# Patient Record
Sex: FEMALE | Race: BLACK OR AFRICAN AMERICAN | NOT HISPANIC OR LATINO | Employment: OTHER | URBAN - METROPOLITAN AREA
[De-identification: names, ages, dates, MRNs, and addresses within clinical notes are randomized per-mention and may not be internally consistent; named-entity substitution may affect disease eponyms.]

---

## 2017-01-04 ENCOUNTER — OFFICE VISIT (OUTPATIENT)
Dept: OBSTETRICS AND GYNECOLOGY | Facility: CLINIC | Age: 62
End: 2017-01-04
Attending: OBSTETRICS & GYNECOLOGY
Payer: COMMERCIAL

## 2017-01-04 ENCOUNTER — HOSPITAL ENCOUNTER (OUTPATIENT)
Dept: RADIOLOGY | Facility: OTHER | Age: 62
Discharge: HOME | End: 2017-01-04
Attending: OBSTETRICS & GYNECOLOGY
Payer: COMMERCIAL

## 2017-01-04 VITALS
SYSTOLIC BLOOD PRESSURE: 150 MMHG | DIASTOLIC BLOOD PRESSURE: 78 MMHG | BODY MASS INDEX: 40.01 KG/M2 | HEIGHT: 64 IN | WEIGHT: 234.38 LBS

## 2017-01-04 DIAGNOSIS — Z12.31 OTHER SCREENING MAMMOGRAM: ICD-10-CM

## 2017-01-04 DIAGNOSIS — Z01.419 ENCOUNTER FOR GYNECOLOGICAL EXAMINATION WITHOUT ABNORMAL FINDING: Primary | ICD-10-CM

## 2017-01-04 DIAGNOSIS — Z12.4 SCREENING FOR CERVICAL CANCER: ICD-10-CM

## 2017-01-04 DIAGNOSIS — N76.0 ACUTE VAGINITIS: ICD-10-CM

## 2017-01-04 PROCEDURE — 77067 SCR MAMMO BI INCL CAD: CPT | Mod: 26,,, | Performed by: RADIOLOGY

## 2017-01-04 PROCEDURE — 77067 SCR MAMMO BI INCL CAD: CPT | Mod: TC

## 2017-01-04 PROCEDURE — 3078F DIAST BP <80 MM HG: CPT | Mod: S$GLB,,, | Performed by: OBSTETRICS & GYNECOLOGY

## 2017-01-04 PROCEDURE — 99396 PREV VISIT EST AGE 40-64: CPT | Mod: S$GLB,,, | Performed by: OBSTETRICS & GYNECOLOGY

## 2017-01-04 PROCEDURE — 3077F SYST BP >= 140 MM HG: CPT | Mod: S$GLB,,, | Performed by: OBSTETRICS & GYNECOLOGY

## 2017-01-04 PROCEDURE — 99999 PR PBB SHADOW E&M-EST. PATIENT-LVL III: CPT | Mod: PBBFAC,,, | Performed by: OBSTETRICS & GYNECOLOGY

## 2017-01-04 PROCEDURE — 88175 CYTOPATH C/V AUTO FLUID REDO: CPT

## 2017-01-04 RX ORDER — TERCONAZOLE 4 MG/G
1 CREAM VAGINAL DAILY
Qty: 45 G | Refills: 1 | Status: SHIPPED | OUTPATIENT
Start: 2017-01-04 | End: 2018-10-23

## 2017-01-04 RX ORDER — DICLOFENAC SODIUM 50 MG/1
TABLET, DELAYED RELEASE ORAL
Refills: 2 | COMMUNITY
Start: 2016-12-30 | End: 2018-03-15 | Stop reason: SDUPTHER

## 2017-01-04 RX ORDER — OXYBUTYNIN CHLORIDE 10 MG/1
TABLET, EXTENDED RELEASE ORAL
Refills: 11 | COMMUNITY
Start: 2016-12-12 | End: 2017-09-20 | Stop reason: ALTCHOICE

## 2017-01-04 NOTE — MR AVS SNAPSHOT
"    [facility], [place], operated by [facility] [address]  [address]  [facility] [address]  Phone: [phone]  Fax: [phone]                  Adriana Paula   2017 10:00 AM   Office Visit    Description:  Female : 1955   Provider:  Tomasa Boston MD   Department:  [facility], [place], operated by [facility] [address]           Reason for Visit     Gynecologic Exam                To Do List           Goals (5 Years of Data)     None      [facility]     [facility] Care [facility] -  Assistance  Registered nurses in the [facility] Call Center provide clinical advisement, health education, appointment booking, and other advisory services.  Call for this free service at [phone].             Medications           Message regarding Medications     Verify the changes and/or additions to your medication regime listed below are the same as discussed with your clinician today.  If any of these changes or additions are incorrect, please notify your healthcare provider.             Verify that the below list of medications is an accurate representation of the medications you are currently taking.  If none reported, the list may be blank. If incorrect, please contact your healthcare provider. Carry this list with you in case of emergency.           Current Medications     amlodipine (NORVASC) 10 MG tablet Take 1 tablet (10 mg total) by mouth once daily.    blood sugar diagnostic (ONETOUCH VERIO) Strp Pt use 4x/day    blood-glucose meter (ONETOUCH VERIO IQ METER) kit Use as instructed    insulin needles, disposable, (BD ULTRA-FINE RUDDY PEN NEEDLES) 32 x 5/32 " Ndle Use with insulin pens    insulin NPH-insulin regular, 70/30, (HUMULIN 70/30) 100 unit/mL (70-30) injection INJECT 35 UNITS UNDER THEN SKIN IN THE MORNING AND 26 UNITS UNDER THE SKIN WITH DINNER    insulin syringe-needle U-100 1 mL 31 gauge x 5/16 Syrg USE TWICE DAILY AS DIRECTED    lancets (ONE TOUCH DELICA LANCETS) 33 gauge Misc 1 lancet by " "Misc.(Non-Drug; Combo Route) route 4 (four) times daily. Pt use 4x/day    losartan (COZAAR) 100 MG tablet Take 1 tablet (100 mg total) by mouth once daily.    metformin (GLUCOPHAGE-XR) 500 MG 24 hr tablet TAKE 2 TABLETS BY MOUTH TWICE DAILY WITH MEALS    tramadol (ULTRAM) 50 mg tablet Take 1-2 tablets ( mg total) by mouth every 6 (six) hours as needed.    aspirin (ECOTRIN) 81 MG EC tablet Take 81 mg by mouth once daily.    diclofenac (VOLTAREN) 50 MG EC tablet     oxybutynin (DITROPAN-XL) 10 MG 24 hr tablet            Clinical Reference Information           Vital Signs - Last Recorded  Most recent update: 1/4/2017 10:18 AM by Kiersten Andrade MA    BP Ht Wt BMI       (!) 150/78 5' 4" (1.626 m) 106.3 kg (234 lb 5.6 oz) 40.23 kg/m2       Blood Pressure          Most Recent Value    BP  (!)  150/78      Allergies as of 1/4/2017     Keflex [Cephalexin]    Penicillins    Sulfa (Sulfonamide Antibiotics)      Immunizations Administered on Date of Encounter - 1/4/2017     None      "

## 2017-01-04 NOTE — PROGRESS NOTES
"SUBJECTIVE:   61 y.o. female   for annual routine Pap and checkup. No LMP recorded. Patient is postmenopausal..  She complains of external vaginal itching. .        Past Medical History   Diagnosis Date    Allergy     Anemia     Anxiety     Arthritis     Cataract     DR (diabetic retinopathy)     Dyslipidemia     GERD (gastroesophageal reflux disease)     Glaucoma     Hypertension     PN (peripheral neuropathy)     Sleep apnea     Type II or unspecified type diabetes mellitus with other specified manifestations, uncontrolled      Past Surgical History   Procedure Laterality Date     section      Myomectomy      Carpal tunnel release      Knee arthroscopy  14     right    Cataract extraction       Social History     Social History    Marital status: Single     Spouse name: N/A    Number of children: N/A    Years of education: N/A     Occupational History    Not on file.     Social History Main Topics    Smoking status: Former Smoker     Start date: 2002    Smokeless tobacco: Never Used    Alcohol use Yes      Comment: socially    Drug use: No    Sexual activity: No     Other Topics Concern    Not on file     Social History Narrative    . 1 daughter. Works as  at [employer] [place] [employer].      Family History   Problem Relation Age of Onset    Arthritis Mother     Cancer Mother     Diabetes Mother     Heart disease Mother     Miscarriages / Stillbirths Mother     Vision loss Mother     No Known Problems Father     Diabetes       "Half of my family"    Breast cancer Maternal Grandmother     Breast cancer Sister     Breast cancer Cousin     No Known Problems Brother     No Known Problems Maternal Aunt     No Known Problems Maternal Uncle     No Known Problems Paternal Aunt     No Known Problems Paternal Uncle     No Known Problems Maternal Grandfather     No Known Problems Paternal Grandmother     No Known Problems Paternal Grandfather " "    Thyroid disease Neg Hx     Ovarian cancer Neg Hx     Colon cancer Neg Hx     Amblyopia Neg Hx     Blindness Neg Hx     Cataracts Neg Hx     Glaucoma Neg Hx     Hypertension Neg Hx     Macular degeneration Neg Hx     Retinal detachment Neg Hx     Strabismus Neg Hx     Stroke Neg Hx      OB History    Para Term  AB SAB TAB Ectopic Multiple Living   2    1 1          # Outcome Date GA Lbr Leroy/2nd Weight Sex Delivery Anes PTL Lv   2       CS-LTranv      1 SAB                     Current Outpatient Prescriptions   Medication Sig Dispense Refill    amlodipine (NORVASC) 10 MG tablet Take 1 tablet (10 mg total) by mouth once daily. 90 tablet 3    blood sugar diagnostic (ONETOUCH VERIO) Strp Pt use 4x/day 200 strip 12    blood-glucose meter (ONETOUCH VERIO IQ METER) kit Use as instructed 200 each 11    insulin needles, disposable, (BD ULTRA-FINE RUDDY PEN NEEDLES) 32 x 5/32 " Ndle Use with insulin pens 200 each 3    insulin NPH-insulin regular, 70/30, (HUMULIN 70/30) 100 unit/mL (70-30) injection INJECT 35 UNITS UNDER THEN SKIN IN THE MORNING AND 26 UNITS UNDER THE SKIN WITH DINNER 40 mL 3    insulin syringe-needle U-100 1 mL 31 gauge x 5/16 Syrg USE TWICE DAILY AS DIRECTED 100 each 11    lancets (ONE TOUCH DELICA LANCETS) 33 gauge Misc 1 lancet by Misc.(Non-Drug; Combo Route) route 4 (four) times daily. Pt use 4x/day 200 each 12    losartan (COZAAR) 100 MG tablet Take 1 tablet (100 mg total) by mouth once daily. 90 tablet 3    metformin (GLUCOPHAGE-XR) 500 MG 24 hr tablet TAKE 2 TABLETS BY MOUTH TWICE DAILY WITH MEALS 360 tablet 2    tramadol (ULTRAM) 50 mg tablet Take 1-2 tablets ( mg total) by mouth every 6 (six) hours as needed. 120 tablet 1    aspirin (ECOTRIN) 81 MG EC tablet Take 81 mg by mouth once daily.      diclofenac (VOLTAREN) 50 MG EC tablet   2    oxybutynin (DITROPAN-XL) 10 MG 24 hr tablet   11    terconazole (TERAZOL 7) 0.4 % Crea Place 1 applicator " vaginally once daily. 45 g 1     No current facility-administered medications for this visit.      Allergies: Keflex [cephalexin]; Penicillins; and Sulfa (sulfonamide antibiotics)     ROS:  Constitutional: no weight loss, weight gain, fever, fatigue  Eyes:  No vision changes, glasses/contacts  ENT/Mouth: No ulcers, sinus problems, ears ringing, headache  Cardiovascular: No inability to lie flat, chest pain, exercise intolerance, swelling, heart palpitations  Respiratory: No wheezing, coughing blood, shortness of breath, or cough  Gastrointestinal: No diarrhea, bloody stool, nausea/vomiting, constipation, gas, hemorrhoids  Genitourinary: No blood in urine, painful urination, urgency of urination, frequency of urination, incomplete emptying, incontinence, abnormal bleeding, painful periods, heavy periods, vaginal discharge, vaginal odor, painful intercourse, sexual problems, bleeding after intercourse, +vaginal itching.  Musculoskeletal: No muscle weakness  Skin/Breast: No painful breasts, nipple discharge, masses, rash, ulcers  Neurological: No passing out, seizures, numbness, headache  Endocrine: +diabetes, hypothyroid, hyperthyroid, hot flashes, hair loss, abnormal hair growth, acne  Psychiatric: No depression, crying  Hematologic: No bruises, bleeding, swollen lymph nodes, anemia.      Physical Exam:   Constitutional: She is oriented to person, place, and time. She appears well-developed and well-nourished.      Neck: Normal range of motion. No tracheal deviation present. No thyromegaly present.    Cardiovascular: Exam reveals no edema.     Pulmonary/Chest: Effort normal. She exhibits no mass, no tenderness, no deformity and no retraction. Right breast exhibits no inverted nipple, no mass, no nipple discharge, no skin change, no tenderness, presence, no bleeding and no swelling. Left breast exhibits no inverted nipple, no mass, no nipple discharge, no skin change, no tenderness, presence, no bleeding and no  swelling. Breasts are symmetrical.        Abdominal: Soft. She exhibits no distension and no mass. There is no tenderness. There is no rebound and no guarding. No hernia. Hernia confirmed negative in the left inguinal area.     Genitourinary: Vagina normal and uterus normal. Rectal exam shows no external hemorrhoid. There is no rash, tenderness or lesion on the right labia. There is no rash, tenderness or lesion on the left labia. Uterus is not deviated. Cervix is normal. No no adexnal prolapse. Right adnexum displays no mass, no tenderness and no fullness. Left adnexum displays no mass, no tenderness and no fullness. No tenderness, bleeding, rectocele, cystocele or unspecified prolapse of vaginal walls in the vagina. No vaginal discharge found. Cervix exhibits no motion tenderness, no discharge and no friability.           Musculoskeletal: Normal range of motion and moves all extremeties. She exhibits no edema.      Lymphadenopathy:        Right: No inguinal adenopathy present.        Left: No inguinal adenopathy present.    Neurological: She is alert and oriented to person, place, and time.    Skin: No rash noted. No erythema. No pallor.    Psychiatric: She has a normal mood and affect. Her behavior is normal. Judgment and thought content normal.         ASSESSMENT:   well woman  Vaginitis  PLAN:   mammogram  pap smear  return annually or prn  Multiple women in her family with breast cancer- discussed genetic testing. She will return for testing

## 2017-01-06 ENCOUNTER — TELEPHONE (OUTPATIENT)
Dept: OBSTETRICS AND GYNECOLOGY | Facility: CLINIC | Age: 62
End: 2017-01-06

## 2017-01-06 NOTE — TELEPHONE ENCOUNTER
----- Message from Yaneli Vyas sent at 1/6/2017  8:15 AM CST -----  Contact: pt   X_  1st Request  _  2nd Request  _  3rd Request    Who:NALDO BARKSDALE [[id number]]    Why: Patient states she would like a call back with the procedure code for her genetic testing     What Number to Call Back: [phone]    When to Expect a call back: (Before the end of the day)   -- if call after 3:00 call back will be tomorrow.

## 2017-01-06 NOTE — TELEPHONE ENCOUNTER
Pt states she is not feeling well and unable to make her appointment today. Pt will call back to reschedule.Pt also wanted the CPT code for the genetic testing. Gave pt billing # to call and get code.

## 2017-01-14 ENCOUNTER — OFFICE VISIT (OUTPATIENT)
Dept: INTERNAL MEDICINE | Facility: CLINIC | Age: 62
End: 2017-01-14
Payer: COMMERCIAL

## 2017-01-14 VITALS
HEIGHT: 65 IN | WEIGHT: 233.69 LBS | TEMPERATURE: 99 F | HEART RATE: 78 BPM | SYSTOLIC BLOOD PRESSURE: 140 MMHG | BODY MASS INDEX: 38.93 KG/M2 | DIASTOLIC BLOOD PRESSURE: 90 MMHG

## 2017-01-14 DIAGNOSIS — J32.9 SINUSITIS, UNSPECIFIED CHRONICITY, UNSPECIFIED LOCATION: Primary | ICD-10-CM

## 2017-01-14 PROCEDURE — 1159F MED LIST DOCD IN RCRD: CPT | Mod: S$GLB,,, | Performed by: INTERNAL MEDICINE

## 2017-01-14 PROCEDURE — 99213 OFFICE O/P EST LOW 20 MIN: CPT | Mod: S$GLB,,, | Performed by: INTERNAL MEDICINE

## 2017-01-14 PROCEDURE — 3080F DIAST BP >= 90 MM HG: CPT | Mod: S$GLB,,, | Performed by: INTERNAL MEDICINE

## 2017-01-14 PROCEDURE — 3077F SYST BP >= 140 MM HG: CPT | Mod: S$GLB,,, | Performed by: INTERNAL MEDICINE

## 2017-01-14 PROCEDURE — 99999 PR PBB SHADOW E&M-EST. PATIENT-LVL III: CPT | Mod: PBBFAC,,, | Performed by: INTERNAL MEDICINE

## 2017-01-14 RX ORDER — DOXYCYCLINE 100 MG/1
100 CAPSULE ORAL 2 TIMES DAILY
Qty: 14 CAPSULE | Refills: 0 | Status: SHIPPED | OUTPATIENT
Start: 2017-01-14 | End: 2017-01-21

## 2017-01-14 NOTE — PROGRESS NOTES
"Subjective:       Patient ID: Adriana Paula is a 61 y.o. female.    Chief Complaint: Sinusitis    HPI    Patient of Amrit Abbott MD, here today for Urgent Care visit. PMH of HTN, back pain, DM, HLD, HA, MARINA. Last A1c 8.3%.    Feels like sinus infexn, had in past. Started as cold with chest pain and coughing and sneezing. Felt better and then recurs. Now with sinus pressure, coughing and sore throat.  At least 2 weeks. Sinuses for past week. No fever or chills, some subjective fever. No nausea or vomiting. Some chest pain (soreness) with coughing. Taking tramadol for osteoarthritis, using for some head pain relief. Also using Tussin-DM. Not using any nasal spray.    Review of Systems    As per HPI    Objective:      Physical Exam   Constitutional: She is oriented to person, place, and time. No distress (does have some mild malaise).   -American woman whose Body mass index is 38.89 kg/(m^2).    HENT:   Right Ear: Tympanic membrane normal.   Left Ear: Tympanic membrane normal.   Nose: Right sinus exhibits frontal sinus tenderness. Right sinus exhibits no maxillary sinus tenderness. Left sinus exhibits frontal sinus tenderness. Left sinus exhibits no maxillary sinus tenderness.   Mouth/Throat: Oropharynx is clear and moist. No oropharyngeal exudate.   Neck: No thyromegaly present.   Cardiovascular: Normal rate, regular rhythm and normal heart sounds.    Pulmonary/Chest: Effort normal and breath sounds normal. No stridor. She has no wheezes. She has no rales.   Lymphadenopathy:     She has no cervical adenopathy.   Neurological: She is alert and oriented to person, place, and time.   Skin: She is not diaphoretic.   Nursing note and vitals reviewed.      Vitals:    01/14/17 0834   BP: (!) 140/90   Pulse: 78   Temp: 98.7 °F (37.1 °C)   Weight: 106 kg (233 lb 11 oz)   Height: 5' 5" (1.651 m)     Body mass index is 38.89 kg/(m^2).    RESULTS: Reviewed labs from last 6 months    Assessment:       1. Sinusitis, " "unspecified chronicity, unspecified location        Plan:   Adriana was seen today for sinusitis.    Diagnoses and all orders for this visit:    Sinusitis, unspecified chronicity, unspecified location:  Sx for over 1 week with pressure, concern for bacterial etiology, will start doxy, counseled about side effects. Also recommendations given for over-the-counter relief, continue Tussin-DM:  -     doxycycline (VIBRAMYCIN) 100 MG Cap; Take 1 capsule (100 mg total) by mouth 2 (two) times daily.  "For symptomatic relief of sinus congestion, take over-the-counter Flonase, 2 sprays each nose once a day; use every day for 1 week. You can also use over-the-counter antihistamines like Claritin, Zyrtec, or Allegra once daily during the day (generic versions are just as good). If problems persist at night, please use over-the-counter Benadryl; this usually causes people to feel drowsy."  Keep regular follow up appointments with Amrit Abbott MD.     Tevin Ruth MD  Internal Medicine    Portions of this note were completed using Dragon medical dictation software. Please excuse typographical or syntax errors.   "

## 2017-01-14 NOTE — PATIENT INSTRUCTIONS
Take doxycycline with a full glass of water and do not lay down for at least 1 hour afterward to avoid esophagus irritation. This medication can also cause patients to get sunburned more easily, so wear sunscreen and skin covering when out in the sun.     For symptomatic relief of sinus congestion, take over-the-counter Flonase, 2 sprays each nose once a day; use every day for 1 week. You can also use over-the-counter antihistamines like Claritin, Zyrtec, or Allegra once daily during the day (generic versions are just as good). If problems persist at night, please use over-the-counter Benadryl; this usually causes people to feel drowsy.

## 2017-01-14 NOTE — MR AVS SNAPSHOT
[facility]  [address]  [facility] [address]  Phone: [phone]  Fax: [phone]                  Adriana Paula   2017 8:00 AM   Office Visit    Description:  Female : 1955   Provider:  Tevin Ruth MD   Department:  [facility]           Reason for Visit     Sinusitis           Diagnoses this Visit        Comments    Sinusitis, unspecified chronicity, unspecified location    -  Primary            To Do List           Goals (5 Years of Data)     None       These Medications        Disp Refills Start End    doxycycline (VIBRAMYCIN) 100 MG Cap 14 capsule 0 2017    Take 1 capsule (100 mg total) by mouth 2 (two) times daily. - Oral    Pharmacy: [facility] [address] [place] [address] [address] [place] [address] AT [facility] Ph #: [phone]         [facility] On Call     [facility] On Call Nurse Care Line -  Assistance  Registered nurses in the [facility] Center provide clinical advisement, health education, appointment booking, and other advisory services.  Call for this free service at [phone].             Medications           Message regarding Medications     Verify the changes and/or additions to your medication regime listed below are the same as discussed with your clinician today.  If any of these changes or additions are incorrect, please notify your healthcare provider.        START taking these NEW medications        Refills    doxycycline (VIBRAMYCIN) 100 MG Cap 0    Sig: Take 1 capsule (100 mg total) by mouth 2 (two) times daily.    Class: Normal    Route: Oral           Verify that the below list of medications is an accurate representation of the medications you are currently taking.  If none reported, the list may be blank. If incorrect, please contact your healthcare provider. Carry this list with you in case of emergency.           Current Medications     amlodipine (NORVASC) 10 MG  "tablet Take 1 tablet (10 mg total) by mouth once daily.    aspirin (ECOTRIN) 81 MG EC tablet Take 81 mg by mouth once daily.    blood sugar diagnostic (ONETOUCH VERIO) Strp Pt use 4x/day    blood-glucose meter (ONETOUCH VERIO IQ METER) kit Use as instructed    insulin needles, disposable, (BD ULTRA-FINE RUDDY PEN NEEDLES) 32 x 5/32 " Ndle Use with insulin pens    insulin NPH-insulin regular, 70/30, (HUMULIN 70/30) 100 unit/mL (70-30) injection INJECT 35 UNITS UNDER THEN SKIN IN THE MORNING AND 26 UNITS UNDER THE SKIN WITH DINNER    insulin syringe-needle U-100 1 mL 31 gauge x 5/16 Syrg USE TWICE DAILY AS DIRECTED    lancets (ONE TOUCH DELICA LANCETS) 33 gauge Misc 1 lancet by Misc.(Non-Drug; Combo Route) route 4 (four) times daily. Pt use 4x/day    losartan (COZAAR) 100 MG tablet Take 1 tablet (100 mg total) by mouth once daily.    metformin (GLUCOPHAGE-XR) 500 MG 24 hr tablet TAKE 2 TABLETS BY MOUTH TWICE DAILY WITH MEALS    oxybutynin (DITROPAN-XL) 10 MG 24 hr tablet     terconazole (TERAZOL 7) 0.4 % Crea Place 1 applicator vaginally once daily.    tramadol (ULTRAM) 50 mg tablet Take 1-2 tablets ( mg total) by mouth every 6 (six) hours as needed.    diclofenac (VOLTAREN) 50 MG EC tablet     doxycycline (VIBRAMYCIN) 100 MG Cap Take 1 capsule (100 mg total) by mouth 2 (two) times daily.           Clinical Reference Information           Vital Signs - Last Recorded  Most recent update: 1/14/2017  8:36 AM by Virgen Jarvis MA    BP Pulse Temp Ht Wt BMI    (!) 140/90 78 98.7 °F (37.1 °C) 5' 5" (1.651 m) 106 kg (233 lb 11 oz) 38.89 kg/m2      Blood Pressure          Most Recent Value    BP  (!)  140/90      Allergies as of 1/14/2017     Keflex [Cephalexin]    Penicillins    Sulfa (Sulfonamide Antibiotics)      Immunizations Administered on Date of Encounter - 1/14/2017     None      Instructions    Take doxycycline with a full glass of water and do not lay down for at least 1 hour afterward to avoid esophagus " irritation. This medication can also cause patients to get sunburned more easily, so wear sunscreen and skin covering when out in the sun.     For symptomatic relief of sinus congestion, take over-the-counter Flonase, 2 sprays each nose once a day; use every day for 1 week. You can also use over-the-counter antihistamines like Claritin, Zyrtec, or Allegra once daily during the day (generic versions are just as good). If problems persist at night, please use over-the-counter Benadryl; this usually causes people to feel drowsy.

## 2017-01-27 ENCOUNTER — TELEPHONE (OUTPATIENT)
Dept: INTERNAL MEDICINE | Facility: CLINIC | Age: 62
End: 2017-01-27

## 2017-01-31 ENCOUNTER — OFFICE VISIT (OUTPATIENT)
Dept: INTERNAL MEDICINE | Facility: CLINIC | Age: 62
End: 2017-01-31
Payer: COMMERCIAL

## 2017-01-31 ENCOUNTER — LAB VISIT (OUTPATIENT)
Dept: LAB | Facility: HOSPITAL | Age: 62
End: 2017-01-31
Attending: FAMILY MEDICINE
Payer: COMMERCIAL

## 2017-01-31 VITALS — DIASTOLIC BLOOD PRESSURE: 89 MMHG | HEART RATE: 70 BPM | SYSTOLIC BLOOD PRESSURE: 138 MMHG

## 2017-01-31 DIAGNOSIS — E11.39 TYPE 2 DIABETES MELLITUS WITH OTHER OPHTHALMIC COMPLICATION, WITHOUT LONG-TERM CURRENT USE OF INSULIN: ICD-10-CM

## 2017-01-31 DIAGNOSIS — E78.5 DYSLIPIDEMIA: ICD-10-CM

## 2017-01-31 DIAGNOSIS — G47.33 OBSTRUCTIVE SLEEP APNEA (ADULT) (PEDIATRIC): ICD-10-CM

## 2017-01-31 DIAGNOSIS — E13.3299: ICD-10-CM

## 2017-01-31 DIAGNOSIS — E11.39 TYPE 2 DIABETES MELLITUS WITH OTHER OPHTHALMIC COMPLICATION, WITHOUT LONG-TERM CURRENT USE OF INSULIN: Primary | ICD-10-CM

## 2017-01-31 DIAGNOSIS — E66.09 OBESITY DUE TO EXCESS CALORIES, UNSPECIFIED OBESITY SEVERITY: ICD-10-CM

## 2017-01-31 PROCEDURE — 3079F DIAST BP 80-89 MM HG: CPT | Mod: S$GLB,,, | Performed by: FAMILY MEDICINE

## 2017-01-31 PROCEDURE — 1159F MED LIST DOCD IN RCRD: CPT | Mod: S$GLB,,, | Performed by: FAMILY MEDICINE

## 2017-01-31 PROCEDURE — 99214 OFFICE O/P EST MOD 30 MIN: CPT | Mod: S$GLB,,, | Performed by: FAMILY MEDICINE

## 2017-01-31 PROCEDURE — 4010F ACE/ARB THERAPY RXD/TAKEN: CPT | Mod: S$GLB,,, | Performed by: FAMILY MEDICINE

## 2017-01-31 PROCEDURE — 3045F PR MOST RECENT HEMOGLOBIN A1C LEVEL 7.0-9.0%: CPT | Mod: S$GLB,,, | Performed by: FAMILY MEDICINE

## 2017-01-31 PROCEDURE — 3075F SYST BP GE 130 - 139MM HG: CPT | Mod: S$GLB,,, | Performed by: FAMILY MEDICINE

## 2017-01-31 PROCEDURE — 36415 COLL VENOUS BLD VENIPUNCTURE: CPT

## 2017-01-31 PROCEDURE — 99999 PR PBB SHADOW E&M-EST. PATIENT-LVL II: CPT | Mod: PBBFAC,,, | Performed by: FAMILY MEDICINE

## 2017-01-31 PROCEDURE — 83036 HEMOGLOBIN GLYCOSYLATED A1C: CPT

## 2017-01-31 RX ORDER — ATORVASTATIN CALCIUM 40 MG/1
40 TABLET, FILM COATED ORAL DAILY
Qty: 90 TABLET | Refills: 3 | Status: SHIPPED | OUTPATIENT
Start: 2017-01-31 | End: 2018-03-15 | Stop reason: SDUPTHER

## 2017-01-31 NOTE — PROGRESS NOTES
Subjective:       Patient ID: Adriana Paula is a 61 y.o. female.    Chief Complaint: No chief complaint on file.  Adriana Paula 61 y.o. female is here for office visit to review care and physical exam, uses 32 U AM and 20 PM 70/30 reports CBGs very variable.  Misses PM dose a lot 2nd to life still hectic.        HPI  Review of Systems   Constitutional: Negative for activity change, fatigue, fever and unexpected weight change.   HENT: Negative for congestion, hearing loss, postnasal drip and rhinorrhea.    Eyes: Negative for redness and visual disturbance.   Respiratory: Negative for chest tightness, shortness of breath and wheezing.    Cardiovascular: Negative for chest pain, palpitations and leg swelling.   Gastrointestinal: Negative for abdominal distention.   Genitourinary: Negative for decreased urine volume, dysuria, flank pain, hematuria, pelvic pain and urgency.   Musculoskeletal: Negative for back pain, gait problem, joint swelling and neck stiffness.   Skin: Negative for color change, rash and wound.   Neurological: Negative for dizziness, syncope, weakness and headaches.   Psychiatric/Behavioral: Negative for behavioral problems, confusion and sleep disturbance. The patient is not nervous/anxious.        Objective:      Physical Exam   Constitutional: She is oriented to person, place, and time. She appears well-developed and well-nourished. No distress.   HENT:   Head: Normocephalic.   Mouth/Throat: No oropharyngeal exudate.   Eyes: EOM are normal. Pupils are equal, round, and reactive to light. No scleral icterus.   Neck: Neck supple. No JVD present. No thyromegaly present.   Cardiovascular: Normal rate, regular rhythm and normal heart sounds.  Exam reveals no gallop and no friction rub.    No murmur heard.  Pulmonary/Chest: Effort normal and breath sounds normal. She has no wheezes. She has no rales.   Abdominal: Soft. Bowel sounds are normal. She exhibits no distension and no mass. There is no  tenderness. There is no guarding.   Musculoskeletal: Normal range of motion. She exhibits no edema.   Lymphadenopathy:     She has no cervical adenopathy.   Neurological: She is alert and oriented to person, place, and time. She has normal reflexes. She displays normal reflexes. No cranial nerve deficit. She exhibits normal muscle tone.   Skin: Skin is warm. No rash noted. No erythema.   Psychiatric: She has a normal mood and affect. Thought content normal.       Assessment:       No diagnosis found.    Plan:       Diagnoses and all orders for this visit:    Type 2 diabetes mellitus with other ophthalmic complication, without long-term current use of insulin  -     Hemoglobin A1c; Future    Mild nonproliferative diabetic retinopathy without macular edema associated with diabetes mellitus of other type  - eye exam  Dyslipidemia  - refill meds, labs three mo  Obstructive sleep apnea (adult) (pediatric)  - needs to Use  Obesity due to excess calories, unspecified obesity severity  - diet duscussed, has some weight loss  Other orders  -     atorvastatin (LIPITOR) 40 MG tablet; Take 1 tablet (40 mg total) by mouth once daily.

## 2017-01-31 NOTE — MR AVS SNAPSHOT
"    [facility]  [address]  [place] [address]  Phone: [phone]  Fax: [phone]                  Adriana Paula   2017 2:00 PM   Office Visit    Description:  Female : 1955   Provider:  Amrit Abbott MD   Department:  [facility] - Internal Medicine           Diagnoses this Visit        Comments    Type 2 diabetes mellitus with other ophthalmic complication, without long-term current use of insulin    -  Primary     Mild nonproliferative diabetic retinopathy without macular edema associated with diabetes mellitus of other type         Dyslipidemia         Obstructive sleep apnea (adult) (pediatric)         Obesity due to excess calories, unspecified obesity severity                To Do List           Goals (5 Years of Data)     None      Follow-Up and Disposition     Return in about 3 months (around 2017), or if symptoms worsen or fail to improve.    Follow-up and Disposition History       These Medications        Disp Refills Start End    atorvastatin (LIPITOR) 40 MG tablet 90 tablet 3 2017    Take 1 tablet (40 mg total) by mouth once daily. - Oral    Pharmacy: [facility] [address] Ph #: [phone]       insulin detemir (LEVEMIR FLEXTOUCH) 100 unit/mL (3 mL) SubQ InPn pen 1 Box 0 2017    Inject 30 Units into the skin every evening. - Subcutaneous    Pharmacy: [facility] [address] AT [address] Ph #: [phone]       needle, disp, 31 gauge 31 gauge x 5/16" Ndle 100 each 11 2017     1 application by Misc.(Non-Drug; Combo Route) route Daily. - Misc.(Non-Drug; Combo Route)    Pharmacy: [facility] [address] AT [address] Ph #: [phone]         [facility]     [facility] -  Assistance  Registered nurses in " "the [facility] provide clinical advisement, health education, appointment booking, and other advisory services.  Call for this free service at [phone].             Medications           Message regarding Medications     Verify the changes and/or additions to your medication regime listed below are the same as discussed with your clinician today.  If any of these changes or additions are incorrect, please notify your healthcare provider.        START taking these NEW medications        Refills    atorvastatin (LIPITOR) 40 MG tablet 3    Sig: Take 1 tablet (40 mg total) by mouth once daily.    Class: Normal    Route: Oral    insulin detemir (LEVEMIR FLEXTOUCH) 100 unit/mL (3 mL) SubQ InPn pen 0    Sig: Inject 30 Units into the skin every evening.    Class: Normal    Route: Subcutaneous    needle, disp, 31 gauge 31 gauge x 5/16" Ndle 11    Si application by Misc.(Non-Drug; Combo Route) route Daily.    Class: Normal    Route: Misc.(Non-Drug; Combo Route)           Verify that the below list of medications is an accurate representation of the medications you are currently taking.  If none reported, the list may be blank. If incorrect, please contact your healthcare provider. Carry this list with you in case of emergency.           Current Medications     amlodipine (NORVASC) 10 MG tablet Take 1 tablet (10 mg total) by mouth once daily.    aspirin (ECOTRIN) 81 MG EC tablet Take 81 mg by mouth once daily.    atorvastatin (LIPITOR) 40 MG tablet Take 1 tablet (40 mg total) by mouth once daily.    blood sugar diagnostic (ONETOUCH VERIO) Strp Pt use 4x/day    blood-glucose meter (ONETOUCH VERIO IQ METER) kit Use as instructed    diclofenac (VOLTAREN) 50 MG EC tablet     insulin detemir (LEVEMIR FLEXTOUCH) 100 unit/mL (3 mL) SubQ InPn pen Inject 30 Units into the skin every evening.    insulin needles, disposable, (BD ULTRA-FINE RUDDY PEN NEEDLES) 32 x 5/32 " Ndle Use with insulin pens    insulin " "NPH-insulin regular, 70/30, (HUMULIN 70/30) 100 unit/mL (70-30) injection INJECT 35 UNITS UNDER THEN SKIN IN THE MORNING AND 26 UNITS UNDER THE SKIN WITH DINNER    insulin syringe-needle U-100 1 mL 31 gauge x 5/16 Syrg USE TWICE DAILY AS DIRECTED    lancets (ONE TOUCH DELICA LANCETS) 33 gauge Misc 1 lancet by Misc.(Non-Drug; Combo Route) route 4 (four) times daily. Pt use 4x/day    losartan (COZAAR) 100 MG tablet Take 1 tablet (100 mg total) by mouth once daily.    metformin (GLUCOPHAGE-XR) 500 MG 24 hr tablet TAKE 2 TABLETS BY MOUTH TWICE DAILY WITH MEALS    needle, disp, 31 gauge 31 gauge x 5/16" Ndle 1 application by Misc.(Non-Drug; Combo Route) route Daily.    oxybutynin (DITROPAN-XL) 10 MG 24 hr tablet     terconazole (TERAZOL 7) 0.4 % Crea Place 1 applicator vaginally once daily.    tramadol (ULTRAM) 50 mg tablet Take 1-2 tablets ( mg total) by mouth every 6 (six) hours as needed.           Clinical Reference Information           Vital Signs - Last Recorded  Most recent update: 1/31/2017  2:12 PM by Amrit Abbott MD    BP Pulse                138/89 70          Blood Pressure          Most Recent Value    BP  138/89      Allergies as of 1/31/2017     Keflex [Cephalexin]    Penicillins    Sulfa (Sulfonamide Antibiotics)      Immunizations Administered on Date of Encounter - 1/31/2017     None      Orders Placed During Today's Visit     Future Labs/Procedures Expected by Expires    Hemoglobin A1c  1/31/2017 1/31/2018    Diabetic Eye Screening Photo  As directed 1/31/2018      "

## 2017-02-01 ENCOUNTER — TELEPHONE (OUTPATIENT)
Dept: INTERNAL MEDICINE | Facility: CLINIC | Age: 62
End: 2017-02-01

## 2017-02-01 LAB
ESTIMATED AVG GLUCOSE: 235 MG/DL
HBA1C MFR BLD HPLC: 9.8 %

## 2017-02-02 ENCOUNTER — TELEPHONE (OUTPATIENT)
Dept: INTERNAL MEDICINE | Facility: CLINIC | Age: 62
End: 2017-02-02

## 2017-02-02 NOTE — TELEPHONE ENCOUNTER
"----- Message from Venkata Valle MA sent at 2/2/2017  2:51 PM CST -----  Contact: Candie sims/Oriana - [phone]   Type: Rx    Name of medication(s): insulin needles, disposable, (BD ULTRA-FINE RUDDY PEN NEEDLES) 32 x 5/32 " Ndle    Is this a refill? New rx? Refill     Who prescribed medication?    Pharmacy Name, Phone, & Location:    Comments: Please call. Thanks!   "

## 2017-02-03 ENCOUNTER — PATIENT MESSAGE (OUTPATIENT)
Dept: INTERNAL MEDICINE | Facility: CLINIC | Age: 62
End: 2017-02-03

## 2017-02-03 ENCOUNTER — LAB VISIT (OUTPATIENT)
Dept: LAB | Facility: HOSPITAL | Age: 62
End: 2017-02-03
Payer: COMMERCIAL

## 2017-02-03 ENCOUNTER — CLINICAL SUPPORT (OUTPATIENT)
Dept: DIABETES | Facility: CLINIC | Age: 62
End: 2017-02-03
Payer: COMMERCIAL

## 2017-02-03 VITALS
BODY MASS INDEX: 38.2 KG/M2 | HEART RATE: 78 BPM | WEIGHT: 229.25 LBS | SYSTOLIC BLOOD PRESSURE: 140 MMHG | DIASTOLIC BLOOD PRESSURE: 82 MMHG | HEIGHT: 65 IN

## 2017-02-03 DIAGNOSIS — Z79.4 TYPE 2 DIABETES MELLITUS WITH HYPERGLYCEMIA, WITH LONG-TERM CURRENT USE OF INSULIN: Primary | Chronic | ICD-10-CM

## 2017-02-03 DIAGNOSIS — I10 ESSENTIAL HYPERTENSION: Chronic | ICD-10-CM

## 2017-02-03 DIAGNOSIS — E11.42 TYPE 2 DIABETES MELLITUS WITH DIABETIC POLYNEUROPATHY, WITH LONG-TERM CURRENT USE OF INSULIN: Chronic | ICD-10-CM

## 2017-02-03 DIAGNOSIS — Z79.4 TYPE 2 DIABETES MELLITUS WITH DIABETIC POLYNEUROPATHY, WITH LONG-TERM CURRENT USE OF INSULIN: Chronic | ICD-10-CM

## 2017-02-03 DIAGNOSIS — E11.9 TYPE 2 DIABETES MELLITUS WITHOUT COMPLICATION, WITH LONG-TERM CURRENT USE OF INSULIN: ICD-10-CM

## 2017-02-03 DIAGNOSIS — E11.3293 TYPE 2 DIABETES MELLITUS WITH BOTH EYES AFFECTED BY MILD NONPROLIFERATIVE RETINOPATHY WITHOUT MACULAR EDEMA, WITH LONG-TERM CURRENT USE OF INSULIN: Chronic | ICD-10-CM

## 2017-02-03 DIAGNOSIS — E11.65 TYPE 2 DIABETES MELLITUS WITH HYPERGLYCEMIA, WITH LONG-TERM CURRENT USE OF INSULIN: Primary | Chronic | ICD-10-CM

## 2017-02-03 DIAGNOSIS — E78.5 DYSLIPIDEMIA: ICD-10-CM

## 2017-02-03 DIAGNOSIS — E11.65 TYPE 2 DIABETES MELLITUS WITH HYPERGLYCEMIA, WITH LONG-TERM CURRENT USE OF INSULIN: Chronic | ICD-10-CM

## 2017-02-03 DIAGNOSIS — E11.9 TYPE 2 DIABETES MELLITUS WITHOUT COMPLICATION, WITH LONG-TERM CURRENT USE OF INSULIN: Primary | ICD-10-CM

## 2017-02-03 DIAGNOSIS — Z79.4 TYPE 2 DIABETES MELLITUS WITHOUT COMPLICATION, WITH LONG-TERM CURRENT USE OF INSULIN: ICD-10-CM

## 2017-02-03 DIAGNOSIS — Z79.4 TYPE 2 DIABETES MELLITUS WITH BOTH EYES AFFECTED BY MILD NONPROLIFERATIVE RETINOPATHY WITHOUT MACULAR EDEMA, WITH LONG-TERM CURRENT USE OF INSULIN: Chronic | ICD-10-CM

## 2017-02-03 DIAGNOSIS — K21.9 GASTROESOPHAGEAL REFLUX DISEASE WITHOUT ESOPHAGITIS: Chronic | ICD-10-CM

## 2017-02-03 DIAGNOSIS — Z79.4 TYPE 2 DIABETES MELLITUS WITH HYPERGLYCEMIA, WITH LONG-TERM CURRENT USE OF INSULIN: Chronic | ICD-10-CM

## 2017-02-03 DIAGNOSIS — E66.9 OBESITY (BMI 30-39.9): Chronic | ICD-10-CM

## 2017-02-03 DIAGNOSIS — Z79.4 TYPE 2 DIABETES MELLITUS WITHOUT COMPLICATION, WITH LONG-TERM CURRENT USE OF INSULIN: Primary | ICD-10-CM

## 2017-02-03 LAB
ANION GAP SERPL CALC-SCNC: 8 MMOL/L
BUN SERPL-MCNC: 10 MG/DL
CALCIUM SERPL-MCNC: 9.3 MG/DL
CHLORIDE SERPL-SCNC: 100 MMOL/L
CO2 SERPL-SCNC: 26 MMOL/L
CREAT SERPL-MCNC: 0.8 MG/DL
EST. GFR  (AFRICAN AMERICAN): >60 ML/MIN/1.73 M^2
EST. GFR  (NON AFRICAN AMERICAN): >60 ML/MIN/1.73 M^2
GLUCOSE SERPL-MCNC: 287 MG/DL
POTASSIUM SERPL-SCNC: 4 MMOL/L
SODIUM SERPL-SCNC: 134 MMOL/L

## 2017-02-03 PROCEDURE — G0108 DIAB MANAGE TRN  PER INDIV: HCPCS | Mod: S$GLB,,, | Performed by: DIETITIAN, REGISTERED

## 2017-02-03 PROCEDURE — 80048 BASIC METABOLIC PNL TOTAL CA: CPT

## 2017-02-03 PROCEDURE — 36415 COLL VENOUS BLD VENIPUNCTURE: CPT

## 2017-02-03 PROCEDURE — 99214 OFFICE O/P EST MOD 30 MIN: CPT | Mod: S$GLB,,, | Performed by: NURSE PRACTITIONER

## 2017-02-03 PROCEDURE — 99999 PR PBB SHADOW E&M-EST. PATIENT-LVL III: CPT | Mod: PBBFAC,,,

## 2017-02-03 PROCEDURE — 82570 ASSAY OF URINE CREATININE: CPT

## 2017-02-03 RX ORDER — PEN NEEDLE, DIABETIC 30 GX3/16"
NEEDLE, DISPOSABLE MISCELLANEOUS
Qty: 100 EACH | Refills: 4 | Status: SHIPPED | OUTPATIENT
Start: 2017-02-03 | End: 2017-02-03 | Stop reason: SDUPTHER

## 2017-02-03 RX ORDER — METFORMIN HYDROCHLORIDE 500 MG/1
2000 TABLET, EXTENDED RELEASE ORAL DAILY
Qty: 360 TABLET | Refills: 2 | Status: SHIPPED | OUTPATIENT
Start: 2017-02-03 | End: 2017-12-27 | Stop reason: SDUPTHER

## 2017-02-03 RX ORDER — PEN NEEDLE, DIABETIC 30 GX3/16"
NEEDLE, DISPOSABLE MISCELLANEOUS
Qty: 100 EACH | Refills: 4 | Status: SHIPPED | OUTPATIENT
Start: 2017-02-03 | End: 2017-03-01 | Stop reason: SDUPTHER

## 2017-02-03 RX ORDER — PEN NEEDLE, DIABETIC 29 G X1/2"
1 NEEDLE, DISPOSABLE MISCELLANEOUS DAILY
Qty: 100 EACH | Refills: 11 | Status: SHIPPED | OUTPATIENT
Start: 2017-02-03 | End: 2017-02-03

## 2017-02-03 NOTE — PROGRESS NOTES
CC:  Diabetes.     HPI: Adriana Paula is a 61 y.o. female presents for visit in [facility]. The patient has had diabetes along with associated comorbidities indicated in the Visit Diagnosis section of this encounter. The patient was diagnosed with Type 2 diabetes in ~1993. Diagnosed during 1st pregnancy, then diagnosed as Type 2 DM.     VISIT #: 1    1st visit - Today, presents with no BG logs, appt scheduled after PCP visit today.     DIABETES COMPLICATIONS: retinopathy and peripheral neuropathy     HOSPITALIZATIONS FOR DIABETES OR RELATED COMPLICATIONS:  No    CURRENT A1C:    Hemoglobin A1C   Date Value Ref Range Status   01/31/2017 9.8 (H) 4.5 - 6.2 % Final     Comment:     According to ADA guidelines, hemoglobin A1C <7.0% represents  optimal control in non-pregnant diabetic patients.  Different  metrics may apply to specific populations.   Standards of Medical Care in Diabetes - 2016.  For the purpose of screening for the presence of diabetes:  <5.7%     Consistent with the absence of diabetes  5.7-6.4%  Consistent with increasing risk for diabetes   (prediabetes)  >or=6.5%  Consistent with diabetes  Currently no consensus exists for use of hemoglobin A1C  for diagnosis of diabetes for children.     10/28/2016 8.3 (H) 4.5 - 6.2 % Final     Comment:     According to ADA guidelines, hemoglobin A1C <7.0% represents  optimal control in non-pregnant diabetic patients.  Different  metrics may apply to specific populations.   Standards of Medical Care in Diabetes - 2016.  For the purpose of screening for the presence of diabetes:  <5.7%     Consistent with the absence of diabetes  5.7-6.4%  Consistent with increasing risk for diabetes   (prediabetes)  >or=6.5%  Consistent with diabetes  Currently no consensus exists for use of hemoglobin A1C  for diagnosis of diabetes for children.     05/19/2016 10.4 (H) 4.5 - 6.2 % Final       DM MEDICATIONS USED IN THE PAST: Novolog, Levemir, Metformin,  70/30    MEDICATIONS UPON START OF PROGRAM: Humulin 34 qAM before breakfast and 22 qPM, Metformin XR 1000 mg BID  Approx twice per week, misses PM doses of insulin and Metformin   Takes doses approx 11 hours apart   AM dose ~10am, PM dose ~7am    CURRENT DIABETES MEDICATIONS: Humulin 34 qAM before breakfast and 22 qPM, Metformin XR 1000 mg BID  Approx twice per week, misses PM doses of insulin and Metformin   Takes doses approx 11 hours apart   AM dose ~10am, PM dose ~7am    ANY DIFFICULTY AFFORDING YOUR CURRENT MEDICATION REGIMEN?  No    CGMS interpretation:  To be performed     DO YOU USE THE [facility] EMAIL SYSTEM? Yes      STANDARDS of CARE:  Statin:  Yes - lipitor 40 mg   ACEI or ARB:  Yes -  ARB  ASA:  Yes - 81 mg   Last eye exam 1/2016 - + mild retinopathy   Microalbumin/Creatinine ratio:  Lab Results   Component Value Date    MICALBCREAT 7.5 07/11/2013       BLOOD GLUCOSE MONITORING:  Checking BG 1-2 times daily 80's-300's - huge variability with no consistency     HYPOGLYCEMIA:  No    CURRENT DAILY ROUTINE (meals/meds):   Eating 4     10am - B - oatmeal, fruit & muffin   1-2pm/ L - largest meal.  5pm - snacks on nuts, fruit, string cheese  8pm - D - hot meal home cooked, or take out     CURRENT EXERCISE:  No    OCCUPATION: works in an office, credentialing department at [employer]     MEDICATIONS, ALLERGIES, LABS, VS's, MEDICAL, SURGICAL, SOCIAL, AND FAMILY HISTORY reviewed and updated in the appropriate sections during this encounter    ROS:     Constitutional: Negative for weight change  Endocrine:  + polyuria, polydipsia, nocturia.  HENT: Denies neck swelling, lumps, horseness or trouble swallowing. Denies any personal or family history of thyroid cancer.    Eyes: + blurry vision in right eye, needs cataract surgery in right   Respiratory: Negative for cough or shortness or breath.  Cardiovascular: Negative for chest pain or claudication.   Gastrointestinal: Negative for nausea, diarrhea, vomiting, bloating.  " No history of pancreatitis or gastroparesis.  Genitourinary: Negative for urgency, frequency, frequent urinary tract infections.  Skin: Denies prolonged wound healing.  Neurological: Negative for syncope, + numbness/burning of extremities.  Psychiatric/Behavioral: Negative for depression or anxiety      All other systems reviewed and are negative.    PE:  Constitutional:   Visit Vitals    BP (!) 140/82    Pulse 78    Ht 5' 5" (1.651 m)    Wt 104 kg (229 lb 4.5 oz)    BMI 38.15 kg/m2      Well developed, well nourished, NAD.    HENT:   External ears, nose: no masses. No significant findings.   Hearing: normal     Neck:  No trachial deviation present, No neck masses noticed   Thyroid:  No thyromegaly present.  No thyroid tenderness.      Cardiovascular:    Auscultation:  No murmurs or abnormal sounds   Lower extremities:  No edema or cyanosis.     Respiratory:    Effort:  Normal, no use of accessory muscles.   Auscultation: breath sounds normal, no adventitious sounds.  Abdomen:     Exam:  Soft, non-tender, no masses.      No hernia noted.  Skin:    Inspection: no rashes, lesion or ulcers, + acanthosis nigracans.   Palpation: no induration or nodules.   Insulin injection sites: no lipoatrophy or lipohypertrophy.  Psychiatric:  Judgement and insight good with normal mood and affect.  Musculoskeletal:  Gait steady. No gross abnormalities.        FOOT EXAMINATION:   Protective Sensation (w/ 10 gram monofilament):  Right: Intact  Left: Intact    Visual Inspection:  Callus -  bilateral heels    Pedal Pulses:   Right: Present  Left: Present    Posterior tibialis:   Right:Present  Left: Present      Decreased vibratory response to 128 Hz tuning fork bilaterally.     ______________________________________________________________________     ASSESSMENT and PLAN:    1- Type 2 diabetes with neuropathy, retinopathy and hyperglycemia - A1c elevated, BG markedly variable without adequate BG control on logs. Reviewed A1c and " BG goals.  Discussed diagnosis of DM, progression of disease, long term complications and tx options, including changing to analog insulin, GLP-1, SGLT-2 medications. For compliance reasons, change Metformin XR to once daily, start at 1000 mg daily and titrate 500 mg weekly to goal of 2000 mg daily (if diarrhea, can decrease to 1000 mg daily). D/c 70/30. Start Victoza at 0.6 mg/day x2 week, then increase to 1.2 mg/day x2 week. IF BG remains >140, then can increase to 1.8 mg/day thereafte (insurance only covers 1.2 mg dose for 1st month).  If experience nausea or vomiting, decrease dose to previously lower dose x1 week, then continue to increase dose weekly to goal dose of 1.8 mg/day. Notify Endocrine NP if nausea or vomiting does not improve with dose adjustment.  Start Levemir 40 units daily.      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 Discussed with patient the importance of wearing appropriate footwear at all times, not to walk barefoot ever, and to check shoes before putting them on feet. Instructed patient to keep feet dry by regularly changing shoes and socks and drying feet after baths and exercises. Also, instructed patient to report any new lesions, discolorations, or swelling to a healthcare professional.    4- Hypertension - goal < 140/90 mmHg -  At goal, on ARB, continue current medications   BP Readings from Last 3 Encounters:   02/03/17 (!) 140/82   01/31/17  138/89   01/14/17 (!) 140/90       5- Hyperlipidemia -  LFT's WNL. LDL at goal, on Lipitor 40 mg daily, continue current medications     Lab Results   Component Value Date    LDLCALC 62.6 (L) 05/19/2016       6- Body mass index is 38.15 kg/(m^2). = Obesity. Modest weight loss (5-10%) may greatly improve BG control. May benefit from weight loss properties of Victoza    7 - GERD - no s/s currently, discussed that use of Victoza may increase nausea, reflux symptoms. Instructed pt to notify me if occurs

## 2017-02-03 NOTE — LETTER
February 3, 2017      [facility]  [address]  [facility] [address]  Phone: [phone]       Patient: NALDO Paula   YOB: 1955  Date of Visit: 02/03/2017    To Whom It May Concern:    NALDO was at [facility] on 02/03/2017. She may return to work/school on 2/6/2017 with no restrictions. If you have any questions or concerns, or if I can be of further assistance, please do not hesitate to contact me.    Sincerely,            Lianne Wilcox, NP

## 2017-02-03 NOTE — Clinical Note
Hi Dr. Abbott,  I saw Adriana Felicianory today for her first Diabetes Empowerment visit. I started her on Victoza and Levemir, d/c 70/30.   I will see her  back in 6 weeks for further medication changes, then 3 months for a f/u with labs. Please let me know if I can be of any assistance. Thank you for allowing me to participate in her care.   Thanks ALMA Roy Endocrinology Nurse Practitioner

## 2017-02-03 NOTE — MR AVS SNAPSHOT
"    [facility]  [address] [facility] [address]  Phone: [phone]                  Adriana Paula   2/3/2017 2:30 PM   Clinical Support    Description:  Female : 1955   Provider:  DIAB[facility]MP[facility]MENT [facility]   Department:  [facility]           Reason for Visit     Blood Sugar Problem           Diagnoses this Visit        Comments    Type 2 diabetes mellitus with hyperglycemia, with long-term current use of insulin    -  Primary     Type 2 diabetes mellitus with both eyes affected by mild nonproliferative retinopathy without macular edema, with long-term current use of insulin                To Do List           Future Appointments        Provider Department Dept Phone    2/3/2017 3:30 PM LAB, APPOINTMENT [facility] [phone]    2017 11:00 AM Minda Williamson OD [facility] [phone]    3/17/2017 3:30 PM DIABETES EMPOWERMENT PROGRAM [facility] [phone]    2017 2:20 PM Amrit Abbott MD [facility] [phone]    2017 10:00 AM LAB, APPOINTMENT [facility] [phone]      Goals (5 Years of Data)     None      Follow-Up and Disposition     Return in about 3 months (around 5/3/2017).       These Medications        Disp Refills Start End    metformin (GLUCOPHAGE-XR) 500 MG 24 hr tablet 360 tablet 2 2/3/2017     Take 4 tablets (2,000 mg total) by mouth once daily. - Oral    Pharmacy: [id number] - [place], [place] - [address] Ph #: [phone]       pen needle, diabetic (BD ULTRA-FINE RUDDY PEN NEEDLES) 32 gauge x 5/32" Ndle 100 each 4 2/3/2017     Uses 2 times daily, on multiple daily insulin injections    Pharmacy: [facility] - [place], [place] - [address] [facility] Ph #: [phone]       liraglutide 0.6 mg/0.1 mL, 18 mg/3 mL, subq PNIJ " "(VICTOZA 2-DUANE) 0.6 mg/0.1 mL (18 mg/3 mL) PnIj 6 mL 0 2/3/2017     Inject 1.2 mg into the skin once daily. - Subcutaneous    Pharmacy: [facility] - [place], [place] [address] Ph #: [phone]         [facility]     [facility] Nurse Care [facility] - 24/7 Assistance  Registered nurses in the [facility] provide clinical advisement, health education, appointment booking, and other advisory services.  Call for this free service at [phone].             Medications           Message regarding Medications     Verify the changes and/or additions to your medication regime listed below are the same as discussed with your clinician today.  If any of these changes or additions are incorrect, please notify your healthcare provider.        START taking these NEW medications        Refills    pen needle, diabetic (BD ULTRA-FINE RUDDY PEN NEEDLES) 32 gauge x 5/32" Ndle 4    Sig: Uses 2 times daily, on multiple daily insulin injections    Class: Normal    liraglutide 0.6 mg/0.1 mL, 18 mg/3 mL, subq PNIJ (VICTOZA 2-DUANE) 0.6 mg/0.1 mL (18 mg/3 mL) PnIj 0    Sig: Inject 1.2 mg into the skin once daily.    Class: Normal    Route: Subcutaneous      CHANGE how you are taking these medications     Start Taking Instead of    metformin (GLUCOPHAGE-XR) 500 MG 24 hr tablet metformin (GLUCOPHAGE-XR) 500 MG 24 hr tablet    Dosage:  Take 4 tablets (2,000 mg total) by mouth once daily. Dosage:  TAKE 2 TABLETS BY MOUTH TWICE DAILY WITH MEALS    Reason for Change:  Reorder       STOP taking these medications     metformin (FORTAMET) 500 mg 24 hr tablet Take 1 tablet (500 mg total) by mouth daily with breakfast.    pen needle, diabetic 31 gauge x 1/4" Ndle 1 application by Misc.(Non-Drug; Combo Route) route Daily.    insulin syringe-needle U-100 1 mL 31 gauge x 5/16 Syrg USE TWICE DAILY AS DIRECTED    needle, disp, 31 gauge 31 gauge x 5/16" Ndle 1 application by Misc.(Non-Drug; Combo Route) route " "Daily.    insulin NPH-insulin regular, 70/30, (HUMULIN 70/30) 100 unit/mL (70-30) injection INJECT 35 UNITS UNDER THEN SKIN IN THE MORNING AND 26 UNITS UNDER THE SKIN WITH DINNER           Verify that the below list of medications is an accurate representation of the medications you are currently taking.  If none reported, the list may be blank. If incorrect, please contact your healthcare provider. Carry this list with you in case of emergency.           Current Medications     amlodipine (NORVASC) 10 MG tablet Take 1 tablet (10 mg total) by mouth once daily.    aspirin (ECOTRIN) 81 MG EC tablet Take 81 mg by mouth once daily.    atorvastatin (LIPITOR) 40 MG tablet Take 1 tablet (40 mg total) by mouth once daily.    blood sugar diagnostic (ONETOUCH VERIO) Strp Pt use 4x/day    blood-glucose meter (ONETOUCH VERIO IQ METER) kit Use as instructed    diclofenac (VOLTAREN) 50 MG EC tablet     insulin detemir (LEVEMIR FLEXTOUCH) 100 unit/mL (3 mL) SubQ InPn pen Inject 30 Units into the skin every evening.    lancets (ONE TOUCH DELICA LANCETS) 33 gauge Misc 1 lancet by Misc.(Non-Drug; Combo Route) route 4 (four) times daily. Pt use 4x/day    liraglutide 0.6 mg/0.1 mL, 18 mg/3 mL, subq PNIJ (VICTOZA 2-DUANE) 0.6 mg/0.1 mL (18 mg/3 mL) PnIj Inject 1.2 mg into the skin once daily.    losartan (COZAAR) 100 MG tablet Take 1 tablet (100 mg total) by mouth once daily.    metformin (GLUCOPHAGE-XR) 500 MG 24 hr tablet Take 4 tablets (2,000 mg total) by mouth once daily.    oxybutynin (DITROPAN-XL) 10 MG 24 hr tablet     pen needle, diabetic (BD ULTRA-FINE RUDDY PEN NEEDLES) 32 gauge x 5/32" Ndle Uses 2 times daily, on multiple daily insulin injections    terconazole (TERAZOL 7) 0.4 % Crea Place 1 applicator vaginally once daily.    tramadol (ULTRAM) 50 mg tablet Take 1-2 tablets ( mg total) by mouth every 6 (six) hours as needed.           Clinical Reference Information           Your Vitals Were     BP Pulse Height Weight BMI " "   140/82 78 5' 5" (1.651 m) 104 kg (229 lb 4.5 oz) 38.15 kg/m2      Blood Pressure          Most Recent Value    BP  (!)  140/82      Allergies as of 2/3/2017     Keflex [Cephalexin]    Penicillins    Sulfa (Sulfonamide Antibiotics)      Immunizations Administered on Date of Encounter - 2/3/2017     None      Orders Placed During Today's Visit      Normal Orders This Visit    Ambulatory consult to Ophthalmology     Microalbumin/creatinine urine ratio     Future Labs/Procedures Expected by Expires    Basic metabolic panel  2/3/2017 2/3/2018    Comprehensive metabolic panel  2/3/2017 2/3/2018    Hemoglobin A1c  2/3/2017 2/3/2018    Lipid panel  2/3/2017 2/3/2018      Instructions    Metformin dose change instructions:    1st week:  Take 2 tablets (1000 mg) with breakfast     2nd week: Take 3 tablets (1500 mg) with breakfast    3rd week: Tatke 4 tablets (2000 mg ) with breakfast and stay on this dose    If you have consistent diarrhea, decrease to 1000 mg once daily     STOP 70/30 insulin    Start Victoza at 0.6 mg/day x2 week, then increase to 1.2 mg/day x2 week, then increase to 1.8 mg/day thereafter. If experience nausea or vomiting, decrease dose to previously lower dose x1 week, then continue to increase dose weekly to goal dose of 1.8 mg/day. Notify Endocrine NP if nausea or vomiting does not improve with dose adjustment.    Start Levemir 40 units once daily     I will send your Victoza and Levemir next month to your [facility] pharmacy            Language Assistance Services     ATTENTION: Language assistance services are available, free of charge. Please call [phone].      ATENCIÓN: Si habla español, tiene a centeno disposición servicios gratuitos de asistencia lingüística. Llame al [phone].     PERLA Ý: N?u b?n nói Ti?ng Vi?t, có các d?ch v? h? tr? ngôn ng? mi?n phí dành cho b?n. G?i s? [phone].         [facility] complies with applicable Federal civil rights laws and does " not discriminate on the basis of race, color, national origin, age, disability, or sex.

## 2017-02-03 NOTE — PATIENT INSTRUCTIONS
Metformin dose change instructions:    1st week:  Take 2 tablets (1000 mg) with breakfast     2nd week: Take 3 tablets (1500 mg) with breakfast    3rd week: Tatke 4 tablets (2000 mg ) with breakfast and stay on this dose    If you have consistent diarrhea, decrease to 1000 mg once daily     STOP 70/30 insulin    Start Victoza at 0.6 mg/day x2 week, then increase to 1.2 mg/day x2 week, then increase to 1.8 mg/day thereafter. If experience nausea or vomiting, decrease dose to previously lower dose x1 week, then continue to increase dose weekly to goal dose of 1.8 mg/day. Notify Endocrine NP if nausea or vomiting does not improve with dose adjustment.    Start Levemir 40 units once daily     I will send your Victoza and Levemir next month to your [facility] pharmacy

## 2017-02-04 LAB
CREAT UR-MCNC: 66 MG/DL
MICROALBUMIN UR DL<=1MG/L-MCNC: 16 UG/ML
MICROALBUMIN/CREATININE RATIO: 24.2 UG/MG

## 2017-02-06 ENCOUNTER — PATIENT MESSAGE (OUTPATIENT)
Dept: INTERNAL MEDICINE | Facility: CLINIC | Age: 62
End: 2017-02-06

## 2017-02-06 DIAGNOSIS — E11.65 TYPE 2 DIABETES MELLITUS WITH HYPERGLYCEMIA, WITH LONG-TERM CURRENT USE OF INSULIN: Chronic | ICD-10-CM

## 2017-02-06 DIAGNOSIS — Z79.4 TYPE 2 DIABETES MELLITUS WITH HYPERGLYCEMIA, WITH LONG-TERM CURRENT USE OF INSULIN: Chronic | ICD-10-CM

## 2017-02-09 ENCOUNTER — PATIENT MESSAGE (OUTPATIENT)
Dept: DIABETES | Facility: CLINIC | Age: 62
End: 2017-02-09

## 2017-02-09 NOTE — PROGRESS NOTES
"   02/03/17 0000   Diabetes Type   Diabetes Type  Type II   Diabetes History   Diabetes Diagnosis >10 years   Nutrition   Meal Plan 24 Hour Recall - Breakfast oatmeal and fruit OR 1/2 muffin and fruit   Meal Plan 24 Hour Recall - Lunch largest meal of the day - 6 in sub sometimes with chips OR a hot meal - order in at work every day. rarely brings lunch, but when does will bring can of soup or dinner leftovers   Meal Plan 24 Hour Recall - Dinner has more of a snack like popcorn and a diet drink or juice at 5 PM and then will eat "dinner" at around 7 PM, but states she is not really hungry and will just have another snack   Monitoring    Blood Glucose Logs No  (Pt reports checking BG 1-2 times daily 80's-300's )   Exercise    Exercise Type walking  (Reports unable to do much with arthritis pain)   Intensity Low   Frequency (2 times per wk)   Duration 15 min   Current Diabetes Treatment    Current Treatment Insulin;Oral Medicaiton  (Currenly taking Metformin and 70/30, but was prescribed Levemir to take with the 70/30 - will have NP assess meds for possible changes)   Social History   Preferred Learning Method Face to Face;Demonstration;Hands On;Reading Materials   Primary Support Self;Daughter   Educational Level High School   Occupation creditiling corinator at [employer]   Smoking Status Ex Smoker  (quit in 2002)   Alcohol Use Monthly  (1-2 glasses of wine about every other weekend)   Barriers to Change   Barriers to Change None   Learning Challenges  None   Readiness to Learn    Readiness to Learn  Acceptance   Diabetes Education Visit   Diabetes Education Record Assessment/Progress Initial/Comprehensive   Diabetes Education Assessment/Progress   Acute Complications (preventing, detecting, and treating acute complications) DC;IS;W;5;1  (instructed on s/s, causes and proper tx of hypo with "rule of 15")   Chronic Complications (preventing, detecting, and treating chronic complications) DC;IS;1  (reviewed standards of " care)   Diabetes Disease Process (diabetes disease process and treatment options) DC   Nutrition (Incorporating nutritional management into one's lifestyle) DC;IS;W;5;1  (Instructed on what foods have carbs, timing and spacing of meals and snacks, and limiting portions of carb foods. also discussed meal balance and consistency and label reading. Rec 30-45 grams CHO for meals and 0-5 grams for snacks)   Medications (states correct name, dose, onset, peak, duration, side effects & timing of meds) DC;D;R;IS;W;5;1  (Per NP recommendation, pt is to d/c 70/30, continue use of Levemir and Metformin and start victoza.  DIABETES EDUCATOR NOTE    VICTOZA PEN INSTRUCTIONS FOR INDIVIDUAL APPOINTMENT        PT was given background information on Victoza and how it works.  Covered in detail how to use the Victoza pen (timing - when to take it, meal planning, storage, and pen replacement). Discussed what to expect: side effects, effects on weight, and blood sugar control.      Instructed that the starting dose is 0.6mg daily for one week and then increased to 1.2 mg daily. If glycemic control doesnt improve on the 1.2 mg dose, the provider may increase the dose to 1.8 mg a day.     Reviewed causes of hypoglycemia including signs, symptoms and treatment options.  When a trend of hypoglycemia occurs, and the pt .is on other oral diabetes medications (OHA, combination medications) stressed the importance of calling the clinic or referring provider immediately as a reduction in dose of this type of medication may be required.    The PT was allowed time to practice placing a pen needle on the test medium demonstration pen, performing a First Time Use flow check shot to purge air bubbles and dialing up a 0.6 mg starting dose.  PT performed adequate return demonstration of subcutaneous mock injection of the test medium into an injection pillow without difficulty.   Questions from the patient were addressed.  The PT was given a Victoza  Patient Starter Kit and was advised to keep a blood sugar log, to perform SMBG BID  at alternating times and to bring the log to all clinic appointments. Patient verbalized understanding.    BG log book containing goals for SMBG was given.     Hypoglycemia information sheet (TOBESOFT) was given and reviewed.)   Monitoring (monitoring blood glucose/other parameters &using results) DC;IS;W;5;1  (provided logs and instructed to SMBG BID and to bring logs to f/u visit in 6 weeks)   Goal Setting and Problem Solving (verbalizes behavior change strategies & sets realistic goals) DC   Behavior Change (developing personal strategies to health & behavior change) DC   Goals   Healthy Eating Set  (Pt will limit carb portions at meals to rec amts and eat more consistent meals)   Start Date 02/03/17   Target Date 05/05/17   Medications Set  (Pt will take all meds as prescribed)   Start Date 02/03/17   Target Date 03/03/17   Diabetes Self-Management Support Plan   Review Status Patient reviewed and agreed to support plan.   Diabetes Care Plan/Intervention   Education Plan/Intervention Diabetes Empowerment Program   Diabetes Meal Plan   Carbohydrate Per Meal 30-45g   Carbohydrate Per Snack  (0-5 grams)   Education Units of Time    Time Spent 60 min

## 2017-02-09 NOTE — TELEPHONE ENCOUNTER
Spoke with the pt. She stated that her symptoms of nausea and vomiting are slowly getting better after taking her Victoza and Levemir medications. The pt was given the pt's assistance program phone number to help pay for the cost of her medications.

## 2017-02-09 NOTE — TELEPHONE ENCOUNTER
Called patient to discuss. Reports nausea has subsided, occurred on Sunday after eating past her fullness point. Now improved, very slight nausea intermittently, last yesterday. Due to increase Victoza to 1.2 mg daily on Saturday. Instructed to increase only if feeling well with no nausea.     Reports  this AM. Decrease Levemir to 35 units starting with next dose. Instructed to send logs next Wednesday or sooner for BG <90 for further insulin titration. Pt verbalized understanding of all instructions.

## 2017-02-13 ENCOUNTER — PATIENT MESSAGE (OUTPATIENT)
Dept: ORTHOPEDICS | Facility: CLINIC | Age: 62
End: 2017-02-13

## 2017-02-16 ENCOUNTER — OFFICE VISIT (OUTPATIENT)
Dept: OPTOMETRY | Facility: CLINIC | Age: 62
End: 2017-02-16
Payer: COMMERCIAL

## 2017-02-16 ENCOUNTER — PATIENT MESSAGE (OUTPATIENT)
Dept: DIABETES | Facility: CLINIC | Age: 62
End: 2017-02-16

## 2017-02-16 DIAGNOSIS — Z96.1 PSEUDOPHAKIA OF LEFT EYE: ICD-10-CM

## 2017-02-16 DIAGNOSIS — H02.834 DERMATOCHALASIS OF BOTH UPPER EYELIDS: ICD-10-CM

## 2017-02-16 DIAGNOSIS — H40.053 OCULAR HYPERTENSION, BILATERAL: ICD-10-CM

## 2017-02-16 DIAGNOSIS — H25.11 NUCLEAR SCLEROTIC CATARACT OF RIGHT EYE: ICD-10-CM

## 2017-02-16 DIAGNOSIS — Z79.4 TYPE 2 DIABETES MELLITUS WITH BOTH EYES AFFECTED BY MILD NONPROLIFERATIVE RETINOPATHY WITHOUT MACULAR EDEMA, WITH LONG-TERM CURRENT USE OF INSULIN: Primary | ICD-10-CM

## 2017-02-16 DIAGNOSIS — H52.4 BILATERAL PRESBYOPIA: ICD-10-CM

## 2017-02-16 DIAGNOSIS — E11.3293 TYPE 2 DIABETES MELLITUS WITH BOTH EYES AFFECTED BY MILD NONPROLIFERATIVE RETINOPATHY WITHOUT MACULAR EDEMA, WITH LONG-TERM CURRENT USE OF INSULIN: Primary | ICD-10-CM

## 2017-02-16 DIAGNOSIS — H02.889 MEIBOMIAN GLAND DYSFUNCTION: ICD-10-CM

## 2017-02-16 DIAGNOSIS — H02.831 DERMATOCHALASIS OF BOTH UPPER EYELIDS: ICD-10-CM

## 2017-02-16 DIAGNOSIS — H04.123 BILATERAL DRY EYES: ICD-10-CM

## 2017-02-16 PROCEDURE — 92014 COMPRE OPH EXAM EST PT 1/>: CPT | Mod: S$GLB,,, | Performed by: OPTOMETRIST

## 2017-02-16 PROCEDURE — 99999 PR PBB SHADOW E&M-EST. PATIENT-LVL II: CPT | Mod: PBBFAC,,, | Performed by: OPTOMETRIST

## 2017-02-16 NOTE — Clinical Note
Echo Wagner,  Ms. Paula was referred to me today for a diabetic eye examination. She is now ready for cataract surgery in the right eye. You performed the cataract surgery in the left eye last year. Did you want to see her for an evaluation first, or can she just schedule the surgery directly? She currently has an evaluation scheduled with you in March; please contact her if she can schedule the surgery directly.  Also, she was initially referred to you for cataract surgery in 2015 by Dr. Batista. I am happy to see her for her post-op and future optometry visits if [facility], but she was initially a patient of Dr. Batista's.  Please let me know if you have any questions.  Sincerely, Minda Williamson OD

## 2017-02-16 NOTE — MR AVS SNAPSHOT
[facility]  [address]  [facility] [address]  Phone: [phone]                  Adriana Paula   2017 1:40 PM   Office Visit    Description:  Female : 1955   Provider:  Minda Williamson OD   Department:  [facility]           Reason for Visit     Diabetic Eye Exam                To Do List           Future Appointments        Provider Department Dept Phone    3/17/2017 3:30 PM DIABETES EMPOWERMENT PROGRAM [facility] [phone]    2017 2:20 PM Amrit Abbott MD [facility] [phone]    2017 10:00 AM LAB, APPOINTMENT [facility] [phone]    2017 3:30 PM DIABETES EMPOWERMENT PROGRAM [facility] [phone]      Goals (5 Years of Data)     None      [facility]     [facility] On [facility] Nurse Care Line -  Assistance  Registered nurses in the [facility] provide clinical advisement, health education, appointment booking, and other advisory services.  Call for this free service at [phone].             Medications           Message regarding Medications     Verify the changes and/or additions to your medication regime listed below are the same as discussed with your clinician today.  If any of these changes or additions are incorrect, please notify your healthcare provider.             Verify that the below list of medications is an accurate representation of the medications you are currently taking.  If none reported, the list may be blank. If incorrect, please contact your healthcare provider. Carry this list with you in case of emergency.           Current Medications     amlodipine (NORVASC) 10 MG tablet Take 1 tablet (10 mg total) by mouth once daily.    aspirin (ECOTRIN) 81 MG EC tablet Take 81 mg by mouth once daily.    atorvastatin (LIPITOR) 40 MG tablet Take 1 tablet (40 mg total) by mouth once daily.    blood sugar  "diagnostic (ONETOUCH VERIO) Strp Pt use 4x/day    blood-glucose meter (ONETOUCH VERIO IQ METER) kit Use as instructed    diclofenac (VOLTAREN) 50 MG EC tablet     insulin detemir (LEVEMIR FLEXTOUCH) 100 unit/mL (3 mL) SubQ InPn pen Inject 35 Units into the skin once daily.    lancets (ONE TOUCH DELICA LANCETS) 33 gauge Misc 1 lancet by Misc.(Non-Drug; Combo Route) route 4 (four) times daily. Pt use 4x/day    liraglutide 0.6 mg/0.1 mL, 18 mg/3 mL, subq PNIJ (VICTOZA 2-DUANE) 0.6 mg/0.1 mL (18 mg/3 mL) PnIj Inject 1.2 mg into the skin once daily.    losartan (COZAAR) 100 MG tablet Take 1 tablet (100 mg total) by mouth once daily.    metformin (GLUCOPHAGE-XR) 500 MG 24 hr tablet Take 4 tablets (2,000 mg total) by mouth once daily.    oxybutynin (DITROPAN-XL) 10 MG 24 hr tablet     pen needle, diabetic (BD ULTRA-FINE RUDDY PEN NEEDLES) 32 gauge x 5/32" Ndle Uses 2 times daily, on multiple daily insulin injections    terconazole (TERAZOL 7) 0.4 % Crea Place 1 applicator vaginally once daily.    tramadol (ULTRAM) 50 mg tablet Take 1-2 tablets ( mg total) by mouth every 6 (six) hours as needed.           Clinical Reference Information           Allergies as of 2/16/2017     Keflex [Cephalexin]    Penicillins    Sulfa (Sulfonamide Antibiotics)      Immunizations Administered on Date of Encounter - 2/16/2017     None      Instructions    Thank you very much for coming in for your eye examination. It was a pleasure working with you today. Below is some information you might find helpful.     You have dryness of your eyes. Please use over-the-counter artificial tears or lubricants (such as Systane, Refresh, Blink, Genteal), 1 drop in each eye 3-4 times per day. Please avoid drops that advertise redness-relief as these can be unhealthy for your eyes and can cause permanent redness if used long-term.    ==============================================    MEIBOMITIS    Your eyes look dry today. Your eyes are dry not because " you're missing the watery portion of your tear film but because you're missing an oily component. The oil component keeps the tears from evaporating and provides stability to the tears.    This portion of the tears is produced by the Meibomian glands which are located just behind the base of the eyelashes. 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 Meibomitis is caused by chronic inflammation inside the glands thought to be a reaction to the normal bacteria that live on your skin.     As this is a chronic condition the goal of treatment is to reduce your symptoms and the inflammation under control. The initial treatment is as follows:     - Warm Compresses: Heat a wash cloth by running it under hot water. Then hold this wash cloth over your closed eyelids and allow your eyelids to absorb the heat. After 20-30 seconds once the wash cloth cools down you'll need to heat it up again.  (An alternative heat source is a gel pack or microwavable eye mask, warmed in the microwave or in a dish of water,  wrapped with a warm wet washcloth). You want to get 10 minutes of warmth to your eyelids, so if the compress feels cool before the end of 10 minutes you should reheat it. You should consistently do this 2 times a day.     - Artificial tears: Some good Artificial tear drops to try are Blink, Refresh Optive, Systane Balance, Thera Tears or Genteal. You should use these consistently 2-3 times a day more if you need them. It's important to use these when in breezy environments or when doing prolonged near work such as reading or computer. The goal is to prevent your eyes from drying rather than instilling them once your eye is already dry.     - Omega 3 Supplement: 1000 mg of good quality fish oil (labeled DHA and/or EPA).   Fish oil, flax seed oil or omega 3 supplements have found to be beneficial in Meibomitis and dry eye syndrome. There are a lot of choices out there and not one single product  has been shown to be more beneficial than others.    It usually takes 1-2 months of treatment before you'll notice a difference. However some will continue to have problems even with this therapy. If you continue to have problems please let me know.     Minda Williamson OD        Language Assistance Services     ATTENTION: Language assistance services are available, free of charge. Please call [phone].      ATENCIÓN: Si habla español, tiene a centeno disposición servicios gratuitos de asistencia lingüística. Llame al [phone].     CHÚ Ý: N?u b?n nói Ti?ng Vi?t, có các d?ch v? h? tr? ngôn ng? mi?n phí dành cho b?n. G?i s? [phone].         [facility] complies with applicable Federal civil rights laws and does not discriminate on the basis of race, color, national origin, age, disability, or sex.

## 2017-02-16 NOTE — LETTER
February 16, 2017      Lianne Wilcox, NP  [address]  [facility] [address]           [facility]  [address]  [facility] [address]  Phone: [phone]          Patient: Adriana Puala   MR Number: [id number]   YOB: 1955   Date of Visit: 2/16/2017       Dear Lianne Wilcox:    Thank you for referring Adriana Paula to me for evaluation. Attached you will find relevant portions of my assessment and plan of care.    If you have questions, please do not hesitate to call me. I look forward to following Adriana Paula along with you.    Sincerely,    Minda Williamson, OD    Enclosure  CC:  No Recipients    If you would like to receive this communication electronically, please contact [email or web address] or [phone] to request more information on [facility] Link access.    For providers and/or their staff who would like to refer a patient to [facility], please contact us through our one-stop-shop provider referral line, [facility] , at [phone].    If you feel you have received this communication in error or would no longer like to receive these types of communications, please e-mail [email or web address]

## 2017-02-16 NOTE — Clinical Note
Molly Abdalla and Dr. Abbott,  Thank you for referring Ms. Paula for a diabetic eye examination. She has new mild non-proliferative diabetic retinopathy in both eyes. She is interested in cataract surgery in the right eye so I have referred her to ophthalmology. Please let me know if you have questions.  Sincerely, Minda Williamson OD

## 2017-02-16 NOTE — PATIENT INSTRUCTIONS
Thank you very much for coming in for your eye examination. It was a pleasure working with you today. Below is some information you might find helpful.     You have dryness of your eyes. Please use over-the-counter artificial tears or lubricants (such as Systane, Refresh, Blink, Genteal), 1 drop in each eye 3-4 times per day. Please avoid drops that advertise redness-relief as these can be unhealthy for your eyes and can cause permanent redness if used long-term.    ==============================================    MEIBOMITIS    Your eyes look dry today. Your eyes are dry not because you're missing the watery portion of your tear film but because you're missing an oily component. The oil component keeps the tears from evaporating and provides stability to the tears.    This portion of the tears is produced by the Meibomian glands which are located just behind the base of the eyelashes. 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 Meibomitis is caused by chronic inflammation inside the glands thought to be a reaction to the normal bacteria that live on your skin.     As this is a chronic condition the goal of treatment is to reduce your symptoms and the inflammation under control. The initial treatment is as follows:     - Warm Compresses: Heat a wash cloth by running it under hot water. Then hold this wash cloth over your closed eyelids and allow your eyelids to absorb the heat. After 20-30 seconds once the wash cloth cools down you'll need to heat it up again.  (An alternative heat source is a gel pack or microwavable eye mask, warmed in the microwave or in a dish of water,  wrapped with a warm wet washcloth). You want to get 10 minutes of warmth to your eyelids, so if the compress feels cool before the end of 10 minutes you should reheat it. You should consistently do this 2 times a day.     - Artificial tears: Some good Artificial tear drops to try are Blink, Refresh Optive, Systane  Balance, Thera Tears or Genteal. You should use these consistently 2-3 times a day more if you need them. It's important to use these when in breezy environments or when doing prolonged near work such as reading or computer. The goal is to prevent your eyes from drying rather than instilling them once your eye is already dry.     - Omega 3 Supplement: 1000 mg of good quality fish oil (labeled DHA and/or EPA).   Fish oil, flax seed oil or omega 3 supplements have found to be beneficial in Meibomitis and dry eye syndrome. There are a lot of choices out there and not one single product has been shown to be more beneficial than others.    It usually takes 1-2 months of treatment before you'll notice a difference. However some will continue to have problems even with this therapy. If you continue to have problems please let me know.     Minda Williamson, OD

## 2017-02-27 RX ORDER — LOSARTAN POTASSIUM 100 MG/1
TABLET ORAL
Qty: 90 TABLET | Refills: 2 | Status: SHIPPED | OUTPATIENT
Start: 2017-02-27 | End: 2017-04-11 | Stop reason: CLARIF

## 2017-03-01 ENCOUNTER — PATIENT MESSAGE (OUTPATIENT)
Dept: DIABETES | Facility: CLINIC | Age: 62
End: 2017-03-01

## 2017-03-01 DIAGNOSIS — Z79.4 TYPE 2 DIABETES MELLITUS WITH HYPERGLYCEMIA, WITH LONG-TERM CURRENT USE OF INSULIN: Chronic | ICD-10-CM

## 2017-03-01 DIAGNOSIS — E11.65 TYPE 2 DIABETES MELLITUS WITH HYPERGLYCEMIA, WITH LONG-TERM CURRENT USE OF INSULIN: Chronic | ICD-10-CM

## 2017-03-01 RX ORDER — PEN NEEDLE, DIABETIC 30 GX3/16"
NEEDLE, DISPOSABLE MISCELLANEOUS
Qty: 100 EACH | Refills: 4 | Status: SHIPPED | OUTPATIENT
Start: 2017-03-01 | End: 2018-02-15

## 2017-03-02 ENCOUNTER — PATIENT MESSAGE (OUTPATIENT)
Dept: DIABETES | Facility: CLINIC | Age: 62
End: 2017-03-02

## 2017-03-10 ENCOUNTER — INITIAL CONSULT (OUTPATIENT)
Dept: OPHTHALMOLOGY | Facility: CLINIC | Age: 62
End: 2017-03-10
Payer: COMMERCIAL

## 2017-03-10 DIAGNOSIS — H25.11 NUCLEAR SCLEROTIC CATARACT OF RIGHT EYE: Primary | ICD-10-CM

## 2017-03-10 DIAGNOSIS — Z98.42 STATUS POST CATARACT EXTRACTION AND INSERTION OF INTRAOCULAR LENS, LEFT: ICD-10-CM

## 2017-03-10 DIAGNOSIS — Z96.1 STATUS POST CATARACT EXTRACTION AND INSERTION OF INTRAOCULAR LENS, LEFT: ICD-10-CM

## 2017-03-10 DIAGNOSIS — H40.003 GLAUCOMA SUSPECT OF BOTH EYES: ICD-10-CM

## 2017-03-10 PROCEDURE — 92136 OPHTHALMIC BIOMETRY: CPT | Mod: RT,S$GLB,, | Performed by: OPHTHALMOLOGY

## 2017-03-10 PROCEDURE — 92014 COMPRE OPH EXAM EST PT 1/>: CPT | Mod: S$GLB,,, | Performed by: OPHTHALMOLOGY

## 2017-03-10 PROCEDURE — 99999 PR PBB SHADOW E&M-EST. PATIENT-LVL II: CPT | Mod: PBBFAC,,, | Performed by: OPHTHALMOLOGY

## 2017-03-10 RX ORDER — PREDNISOLONE ACETATE 10 MG/ML
1 SUSPENSION/ DROPS OPHTHALMIC 3 TIMES DAILY
Qty: 5 ML | Refills: 1 | Status: SHIPPED | OUTPATIENT
Start: 2017-03-10 | End: 2017-04-09

## 2017-03-10 RX ORDER — INSULIN HUMAN 100 [IU]/ML
INJECTION, SUSPENSION SUBCUTANEOUS
COMMUNITY
Start: 2017-01-30 | End: 2017-03-10 | Stop reason: ALTCHOICE

## 2017-03-10 RX ORDER — OFLOXACIN 3 MG/ML
1 SOLUTION/ DROPS OPHTHALMIC 3 TIMES DAILY
Qty: 5 ML | Refills: 0 | Status: SHIPPED | OUTPATIENT
Start: 2017-03-10 | End: 2017-03-20

## 2017-03-10 NOTE — PROGRESS NOTES
HPI     Erica ROQUEPrem Batista, OD   OHTN / + fam h/o glc - mom/brother   S/p phaco w/IOL OS 3/17/16     Patient returns for cataract eval OD. Pt states she is ready to have OD   done. Denies any pain. No flashes, do have floaters OU. Comp[laints of   dryness OU.     Gtts: None           Last edited by Clark Salguero MA on 3/10/2017 10:55 AM.         Assessment /Plan     For exam results, see Encounter Report.    Nuclear sclerotic cataract of right eye  -     IOL Master - OD - Right Eye    Status post cataract extraction and insertion of intraocular lens, left    Glaucoma suspect of both eyes    Other orders  -     prednisoLONE acetate (PRED FORTE) 1 % DrpS; Place 1 drop into the right eye 3 (three) times daily.  Dispense: 5 mL; Refill: 1  -     ofloxacin (OCUFLOX) 0.3 % ophthalmic solution; Place 1 drop into the right eye 3 (three) times daily.  Dispense: 5 mL; Refill: 0      Visually significant nuclear sclerotic cataract   - Interfering with activities of daily living.  Pt desires cataract surgery for [facility] rehabilitation.   - R/B/A discussed and pt agrees to proceed with surgery.   - IOL options discussed according to patient's goals and concomitant ocular pathology; and pt content with monofocal lens.    - Target: plano.    (pcboo 24.0 od)    S/p phaco w/IOL OS 3/17/16       OHTN / + fam h/o glc - mom/brother

## 2017-03-10 NOTE — LETTER
March 10, 2017      Minda Williamson, OD  [address]  [facility] [address]           [facility]  [address]  [facility] [address]  Phone: [phone]  Fax: [phone]          Patient: Adriana Paula   MR Number: [id number]   YOB: 1955   Date of Visit: 3/10/2017       Dear Dr. Minda Williamson:    Thank you for referring Adriana Paula to me for evaluation. Attached you will find relevant portions of my assessment and plan of care.    If you have questions, please do not hesitate to call me. I look forward to following Adriana Paula along with you.    Sincerely,    Ingrid Wagner MD    Enclosure  CC:  No Recipients    If you would like to receive this communication electronically, please contact [email or web address] or [phone] to request more information on [facility] Link access.    For providers and/or their staff who would like to refer a patient to [facility], please contact us through our one-stop-shop provider referral line, [facility], at [phone].    If you feel you have received this communication in error or would no longer like to receive these types of communications, please e-mail [email or web address]

## 2017-03-13 ENCOUNTER — TELEPHONE (OUTPATIENT)
Dept: OPHTHALMOLOGY | Facility: CLINIC | Age: 62
End: 2017-03-13

## 2017-03-13 DIAGNOSIS — H25.11 NUCLEAR SCLEROTIC CATARACT OF RIGHT EYE: Primary | ICD-10-CM

## 2017-03-17 ENCOUNTER — CLINICAL SUPPORT (OUTPATIENT)
Dept: DIABETES | Facility: CLINIC | Age: 62
End: 2017-03-17
Payer: COMMERCIAL

## 2017-03-17 VITALS
HEART RATE: 76 BPM | BODY MASS INDEX: 37.1 KG/M2 | SYSTOLIC BLOOD PRESSURE: 136 MMHG | WEIGHT: 222.69 LBS | DIASTOLIC BLOOD PRESSURE: 84 MMHG | HEIGHT: 65 IN

## 2017-03-17 DIAGNOSIS — E11.42 TYPE 2 DIABETES MELLITUS WITH DIABETIC POLYNEUROPATHY, WITH LONG-TERM CURRENT USE OF INSULIN: Primary | Chronic | ICD-10-CM

## 2017-03-17 DIAGNOSIS — E66.9 OBESITY (BMI 30-39.9): Chronic | ICD-10-CM

## 2017-03-17 DIAGNOSIS — Z79.4 TYPE 2 DIABETES MELLITUS WITH BOTH EYES AFFECTED BY MILD NONPROLIFERATIVE RETINOPATHY WITHOUT MACULAR EDEMA, WITH LONG-TERM CURRENT USE OF INSULIN: Chronic | ICD-10-CM

## 2017-03-17 DIAGNOSIS — Z79.4 TYPE 2 DIABETES MELLITUS WITH HYPERGLYCEMIA, WITH LONG-TERM CURRENT USE OF INSULIN: Chronic | ICD-10-CM

## 2017-03-17 DIAGNOSIS — E11.3293 TYPE 2 DIABETES MELLITUS WITH BOTH EYES AFFECTED BY MILD NONPROLIFERATIVE RETINOPATHY WITHOUT MACULAR EDEMA, WITH LONG-TERM CURRENT USE OF INSULIN: Chronic | ICD-10-CM

## 2017-03-17 DIAGNOSIS — E78.5 DYSLIPIDEMIA: ICD-10-CM

## 2017-03-17 DIAGNOSIS — K21.9 GASTROESOPHAGEAL REFLUX DISEASE WITHOUT ESOPHAGITIS: Chronic | ICD-10-CM

## 2017-03-17 DIAGNOSIS — Z79.4 TYPE 2 DIABETES MELLITUS WITH DIABETIC POLYNEUROPATHY, WITH LONG-TERM CURRENT USE OF INSULIN: Primary | Chronic | ICD-10-CM

## 2017-03-17 DIAGNOSIS — I10 ESSENTIAL HYPERTENSION: Chronic | ICD-10-CM

## 2017-03-17 DIAGNOSIS — E11.65 TYPE 2 DIABETES MELLITUS WITH HYPERGLYCEMIA, WITH LONG-TERM CURRENT USE OF INSULIN: Chronic | ICD-10-CM

## 2017-03-17 PROCEDURE — 99214 OFFICE O/P EST MOD 30 MIN: CPT | Mod: S$GLB,,, | Performed by: NURSE PRACTITIONER

## 2017-03-17 PROCEDURE — 99999 PR PBB SHADOW E&M-EST. PATIENT-LVL IV: CPT | Mod: PBBFAC,,,

## 2017-03-17 NOTE — PROGRESS NOTES
CC:  Diabetes.     HPI: Adriana Paula is a 61 y.o. female presents for visit in [facility]. The patient has had diabetes along with associated comorbidities indicated in the Visit Diagnosis section of this encounter. The patient was diagnosed with Type 2 diabetes in ~1993. Diagnosed during 1st pregnancy, then diagnosed as Type 2 DM.     VISIT #: 2    1st visit - Presented with no BG logs. At this visit, was started on Victoza and Levemir, metformin changed to XR for compliance reasons.     2nd visit - Today, she presents alone for 6 week f/u. BG improved but not at goal. Was having challenges with nausea after starting Victoza and had to decrease dose a few times, now on 1.2 mg daily and reports improved but nausea remains intermittent (couple times per week). Did not change to Metformin 2000 mg once daily, still missing many doses of PM metformin - didn't change to once daily because did not want to take 4 pills at once. Upcoming cataract surgery     DIABETES COMPLICATIONS: retinopathy and peripheral neuropathy     HOSPITALIZATIONS FOR DIABETES OR RELATED COMPLICATIONS:  No    CURRENT A1C:    Hemoglobin A1C   Date Value Ref Range Status   01/31/2017 9.8 (H) 4.5 - 6.2 % Final     Comment:     According to ADA guidelines, hemoglobin A1C <7.0% represents  optimal control in non-pregnant diabetic patients.  Different  metrics may apply to specific populations.   Standards of Medical Care in Diabetes - 2016.  For the purpose of screening for the presence of diabetes:  <5.7%     Consistent with the absence of diabetes  5.7-6.4%  Consistent with increasing risk for diabetes   (prediabetes)  >or=6.5%  Consistent with diabetes  Currently no consensus exists for use of hemoglobin A1C  for diagnosis of diabetes for children.     10/28/2016 8.3 (H) 4.5 - 6.2 % Final     Comment:     According to ADA guidelines, hemoglobin A1C <7.0% represents  optimal control in non-pregnant diabetic patients.   Different  metrics may apply to specific populations.   Standards of Medical Care in Diabetes - 2016.  For the purpose of screening for the presence of diabetes:  <5.7%     Consistent with the absence of diabetes  5.7-6.4%  Consistent with increasing risk for diabetes   (prediabetes)  >or=6.5%  Consistent with diabetes  Currently no consensus exists for use of hemoglobin A1C  for diagnosis of diabetes for children.     05/19/2016 10.4 (H) 4.5 - 6.2 % Final       DM MEDICATIONS USED IN THE PAST: Novolog, Levemir, Metformin, 70/30    MEDICATIONS UPON START OF PROGRAM: Humulin 34 qAM before breakfast and 22 qPM, Metformin XR 1000 mg BID  Approx twice per week, misses PM doses of insulin and Metformin   Takes doses approx 11 hours apart   AM dose ~10am, PM dose ~7am    CURRENT DIABETES MEDICATIONS: Metformin XR 1000 mg BID, Victoza 1.2 mg daily, Levemir 40 units daily   Multiple times per week missing PM dose of Metformin     ANY DIFFICULTY AFFORDING YOUR CURRENT MEDICATION REGIMEN?  No    CGMS interpretation:  To be performed     DO YOU USE THE [facility] EMAIL SYSTEM? Yes      STANDARDS of CARE:  Statin:  Yes - lipitor 40 mg   ACEI or ARB:  Yes -  ARB  ASA:  Yes - 81 mg   Last eye exam 2/2017 - + mild retinopathy   Microalbumin/Creatinine ratio:  Lab Results   Component Value Date    MICALBCREAT 24.2 02/03/2017       BLOOD GLUCOSE MONITORING:  Checking BG 1-2 times daily now 150's-180's      HYPOGLYCEMIA:  No    CURRENT DAILY ROUTINE (meals/meds):   Eating 2-3 small meals daily  Not snacking often     CURRENT EXERCISE:  No    OCCUPATION: works in an office, credentialing department at [employer]     MEDICATIONS, ALLERGIES, LABS, VS's, MEDICAL, SURGICAL, SOCIAL, AND FAMILY HISTORY reviewed and updated in the appropriate sections during this encounter    ROS:     Constitutional: Negative for weight change  Endocrine:  Denies polyuria, polydipsia, nocturia.  HENT: Denies neck swelling, lumps, horseness or trouble swallowing.  "Denies any personal or family history of thyroid cancer.    Eyes: + blurry vision in right eye, needs cataract surgery in right   Respiratory: Negative for cough or shortness or breath.  Cardiovascular: Negative for chest pain or claudication.   Gastrointestinal: Negative for nausea, diarrhea, vomiting, bloating.  No history of pancreatitis or gastroparesis.  Genitourinary: Negative for urgency, frequency, frequent urinary tract infections.  Skin: Denies prolonged wound healing.  Neurological: Negative for syncope, + numbness/burning of extremities.  Psychiatric/Behavioral: Negative for depression or anxiety      All other systems reviewed and are negative.    PE:  Constitutional:   /84  Pulse 76  Ht 5' 5" (1.651 m)  Wt 101 kg (222 lb 10.6 oz)  BMI 37.05 kg/m2   Well developed, well nourished, NAD.    HENT:   External ears, nose: no masses. No significant findings.   Hearing: normal     Neck:  No trachial deviation present, No neck masses noticed   Thyroid:  No thyromegaly present.  No thyroid tenderness.      Cardiovascular:    Auscultation:  No murmurs or abnormal sounds   Lower extremities:  No edema or cyanosis.     Respiratory:    Effort:  Normal, no use of accessory muscles.   Auscultation: breath sounds normal, no adventitious sounds.  Abdomen:     Exam:  Soft, non-tender, no masses.      No hernia noted.  Skin:    Inspection: no rashes, lesion or ulcers, + acanthosis nigracans.   Palpation: no induration or nodules.   Insulin injection sites: no lipoatrophy or lipohypertrophy.  Psychiatric:  Judgement and insight good with normal mood and affect.  Musculoskeletal:  Gait steady. No gross abnormalities.        FOOT EXAMINATION:   Protective Sensation (w/ 10 gram monofilament):  Right: Intact  Left: Intact    Visual Inspection:  Callus -  bilateral heels    Pedal Pulses:   Right: Present  Left: Present    Posterior tibialis:   Right:Present  Left: Present      Decreased vibratory response to 128 Hz " tuning fork bilaterally.     ______________________________________________________________________     ASSESSMENT and PLAN:    1- Type 2 diabetes with neuropathy, retinopathy and hyperglycemia - BG readings improved but not at goal.  Discussed tx options, including changing to Trulicity to see if nausea resolves, adding SGLT-2, d/c GLP-1 and basal and change to Vgo.     Again, for compliance reasons, highly suggest changing to Metformin XR to once daily 2000 mg daily - patient will try.   Long discussion with pt - recommend to add SGLT-2 to regimen if she wishes to continue on GLP-1 but would recommend more to change to Vgo to eliminate intermittent nausea and eliminate all injections.  Pt wishes to try to continue Victoza, discussed that she needs to notify me if nausea resumes or increases with increase in dose. BG currently not controlled with current doses of medications. Continue Levemir 40 units daily, increase Victoza to 1.8 mg daily.     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 Discussed with patient the importance of wearing appropriate footwear at all times, not to walk barefoot ever, and to check shoes before putting them on feet. Instructed patient to keep feet dry by regularly changing shoes and socks and drying feet after baths and exercises. Also, instructed patient to report any new lesions, discolorations, or swelling to a healthcare professional.    4- Hypertension - goal < 140/90 mmHg -  At goal, on ARB, continue current medications   BP Readings from Last 3  Encounters:   03/17/17 136/84   02/03/17 (!) 140/82   01/31/17 138/89       5- Hyperlipidemia -  LFT's WNL. LDL at goal, on Lipitor 40 mg daily, continue current medications     Lab Results   Component Value Date    LDLCALC 62.6 (L) 05/19/2016       6- Body mass index is 37.05 kg/(m^2). = Obesity. Modest weight loss (5-10%) may greatly improve BG control. May benefit from weight loss properties of Victoza    7 - GERD - nausea improving, only intermittent now. Discussed that use of Victoza may increase nausea, reflux symptoms. Instructed pt to notify me if occurs

## 2017-03-17 NOTE — MR AVS SNAPSHOT
[facility]  [address]  [facility] [address]  Phone: [phone]                  Adriana Paula   3/17/2017 3:30 PM   Clinical Support    Description:  Female : 1955   Provider:  [facility]   Department:  [facility]           Reason for Visit     Blood Sugar Problem           Diagnoses this Visit        Comments    Type 2 diabetes mellitus with diabetic polyneuropathy, with long-term current use of insulin    -  Primary     Type 2 diabetes mellitus with both eyes affected by mild nonproliferative retinopathy without macular edema, with long-term current use of insulin         Type 2 diabetes mellitus with hyperglycemia, with long-term current use of insulin         Obesity (BMI 30-39.9)         Gastroesophageal reflux disease without esophagitis         Essential hypertension         Dyslipidemia                To Do List           Future Appointments        Provider Department Dept Phone    2017 1:00 PM [facility], ENDOCRINE [facility] [phone]    2017 2:20 PM Amrit Abbott MD [facility] Internal Medicine [phone]    2017 8:00 AM [facility], ENDOCRINE [facility] [phone]    2017 10:00 AM LAB, APPOINTMENT [facility] [phone]    2017 3:30 PM DI[facility]MENT [facility] [phone]      Your Future Surgeries/Procedures     2017   Surgery with Ingrid Wagner MD   [facility]    [address] [place], [place] [address] [id number] [phone]              Goals (5 Years of Data)     None      [facility]     [facility] Care Line -  Assistance  Registered nurses in the [facility] provide clinical advisement, health education, appointment booking, and other advisory services.  Call for this free service at [phone].            "  Medications           Message regarding Medications     Verify the changes and/or additions to your medication regime listed below are the same as discussed with your clinician today.  If any of these changes or additions are incorrect, please notify your healthcare provider.             Verify that the below list of medications is an accurate representation of the medications you are currently taking.  If none reported, the list may be blank. If incorrect, please contact your healthcare provider. Carry this list with you in case of emergency.           Current Medications     amlodipine (NORVASC) 10 MG tablet Take 1 tablet (10 mg total) by mouth once daily.    atorvastatin (LIPITOR) 40 MG tablet Take 1 tablet (40 mg total) by mouth once daily.    blood sugar diagnostic (ONETOUCH VERIO) Strp Pt use 4x/day    blood-glucose meter (ONETOUCH VERIO IQ METER) kit Use as instructed    diclofenac (VOLTAREN) 50 MG EC tablet     insulin detemir (LEVEMIR FLEXTOUCH) 100 unit/mL (3 mL) SubQ InPn pen Inject 40 Units into the skin once daily.    lancets (ONE TOUCH DELICA LANCETS) 33 gauge Misc 1 lancet by Misc.(Non-Drug; Combo Route) route 4 (four) times daily. Pt use 4x/day    liraglutide 0.6 mg/0.1 mL, 18 mg/3 mL, subq PNIJ (VICTOZA 3-DUANE) 0.6 mg/0.1 mL (18 mg/3 mL) PnIj Inject 1.8 mg into the skin once daily.    losartan (COZAAR) 100 MG tablet Take 1 tablet (100 mg total) by mouth once daily.    losartan (COZAAR) 100 MG tablet TAKE 1 TABLET BY MOUTH EVERY DAY    metformin (GLUCOPHAGE-XR) 500 MG 24 hr tablet Take 4 tablets (2,000 mg total) by mouth once daily.    ofloxacin (OCUFLOX) 0.3 % ophthalmic solution Place 1 drop into the right eye 3 (three) times daily.    oxybutynin (DITROPAN-XL) 10 MG 24 hr tablet     pen needle, diabetic (BD ULTRA-FINE RUDDY PEN NEEDLES) 32 gauge x 5/32" Ndle Uses 2 times daily, on multiple daily insulin injections    prednisoLONE acetate (PRED FORTE) 1 % DrpS Place 1 drop into the right eye 3 " "(three) times daily.    tramadol (ULTRAM) 50 mg tablet Take 1-2 tablets ( mg total) by mouth every 6 (six) hours as needed.    aspirin (ECOTRIN) 81 MG EC tablet Take 81 mg by mouth once daily.    terconazole (TERAZOL 7) 0.4 % Crea Place 1 applicator vaginally once daily.           Clinical Reference Information           Your Vitals Were     BP Pulse Height Weight BMI    136/84 76 5' 5" (1.651 m) 101 kg (222 lb 10.6 oz) 37.05 kg/m2      Blood Pressure          Most Recent Value    BP  136/84      Allergies as of 3/17/2017     Keflex [Cephalexin]    Penicillins    Sulfa (Sulfonamide Antibiotics)      Immunizations Administered on Date of Encounter - 3/17/2017     None      Orders Placed During Today's Visit     Future Labs/Procedures Expected by Expires    TSH  3/17/2017 3/17/2018    GLUCOSE MONITORING CONTINUOUS MIN 72 HOURS  As directed 3/17/2018    GLUCOSE MONITORING CONTINUOUS MIN 72 HOURS  As directed 3/17/2018      Instructions    Increase your Metformin to either 1000 mg twice daily EVERY DAY or 2000 mg daily in the morning with food    If no nausea, increase your Victoza to 1.8 mg daily    Increase your fluid intake to 6 bottles daily and email me if you can consistently do this and I will start Invokana     For now, continue your Levemir 40 units daily        Language Assistance Services     ATTENTION: Language assistance services are available, free of charge. Please call [phone].      ATENCIÓN: Si habla español, tiene a centeno disposición servicios gratuitos de asistencia lingüística. Llame al [phone].     PERLA Ý: N?u b?n nói Ti?ng Vi?t, có các d?ch v? h? tr? ngôn ng? mi?n phí dành cho b?n. G?i s? [phone].         [facility] complies with applicable Federal civil rights laws and does not discriminate on the basis of race, color, national origin, age, disability, or sex.        "

## 2017-03-20 ENCOUNTER — PATIENT MESSAGE (OUTPATIENT)
Dept: DIABETES | Facility: CLINIC | Age: 62
End: 2017-03-20

## 2017-04-04 ENCOUNTER — PATIENT MESSAGE (OUTPATIENT)
Dept: INTERNAL MEDICINE | Facility: CLINIC | Age: 62
End: 2017-04-04

## 2017-04-04 DIAGNOSIS — H26.9 CATARACT, UNSPECIFIED CATARACT TYPE, UNSPECIFIED LATERALITY: Primary | ICD-10-CM

## 2017-04-06 ENCOUNTER — PATIENT MESSAGE (OUTPATIENT)
Dept: OPHTHALMOLOGY | Facility: CLINIC | Age: 62
End: 2017-04-06

## 2017-04-07 ENCOUNTER — PATIENT MESSAGE (OUTPATIENT)
Dept: DIABETES | Facility: CLINIC | Age: 62
End: 2017-04-07

## 2017-04-11 ENCOUNTER — PATIENT MESSAGE (OUTPATIENT)
Dept: DIABETES | Facility: CLINIC | Age: 62
End: 2017-04-11

## 2017-04-11 DIAGNOSIS — Z79.4 TYPE 2 DIABETES MELLITUS WITH DIABETIC POLYNEUROPATHY, WITH LONG-TERM CURRENT USE OF INSULIN: Primary | Chronic | ICD-10-CM

## 2017-04-11 DIAGNOSIS — E11.42 TYPE 2 DIABETES MELLITUS WITH DIABETIC POLYNEUROPATHY, WITH LONG-TERM CURRENT USE OF INSULIN: Primary | Chronic | ICD-10-CM

## 2017-04-11 NOTE — H&P
History    Chief complaint:  Painless progressive vision loss    Present Ilness/Diagnosis: Nuclear sclerotic Cataract    Past Medical History:  has a past medical history of Allergy; Anemia; Anxiety; Arthritis; Cataract; DR (diabetic retinopathy); Dyslipidemia; Essential hypertension (8/1/2012); Gastroesophageal reflux disease without esophagitis (2/3/2017); GERD (gastroesophageal reflux disease); Glaucoma; Hypertension; Obesity (BMI 30-39.9) (10/24/2013); PN (peripheral neuropathy); Sleep apnea; Type 2 diabetes mellitus with both eyes affected by mild nonproliferative retinopathy without macular edema, with long-term current use of insulin; Type 2 diabetes mellitus with diabetic polyneuropathy, with long-term current use of insulin; and Type II or unspecified type diabetes mellitus with other specified manifestations, uncontrolled.    Family History/Social History: refer to chart    Allergies:   Review of patient's allergies indicates:   Allergen Reactions    Keflex [cephalexin] Rash      blisters, fever    Penicillins Itching    Sulfa (sulfonamide antibiotics) Rash      blisters,fever           Current Medications: No current facility-administered medications for this encounter.     Current Outpatient Prescriptions:     amlodipine (NORVASC) 10 MG tablet, Take 1 tablet (10 mg total) by mouth once daily., Disp: 90 tablet, Rfl: 3    aspirin (ECOTRIN) 81 MG EC tablet, Take 81 mg by mouth once daily., Disp: , Rfl:     atorvastatin (LIPITOR) 40 MG tablet, Take 1 tablet (40 mg total) by mouth once daily., Disp: 90 tablet, Rfl: 3    blood sugar diagnostic (ONETOUCH VERIO) Strp, Pt use 4x/day, Disp: 200 strip, Rfl: 12    blood-glucose meter (ONETOUCH VERIO IQ METER) kit, Use as instructed, Disp: 200 each, Rfl: 11    diclofenac (VOLTAREN) 50 MG EC tablet, , Disp: , Rfl: 2    insulin detemir (LEVEMIR FLEXTOUCH) 100 unit/mL (3 mL) SubQ InPn pen, Inject 40 Units into the skin once daily., Disp: 1 Box, Rfl: 4     "lancets (ONE TOUCH DELICA LANCETS) 33 gauge Misc, 1 lancet by Misc.(Non-Drug; Combo Route) route 4 (four) times daily. Pt use 4x/day, Disp: 200 each, Rfl: 12    liraglutide 0.6 mg/0.1 mL, 18 mg/3 mL, subq PNIJ (VICTOZA 3-DUANE) 0.6 mg/0.1 mL (18 mg/3 mL) PnIj, Inject 1.8 mg into the skin once daily., Disp: 9 mL, Rfl: 4    losartan (COZAAR) 100 MG tablet, Take 1 tablet (100 mg total) by mouth once daily., Disp: 90 tablet, Rfl: 3    losartan (COZAAR) 100 MG tablet, TAKE 1 TABLET BY MOUTH EVERY DAY, Disp: 90 tablet, Rfl: 2    metformin (GLUCOPHAGE-XR) 500 MG 24 hr tablet, Take 4 tablets (2,000 mg total) by mouth once daily., Disp: 360 tablet, Rfl: 2    oxybutynin (DITROPAN-XL) 10 MG 24 hr tablet, , Disp: , Rfl: 11    pen needle, diabetic (BD ULTRA-FINE RUDDY PEN NEEDLES) 32 gauge x 5/32" Ndle, Uses 2 times daily, on multiple daily insulin injections, Disp: 100 each, Rfl: 4    terconazole (TERAZOL 7) 0.4 % Crea, Place 1 applicator vaginally once daily., Disp: 45 g, Rfl: 1    tramadol (ULTRAM) 50 mg tablet, Take 1-2 tablets ( mg total) by mouth every 6 (six) hours as needed., Disp: 120 tablet, Rfl: 1    Physical Exam    BP: Vital signs stable  General: No apparent distress  HEENT: nuclear sclerotic cataract  Lungs: adequate respirations  Heart: + pulses  Abdomen: soft  Rectal/pelvic: deferred    Impression: Visually significant Cataract    Plan: Phacoemulsification with implantation of Intraocular lens    "

## 2017-04-12 ENCOUNTER — TELEPHONE (OUTPATIENT)
Dept: OPHTHALMOLOGY | Facility: CLINIC | Age: 62
End: 2017-04-12

## 2017-04-13 ENCOUNTER — OFFICE VISIT (OUTPATIENT)
Dept: OPHTHALMOLOGY | Facility: CLINIC | Age: 62
End: 2017-04-13
Attending: OPHTHALMOLOGY
Payer: COMMERCIAL

## 2017-04-13 ENCOUNTER — SURGERY (OUTPATIENT)
Age: 62
End: 2017-04-13

## 2017-04-13 ENCOUNTER — HOSPITAL ENCOUNTER (OUTPATIENT)
Facility: OTHER | Age: 62
Discharge: HOME | End: 2017-04-13
Attending: OPHTHALMOLOGY | Admitting: OPHTHALMOLOGY
Payer: COMMERCIAL

## 2017-04-13 ENCOUNTER — ANESTHESIA (OUTPATIENT)
Dept: SURGERY | Facility: OTHER | Age: 62
End: 2017-04-13
Payer: COMMERCIAL

## 2017-04-13 ENCOUNTER — ANESTHESIA EVENT (OUTPATIENT)
Dept: SURGERY | Facility: OTHER | Age: 62
End: 2017-04-13
Payer: COMMERCIAL

## 2017-04-13 VITALS
TEMPERATURE: 99 F | SYSTOLIC BLOOD PRESSURE: 122 MMHG | OXYGEN SATURATION: 98 % | HEART RATE: 80 BPM | WEIGHT: 222 LBS | RESPIRATION RATE: 18 BRPM | HEIGHT: 65 IN | DIASTOLIC BLOOD PRESSURE: 60 MMHG | BODY MASS INDEX: 36.99 KG/M2

## 2017-04-13 DIAGNOSIS — H25.10 SENILE NUCLEAR SCLEROSIS: ICD-10-CM

## 2017-04-13 DIAGNOSIS — Z96.1 STATUS POST CATARACT EXTRACTION AND INSERTION OF INTRAOCULAR LENS, RIGHT: Primary | ICD-10-CM

## 2017-04-13 DIAGNOSIS — H25.11 SENILE NUCLEAR SCLEROSIS, RIGHT: Primary | ICD-10-CM

## 2017-04-13 DIAGNOSIS — Z96.1 STATUS POST CATARACT EXTRACTION AND INSERTION OF INTRAOCULAR LENS, LEFT: ICD-10-CM

## 2017-04-13 DIAGNOSIS — Z98.42 STATUS POST CATARACT EXTRACTION AND INSERTION OF INTRAOCULAR LENS, LEFT: ICD-10-CM

## 2017-04-13 DIAGNOSIS — Z98.41 STATUS POST CATARACT EXTRACTION AND INSERTION OF INTRAOCULAR LENS, RIGHT: Primary | ICD-10-CM

## 2017-04-13 LAB — POCT GLUCOSE: 213 MG/DL (ref 70–110)

## 2017-04-13 PROCEDURE — 37000008 HC ANESTHESIA 1ST 15 MINUTES: Performed by: OPHTHALMOLOGY

## 2017-04-13 PROCEDURE — 37000009 HC ANESTHESIA EA ADD 15 MINS: Performed by: OPHTHALMOLOGY

## 2017-04-13 PROCEDURE — 66984 XCAPSL CTRC RMVL W/O ECP: CPT | Mod: RT,,, | Performed by: OPHTHALMOLOGY

## 2017-04-13 PROCEDURE — 99999 PR PBB SHADOW E&M-EST. PATIENT-LVL I: CPT | Mod: PBBFAC,,, | Performed by: OPHTHALMOLOGY

## 2017-04-13 PROCEDURE — 36000706: Performed by: OPHTHALMOLOGY

## 2017-04-13 PROCEDURE — V2632 POST CHMBR INTRAOCULAR LENS: HCPCS | Performed by: OPHTHALMOLOGY

## 2017-04-13 PROCEDURE — 71000015 HC POSTOP RECOV 1ST HR: Performed by: OPHTHALMOLOGY

## 2017-04-13 PROCEDURE — 25000003 PHARM REV CODE 250: Performed by: NURSE ANESTHETIST, CERTIFIED REGISTERED

## 2017-04-13 PROCEDURE — 99024 POSTOP FOLLOW-UP VISIT: CPT | Mod: S$GLB,,, | Performed by: OPHTHALMOLOGY

## 2017-04-13 PROCEDURE — 36000707: Performed by: OPHTHALMOLOGY

## 2017-04-13 PROCEDURE — 63600175 PHARM REV CODE 636 W HCPCS: Performed by: NURSE ANESTHETIST, CERTIFIED REGISTERED

## 2017-04-13 PROCEDURE — 25000003 PHARM REV CODE 250: Performed by: OPHTHALMOLOGY

## 2017-04-13 PROCEDURE — 82962 GLUCOSE BLOOD TEST: CPT | Performed by: OPHTHALMOLOGY

## 2017-04-13 DEVICE — IMPLANTABLE DEVICE: Type: IMPLANTABLE DEVICE | Site: EYE | Status: FUNCTIONAL

## 2017-04-13 RX ORDER — MIDAZOLAM HYDROCHLORIDE 1 MG/ML
INJECTION INTRAMUSCULAR; INTRAVENOUS
Status: DISCONTINUED | OUTPATIENT
Start: 2017-04-13 | End: 2017-04-13

## 2017-04-13 RX ORDER — PROPARACAINE HYDROCHLORIDE 5 MG/ML
1 SOLUTION/ DROPS OPHTHALMIC
Status: DISCONTINUED | OUTPATIENT
Start: 2017-04-13 | End: 2017-04-13 | Stop reason: HOSPADM

## 2017-04-13 RX ORDER — PHENYLEPHRINE HYDROCHLORIDE 25 MG/ML
1 SOLUTION/ DROPS OPHTHALMIC
Status: DISCONTINUED | OUTPATIENT
Start: 2017-04-13 | End: 2017-04-13 | Stop reason: HOSPADM

## 2017-04-13 RX ORDER — TROPICAMIDE 10 MG/ML
1 SOLUTION/ DROPS OPHTHALMIC
Status: DISCONTINUED | OUTPATIENT
Start: 2017-04-13 | End: 2017-04-13 | Stop reason: HOSPADM

## 2017-04-13 RX ORDER — PREDNISOLONE ACETATE 10 MG/ML
1 SUSPENSION/ DROPS OPHTHALMIC 3 TIMES DAILY
Qty: 5 ML | Refills: 1 | Status: SHIPPED | OUTPATIENT
Start: 2017-04-13 | End: 2017-05-13

## 2017-04-13 RX ORDER — KETOROLAC TROMETHAMINE 5 MG/ML
1 SOLUTION OPHTHALMIC ONCE
Status: COMPLETED | OUTPATIENT
Start: 2017-04-13 | End: 2017-04-13

## 2017-04-13 RX ORDER — SODIUM CHLORIDE, SODIUM LACTATE, POTASSIUM CHLORIDE, CALCIUM CHLORIDE 600; 310; 30; 20 MG/100ML; MG/100ML; MG/100ML; MG/100ML
INJECTION, SOLUTION INTRAVENOUS CONTINUOUS PRN
Status: DISCONTINUED | OUTPATIENT
Start: 2017-04-13 | End: 2017-04-13

## 2017-04-13 RX ORDER — PHENYLEPHRINE HYDROCHLORIDE 100 MG/ML
SOLUTION/ DROPS OPHTHALMIC
Status: DISCONTINUED | OUTPATIENT
Start: 2017-04-13 | End: 2017-04-13 | Stop reason: HOSPADM

## 2017-04-13 RX ORDER — ACETAMINOPHEN 325 MG/1
650 TABLET ORAL EVERY 4 HOURS PRN
Status: DISCONTINUED | OUTPATIENT
Start: 2017-04-13 | End: 2017-04-13 | Stop reason: HOSPADM

## 2017-04-13 RX ORDER — LIDOCAINE HYDROCHLORIDE 40 MG/ML
INJECTION, SOLUTION RETROBULBAR
Status: DISCONTINUED | OUTPATIENT
Start: 2017-04-13 | End: 2017-04-13 | Stop reason: HOSPADM

## 2017-04-13 RX ORDER — MOXIFLOXACIN 5 MG/ML
1 SOLUTION/ DROPS OPHTHALMIC
Status: COMPLETED | OUTPATIENT
Start: 2017-04-13 | End: 2017-04-13

## 2017-04-13 RX ORDER — LIDOCAINE HYDROCHLORIDE 10 MG/ML
INJECTION, SOLUTION EPIDURAL; INFILTRATION; INTRACAUDAL; PERINEURAL
Status: DISCONTINUED | OUTPATIENT
Start: 2017-04-13 | End: 2017-04-13 | Stop reason: HOSPADM

## 2017-04-13 RX ORDER — TETRACAINE HYDROCHLORIDE 5 MG/ML
SOLUTION OPHTHALMIC
Status: DISCONTINUED | OUTPATIENT
Start: 2017-04-13 | End: 2017-04-13 | Stop reason: HOSPADM

## 2017-04-13 RX ORDER — MOXIFLOXACIN 5 MG/ML
SOLUTION/ DROPS OPHTHALMIC
Status: DISCONTINUED | OUTPATIENT
Start: 2017-04-13 | End: 2017-04-13 | Stop reason: HOSPADM

## 2017-04-13 RX ADMIN — MIDAZOLAM HYDROCHLORIDE 2 MG: 1 INJECTION, SOLUTION INTRAMUSCULAR; INTRAVENOUS at 10:04

## 2017-04-13 RX ADMIN — TROPICAMIDE 1 DROP: 10 SOLUTION/ DROPS OPHTHALMIC at 08:04

## 2017-04-13 RX ADMIN — LIDOCAINE HYDROCHLORIDE 2 DROP: 40 INJECTION, SOLUTION RETROBULBAR; TOPICAL at 10:04

## 2017-04-13 RX ADMIN — SODIUM CHONDROITIN SULFATE / SODIUM HYALURONATE 2 ML: 0.55-0.5 INJECTION INTRAOCULAR at 10:04

## 2017-04-13 RX ADMIN — PHENYLEPHRINE HYDROCHLORIDE 1 DROP: 25 SOLUTION/ DROPS OPHTHALMIC at 08:04

## 2017-04-13 RX ADMIN — LIDOCAINE HYDROCHLORIDE 1 ML: 10 INJECTION, SOLUTION EPIDURAL; INFILTRATION; INTRACAUDAL; PERINEURAL at 10:04

## 2017-04-13 RX ADMIN — BALANCED SALT SOLUTION ENRICHED WITH BICARBONATE, DEXTROSE, AND GLUTATHIONE 1 ML: KIT at 09:04

## 2017-04-13 RX ADMIN — PHENYLEPHRINE HYDROCHLORIDE 1 DROP: 100 SOLUTION/ DROPS OPHTHALMIC at 09:04

## 2017-04-13 RX ADMIN — PROPARACAINE HYDROCHLORIDE 1 DROP: 5 SOLUTION/ DROPS OPHTHALMIC at 08:04

## 2017-04-13 RX ADMIN — KETOROLAC TROMETHAMINE 1 DROP: 5 SOLUTION/ DROPS OPHTHALMIC at 08:04

## 2017-04-13 RX ADMIN — MOXIFLOXACIN HYDROCHLORIDE 1 DROP: 5 SOLUTION/ DROPS OPHTHALMIC at 08:04

## 2017-04-13 RX ADMIN — PROPARACAINE HYDROCHLORIDE 1 DROP: 5 SOLUTION/ DROPS OPHTHALMIC at 09:04

## 2017-04-13 RX ADMIN — BALANCED SALT SOLUTION ENRICHED WITH BICARBONATE, DEXTROSE, AND GLUTATHIONE 500 ML: KIT at 10:04

## 2017-04-13 RX ADMIN — TETRACAINE HYDROCHLORIDE 1 DROP: 5 SOLUTION OPHTHALMIC at 10:04

## 2017-04-13 RX ADMIN — MOXIFLOXACIN HYDROCHLORIDE 1 DROP: 5 SOLUTION/ DROPS OPHTHALMIC at 09:04

## 2017-04-13 RX ADMIN — SODIUM CHLORIDE, SODIUM LACTATE, POTASSIUM CHLORIDE, AND CALCIUM CHLORIDE: 600; 310; 30; 20 INJECTION, SOLUTION INTRAVENOUS at 10:04

## 2017-04-13 NOTE — DISCHARGE SUMMARY
BRIEF DISCHARGE NOTE:    Reason for hospitalization -  Cataract surgery     Final Diagnosis - Visually significant Cataract    Procedures and treatment provided - Status post phacoemulsification with placement of intraocular lens     Diet - Advance to regular as tolerated    Activity - as tolerated    Disposition at the end of the case - Good.    Discharge: to home    The patient tolerated the procedure well and knows to follow up with me tomorrow morning in the eye clinic, sooner if needed.    Patient and family instructions (as appropriate) - Given to patient on discharge    Ingrid Wagner MD

## 2017-04-13 NOTE — PROGRESS NOTES
HPI     Post-op Evaluation    Additional comments: Right Eye.            Comments   Erica Batista, OD   OHTN / + fam h/o glc - mom/brother   S/p phaco w/IOL OS 3/17/16   S/p CE with IOL OD (Catalys) - 4/13/17    Pt states having some FBS and mild blurry vision OD.  Otherwise doing   well.           Last edited by Ingrid Wagner MD on 4/13/2017  2:28 PM. (History)            Assessment /Plan     For exam results, see Encounter Report.    Status post cataract extraction and insertion of intraocular lens, right    Status post cataract extraction and insertion of intraocular lens, left    Other orders  -     prednisoLONE acetate (PRED FORTE) 1 % DrpS; Place 1 drop into the right eye 3 (three) times daily.  Dispense: 5 mL; Refill: 1      Slit Lamp Exam  L/L - normal  C/s - quiet  Cornea - clear  A/C - 1+ cell  Lens - PCIOL    Slit Lamp Exam  L/L - normal  C/s - quiet  Cornea - clear  A/C - 1+ cell  Lens - PCIOL    POD #1 s/p phaco/IOL  - doing well  - continue the following drops:    vigamox or ocuflox TID x 1 wk then stop  Pred forte or durezol or dexamethasone TID x  4 wks  Ketorolac TID until runs out    Versus:    Steroid/abx/NSAID combo drop TID until it runs out, ~ 30 days duration    Appropriate precautions and post op medications reviewed.  Patient instructed to call or come in if symptoms of redness, decreased vision, or pain are experienced.    -f/up 1-2wks, sooner PRN.     Then 1 mo dr. Batista for dfe / mrx

## 2017-04-13 NOTE — OP NOTE
DATE OF PROCEDURE: 04/13/2017    SURGEON: JOHN BURRIS MD    PREOPERATIVE DIAGNOSIS:  Senile nuclear sclerotic cataract right eye.     POSTOPERATIVE DIAGNOSIS: Senile nuclear sclerotic cataract right eye.     PROCEDURE PERFORMED:  Phacoemulsification with placement of intraocular lens, right eye.    IMPLANT:  PCBOO 24.0    ANESTHESIA:  Topical and MAC    COMPLICATIONS: none    ESTIMATED BLOOD LOSS: <1cc    SPECIMENS: none    INDICATIONS FOR PROCEDURE:  This patient presented to the clinic with decreased vision in the right eye and was found to have a cataract.  The risks, benefits, and alternatives were discussed and the patient agreed to proceed with phacoemulsification and implantation of a lens in the right eye.     PROCEDURE IN DETAIL:     The Catalys femtosecond laser was used prior to the cataract surgery portion in the laser suite to perform the capsulorhexis and lens fragmentation.    The patient was met in the preop holding area.  Consent was confirmed to be signed.  The operative site was marked.  The patient was brought into the operating room by the anesthesia team and placed under monitored anesthesia care.  The right eye was prepped and draped in a sterile ophthalmic fashion.  A Ham speculum was placed into the right eye.   A paracentesis site was made and 1% preservative-free lidocaine was injected into the anterior chamber.  Viscoelastic  material was injected into the anterior chamber.  A keratome blade was used to make a clear corneal incision.  A cystotome was used to initiate the continuous curvilinear capsulorrhexis which was completed with Utrata forceps.  BSS on a morfin cannula was used to perform hydrodissection.  The phacoemulsification tip was introduced into the eye and the nucleus was removed in a standard divide-and-conquer fashion.  Remaining cortical material was removed from the eye using irrigation-aspiration.  The capsular bag was filled with viscoelastic material and the  intraocular lens was injected and positioned into place. Remaining viscoelastic material was removed from the eye using irrigation and aspiration.  The corneal wounds were hydrated.  The eye was filled to physiologic pressure. The wounds were found to be watertight. Drops of Vigamox and prednisilone were placed into the eye.  The eye was washed, dried, and shielded.  The patient tolerated the procedure well and knows to follow up with me tomorrow morning, sooner if needed.

## 2017-04-13 NOTE — LETTER
April 13, 2017         [address]  [place] [address]  Phone: [phone]  Fax: [phone]       Patient: Adriana Paula   YOB: 1955  Date of Visit: 04/13/2017    To Whom It May Concern:    Livan was at [facility] on 04/13/2017. She was with her Mother at the hospital. If you have any questions or concerns, or if I can be of further assistance, please do not hesitate to contact me.    Sincerely,    Dagmar Corley RN

## 2017-04-13 NOTE — IP AVS SNAPSHOT
[facility], [place], operated by [facility]  [address] [place], [place] [id number]  Phone: [phone]           Patient Discharge Instructions   Our goal is to set you up for success. This packet includes information on your condition, medications, and your home care.  It will help you care for yourself to prevent having to return to the hospital.     Please ask your nurse if you have any questions.      There are many details to remember when preparing to leave the hospital. Here is what you will need to do:    1. Take your medicine. If you are prescribed medications, review your Medication List on the following pages. You may have new medications to  at the pharmacy and others that you'll need to stop taking. Review the instructions for how and when to take your medications. Talk with your doctor or nurses if you are unsure of what to do.     2. Go to your follow-up appointments. Specific follow-up information is listed in the following pages. Your may be contacted by a nurse or clinical provider about future appointments. Be sure we have all of the phone numbers to reach you. Please contact your provider's office if you are unable to make an appointment.     3. Watch for warning signs. Your doctor or nurse will give you detailed warning signs to watch for and when to call for assistance. These instructions may also include educational information about your condition. If you experience any of warning signs to your health, call your doctor.               ** Verify the list of medication(s) below is accurate and up to date. Carry this with you in case of emergency. If your medications have changed, please notify your healthcare provider.             Medication List      CONTINUE taking these medications        Additional Info                      amlodipine 10 MG tablet   Commonly known as:  NORVASC   Quantity:  90 tablet   Refills:  3   Dose:  10 mg    Instructions:  Take 1 tablet (10 mg total) by mouth  once daily.     Begin Date    AM    Noon    PM    Bedtime       aspirin 81 MG EC tablet   Commonly known as:  ECOTRIN   Refills:  0   Dose:  81 mg    Instructions:  Take 81 mg by mouth once daily.     Begin Date    AM    Noon    PM    Bedtime       atorvastatin 40 MG tablet   Commonly known as:  LIPITOR   Quantity:  90 tablet   Refills:  3   Dose:  40 mg    Instructions:  Take 1 tablet (40 mg total) by mouth once daily.     Begin Date    AM    Noon    PM    Bedtime       blood sugar diagnostic Strp   Commonly known as:  ONETOUCH VERIO   Quantity:  200 strip   Refills:  12    Instructions:  Pt use 4x/day     Begin Date    AM    Noon    PM    Bedtime       blood-glucose meter kit   Commonly known as:  ONETOUCH VERIO IQ METER   Quantity:  200 each   Refills:  11    Instructions:  Use as instructed     Begin Date    AM    Noon    PM    Bedtime       diclofenac 50 MG EC tablet   Commonly known as:  VOLTAREN   Refills:  2      Begin Date    AM    Noon    PM    Bedtime       insulin detemir 100 unit/mL (3 mL) Inpn pen   Commonly known as:  LEVEMIR FLEXTOUCH   Quantity:  1 Box   Refills:  4   Dose:  40 Units    Instructions:  Inject 40 Units into the skin once daily.     Begin Date    AM    Noon    PM    Bedtime       lancets 33 gauge Misc   Commonly known as:  ONETOUCH DELICA LANCETS   Quantity:  200 each   Refills:  12   Dose:  1 lancet    Instructions:  1 lancet by Misc.(Non-Drug; Combo Route) route 4 (four) times daily. Pt use 4x/day     Begin Date    AM    Noon    PM    Bedtime       losartan 100 MG tablet   Commonly known as:  COZAAR   Quantity:  90 tablet   Refills:  3   Dose:  100 mg    Instructions:  Take 1 tablet (100 mg total) by mouth once daily.     Begin Date    AM    Noon    PM    Bedtime       metformin 500 MG 24 hr tablet   Commonly known as:  GLUCOPHAGE-XR   Quantity:  360 tablet   Refills:  2   Dose:  2000 mg    Instructions:  Take 4 tablets (2,000 mg total) by mouth once daily.     Begin Date    AM     "Noon    PM    Bedtime       oxybutynin 10 MG 24 hr tablet   Commonly known as:  DITROPAN-XL   Refills:  11      Begin Date    AM    Noon    PM    Bedtime       pen needle, diabetic 32 gauge x 5/32" Ndle   Commonly known as:  BD ULTRA-FINE RUDDY PEN NEEDLES   Quantity:  100 each   Refills:  4    Instructions:  Uses 2 times daily, on multiple daily insulin injections     Begin Date    AM    Noon    PM    Bedtime       SITagliptan 100 MG Tab   Commonly known as:  JANUVIA   Quantity:  30 tablet   Refills:  4   Dose:  100 mg    Instructions:  Take 1 tablet (100 mg total) by mouth once daily.     Begin Date    AM    Noon    PM    Bedtime       terconazole 0.4 % Crea   Commonly known as:  TERAZOL 7   Quantity:  45 g   Refills:  1   Dose:  1 applicator    Instructions:  Place 1 applicator vaginally once daily.     Begin Date    AM    Noon    PM    Bedtime       tramadol 50 mg tablet   Commonly known as:  ULTRAM   Quantity:  120 tablet   Refills:  1   Dose:   mg    Instructions:  Take 1-2 tablets ( mg total) by mouth every 6 (six) hours as needed.     Begin Date    AM    Noon    PM    Bedtime                  Please bring to all follow up appointments:    1. A copy of your discharge instructions.  2. All medicines you are currently taking in their original bottles.  3. Identification and insurance card.    Please arrive 15 minutes ahead of scheduled appointment time.    Please call 24 hours in advance if you must reschedule your appointment and/or time.        Your Scheduled Appointments     Apr 13, 2017  1:35 PM CDT   Post OP with Ingrid Wagner MD   Christian - Opthalmology ([facility])    [address]  [facility] [id number] [phone]            Apr 26, 2017  1:00 PM CDT   Cont. Glucose Monitoring with CGMS, ENDOCRINE   Rafi Arrington - Endocrinology ([facility] )    [address]  [facility] [address] [phone]            May 01, 2017  2:20 PM CDT   Established Patient " "Visit with MD Rafi Hansen Brandi - Internal Medicine ([facility])    [address]  [facility] [address] [phone]            May 02, 2017  8:00 AM CDT   Cont. Glucose Monitoring with CGMS, ENDOCRINE   Rafi Arrington - Endocrinology ([facility] )    [address]  [facility] [address] [phone]            May 05, 2017 10:00 AM CDT   Non-Fasting Lab with LAB, APPOINTMENT [facility]    [address]  [place] [address] [phone]              Follow-up Information     Follow up In 1 day.        Discharge Instructions     Future Orders    Call MD for:  severe uncontrolled pain     Diet general     Questions:    Total calories:      Fat restriction, if any:      Protein restriction, if any:      Na restriction, if any:      Fluid restriction:      Additional restrictions:      Lifting restrictions         Discharge Instructions         Anesthesia: Monitored Anesthesia Care (MAC)    Anesthesia Safety  · Have an adult family member or friend drive you home after the procedure.  · For the first 24 hours after your surgery:  ¨ Do not drive or use heavy equipment.  ¨ Do not make important decisions or sign documents.  ¨ Avoid alcohol.  ¨ Have someone stay with you, if possible. They can watch for problems and help keep you safe.          Primary Diagnosis     Your primary diagnosis was:  Cataract      Admission Information     Date & Time Provider Department CSN    4/13/2017  7:54 AM Ingrid Wagner MD [facility] [id number]      Care Providers     Provider Role Specialty Primary office phone    Ingrid Wagner MD Attending Provider Ophthalmology [phone]    Ingrid Wagenr MD Surgeon  Ophthalmology [phone]      Your Vitals Were     BP Pulse Temp Resp Height Weight    122/60 80 98.6 °F (37 °C) (Oral) 18 5' 5" (1.651 m) " 100.7 kg (222 lb)    SpO2 BMI             98% 36.94 kg/m2         Recent Lab Values        7/11/2013 10/24/2013 2/17/2014 12/12/2014 10/27/2015 5/19/2016 10/28/2016 1/31/2017      9:28 AM 10:35 AM  8:20 AM  9:10 AM  9:10 AM  9:24 AM  3:20 PM  2:45 PM    A1C 12.0 (H) 10.0 (H) 10.1 (H) 10.9 (H) 11.4 (H) 10.4 (H) 8.3 (H) 9.8 (H)    Comment for A1C at  3:20 PM on 10/28/2016:  According to ADA guidelines, hemoglobin A1C <7.0% represents  optimal control in non-pregnant diabetic patients.  Different  metrics may apply to specific populations.   Standards of Medical Care in Diabetes - 2016.  For the purpose of screening for the presence of diabetes:  <5.7%     Consistent with the absence of diabetes  5.7-6.4%  Consistent with increasing risk for diabetes   (prediabetes)  >or=6.5%  Consistent with diabetes  Currently no consensus exists for use of hemoglobin A1C  for diagnosis of diabetes for children.      Comment for A1C at  2:45 PM on 1/31/2017:  According to ADA guidelines, hemoglobin A1C <7.0% represents  optimal control in non-pregnant diabetic patients.  Different  metrics may apply to specific populations.   Standards of Medical Care in Diabetes - 2016.  For the purpose of screening for the presence of diabetes:  <5.7%     Consistent with the absence of diabetes  5.7-6.4%  Consistent with increasing risk for diabetes   (prediabetes)  >or=6.5%  Consistent with diabetes  Currently no consensus exists for use of hemoglobin A1C  for diagnosis of diabetes for children.        Allergies as of 4/13/2017        Reactions    Keflex [Cephalexin] Rash     blisters, fever    Penicillins Itching    Sulfa (Sulfonamide Antibiotics) Rash     blisters,fever      [facility]     [facility] On Call Nurse Care Line - 24/7 Assistance  Unless otherwise directed by your provider, please contact [facility], our nurse care line that is available for 24/7 assistance.     Registered nurses in the [facility] provide clinical  advisement, health education, appointment booking, and other advisory services.  Call for this free service at [phone].        Advance Directives     An advance directive is a document which, in the event you are no longer able to make decisions for yourself, tells your healthcare team what kind of treatment you do or do not want to receive, or who you would like to make those decisions for you.  If you do not currently have an advance directive, [facility] encourages you to create one.  For more information call:  [phone]), [phone] ([phone]),  or log on to [email or web address].        Smoking Cessation     If you would like to quit smoking:   You may be eligible for free services if you are a [place] resident and started smoking cigarettes before September 1, 1988.  Call the Smoking Cessation Trust (SCT) toll free at (647) 761-0987 or (308) 905-0349.   Call 9-399-QUIT-NOW if you do not meet the above criteria.   Contact us via email: [email or web address]   View our website for more information: [email or web address]        Language Assistance Services     ATTENTION: Language assistance services are available, free of charge. Please call [phone].      ATENCIÓN: Si habla español, tiene a centeno disposición servicios gratuitos de asistencia lingüística. Llame al [phone].     CHÚ Ý: N?u b?n nói Ti?ng Vi?t, có các d?ch v? h? tr? ngôn ng? mi?n phí dành cho b?n. G?i s? [phone].        Diabetes Discharge Instructions                                    [facility] complies with applicable Federal civil rights laws and does not discriminate on the basis of race, color, national origin, age, disability, or sex.

## 2017-04-13 NOTE — PLAN OF CARE
Patient prefers to have Livan Galdamez, daughter, present for discharge teaching. Please contact them @ [phone].

## 2017-04-13 NOTE — ANESTHESIA PREPROCEDURE EVALUATION
04/13/2017  Adriana Paula is a 61 y.o., female.    [facility] Anesthesia Evaluation    I have reviewed the Patient Summary Reports.    I have reviewed the Nursing Notes.   I have reviewed the Medications.     Review of Systems  Anesthesia Hx:  No problems with previous Anesthesia  History of prior surgery of interest to airway management or planning:   Social:  Former Smoker    Hematology/Oncology:  Hematology Normal   Oncology Normal     EENT/Dental:EENT/Dental Normal   Cardiovascular:   Exercise tolerance: good Hypertension hyperlipidemia    Pulmonary:   Sleep Apnea    Renal/:  Renal/ Normal     Hepatic/GI:   GERD    Musculoskeletal:   Arthritis     Neurological:   Headaches    Endocrine:   Diabetes, type 1    Psych:  Psychiatric Normal           Physical Exam  General:  Obesity                 Anesthesia Plan  Type of Anesthesia, risks & benefits discussed:  Anesthesia Type:  MAC  Patient's Preference:   Intra-op Monitoring Plan:   Intra-op Monitoring Plan Comments:   Post Op Pain Control Plan:   Post Op Pain Control Plan Comments:   Induction:    Beta Blocker:         Informed Consent: Patient understands risks and agrees with Anesthesia plan.  Questions answered. Anesthesia consent signed with patient.  ASA Score: 3     Day of Surgery Review of History & Physical:    H&P update referred to the surgeon.         Ready For Surgery From Anesthesia Perspective.

## 2017-04-13 NOTE — TELEPHONE ENCOUNTER
Please schedule patient for diabetes education for MNT (carb counting) in next 2-3 weeks (ideally before CGMS is placed)    Thanks

## 2017-04-13 NOTE — ANESTHESIA POSTPROCEDURE EVALUATION
"Anesthesia Post Evaluation    Patient: Adriana Paula    Procedure(s) Performed: Procedure(s) (LRB):  PHACOEMULSIFICATION-ASPIRATION-CATARACT (Right)  INSERTION-INTRAOCULAR LENS (IOL) (Right)    Final Anesthesia Type: MAC  Patient location during evaluation: [facility]  Patient participation: Yes- Able to Participate  Level of consciousness: awake and alert  Post-procedure vital signs: reviewed and stable  Pain management: adequate  Airway patency: patent  PONV status at discharge: No PONV  Anesthetic complications: no      Cardiovascular status: blood pressure returned to baseline  Respiratory status: unassisted and spontaneous ventilation  Hydration status: euvolemic  Follow-up not needed.        Visit Vitals    /63    Pulse 92    Temp 36.9 °C (98.4 °F) (Oral)    Resp 18    Ht 5' 5" (1.651 m)    Wt 100.7 kg (222 lb)    SpO2 98%    Breastfeeding No    BMI 36.94 kg/m2       Pain/Eric Score: No Data Recorded      "

## 2017-04-13 NOTE — PROGRESS NOTES
Dr. Melendez called and stated patient's blood glucose of 213 was fine and no further action needed.

## 2017-04-18 ENCOUNTER — TELEPHONE (OUTPATIENT)
Dept: DIABETES | Facility: CLINIC | Age: 62
End: 2017-04-18

## 2017-04-18 ENCOUNTER — OFFICE VISIT (OUTPATIENT)
Dept: OPHTHALMOLOGY | Facility: CLINIC | Age: 62
End: 2017-04-18
Payer: COMMERCIAL

## 2017-04-18 ENCOUNTER — PATIENT MESSAGE (OUTPATIENT)
Dept: ENDOCRINOLOGY | Facility: HOSPITAL | Age: 62
End: 2017-04-18

## 2017-04-18 DIAGNOSIS — Z96.1 STATUS POST CATARACT EXTRACTION AND INSERTION OF INTRAOCULAR LENS, LEFT: ICD-10-CM

## 2017-04-18 DIAGNOSIS — H40.003 GLAUCOMA SUSPECT OF BOTH EYES: ICD-10-CM

## 2017-04-18 DIAGNOSIS — Z79.4 TYPE 2 DIABETES MELLITUS WITH DIABETIC POLYNEUROPATHY, WITH LONG-TERM CURRENT USE OF INSULIN: Primary | Chronic | ICD-10-CM

## 2017-04-18 DIAGNOSIS — Z98.41 STATUS POST CATARACT EXTRACTION AND INSERTION OF INTRAOCULAR LENS, RIGHT: Primary | ICD-10-CM

## 2017-04-18 DIAGNOSIS — Z98.42 STATUS POST CATARACT EXTRACTION AND INSERTION OF INTRAOCULAR LENS, LEFT: ICD-10-CM

## 2017-04-18 DIAGNOSIS — E11.42 TYPE 2 DIABETES MELLITUS WITH DIABETIC POLYNEUROPATHY, WITH LONG-TERM CURRENT USE OF INSULIN: Primary | Chronic | ICD-10-CM

## 2017-04-18 DIAGNOSIS — Z96.1 STATUS POST CATARACT EXTRACTION AND INSERTION OF INTRAOCULAR LENS, RIGHT: Primary | ICD-10-CM

## 2017-04-18 PROCEDURE — 99999 PR PBB SHADOW E&M-EST. PATIENT-LVL III: CPT | Mod: PBBFAC,,, | Performed by: OPHTHALMOLOGY

## 2017-04-18 PROCEDURE — 99024 POSTOP FOLLOW-UP VISIT: CPT | Mod: S$GLB,,, | Performed by: OPHTHALMOLOGY

## 2017-04-18 NOTE — PROGRESS NOTES
HPI     Post-op Evaluation    Additional comments: 1 wek po od           Comments   Erica Batista, OD   OHTN / + fam h/o glc - mom/brother   S/p phaco w/IOL OS 3/17/16   S/p CE with IOL OD ([facility]) - 4/13/17     Pt states VA OD ha improved. Pt denies pain and discomfort. Pt states she   was not given instructions on teto gtts regimen. Pt eyes are teary. BSL was   X 212 this morning.     Eyemeds  PF TID OD  Ketorolac TID OD  Viagmaox TID OD          Last edited by Ingrid Wagner MD on 4/18/2017  9:14 AM. (History)            Assessment /Plan     For exam results, see Encounter Report.    Status post cataract extraction and insertion of intraocular lens, right    Status post cataract extraction and insertion of intraocular lens, left    Glaucoma suspect of both eyes      PO week #1 s/p phaco/IOL -    - doing well, no issues  - okay to d/c abx gtt  - continue PF/ketoroloc TID for total of 1 month VS. COMBINATION drop TID until gone.    - f/up 3-4 wks for MRx, DFE with dr. Batista    GS testing per eliel as well.

## 2017-04-19 ENCOUNTER — PATIENT MESSAGE (OUTPATIENT)
Dept: INTERNAL MEDICINE | Facility: CLINIC | Age: 62
End: 2017-04-19

## 2017-04-19 DIAGNOSIS — L03.90 CELLULITIS, UNSPECIFIED CELLULITIS SITE: Primary | ICD-10-CM

## 2017-04-19 DIAGNOSIS — L03.90 CELLULITIS, UNSPECIFIED CELLULITIS SITE: ICD-10-CM

## 2017-04-19 RX ORDER — DOXYCYCLINE HYCLATE 100 MG
100 TABLET ORAL 2 TIMES DAILY
Qty: 20 TABLET | Refills: 0 | Status: SHIPPED | OUTPATIENT
Start: 2017-04-19 | End: 2017-04-29

## 2017-04-19 RX ORDER — DOXYCYCLINE HYCLATE 100 MG
100 TABLET ORAL 2 TIMES DAILY
Qty: 20 TABLET | Refills: 0 | Status: SHIPPED | OUTPATIENT
Start: 2017-04-19 | End: 2017-04-19 | Stop reason: SDUPTHER

## 2017-04-26 ENCOUNTER — CLINICAL SUPPORT (OUTPATIENT)
Dept: ENDOCRINOLOGY | Facility: CLINIC | Age: 62
End: 2017-04-26
Payer: COMMERCIAL

## 2017-04-26 ENCOUNTER — CLINICAL SUPPORT (OUTPATIENT)
Dept: DIABETES | Facility: CLINIC | Age: 62
End: 2017-04-26
Payer: COMMERCIAL

## 2017-04-26 DIAGNOSIS — Z79.4 TYPE 2 DIABETES MELLITUS WITH DIABETIC POLYNEUROPATHY, WITH LONG-TERM CURRENT USE OF INSULIN: Chronic | ICD-10-CM

## 2017-04-26 DIAGNOSIS — E11.42 TYPE 2 DIABETES MELLITUS WITH DIABETIC POLYNEUROPATHY, WITH LONG-TERM CURRENT USE OF INSULIN: Chronic | ICD-10-CM

## 2017-04-26 DIAGNOSIS — Z79.4 TYPE 2 DIABETES MELLITUS WITH BOTH EYES AFFECTED BY MILD NONPROLIFERATIVE RETINOPATHY WITHOUT MACULAR EDEMA, WITH LONG-TERM CURRENT USE OF INSULIN: Chronic | ICD-10-CM

## 2017-04-26 DIAGNOSIS — E11.3293 TYPE 2 DIABETES MELLITUS WITH BOTH EYES AFFECTED BY MILD NONPROLIFERATIVE RETINOPATHY WITHOUT MACULAR EDEMA, WITH LONG-TERM CURRENT USE OF INSULIN: Chronic | ICD-10-CM

## 2017-04-26 PROCEDURE — G0108 DIAB MANAGE TRN  PER INDIV: HCPCS | Mod: S$GLB,,, | Performed by: DIETITIAN, REGISTERED

## 2017-04-26 PROCEDURE — 95250 CONT GLUC MNTR PHYS/QHP EQP: CPT | Mod: S$GLB,,, | Performed by: DIETITIAN, REGISTERED

## 2017-04-26 NOTE — PROGRESS NOTES
"Diabetes Education  Author: Tracy Stafford RD  Date: 4/26/2017    Diabetes Education Visit  Diabetes Education Record Assessment/Progress: Comprehensive/Group    Diabetes Type  Diabetes Type : Type II    Nutrition  Meal Planning:  (Pt has not made any dietary changes since last education visit, but has poor concept of foods that contian CHO)  Meal Plan 24 Hour Recall - Breakfast: oatmeal and fruit OR 1/2 muffin and fruit  Meal Plan 24 Hour Recall - Lunch: largest meal of the day - 6 in sub sometimes with chips OR a hot meal - order in at work every day. rarely brings lunch, but when does will bring can of soup or dinner leftovers  Meal Plan 24 Hour Recall - Dinner: has more of a snack like popcorn and a diet drink or juice at 5 PM and then will eat "dinner" at around 7 PM, but states she is not really hungry and will just have another snack    Monitoring   Blood Glucose Logs: No (Pt reports checking BG 1-2 times daily low 100's-200's. Pt seen to have CGMS device placed today)    Exercise   Frequency: Never    Current Diabetes Treatment   Current Treatment: Insulin, Oral Medication (Tradjenta 5 mg daily, Levemir 45 units daily (this was increased from 40 units last week by PCP), and is prescribed 2000 mg Metformin daily, but frequently forgets to take PM dose and continues to refuse to take all XR tabs in the AM)    Social History  Preferred Learning Method: Face to Face, Demonstration, Hands On, Reading Materials  Primary Support: Self  Educational Level: High School  Occupation: credentialing corrdinator  Smoking Status: Never a Smoker  Alcohol Use: Monthly    Barriers to Change  Barriers to Change: None  Learning Challenges : None    Diabetes Education Assessment/Progress  Acute Complications (preventing, detecting, and treating acute complications): Discussion, Individual Session, Written Materials Provided, Instructed, Competent (verbalizes/demonstrates) (instructed on s/s, causes and proper tx of hypo " "with "rule of 15")    Chronic Complications (preventing, detecting, and treating chronic complications):  (reviewed standards of care)    Diabetes Disease Process (diabetes disease process and treatment options): Discussion    Nutrition (Incorporating nutritional management into one's lifestyle): Discussion, Individual Session, Written Materials Provided, Instructed (Instructed on what foods have carbs, timing and spacing of meals and snacks, and limiting portions of carb foods. also discussed meal balance and consistency and label reading. Rec 30-45 grams CHO for meals and 0-5 grams for snacks)    Physical Activity (incorporating physical activity into one's lifestyle): Discussion, Individual Session (discussed goals and benefits)    Medications (states correct name, dose, onset, peak, duration, side effects & timing of meds): Discussion, Individual Session, Instructed, Competent (verbalizes/demonstrates) (Reviewed timing and MOA of all meds and stressed importance of taking prescribed dose of metformin)    Monitoring (monitoring blood glucose/other parameters & using results): Discussion, Individual Session, Written Materials Provided, Instructed, Competent (verbalizes/demonstrates) (provided logs and instructed to SMBG BID and to bring logs to all empowerment visits)    Goal Setting and Problem Solving (verbalizes behavior change strategies & sets realistic goals): Discussion    Behavior Change (developing personal strategies to health & behavior change): Discussion    Goals  Healthy Eating: In Progress (Pt will limit carb portions at meals to rec amts and eat more consistent meals)  Medications: In Progress (Pt will take all meds as prescribed)    Diabetes Care Plan/Intervention  Education Plan/Intervention: Diabetes Empowerment Program    Diabetes Meal Plan  Carbohydrate Per Meal: 30-45g  Carbohydrate Per Snack :  (0-5 grams)    Education Units of Time   Time Spent: 45 min      Health Maintenance Topics with " due status: Not Due       Topic Last Completion Date    Lipid Panel 05/19/2016    Mammogram 01/04/2017    Pap Smear 01/04/2017    Hemoglobin A1c 01/31/2017    Foot Exam 02/03/2017    Eye Exam 03/10/2017     Health Maintenance Due   Topic Date Due    TETANUS VACCINE  05/22/1972    Pneumococcal PPSV23 (Medium Risk) (1) 05/22/1972    Colonoscopy  03/23/2016    Influenza Vaccine  08/01/2016

## 2017-04-26 NOTE — PROGRESS NOTES
"DIABETES EDUCATOR NOTE   PLACEMENT OF FREESTYLE WINSOME PRO SENSOR  CONTINOUS GLUCOSE MONITORING SYSTEM (CGMS)    Patient is here in clinic today for placement of continuous glucose monitoring sensor.      Each patient verified that they were here for CGMS procedure ordered by their provider and that they have a working glucose meter and supplies at home.   Patient will be provided with a Freestyle Winsome Sensor and a copy of the Continuous Glucose Monitoring Patient Log to fill out during the study.   A detailed  explanation of Continuous Glucose Monitoring was  provided. Patient informed that this is a blind procedure and that they will not actually see the blood sugar tracing in real time.  Reviewed with patient the eblizz patient education handout called "Your Freestyle Winsome Pro sensor: What you need to know" to review self-care during the study to avoid sensor loosening or removal ie... Bathing, Swimming, dressing, and exercising.   Instructed patient to check blood sugar using home glucometer and to record the following on provided patient log sheets:Blood sugar taken at home, Meals and snacks, Activity, and Diabetes medications taken and dosage    Patient was brought to a private location.  Arm for insertion was selected and prepared and allowed to dry. Glucose Sensor Serial Number [id number]  was inserted to back of patient's right upper arm.    The following forms  were given and reviewed in detail with patient and all questions answered.   · Continuous Glucose Monitoring Patient Log #[id number]  · Freestyle Voter [employer]ity Patient Handout "Your Freestyle Winsome Pro Sensor: What you need to know"     Instructions held in a group: Time: 15 min   Insertion of sensor done individually in private:  Time: 5 minutes       "

## 2017-05-01 ENCOUNTER — PATIENT MESSAGE (OUTPATIENT)
Dept: ENDOCRINOLOGY | Facility: HOSPITAL | Age: 62
End: 2017-05-01

## 2017-05-02 ENCOUNTER — CLINICAL SUPPORT (OUTPATIENT)
Dept: ENDOCRINOLOGY | Facility: CLINIC | Age: 62
End: 2017-05-02
Payer: COMMERCIAL

## 2017-05-02 DIAGNOSIS — Z79.4 TYPE 2 DIABETES MELLITUS WITH DIABETIC POLYNEUROPATHY, WITH LONG-TERM CURRENT USE OF INSULIN: Chronic | ICD-10-CM

## 2017-05-02 DIAGNOSIS — E11.42 TYPE 2 DIABETES MELLITUS WITH DIABETIC POLYNEUROPATHY, WITH LONG-TERM CURRENT USE OF INSULIN: Chronic | ICD-10-CM

## 2017-05-02 PROCEDURE — 95251 CONT GLUC MNTR ANALYSIS I&R: CPT | Mod: S$GLB,,, | Performed by: NURSE PRACTITIONER

## 2017-05-03 ENCOUNTER — PATIENT MESSAGE (OUTPATIENT)
Dept: DIABETES | Facility: CLINIC | Age: 62
End: 2017-05-03

## 2017-05-03 NOTE — PROGRESS NOTES
DIABETES EDUCATOR NOTE   Return of the Freestyle Rolando Pro Sensor and Patient Log.    Patient returned to clinic today to return Glucose Sensor and signed patient log form used in CMGS procedure.    The CGMS Sensor will be scanned and downloaded. All reports will be imported into the patient's electronic medical record.    Endocrine Nurse Practitioner will complete data interpretation and make recommendations; will forward recommendations to the ordering provider for follow up with patient.

## 2017-05-11 NOTE — TELEPHONE ENCOUNTER
See email, Please help pt to schedule, pt will need to contact billing about copay but please help her schedule/reschedule appt if current appt time does not work

## 2017-05-19 ENCOUNTER — PATIENT MESSAGE (OUTPATIENT)
Dept: ENDOCRINOLOGY | Facility: CLINIC | Age: 62
End: 2017-05-19

## 2017-05-23 ENCOUNTER — OFFICE VISIT (OUTPATIENT)
Dept: OPTOMETRY | Facility: CLINIC | Age: 62
End: 2017-05-23
Payer: COMMERCIAL

## 2017-05-23 DIAGNOSIS — H04.123 DRY EYE SYNDROME, BILATERAL: ICD-10-CM

## 2017-05-23 DIAGNOSIS — H02.88A MEIBOMIAN GLAND DYSFUNCTION (MGD), BILATERAL, BOTH UPPER AND LOWER LIDS: ICD-10-CM

## 2017-05-23 DIAGNOSIS — H02.88B MEIBOMIAN GLAND DYSFUNCTION (MGD), BILATERAL, BOTH UPPER AND LOWER LIDS: ICD-10-CM

## 2017-05-23 DIAGNOSIS — I10 ESSENTIAL HYPERTENSION: Chronic | ICD-10-CM

## 2017-05-23 DIAGNOSIS — Z96.1 PSEUDOPHAKIA, BOTH EYES: Primary | ICD-10-CM

## 2017-05-23 DIAGNOSIS — E11.65 TYPE 2 DIABETES MELLITUS WITH HYPERGLYCEMIA, WITH LONG-TERM CURRENT USE OF INSULIN: Chronic | ICD-10-CM

## 2017-05-23 DIAGNOSIS — Z79.4 TYPE 2 DIABETES MELLITUS WITH HYPERGLYCEMIA, WITH LONG-TERM CURRENT USE OF INSULIN: Chronic | ICD-10-CM

## 2017-05-23 PROCEDURE — 99024 POSTOP FOLLOW-UP VISIT: CPT | Mod: S$GLB,,, | Performed by: OPTOMETRIST

## 2017-05-23 PROCEDURE — 99999 PR PBB SHADOW E&M-EST. PATIENT-LVL I: CPT | Mod: PBBFAC,,, | Performed by: OPTOMETRIST

## 2017-05-23 RX ORDER — OFLOXACIN 3 MG/ML
SOLUTION/ DROPS OPHTHALMIC
Refills: 0 | COMMUNITY
Start: 2017-04-04 | End: 2018-03-15

## 2017-05-23 NOTE — PROGRESS NOTES
HPI     Adriana Paula is a/an 62 y.o. 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 She needs to read on   paper and at the computer. The readers Dr Wagner recommended +2.50 OTC   readers only work for reading she would like to get a solution for both if   possible. She also noticed that her eyes are looking puffy and her eyes   feel heavy but that was present before the catarct Sx.  She still uses the prednisolone eyedrops 2x a day OD  She states that she took another medication for her type 2 diabetes   recently and lost a lot of weight but had to discontinue that medicine and   thats when her bloodsugar got a little bit out of control. She expect it   to get back to normal since she switched meds again  LBSL 157 yesterday  Hemoglobin A1C       Date                     Value               Ref Range             Status                05/02/2017               9.5 (H)             4.5 - 6.2 %           Final                ----------    (--)blurred vision  (--)Headaches  (--)diplopia  (--)flashes  (--)floaters  (--)pain  (--)Itching  (--)tearing  (--)burning  (+)Dryness her eyes feel dry   (--) OTC Drops  (--)Photophobia    Last edited by Karolina Washington on 5/23/2017  2:35 PM.   (History)            Assessment /Plan     For exam results, see Encounter Report.    Pseudophakia, both eyes   Stable, monitor   Decrease PF to QD until finished    Type 2 diabetes mellitus with hyperglycemia, with long-term current use of insulin  Essential hypertension   No retinopathy, monitor yearly    Meibomian gland dysfunction (MGD), bilateral, both upper and lower lids   WC, lid scrubs daily    Dry eye syndrome, bilateral   Use refresh liquigel  BID/PRN    RTC 1 year DFE

## 2017-05-24 ENCOUNTER — TELEPHONE (OUTPATIENT)
Dept: INTERNAL MEDICINE | Facility: CLINIC | Age: 62
End: 2017-05-24

## 2017-06-30 ENCOUNTER — TELEPHONE (OUTPATIENT)
Dept: INTERNAL MEDICINE | Facility: CLINIC | Age: 62
End: 2017-06-30

## 2017-07-24 ENCOUNTER — PATIENT MESSAGE (OUTPATIENT)
Dept: INTERNAL MEDICINE | Facility: CLINIC | Age: 62
End: 2017-07-24

## 2017-07-24 DIAGNOSIS — Z79.4 TYPE 2 DIABETES MELLITUS WITH HYPERGLYCEMIA, WITH LONG-TERM CURRENT USE OF INSULIN: Chronic | ICD-10-CM

## 2017-07-24 DIAGNOSIS — E11.65 TYPE 2 DIABETES MELLITUS WITH HYPERGLYCEMIA, WITH LONG-TERM CURRENT USE OF INSULIN: Chronic | ICD-10-CM

## 2017-07-24 RX ORDER — PEN NEEDLE, DIABETIC 29 G X1/2"
1 NEEDLE, DISPOSABLE MISCELLANEOUS
Qty: 200 EACH | Refills: 11 | Status: SHIPPED | OUTPATIENT
Start: 2017-07-24 | End: 2018-02-15

## 2017-07-24 RX ORDER — NYSTATIN AND TRIAMCINOLONE ACETONIDE 100000; 1 [USP'U]/G; MG/G
CREAM TOPICAL 4 TIMES DAILY
Qty: 1 TUBE | Refills: 0 | Status: SHIPPED | OUTPATIENT
Start: 2017-07-24 | End: 2017-11-10 | Stop reason: SDUPTHER

## 2017-07-24 RX ORDER — NYSTATIN AND TRIAMCINOLONE ACETONIDE 100000; 1 [USP'U]/G; MG/G
CREAM TOPICAL 4 TIMES DAILY
Qty: 1 TUBE | Refills: 0 | Status: SHIPPED | OUTPATIENT
Start: 2017-07-24 | End: 2017-07-24 | Stop reason: SDUPTHER

## 2017-07-31 ENCOUNTER — DOCUMENTATION ONLY (OUTPATIENT)
Dept: INTERNAL MEDICINE | Facility: CLINIC | Age: 62
End: 2017-07-31

## 2017-07-31 NOTE — PROGRESS NOTES
"Pt came in today to review CBGs and diet.  Has a very poor diet.  Has biscuits and egg mc muffins, etc... States "doesn't like to cook."  Has a lot of unhealthy foods at work that's available.  Using 40 U Levemir in AM.  Has good AM CBGs but PMs high, 150-180s likely 2nd to mid-day poor eating.  Will con't to see back with diet adjustment.  "

## 2017-08-09 ENCOUNTER — PATIENT MESSAGE (OUTPATIENT)
Dept: INTERNAL MEDICINE | Facility: CLINIC | Age: 62
End: 2017-08-09

## 2017-08-09 RX ORDER — LANCETS 33 GAUGE
1 EACH MISCELLANEOUS
Qty: 200 EACH | Refills: 11 | Status: SHIPPED | OUTPATIENT
Start: 2017-08-09 | End: 2020-06-25 | Stop reason: SDUPTHER

## 2017-08-14 DIAGNOSIS — E11.9 TYPE 2 DIABETES MELLITUS WITHOUT COMPLICATION: ICD-10-CM

## 2017-08-14 RX ORDER — BLOOD-GLUCOSE METER
EACH MISCELLANEOUS
Qty: 200 STRIP | Refills: 0 | Status: SHIPPED | OUTPATIENT
Start: 2017-08-14 | End: 2019-04-11

## 2017-08-21 ENCOUNTER — TELEPHONE (OUTPATIENT)
Dept: INTERNAL MEDICINE | Facility: CLINIC | Age: 62
End: 2017-08-21

## 2017-08-23 ENCOUNTER — PATIENT MESSAGE (OUTPATIENT)
Dept: PODIATRY | Facility: CLINIC | Age: 62
End: 2017-08-23

## 2017-08-28 ENCOUNTER — OFFICE VISIT (OUTPATIENT)
Dept: PODIATRY | Facility: CLINIC | Age: 62
End: 2017-08-28
Payer: COMMERCIAL

## 2017-08-28 VITALS
WEIGHT: 203.13 LBS | HEART RATE: 85 BPM | DIASTOLIC BLOOD PRESSURE: 77 MMHG | HEIGHT: 65 IN | SYSTOLIC BLOOD PRESSURE: 145 MMHG | BODY MASS INDEX: 33.84 KG/M2

## 2017-08-28 DIAGNOSIS — E11.42 DIABETIC POLYNEUROPATHY ASSOCIATED WITH TYPE 2 DIABETES MELLITUS: Primary | ICD-10-CM

## 2017-08-28 DIAGNOSIS — B35.1 ONYCHOMYCOSIS DUE TO DERMATOPHYTE: ICD-10-CM

## 2017-08-28 PROCEDURE — 4010F ACE/ARB THERAPY RXD/TAKEN: CPT | Mod: S$GLB,,, | Performed by: PODIATRIST

## 2017-08-28 PROCEDURE — 99999 PR PBB SHADOW E&M-EST. PATIENT-LVL IV: CPT | Mod: PBBFAC,,, | Performed by: PODIATRIST

## 2017-08-28 PROCEDURE — 3008F BODY MASS INDEX DOCD: CPT | Mod: S$GLB,,, | Performed by: PODIATRIST

## 2017-08-28 PROCEDURE — 3077F SYST BP >= 140 MM HG: CPT | Mod: S$GLB,,, | Performed by: PODIATRIST

## 2017-08-28 PROCEDURE — 3078F DIAST BP <80 MM HG: CPT | Mod: S$GLB,,, | Performed by: PODIATRIST

## 2017-08-28 PROCEDURE — 99213 OFFICE O/P EST LOW 20 MIN: CPT | Mod: S$GLB,,, | Performed by: PODIATRIST

## 2017-08-28 PROCEDURE — 3046F HEMOGLOBIN A1C LEVEL >9.0%: CPT | Mod: S$GLB,,, | Performed by: PODIATRIST

## 2017-08-28 NOTE — PATIENT INSTRUCTIONS
Soak toe in warm water with Epson salts daily for 1 week    Vicks vapor rub to nail daily    Nail removal if pain continues         Understanding Thickened Nails  There are several causes of very thick or crumbling nails. They can be caused by injuries or pressure from shoes. Fungal infections are a common cause. Diabetes, psoriasis, or vascular disease are other possible causes.    Symptoms  Along with thickening, the nail may appear ridged, brittle, or yellowish. It may also feel painful when pressure is put on it. After a while, a thickened nail may loosen and fall off.  Evaluation  Because thickened nails may be a symptom of a medical condition, your doctor will look at your medical history. A test may be done to check for fungal infection. The thickness and color of the nail are examined carefully. This helps determine the cause.  Treatment  · For infection: Oral or topical antifungal medications may be used to treat infection. These can help prevent sores under the nail. They also keep the fungus from spreading to other nails.  · For thick nails not caused by infection: Thinning the nail may be an option. This can be done by trimming, filing, or grinding.  · For pain: If the nail causes pain, part or all of the nail can be removed with surgery. Never try to remove a nail by yourself.  Prevention  Many nail problems can be prevented by wearing the right shoes and trimming your nails properly. Keep your feet clean and dry to help avoid infection. If you have diabetes, talk with your podiatrist or primary care doctor before doing any foot self-care.  · The right shoes: Get your feet measured. Your size may change as you age. This is due to ligaments that loosen with age and allow the bones in your foot to change position or spread. Wear shoes that are supportive and roomy enough for your toes to wiggle. Look for shoes made of natural materials, such as leather, which allow your feet to breathe.  · Proper  trimming: To avoid problems, trim your toenails straight across. Then, file the edges with a nail file. If you cant trim your own nails, ask your health care provider to do so for you.      Understanding Tinea Unguium  Tinea unguium is a type of fungal infection. The fungus infects the fingernails and, more commonly, the toenails. Its more common in men, older adults, and people who have diabetes, peripheral vascular disease, or another health problem that weakens the immune system.  How to say it  [id number]   What causes tinea unguium?  Tinea unguium is caused by a fungus. Several different types of fungus can grow on the nails.  The condition is much more likely to occur on the toenails. It can spread from one nail to another. You are more likely to get tinea unguium if you:  · Have another fungal infection, such as athletes foot  · Have sweaty feet  · Share nail clippers with a person who has a fungal infection  · Swim often  · Walk barefoot in damp areas, such as locker rooms  · Use communal or shared showers  What are the symptoms of tinea unguium?  If you have an infected nail, it may become:  · Brittle  · Thick  · Hard  · Discolored, yellow to brown  · Irregular in shape  The nail may also have crumbling white or colored material under it.  If left untreated, the fungus may spread to the nail bed, which is the skin under the nail. The nail may  also fall off.  How is tinea unguium treated?  With proper treatment, tinea unguium may be cured. But it often takes several months as the nail grows.  Treatments include:  · Good hygiene. Keep feet and nails clean and dry. If the infection is on the toenails, check that your shoes fit properly.  · Medicine. Over-the-counter antifungal products, such as creams, may kill the fungus and ease symptoms. If you have a severe infection, or the infection wont go away, you may need to take another medicine by mouth.  Some of these oral medicines can have side  effects and need to be used for 3 months.  · Surgery.  The nail may be removed. But there is a high chance the fungus will return.  · Laser. A special type of laser directed at the nail itself can kill the fungus.        Nail Fungal Infection  A nail fungal infection changes the way fingernails and toenails look. They may thicken, discolor, change shape, or split. This condition is hard to treat because nails grow slowly and have limited blood supply. The infection often comes back after treatment.  There are 2 types of medicines used to treat this condition:  · Topical anti-fungal medicines. These are applied to the surface of the skin and nail area. These medicines are not very effective because they cant get deep into the nail.  · Oral antifungal medicines. These medicines work better because they go into the nail from the inside out. But the infection may still come back. It may take 9 to 12 months for your nail to look normal again. This means you are cured. You can repeat treatment if needed. Most people take these medicines without any problems. It is rare to stop therapy because of side effects. But your healthcare provider may give you some monitoring tests. Talk about possible side effects with your provider before starting treatment.  If medicines fail, the nail can be removed surgically or chemically. These methods physically remove the fungus from the body. This helps medical treatment be more effective.  Home care  · Use medicines exactly as directed for as long as directed. Treating a fungal infection can take longer than other kinds of infections.  · Smoking is a risk factor for fungal infection. This is one more reason to quit.  · Wear absorbent socks, and shoes that let your feet breathe. Sweaty feet increase your risk of fungal infection. They also make an existing infection harder to treat.  · Use footwear when in damp public places like swimming pools, gyms, and shower rooms. This will help you  avoid the fungus that grows there.  · Don't share nail clippers or scissors with others.  Follow-up care  Follow up with your healthcare provider, or as advised.  When to seek medical advice  Call your healthcare provider right away if any of these occur:  · Skin by the nail becomes red, swollen, painful, or drains pus (a creamy yellow or white liquid)  · Side effects from oral anti-fungal medicines  Date Last Reviewed: 8/1/2016 © 2000-2016 [employer]. [address] [place] [address]. All rights reserved. This information is not intended as a substitute for professional medical care. Always follow your healthcare professional's instructions.

## 2017-08-28 NOTE — PROGRESS NOTES
Subjective:      Patient ID: Adriana Paula is a 62 y.o. female.    Chief Complaint: Diabetes Mellitus (PCP Dr. Abbott 17); Diabetic Foot Exam; Routine Foot Care; and Nail Problem (right big toe nail splitt)    Adriana is a 62 y.o. female who presents to the clinic complaining of right foot pain that started 3 days ago after wearing high heels for a . She is limping with pain during every step. Denies fall or injury. Pain at bottom of big toe joint with WB. Ice helps.     10/27/15: f/u for right toe pain with splitting and fungus. Gets nails done at spa. Denies injury, drainage, swelling. Pain only with pressure at nail.    Review of Systems   Constitution: Negative for chills, fever and malaise/fatigue.   Cardiovascular: Negative for chest pain, leg swelling, orthopnea and palpitations.   Respiratory: Negative for cough, shortness of breath and wheezing.    Skin: Positive for dry skin. Negative for itching, poor wound healing and rash.   Musculoskeletal: Negative for arthritis, gout, joint pain, joint swelling, muscle weakness and myalgias.   Neurological: Positive for numbness and paresthesias. Negative for disturbances in coordination, dizziness, focal weakness and tremors.           Objective:      Physical Exam   Cardiovascular:   Pulses:       Dorsalis pedis pulses are 1+ on the right side, and 1+ on the left side.        Posterior tibial pulses are 2+ on the right side, and 2+ on the left side.   Dorsalis pedis and posterior tibial pulses are diminished Bilaterally. Toes are cool to touch. Feet are warm proximally.There is decreased digital hair. Skin is atrophic, slightly hyperpigmented, and mildly edematous       Musculoskeletal:        Right foot: There is normal range of motion, no tenderness and no bony tenderness.   No pain at sesamoid or MTPJ     Neurological:   Boston-Hari 5.07 monofilamant testing is diminished both feet. Sharp/dull sensation diminished Bilaterally. Light touch  absent Bilaterally.       Skin: Skin is dry and intact. No abrasion, no ecchymosis, no laceration and no rash noted. No cyanosis.   Mycotic changes at right hallux nail with thickening and yellow discoloration. Splitting at medial border of nail plate with some tenderness at this area. No ingrown nail border.                    Assessment:       Encounter Diagnoses   Name Primary?    Diabetic polyneuropathy associated with type 2 diabetes mellitus Yes    Onychomycosis due to dermatophyte          Plan:       Adriana was seen today for diabetes mellitus, diabetic foot exam, routine foot care and nail problem.    Diagnoses and all orders for this visit:    Diabetic polyneuropathy associated with type 2 diabetes mellitus    Onychomycosis due to dermatophyte      I counseled the patient on her conditions, their implications and medical management.    Discussed all treatment options such as topical, oral, laser treatments vs surgical removal of nail permanent vs non-permanent in detail pertaining to nail fungus    She will use vicks vapor rub daily.    Split area of nail plate trimmed and removed for comfort. No signs of infection.     Agrees to nail plate removal if pain does not improve in 3-4 weeks.

## 2017-09-12 ENCOUNTER — TELEPHONE (OUTPATIENT)
Dept: INTERNAL MEDICINE | Facility: CLINIC | Age: 62
End: 2017-09-12

## 2017-09-19 ENCOUNTER — PATIENT MESSAGE (OUTPATIENT)
Dept: UROLOGY | Facility: CLINIC | Age: 62
End: 2017-09-19

## 2017-09-20 ENCOUNTER — OFFICE VISIT (OUTPATIENT)
Dept: UROLOGY | Facility: CLINIC | Age: 62
End: 2017-09-20
Payer: COMMERCIAL

## 2017-09-20 VITALS
HEIGHT: 66 IN | DIASTOLIC BLOOD PRESSURE: 85 MMHG | BODY MASS INDEX: 32.47 KG/M2 | HEART RATE: 75 BPM | WEIGHT: 202 LBS | SYSTOLIC BLOOD PRESSURE: 145 MMHG

## 2017-09-20 DIAGNOSIS — N89.8 VAGINAL ITCHING: ICD-10-CM

## 2017-09-20 DIAGNOSIS — E11.65 TYPE 2 DIABETES MELLITUS WITH HYPERGLYCEMIA, WITH LONG-TERM CURRENT USE OF INSULIN: Chronic | ICD-10-CM

## 2017-09-20 DIAGNOSIS — Z79.4 TYPE 2 DIABETES MELLITUS WITH HYPERGLYCEMIA, WITH LONG-TERM CURRENT USE OF INSULIN: Chronic | ICD-10-CM

## 2017-09-20 DIAGNOSIS — N39.41 URGE INCONTINENCE: Primary | ICD-10-CM

## 2017-09-20 LAB
BILIRUB SERPL-MCNC: NORMAL MG/DL
BLOOD URINE, POC: NORMAL
COLOR, POC UA: YELLOW
GLUCOSE UR QL STRIP: NORMAL
KETONES UR QL STRIP: NORMAL
LEUKOCYTE ESTERASE URINE, POC: NORMAL
NITRITE, POC UA: NORMAL
PH, POC UA: 7
PROTEIN, POC: NORMAL
SPECIFIC GRAVITY, POC UA: 1
UROBILINOGEN, POC UA: NORMAL

## 2017-09-20 PROCEDURE — 99214 OFFICE O/P EST MOD 30 MIN: CPT | Mod: 25,S$GLB,, | Performed by: NURSE PRACTITIONER

## 2017-09-20 PROCEDURE — 81002 URINALYSIS NONAUTO W/O SCOPE: CPT | Mod: S$GLB,,, | Performed by: NURSE PRACTITIONER

## 2017-09-20 PROCEDURE — 3008F BODY MASS INDEX DOCD: CPT | Mod: S$GLB,,, | Performed by: NURSE PRACTITIONER

## 2017-09-20 PROCEDURE — 3079F DIAST BP 80-89 MM HG: CPT | Mod: S$GLB,,, | Performed by: NURSE PRACTITIONER

## 2017-09-20 PROCEDURE — 87480 CANDIDA DNA DIR PROBE: CPT

## 2017-09-20 PROCEDURE — 99999 PR PBB SHADOW E&M-EST. PATIENT-LVL V: CPT | Mod: PBBFAC,,, | Performed by: NURSE PRACTITIONER

## 2017-09-20 PROCEDURE — 3077F SYST BP >= 140 MM HG: CPT | Mod: S$GLB,,, | Performed by: NURSE PRACTITIONER

## 2017-09-20 PROCEDURE — 87660 TRICHOMONAS VAGIN DIR PROBE: CPT

## 2017-09-20 PROCEDURE — 4010F ACE/ARB THERAPY RXD/TAKEN: CPT | Mod: S$GLB,,, | Performed by: NURSE PRACTITIONER

## 2017-09-20 PROCEDURE — 3046F HEMOGLOBIN A1C LEVEL >9.0%: CPT | Mod: S$GLB,,, | Performed by: NURSE PRACTITIONER

## 2017-09-20 RX ORDER — SOLIFENACIN SUCCINATE 10 MG/1
10 TABLET, FILM COATED ORAL DAILY
Qty: 30 TABLET | Refills: 11 | Status: SHIPPED | OUTPATIENT
Start: 2017-09-20 | End: 2017-09-20 | Stop reason: ALTCHOICE

## 2017-09-20 RX ORDER — FESOTERODINE FUMARATE 8 MG/1
8 TABLET, EXTENDED RELEASE ORAL DAILY
Qty: 30 TABLET | Refills: 3 | Status: SHIPPED | OUTPATIENT
Start: 2017-09-20 | End: 2017-11-28 | Stop reason: ALTCHOICE

## 2017-09-20 NOTE — PROGRESS NOTES
CHIEF COMPLAINT:    Mrs. Paula is a 62 y.o. female presenting for urge incontinence.    PRESENTING ILLNESS:    Adriana Paula is a 62 y.o. female who presents for urge incontinence.    Last seen in the clinic with Dr. Antoine in 2016.     She reports that while on oxybutynin 10 mg XL denied frequency, urgency, and urge incontinence. She did not need to wear any pads. She reported good FOS and complete bladder emptying. Reports side effects of dry mouth and dry eyes. She was pleased with her urination but not the s/e's.  She ran out of it for the last week and has started to have frequency, urgency, and urge incontinence. She reports that she is having to wear 1-2 pads daily. She also reports vaginal itching and possible discharge. She admits that she drinks copious fluids with at least 4 20 oz bottles of water plus 2-3 8 oz cups of coffee a day.    , C section, had a myomectomy before she was able to get pregnant with her child, bowels are normal.      REVIEW OF SYSTEMS:    Patient denies bleeding diathesis, chills, decreased size/force of stream, dysuria, fever, flank pain, hematuria, hesitancy, intermittency or feeling of incomplete emptying, stones, TB or genitourinary trauma and urethral discharge.    PATIENT HISTORY:    Past Medical History:   Diagnosis Date    Allergy     Anemia     Anxiety     Arthritis     Cataract     DR (diabetic retinopathy)     Dyslipidemia     Essential hypertension 2012    Gastroesophageal reflux disease without esophagitis 2/3/2017    GERD (gastroesophageal reflux disease)     Glaucoma     Hypertension     Obesity (BMI 30-39.9) 10/24/2013    PN (peripheral neuropathy)     Sleep apnea     Type 2 diabetes mellitus with both eyes affected by mild nonproliferative retinopathy without macular edema, with long-term current use of insulin     Type 2 diabetes mellitus with diabetic polyneuropathy, with long-term current use of insulin     Type II or unspecified  "type diabetes mellitus with other specified manifestations, uncontrolled        Past Surgical History:   Procedure Laterality Date    CARPAL TUNNEL RELEASE      CATARACT EXTRACTION      CATARACT EXTRACTION W/  INTRAOCULAR LENS IMPLANT Right 2017    Dr Brewster     SECTION      KNEE ARTHROSCOPY  14    right    MYOMECTOMY         Family History   Problem Relation Age of Onset    Arthritis Mother     Cancer Mother     Diabetes Mother     Heart disease Mother     Miscarriages / Stillbirths Mother     Vision loss Mother     No Known Problems Father     Diabetes       "Half of my family"    Breast cancer Maternal Grandmother     Breast cancer Sister     Breast cancer Cousin      Social History    Marital status: Single     Social History Main Topics    Smoking status: Former Smoker     Start date: 2002    Smokeless tobacco: Never Used    Alcohol use Yes      Comment: socially    Drug use: No    Sexual activity: No     Social History Narrative    . 1 daughter. Works as  at [employer].        Allergies:  Keflex [cephalexin]; Penicillins; and Sulfa (sulfonamide antibiotics)    Medications:  Outpatient Encounter Prescriptions as of 2016   Medication Sig Dispense Refill    amlodipine (NORVASC) 10 MG tablet Take 1 tablet (10 mg total) by mouth once daily. 30 tablet 11    blood sugar diagnostic (ONETOUCH VERIO) Strp Pt use 4x/day 200 strip 12    blood-glucose meter (ONETOUCH VERIO IQ METER) kit Use as instructed 200 each 11    HUMULIN 70/30 100 unit/mL (70-30) injection INJECT 35 UNITS UNDER THEN SKIN IN THE MORNING AND 26 UNITS UNDER THE SKIN WITH DINNER (Patient taking differently: INJECT 32 UNITS UNDER THEN SKIN IN THE MORNING AND 22 UNITS UNDER THE SKIN WITH DINNER) 40 mL 11    insulin syringe-needle U-100 1 mL 31 x 5/16" Syrg USE TWICE DAILY 100 each 6    lancets (ONE TOUCH DELICA LANCETS) 33 gauge Misc 1 lancet by Misc.(Non-Drug; Combo " Route) route 4 (four) times daily. Pt use 4x/day 200 each 12    losartan (COZAAR) 100 MG tablet TAKE 1 TABLET BY MOUTH DAILY 90 tablet 3    metformin (GLUCOPHAGE-XR) 500 MG 24 hr tablet TAKE 2 TABLETS BY MOUTH TWICE DAILY WITH MEALS 360 tablet 3    tramadol (ULTRAM) 50 mg tablet Take 1-2 tablets ( mg total) by mouth every 6 (six) hours as needed. 120 tablet 1     PHYSICAL EXAMINATION:    The patient generally appears in good health, is appropriately interactive, and is in no apparent distress.    Skin: No lesions.    Mental: Cooperative with normal affect.    Neuro: Grossly intact.    HEENT: Normal. No evidence of lymphadenopathy.    Chest: Clear to ascultation bilaterally, normal inspiratory effort.    Heart: Regular rhythm.  No murmurs    Abdomen: BS active. Soft, non-tender. No masses or organomegaly. Bladder is not palpable. No evidence of flank discomfort. No evidence of inguinal hernia.    Extremities: No clubbing, cyanosis, or edema    The skin of the external genitalia is thickened, consistent with chronic irritation.   Urethral meatus is normal    LABS:    UA 1.000, pH 7, otherwise, negative    IMPRESSION:    Encounter Diagnoses   Name Primary?    Urge incontinence Yes    Vaginal itching     Type 2 diabetes mellitus with hyperglycemia, with long-term current use of insulin        PLAN:  -Reassure pt and discussed plan of care,  -Vaginosis screening for vaginal itching and discharge. Will notify with results.   -Discussed multimodal therapy.  Discussed anticholinergics and myrbetriq. Explained both possible s/es. She has high BP and does not want to try myrbetriq. She wants to try a new anticholinergic. Discussed side effects, indications, and MOA for toviaz. Prescription sent to the pharmacy. Pt verbalized understanding.  -Discussed DM control  -Follow up in 4-6 weeks to reassess toviaz or prn worsening symptoms.

## 2017-09-20 NOTE — PATIENT INSTRUCTIONS
Treating Incontinence in Women with Medicine    Urinary incontinence is the leaking of urine from the bladder. In some cases, medicine can reduce or stop the leaking. It is mainly given for urge incontinence. Your healthcare provider will talk with you about your options. Make sure to ask what side effects to expect.  Below are some types of medicines that may help with urge incontinence.  Types of medicine  · Anticholinergics. These may increase how much urine the bladder can hold. They may also help relax bladder muscles.  · Estrogen. This may help improve muscle tone in the urethra and bladder.  · Antibiotics. These are used to treat urinary tract infections.  · Botulinum toxin. Injection of botulinum toxin into the bladder muscle is an option when other medicines are not effective.  Tips for taking medicine  · Take your medicine on time and as your healthcare provider tell you to.  · Tell your healthcare provider if you have any side effects, your dosage may be adjusted if necessary.  · Be patient. It may take time to find the right dose for you.  · Keep a list of the medicines you take. Show it to your healthcare provider and pharmacist before you buy over-the-counter medicines.   Date Last Reviewed: 1/1/2017  © 4469-9252 [employer]. [address] [place] [address], [place] [address]. All rights reserved. This information is not intended as a substitute for professional medical care. Always follow your healthcare professional's instructions.

## 2017-09-21 LAB
CANDIDA RRNA VAG QL PROBE: NEGATIVE
G VAGINALIS RRNA GENITAL QL PROBE: NEGATIVE
T VAGINALIS RRNA GENITAL QL PROBE: NEGATIVE

## 2017-09-26 ENCOUNTER — PATIENT MESSAGE (OUTPATIENT)
Dept: ORTHOPEDICS | Facility: CLINIC | Age: 62
End: 2017-09-26

## 2017-09-26 ENCOUNTER — TELEPHONE (OUTPATIENT)
Dept: ORTHOPEDICS | Facility: CLINIC | Age: 62
End: 2017-09-26

## 2017-09-26 NOTE — TELEPHONE ENCOUNTER
Spoke to pt and she was informed several times via telephone and patient portal per [facility], that neither her or Dr perera will have reasons to fill her an jury duty excuse note stating that she won't be able to attend. She has not had any recent surgeries. She was also informed that she will need to contact Dr Almeida's office to to get tramadol.  An appt was made for 10/6 at 215p with [facility] for her continued knee pain. Pt verbalized understanding.

## 2017-09-28 ENCOUNTER — PATIENT MESSAGE (OUTPATIENT)
Dept: INTERNAL MEDICINE | Facility: CLINIC | Age: 62
End: 2017-09-28

## 2017-10-06 ENCOUNTER — OFFICE VISIT (OUTPATIENT)
Dept: ORTHOPEDICS | Facility: CLINIC | Age: 62
End: 2017-10-06
Payer: COMMERCIAL

## 2017-10-06 ENCOUNTER — TELEPHONE (OUTPATIENT)
Dept: INTERNAL MEDICINE | Facility: CLINIC | Age: 62
End: 2017-10-06

## 2017-10-06 ENCOUNTER — HOSPITAL ENCOUNTER (OUTPATIENT)
Dept: RADIOLOGY | Facility: HOSPITAL | Age: 62
Discharge: HOME | End: 2017-10-06
Attending: NURSE PRACTITIONER
Payer: COMMERCIAL

## 2017-10-06 ENCOUNTER — PATIENT MESSAGE (OUTPATIENT)
Dept: INTERNAL MEDICINE | Facility: CLINIC | Age: 62
End: 2017-10-06

## 2017-10-06 VITALS — HEIGHT: 66 IN | WEIGHT: 203.94 LBS | BODY MASS INDEX: 32.78 KG/M2

## 2017-10-06 DIAGNOSIS — M25.562 LEFT KNEE PAIN, UNSPECIFIED CHRONICITY: Primary | ICD-10-CM

## 2017-10-06 DIAGNOSIS — M25.562 PAIN IN BOTH KNEES, UNSPECIFIED CHRONICITY: ICD-10-CM

## 2017-10-06 DIAGNOSIS — M17.0 PRIMARY OSTEOARTHRITIS OF BOTH KNEES: Primary | ICD-10-CM

## 2017-10-06 DIAGNOSIS — M25.561 PAIN IN BOTH KNEES, UNSPECIFIED CHRONICITY: ICD-10-CM

## 2017-10-06 DIAGNOSIS — E11.65 TYPE 2 DIABETES MELLITUS WITH HYPERGLYCEMIA, WITH LONG-TERM CURRENT USE OF INSULIN: Chronic | ICD-10-CM

## 2017-10-06 DIAGNOSIS — Z79.4 TYPE 2 DIABETES MELLITUS WITH HYPERGLYCEMIA, WITH LONG-TERM CURRENT USE OF INSULIN: Chronic | ICD-10-CM

## 2017-10-06 PROCEDURE — 73562 X-RAY EXAM OF KNEE 3: CPT | Mod: TC,50

## 2017-10-06 PROCEDURE — 99213 OFFICE O/P EST LOW 20 MIN: CPT | Mod: 25,S$GLB,, | Performed by: NURSE PRACTITIONER

## 2017-10-06 PROCEDURE — 73562 X-RAY EXAM OF KNEE 3: CPT | Mod: 26,LT,, | Performed by: RADIOLOGY

## 2017-10-06 PROCEDURE — 99999 PR PBB SHADOW E&M-EST. PATIENT-LVL III: CPT | Mod: PBBFAC,,, | Performed by: NURSE PRACTITIONER

## 2017-10-06 PROCEDURE — 73562 X-RAY EXAM OF KNEE 3: CPT | Mod: 26,59,RT, | Performed by: RADIOLOGY

## 2017-10-06 PROCEDURE — 20610 DRAIN/INJ JOINT/BURSA W/O US: CPT | Mod: 50,S$GLB,, | Performed by: NURSE PRACTITIONER

## 2017-10-06 RX ORDER — TRAMADOL HYDROCHLORIDE 50 MG/1
50 TABLET ORAL EVERY 6 HOURS PRN
Qty: 60 TABLET | Refills: 1 | Status: SHIPPED | OUTPATIENT
Start: 2017-10-06 | End: 2018-05-15 | Stop reason: SDUPTHER

## 2017-10-06 RX ADMIN — TRIAMCINOLONE ACETONIDE 80 MG: 40 INJECTION, SUSPENSION INTRA-ARTICULAR; INTRAMUSCULAR at 11:10

## 2017-10-06 NOTE — TELEPHONE ENCOUNTER
----- Message from Tahir Ortega sent at 10/6/2017 10:49 AM CDT -----  Contact: Pt at [phone]  Pt requesting a call back to f/u on a letter regarding jury duty.

## 2017-10-06 NOTE — PROGRESS NOTES
CC: Pain of the Left Knee and Pain of the Right Knee      HPI: Pt with bilateral knee pain for the past week. She has been seen in the past and has had xray and MRI. Her pain is consistent with inflammation and she is seen by PM&R for pain medication management. She has had cortisone injections in the past with complete relief of her pain and would like to repeat that today. The pain is aching and global and worse with activity. She denies catching and locking. She is ambulating without assistive device. There is not a limp.    ROS  General: denies fever and chills  Resp: no c/o sob  CVS: no c/o cp  MSK: c/o bilateral knee pain which is aching and global     PE  General: AAOx3, pleasant and cooperative  Resp: respirations even and unlabored  MSK: bilateral knee exam  0 degrees extension  120 degrees flexion  No warmth or erythema   - effusion    Xray:  Reviewed by me: Right: No fracture dislocation bone destruction seen.    Left: No fracture dislocation bone destruction seen    Assessment:  Bilateral knee pain    Plan:  Cortisone injections to bilateral knees today  RICE   nsaids and tramadol prn  F/u as needed    Knee Injection Procedure Note    Pre-operative Diagnosis: bilateral knee degenerative arthritis    Post-operative Diagnosis: same    Indications: bilateral knee pain    Anesthesia: none    Procedure Details     Verbal consent was obtained for the procedure. The injection site was identified and the skin was prepared with alcohol. The bilateral knee was injected from an anterolateral approach with 1 ml of Kenalog and 5 ml Lidocaine under sterile technique using a 22 gauge needle. The needle was removed and the area cleansed and dressed.    Complications:  None; patient tolerated the procedure well.    she was advised to rest the knee today, using ice and elevation as needed for comfort and swelling. she did receive immediate relief of the knee pain. she was told this would be short lived and is secondary to  the lidocaine. she may have an increase in discomfort tonight followed by steady improvement over the next several days. It may take 1-3 weeks following the injection to get the full benefit of the medication.

## 2017-10-08 RX ORDER — TRIAMCINOLONE ACETONIDE 40 MG/ML
80 INJECTION, SUSPENSION INTRA-ARTICULAR; INTRAMUSCULAR
Status: COMPLETED | OUTPATIENT
Start: 2017-10-06 | End: 2017-10-06

## 2017-11-09 DIAGNOSIS — I10 ESSENTIAL HYPERTENSION: ICD-10-CM

## 2017-11-09 RX ORDER — AMLODIPINE BESYLATE 10 MG/1
TABLET ORAL
Qty: 90 TABLET | Refills: 0 | Status: SHIPPED | OUTPATIENT
Start: 2017-11-09 | End: 2018-03-15 | Stop reason: SDUPTHER

## 2017-11-10 ENCOUNTER — PATIENT MESSAGE (OUTPATIENT)
Dept: INTERNAL MEDICINE | Facility: CLINIC | Age: 62
End: 2017-11-10

## 2017-11-10 DIAGNOSIS — E11.9 TYPE 2 DIABETES MELLITUS WITHOUT COMPLICATION: ICD-10-CM

## 2017-11-10 RX ORDER — NYSTATIN AND TRIAMCINOLONE ACETONIDE 100000; 1 [USP'U]/G; MG/G
CREAM TOPICAL 4 TIMES DAILY
Qty: 1 TUBE | Refills: 0 | Status: SHIPPED | OUTPATIENT
Start: 2017-11-10 | End: 2018-03-21 | Stop reason: SDUPTHER

## 2017-11-13 DIAGNOSIS — E11.65 TYPE 2 DIABETES MELLITUS WITH HYPERGLYCEMIA, WITH LONG-TERM CURRENT USE OF INSULIN: Chronic | ICD-10-CM

## 2017-11-13 DIAGNOSIS — Z79.4 TYPE 2 DIABETES MELLITUS WITH HYPERGLYCEMIA, WITH LONG-TERM CURRENT USE OF INSULIN: Chronic | ICD-10-CM

## 2017-11-14 RX ORDER — INSULIN DETEMIR 100 [IU]/ML
INJECTION, SOLUTION SUBCUTANEOUS
Qty: 15 ML | Refills: 0 | Status: SHIPPED | OUTPATIENT
Start: 2017-11-14 | End: 2017-12-21 | Stop reason: SDUPTHER

## 2017-11-21 DIAGNOSIS — E11.3293 TYPE 2 DIABETES MELLITUS WITH BOTH EYES AFFECTED BY MILD NONPROLIFERATIVE RETINOPATHY WITHOUT MACULAR EDEMA, WITH LONG-TERM CURRENT USE OF INSULIN: Primary | Chronic | ICD-10-CM

## 2017-11-21 DIAGNOSIS — Z79.4 TYPE 2 DIABETES MELLITUS WITH BOTH EYES AFFECTED BY MILD NONPROLIFERATIVE RETINOPATHY WITHOUT MACULAR EDEMA, WITH LONG-TERM CURRENT USE OF INSULIN: Primary | Chronic | ICD-10-CM

## 2017-11-21 DIAGNOSIS — E11.42 TYPE 2 DIABETES MELLITUS WITH DIABETIC POLYNEUROPATHY, WITH LONG-TERM CURRENT USE OF INSULIN: Chronic | ICD-10-CM

## 2017-11-21 DIAGNOSIS — Z79.4 TYPE 2 DIABETES MELLITUS WITH DIABETIC POLYNEUROPATHY, WITH LONG-TERM CURRENT USE OF INSULIN: Chronic | ICD-10-CM

## 2017-11-21 RX ORDER — LINAGLIPTIN 5 MG/1
TABLET, FILM COATED ORAL
Qty: 30 TABLET | Refills: 0 | Status: SHIPPED | OUTPATIENT
Start: 2017-11-21 | End: 2017-12-27 | Stop reason: SDUPTHER

## 2017-11-21 NOTE — TELEPHONE ENCOUNTER
Please contact pt, Pt needs empowerment f/u with BMP, A1c and urine MAC before    Tradjenta refilled for 30 days until pt can obtain appt    Thanks

## 2017-11-22 ENCOUNTER — PATIENT MESSAGE (OUTPATIENT)
Dept: UROLOGY | Facility: CLINIC | Age: 62
End: 2017-11-22

## 2017-11-28 DIAGNOSIS — R32 URINARY INCONTINENCE, UNSPECIFIED TYPE: ICD-10-CM

## 2017-11-28 DIAGNOSIS — R35.0 FREQUENCY OF URINATION: Primary | ICD-10-CM

## 2017-11-28 RX ORDER — SOLIFENACIN SUCCINATE 10 MG/1
10 TABLET, FILM COATED ORAL DAILY
Qty: 30 TABLET | Refills: 4 | Status: SHIPPED | OUTPATIENT
Start: 2017-11-28 | End: 2018-02-16 | Stop reason: ALTCHOICE

## 2017-11-28 RX ORDER — SOLIFENACIN SUCCINATE 10 MG/1
10 TABLET, FILM COATED ORAL DAILY
Qty: 30 TABLET | Refills: 4 | Status: SHIPPED | OUTPATIENT
Start: 2017-11-28 | End: 2017-11-28 | Stop reason: SDUPTHER

## 2017-11-29 ENCOUNTER — TELEPHONE (OUTPATIENT)
Dept: UROLOGY | Facility: CLINIC | Age: 62
End: 2017-11-29

## 2017-11-29 NOTE — TELEPHONE ENCOUNTER
Called pharmacy and explained that she tried toviaz and ditropan with no benefits. She stated she would complete the prior auth and let me know about the vesicare. I verbalized appreciation.       ----- Message from Sosa Linares RN sent at 11/29/2017  9:35 AM CST -----  Regarding: FW: Vesicare not covered  Contact: [phone]      ----- Message -----  From: Kathleen Arias PharmD  Sent: 11/29/2017   9:21 AM  To: [facility]  Subject: Vesicare not covered                             Vesicare is not covered under her pharmacy benefit. They prefer oxybutynin, oxybutynin ER, Toviaz or Oxytrol OTC. Please let us know if you'd like to switch to one of those, [facility] at [address] [place] [address].     ThanksKhoa

## 2017-11-30 ENCOUNTER — TELEPHONE (OUTPATIENT)
Dept: INTERNAL MEDICINE | Facility: CLINIC | Age: 62
End: 2017-11-30

## 2017-11-30 NOTE — TELEPHONE ENCOUNTER
----- Message from Jonh Card sent at 11/30/2017  2:11 PM CST -----  Contact: [facility]  Appointment Request From: Adriana Paula    With Provider: Amrit Abbott MD [-Primary Care Physician-]    Would Accept With:Only the person I've selected    Preferred Date Range: From 12/1/2017 To 12/5/2017    Preferred Times: Monday Morning, Friday Afternoon    Reason for visit: Request an Appt    Comments:  Dr. Abbott:  I need an appointment as soon as possible, and maybe a flu shoot.  Blood sugars, head and neck aches, and I've been dizzy and weak for about a week.    Thanks, Adriana

## 2017-12-01 ENCOUNTER — OFFICE VISIT (OUTPATIENT)
Dept: INTERNAL MEDICINE | Facility: CLINIC | Age: 62
End: 2017-12-01
Payer: COMMERCIAL

## 2017-12-01 VITALS
DIASTOLIC BLOOD PRESSURE: 68 MMHG | TEMPERATURE: 99 F | WEIGHT: 202.81 LBS | BODY MASS INDEX: 33.79 KG/M2 | HEART RATE: 80 BPM | SYSTOLIC BLOOD PRESSURE: 124 MMHG | HEIGHT: 65 IN | OXYGEN SATURATION: 98 %

## 2017-12-01 DIAGNOSIS — J32.9 SINUSITIS, UNSPECIFIED CHRONICITY, UNSPECIFIED LOCATION: Primary | ICD-10-CM

## 2017-12-01 DIAGNOSIS — E11.3299 TYPE 2 DIABETES MELLITUS WITH MILD NONPROLIFERATIVE RETINOPATHY WITHOUT MACULAR EDEMA, WITH LONG-TERM CURRENT USE OF INSULIN, UNSPECIFIED LATERALITY: ICD-10-CM

## 2017-12-01 DIAGNOSIS — Z79.4 TYPE 2 DIABETES MELLITUS WITH MILD NONPROLIFERATIVE RETINOPATHY WITHOUT MACULAR EDEMA, WITH LONG-TERM CURRENT USE OF INSULIN, UNSPECIFIED LATERALITY: ICD-10-CM

## 2017-12-01 PROCEDURE — 99213 OFFICE O/P EST LOW 20 MIN: CPT | Mod: S$GLB,,, | Performed by: INTERNAL MEDICINE

## 2017-12-01 PROCEDURE — 99999 PR PBB SHADOW E&M-EST. PATIENT-LVL III: CPT | Mod: PBBFAC,,, | Performed by: INTERNAL MEDICINE

## 2017-12-01 RX ORDER — PROMETHAZINE HYDROCHLORIDE AND CODEINE PHOSPHATE 6.25; 1 MG/5ML; MG/5ML
5 SOLUTION ORAL EVERY 6 HOURS PRN
Qty: 180 ML | Refills: 0 | Status: SHIPPED | OUTPATIENT
Start: 2017-12-01 | End: 2017-12-11

## 2017-12-01 RX ORDER — CIPROFLOXACIN 500 MG/1
500 TABLET ORAL 2 TIMES DAILY
Qty: 20 TABLET | Refills: 0 | Status: SHIPPED | OUTPATIENT
Start: 2017-12-01 | End: 2017-12-11

## 2017-12-02 NOTE — PROGRESS NOTES
Subjective:       Patient ID: Adriana Paula is a 62 y.o. female.    Chief Complaint: URI (sore throat, dizziness, ear popping x1 week, afebrile)    URI    This is a new problem. The current episode started in the past 7 days. The problem has been gradually worsening. There has been no fever. Associated symptoms include congestion, coughing, sinus pain and a sore throat. Pertinent negatives include no abdominal pain, chest pain, diarrhea, dysuria, ear pain, headaches, nausea, rash, vomiting or wheezing. She has tried nothing for the symptoms. The treatment provided no relief.     Review of Systems   Constitutional: Negative for activity change, chills, fatigue and fever.   HENT: Positive for congestion, sinus pain and sore throat. Negative for ear pain, nosebleeds, postnasal drip and sinus pressure.    Eyes: Negative.  Negative for visual disturbance.   Respiratory: Positive for cough. Negative for chest tightness, shortness of breath and wheezing.    Cardiovascular: Negative for chest pain.   Gastrointestinal: Negative for abdominal pain, diarrhea, nausea and vomiting.   Genitourinary: Negative for difficulty urinating, dysuria, frequency and urgency.   Musculoskeletal: Negative for arthralgias and neck stiffness.   Skin: Negative for rash.   Neurological: Negative for dizziness, weakness and headaches.   Psychiatric/Behavioral: Negative for sleep disturbance. The patient is not nervous/anxious.        Objective:      Physical Exam   Constitutional: She is oriented to person, place, and time. She appears well-developed and well-nourished.  Non-toxic appearance. No distress.   HENT:   Head: Normocephalic and atraumatic.   Right Ear: Tympanic membrane, external ear and ear canal normal.   Left Ear: Tympanic membrane, external ear and ear canal normal.   Nose: Right sinus exhibits frontal sinus tenderness. Right sinus exhibits no maxillary sinus tenderness. Left sinus exhibits frontal sinus tenderness. Left sinus  exhibits no maxillary sinus tenderness.   Mouth/Throat:       Eyes: EOM are normal. Pupils are equal, round, and reactive to light. No scleral icterus.   Neck: Normal range of motion. Neck supple. No thyromegaly present.   Cardiovascular: Normal rate, regular rhythm and normal heart sounds.    Pulmonary/Chest: Effort normal and breath sounds normal.   Abdominal: Soft. Bowel sounds are normal. She exhibits no mass. There is no tenderness. There is no rebound.   Musculoskeletal: Normal range of motion.   Lymphadenopathy:     She has no cervical adenopathy.   Neurological: She is alert and oriented to person, place, and time. She has normal reflexes. She displays normal reflexes. No cranial nerve deficit. She exhibits normal muscle tone. Coordination normal.   Skin: Skin is warm and dry.   Psychiatric: She has a normal mood and affect. Her behavior is normal.       Assessment:       1. Sinusitis, unspecified chronicity, unspecified location    2. Type 2 diabetes mellitus with mild nonproliferative retinopathy without macular edema, with long-term current use of insulin, unspecified laterality        Plan:   Adriana was seen today for uri.    Diagnoses and all orders for this visit:    Sinusitis, unspecified chronicity, unspecified location    Type 2 diabetes mellitus with mild nonproliferative retinopathy without macular edema, with long-term current use of insulin, unspecified laterality    Other orders  -     ciprofloxacin HCl (CIPRO) 500 MG tablet; Take 1 tablet (500 mg total) by mouth 2 (two) times daily.  -     promethazine-codeine 6.25-10 mg/5 ml (PHENERGAN WITH CODEINE) 6.25-10 mg/5 mL syrup; Take 5 mLs by mouth every 6 (six) hours as needed.

## 2017-12-18 ENCOUNTER — PATIENT MESSAGE (OUTPATIENT)
Dept: INTERNAL MEDICINE | Facility: CLINIC | Age: 62
End: 2017-12-18

## 2017-12-18 DIAGNOSIS — E11.65 TYPE 2 DIABETES MELLITUS WITH HYPERGLYCEMIA, WITH LONG-TERM CURRENT USE OF INSULIN: Chronic | ICD-10-CM

## 2017-12-18 DIAGNOSIS — Z79.4 TYPE 2 DIABETES MELLITUS WITH HYPERGLYCEMIA, WITH LONG-TERM CURRENT USE OF INSULIN: Chronic | ICD-10-CM

## 2017-12-18 RX ORDER — INSULIN DETEMIR 100 [IU]/ML
INJECTION, SOLUTION SUBCUTANEOUS
Qty: 15 ML | Refills: 0 | OUTPATIENT
Start: 2017-12-18

## 2017-12-20 RX ORDER — LOSARTAN POTASSIUM 100 MG/1
100 TABLET ORAL DAILY
Qty: 90 TABLET | Refills: 3 | Status: SHIPPED | OUTPATIENT
Start: 2017-12-20 | End: 2018-08-01 | Stop reason: SDUPTHER

## 2017-12-20 RX ORDER — LOSARTAN POTASSIUM 100 MG/1
100 TABLET ORAL DAILY
Qty: 90 TABLET | Refills: 3 | Status: SHIPPED | OUTPATIENT
Start: 2017-12-20 | End: 2017-12-20 | Stop reason: SDUPTHER

## 2017-12-21 DIAGNOSIS — E11.65 TYPE 2 DIABETES MELLITUS WITH HYPERGLYCEMIA, WITH LONG-TERM CURRENT USE OF INSULIN: Chronic | ICD-10-CM

## 2017-12-21 DIAGNOSIS — Z79.4 TYPE 2 DIABETES MELLITUS WITH HYPERGLYCEMIA, WITH LONG-TERM CURRENT USE OF INSULIN: Chronic | ICD-10-CM

## 2017-12-21 RX ORDER — INSULIN DETEMIR 100 [IU]/ML
INJECTION, SOLUTION SUBCUTANEOUS
Qty: 15 ML | Refills: 0 | Status: SHIPPED | OUTPATIENT
Start: 2017-12-21 | End: 2018-01-25 | Stop reason: SDUPTHER

## 2017-12-27 ENCOUNTER — PATIENT MESSAGE (OUTPATIENT)
Dept: INTERNAL MEDICINE | Facility: CLINIC | Age: 62
End: 2017-12-27

## 2017-12-27 DIAGNOSIS — E11.65 TYPE 2 DIABETES MELLITUS WITH HYPERGLYCEMIA, WITH LONG-TERM CURRENT USE OF INSULIN: Chronic | ICD-10-CM

## 2017-12-27 DIAGNOSIS — Z79.4 TYPE 2 DIABETES MELLITUS WITH HYPERGLYCEMIA, WITH LONG-TERM CURRENT USE OF INSULIN: Chronic | ICD-10-CM

## 2017-12-27 DIAGNOSIS — Z79.4 TYPE 2 DIABETES MELLITUS WITH DIABETIC POLYNEUROPATHY, WITH LONG-TERM CURRENT USE OF INSULIN: Chronic | ICD-10-CM

## 2017-12-27 DIAGNOSIS — E11.42 TYPE 2 DIABETES MELLITUS WITH DIABETIC POLYNEUROPATHY, WITH LONG-TERM CURRENT USE OF INSULIN: Chronic | ICD-10-CM

## 2017-12-27 RX ORDER — METFORMIN HYDROCHLORIDE 500 MG/1
1000 TABLET, EXTENDED RELEASE ORAL 2 TIMES DAILY WITH MEALS
Qty: 60 TABLET | Refills: 3 | Status: SHIPPED | OUTPATIENT
Start: 2017-12-27 | End: 2018-05-17

## 2017-12-29 ENCOUNTER — LAB VISIT (OUTPATIENT)
Dept: LAB | Facility: HOSPITAL | Age: 62
End: 2017-12-29
Attending: FAMILY MEDICINE
Payer: COMMERCIAL

## 2017-12-29 DIAGNOSIS — E11.9 TYPE 2 DIABETES MELLITUS WITHOUT COMPLICATION, WITH LONG-TERM CURRENT USE OF INSULIN: ICD-10-CM

## 2017-12-29 DIAGNOSIS — Z79.4 TYPE 2 DIABETES MELLITUS WITHOUT COMPLICATION, WITH LONG-TERM CURRENT USE OF INSULIN: Primary | ICD-10-CM

## 2017-12-29 DIAGNOSIS — E11.9 TYPE 2 DIABETES MELLITUS WITHOUT COMPLICATION, WITH LONG-TERM CURRENT USE OF INSULIN: Primary | ICD-10-CM

## 2017-12-29 DIAGNOSIS — Z79.4 TYPE 2 DIABETES MELLITUS WITHOUT COMPLICATION, WITH LONG-TERM CURRENT USE OF INSULIN: ICD-10-CM

## 2017-12-29 LAB
ESTIMATED AVG GLUCOSE: 192 MG/DL
HBA1C MFR BLD HPLC: 8.3 %

## 2017-12-29 PROCEDURE — 83036 HEMOGLOBIN GLYCOSYLATED A1C: CPT

## 2017-12-29 PROCEDURE — 36415 COLL VENOUS BLD VENIPUNCTURE: CPT

## 2018-01-02 ENCOUNTER — TELEPHONE (OUTPATIENT)
Dept: INTERNAL MEDICINE | Facility: CLINIC | Age: 63
End: 2018-01-02

## 2018-01-25 ENCOUNTER — TELEPHONE (OUTPATIENT)
Dept: OBSTETRICS AND GYNECOLOGY | Facility: CLINIC | Age: 63
End: 2018-01-25

## 2018-01-25 DIAGNOSIS — Z79.4 TYPE 2 DIABETES MELLITUS WITH HYPERGLYCEMIA, WITH LONG-TERM CURRENT USE OF INSULIN: Chronic | ICD-10-CM

## 2018-01-25 DIAGNOSIS — E11.65 TYPE 2 DIABETES MELLITUS WITH HYPERGLYCEMIA, WITH LONG-TERM CURRENT USE OF INSULIN: Chronic | ICD-10-CM

## 2018-01-25 DIAGNOSIS — Z12.31 ENCOUNTER FOR SCREENING MAMMOGRAM FOR MALIGNANT NEOPLASM OF BREAST: Primary | ICD-10-CM

## 2018-01-25 RX ORDER — INSULIN DETEMIR 100 [IU]/ML
INJECTION, SOLUTION SUBCUTANEOUS
Qty: 15 ML | Refills: 0 | Status: SHIPPED | OUTPATIENT
Start: 2018-01-25 | End: 2018-03-11 | Stop reason: SDUPTHER

## 2018-01-25 NOTE — TELEPHONE ENCOUNTER
----- Message from Marlon Donohue sent at 1/25/2018  8:57 AM CST -----  Contact: pt  x 1st Request  _ 2nd Request  _ 3rd Request    Who: pt    Why: [facility] order request    What Number to Call Back: [phone]    When to Expect a call back: (Before the end of the day)  -- if call after 3:00 call back will be tomorrow.

## 2018-01-26 ENCOUNTER — HOSPITAL ENCOUNTER (OUTPATIENT)
Dept: RADIOLOGY | Facility: OTHER | Age: 63
Discharge: HOME | End: 2018-01-26
Attending: OBSTETRICS & GYNECOLOGY
Payer: COMMERCIAL

## 2018-01-26 DIAGNOSIS — Z12.31 ENCOUNTER FOR SCREENING MAMMOGRAM FOR MALIGNANT NEOPLASM OF BREAST: ICD-10-CM

## 2018-01-26 PROCEDURE — 77063 BREAST TOMOSYNTHESIS BI: CPT | Mod: 26,,, | Performed by: RADIOLOGY

## 2018-01-26 PROCEDURE — 77067 SCR MAMMO BI INCL CAD: CPT | Mod: TC

## 2018-01-26 PROCEDURE — 77067 SCR MAMMO BI INCL CAD: CPT | Mod: 26,,, | Performed by: RADIOLOGY

## 2018-02-02 ENCOUNTER — TELEPHONE (OUTPATIENT)
Dept: OBSTETRICS AND GYNECOLOGY | Facility: CLINIC | Age: 63
End: 2018-02-02

## 2018-02-02 NOTE — TELEPHONE ENCOUNTER
Left message for Adriana Paula  Letting her know her mammogram was normal, however her Eliseoer-Raven lifetime risk assessment for breast cancer was on the higher side and we would like to schedule a CONSULT at the [facility] to discuss this and come up with a plan for monitoring her closer.

## 2018-02-02 NOTE — TELEPHONE ENCOUNTER
----- Message from Tomasa Boston MD sent at 2/1/2018  6:36 PM CST -----  Please schedule with Butch or Lauren for T-C score >20%

## 2018-02-14 ENCOUNTER — TELEPHONE (OUTPATIENT)
Dept: INTERNAL MEDICINE | Facility: CLINIC | Age: 63
End: 2018-02-14

## 2018-02-14 NOTE — TELEPHONE ENCOUNTER
"----- Message from Ayde Garcia PharmD sent at 2/14/2018 12:17 PM CST -----  Regarding: BD pen Needle Request  Patient is requesting a new rx for Short BD Pen Needles (5/16"x0.25mm). The pen needles the patient is using at the moment are too long for her comfort. If appropriated, please send a new rx to [facility]. Patient stated she has a visit in March to establish care with Dr. Márquez. Thank you!  "

## 2018-02-15 ENCOUNTER — PATIENT MESSAGE (OUTPATIENT)
Dept: INTERNAL MEDICINE | Facility: CLINIC | Age: 63
End: 2018-02-15

## 2018-02-15 DIAGNOSIS — N39.41 URGE INCONTINENCE: ICD-10-CM

## 2018-02-15 RX ORDER — PEN NEEDLE, DIABETIC 30 GX3/16"
1 NEEDLE, DISPOSABLE MISCELLANEOUS 2 TIMES DAILY
Qty: 100 EACH | Refills: 11 | Status: SHIPPED | OUTPATIENT
Start: 2018-02-15 | End: 2019-01-08 | Stop reason: SDUPTHER

## 2018-02-16 ENCOUNTER — PATIENT MESSAGE (OUTPATIENT)
Dept: UROLOGY | Facility: CLINIC | Age: 63
End: 2018-02-16

## 2018-02-16 DIAGNOSIS — N39.41 URGE INCONTINENCE: Primary | ICD-10-CM

## 2018-02-16 RX ORDER — FESOTERODINE FUMARATE 8 MG/1
TABLET, FILM COATED, EXTENDED RELEASE ORAL
Qty: 30 TABLET | Refills: 0 | OUTPATIENT
Start: 2018-02-16

## 2018-02-16 RX ORDER — FESOTERODINE FUMARATE 8 MG/1
1 TABLET, EXTENDED RELEASE ORAL DAILY
Qty: 90 TABLET | Refills: 2 | Status: SHIPPED | OUTPATIENT
Start: 2018-02-16 | End: 2018-02-23 | Stop reason: ALTCHOICE

## 2018-02-19 ENCOUNTER — TELEPHONE (OUTPATIENT)
Dept: OBSTETRICS AND GYNECOLOGY | Facility: CLINIC | Age: 63
End: 2018-02-19

## 2018-02-19 NOTE — TELEPHONE ENCOUNTER
Spoke with Adriana Paula  to schedule a CONSULT with Jil Mccormack / Dr. Rae at the [facility] due to having a Tyrer-Cuzi lifetime breast cancer risk of 32.59%.    I reassured her that her mammogram was normal, but that this is a new tool used to help identify those at a higher risk for breast cancer in the future.  At the consult, a plan can be created for more frequent monitoring; and in turn early detection of any breast cancer that may develop in the future.    Instructions to the [facility] given and reminder letter printed and sent in the mail.    Pt verbalized understanding and appointment was scheduled for March 27th.

## 2018-02-23 DIAGNOSIS — N39.41 URGE INCONTINENCE: Primary | ICD-10-CM

## 2018-02-23 RX ORDER — OXYBUTYNIN CHLORIDE 10 MG/1
10 TABLET, EXTENDED RELEASE ORAL DAILY
Qty: 30 TABLET | Refills: 11 | Status: SHIPPED | OUTPATIENT
Start: 2018-02-23 | End: 2018-04-04 | Stop reason: SDUPTHER

## 2018-02-27 DIAGNOSIS — I10 ESSENTIAL HYPERTENSION: ICD-10-CM

## 2018-02-27 RX ORDER — AMLODIPINE BESYLATE 10 MG/1
TABLET ORAL
Qty: 90 TABLET | Refills: 0 | OUTPATIENT
Start: 2018-02-27

## 2018-02-27 RX ORDER — ATORVASTATIN CALCIUM 40 MG/1
TABLET, FILM COATED ORAL
Qty: 90 TABLET | Refills: 0 | OUTPATIENT
Start: 2018-02-27

## 2018-03-11 ENCOUNTER — OFFICE VISIT (OUTPATIENT)
Dept: URGENT CARE | Facility: CLINIC | Age: 63
End: 2018-03-11
Payer: COMMERCIAL

## 2018-03-11 VITALS
DIASTOLIC BLOOD PRESSURE: 76 MMHG | SYSTOLIC BLOOD PRESSURE: 173 MMHG | TEMPERATURE: 98 F | BODY MASS INDEX: 32.99 KG/M2 | RESPIRATION RATE: 16 BRPM | HEIGHT: 65 IN | OXYGEN SATURATION: 98 % | HEART RATE: 63 BPM | WEIGHT: 198 LBS

## 2018-03-11 DIAGNOSIS — Z79.4 TYPE 2 DIABETES MELLITUS WITH HYPERGLYCEMIA, WITH LONG-TERM CURRENT USE OF INSULIN: Chronic | ICD-10-CM

## 2018-03-11 DIAGNOSIS — J01.20 ACUTE ETHMOIDAL SINUSITIS, RECURRENCE NOT SPECIFIED: Primary | ICD-10-CM

## 2018-03-11 DIAGNOSIS — Z79.4 TYPE 2 DIABETES MELLITUS WITHOUT COMPLICATION, WITH LONG-TERM CURRENT USE OF INSULIN: ICD-10-CM

## 2018-03-11 DIAGNOSIS — E11.65 TYPE 2 DIABETES MELLITUS WITH HYPERGLYCEMIA, WITH LONG-TERM CURRENT USE OF INSULIN: Chronic | ICD-10-CM

## 2018-03-11 DIAGNOSIS — E11.9 TYPE 2 DIABETES MELLITUS WITHOUT COMPLICATION, WITH LONG-TERM CURRENT USE OF INSULIN: ICD-10-CM

## 2018-03-11 LAB — GLUCOSE SERPL-MCNC: 187 MG/DL (ref 70–110)

## 2018-03-11 PROCEDURE — 99214 OFFICE O/P EST MOD 30 MIN: CPT | Mod: S$GLB,,, | Performed by: EMERGENCY MEDICINE

## 2018-03-11 PROCEDURE — 3077F SYST BP >= 140 MM HG: CPT | Mod: CPTII,S$GLB,, | Performed by: EMERGENCY MEDICINE

## 2018-03-11 PROCEDURE — 3078F DIAST BP <80 MM HG: CPT | Mod: CPTII,S$GLB,, | Performed by: EMERGENCY MEDICINE

## 2018-03-11 PROCEDURE — 82948 REAGENT STRIP/BLOOD GLUCOSE: CPT | Mod: S$GLB,,, | Performed by: EMERGENCY MEDICINE

## 2018-03-11 RX ORDER — DOXYCYCLINE 100 MG/1
100 CAPSULE ORAL 2 TIMES DAILY
Qty: 20 CAPSULE | Refills: 0 | Status: SHIPPED | OUTPATIENT
Start: 2018-03-11 | End: 2018-03-21

## 2018-03-11 NOTE — PROGRESS NOTES
Subjective:       Patient ID: Adriana Paula is a 62 y.o. female.    Vitals:    03/11/18 1403   BP: (!) 173/76   Pulse: 63   Resp: 16   Temp: 98 °F (36.7 °C)       Chief Complaint: Cough    Pt states non productive cough and chest congestion with sinus pressure and headache x 3-4 days.       Cough   This is a new problem. The current episode started in the past 7 days. The problem has been gradually worsening. The problem occurs hourly. The cough is non-productive. Associated symptoms include headaches, myalgias and a sore throat. Pertinent negatives include no chest pain, chills, ear pain, eye redness, fever, shortness of breath or wheezing. She has tried nothing for the symptoms. The treatment provided no relief.     Review of Systems   Constitution: Negative for chills, fever and malaise/fatigue.   HENT: Positive for congestion and sore throat. Negative for ear pain and hoarse voice.    Eyes: Negative for discharge and redness.   Cardiovascular: Negative for chest pain, dyspnea on exertion and leg swelling.   Respiratory: Positive for cough. Negative for shortness of breath, sputum production and wheezing.    Musculoskeletal: Positive for myalgias.   Gastrointestinal: Positive for nausea. Negative for abdominal pain.   Neurological: Positive for headaches.       Objective:      Physical Exam   Constitutional: She is oriented to person, place, and time. She appears well-developed and well-nourished. She is cooperative.  Non-toxic appearance. She does not appear ill. No distress.   HENT:   Head: Normocephalic and atraumatic.   Right Ear: Hearing, tympanic membrane, external ear and ear canal normal.   Left Ear: Hearing, tympanic membrane, external ear and ear canal normal.   Nose: Mucosal edema and rhinorrhea present. No nasal deformity. No epistaxis. Right sinus exhibits frontal sinus tenderness. Right sinus exhibits no maxillary sinus tenderness. Left sinus exhibits frontal sinus tenderness. Left sinus  exhibits no maxillary sinus tenderness.   Mouth/Throat: Uvula is midline, oropharynx is clear and moist and mucous membranes are normal. No trismus in the jaw. Normal dentition. No uvula swelling. No posterior oropharyngeal erythema.   Eyes: Conjunctivae and lids are normal. No scleral icterus.   Sclera clear bilat   Neck: Trachea normal, full passive range of motion without pain and phonation normal. Neck supple.   Cardiovascular: Normal rate, regular rhythm, normal heart sounds, intact distal pulses and normal pulses.    Pulmonary/Chest: Effort normal and breath sounds normal. No respiratory distress.   Abdominal: Soft. Normal appearance and bowel sounds are normal. She exhibits no distension. There is no tenderness.   Musculoskeletal: Normal range of motion. She exhibits no edema or deformity.   Neurological: She is alert and oriented to person, place, and time. She exhibits normal muscle tone. Coordination normal.   Skin: Skin is warm, dry and intact. She is not diaphoretic. No pallor.   Psychiatric: She has a normal mood and affect. Her speech is normal and behavior is normal. Judgment and thought content normal. Cognition and memory are normal.   Nursing note and vitals reviewed.      Assessment:       1. Acute ethmoidal sinusitis, recurrence not specified    2. Type 2 diabetes mellitus without complication, with long-term current use of insulin    3. Type 2 diabetes mellitus with hyperglycemia, with long-term current use of insulin        Plan:       Adriana was seen today for cough.    Diagnoses and all orders for this visit:    Acute ethmoidal sinusitis, recurrence not specified    Type 2 diabetes mellitus without complication, with long-term current use of insulin  -     POCT glucose    Type 2 diabetes mellitus with hyperglycemia, with long-term current use of insulin  -     insulin detemir U-100 (LEVEMIR FLEXTOUCH U-100 INSULN) 100 unit/mL (3 mL) SubQ InPn pen; Inject 45 Units into the skin once  daily.    Other orders  -     insulin regular 100 unit/mL Inj injection; Use Insulin as follows based on sugar readin-50: eat/call MD                    : none  150-200: 3units SQ                200-250: 5 units SQ  250-300: 7 units SQ               300-350:  9units SQ  >350: 11units/call Md/go to ER  -     doxycycline (VIBRAMYCIN) 100 MG Cap; Take 1 capsule (100 mg total) by mouth 2 (two) times daily.      Patient Instructions     Please return here or go to the Emergency Department for any concerns or worsening of condition.        Zyrtec, Claritin, or Allegra OTC as directed for the next 7 days  Flonase OTC as directed for the next 7 days  Salt Water Nasal Spray OTC 3x/day for the next 7 days      Follow up with Primary Care or ENT if not improved in 7-10 Days:  [phone]          Acute Bacterial Rhinosinusitis (ABRS)  Acute bacterial rhinosinusitis (ABRS) is an infection of your nasal cavity and sinuses. Its caused by bacteria. Acute means that youve had symptoms for less than 12 weeks.  Understanding your sinuses  The nasal cavity is the large air-filled space behind your nose. The sinuses are a group of spaces formed by the bones of your face. They connect with your nasal cavity. ABRS causes the tissue lining these spaces to become inflamed. Mucus may not drain normally. This leads to facial pain and other symptoms.  What causes ABRS?  ABRS most often follows an upper respiratory infection caused by a virus. Bacteria then infect the lining of your nasal cavity and sinuses. But you can also get ABRS if you have:  · Nasal allergies  · Long-term nasal swelling and congestion not caused by allergies  · Blockage in the nose  Symptoms of ABRS  The symptoms of ABRS may be different for each person, and can include:  · Nasal congestion  · Runny nose  · Fluid draining from the nose down the throat (postnasal drip)  · Headache  · Cough  · Pain in the sinuses  · Thick, colored fluid from the nose  (mucus)  · Fever  Diagnosing ABRS  ABRS may be diagnosed if youve had an upper respiratory infection like a cold and cough for longer than 10 to 14 days. Your health care provider will ask about your symptoms and your medical history. The provider will check your vital signs, including your temperature. Youll have a physical exam. The health care provider will check your ears, nose, and throat. You likely wont need any tests. If ABRS comes back, you may have a culture or other tests.  Treatment for ABRS  Treatment may include:  · Antibiotic medicine. This is for symptoms that last for at least 10 to 14 days.  · Nasal corticosteroid medicine. Drops or spray used in the nose can lessen swelling and congestion.  · Over-the-counter pain medicine. This is to lessen sinus pain and pressure.  · Nasal decongestant medicine. Spray or drops may help to lessen congestion. Do not use them for more than a few days.  · Salt wash (saline irrigation). This can help to loosen mucus.  Possible complications of ABRS  ABRS may come back or become long-term (chronic).  In rare cases, ABRS may cause complications such as:   · Inflamed tissue around the brain and spinal cord (meningitis)  · Inflamed tissue around the eyes (orbital cellulitis)  · Inflamed bones around the sinuses (osteitis)  These problems may need to be treated in a hospital with intravenous (IV) antibiotic medicine or surgery.  When to call the health care provider  Call your health care provider if you have any of the following:  · Symptoms that dont get better, or get worse  · Symptoms that dont get better after 3 to 5 days on antibiotics  · Trouble seeing  · Swelling around your eyes  · Confusion or trouble staying awake   Date Last Reviewed: 3/3/2015  © 4804-1716 [employer]. [address] [place] [address], [place] [address]. All rights reserved. This information is not intended as a substitute for professional medical care. Always follow your healthcare  "professional's instructions.      Understanding Nasal Anatomy: Inside View  A lot happens under the surface of the nose. The bone and cartilage under the skin give the nose most of its size and shape. Other structures inside and behind the nose help you breathe. Learning the anatomy of the nose can help you better understand how the nose works.       Bone supports the upper third (bridge) of the nose. The upper cartilage supports the side of the nose. The lower cartilage adds support, width, and height. It helps shape the nostrils and the tip of the nose. Skin also helps shape the nose.          The nasal cavity is a hollow space behind the nose that air flows through. The septum is a thin "wall" made of cartilage and bone. It divides the inside of the nose into two chambers. The mucous membrane is thin tissue that lines the nose, sinuses, and throat. It warms and moistens the air you breathe in. It also makes the sticky mucus that helps clean the air of dust and other small particles. The turbinates on each side of the nose are curved, bony ridges lined with mucous membrane. They warm and moisten the air you breathe in. The sinuses are hollow, air-filled chambers in the bone around your nose. Mucus from the sinuses drains into the nasal cavity.  Date Last Reviewed: 11/1/2016 © 2000-2016 [employer]. [address] [place] [address], [place] [address]. All rights reserved. This information is not intended as a substitute for professional medical care. Always follow your healthcare professional's instructions.            How to Check Your Blood Sugar    Keeping track of how much sugar (glucose) is in your blood is an important part of self-care when you have diabetes. This is also called self-monitoring of blood glucose (SMBG). To make sure your glucose and insulin are in balance, check your blood sugar as instructed by your healthcare provider. You may need to check your blood glucose levels at certain times " every day. Or you may need to check them only a few times a week.  What you need  To check your blood sugar, make sure you have the following:   · A small pricking needle (lancing device)  · Test strips  · A glucose meter  · A notebook, chart, or log book and pen or pencil   Using a blood glucose meter  You can check your blood sugar at home, at work, and anywhere else. Your diabetes team will help you choose a blood glucose meter. A meter measures the amount of glucose in a tiny drop of blood. Youll use a device called a lancet to draw a drop of blood. Put the strip in the meter first. Then touch the test strip to the drop of blood. The meter then gives you a number (reading) that tells you the level of your blood sugar.  Aim for your target range  Your blood sugar should be in your target range--not too high and not too low. A target range is where your blood sugar level is healthiest. Staying in this range as much as possible will help lower your risk for health problems (complications). Your diabetes team will help you figure out the best target range for you. That range depends on many things. They include your age, other health problems, how well your diabetes is controlled, and how long you have had diabetes. In general, target ranges are:  · Control of blood glucose (hemoglobin A1c or A1c): generally, 7.0% or less. You will usually have this test at the lab.  · Before a meal (preprandial glucose): Between 80 and 130 mg/dL.  · One to 2 hours after a meal (postprandial glucose): Less than 180 mg/dL.  Track your readings  Use a notebook, chart, or log book to keep track of your readings. Write down the date, time, and your blood sugar level numbers. This helps you see patterns, such as high blood sugar after eating certain foods. Take your log along when you see your healthcare provider. Your blood glucose levels will help your provider decide if he or she needs to make any changes to your management plan. To  check your blood sugar, follow the steps below.  Step 1. Get ready  · Wash your hands with soap and warm (not hot) water.  · Follow all of the instructions that came with your glucometer. Be sure that your test strips were designed to be used with your meter and are not .   Step 2. Draw a drop of blood  · Prick the side of your finger with the lancet. Squeeze gently until you get a drop of blood. Squeezing too hard can cause an inaccurate reading.  · Put the lancet in a special sharps container. Ask your healthcare team where you can buy one or what you can use to throw away any sharps.  · If you are unable to get enough blood, hold your hand at your side and gently shake it.  Step 3. Place the drop on a strip  · Wait for the meter to show a message or symbol that it is time to test.  · Touch the test strip to the drop of blood.  · Be sure to follow the instructions included with meter.  Step 4. Read and record your results  · Wait for your meter to show the result.  · If you see an error message, recheck using a fresh strip and a fresh drop of blood. Also recheck if the glucose numbers aren't what you expect--too low without symptoms, or too high for no reason.  · Write the results in your log book.  Date Last Reviewed: 2016  © 4323-1492 [employer]. [address] [place] [address], [place] [address]. All rights reserved. This information is not intended as a substitute for professional medical care. Always follow your healthcare professional's instructions.

## 2018-03-11 NOTE — LETTER
March 11, 2018      [facility] [address] [place]  [facility] [address]  Phone: [phone]  Fax: [phone]       Patient: Adriana Paula   YOB: 1955  Date of Visit: 03/11/2018    To Whom It May Concern:    Hetal Paula  was at [facility] on 03/11/2018. She may return to work/school on 3/13/18 with no restrictions. If you have any questions or concerns, or if I can be of further assistance, please do not hesitate to contact me.    Sincerely,    Loc Vazquez III, MD

## 2018-03-15 ENCOUNTER — OFFICE VISIT (OUTPATIENT)
Dept: INTERNAL MEDICINE | Facility: CLINIC | Age: 63
End: 2018-03-15
Payer: COMMERCIAL

## 2018-03-15 VITALS
BODY MASS INDEX: 33.43 KG/M2 | SYSTOLIC BLOOD PRESSURE: 164 MMHG | HEIGHT: 65 IN | WEIGHT: 200.63 LBS | RESPIRATION RATE: 16 BRPM | HEART RATE: 75 BPM | TEMPERATURE: 98 F | DIASTOLIC BLOOD PRESSURE: 91 MMHG

## 2018-03-15 DIAGNOSIS — E66.9 OBESITY (BMI 30-39.9): Chronic | ICD-10-CM

## 2018-03-15 DIAGNOSIS — E78.5 DYSLIPIDEMIA: ICD-10-CM

## 2018-03-15 DIAGNOSIS — Z79.4 TYPE 2 DIABETES MELLITUS WITH DIABETIC POLYNEUROPATHY, WITH LONG-TERM CURRENT USE OF INSULIN: Chronic | ICD-10-CM

## 2018-03-15 DIAGNOSIS — G47.33 OSA ON CPAP: ICD-10-CM

## 2018-03-15 DIAGNOSIS — M15.9 PRIMARY OSTEOARTHRITIS INVOLVING MULTIPLE JOINTS: ICD-10-CM

## 2018-03-15 DIAGNOSIS — E11.42 TYPE 2 DIABETES MELLITUS WITH DIABETIC POLYNEUROPATHY, WITH LONG-TERM CURRENT USE OF INSULIN: Chronic | ICD-10-CM

## 2018-03-15 DIAGNOSIS — R53.83 FATIGUE, UNSPECIFIED TYPE: ICD-10-CM

## 2018-03-15 DIAGNOSIS — Z00.00 ANNUAL PHYSICAL EXAM: Primary | ICD-10-CM

## 2018-03-15 DIAGNOSIS — I10 ESSENTIAL HYPERTENSION: ICD-10-CM

## 2018-03-15 PROCEDURE — 99396 PREV VISIT EST AGE 40-64: CPT | Mod: S$GLB,,, | Performed by: INTERNAL MEDICINE

## 2018-03-15 PROCEDURE — 99999 PR PBB SHADOW E&M-EST. PATIENT-LVL III: CPT | Mod: PBBFAC,,, | Performed by: INTERNAL MEDICINE

## 2018-03-15 RX ORDER — AMLODIPINE BESYLATE 10 MG/1
10 TABLET ORAL DAILY
Qty: 90 TABLET | Refills: 3 | Status: SHIPPED | OUTPATIENT
Start: 2018-03-15 | End: 2018-08-01 | Stop reason: SDUPTHER

## 2018-03-15 RX ORDER — ATORVASTATIN CALCIUM 40 MG/1
40 TABLET, FILM COATED ORAL DAILY
Qty: 90 TABLET | Refills: 3 | Status: SHIPPED | OUTPATIENT
Start: 2018-03-15 | End: 2018-08-01 | Stop reason: SDUPTHER

## 2018-03-15 RX ORDER — DICLOFENAC SODIUM 50 MG/1
50 TABLET, DELAYED RELEASE ORAL 2 TIMES DAILY PRN
Qty: 60 TABLET | Refills: 1 | Status: SHIPPED | OUTPATIENT
Start: 2018-03-15 | End: 2019-01-22

## 2018-03-15 NOTE — PROGRESS NOTES
Subjective:      Adriana Palua is a 62 y.o. female who presents for annual exam.      Family History:  family history includes Arthritis in her mother; Breast cancer (age of onset: 40) in her cousin and cousin; Breast cancer (age of onset: 50) in her maternal grandmother and sister; Breast cancer (age of onset: 75) in her mother; Cancer in her mother; Diabetes in her mother; Heart disease in her mother; Miscarriages / Stillbirths in her mother; No Known Problems in her brother, father, maternal aunt, maternal grandfather, maternal uncle, paternal aunt, paternal grandfather, paternal grandmother, and paternal uncle; Vision loss in her mother.    Health Maintenance:  Health Maintenance       Date Due Completion Date    TETANUS VACCINE 1972 ---    Pneumococcal PPSV23 (Medium Risk) (1) 1972 ---    Low Dose Statin 1975 ---    Colonoscopy 2016 3/23/2009    Influenza Vaccine 2017    Foot Exam 2018 2/3/2017    Eye Exam 03/10/2018 3/10/2017    Lipid Panel 2018    Hemoglobin A1c 2018    Mammogram 2019    Pap Smear with HPV Cotest 2020        Eye exam: 2017  OB/GYN: 2017  Colonoscopy: 3/2009, normal    MM2018  Last Pap: 2017    Tetanus: patient thinks it was done  Shingles: 2016  Prevnar 13 2016    Exercise: not much, limited by knee pains  Diet: unhealthy at times  Body mass index is 33.38 kg/m².    Meds:   Current Outpatient Prescriptions:     amLODIPine (NORVASC) 10 MG tablet, Take 1 tablet (10 mg total) by mouth once daily., Disp: 90 tablet, Rfl: 3    aspirin (ECOTRIN) 81 MG EC tablet, Take 81 mg by mouth once daily., Disp: , Rfl:     atorvastatin (LIPITOR) 40 MG tablet, Take 1 tablet (40 mg total) by mouth once daily., Disp: 90 tablet, Rfl: 3    blood-glucose meter (ONETOUCH VERIO IQ METER) kit, Use as instructed, Disp: 200 each, Rfl: 11    diclofenac (VOLTAREN) 50 MG EC tablet, Take 1  "tablet (50 mg total) by mouth 2 (two) times daily as needed., Disp: 60 tablet, Rfl: 1    doxycycline (VIBRAMYCIN) 100 MG Cap, Take 1 capsule (100 mg total) by mouth 2 (two) times daily., Disp: 20 capsule, Rfl: 0    insulin detemir U-100 (LEVEMIR FLEXTOUCH U-100 INSULN) 100 unit/mL (3 mL) SubQ InPn pen, Inject 45 Units into the skin once daily., Disp: 13.5 mL, Rfl: 0    lancets 33 gauge Misc, 1 lancet by Misc.(Non-Drug; Combo Route) route 2 (two) times daily before meals., Disp: 200 each, Rfl: 11    linagliptin (TRADJENTA) 5 mg Tab tablet, Take 1 tablet (5 mg total) by mouth once daily., Disp: 30 tablet, Rfl: 3    losartan (COZAAR) 100 MG tablet, Take 1 tablet (100 mg total) by mouth once daily., Disp: 90 tablet, Rfl: 3    metFORMIN (GLUCOPHAGE-XR) 500 MG 24 hr tablet, Take 2 tablets (1,000 mg total) by mouth 2 (two) times daily with meals., Disp: 60 tablet, Rfl: 3    ONETOUCH VERIO Strp, USE FOUR TIMES DAILY AS DIRECTED, Disp: 200 strip, Rfl: 0    oxybutynin (DITROPAN-XL) 10 MG 24 hr tablet, Take 1 tablet (10 mg total) by mouth once daily., Disp: 30 tablet, Rfl: 11    pen needle, diabetic (BD INSULIN PEN NEEDLE UF SHORT) 31 gauge x 5/16" Ndle, 1 each by Misc.(Non-Drug; Combo Route) route 2 (two) times daily., Disp: 100 each, Rfl: 11    terconazole (TERAZOL 7) 0.4 % Crea, Place 1 applicator vaginally once daily., Disp: 45 g, Rfl: 1    nystatin-triamcinolone (MYCOLOG II) cream, Apply topically 4 (four) times daily., Disp: 1 Tube, Rfl: 0    PMHx:   Past Medical History:   Diagnosis Date    Allergy     Anemia     Anxiety     Arthritis     Breast cyst     Cataract     DR (diabetic retinopathy)     Dyslipidemia     Essential hypertension 8/1/2012    Gastroesophageal reflux disease without esophagitis 2/3/2017    GERD (gastroesophageal reflux disease)     Glaucoma     Hypertension     Obesity (BMI 30-39.9) 10/24/2013    PN (peripheral neuropathy)     Sleep apnea     Type 2 diabetes mellitus with " both eyes affected by mild nonproliferative retinopathy without macular edema, with long-term current use of insulin     Type 2 diabetes mellitus with diabetic polyneuropathy, with long-term current use of insulin     Type II or unspecified type diabetes mellitus with other specified manifestations, uncontrolled        PSHx:     Past Surgical History:   Procedure Laterality Date    BREAST CYST EXCISION Right     1980s    CARPAL TUNNEL RELEASE      CATARACT EXTRACTION      CATARACT EXTRACTION W/  INTRAOCULAR LENS IMPLANT Right 2017    Dr Brewster     SECTION      KNEE ARTHROSCOPY  14    right    MYOMECTOMY         SocHx:   Social History     Social History    Marital status: Single     Spouse name: N/A    Number of children: N/A    Years of education: N/A     Social History Main Topics    Smoking status: Former Smoker     Start date: 2002    Smokeless tobacco: Never Used    Alcohol use Yes      Comment: socially    Drug use: No    Sexual activity: No     Other Topics Concern    None     Social History Narrative    . 1 daughter. Works as  at [employer].        Review of Systems   Constitutional: Negative for chills, fatigue, fever and unexpected weight change (able to lose 40 lbs in the last year).   HENT: Negative for congestion, ear discharge, ear pain, postnasal drip, rhinorrhea, sinus pressure and sore throat.    Eyes: Negative for redness and visual disturbance.   Respiratory: Negative for cough, chest tightness and shortness of breath.    Cardiovascular: Negative for chest pain, palpitations and leg swelling.   Gastrointestinal: Negative for abdominal pain, blood in stool, constipation, diarrhea, nausea and vomiting.   Endocrine: Negative for polydipsia and polyuria.   Genitourinary: Negative for dysuria, hematuria and urgency.   Musculoskeletal: Positive for arthralgias (knee pains, she would like to delay any surgery, sees Ortho, mild hip  pains). Negative for myalgias.   Skin: Negative for rash.   Neurological: Positive for headaches (neck and headache, tramadol ). Negative for dizziness, weakness, light-headedness and numbness.        History of migraine   Hematological: Negative for adenopathy.   Psychiatric/Behavioral: Negative for dysphoric mood and sleep disturbance.        Reports lots of stress related to caring for mother       Objective:      Physical Exam   Constitutional: She is oriented to person, place, and time. Vital signs are normal. She appears well-developed and well-nourished. No distress.   HENT:   Head: Normocephalic and atraumatic.   Right Ear: Hearing, tympanic membrane, external ear and ear canal normal. Tympanic membrane is not erythematous and not bulging.   Left Ear: Hearing, tympanic membrane, external ear and ear canal normal. Tympanic membrane is not erythematous and not bulging.   Nose: Nose normal.   Mouth/Throat: Uvula is midline, oropharynx is clear and moist and mucous membranes are normal. No oropharyngeal exudate or posterior oropharyngeal erythema.   Eyes: Conjunctivae, EOM and lids are normal. Pupils are equal, round, and reactive to light. No scleral icterus.   Neck: Normal range of motion. Neck supple. No thyroid mass and no thyromegaly present.   Cardiovascular: Normal rate, regular rhythm, normal heart sounds and intact distal pulses.    No murmur heard.  Pulmonary/Chest: Effort normal and breath sounds normal. She has no wheezes.   Abdominal: Soft. Bowel sounds are normal. She exhibits no distension. There is no tenderness. There is no rigidity, no rebound and no guarding.   Musculoskeletal: Normal range of motion. She exhibits no edema.   Lymphadenopathy:     She has no cervical adenopathy.        Right: No supraclavicular adenopathy present.        Left: No supraclavicular adenopathy present.   Neurological: She is alert and oriented to person, place, and time. She has normal reflexes. Coordination and  gait normal.   Skin: Skin is warm, dry and intact. No rash noted. She is not diaphoretic.   Psychiatric: She has a normal mood and affect.   Vitals reviewed.      Assessment:       1. Annual physical exam    2. Type 2 diabetes mellitus with diabetic polyneuropathy, with long-term current use of insulin    3. Dyslipidemia    4. Essential hypertension    5. Fatigue, unspecified type    6. Obesity (BMI 30-39.9)    7. MARINA on CPAP    8. Primary osteoarthritis involving multiple joints        Plan:       1. Annual physical exam  - CBC auto differential; Future  - Comprehensive metabolic panel; Future  - Lipid panel; Future  - TSH; Future  - Urinalysis; Future    2. Type 2 diabetes mellitus with diabetic polyneuropathy, with long-term current use of insulin  - Hemoglobin A1c; Future  - Microalbumin/creatinine urine ratio; Future  - AM BG controlled, sees endocrinology, continue Levemir, Tradjenta and metformin    3. Dyslipidemia  - stable, check lipid panel  - atorvastatin (LIPITOR) 40 MG tablet; Take 1 tablet (40 mg total) by mouth once daily.  Dispense: 90 tablet; Refill: 3    4. Essential hypertension  - reports normal SBP on medications is 140-150's  - amLODIPine (NORVASC) 10 MG tablet; Take 1 tablet (10 mg total) by mouth once daily.  Dispense: 90 tablet; Refill: 3    5. Fatigue, unspecified type  - Vitamin D; Future  - Ferritin; Future  - Iron and TIBC; Future    6. Obesity (BMI 30-39.9)  - increase physical activity    7. MARINA on CPAP  - has not been compliant    8. Primary osteoarthritis involving multiple joints  - diclofenac (VOLTAREN) 50 MG EC tablet; Take 1 tablet (50 mg total) by mouth 2 (two) times daily as needed.  Dispense: 60 tablet; Refill: 1      RTC in 1 month or sooner if needed    Nicole Márquez MD

## 2018-03-15 NOTE — Clinical Note
Patient requested a refill of the nystatin and steroid cream in a jar that was mixed in the pharmacy at [facility]. Could you ask if she can provide a prescription number on the label and date it was filled? thx

## 2018-03-19 ENCOUNTER — LAB VISIT (OUTPATIENT)
Dept: LAB | Facility: HOSPITAL | Age: 63
End: 2018-03-19
Attending: INTERNAL MEDICINE
Payer: COMMERCIAL

## 2018-03-19 ENCOUNTER — TELEPHONE (OUTPATIENT)
Dept: ENDOCRINOLOGY | Facility: CLINIC | Age: 63
End: 2018-03-19

## 2018-03-19 ENCOUNTER — TELEPHONE (OUTPATIENT)
Dept: INTERNAL MEDICINE | Facility: CLINIC | Age: 63
End: 2018-03-19

## 2018-03-19 DIAGNOSIS — Z79.4 TYPE 2 DIABETES MELLITUS WITH DIABETIC POLYNEUROPATHY, WITH LONG-TERM CURRENT USE OF INSULIN: Chronic | ICD-10-CM

## 2018-03-19 DIAGNOSIS — Z00.00 ANNUAL PHYSICAL EXAM: ICD-10-CM

## 2018-03-19 DIAGNOSIS — R53.83 FATIGUE, UNSPECIFIED TYPE: ICD-10-CM

## 2018-03-19 DIAGNOSIS — E11.42 TYPE 2 DIABETES MELLITUS WITH DIABETIC POLYNEUROPATHY, WITH LONG-TERM CURRENT USE OF INSULIN: Chronic | ICD-10-CM

## 2018-03-19 LAB
25(OH)D3+25(OH)D2 SERPL-MCNC: 6 NG/ML
ALBUMIN SERPL BCP-MCNC: 3.5 G/DL
ALP SERPL-CCNC: 69 U/L
ALT SERPL W/O P-5'-P-CCNC: 18 U/L
ANION GAP SERPL CALC-SCNC: 6 MMOL/L
ANION GAP SERPL CALC-SCNC: 6 MMOL/L
AST SERPL-CCNC: 21 U/L
BASOPHILS # BLD AUTO: 0.03 K/UL
BASOPHILS NFR BLD: 0.5 %
BILIRUB SERPL-MCNC: 0.8 MG/DL
BUN SERPL-MCNC: 8 MG/DL
BUN SERPL-MCNC: 8 MG/DL
CALCIUM SERPL-MCNC: 9.2 MG/DL
CALCIUM SERPL-MCNC: 9.2 MG/DL
CHLORIDE SERPL-SCNC: 105 MMOL/L
CHLORIDE SERPL-SCNC: 105 MMOL/L
CHOLEST SERPL-MCNC: 171 MG/DL
CHOLEST/HDLC SERPL: 2.2 {RATIO}
CO2 SERPL-SCNC: 28 MMOL/L
CO2 SERPL-SCNC: 28 MMOL/L
CREAT SERPL-MCNC: 0.8 MG/DL
CREAT SERPL-MCNC: 0.8 MG/DL
DIFFERENTIAL METHOD: ABNORMAL
EOSINOPHIL # BLD AUTO: 0.1 K/UL
EOSINOPHIL NFR BLD: 1.9 %
ERYTHROCYTE [DISTWIDTH] IN BLOOD BY AUTOMATED COUNT: 14.6 %
EST. GFR  (AFRICAN AMERICAN): >60 ML/MIN/1.73 M^2
EST. GFR  (AFRICAN AMERICAN): >60 ML/MIN/1.73 M^2
EST. GFR  (NON AFRICAN AMERICAN): >60 ML/MIN/1.73 M^2
EST. GFR  (NON AFRICAN AMERICAN): >60 ML/MIN/1.73 M^2
ESTIMATED AVG GLUCOSE: 174 MG/DL
FERRITIN SERPL-MCNC: 70 NG/ML
GLUCOSE SERPL-MCNC: 193 MG/DL
GLUCOSE SERPL-MCNC: 193 MG/DL
HBA1C MFR BLD HPLC: 7.7 %
HCT VFR BLD AUTO: 38.9 %
HDLC SERPL-MCNC: 79 MG/DL
HDLC SERPL: 46.2 %
HGB BLD-MCNC: 12.3 G/DL
IMM GRANULOCYTES # BLD AUTO: 0.02 K/UL
IMM GRANULOCYTES NFR BLD AUTO: 0.3 %
IRON SERPL-MCNC: 45 UG/DL
LDLC SERPL CALC-MCNC: 82.8 MG/DL
LYMPHOCYTES # BLD AUTO: 1.9 K/UL
LYMPHOCYTES NFR BLD: 33.1 %
MCH RBC QN AUTO: 25.9 PG
MCHC RBC AUTO-ENTMCNC: 31.6 G/DL
MCV RBC AUTO: 82 FL
MONOCYTES # BLD AUTO: 0.5 K/UL
MONOCYTES NFR BLD: 9 %
NEUTROPHILS # BLD AUTO: 3.2 K/UL
NEUTROPHILS NFR BLD: 55.2 %
NONHDLC SERPL-MCNC: 92 MG/DL
NRBC BLD-RTO: 0 /100 WBC
PLATELET # BLD AUTO: 226 K/UL
PMV BLD AUTO: 11.2 FL
POTASSIUM SERPL-SCNC: 4.1 MMOL/L
POTASSIUM SERPL-SCNC: 4.1 MMOL/L
PROT SERPL-MCNC: 6.9 G/DL
RBC # BLD AUTO: 4.74 M/UL
SATURATED IRON: 14 %
SODIUM SERPL-SCNC: 139 MMOL/L
SODIUM SERPL-SCNC: 139 MMOL/L
TOTAL IRON BINDING CAPACITY: 327 UG/DL
TRANSFERRIN SERPL-MCNC: 221 MG/DL
TRIGL SERPL-MCNC: 46 MG/DL
TSH SERPL DL<=0.005 MIU/L-ACNC: 0.4 UIU/ML
WBC # BLD AUTO: 5.8 K/UL

## 2018-03-19 PROCEDURE — 82728 ASSAY OF FERRITIN: CPT

## 2018-03-19 PROCEDURE — 82306 VITAMIN D 25 HYDROXY: CPT

## 2018-03-19 PROCEDURE — 80053 COMPREHEN METABOLIC PANEL: CPT

## 2018-03-19 PROCEDURE — 85025 COMPLETE CBC W/AUTO DIFF WBC: CPT

## 2018-03-19 PROCEDURE — 83036 HEMOGLOBIN GLYCOSYLATED A1C: CPT

## 2018-03-19 PROCEDURE — 80061 LIPID PANEL: CPT

## 2018-03-19 PROCEDURE — 83540 ASSAY OF IRON: CPT

## 2018-03-19 PROCEDURE — 84443 ASSAY THYROID STIM HORMONE: CPT

## 2018-03-19 PROCEDURE — 36415 COLL VENOUS BLD VENIPUNCTURE: CPT | Mod: PO

## 2018-03-19 NOTE — TELEPHONE ENCOUNTER
Please call pt to schedule chronic DM follow up. [facility] 3[facility]..    Thanks

## 2018-03-20 ENCOUNTER — PATIENT MESSAGE (OUTPATIENT)
Dept: INTERNAL MEDICINE | Facility: CLINIC | Age: 63
End: 2018-03-20

## 2018-03-20 PROBLEM — E55.9 VITAMIN D DEFICIENCY: Status: ACTIVE | Noted: 2018-03-20

## 2018-03-20 RX ORDER — ERGOCALCIFEROL 1.25 MG/1
50000 CAPSULE ORAL
Qty: 4 CAPSULE | Refills: 2 | Status: SHIPPED | OUTPATIENT
Start: 2018-03-20 | End: 2019-04-11

## 2018-03-20 NOTE — TELEPHONE ENCOUNTER
Called a pt and left a voice mail to give a call back for schedule for follow up appointment with Endo dept. Contact no was provided .

## 2018-03-21 RX ORDER — NYSTATIN AND TRIAMCINOLONE ACETONIDE 100000; 1 [USP'U]/G; MG/G
CREAM TOPICAL 3 TIMES DAILY
Qty: 1 TUBE | Refills: 2 | Status: SHIPPED | OUTPATIENT
Start: 2018-03-21 | End: 2019-04-02

## 2018-03-21 NOTE — TELEPHONE ENCOUNTER
See result note    Ordered weekly vitamin D3 50,000 IU    Please call [facility] pharmacy to find out components of the compounded cream (possibly nystatin and a steroid cream). Please provide verbal order for same cream with 1 refill.

## 2018-03-21 NOTE — TELEPHONE ENCOUNTER
Spoke with pharmacy, patient was given both nystatin and triamcinolone cream separately and mixed as a courtesy for patient. Dr Márquez notified . Med is on profile as mycolog II

## 2018-04-04 ENCOUNTER — PATIENT MESSAGE (OUTPATIENT)
Dept: UROLOGY | Facility: CLINIC | Age: 63
End: 2018-04-04

## 2018-04-04 DIAGNOSIS — N39.41 URGE INCONTINENCE: ICD-10-CM

## 2018-04-04 RX ORDER — OXYBUTYNIN CHLORIDE 10 MG/1
10 TABLET, EXTENDED RELEASE ORAL DAILY
Qty: 30 TABLET | Refills: 11 | Status: SHIPPED | OUTPATIENT
Start: 2018-04-04 | End: 2018-04-10

## 2018-04-10 DIAGNOSIS — R35.0 FREQUENCY OF URINATION: Primary | ICD-10-CM

## 2018-04-10 RX ORDER — OXYBUTYNIN CHLORIDE 5 MG/1
5 TABLET ORAL 3 TIMES DAILY
Qty: 90 TABLET | Refills: 11 | Status: SHIPPED | OUTPATIENT
Start: 2018-04-10 | End: 2019-05-13

## 2018-04-13 ENCOUNTER — PATIENT MESSAGE (OUTPATIENT)
Dept: OPTOMETRY | Facility: CLINIC | Age: 63
End: 2018-04-13

## 2018-04-13 ENCOUNTER — OFFICE VISIT (OUTPATIENT)
Dept: OPTOMETRY | Facility: CLINIC | Age: 63
End: 2018-04-13
Payer: COMMERCIAL

## 2018-04-13 DIAGNOSIS — H43.812 PVD (POSTERIOR VITREOUS DETACHMENT), LEFT EYE: Primary | ICD-10-CM

## 2018-04-13 DIAGNOSIS — I10 ESSENTIAL HYPERTENSION: Chronic | ICD-10-CM

## 2018-04-13 DIAGNOSIS — Z79.4 TYPE 2 DIABETES MELLITUS WITH HYPERGLYCEMIA, WITH LONG-TERM CURRENT USE OF INSULIN: Chronic | ICD-10-CM

## 2018-04-13 DIAGNOSIS — E11.65 TYPE 2 DIABETES MELLITUS WITH HYPERGLYCEMIA, WITH LONG-TERM CURRENT USE OF INSULIN: Chronic | ICD-10-CM

## 2018-04-13 PROCEDURE — 99999 PR PBB SHADOW E&M-EST. PATIENT-LVL III: CPT | Mod: PBBFAC,,, | Performed by: OPTOMETRIST

## 2018-04-13 PROCEDURE — 92014 COMPRE OPH EXAM EST PT 1/>: CPT | Mod: S$GLB,,, | Performed by: OPTOMETRIST

## 2018-04-17 NOTE — PROGRESS NOTES
"HPI     Spots and/or Floaters    Additional comments: Floaters/Jagged Light OS           Comments   OHTN / + fam h/o glc - mom/brother   S/p phaco w/IOL OS 3/17/16   S/p CE with IOL OD ([facility]) - 4/13/17     Patient states she has been seeing Floaters in OU(not sure which eye) for   about 1 week now. She states she saw a "jagged" flash of light in OS va   for about 20 minutes, last night. She also stated she had a slight   headache that was accompanied with the "jagged" light episode. Her va in   OU, overall seems to be stable.    No gtts       Last edited by Raymundo Argueta on 4/13/2018  3:00 PM. (History)            Assessment /Plan     For exam results, see Encounter Report.    PVD (posterior vitreous detachment), left eye   Symptomatic x 1 week   No retinal holes, tears, or detachments   Discussed RD precautions   RTC 1 month for DFE/ PCD follow up    Type 2 diabetes mellitus with hyperglycemia, with long-term current use of insulin  Essential hypertension   No retinopathy, monitor yearly               "

## 2018-04-20 ENCOUNTER — PATIENT MESSAGE (OUTPATIENT)
Dept: INTERNAL MEDICINE | Facility: CLINIC | Age: 63
End: 2018-04-20

## 2018-04-20 DIAGNOSIS — E11.65 TYPE 2 DIABETES MELLITUS WITH HYPERGLYCEMIA, WITH LONG-TERM CURRENT USE OF INSULIN: Chronic | ICD-10-CM

## 2018-04-20 DIAGNOSIS — Z79.4 TYPE 2 DIABETES MELLITUS WITH HYPERGLYCEMIA, WITH LONG-TERM CURRENT USE OF INSULIN: Chronic | ICD-10-CM

## 2018-04-23 ENCOUNTER — PATIENT MESSAGE (OUTPATIENT)
Dept: INTERNAL MEDICINE | Facility: CLINIC | Age: 63
End: 2018-04-23

## 2018-05-07 ENCOUNTER — OFFICE VISIT (OUTPATIENT)
Dept: OPTOMETRY | Facility: CLINIC | Age: 63
End: 2018-05-07
Payer: COMMERCIAL

## 2018-05-07 DIAGNOSIS — H52.4 PRESBYOPIA: ICD-10-CM

## 2018-05-07 DIAGNOSIS — E11.9 TYPE 2 DIABETES MELLITUS WITHOUT OPHTHALMIC MANIFESTATIONS: ICD-10-CM

## 2018-05-07 DIAGNOSIS — E11.65 TYPE 2 DIABETES MELLITUS WITH HYPERGLYCEMIA, WITH LONG-TERM CURRENT USE OF INSULIN: Primary | ICD-10-CM

## 2018-05-07 DIAGNOSIS — H43.812 PVD (POSTERIOR VITREOUS DETACHMENT), LEFT EYE: ICD-10-CM

## 2018-05-07 DIAGNOSIS — Z79.4 TYPE 2 DIABETES MELLITUS WITH HYPERGLYCEMIA, WITH LONG-TERM CURRENT USE OF INSULIN: Primary | ICD-10-CM

## 2018-05-07 PROCEDURE — 92015 DETERMINE REFRACTIVE STATE: CPT | Mod: S$GLB,,, | Performed by: OPTOMETRIST

## 2018-05-07 PROCEDURE — 92014 COMPRE OPH EXAM EST PT 1/>: CPT | Mod: S$GLB,,, | Performed by: OPTOMETRIST

## 2018-05-07 PROCEDURE — 99999 PR PBB SHADOW E&M-EST. PATIENT-LVL III: CPT | Mod: PBBFAC,,, | Performed by: OPTOMETRIST

## 2018-05-15 DIAGNOSIS — M17.0 PRIMARY OSTEOARTHRITIS OF BOTH KNEES: ICD-10-CM

## 2018-05-15 RX ORDER — TRAMADOL HYDROCHLORIDE 50 MG/1
TABLET ORAL
Qty: 60 TABLET | Refills: 0 | Status: SHIPPED | OUTPATIENT
Start: 2018-05-15 | End: 2019-01-22 | Stop reason: SDUPTHER

## 2018-05-17 DIAGNOSIS — Z79.4 TYPE 2 DIABETES MELLITUS WITH HYPERGLYCEMIA, WITH LONG-TERM CURRENT USE OF INSULIN: Chronic | ICD-10-CM

## 2018-05-17 DIAGNOSIS — E11.65 TYPE 2 DIABETES MELLITUS WITH HYPERGLYCEMIA, WITH LONG-TERM CURRENT USE OF INSULIN: Chronic | ICD-10-CM

## 2018-05-17 RX ORDER — METFORMIN HYDROCHLORIDE 500 MG/1
1000 TABLET, EXTENDED RELEASE ORAL 2 TIMES DAILY WITH MEALS
Qty: 60 TABLET | Refills: 2 | Status: SHIPPED | OUTPATIENT
Start: 2018-05-17 | End: 2018-08-29

## 2018-07-09 DIAGNOSIS — Z79.4 TYPE 2 DIABETES MELLITUS WITH HYPERGLYCEMIA, WITH LONG-TERM CURRENT USE OF INSULIN: Chronic | ICD-10-CM

## 2018-07-09 DIAGNOSIS — E11.65 TYPE 2 DIABETES MELLITUS WITH HYPERGLYCEMIA, WITH LONG-TERM CURRENT USE OF INSULIN: Chronic | ICD-10-CM

## 2018-07-13 DIAGNOSIS — E11.42 TYPE 2 DIABETES MELLITUS WITH DIABETIC POLYNEUROPATHY, WITH LONG-TERM CURRENT USE OF INSULIN: Chronic | ICD-10-CM

## 2018-07-13 DIAGNOSIS — Z79.4 TYPE 2 DIABETES MELLITUS WITH DIABETIC POLYNEUROPATHY, WITH LONG-TERM CURRENT USE OF INSULIN: Chronic | ICD-10-CM

## 2018-08-01 ENCOUNTER — OFFICE VISIT (OUTPATIENT)
Dept: INTERNAL MEDICINE | Facility: CLINIC | Age: 63
End: 2018-08-01
Payer: COMMERCIAL

## 2018-08-01 ENCOUNTER — HOSPITAL ENCOUNTER (OUTPATIENT)
Dept: RADIOLOGY | Facility: HOSPITAL | Age: 63
Discharge: HOME | End: 2018-08-01
Attending: INTERNAL MEDICINE
Payer: COMMERCIAL

## 2018-08-01 VITALS
HEIGHT: 65 IN | SYSTOLIC BLOOD PRESSURE: 137 MMHG | BODY MASS INDEX: 33.35 KG/M2 | WEIGHT: 200.19 LBS | HEART RATE: 70 BPM | TEMPERATURE: 98 F | RESPIRATION RATE: 16 BRPM | DIASTOLIC BLOOD PRESSURE: 69 MMHG

## 2018-08-01 DIAGNOSIS — Z98.890 HISTORY OF CARPAL TUNNEL SURGERY OF RIGHT WRIST: ICD-10-CM

## 2018-08-01 DIAGNOSIS — M79.644 PAIN OF RIGHT THUMB: ICD-10-CM

## 2018-08-01 DIAGNOSIS — M25.531 RIGHT WRIST PAIN: ICD-10-CM

## 2018-08-01 DIAGNOSIS — R30.0 DYSURIA: ICD-10-CM

## 2018-08-01 DIAGNOSIS — E11.42 TYPE 2 DIABETES MELLITUS WITH DIABETIC POLYNEUROPATHY, WITH LONG-TERM CURRENT USE OF INSULIN: Primary | Chronic | ICD-10-CM

## 2018-08-01 DIAGNOSIS — E55.9 VITAMIN D INSUFFICIENCY: ICD-10-CM

## 2018-08-01 DIAGNOSIS — E78.5 DYSLIPIDEMIA: ICD-10-CM

## 2018-08-01 DIAGNOSIS — Z79.4 TYPE 2 DIABETES MELLITUS WITH DIABETIC POLYNEUROPATHY, WITH LONG-TERM CURRENT USE OF INSULIN: Primary | Chronic | ICD-10-CM

## 2018-08-01 DIAGNOSIS — I10 ESSENTIAL HYPERTENSION: Chronic | ICD-10-CM

## 2018-08-01 PROBLEM — M18.11 PRIMARY OSTEOARTHRITIS OF FIRST CARPOMETACARPAL JOINT OF RIGHT HAND: Status: ACTIVE | Noted: 2018-08-01

## 2018-08-01 PROCEDURE — 90471 IMMUNIZATION ADMIN: CPT | Mod: S$GLB,,, | Performed by: INTERNAL MEDICINE

## 2018-08-01 PROCEDURE — 99999 PR PBB SHADOW E&M-EST. PATIENT-LVL IV: CPT | Mod: PBBFAC,,, | Performed by: INTERNAL MEDICINE

## 2018-08-01 PROCEDURE — 3075F SYST BP GE 130 - 139MM HG: CPT | Mod: CPTII,S$GLB,, | Performed by: INTERNAL MEDICINE

## 2018-08-01 PROCEDURE — 99214 OFFICE O/P EST MOD 30 MIN: CPT | Mod: 25,S$GLB,, | Performed by: INTERNAL MEDICINE

## 2018-08-01 PROCEDURE — 73130 X-RAY EXAM OF HAND: CPT | Mod: 26,RT,, | Performed by: RADIOLOGY

## 2018-08-01 PROCEDURE — 90732 PPSV23 VACC 2 YRS+ SUBQ/IM: CPT | Mod: S$GLB,,, | Performed by: INTERNAL MEDICINE

## 2018-08-01 PROCEDURE — 3045F PR MOST RECENT HEMOGLOBIN A1C LEVEL 7.0-9.0%: CPT | Mod: CPTII,S$GLB,, | Performed by: INTERNAL MEDICINE

## 2018-08-01 PROCEDURE — 73110 X-RAY EXAM OF WRIST: CPT | Mod: 26,RT,, | Performed by: RADIOLOGY

## 2018-08-01 PROCEDURE — 73130 X-RAY EXAM OF HAND: CPT | Mod: TC,PO,RT

## 2018-08-01 PROCEDURE — 3078F DIAST BP <80 MM HG: CPT | Mod: CPTII,S$GLB,, | Performed by: INTERNAL MEDICINE

## 2018-08-01 PROCEDURE — 3008F BODY MASS INDEX DOCD: CPT | Mod: CPTII,S$GLB,, | Performed by: INTERNAL MEDICINE

## 2018-08-01 PROCEDURE — 73110 X-RAY EXAM OF WRIST: CPT | Mod: TC,PO,RT

## 2018-08-01 RX ORDER — AMLODIPINE BESYLATE 10 MG/1
10 TABLET ORAL DAILY
Qty: 90 TABLET | Refills: 3 | Status: SHIPPED | OUTPATIENT
Start: 2018-08-01 | End: 2018-08-01 | Stop reason: SDUPTHER

## 2018-08-01 RX ORDER — ATORVASTATIN CALCIUM 40 MG/1
40 TABLET, FILM COATED ORAL DAILY
Qty: 90 TABLET | Refills: 3 | Status: SHIPPED | OUTPATIENT
Start: 2018-08-01 | End: 2018-10-19 | Stop reason: SDUPTHER

## 2018-08-01 RX ORDER — AMLODIPINE BESYLATE 10 MG/1
10 TABLET ORAL DAILY
Qty: 90 TABLET | Refills: 3 | Status: SHIPPED | OUTPATIENT
Start: 2018-08-01 | End: 2018-10-19 | Stop reason: SDUPTHER

## 2018-08-01 RX ORDER — LOSARTAN POTASSIUM 100 MG/1
100 TABLET ORAL DAILY
Qty: 90 TABLET | Refills: 3 | Status: SHIPPED | OUTPATIENT
Start: 2018-08-01 | End: 2018-10-19 | Stop reason: SDUPTHER

## 2018-08-01 NOTE — PROGRESS NOTES
Subjective:       Patient ID: Adriana Paula is a 63 y.o. female who presents for Follow-up; Diabetes; Hypertension; and Wrist Pain      Diabetes   She has type 2 diabetes mellitus. No MedicAlert identification noted. The initial diagnosis of diabetes was made 25 years ago. Hypoglycemia symptoms include dizziness, nervousness/anxiousness (reports she is under lots of stress) and sleepiness. Pertinent negatives for hypoglycemia include no confusion, headaches, hunger, mood changes, seizures, speech difficulty, sweats or tremors. Associated symptoms include blurred vision, fatigue, polydipsia, polyuria, visual change, weakness and weight loss. Pertinent negatives for diabetes include no chest pain, no foot paresthesias, no foot ulcerations and no polyphagia. Pertinent negatives for hypoglycemia complications include no blackouts, no hospitalization, no required assistance and no required glucagon injection. Symptoms are worsening. Diabetic complications include peripheral neuropathy and retinopathy. Pertinent negatives for diabetic complications include no autonomic neuropathy, CVA, heart disease or nephropathy. Risk factors for coronary artery disease include family history, hypertension, obesity, stress and diabetes mellitus. Current diabetic treatment includes diet, insulin injections and oral agent (monotherapy). She is compliant with treatment most of the time. She is currently taking insulin pre-breakfast. Insulin injections are given by patient. Rotation sites for injection include the thighs. Her weight is decreasing steadily. She is following a generally healthy diet. Meal planning includes avoidance of concentrated sweets. She has not had a previous visit with a dietitian. She participates in exercise intermittently. She monitors blood glucose at home 1-2 x per day. She monitors urine at home <1 x per month. Blood glucose monitoring compliance is adequate. Her home blood glucose trend is fluctuating  minimally. Her breakfast blood glucose range is generally 140-180 mg/dl. An ACE inhibitor/angiotensin II receptor blocker is being taken. She sees a podiatrist.Eye exam is current.   Hypertension   This is a chronic problem. The current episode started more than 1 year ago. The problem has been gradually improving since onset. The problem is controlled. Associated symptoms include blurred vision and malaise/fatigue. Pertinent negatives include no chest pain, headaches, palpitations, peripheral edema, shortness of breath or sweats. There are no associated agents to hypertension. Risk factors for coronary artery disease include diabetes mellitus, dyslipidemia, family history and sedentary lifestyle. Past treatments include calcium channel blockers and angiotensin blockers. Compliance problems include diet.  Hypertensive end-organ damage includes retinopathy. There is no history of kidney disease or CVA. There is no history of chronic renal disease or a thyroid problem.   Wrist Pain    The pain is present in the right wrist. This is a recurrent problem. The current episode started more than 1 month ago. There has been no history of extremity trauma. The problem occurs intermittently. The problem has been waxing and waning. The quality of the pain is described as aching. The pain is moderate. Associated symptoms include joint swelling and stiffness. Pertinent negatives include no fever, numbness or tingling. The treatment provided no relief. Her past medical history is significant for diabetes. There is no history of rheumatoid arthritis.      's but was 171 this AM. Has been in 80's at times and she reports weakness and shakiness but no recent episodes. Does not follow strict diet and ate donuts recently but avoids sodas.      Answers for HPI/ROS submitted by the patient on 7/30/2018   Diabetes problem  Diabetes type: type 2  MedicAlert ID: No  Disease duration: 25 years  blurred vision: Yes  chest pain:  Yes  fatigue: Yes  foot paresthesias: No  foot ulcerations: No  polydipsia: Yes  polyphagia: No  polyuria: Yes  visual change: Yes  weakness: Yes  weight loss: Yes  Symptom course: worsening  confusion: No  dizziness: Yes  headaches: Yes  hunger: No  mood changes: No  nervous/anxious: Yes  pallor: No  seizures: No  sleepiness: Yes  speech difficulty: No  sweats: No  tremors: No  blackouts: No  hospitalization: No  required assistance: No  required glucagon: No  CVA: No  heart disease: No  nephropathy: No  peripheral neuropathy: Yes  retinopathy: Yes  autonomic neuropathy: No  CAD risks: family history, hypertension, obesity, stress, diabetes mellitus  Current treatments: diet, insulin injections, oral agent (monotherapy)  Treatment compliance: most of the time  Dose schedule: pre-breakfast  Given by: patient  Injection sites: thighs  Home blood tests: 1-2 x per day  Home urines: <1 x per month  Monitoring compliance: adequate  Blood glucose trend: fluctuating minimally  Weight trend: decreasing steadily  Current diet: generally healthy  Meal planning: avoidance of concentrated sweets  Exercise: intermittently  Dietitian visit: No  Eye exam current: Yes  Sees podiatrist: Yes      Review of Systems   Constitutional: Positive for fatigue, malaise/fatigue and weight loss. Negative for chills and fever.   HENT: Negative for congestion and rhinorrhea.    Eyes: Positive for blurred vision. Negative for photophobia and redness.   Respiratory: Negative for cough, chest tightness and shortness of breath.    Cardiovascular: Negative for chest pain, palpitations and leg swelling.   Gastrointestinal: Negative for abdominal pain, constipation, diarrhea and vomiting.   Endocrine: Positive for polydipsia and polyuria. Negative for polyphagia.   Genitourinary: Negative for dysuria, hematuria and urgency.        Urine is dark at times   Musculoskeletal: Positive for arthralgias and stiffness. Negative for myalgias.   Skin: Negative  for rash.   Neurological: Positive for dizziness and weakness. Negative for tingling, tremors, seizures, speech difficulty, numbness and headaches.   Psychiatric/Behavioral: Negative for confusion. The patient is nervous/anxious (reports she is under lots of stress).        Objective:      Physical Exam   Constitutional: She is oriented to person, place, and time. Vital signs are normal. She appears well-developed and well-nourished. No distress.   HENT:   Head: Normocephalic and atraumatic.   Right Ear: Hearing and external ear normal.   Left Ear: Hearing and external ear normal.   Nose: Nose normal.   Mouth/Throat: Uvula is midline and mucous membranes are normal.   Eyes: Lids are normal.   Neck: Full passive range of motion without pain.   Cardiovascular: Normal rate, regular rhythm, normal heart sounds and intact distal pulses.    No murmur heard.  Pulmonary/Chest: Effort normal and breath sounds normal. She has no wheezes.   Abdominal: Soft. Bowel sounds are normal. She exhibits no distension. There is no tenderness.   Musculoskeletal: Normal range of motion. She exhibits no edema.        Right wrist: She exhibits swelling. She exhibits normal range of motion and no bony tenderness.   Neurological: She is alert and oriented to person, place, and time.   Skin: Skin is warm and dry. No rash noted. She is not diaphoretic.   Nodular lesions felt under skin on thighs at injection sites   Psychiatric: She has a normal mood and affect.   Vitals reviewed.      Assessment/Plan:         1. Type 2 diabetes mellitus with diabetic polyneuropathy, with long-term current use of insulin  - increase Levemir 45 units daily, will increase based on BG readings  - misses 2nd metformin dose at times  - Basic metabolic panel; Future  - Hemoglobin A1c; Future    2. Essential hypertension  - stable, controlled  - losartan (COZAAR) 100 MG tablet; Take 1 tablet (100 mg total) by mouth once daily.  Dispense: 90 tablet; Refill: 3  -  amLODIPine (NORVASC) 10 MG tablet; Take 1 tablet (10 mg total) by mouth once daily.  Dispense: 90 tablet; Refill: 3    3. Dyslipidemia  - stable, continue :ipitor  - atorvastatin (LIPITOR) 40 MG tablet; Take 1 tablet (40 mg total) by mouth once daily.  Dispense: 90 tablet; Refill: 3    4. Dysuria  - Urinalysis; Future  - Urine culture; Future    5. Pain of right thumb  - may use tylenol as needed for pain    6. Right wrist pain  - X-Ray Hand Complete Right; Future  - Ambulatory Referral to Orthopedics    7. History of carpal tunnel surgery of right wrist  - Ambulatory Referral to Orthopedics    8. Vitamin D insufficiency  - Vitamin D; Future    RTC in 3 months or sooner if needed    Nicole Márquez MD

## 2018-08-08 ENCOUNTER — PATIENT MESSAGE (OUTPATIENT)
Dept: OPTOMETRY | Facility: CLINIC | Age: 63
End: 2018-08-08

## 2018-08-13 ENCOUNTER — OFFICE VISIT (OUTPATIENT)
Dept: ORTHOPEDICS | Facility: CLINIC | Age: 63
End: 2018-08-13
Payer: COMMERCIAL

## 2018-08-13 VITALS
HEART RATE: 73 BPM | SYSTOLIC BLOOD PRESSURE: 129 MMHG | DIASTOLIC BLOOD PRESSURE: 70 MMHG | WEIGHT: 200 LBS | HEIGHT: 65 IN | BODY MASS INDEX: 33.32 KG/M2

## 2018-08-13 DIAGNOSIS — M79.641 RIGHT HAND PAIN: Primary | ICD-10-CM

## 2018-08-13 DIAGNOSIS — M18.11 ARTHRITIS OF CARPOMETACARPAL (CMC) JOINT OF RIGHT THUMB: ICD-10-CM

## 2018-08-13 PROCEDURE — 20526 THER INJECTION CARP TUNNEL: CPT | Mod: 59,RT,S$GLB, | Performed by: PHYSICIAN ASSISTANT

## 2018-08-13 PROCEDURE — 99999 PR PBB SHADOW E&M-EST. PATIENT-LVL IV: CPT | Mod: PBBFAC,,, | Performed by: PHYSICIAN ASSISTANT

## 2018-08-13 PROCEDURE — 20600 DRAIN/INJ JOINT/BURSA W/O US: CPT | Mod: RT,S$GLB,, | Performed by: PHYSICIAN ASSISTANT

## 2018-08-13 PROCEDURE — 3078F DIAST BP <80 MM HG: CPT | Mod: CPTII,S$GLB,, | Performed by: PHYSICIAN ASSISTANT

## 2018-08-13 PROCEDURE — 3074F SYST BP LT 130 MM HG: CPT | Mod: CPTII,S$GLB,, | Performed by: PHYSICIAN ASSISTANT

## 2018-08-13 PROCEDURE — 3008F BODY MASS INDEX DOCD: CPT | Mod: CPTII,S$GLB,, | Performed by: PHYSICIAN ASSISTANT

## 2018-08-13 PROCEDURE — 99204 OFFICE O/P NEW MOD 45 MIN: CPT | Mod: 25,S$GLB,, | Performed by: PHYSICIAN ASSISTANT

## 2018-08-13 RX ORDER — DEXAMETHASONE SODIUM PHOSPHATE 4 MG/ML
8 INJECTION, SOLUTION INTRA-ARTICULAR; INTRALESIONAL; INTRAMUSCULAR; INTRAVENOUS; SOFT TISSUE
Status: COMPLETED | OUTPATIENT
Start: 2018-08-13 | End: 2018-08-13

## 2018-08-13 RX ORDER — LIDOCAINE HYDROCHLORIDE 10 MG/ML
1.5 INJECTION, SOLUTION EPIDURAL; INFILTRATION; INTRACAUDAL; PERINEURAL
Status: COMPLETED | OUTPATIENT
Start: 2018-08-13 | End: 2018-08-13

## 2018-08-13 RX ADMIN — DEXAMETHASONE SODIUM PHOSPHATE 8 MG: 4 INJECTION, SOLUTION INTRA-ARTICULAR; INTRALESIONAL; INTRAMUSCULAR; INTRAVENOUS; SOFT TISSUE at 04:08

## 2018-08-13 RX ADMIN — LIDOCAINE HYDROCHLORIDE 15 MG: 10 INJECTION, SOLUTION EPIDURAL; INFILTRATION; INTRACAUDAL; PERINEURAL at 04:08

## 2018-08-13 NOTE — LETTER
August 13, 2018      Nicole Márquez MD  [address] [facility] [address]           [facility]  [address], [address]  [facility] [address]  Phone: [phone]          Patient: Adriana Paula   MR Number: [id number]   YOB: 1955   Date of Visit: 8/13/2018       Dear Dr. Nicole Márquez:    Thank you for referring Adriana Paula to me for evaluation. Attached you will find relevant portions of my assessment and plan of care.    If you have questions, please do not hesitate to call me. I look forward to following Adriana Paula along with you.    Sincerely,    Olga Washington PA-C    Enclosure  CC:  No Recipients    If you would like to receive this communication electronically, please contact [email or web address] or [phone] to request more information on [employer] access.    For providers and/or their staff who would like to refer a patient to [facility], please contact us through our one-stop-shop provider referral line, [facility], at [phone].    If you feel you have received this communication in error or would no longer like to receive these types of communications, please e-mail [email or web address]

## 2018-08-13 NOTE — PROGRESS NOTES
Subjective:      Patient ID: Adriana Paula is a 63 y.o. female.    Chief Complaint: Pain of the Right Wrist      HPI  Adriana Paula is a right hand dominant 63 y.o. female presenting today for right hand and thumb pain. She notes hx of right carpal tunnel release in 1995. This resolved prior numbness/tingling however states she feels she has had some on-going pain since surgery. Pain has increased in the past 2-3 mos. She notes pain greatest around the area of the thumb CMC. Pain is increased with use of the hand, such as opening jars. Pt also has occasional shooting pain, this does sometimes awaken her at night. She has tried aspercream over the thumb with minimal relief. She does note rare numbness in the ulnar distribution.        Review of patient's allergies indicates:   Allergen Reactions    Keflex [cephalexin] Rash      blisters, fever    Penicillins Itching    Sulfa (sulfonamide antibiotics) Rash      blisters,fever             Current Outpatient Medications   Medication Sig Dispense Refill    amLODIPine (NORVASC) 10 MG tablet Take 1 tablet (10 mg total) by mouth once daily. 90 tablet 3    atorvastatin (LIPITOR) 40 MG tablet Take 1 tablet (40 mg total) by mouth once daily. 90 tablet 3    blood-glucose meter (ONETOUCH VERIO IQ METER) kit Use as instructed 200 each 11    diclofenac (VOLTAREN) 50 MG EC tablet Take 1 tablet (50 mg total) by mouth 2 (two) times daily as needed. 60 tablet 1    ergocalciferol (ERGOCALCIFEROL) 50,000 unit Cap Take 1 capsule (50,000 Units total) by mouth every 7 days. 4 capsule 2    insulin detemir U-100 (LEVEMIR FLEXTOUCH U-100 INSULN) 100 unit/mL (3 mL) SubQ InPn pen Inject 45 Units into the skin once daily. 15 mL 1    lancets 33 gauge Misc 1 lancet by Misc.(Non-Drug; Combo Route) route 2 (two) times daily before meals. 200 each 11    linagliptin (TRADJENTA) 5 mg Tab tablet Take 1 tablet (5 mg total) by mouth once daily. 30 tablet 2    losartan (COZAAR) 100 MG  "tablet Take 1 tablet (100 mg total) by mouth once daily. 90 tablet 3    metFORMIN (GLUCOPHAGE-XR) 500 MG 24 hr tablet Take 2 tablets (1,000 mg total) by mouth 2 (two) times daily with meals. 60 tablet 2    ONETOUCH VERIO Strp USE FOUR TIMES DAILY AS DIRECTED 200 strip 0    oxybutynin (DITROPAN) 5 MG Tab Take 1 tablet (5 mg total) by mouth 3 (three) times daily. 90 tablet 11    pen needle, diabetic (BD ULTRA-FINE SHORT PEN NEEDLE) 31 gauge x 5/16" Ndle Use as directed (two) times daily. 100 each 11    terconazole (TERAZOL 7) 0.4 % Crea Place 1 applicator vaginally once daily. 45 g 1    traMADol (ULTRAM) 50 mg tablet TAKE 1 TABLET(50 MG) BY MOUTH EVERY 6 HOURS AS NEEDED 60 tablet 0    aspirin (ECOTRIN) 81 MG EC tablet Take 81 mg by mouth once daily.      nystatin-triamcinolone (MYCOLOG II) cream Apply topically 3 (three) times daily. 1 Tube 2     Current Facility-Administered Medications   Medication Dose Route Frequency Provider Last Rate Last Dose    [COMPLETED] dexamethasone injection 8 mg  8 mg Other 1 time in Clinic/HOD Olga Washington PA-C   8 mg at 08/13/18 1600    [COMPLETED] lidocaine (PF) 10 mg/ml (1%) injection 15 mg  1.5 mL Other 1 time in Clinic/HOD Olga Washington PA-C   15 mg at 08/13/18 1600       Past Medical History:   Diagnosis Date    Allergy     Anemia     Anxiety     Arthritis     Breast cyst     Cataract     DR (diabetic retinopathy)     Dyslipidemia     Essential hypertension 8/1/2012    Gastroesophageal reflux disease without esophagitis 2/3/2017    GERD (gastroesophageal reflux disease)     Glaucoma     Hypertension     Obesity (BMI 30-39.9) 10/24/2013    PN (peripheral neuropathy)     Sleep apnea     Type 2 diabetes mellitus with both eyes affected by mild nonproliferative retinopathy without macular edema, with long-term current use of insulin     Type 2 diabetes mellitus with diabetic polyneuropathy, with long-term current use of insulin     Type II or unspecified " "type diabetes mellitus with other specified manifestations, uncontrolled        Past Surgical History:   Procedure Laterality Date    BREAST CYST EXCISION Right     1980s    CARPAL TUNNEL RELEASE Right     CATARACT EXTRACTION      CATARACT EXTRACTION W/  INTRAOCULAR LENS IMPLANT Right 2017    Dr Brewster     SECTION      KNEE ARTHROSCOPY  14    right    MYOMECTOMY         Review of Systems:  Constitutional: Negative for chills and fever.   Respiratory: Negative for cough and shortness of breath.    Gastrointestinal: Negative for nausea and vomiting.   Skin: Negative for rash.   Neurological: Negative for dizziness and headaches.   Psychiatric/Behavioral: Negative for depression.   MSK as in HPI       OBJECTIVE:     PHYSICAL EXAM:  /70   Pulse 73   Ht 5' 5" (1.651 m)   Wt 90.7 kg (200 lb)   BMI 33.28 kg/m²     GEN:  NAD, well-developed, well-groomed.  NEURO: Awake, alert, and oriented. Normal attention and concentration.    PSYCH: Normal mood and affect. Behavior is normal.  HEENT: No cervical lymphadenopathy noted.  CARDIOVASCULAR: Radial pulses 2+ bilaterally. No LE edema noted.  PULMONARY: Breath sounds normal. No respiratory distress.  SKIN: Intact, no rashes.      MSK:   RUE:  Good active ROM of the wrist and fingers. ttp over the thumb CMC. Positive tinels and durkans at the wrist. AIN/PIN/Radial/Median/Ulnar Nerves assessed in isolation without deficit. Radial & Ulnar arteries palpated 2+. Capillary Refill <3s.        RADIOGRAPHS:  Xray right wrist 18  FINDINGS:  Mild degenerative changes are seen at the 1st carpal metacarpal joint space.  Bony structures of the wrist are otherwise intact.    Comments: I have personally reviewed the imaging and I agree with the above radiologist's report.    ASSESSMENT/PLAN:       ICD-10-CM ICD-9-CM   1. Right hand pain M79.641 729.5   2. Arthritis of carpometacarpal (CMC) joint of right thumb M18.11 716.94       Orders Placed This " Encounter    EMG W/ ULTRASOUND AND NERVE CONDUCTION TEST 2 Extremities    lidocaine (PF) 10 mg/ml (1%) injection 15 mg    dexamethasone injection 8 mg      Plan:   -thumb CMC- injection today, compound cream ordered, recommend bracing and paraffin   -carpal tunnel- injection today, EMG ordered, brace given for nightly use  -RTC for results    PROCEDURE:  I have explained the risks, benefits, and alternatives of the procedure in detail.  The patient voices understanding and all questions have been answered.  The patient agrees to proceed as planned. Fluoroscopy used for needle placement. So after a sterile prep of the skin in the normal fashion the right thumb CMC is injected using a 25 gauge needle with a combination of 0.5cc 1% plain lidocaine and 4 mg of dexamethasone.  The patient is cautioned and immediate relief of pain is secondary to the local anesthetic and will be temporary.  After the anesthetic wears off there may be a increase in pain that may last for a few hours or a few days and they should use ice to help alleviate this flair up of pain. Patient tolerated the procedure well.       PROCEDURE:  I have explained the risks, benefits, and alternatives of the procedure in detail.  The patient voices understanding and all questions have been answered.  The patient agrees to proceed as planned. So after a sterile prep of the skin in the normal fashion the right carpal tunnel is injected from the volar approach using a 25 gauge needle with a combination of 1cc 1% plain lidocaine and 4 mg of dexamethasone.  The patient is cautioned and immediate relief of pain is secondary to the local anesthetic and will be temporary.  After the anesthetic wears off there may be a increase in pain that may last for a few hours or a few days and they should use ice to help alleviate this flair up of pain. Patient tolerated the procedure well.       The patient indicates understanding of these issues and agrees to the  plan.    Olga Washington PA-C  [facility]   [facility] [place], [place]

## 2018-08-29 ENCOUNTER — TELEPHONE (OUTPATIENT)
Dept: INTERNAL MEDICINE | Facility: CLINIC | Age: 63
End: 2018-08-29

## 2018-08-29 DIAGNOSIS — Z79.4 TYPE 2 DIABETES MELLITUS WITH HYPERGLYCEMIA, WITH LONG-TERM CURRENT USE OF INSULIN: Chronic | ICD-10-CM

## 2018-08-29 DIAGNOSIS — E11.65 TYPE 2 DIABETES MELLITUS WITH HYPERGLYCEMIA, WITH LONG-TERM CURRENT USE OF INSULIN: Chronic | ICD-10-CM

## 2018-08-29 RX ORDER — METFORMIN HYDROCHLORIDE 500 MG/1
1000 TABLET, EXTENDED RELEASE ORAL 2 TIMES DAILY WITH MEALS
Qty: 60 TABLET | Refills: 2 | Status: SHIPPED | OUTPATIENT
Start: 2018-08-29 | End: 2018-11-21 | Stop reason: SDUPTHER

## 2018-08-29 NOTE — TELEPHONE ENCOUNTER
Spoke to patient.     Still with some hypoglycemic episodes. Will change Levemir to 22 units bid with breakfast and with dinner.    Check BG readings and call or email in a few days.

## 2018-09-19 ENCOUNTER — PATIENT MESSAGE (OUTPATIENT)
Dept: UROLOGY | Facility: CLINIC | Age: 63
End: 2018-09-19

## 2018-10-04 ENCOUNTER — PATIENT MESSAGE (OUTPATIENT)
Dept: INTERNAL MEDICINE | Facility: CLINIC | Age: 63
End: 2018-10-04

## 2018-10-05 DIAGNOSIS — Z79.4 TYPE 2 DIABETES MELLITUS WITH HYPERGLYCEMIA, WITH LONG-TERM CURRENT USE OF INSULIN: Chronic | ICD-10-CM

## 2018-10-05 DIAGNOSIS — E11.65 TYPE 2 DIABETES MELLITUS WITH HYPERGLYCEMIA, WITH LONG-TERM CURRENT USE OF INSULIN: Chronic | ICD-10-CM

## 2018-10-06 DIAGNOSIS — Z79.4 TYPE 2 DIABETES MELLITUS WITH HYPERGLYCEMIA, WITH LONG-TERM CURRENT USE OF INSULIN: Chronic | ICD-10-CM

## 2018-10-06 DIAGNOSIS — E11.65 TYPE 2 DIABETES MELLITUS WITH HYPERGLYCEMIA, WITH LONG-TERM CURRENT USE OF INSULIN: Chronic | ICD-10-CM

## 2018-10-08 DIAGNOSIS — Z79.4 TYPE 2 DIABETES MELLITUS WITH HYPERGLYCEMIA, WITH LONG-TERM CURRENT USE OF INSULIN: Chronic | ICD-10-CM

## 2018-10-08 DIAGNOSIS — E11.65 TYPE 2 DIABETES MELLITUS WITH HYPERGLYCEMIA, WITH LONG-TERM CURRENT USE OF INSULIN: Chronic | ICD-10-CM

## 2018-10-08 RX ORDER — INSULIN DETEMIR 100 [IU]/ML
INJECTION, SOLUTION SUBCUTANEOUS
Qty: 15 ML | Refills: 0 | Status: SHIPPED | OUTPATIENT
Start: 2018-10-08 | End: 2018-10-19

## 2018-10-08 RX ORDER — INSULIN DEGLUDEC 100 U/ML
30 INJECTION, SOLUTION SUBCUTANEOUS DAILY
Qty: 9 ML | Refills: 0 | Status: SHIPPED | OUTPATIENT
Start: 2018-10-08 | End: 2018-10-19

## 2018-10-08 NOTE — TELEPHONE ENCOUNTER
Sent Tresiba 30 units daily to pharmacy to determine cost.    Emailed patient.    She will notify of cost for Tresiba. If affordable, will change Levemir to Tresiba.

## 2018-10-17 ENCOUNTER — PATIENT MESSAGE (OUTPATIENT)
Dept: INTERNAL MEDICINE | Facility: CLINIC | Age: 63
End: 2018-10-17

## 2018-10-18 ENCOUNTER — PATIENT MESSAGE (OUTPATIENT)
Dept: INTERNAL MEDICINE | Facility: CLINIC | Age: 63
End: 2018-10-18

## 2018-10-18 ENCOUNTER — PROCEDURE VISIT (OUTPATIENT)
Dept: NEUROLOGY | Facility: CLINIC | Age: 63
End: 2018-10-18
Payer: COMMERCIAL

## 2018-10-18 DIAGNOSIS — M79.641 RIGHT HAND PAIN: ICD-10-CM

## 2018-10-18 PROCEDURE — 95886 MUSC TEST DONE W/N TEST COMP: CPT | Mod: S$GLB,,, | Performed by: PSYCHIATRY & NEUROLOGY

## 2018-10-18 PROCEDURE — 95913 NRV CNDJ TEST 13/> STUDIES: CPT | Mod: S$GLB,,, | Performed by: PSYCHIATRY & NEUROLOGY

## 2018-10-19 ENCOUNTER — HOSPITAL ENCOUNTER (OUTPATIENT)
Dept: RADIOLOGY | Facility: HOSPITAL | Age: 63
Discharge: HOME | End: 2018-10-19
Attending: INTERNAL MEDICINE
Payer: COMMERCIAL

## 2018-10-19 ENCOUNTER — OFFICE VISIT (OUTPATIENT)
Dept: INTERNAL MEDICINE | Facility: CLINIC | Age: 63
End: 2018-10-19
Payer: COMMERCIAL

## 2018-10-19 DIAGNOSIS — E11.65 TYPE 2 DIABETES MELLITUS WITH HYPERGLYCEMIA, WITH LONG-TERM CURRENT USE OF INSULIN: Chronic | ICD-10-CM

## 2018-10-19 DIAGNOSIS — M25.511 ACUTE PAIN OF RIGHT SHOULDER: Primary | ICD-10-CM

## 2018-10-19 DIAGNOSIS — E78.5 DYSLIPIDEMIA: ICD-10-CM

## 2018-10-19 DIAGNOSIS — I10 ESSENTIAL HYPERTENSION: Chronic | ICD-10-CM

## 2018-10-19 DIAGNOSIS — Z79.4 TYPE 2 DIABETES MELLITUS WITH HYPERGLYCEMIA, WITH LONG-TERM CURRENT USE OF INSULIN: Chronic | ICD-10-CM

## 2018-10-19 DIAGNOSIS — M94.0 COSTOCHONDRITIS: ICD-10-CM

## 2018-10-19 DIAGNOSIS — M25.511 ACUTE PAIN OF RIGHT SHOULDER: ICD-10-CM

## 2018-10-19 DIAGNOSIS — M62.838 MUSCLE SPASM OF RIGHT SHOULDER: ICD-10-CM

## 2018-10-19 PROCEDURE — 3045F PR MOST RECENT HEMOGLOBIN A1C LEVEL 7.0-9.0%: CPT | Mod: CPTII,S$GLB,, | Performed by: INTERNAL MEDICINE

## 2018-10-19 PROCEDURE — 99214 OFFICE O/P EST MOD 30 MIN: CPT | Mod: 25,S$GLB,, | Performed by: INTERNAL MEDICINE

## 2018-10-19 PROCEDURE — 90686 IIV4 VACC NO PRSV 0.5 ML IM: CPT | Mod: S$GLB,,, | Performed by: INTERNAL MEDICINE

## 2018-10-19 PROCEDURE — 73030 X-RAY EXAM OF SHOULDER: CPT | Mod: TC,PO,RT

## 2018-10-19 PROCEDURE — 73030 X-RAY EXAM OF SHOULDER: CPT | Mod: 26,RT,, | Performed by: RADIOLOGY

## 2018-10-19 PROCEDURE — 90471 IMMUNIZATION ADMIN: CPT | Mod: S$GLB,,, | Performed by: INTERNAL MEDICINE

## 2018-10-19 PROCEDURE — 3078F DIAST BP <80 MM HG: CPT | Mod: CPTII,S$GLB,, | Performed by: INTERNAL MEDICINE

## 2018-10-19 PROCEDURE — 3008F BODY MASS INDEX DOCD: CPT | Mod: CPTII,S$GLB,, | Performed by: INTERNAL MEDICINE

## 2018-10-19 PROCEDURE — 99999 PR PBB SHADOW E&M-EST. PATIENT-LVL IV: CPT | Mod: PBBFAC,,, | Performed by: INTERNAL MEDICINE

## 2018-10-19 PROCEDURE — 3075F SYST BP GE 130 - 139MM HG: CPT | Mod: CPTII,S$GLB,, | Performed by: INTERNAL MEDICINE

## 2018-10-19 RX ORDER — ATORVASTATIN CALCIUM 40 MG/1
40 TABLET, FILM COATED ORAL DAILY
Qty: 90 TABLET | Refills: 3 | Status: SHIPPED | OUTPATIENT
Start: 2018-10-19 | End: 2019-10-09 | Stop reason: SDUPTHER

## 2018-10-19 RX ORDER — AMLODIPINE BESYLATE 10 MG/1
10 TABLET ORAL DAILY
Qty: 90 TABLET | Refills: 3 | Status: SHIPPED | OUTPATIENT
Start: 2018-10-19 | End: 2019-10-09 | Stop reason: SDUPTHER

## 2018-10-19 RX ORDER — LOSARTAN POTASSIUM 100 MG/1
100 TABLET ORAL DAILY
Qty: 90 TABLET | Refills: 3 | Status: SHIPPED | OUTPATIENT
Start: 2018-10-19 | End: 2019-10-09 | Stop reason: SDUPTHER

## 2018-10-19 RX ORDER — METHOCARBAMOL 500 MG/1
500 TABLET, FILM COATED ORAL NIGHTLY PRN
Qty: 30 TABLET | Refills: 0 | Status: SHIPPED | OUTPATIENT
Start: 2018-10-19 | End: 2019-04-02

## 2018-10-19 NOTE — PROGRESS NOTES
Subjective:       Patient ID: Adriana Paula is a 63 y.o. female who presents for Chest Pain; Shoulder Pain; and Hypertension      Chest Pain    This is a new problem. The current episode started more than 1 month ago. The problem occurs intermittently. The problem has been waxing and waning. The pain is present in the substernal region. The pain is moderate. The quality of the pain is described as pressure. The pain radiates to the right arm and right shoulder. Associated symptoms include headaches. Pertinent negatives include no abdominal pain, cough, dizziness, exertional chest pressure, fever, irregular heartbeat, lower extremity edema, nausea, numbness, shortness of breath, sputum production, vomiting or weakness. The pain is aggravated by nothing. She has tried rest for the symptoms. The treatment provided mild relief. Risk factors include lack of exercise, post-menopausal and sedentary lifestyle.   Her past medical history is significant for diabetes, hyperlipidemia, hypertension and recent injury (has been lifting her mother with right arm repeatedly).   Pertinent negatives for past medical history include no anxiety/panic attacks.   Her family medical history is significant for heart disease. Prior diagnostic workup includes stress echo.   Neck Pain    This is a new problem. The current episode started 1 to 4 weeks ago. The problem occurs intermittently. The problem has been waxing and waning. The pain is associated with lifting a heavy object. The pain is present in the right side. The quality of the pain is described as aching. The pain is severe. Nothing aggravates the symptoms. Associated symptoms include chest pain, headaches and tingling. Pertinent negatives include no fever, numbness, photophobia or weakness. She has tried nothing for the symptoms.   Hypertension   This is a chronic problem. The current episode started more than 1 year ago. The problem is unchanged. The problem is controlled.  Associated symptoms include chest pain, headaches and neck pain. Pertinent negatives include no peripheral edema or shortness of breath. There are no associated agents to hypertension. Risk factors for coronary artery disease include diabetes mellitus, dyslipidemia, family history and sedentary lifestyle. Past treatments include calcium channel blockers and angiotensin blockers. The current treatment provides moderate improvement. There are no compliance problems.  There is no history of kidney disease. There is no history of chronic renal disease.   Shoulder Pain    The pain is present in the right shoulder. This is a new problem. The current episode started 1 to 4 weeks ago. There has been no history of extremity trauma. The problem occurs intermittently. The problem has been waxing and waning. The quality of the pain is described as aching. The pain is moderate. Associated symptoms include headaches, a limited range of motion, stiffness and tingling. Pertinent negatives include no fever or numbness. The symptoms are aggravated by activity. She has tried acetaminophen for the symptoms. The treatment provided mild relief. Her past medical history is significant for diabetes.      BP at home 130-140's/70-80's. AM BG usually 130-150's but sometimes as high as 200's. She has been evaluated in the past for chest pain and was told she has costochondritis.      Review of Systems   Constitutional: Negative for chills and fever.   HENT: Negative for congestion, postnasal drip, rhinorrhea and sinus pressure.    Eyes: Negative for photophobia and visual disturbance.   Respiratory: Negative for cough, sputum production and shortness of breath.    Cardiovascular: Positive for chest pain. Negative for leg swelling.   Gastrointestinal: Negative for abdominal pain, constipation, diarrhea, nausea and vomiting.   Endocrine: Negative for cold intolerance and heat intolerance.   Musculoskeletal: Positive for neck pain and stiffness.  Negative for arthralgias (right shoulder pain) and myalgias.   Skin: Negative for rash.   Neurological: Positive for tingling and headaches. Negative for dizziness, weakness and numbness.       Objective:      Physical Exam   Constitutional: She is oriented to person, place, and time. Vital signs are normal. She appears well-developed and well-nourished. No distress.   HENT:   Head: Normocephalic and atraumatic.   Right Ear: Hearing and external ear normal.   Left Ear: Hearing and external ear normal.   Nose: Nose normal.   Mouth/Throat: Uvula is midline.   Eyes: Lids are normal.   Neck: Normal range of motion. Spinous process tenderness and muscular tenderness present. No neck rigidity.   Cardiovascular: Normal rate, regular rhythm, normal heart sounds and intact distal pulses.   No murmur heard.  Pulmonary/Chest: Effort normal and breath sounds normal. She has no wheezes.   Abdominal: Soft. Bowel sounds are normal. She exhibits no distension. There is no tenderness.   Musculoskeletal: She exhibits no edema.        Right shoulder: She exhibits decreased range of motion, tenderness and pain. She exhibits no crepitus.        Right wrist: She exhibits swelling. She exhibits normal range of motion and no bony tenderness.        Right upper arm: She exhibits tenderness. She exhibits no swelling.   Neurological: She is alert and oriented to person, place, and time.   Skin: Skin is warm, dry and intact. No rash noted. She is not diaphoretic.   Nodular lesions felt under skin on thighs at injection sites   Psychiatric: She has a normal mood and affect.   Vitals reviewed.      Assessment/Plan:         1. Acute pain of right shoulder  - X-ray Shoulder 2 or More Views Right; Future  - may use voltaren as needed for pain    2. Muscle spasm of right shoulder  - methocarbamol (ROBAXIN) 500 MG Tab; Take 1 tablet (500 mg total) by mouth nightly as needed.  Dispense: 30 tablet; Refill: 0    3. Costochondritis  - voltaren as needed  for pain, call if no improvement    4. Essential hypertension  - BP controlled  - amLODIPine (NORVASC) 10 MG tablet; Take 1 tablet (10 mg total) by mouth once daily.  Dispense: 90 tablet; Refill: 3  - losartan (COZAAR) 100 MG tablet; Take 1 tablet (100 mg total) by mouth once daily.  Dispense: 90 tablet; Refill: 3    5. Type 2 diabetes mellitus with hyperglycemia, with long-term current use of insulin  - increase levemir to 26 unts bid  - insulin detemir U-100 (LEVEMIR FLEXTOUCH U-100 INSULN) 100 unit/mL (3 mL) SubQ InPn pen; Inject 22 Units into the skin 2 (two) times daily. Inject 26 units twice daily  Dispense: 15 mL; Refill: 0    6. Dyslipidemia  - atorvastatin (LIPITOR) 40 MG tablet; Take 1 tablet (40 mg total) by mouth once daily.  Dispense: 90 tablet; Refill: 3    RTC in 3 months or sooner if needed    Nicole Márquez MD

## 2018-10-20 VITALS
RESPIRATION RATE: 16 BRPM | HEIGHT: 65 IN | HEART RATE: 79 BPM | WEIGHT: 202.63 LBS | DIASTOLIC BLOOD PRESSURE: 62 MMHG | SYSTOLIC BLOOD PRESSURE: 134 MMHG | TEMPERATURE: 99 F | BODY MASS INDEX: 33.76 KG/M2

## 2018-10-22 ENCOUNTER — TELEPHONE (OUTPATIENT)
Dept: INTERNAL MEDICINE | Facility: CLINIC | Age: 63
End: 2018-10-22

## 2018-10-23 ENCOUNTER — OFFICE VISIT (OUTPATIENT)
Dept: ORTHOPEDICS | Facility: CLINIC | Age: 63
End: 2018-10-23
Payer: COMMERCIAL

## 2018-10-23 ENCOUNTER — CLINICAL SUPPORT (OUTPATIENT)
Dept: OPHTHALMOLOGY | Facility: CLINIC | Age: 63
End: 2018-10-23
Payer: COMMERCIAL

## 2018-10-23 VITALS
BODY MASS INDEX: 33.76 KG/M2 | HEIGHT: 65 IN | DIASTOLIC BLOOD PRESSURE: 69 MMHG | SYSTOLIC BLOOD PRESSURE: 120 MMHG | WEIGHT: 202.63 LBS | HEART RATE: 81 BPM

## 2018-10-23 DIAGNOSIS — M19.049 CMC ARTHRITIS: Primary | ICD-10-CM

## 2018-10-23 PROCEDURE — 99999 PR PBB SHADOW E&M-EST. PATIENT-LVL IV: CPT | Mod: PBBFAC,,, | Performed by: ORTHOPAEDIC SURGERY

## 2018-10-23 PROCEDURE — 99214 OFFICE O/P EST MOD 30 MIN: CPT | Mod: S$GLB,,, | Performed by: ORTHOPAEDIC SURGERY

## 2018-10-23 PROCEDURE — 3008F BODY MASS INDEX DOCD: CPT | Mod: CPTII,S$GLB,, | Performed by: ORTHOPAEDIC SURGERY

## 2018-10-23 PROCEDURE — 3078F DIAST BP <80 MM HG: CPT | Mod: CPTII,S$GLB,, | Performed by: ORTHOPAEDIC SURGERY

## 2018-10-23 PROCEDURE — 90715 TDAP VACCINE 7 YRS/> IM: CPT | Mod: ,,, | Performed by: INTERNAL MEDICINE

## 2018-10-23 PROCEDURE — 90471 IMMUNIZATION ADMIN: CPT | Mod: ,,, | Performed by: INTERNAL MEDICINE

## 2018-10-23 PROCEDURE — 3074F SYST BP LT 130 MM HG: CPT | Mod: CPTII,S$GLB,, | Performed by: ORTHOPAEDIC SURGERY

## 2018-10-23 NOTE — PROGRESS NOTES
Tdap given; RIGHT Deltoid; VIS given; No adverse reaction noted; Patient asked to remain in clinic for 15 minutes post injection.

## 2018-10-23 NOTE — PROGRESS NOTES
This visit: Injection did help for a couple of weeks. Pain started again in base of thumb 2 weeks ago. Pt has trouble picking things up or turning top of jar.     Subjective:      Patient ID: Adriana Paula is a 63 y.o. female.    Chief Complaint: Pain of the Left Hand and Pain of the Right Hand      HPI At last visit:   Adriana Paula is a right hand dominant 63 y.o. female presenting today for right hand and thumb pain. She notes hx of right carpal tunnel release in 1995. This resolved prior numbness/tingling however states she feels she has had some on-going pain since surgery. Pain has increased in the past 2-3 mos. She notes pain greatest around the area of the thumb CMC. Pain is increased with use of the hand, such as opening jars. Pt also has occasional shooting pain, this does sometimes awaken her at night. She has tried aspercream over the thumb with minimal relief. She does note rare numbness in the ulnar distribution.        Review of patient's allergies indicates:   Allergen Reactions    Keflex [cephalexin] Rash      blisters, fever    Penicillins Itching    Sulfa (sulfonamide antibiotics) Rash      blisters,fever             Current Outpatient Medications   Medication Sig Dispense Refill    amLODIPine (NORVASC) 10 MG tablet Take 1 tablet (10 mg total) by mouth once daily. 90 tablet 3    aspirin (ECOTRIN) 81 MG EC tablet Take 81 mg by mouth once daily.      atorvastatin (LIPITOR) 40 MG tablet Take 1 tablet (40 mg total) by mouth once daily. 90 tablet 3    blood-glucose meter (ONETOUCH VERIO IQ METER) kit Use as instructed 200 each 11    diclofenac (VOLTAREN) 50 MG EC tablet Take 1 tablet (50 mg total) by mouth 2 (two) times daily as needed. 60 tablet 1    ergocalciferol (ERGOCALCIFEROL) 50,000 unit Cap Take 1 capsule (50,000 Units total) by mouth every 7 days. 4 capsule 2    insulin detemir U-100 (LEVEMIR FLEXTOUCH U-100 INSULN) 100 unit/mL (3 mL) SubQ InPn pen Inject 26 Units into the  "skin 2 (two) times daily. Inject 26 units twice daily 15 mL 0    lancets 33 gauge Misc 1 lancet by Misc.(Non-Drug; Combo Route) route 2 (two) times daily before meals. 200 each 11    linagliptin (TRADJENTA) 5 mg Tab tablet Take 1 tablet (5 mg total) by mouth once daily. 30 tablet 2    losartan (COZAAR) 100 MG tablet Take 1 tablet (100 mg total) by mouth once daily. 90 tablet 3    metFORMIN (GLUCOPHAGE-XR) 500 MG 24 hr tablet Take 2 tablets (1,000 mg total) by mouth 2 (two) times daily with meals. 60 tablet 2    methocarbamol (ROBAXIN) 500 MG Tab Take 1 tablet (500 mg total) by mouth nightly as needed. 30 tablet 0    nystatin-triamcinolone (MYCOLOG II) cream Apply topically 3 (three) times daily. 1 Tube 2    ONETOUCH VERIO Strp USE FOUR TIMES DAILY AS DIRECTED 200 strip 0    oxybutynin (DITROPAN) 5 MG Tab Take 1 tablet (5 mg total) by mouth 3 (three) times daily. 90 tablet 11    pen needle, diabetic (BD ULTRA-FINE SHORT PEN NEEDLE) 31 gauge x 5/16" Ndle Use as directed (two) times daily. 100 each 11    traMADol (ULTRAM) 50 mg tablet TAKE 1 TABLET(50 MG) BY MOUTH EVERY 6 HOURS AS NEEDED 60 tablet 0     No current facility-administered medications for this visit.        Past Medical History:   Diagnosis Date    Allergy     Anemia     Anxiety     Arthritis     Breast cyst     Cataract     DR (diabetic retinopathy)     Dyslipidemia     Essential hypertension 8/1/2012    Gastroesophageal reflux disease without esophagitis 2/3/2017    GERD (gastroesophageal reflux disease)     Glaucoma     Hypertension     Obesity (BMI 30-39.9) 10/24/2013    PN (peripheral neuropathy)     Sleep apnea     Type 2 diabetes mellitus with both eyes affected by mild nonproliferative retinopathy without macular edema, with long-term current use of insulin     Type 2 diabetes mellitus with diabetic polyneuropathy, with long-term current use of insulin     Type II or unspecified type diabetes mellitus with other " "specified manifestations, uncontrolled        Past Surgical History:   Procedure Laterality Date    ARTHROSCOPY, KNEE Right 2014    Performed [facility] at [facility] 2ND FLR    BREAST CYST EXCISION Right     1980s    CARPAL TUNNEL RELEASE Right     CATARACT EXTRACTION      CATARACT EXTRACTION W/  INTRAOCULAR LENS IMPLANT Right 2017    Dr Brewster     SECTION      INSERTION-INTRAOCULAR LENS (IOL) Right 2017    [facility]    INSERTION-INTRAOCULAR LENS (IOL) Left 3/17/2016    Performed by Ingrid [facility] at [facility] OR 1ST FLR    KNEE ARTHROSCOPY  14    right    MYOMECTOMY      PHACOEMULSIFICATION-ASPIRATION-CATARACT Right 2017    [facility]    PHACOEMULSIFICATION-ASPIRATION-CATARACT Left 3/17/2016    Performed by Ingrid Wagner [facility] at [facility] OR 1ST FLR       Review of Systems:  Constitutional: Negative for chills and fever.   Respiratory: Negative for cough and shortness of breath.    Gastrointestinal: Negative for nausea and vomiting.   Skin: Negative for rash.   Neurological: Negative for dizziness and headaches.   Psychiatric/Behavioral: Negative for depression.   MSK as in HPI       OBJECTIVE:     PHYSICAL EXAM:  /69   Pulse 81   Ht 5' 5" (1.651 m)   Wt 91.9 kg (202 lb 9.6 oz)   BMI 33.71 kg/m²     GEN:  NAD, well-developed, well-groomed.  NEURO: Awake, alert, and oriented. Normal attention and concentration.    PSYCH: Normal mood and affect. Behavior is normal.  HEENT: No cervical lymphadenopathy noted.  CARDIOVASCULAR: Radial pulses 2+ bilaterally. No LE edema noted.  PULMONARY: Breath sounds normal. No respiratory distress.  SKIN: Intact, no rashes.      MSK:   RUE:  Good active ROM of the wrist and fingers. ttp over the thumb CMC. Positive tinels and durkans at the wrist. AIN/PIN/Radial/Median/Ulnar Nerves assessed in isolation without deficit. Radial & Ulnar arteries palpated " 2+. Capillary Refill <3s.        RADIOGRAPHS:  Xray right wrist 8/1/18  FINDINGS:  Mild degenerative changes are seen at the 1st carpal metacarpal joint space.  Bony structures of the wrist are otherwise intact.    Comments: I have personally reviewed the imaging and I agree with the above radiologist's report.    ASSESSMENT/PLAN:       ICD-10-CM ICD-9-CM   1. CMC arthritis M19.049 716.94       Orders Placed This Encounter    Ambulatory Referral to Physical/Occupational Therapy      Plan for OT, compound cream, bracing, paraffin

## 2018-11-02 ENCOUNTER — CLINICAL SUPPORT (OUTPATIENT)
Dept: REHABILITATION | Facility: HOSPITAL | Age: 63
End: 2018-11-02
Attending: ORTHOPAEDIC SURGERY
Payer: COMMERCIAL

## 2018-11-02 DIAGNOSIS — M79.644 THUMB PAIN, RIGHT: ICD-10-CM

## 2018-11-02 DIAGNOSIS — R29.898 DECREASED GRIP STRENGTH OF RIGHT HAND: ICD-10-CM

## 2018-11-02 PROCEDURE — 97018 PARAFFIN BATH THERAPY: CPT

## 2018-11-02 PROCEDURE — 97110 THERAPEUTIC EXERCISES: CPT

## 2018-11-02 PROCEDURE — 97165 OT EVAL LOW COMPLEX 30 MIN: CPT

## 2018-11-02 NOTE — PATIENT INSTRUCTIONS
"[facility]  OCCUPATIONAL THERAPY  HOME EXERCISE PROGRAM         Complete the following exercises with 10 repetitions each, 3x/day.                                 [facility]  OCCUPATIONAL THERAPY  HOME EXERCISE PROGRAM    Apply pressure to the webspace between your thumb & index finger. You can use your other hand or a chip clip. Hold for 30 seconds. Do 3 repetitions, 2x/day.     Reach around the back of your thumb using your opposite hand & wrap your fingers around your thumb. Pull your palm open using your opposite hand. To deepen the stretch, bring your palm to your chest.   Hold for 30 seconds. Do 3 repetitions, 2x/day.     Make the shape of the letter "c" using your thumb & index finger.   Hold for 5 seconds. Do 10 repetitions.      Make the shape of the letter "c" using your thumb & index finger. Slowly start to pinch the tips of your thumb and index finger together like you are closing the "c". Stop before you lose the [place] shape. Return to the "c" position and repeat.   Do 10 repetitions, 2x/day.   Rest your hand on your small finger. Lift your index finger away from your middle finger. Use your other hand to resist your index finger. Hold 5 seconds. Do 10 repetitions, 2x/day.      MONSTER Reynoso  Occupational Therapist      "

## 2018-11-02 NOTE — PLAN OF CARE
[facility] Occupational Therapy  Initial Evaluation     Name: Adriana Paula  Clinic Number: [id number]    Medical Diagnosis: R thumb CMC arthritis  Date of Onset: worsening sx in past 2-3 mo  Date of Surgery: had CTR on R hand in 1995  Surgical Procedure: n/a  Therapy Diagnosis:   Encounter Diagnoses   Name Primary?    Thumb pain, right     Decreased  strength of right hand      Precautions: Standard    Physician: Yuridia Gonzales, [place]  Physician Orders: eval and treat  Date of Return to MD: no future appt made yet    Evaluation Date: 11/2/2018  Plan of Care Certification Period: 12/16/18    Visit #: 1/ Visits authorized: 20  Insurance Authorization period Expiration: 12/31/18    Time In:2:05  Time Out: 3:05  Total Billable Time: 60 minutes  Charges for this Visit: IE x 1, P x 1, TE x 1      Subjective     Involved Side:  right  Dominant Side: Right  Imaging: x-ray on 8/1/18 indicates: Mild degenerative changes are seen at the 1st carpal metacarpal joint space.  Bony structures of the wrist are otherwise intact.  Mechanism of Injury: arthritis, repetitive strain  History of Current Condition: worsening sx over past few months affecting ADLs and IADLs  Previous Therapy: has had therapy in the past on her R hand following CTR    Pain:  Functional Pain Scale Rating 0-10:   7/10 at current  4/10 at best  9/10 at worst  Location: thumb thenar and CMC area  Description: Aching, Burning and Tight  Aggravating Factors: Extension, Flexing and Lifting  Easing Factors: pain medication, heating pad and rest; pt reports she takes tramadol for her knees      Past Medical History/Physical Systems Review:   Adriana Paula  has a past medical history of Allergy, Anemia, Anxiety, Arthritis, Breast cyst, Cataract, DR (diabetic retinopathy), Dyslipidemia, Essential hypertension, Gastroesophageal reflux disease without esophagitis, GERD (gastroesophageal reflux disease), Glaucoma, Hypertension, Obesity  (BMI 30-39.9), PN (peripheral neuropathy), Sleep apnea, Type 2 diabetes mellitus with both eyes affected by mild nonproliferative retinopathy without macular edema, with long-term current use of insulin, Type 2 diabetes mellitus with diabetic polyneuropathy, with long-term current use of insulin, and Type II or unspecified type diabetes mellitus with other specified manifestations, uncontrolled.    Adriana Paula  has a past surgical history that includes  section; Myomectomy; Carpal tunnel release (Right); Knee arthroscopy (14); Cataract extraction; Cataract extraction w/  intraocular lens implant (Right, 2017); Breast cyst excision (Right); PHACOEMULSIFICATION-ASPIRATION-CATARACT (Right, 2017); INSERTION-INTRAOCULAR LENS (IOL) (Right, 2017); PHACOEMULSIFICATION-ASPIRATION-CATARACT (Left, 3/17/2016); INSERTION-INTRAOCULAR LENS (IOL) (Left, 3/17/2016); and ARTHROSCOPY, KNEE (Right, 2014).    Adriana has a current medication list which includes the following prescription(s): amlodipine, aspirin, atorvastatin, blood-glucose meter, diclofenac, ergocalciferol, insulin detemir u-100, lancets, linagliptin, losartan, metformin, methocarbamol, nystatin-triamcinolone, onetouch verio, oxybutynin, pen needle, diabetic, and tramadol.    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    Functional Limitations/Social History:   Pt lives with her elderly mother. Occasionally needs to assist her mother. Reports her mother falls often. Pt occasionally cooks and cleans. Tries to get her daughter to assist with vacuuming.  Pt drives herself. Pt enjoys dancing. Pt having trouble moving  laundry, opening jars and bottles, making the bed.      Objective     Observation/Appearance: pt wearing thumb brace, minimal swelling noted at thumb CMC    Edema. Measured in centimeters.   11/2/2018 11/2/2018    Left Right   Wrist Crease 16.2 16.5     Elbow and Wrist ROM. Measured in degrees.   11/2/2018 11/2/2018    Left Right   Wrist Ext/Flex 70/80 55/80   Wrist RD/UD 14/30 14/30     Hand ROM. Measured in degrees.   11/2/2018 11/2/2018    Left Right        Thumb: MP 55 53                IP 50 47       Rad ADD/ABD 0/46 0/48       Pal ADD/ABD 0/50 0/50          Opposition WFL 3 cm away from DPC, able to touch tip of SF      Strength (Dynamometer) and Pinch Strength (Pinch Gauge)  Measured in pounds.   11/2/2018 11/2/2018    Left Right   Rung II 25 17   Key Pinch 8 5   3pt Pinch 5 4   2pt Pinch 3.5 3     Sensation: reports occasional numbness in tips of fingers, especially SF & RF.      Manual Muscle Test: NT due to pain    Special Tests  + Tinels test on R        Treatment     Treatment Time In: 2:40 pm  Treatment Time Out: 3:05 pm  Total Treatment time separate from Evaluation time:25 min    Adriana received the following supervised modalities after being cleared for contradictions for 10 minutes:   -Patient received paraffin bath to R hand(s) for 10 minutes to increase blood flow, circulation, pain management and for tissue elasticity prior to therex.       Adriana received therapeutic exercises for 15 minutes including:  -Pt instructed on and performed HEP, including thumb circumduction, pinky slides, and thumb CMC arthritis ex and stretches (see pt instructions)        Home Exercise Program/Education:  Issued HEP (see patient instructions in EMR) and educated on modality use for pain management . Exercises were reviewed and Adriana was able to demonstrate them prior to the end of the session.   Pt received a written copy of exercises to perform at home. Adriana demonstrated good  understanding of the  education provided.  Pt was advised to perform these exercises free of pain, and to stop performing them if pain occurs.    Patient/Family Education: role of OT, goals for OT, scheduling/cancellations - pt verbalized understanding. Discussed insurance limitations with patient.    Additional Education provided: joint protection principles      Assessment     Adriana Paula is a 63 y.o. female referred to outpatient occupational/hand therapy and presents with a medical diagnosis of R thumb CMC arthritis, resulting in Decreased ROM, Decreased  strength, Decreased pinch strength, Decreased functional hand use, Increased pain, Joint Stiffness and Diminished/Impaired Sensation and demonstrates limitations as described in the chart below. Following expanded medical record review it is determined that pt will benefit from occupational therapy services in order to maximize pain free and/or functional use of right hand. The following goals were discussed with the patient and patient is in agreement with them as to be addressed in the treatment plan. The patient's rehab potential is Good.     Anticipated barriers to occupational therapy: none  Pt has no cultural, educational or language barriers to learning provided.    Profile and History Assessment of Occupational Performance Level of Clinical Decision Making Complexity Score   Occupational Profile:   Adriana Paula is a 63 y.o. female who lives with their family and is currently employed . Job duties include working on a computer all day.    Adriana Paula has difficulty with  grooming and dressing  driving/transportation management, shopping, phone/computer use and housework/household chores  affecting his/her daily functional abilities. His/her main goal for therapy is to have less pain.     Previous functional status: Independent with all ADLs.      Comorbidities:   See PMH and Physical Systems Review Section above.    Medical and Therapy History Review:    Expanded               Performance Deficits    Physical:  Joint Mobility  Muscle Power/Strength  Muscle Endurance   Strength  Pinch Strength  Fine Motor Coordination  Pain    Cognitive:  No Deficits    Psychosocial:    No Deficits     Clinical Decision Making:  moderate    Assessment Process:  Detailed Assessments    Modification/Need for Assistance:  Not Necessary    Intervention Selection:  Several Treatment Options       low  Based on PMHX, co morbidities , data from assessments and functional level of assistance required with task and clinical presentation directly impacting function.         Goals   The following goals were discussed with the patient and patient is in agreement with them as to be addressed in the treatment plan.   Long Term Goals (LTGs); to be met by discharge.  LTG #1: Pt will report a pain level of 1 out of 10 at the worst with daily activities   LTG #2: Pt will demonstrate improved  strength by at least 3-5# in order to hold and grasp items better  LTG #3: Pt will return to prior level of function for ADLs and household management.     Short Term Goals (STGs); to be met within 4 weeks (12/2/18).  STG #1a: Pt will report 3 out of 10 pain level with daily activities and job duties.  STG #2: Pt will demonstrate independence with issued HEP.   STG #3b: Pt will demo improved opposition WFL in order to grasp items and better use hand    Plan     Outpatient occupational therapy 2 times weekly for 6 weeks during the certification period from 11/2/2018 to 12/16/18.    Treatment to include: Paraffin, Fluidotherapy, Manual therapy/joint mobilizations, Modalities for pain management, US 3 mhz, Therapeutic exercises/activities., Strengthening, Orthotic Fabrication/Fit/Training, Joint Protection and Energy Conservation, as well as any other treatments deemed necessary based on the patient's needs or progress.     Therapist: MONSTER Reynoso certify the need for these services  furnished under this plan of treatment and while under my care    ____________________________________                         __________________  Physician/Referring Practitioner                                               Date of Signature

## 2018-11-13 ENCOUNTER — CLINICAL SUPPORT (OUTPATIENT)
Dept: REHABILITATION | Facility: HOSPITAL | Age: 63
End: 2018-11-13
Payer: COMMERCIAL

## 2018-11-13 DIAGNOSIS — M79.644 THUMB PAIN, RIGHT: ICD-10-CM

## 2018-11-13 DIAGNOSIS — R29.898 DECREASED GRIP STRENGTH OF RIGHT HAND: ICD-10-CM

## 2018-11-13 PROCEDURE — 97110 THERAPEUTIC EXERCISES: CPT

## 2018-11-13 PROCEDURE — 97035 APP MDLTY 1+ULTRASOUND EA 15: CPT

## 2018-11-13 PROCEDURE — 97018 PARAFFIN BATH THERAPY: CPT

## 2018-11-13 NOTE — PROGRESS NOTES
"  Occupational Therapy Daily Treatment Note      Date: 11/13/2018  Name: Adriana Paula  Clinic Number: [id number]    Medical Diagnosis: R thumb CMC arthritis  Date of Onset: worsening sx in past 2-3 mo  Date of Surgery: had CTR on R hand in 1995  Surgical Procedure: n/a  Therapy Diagnosis:        Encounter Diagnoses   Name Primary?    Thumb pain, right      Decreased  strength of right hand        Precautions: Standard     Physician: Yuridia Gonzales, *  Physician Orders: eval and treat  Date of Return to MD: no future appt made yet     Evaluation Date: 11/2/2018  Plan of Care Certification Period: 12/16/18     Visit #: 2/ Visits authorized: 20  Insurance Authorization period Expiration: 12/31/18    Time In: 2:15 pm  Time Out: 3:05  Total Billable Time: 50 minutes  Charges for this Visit: P x 1, US x 1, TE x 1    Subjective     Pt reports: "my hand is sore when I don't wear my brace." Pt reports she had to help lift and move her mother recently when she fell.  she was compliant with home exercise program given last session.   Response to previous treatment:cont with thumb pain  Functional change: cont with pain, especially with using computer for work    Pain: 6/10  Location: right thumb CMC    Objective     Pt seen by OT this session. Treatment consisted of the following:    Adriana received the following supervised modalities after being cleared for contradictions for 10 minutes:   Patient received paraffin bath to R hand(s) for 10 minutes to increase blood flow, circulation, pain management and for tissue elasticity prior to therex.       Adriana received the following direct contact modalities after being cleared for contraindications for 8 minutes:  Patient received ultrasound to  R thumb CMC area to increase blood flow, circulation, tissue elasticity, pain management and for wound/scar management for 8 minutes @ 3.3 Mhz, Intensity 0.6 w/cm2 at 20% duty cycle.       Adriana received the following " "manual therapy techniques for 5 minutes:   Pt received STM to R thenar and CMC to increase tissue circulation and elasticity    Adriana received therapeutic exercises for 22 minutes including:  Pt performed thumb circumduction, thumb abd/add, ext/flex, pinky slides, thumb and index "C" isometrics x 10 reps each. Pt performed wrist AROM flex/ext, RD/UD x 10 reps each. Pt performed FM task with pom pom  with thumb and index.       Home Exercises and Education Provided     Education provided: reviewed brace wear, stretches, HEP and modalities  - Progress towards goals     Written Home Exercises Provided: Patient instructed to cont prior HEP.  Exercises were reviewed and Adriana was able to demonstrate them prior to the end of the session.  Adriana demonstrated good  understanding of the education provided.   .   See EMR under Patient Instructions for exercises provided prior visit.     Assessment     Adriana Paula is a 63 y.o. female referred to outpatient occupational/hand therapy and presents with a medical diagnosis of R thumb CMC arthritis. Pt with cont c/o pain in thumb CMC. Good tolerance of tx this date.     Adriana is progressing well towards her goals and there are no updates to goals at this time. Pt prognosis continues as Good. Pt will continue to benefit from skilled outpatient occupational therapy to address the deficits listed in the problem list on initial evaluation, provide pt/family education and to maximize pt's level of independence in the home and community environment.     Anticipated barriers to continued occupational therapy: none    Pt's spiritual, cultural and educational needs considered and pt agreeable to plan of care and goals.    Goals     The following goals were discussed with the patient and patient is in agreement with them as to be addressed in the treatment plan.   Long Term Goals (LTGs); to be met by discharge.  LTG #1: Pt will report a pain level of 1 out of 10 at the " worst with daily activities       LTG #2: Pt will demonstrate improved  strength by at least 3-5# in order to hold and grasp items better  LTG #3: Pt will return to prior level of function for ADLs and household management.      Short Term Goals (STGs); to be met within 4 weeks (12/2/18).  STG #1a: Pt will report 3 out of 10 pain level with daily activities and job duties.  STG #2: Pt will demonstrate independence with issued HEP.   STG #3b: Pt will demo improved opposition WFL in order to grasp items and better use hand    Plan     Continue skilled occupational therapy with individualized plan of care 2x/week for 6 weeks from 11/2/18 to 12/16/18.    Discussed Plan of Care with patient: Yes  Updates/Grading for next session: progress as tolerated. Pt would like to hold therapy for about a month.    MONSTER Reynoso

## 2018-11-21 ENCOUNTER — PATIENT MESSAGE (OUTPATIENT)
Dept: INTERNAL MEDICINE | Facility: CLINIC | Age: 63
End: 2018-11-21

## 2018-11-21 DIAGNOSIS — E11.3293 TYPE 2 DIABETES MELLITUS WITH BOTH EYES AFFECTED BY MILD NONPROLIFERATIVE RETINOPATHY WITHOUT MACULAR EDEMA, WITH LONG-TERM CURRENT USE OF INSULIN: Primary | Chronic | ICD-10-CM

## 2018-11-21 DIAGNOSIS — Z79.4 TYPE 2 DIABETES MELLITUS WITH HYPERGLYCEMIA, WITH LONG-TERM CURRENT USE OF INSULIN: Chronic | ICD-10-CM

## 2018-11-21 DIAGNOSIS — Z79.4 TYPE 2 DIABETES MELLITUS WITH DIABETIC POLYNEUROPATHY, WITH LONG-TERM CURRENT USE OF INSULIN: Chronic | ICD-10-CM

## 2018-11-21 DIAGNOSIS — E11.42 TYPE 2 DIABETES MELLITUS WITH DIABETIC POLYNEUROPATHY, WITH LONG-TERM CURRENT USE OF INSULIN: Chronic | ICD-10-CM

## 2018-11-21 DIAGNOSIS — E11.65 TYPE 2 DIABETES MELLITUS WITH HYPERGLYCEMIA, WITH LONG-TERM CURRENT USE OF INSULIN: Chronic | ICD-10-CM

## 2018-11-21 DIAGNOSIS — Z79.4 TYPE 2 DIABETES MELLITUS WITH BOTH EYES AFFECTED BY MILD NONPROLIFERATIVE RETINOPATHY WITHOUT MACULAR EDEMA, WITH LONG-TERM CURRENT USE OF INSULIN: Primary | Chronic | ICD-10-CM

## 2018-11-21 RX ORDER — METFORMIN HYDROCHLORIDE 500 MG/1
1000 TABLET, EXTENDED RELEASE ORAL 2 TIMES DAILY WITH MEALS
Qty: 60 TABLET | Refills: 2 | Status: SHIPPED | OUTPATIENT
Start: 2018-11-21 | End: 2018-11-29 | Stop reason: SDUPTHER

## 2018-11-29 ENCOUNTER — PATIENT MESSAGE (OUTPATIENT)
Dept: INTERNAL MEDICINE | Facility: CLINIC | Age: 63
End: 2018-11-29

## 2018-11-29 DIAGNOSIS — E11.42 TYPE 2 DIABETES MELLITUS WITH DIABETIC POLYNEUROPATHY, WITH LONG-TERM CURRENT USE OF INSULIN: Chronic | ICD-10-CM

## 2018-11-29 DIAGNOSIS — Z79.4 TYPE 2 DIABETES MELLITUS WITH HYPERGLYCEMIA, WITH LONG-TERM CURRENT USE OF INSULIN: Chronic | ICD-10-CM

## 2018-11-29 DIAGNOSIS — E11.65 TYPE 2 DIABETES MELLITUS WITH HYPERGLYCEMIA, WITH LONG-TERM CURRENT USE OF INSULIN: Chronic | ICD-10-CM

## 2018-11-29 DIAGNOSIS — Z79.4 TYPE 2 DIABETES MELLITUS WITH DIABETIC POLYNEUROPATHY, WITH LONG-TERM CURRENT USE OF INSULIN: Chronic | ICD-10-CM

## 2018-11-29 RX ORDER — METFORMIN HYDROCHLORIDE 500 MG/1
1000 TABLET, EXTENDED RELEASE ORAL 2 TIMES DAILY WITH MEALS
Qty: 120 TABLET | Refills: 2 | Status: SHIPPED | OUTPATIENT
Start: 2018-11-29 | End: 2019-01-22 | Stop reason: SDUPTHER

## 2018-11-29 NOTE — TELEPHONE ENCOUNTER
Spoke to patient.    Tradjenta and metformin were sent to [facility] on [address] on 11/21/18 but patient still waiting to receive medication.    Will send tradjenta, metformin and Levemir 26 units bid to [facility] on [address] now. Patient will call them to verify that medications were received.    Spoke to pharmacy and medication orders have been received.

## 2018-12-06 ENCOUNTER — PATIENT MESSAGE (OUTPATIENT)
Dept: INTERNAL MEDICINE | Facility: CLINIC | Age: 63
End: 2018-12-06

## 2018-12-06 ENCOUNTER — LAB VISIT (OUTPATIENT)
Dept: LAB | Facility: HOSPITAL | Age: 63
End: 2018-12-06
Attending: INTERNAL MEDICINE
Payer: COMMERCIAL

## 2018-12-06 ENCOUNTER — OFFICE VISIT (OUTPATIENT)
Dept: INTERNAL MEDICINE | Facility: CLINIC | Age: 63
End: 2018-12-06
Payer: COMMERCIAL

## 2018-12-06 VITALS
WEIGHT: 198.19 LBS | SYSTOLIC BLOOD PRESSURE: 123 MMHG | DIASTOLIC BLOOD PRESSURE: 62 MMHG | HEIGHT: 65 IN | BODY MASS INDEX: 33.02 KG/M2 | OXYGEN SATURATION: 97 % | TEMPERATURE: 99 F | HEART RATE: 73 BPM

## 2018-12-06 DIAGNOSIS — E11.65 TYPE 2 DIABETES MELLITUS WITH HYPERGLYCEMIA, WITH LONG-TERM CURRENT USE OF INSULIN: Chronic | ICD-10-CM

## 2018-12-06 DIAGNOSIS — E11.3293 TYPE 2 DIABETES MELLITUS WITH BOTH EYES AFFECTED BY MILD NONPROLIFERATIVE RETINOPATHY WITHOUT MACULAR EDEMA, WITH LONG-TERM CURRENT USE OF INSULIN: Chronic | ICD-10-CM

## 2018-12-06 DIAGNOSIS — Z79.4 TYPE 2 DIABETES MELLITUS WITH HYPERGLYCEMIA, WITH LONG-TERM CURRENT USE OF INSULIN: Chronic | ICD-10-CM

## 2018-12-06 DIAGNOSIS — J02.9 SORE THROAT: ICD-10-CM

## 2018-12-06 DIAGNOSIS — R05.9 COUGH: ICD-10-CM

## 2018-12-06 DIAGNOSIS — J01.00 ACUTE NON-RECURRENT MAXILLARY SINUSITIS: Primary | ICD-10-CM

## 2018-12-06 DIAGNOSIS — Z79.4 TYPE 2 DIABETES MELLITUS WITH BOTH EYES AFFECTED BY MILD NONPROLIFERATIVE RETINOPATHY WITHOUT MACULAR EDEMA, WITH LONG-TERM CURRENT USE OF INSULIN: Chronic | ICD-10-CM

## 2018-12-06 LAB
ANION GAP SERPL CALC-SCNC: 8 MMOL/L
BUN SERPL-MCNC: 6 MG/DL
CALCIUM SERPL-MCNC: 9.4 MG/DL
CHLORIDE SERPL-SCNC: 103 MMOL/L
CO2 SERPL-SCNC: 25 MMOL/L
CREAT SERPL-MCNC: 0.8 MG/DL
DEPRECATED S PYO AG THROAT QL EIA: NEGATIVE
EST. GFR  (AFRICAN AMERICAN): >60 ML/MIN/1.73 M^2
EST. GFR  (NON AFRICAN AMERICAN): >60 ML/MIN/1.73 M^2
ESTIMATED AVG GLUCOSE: 252 MG/DL
GLUCOSE SERPL-MCNC: 275 MG/DL
HBA1C MFR BLD HPLC: 10.4 %
POTASSIUM SERPL-SCNC: 3.8 MMOL/L
SODIUM SERPL-SCNC: 136 MMOL/L

## 2018-12-06 PROCEDURE — 3008F BODY MASS INDEX DOCD: CPT | Mod: CPTII,S$GLB,, | Performed by: INTERNAL MEDICINE

## 2018-12-06 PROCEDURE — 83036 HEMOGLOBIN GLYCOSYLATED A1C: CPT

## 2018-12-06 PROCEDURE — 99214 OFFICE O/P EST MOD 30 MIN: CPT | Mod: S$GLB,,, | Performed by: INTERNAL MEDICINE

## 2018-12-06 PROCEDURE — 3074F SYST BP LT 130 MM HG: CPT | Mod: CPTII,S$GLB,, | Performed by: INTERNAL MEDICINE

## 2018-12-06 PROCEDURE — 36415 COLL VENOUS BLD VENIPUNCTURE: CPT | Mod: PO

## 2018-12-06 PROCEDURE — 87081 CULTURE SCREEN ONLY: CPT

## 2018-12-06 PROCEDURE — 87880 STREP A ASSAY W/OPTIC: CPT | Mod: PO

## 2018-12-06 PROCEDURE — 80048 BASIC METABOLIC PNL TOTAL CA: CPT

## 2018-12-06 PROCEDURE — 99999 PR PBB SHADOW E&M-EST. PATIENT-LVL V: CPT | Mod: PBBFAC,,, | Performed by: INTERNAL MEDICINE

## 2018-12-06 PROCEDURE — 3078F DIAST BP <80 MM HG: CPT | Mod: CPTII,S$GLB,, | Performed by: INTERNAL MEDICINE

## 2018-12-06 RX ORDER — FLUTICASONE PROPIONATE 50 MCG
1 SPRAY, SUSPENSION (ML) NASAL DAILY
Qty: 16 G | Refills: 0 | Status: SHIPPED | OUTPATIENT
Start: 2018-12-06 | End: 2019-01-02 | Stop reason: SDUPTHER

## 2018-12-06 RX ORDER — LEVOCETIRIZINE DIHYDROCHLORIDE 5 MG/1
5 TABLET, FILM COATED ORAL DAILY
Qty: 30 TABLET | Refills: 0 | Status: SHIPPED | OUTPATIENT
Start: 2018-12-06 | End: 2019-04-11

## 2018-12-06 RX ORDER — BENZONATATE 200 MG/1
200 CAPSULE ORAL 3 TIMES DAILY PRN
Qty: 30 CAPSULE | Refills: 0 | Status: SHIPPED | OUTPATIENT
Start: 2018-12-06 | End: 2018-12-16

## 2018-12-06 RX ORDER — AZITHROMYCIN 250 MG/1
TABLET, FILM COATED ORAL
Qty: 6 TABLET | Refills: 0 | Status: SHIPPED | OUTPATIENT
Start: 2018-12-06 | End: 2019-01-22

## 2018-12-06 RX ORDER — CODEINE PHOSPHATE AND GUAIFENESIN 10; 100 MG/5ML; MG/5ML
5 SOLUTION ORAL NIGHTLY PRN
Qty: 118 ML | Refills: 0 | Status: SHIPPED | OUTPATIENT
Start: 2018-12-06 | End: 2018-12-16

## 2018-12-06 RX ORDER — DOXYCYCLINE HYCLATE 100 MG
100 TABLET ORAL EVERY 12 HOURS
Qty: 14 TABLET | Refills: 0 | Status: SHIPPED | OUTPATIENT
Start: 2018-12-06 | End: 2018-12-06 | Stop reason: ALTCHOICE

## 2018-12-06 NOTE — TELEPHONE ENCOUNTER
Pt scheduled to come in this morning at 11am. Pt has been notified via portal and was asked to call us if she is not able to make it.

## 2018-12-08 LAB — BACTERIA THROAT CULT: NORMAL

## 2018-12-10 ENCOUNTER — PATIENT MESSAGE (OUTPATIENT)
Dept: INTERNAL MEDICINE | Facility: CLINIC | Age: 63
End: 2018-12-10

## 2018-12-13 PROBLEM — J01.00 ACUTE NON-RECURRENT MAXILLARY SINUSITIS: Status: ACTIVE | Noted: 2018-12-13

## 2018-12-13 PROBLEM — J01.00 ACUTE NON-RECURRENT MAXILLARY SINUSITIS: Status: RESOLVED | Noted: 2018-12-13 | Resolved: 2018-12-13

## 2018-12-14 NOTE — PROGRESS NOTES
Subjective:       Patient ID: Adriana Paula is a 63 y.o. female who presents for Cough; Nasal Congestion; Generalized Body Aches; and Sore Throat      Sore Throat    This is a recurrent problem. The current episode started in the past 7 days. The problem has been gradually worsening. The pain is worse on the left side. There has been no fever. The fever has been present for less than 1 day. The pain is at a severity of 7/10. The pain is moderate. Associated symptoms include congestion, coughing, ear pain (left), headaches, a hoarse voice, neck pain, swollen glands and trouble swallowing. Pertinent negatives include no abdominal pain, diarrhea, drooling, ear discharge, plugged ear sensation, shortness of breath, stridor or vomiting. She has had no exposure to strep or mono. She has tried oral narcotic analgesics for the symptoms. The treatment provided no relief.   Otalgia    There is pain in the left ear. This is a new problem. The current episode started in the past 7 days. The problem occurs constantly. The problem has been unchanged. There has been no fever. Associated symptoms include coughing, headaches, neck pain, rhinorrhea and a sore throat. Pertinent negatives include no abdominal pain, diarrhea, ear discharge, hearing loss, rash or vomiting. She has tried nothing for the symptoms. There is no history of a chronic ear infection or hearing loss.      Reports BG is better controlled, 90s in AM and 150-160's in evening.      Review of Systems   Constitutional: Negative for chills, diaphoresis and fever.   HENT: Positive for congestion, ear pain (left), hoarse voice, postnasal drip, rhinorrhea, sinus pressure, sinus pain, sore throat and trouble swallowing. Negative for drooling, ear discharge and hearing loss.    Eyes: Negative for discharge and itching.   Respiratory: Positive for cough. Negative for chest tightness, shortness of breath and stridor.    Cardiovascular: Negative for chest pain, palpitations  and leg swelling.   Gastrointestinal: Negative for abdominal pain, constipation, diarrhea, nausea and vomiting.   Musculoskeletal: Positive for neck pain. Negative for arthralgias and myalgias.   Skin: Negative for rash.   Neurological: Positive for headaches. Negative for dizziness and numbness.   Hematological: Negative for adenopathy.         Answers for HPI/ROS submitted by the patient on 12/6/2018   Sore throat  Chronicity: recurrent  Onset: in the past 7 days  Progression since onset: gradually worsening  Pain worse on: left  Fever: no fever  Fever duration: less than 1 day  Pain - numeric: 7/10  abdominal pain: No  congestion: Yes  cough: Yes  diarrhea: No  drooling: No  ear discharge: No  ear pain: Yes  headaches: Yes  hoarse voice: Yes  neck pain: Yes  plugged ear sensation: No  shortness of breath: No  stridor: Yes  swollen glands: Yes  trouble swallowing: Yes  vomiting: No  strep: No  mono: No  Treatments tried: oral narcotic analgesics  Improvement on treatment: no relief  Pain severity: moderate      Objective:      Physical Exam   Constitutional: She is oriented to person, place, and time. Vital signs are normal. She appears well-developed and well-nourished. No distress.   HENT:   Head: Normocephalic and atraumatic.   Right Ear: Hearing, tympanic membrane, external ear and ear canal normal. Tympanic membrane is not erythematous and not bulging.   Left Ear: Hearing, tympanic membrane, external ear and ear canal normal. Tympanic membrane is not erythematous and not bulging.   Nose: Nose normal.   Mouth/Throat: Uvula is midline and mucous membranes are normal.   Eyes: Lids are normal.   Neck: Full passive range of motion without pain.   Cardiovascular: Normal rate, regular rhythm, normal heart sounds and intact distal pulses.   No murmur heard.  Pulmonary/Chest: Effort normal and breath sounds normal. No tachypnea and no bradypnea. She has no decreased breath sounds. She has no wheezes.   Abdominal:  Soft. Bowel sounds are normal. She exhibits no distension. There is no tenderness.   Musculoskeletal: Normal range of motion. She exhibits no edema.   Lymphadenopathy:        Head (right side): No submandibular, no preauricular and no posterior auricular adenopathy present.        Head (left side): No submandibular, no preauricular and no posterior auricular adenopathy present.     She has no cervical adenopathy.        Right cervical: No superficial cervical adenopathy present.       Left cervical: No superficial cervical adenopathy present.   Neurological: She is alert and oriented to person, place, and time.   Skin: Skin is warm, dry and intact. No rash noted. She is not diaphoretic.   Psychiatric: She has a normal mood and affect.   Vitals reviewed.      Assessment/Plan:       1. Acute non-recurrent maxillary sinusitis  - start Z-pack   - fluticasone (FLONASE) 50 mcg/actuation nasal spray; 1 spray (50 mcg total) by Each Nare route once daily.  Dispense: 16 g; Refill: 0  - levocetirizine (XYZAL) 5 MG tablet; Take 1 tablet (5 mg total) by mouth once daily.  Dispense: 30 tablet; Refill: 0  - avoid decongestants    2. Cough  - benzonatate (TESSALON) 200 MG capsule; Take 1 capsule (200 mg total) by mouth 3 (three) times daily as needed.  Dispense: 30 capsule; Refill: 0  - guaifenesin-codeine 100-10 mg/5 ml (TUSSI-ORGANIDIN NR)  mg/5 mL syrup; Take 5 mLs by mouth nightly as needed for Cough.  Dispense: 118 mL; Refill: 0    3. Sore throat  - Throat Screen, Rapid  - may use chloraseptic spray as needed for sore throat    Call if no improvement in symptoms    Nicole Márquez MD

## 2019-01-02 DIAGNOSIS — J01.00 ACUTE NON-RECURRENT MAXILLARY SINUSITIS: ICD-10-CM

## 2019-01-02 RX ORDER — FLUTICASONE PROPIONATE 50 MCG
SPRAY, SUSPENSION (ML) NASAL
Qty: 16 ML | Refills: 0 | Status: SHIPPED | OUTPATIENT
Start: 2019-01-02 | End: 2019-02-14 | Stop reason: SDUPTHER

## 2019-01-07 ENCOUNTER — PATIENT MESSAGE (OUTPATIENT)
Dept: INTERNAL MEDICINE | Facility: CLINIC | Age: 64
End: 2019-01-07

## 2019-01-08 ENCOUNTER — PATIENT MESSAGE (OUTPATIENT)
Dept: ORTHOPEDICS | Facility: CLINIC | Age: 64
End: 2019-01-08

## 2019-01-08 RX ORDER — PEN NEEDLE, DIABETIC 30 GX3/16"
1 NEEDLE, DISPOSABLE MISCELLANEOUS 2 TIMES DAILY
Qty: 100 EACH | Refills: 11 | Status: SHIPPED | OUTPATIENT
Start: 2019-01-08 | End: 2020-04-01

## 2019-01-08 RX ORDER — INSULIN DEGLUDEC 100 U/ML
60 INJECTION, SOLUTION SUBCUTANEOUS DAILY
Qty: 18 ML | Refills: 2 | Status: SHIPPED | OUTPATIENT
Start: 2019-01-08 | End: 2019-05-05 | Stop reason: SDUPTHER

## 2019-01-08 NOTE — TELEPHONE ENCOUNTER
Called pt; advised to stop levemir, and to start new rx. Pt verbalized understanding; nothing further.

## 2019-01-10 ENCOUNTER — HOSPITAL ENCOUNTER (OUTPATIENT)
Dept: RADIOLOGY | Facility: OTHER | Age: 64
Discharge: HOME | End: 2019-01-10
Attending: PHYSICIAN ASSISTANT
Payer: COMMERCIAL

## 2019-01-10 ENCOUNTER — OFFICE VISIT (OUTPATIENT)
Dept: ORTHOPEDICS | Facility: CLINIC | Age: 64
End: 2019-01-10
Payer: COMMERCIAL

## 2019-01-10 VITALS
SYSTOLIC BLOOD PRESSURE: 155 MMHG | DIASTOLIC BLOOD PRESSURE: 81 MMHG | HEIGHT: 65 IN | WEIGHT: 198 LBS | BODY MASS INDEX: 32.99 KG/M2 | HEART RATE: 75 BPM

## 2019-01-10 DIAGNOSIS — M65.312 TRIGGER FINGER OF LEFT THUMB: Primary | ICD-10-CM

## 2019-01-10 DIAGNOSIS — M65.312 TRIGGER FINGER OF LEFT THUMB: ICD-10-CM

## 2019-01-10 PROCEDURE — 3077F PR MOST RECENT SYSTOLIC BLOOD PRESSURE >= 140 MM HG: ICD-10-PCS | Mod: CPTII,S$GLB,, | Performed by: PHYSICIAN ASSISTANT

## 2019-01-10 PROCEDURE — 3077F SYST BP >= 140 MM HG: CPT | Mod: CPTII,S$GLB,, | Performed by: PHYSICIAN ASSISTANT

## 2019-01-10 PROCEDURE — 73130 X-RAY EXAM OF HAND: CPT | Mod: TC,FY,LT

## 2019-01-10 PROCEDURE — 76942 ECHO GUIDE FOR BIOPSY: CPT | Mod: S$GLB,,, | Performed by: PHYSICIAN ASSISTANT

## 2019-01-10 PROCEDURE — 99213 PR OFFICE/OUTPT VISIT, EST, LEVL III, 20-29 MIN: ICD-10-PCS | Mod: 25,S$GLB,, | Performed by: PHYSICIAN ASSISTANT

## 2019-01-10 PROCEDURE — 20551 PR INJECT TENDON ORIGIN/INSERT: ICD-10-PCS | Mod: FA,S$GLB,, | Performed by: PHYSICIAN ASSISTANT

## 2019-01-10 PROCEDURE — 3079F DIAST BP 80-89 MM HG: CPT | Mod: CPTII,S$GLB,, | Performed by: PHYSICIAN ASSISTANT

## 2019-01-10 PROCEDURE — 99999 PR PBB SHADOW E&M-EST. PATIENT-LVL IV: ICD-10-PCS | Mod: PBBFAC,,, | Performed by: PHYSICIAN ASSISTANT

## 2019-01-10 PROCEDURE — 3079F PR MOST RECENT DIASTOLIC BLOOD PRESSURE 80-89 MM HG: ICD-10-PCS | Mod: CPTII,S$GLB,, | Performed by: PHYSICIAN ASSISTANT

## 2019-01-10 PROCEDURE — 76942 PR U/S GUIDANCE FOR NEEDLE GUIDANCE: ICD-10-PCS | Mod: S$GLB,,, | Performed by: PHYSICIAN ASSISTANT

## 2019-01-10 PROCEDURE — 73130 X-RAY EXAM OF HAND: CPT | Mod: 26,LT,, | Performed by: RADIOLOGY

## 2019-01-10 PROCEDURE — 20551 NJX 1 TENDON ORIGIN/INSJ: CPT | Mod: FA,S$GLB,, | Performed by: PHYSICIAN ASSISTANT

## 2019-01-10 PROCEDURE — 3008F PR BODY MASS INDEX (BMI) DOCUMENTED: ICD-10-PCS | Mod: CPTII,S$GLB,, | Performed by: PHYSICIAN ASSISTANT

## 2019-01-10 PROCEDURE — 99213 OFFICE O/P EST LOW 20 MIN: CPT | Mod: 25,S$GLB,, | Performed by: PHYSICIAN ASSISTANT

## 2019-01-10 PROCEDURE — 73130 XR HAND COMPLETE 3 VIEW LEFT: ICD-10-PCS | Mod: 26,LT,, | Performed by: RADIOLOGY

## 2019-01-10 PROCEDURE — 99999 PR PBB SHADOW E&M-EST. PATIENT-LVL IV: CPT | Mod: PBBFAC,,, | Performed by: PHYSICIAN ASSISTANT

## 2019-01-10 PROCEDURE — 3008F BODY MASS INDEX DOCD: CPT | Mod: CPTII,S$GLB,, | Performed by: PHYSICIAN ASSISTANT

## 2019-01-10 RX ORDER — DEXAMETHASONE SODIUM PHOSPHATE 4 MG/ML
4 INJECTION, SOLUTION INTRA-ARTICULAR; INTRALESIONAL; INTRAMUSCULAR; INTRAVENOUS; SOFT TISSUE
Status: COMPLETED | OUTPATIENT
Start: 2019-01-10 | End: 2019-01-10

## 2019-01-10 RX ORDER — LIDOCAINE HYDROCHLORIDE 10 MG/ML
0.5 INJECTION, SOLUTION EPIDURAL; INFILTRATION; INTRACAUDAL; PERINEURAL
Status: COMPLETED | OUTPATIENT
Start: 2019-01-10 | End: 2019-01-10

## 2019-01-10 RX ADMIN — LIDOCAINE HYDROCHLORIDE 5 MG: 10 INJECTION, SOLUTION EPIDURAL; INFILTRATION; INTRACAUDAL; PERINEURAL at 09:01

## 2019-01-10 RX ADMIN — DEXAMETHASONE SODIUM PHOSPHATE 4 MG: 4 INJECTION, SOLUTION INTRA-ARTICULAR; INTRALESIONAL; INTRAMUSCULAR; INTRAVENOUS; SOFT TISSUE at 09:01

## 2019-01-10 NOTE — PROGRESS NOTES
"Subjective:      Patient ID: Adriana Paula is a 63 y.o. female.    Chief Complaint: Pain of the Left Hand and Pain of the Right Hand      HPI  Adriana Paula is a right hand dominant 63 y.o. female presenting today for left thumb pain.  There was not a history of trauma.  Onset of symptoms began 1 month ago. She reports that the thumb has started "sticking" when she bends it.  She is able to actively release it. She reports a history of a trigger thumb on the right hand, she believes it was surgically released.  She also has a history of right carpal tunnel release.  She was initially seen for right hand and wrist pain in 08/2018.  She has been treating right CMC arthritis with bracing and compound cream, noting some improvement in her pain.  She has also been using bilateral carpal tunnel braces at night, her finger numbness and tingling has improved with use of the braces.    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      Review of patient's allergies indicates:   Allergen Reactions    Keflex [cephalexin] Rash      blisters, fever    Penicillins Itching    Sulfa (sulfonamide antibiotics) Rash      blisters,fever             Current Outpatient Medications   Medication Sig Dispense Refill    amLODIPine (NORVASC) 10 MG tablet Take 1 tablet (10 mg total) by mouth once daily. 90 tablet 3    atorvastatin (LIPITOR) 40 MG tablet Take 1 tablet (40 mg total) by mouth once daily. 90 tablet 3    azithromycin (Z-DUANE) 250 MG tablet Take 2 tablets by mouth on day 1; Take 1 tablet by mouth on days 2-5 6 tablet 0    blood-glucose meter (ONETOUCH VERIO IQ METER) kit Use as instructed 200 each 11    diclofenac (VOLTAREN) 50 MG EC tablet Take 1 tablet (50 mg total) by mouth 2 (two) times daily as needed. 60 tablet 1    ergocalciferol (ERGOCALCIFEROL) 50,000 unit Cap Take 1 capsule (50,000 Units total) " "by mouth every 7 days. 4 capsule 2    fluticasone (FLONASE) 50 mcg/actuation nasal spray SHAKE LIQUID AND USE 1 SPRAY(50 MCG) IN EACH NOSTRIL EVERY DAY 16 mL 0    insulin degludec (TRESIBA FLEXTOUCH U-100) 100 unit/mL (3 mL) InPn Inject 60 Units into the skin once daily. 18 mL 2    lancets 33 gauge Misc 1 lancet by Misc.(Non-Drug; Combo Route) route 2 (two) times daily before meals. 200 each 11    levocetirizine (XYZAL) 5 MG tablet Take 1 tablet (5 mg total) by mouth once daily. 30 tablet 0    linagliptin (TRADJENTA) 5 mg Tab tablet Take 1 tablet (5 mg total) by mouth once daily. 30 tablet 2    losartan (COZAAR) 100 MG tablet Take 1 tablet (100 mg total) by mouth once daily. 90 tablet 3    metFORMIN (GLUCOPHAGE-XR) 500 MG 24 hr tablet Take 2 tablets (1,000 mg total) by mouth 2 (two) times daily with meals. 120 tablet 2    methocarbamol (ROBAXIN) 500 MG Tab Take 1 tablet (500 mg total) by mouth nightly as needed. 30 tablet 0    ONETOUCH VERIO Strp USE FOUR TIMES DAILY AS DIRECTED 200 strip 0    oxybutynin (DITROPAN) 5 MG Tab Take 1 tablet (5 mg total) by mouth 3 (three) times daily. 90 tablet 11    pen needle, diabetic (BD ULTRA-FINE SHORT PEN NEEDLE) 31 gauge x 5/16" Ndle Use as directed (two) times daily. 100 each 11    traMADol (ULTRAM) 50 mg tablet TAKE 1 TABLET(50 MG) BY MOUTH EVERY 6 HOURS AS NEEDED 60 tablet 0    aspirin (ECOTRIN) 81 MG EC tablet Take 81 mg by mouth once daily.      nystatin-triamcinolone (MYCOLOG II) cream Apply topically 3 (three) times daily. 1 Tube 2     No current facility-administered medications for this visit.        Past Medical History:   Diagnosis Date    Allergy     Anemia     Anxiety     Arthritis     Breast cyst     Cataract     DR (diabetic retinopathy)     Dyslipidemia     Essential hypertension 8/1/2012    Gastroesophageal reflux disease without esophagitis 2/3/2017    GERD (gastroesophageal reflux disease)     Glaucoma     Hypertension     Obesity " "(BMI 30-39.9) 10/24/2013    PN (peripheral neuropathy)     Sleep apnea     Type 2 diabetes mellitus with both eyes affected by mild nonproliferative retinopathy without macular edema, with long-term current use of insulin     Type 2 diabetes mellitus with diabetic polyneuropathy, with long-term current use of insulin     Type II or unspecified type diabetes mellitus with other specified manifestations, uncontrolled        Past Surgical History:   Procedure Laterality Date    ARTHROSCOPY, KNEE Right 2014    Performed [facility] at [facility] OR 2ND [facility]LR    BREAST CYST EXCISION Right     1980s    CARPAL TUNNEL RELEASE Right     CATARACT EXTRACTION      CATARACT EXTRACTION W/  INTRAOCULAR LENS IMPLANT Right 2017    Dr Brewster     SECTION      INSERTION-INTRAOCULAR LENS (IOL) Right 2017    Performed by [facility] at [facility] OR    INSERTION-INTRAOCULAR LENS (IOL) Left 3/17/2016    Performed by Ingrid [facility] at [facility] OR 1ST FLR    KNEE ARTHROSCOPY  14    right    MYOMECTOMY      PHACOEMULSIFICATION-ASPIRATION-CATARACT Right 2017    Performed by [facility] at [facility] OR    PHACOEMULSIFICATION-ASPIRATION-CATARACT Left 3/17/2016    Performed by Ingrid Wagner [facility] at [facility] OR 1ST FLR         Review of Systems:  Review of Systems   Constitution: Negative for chills and fever.   Skin: Negative for rash and suspicious lesions.   Musculoskeletal:        See HPI   Neurological: Negative for dizziness, headaches, light-headedness, numbness and paresthesias.   Psychiatric/Behavioral: Negative for depression. The patient is not nervous/anxious.          OBJECTIVE:     PHYSICAL EXAM:  Height: 5' 5" (165.1 cm) Weight: 89.8 kg (198 lb)  Vitals:    01/10/19 0802   BP: (!) 155/81   Pulse: 75   Weight: 89.8 kg (198 lb)   Height: 5' 5" (1.651 m)   PainSc:   7     General    Vitals reviewed.  Constitutional: She is oriented to person, place, and time. She appears " well-developed and well-nourished.   HENT:   Head: Normocephalic and atraumatic.   Neck: Normal range of motion.   Cardiovascular: Normal rate.    Pulmonary/Chest: Effort normal. No respiratory distress.   Neurological: She is alert and oriented to person, place, and time.   Psychiatric: She has a normal mood and affect. Her behavior is normal. Judgment and thought content normal.            Musculoskeletal:  Well-healed surgical scars right hand.  Tender to palpation over the left thumb A1 pulley.  Otherwise nontender to palpation over the hands and wrists bilaterally. Positive grind test on the right, negative on the left.  Neurovascularly intact-good sensation, however right hand sensation decreased compared to left in all distributions. Good equal motor function, good capillary refill, 2+ radial pulses.      RADIOGRAPHS:  Left Hand X-Ray, 1/10/19  FINDINGS:  DJD changes 1st MCP, 1st metacarpal-carpal and radiocarpal joint particularly medially and dorsally.      Impression     No fracture dislocation     Comments: I have personally reviewed the imaging and I agree with the above radiologist's report.      EMG, 10/2018:  Impression   This is an abnormal study. There is electrophysiologic evidence of:   1. Mild bilateral carpal tunnel syndrome, comparatively worse on the left;   2. 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     ASSESSMENT/PLAN:   Adriana was seen today for pain and pain.    Diagnoses and all orders for this visit:    Trigger finger of left thumb  -     X-Ray Hand 3 View Left; Future    Other orders  -     lidocaine (PF) 10 mg/ml (1%) injection 5 mg  -     dexamethasone injection 4 mg           - We talked at length about the anatomy and pathophysiology of   Encounter Diagnosis   Name Primary?    Trigger finger of left thumb Yes       - discussed patient's symptoms and physical exam findings, discussed trigger finger.  Discussed treatment  options including bracing, NSAIDs, massage/therapy, steroid injection, and surgery.  - left hand x-ray today  - steroid injection today left thumb trigger finger  - Follow up in 8 weeks    PROCEDURE NOTE:  I have explained the risks, benefits, and alternatives of the procedure in detail.  The patient voices understanding and all questions have been answered, consents to injection. Pause for timeout.  After a steril prep of the skin in the normal fashion, the level of the A-1 pulley of the left thumb is injected using a 25 gauge needle from the volar approach with a  combination of 0.5 cc 1% plain Lidocaine and 4 mg of dexamethasone.  The patient is cautioned and immediate relief of pain is secondary to the local anesthetic and will be temporary.  After the anesthetic wears off there may be a increase in pain that may last for a few hours or a few days and they should use ice to help alleviate this flair up of pain.   Ultrasound was utilized for this injection for improved visualization.        Disclaimer: This note has been generated using voice-recognition software. There may be typographical errors that have been missed during proof-reading.

## 2019-01-17 ENCOUNTER — TELEPHONE (OUTPATIENT)
Dept: ORTHOPEDICS | Facility: CLINIC | Age: 64
End: 2019-01-17

## 2019-01-17 ENCOUNTER — PATIENT MESSAGE (OUTPATIENT)
Dept: ORTHOPEDICS | Facility: CLINIC | Age: 64
End: 2019-01-17

## 2019-01-22 ENCOUNTER — OFFICE VISIT (OUTPATIENT)
Dept: INTERNAL MEDICINE | Facility: CLINIC | Age: 64
End: 2019-01-22
Payer: COMMERCIAL

## 2019-01-22 VITALS
RESPIRATION RATE: 20 BRPM | SYSTOLIC BLOOD PRESSURE: 110 MMHG | BODY MASS INDEX: 33.5 KG/M2 | TEMPERATURE: 99 F | DIASTOLIC BLOOD PRESSURE: 62 MMHG | HEART RATE: 76 BPM | HEIGHT: 65 IN | WEIGHT: 201.06 LBS

## 2019-01-22 DIAGNOSIS — E11.59 HYPERTENSION ASSOCIATED WITH DIABETES: ICD-10-CM

## 2019-01-22 DIAGNOSIS — I15.2 HYPERTENSION ASSOCIATED WITH DIABETES: ICD-10-CM

## 2019-01-22 DIAGNOSIS — E11.65 TYPE 2 DIABETES MELLITUS WITH HYPERGLYCEMIA, WITH LONG-TERM CURRENT USE OF INSULIN: Primary | Chronic | ICD-10-CM

## 2019-01-22 DIAGNOSIS — Z79.4 TYPE 2 DIABETES MELLITUS WITH HYPERGLYCEMIA, WITH LONG-TERM CURRENT USE OF INSULIN: Primary | Chronic | ICD-10-CM

## 2019-01-22 DIAGNOSIS — M17.0 PRIMARY OSTEOARTHRITIS OF BOTH KNEES: ICD-10-CM

## 2019-01-22 PROCEDURE — 3078F DIAST BP <80 MM HG: CPT | Mod: CPTII,S$GLB,, | Performed by: INTERNAL MEDICINE

## 2019-01-22 PROCEDURE — 99999 PR PBB SHADOW E&M-EST. PATIENT-LVL III: ICD-10-PCS | Mod: PBBFAC,,, | Performed by: INTERNAL MEDICINE

## 2019-01-22 PROCEDURE — 99214 PR OFFICE/OUTPT VISIT, EST, LEVL IV, 30-39 MIN: ICD-10-PCS | Mod: S$GLB,,, | Performed by: INTERNAL MEDICINE

## 2019-01-22 PROCEDURE — 99214 OFFICE O/P EST MOD 30 MIN: CPT | Mod: S$GLB,,, | Performed by: INTERNAL MEDICINE

## 2019-01-22 PROCEDURE — 3046F HEMOGLOBIN A1C LEVEL >9.0%: CPT | Mod: CPTII,S$GLB,, | Performed by: INTERNAL MEDICINE

## 2019-01-22 PROCEDURE — 3008F BODY MASS INDEX DOCD: CPT | Mod: CPTII,S$GLB,, | Performed by: INTERNAL MEDICINE

## 2019-01-22 PROCEDURE — 3008F PR BODY MASS INDEX (BMI) DOCUMENTED: ICD-10-PCS | Mod: CPTII,S$GLB,, | Performed by: INTERNAL MEDICINE

## 2019-01-22 PROCEDURE — 3046F PR MOST RECENT HEMOGLOBIN A1C LEVEL > 9.0%: ICD-10-PCS | Mod: CPTII,S$GLB,, | Performed by: INTERNAL MEDICINE

## 2019-01-22 PROCEDURE — 99999 PR PBB SHADOW E&M-EST. PATIENT-LVL III: CPT | Mod: PBBFAC,,, | Performed by: INTERNAL MEDICINE

## 2019-01-22 PROCEDURE — 3074F PR MOST RECENT SYSTOLIC BLOOD PRESSURE < 130 MM HG: ICD-10-PCS | Mod: CPTII,S$GLB,, | Performed by: INTERNAL MEDICINE

## 2019-01-22 PROCEDURE — 3078F PR MOST RECENT DIASTOLIC BLOOD PRESSURE < 80 MM HG: ICD-10-PCS | Mod: CPTII,S$GLB,, | Performed by: INTERNAL MEDICINE

## 2019-01-22 PROCEDURE — 3074F SYST BP LT 130 MM HG: CPT | Mod: CPTII,S$GLB,, | Performed by: INTERNAL MEDICINE

## 2019-01-22 RX ORDER — DICLOFENAC SODIUM 10 MG/G
GEL TOPICAL
COMMUNITY
Start: 2018-11-02 | End: 2019-10-07

## 2019-01-22 RX ORDER — METFORMIN HYDROCHLORIDE 500 MG/1
1000 TABLET, EXTENDED RELEASE ORAL 2 TIMES DAILY WITH MEALS
Qty: 120 TABLET | Refills: 2 | Status: SHIPPED | OUTPATIENT
Start: 2019-01-22 | End: 2019-05-13 | Stop reason: SDUPTHER

## 2019-01-22 RX ORDER — LIDOCAINE AND PRILOCAINE 25; 25 MG/G; MG/G
CREAM TOPICAL
COMMUNITY
Start: 2018-11-02 | End: 2019-10-07

## 2019-01-22 RX ORDER — TRAMADOL HYDROCHLORIDE 50 MG/1
50 TABLET ORAL EVERY 12 HOURS PRN
Qty: 60 TABLET | Refills: 0 | Status: SHIPPED | OUTPATIENT
Start: 2019-01-22 | End: 2019-03-24 | Stop reason: SDUPTHER

## 2019-01-22 NOTE — PROGRESS NOTES
Subjective:       Patient ID: Adriana Paula is a 63 y.o. female who presents for Follow-up; Diabetes; Knee Pain; and Hypertension      Diabetes   She presents for her follow-up diabetic visit. She has type 2 diabetes mellitus. Her disease course has been stable. Hypoglycemia symptoms include tremors. Pertinent negatives for hypoglycemia include no dizziness, headaches or nervousness/anxiousness. (Occurred once) Pertinent negatives for diabetes include no chest pain, no polydipsia, no polyuria and no weakness. There are no hypoglycemic complications. Diabetic complications include peripheral neuropathy and retinopathy. Risk factors for coronary artery disease include diabetes mellitus, dyslipidemia and hypertension. She is compliant with treatment all of the time. She is following a generally healthy diet. She rarely participates in exercise. There is no change in her home blood glucose trend. Her breakfast blood glucose range is generally  mg/dl. An ACE inhibitor/angiotensin II receptor blocker is being taken.   Knee Pain    The incident occurred more than 1 week ago. There was no injury mechanism. The pain is present in the left knee and right knee. The pain is moderate. The pain has been intermittent since onset. Pertinent negatives include no inability to bear weight, muscle weakness or numbness. The symptoms are aggravated by weight bearing and palpation. She has tried NSAIDs for the symptoms. The treatment provided mild relief.   Hypertension   This is a chronic problem. The current episode started more than 1 year ago. The problem is unchanged. The problem is controlled. Pertinent negatives include no chest pain, headaches, palpitations, peripheral edema or shortness of breath. There are no associated agents to hypertension. Risk factors for coronary artery disease include diabetes mellitus, dyslipidemia and sedentary lifestyle. Past treatments include calcium channel blockers and angiotensin  blockers. The current treatment provides moderate improvement. Hypertensive end-organ damage includes retinopathy. There is no history of chronic renal disease.       Review of Systems   Constitutional: Negative for chills and fever.   HENT: Negative for congestion, rhinorrhea and sinus pressure.    Eyes: Negative for redness and visual disturbance.   Respiratory: Negative for cough and shortness of breath.    Cardiovascular: Negative for chest pain, palpitations and leg swelling.   Gastrointestinal: Negative for abdominal pain, diarrhea, nausea and vomiting.   Endocrine: Negative for polydipsia and polyuria.   Genitourinary: Negative for dysuria, flank pain and hematuria.   Musculoskeletal: Positive for arthralgias (bilateral knee pain). Negative for myalgias.   Skin: Negative for rash.   Neurological: Positive for tremors. Negative for dizziness, weakness, numbness and headaches.   Psychiatric/Behavioral: Negative for dysphoric mood. The patient is not nervous/anxious.        Objective:      Physical Exam   Constitutional: She is oriented to person, place, and time. Vital signs are normal. She appears well-developed and well-nourished. No distress.   HENT:   Head: Normocephalic and atraumatic.   Right Ear: Hearing and external ear normal.   Left Ear: Hearing and external ear normal.   Nose: Nose normal.   Mouth/Throat: Uvula is midline and mucous membranes are normal.   Eyes: Lids are normal.   Cardiovascular: Normal rate, regular rhythm, normal heart sounds and intact distal pulses.   No murmur heard.  Pulmonary/Chest: Effort normal and breath sounds normal. She has no wheezes.   Abdominal: Soft. Bowel sounds are normal. She exhibits no distension. There is no tenderness.   Musculoskeletal: Normal range of motion. She exhibits no edema.   Neurological: She is alert and oriented to person, place, and time.   Skin: Skin is warm, dry and intact. No rash noted. She is not diaphoretic.   Psychiatric: She has a normal  mood and affect.   Vitals reviewed.      Assessment/Plan:       1. Type 2 diabetes mellitus with hyperglycemia, with long-term current use of insulin  - metFORMIN (GLUCOPHAGE-XR) 500 MG 24 hr tablet; Take 2 tablets (1,000 mg total) by mouth 2 (two) times daily with meals.  Dispense: 120 tablet; Refill: 2  - flash glucose scanning reader (FREESTYLE WINSOME 10 DAY READER) Misc; 1 each by Misc.(Non-Drug; Combo Route) route once. for 1 dose  Dispense: 1 each; Refill: 0  - flash glucose sensor (FREESTYLE WINSOME 10 DAY SENSOR) Kit; 1 each by Misc.(Non-Drug; Combo Route) route continuous prn (every 10 days).  Dispense: 3 kit; Refill: 5    2. Primary osteoarthritis of both knees  - VOLTAREN 1 % Gel  - traMADol (ULTRAM) 50 mg tablet; Take 1 tablet (50 mg total) by mouth every 12 (twelve) hours as needed for Pain.  Dispense: 60 tablet; Refill: 0    3. Hypertension associated with diabetes  - controlled, continue losartan and amlodipine    RTC in March for annual exam or sooner if needed    Nicole Márquez MD

## 2019-01-29 ENCOUNTER — PATIENT MESSAGE (OUTPATIENT)
Dept: INTERNAL MEDICINE | Facility: CLINIC | Age: 64
End: 2019-01-29

## 2019-01-31 ENCOUNTER — PATIENT MESSAGE (OUTPATIENT)
Dept: INTERNAL MEDICINE | Facility: CLINIC | Age: 64
End: 2019-01-31

## 2019-02-05 ENCOUNTER — TELEPHONE (OUTPATIENT)
Dept: OBSTETRICS AND GYNECOLOGY | Facility: CLINIC | Age: 64
End: 2019-02-05

## 2019-02-05 ENCOUNTER — PATIENT MESSAGE (OUTPATIENT)
Dept: OBSTETRICS AND GYNECOLOGY | Facility: CLINIC | Age: 64
End: 2019-02-05

## 2019-02-05 DIAGNOSIS — Z12.39 BREAST SCREENING: Primary | ICD-10-CM

## 2019-02-14 ENCOUNTER — HOSPITAL ENCOUNTER (OUTPATIENT)
Dept: RADIOLOGY | Facility: OTHER | Age: 64
Discharge: HOME | End: 2019-02-14
Attending: OBSTETRICS & GYNECOLOGY
Payer: COMMERCIAL

## 2019-02-14 DIAGNOSIS — J01.00 ACUTE NON-RECURRENT MAXILLARY SINUSITIS: ICD-10-CM

## 2019-02-14 DIAGNOSIS — Z12.39 BREAST SCREENING: ICD-10-CM

## 2019-02-14 PROCEDURE — 77063 MAMMO DIGITAL SCREENING BILAT WITH TOMOSYNTHESIS_CAD: ICD-10-PCS | Mod: 26,,, | Performed by: RADIOLOGY

## 2019-02-14 PROCEDURE — 77063 BREAST TOMOSYNTHESIS BI: CPT | Mod: 26,,, | Performed by: RADIOLOGY

## 2019-02-14 PROCEDURE — 77067 SCR MAMMO BI INCL CAD: CPT | Mod: TC

## 2019-02-14 PROCEDURE — 77067 SCR MAMMO BI INCL CAD: CPT | Mod: 26,,, | Performed by: RADIOLOGY

## 2019-02-14 PROCEDURE — 77067 MAMMO DIGITAL SCREENING BILAT WITH TOMOSYNTHESIS_CAD: ICD-10-PCS | Mod: 26,,, | Performed by: RADIOLOGY

## 2019-02-14 RX ORDER — FLUTICASONE PROPIONATE 50 MCG
SPRAY, SUSPENSION (ML) NASAL
Qty: 16 ML | Refills: 0 | Status: SHIPPED | OUTPATIENT
Start: 2019-02-14 | End: 2019-12-20

## 2019-02-22 NOTE — TELEPHONE ENCOUNTER
Changed from Januvia to Tradjenta for PA reasons   Helical Rim Advancement Flap Text: The defect edges were debeveled with a #15 blade scalpel.  Given the location of the defect and the proximity to free margins (helical rim) a double helical rim advancement flap was deemed most appropriate.  Using a sterile surgical marker, the appropriate advancement flaps were drawn incorporating the defect and placing the expected incisions between the helical rim and antihelix where possible.  The area thus outlined was incised through and through with a #15 scalpel blade.  With a skin hook and iris scissors, the flaps were gently and sharply undermined and freed up.

## 2019-03-07 ENCOUNTER — OFFICE VISIT (OUTPATIENT)
Dept: ORTHOPEDICS | Facility: CLINIC | Age: 64
End: 2019-03-07
Payer: COMMERCIAL

## 2019-03-07 VITALS
SYSTOLIC BLOOD PRESSURE: 134 MMHG | HEIGHT: 65 IN | BODY MASS INDEX: 33.49 KG/M2 | HEART RATE: 80 BPM | DIASTOLIC BLOOD PRESSURE: 76 MMHG | WEIGHT: 201 LBS

## 2019-03-07 DIAGNOSIS — M65.312 TRIGGER FINGER OF LEFT THUMB: Primary | ICD-10-CM

## 2019-03-07 PROCEDURE — 3008F PR BODY MASS INDEX (BMI) DOCUMENTED: ICD-10-PCS | Mod: CPTII,S$GLB,, | Performed by: PHYSICIAN ASSISTANT

## 2019-03-07 PROCEDURE — 99999 PR PBB SHADOW E&M-EST. PATIENT-LVL V: ICD-10-PCS | Mod: PBBFAC,,, | Performed by: PHYSICIAN ASSISTANT

## 2019-03-07 PROCEDURE — 3075F PR MOST RECENT SYSTOLIC BLOOD PRESS GE 130-139MM HG: ICD-10-PCS | Mod: CPTII,S$GLB,, | Performed by: PHYSICIAN ASSISTANT

## 2019-03-07 PROCEDURE — 99214 PR OFFICE/OUTPT VISIT, EST, LEVL IV, 30-39 MIN: ICD-10-PCS | Mod: S$GLB,,, | Performed by: PHYSICIAN ASSISTANT

## 2019-03-07 PROCEDURE — 99999 PR PBB SHADOW E&M-EST. PATIENT-LVL V: CPT | Mod: PBBFAC,,, | Performed by: PHYSICIAN ASSISTANT

## 2019-03-07 PROCEDURE — 99214 OFFICE O/P EST MOD 30 MIN: CPT | Mod: S$GLB,,, | Performed by: PHYSICIAN ASSISTANT

## 2019-03-07 PROCEDURE — 3078F DIAST BP <80 MM HG: CPT | Mod: CPTII,S$GLB,, | Performed by: PHYSICIAN ASSISTANT

## 2019-03-07 PROCEDURE — 3075F SYST BP GE 130 - 139MM HG: CPT | Mod: CPTII,S$GLB,, | Performed by: PHYSICIAN ASSISTANT

## 2019-03-07 PROCEDURE — 3078F PR MOST RECENT DIASTOLIC BLOOD PRESSURE < 80 MM HG: ICD-10-PCS | Mod: CPTII,S$GLB,, | Performed by: PHYSICIAN ASSISTANT

## 2019-03-07 PROCEDURE — 3008F BODY MASS INDEX DOCD: CPT | Mod: CPTII,S$GLB,, | Performed by: PHYSICIAN ASSISTANT

## 2019-03-07 NOTE — PROGRESS NOTES
"Subjective:      Patient ID: Adriana Paula is a 63 y.o. female.    Chief Complaint: Pain of the Left Hand      HPI  Adriana Paula is a right hand dominant 63 y.o. female presenting today for follow up of left thumb pain.  She underwent steroid injection for trigger thumb at her last visit, she reports that it is not improved.  She reports that she has decreased use of the left hand due to pain and triggering when she moves the left thumb.  She reports that last trigger 1 hr ago.  She reports significant pain with triggering.  She has been using oral diclofenac and compound cream to aid in her pain, she uses these for general arthritis pain.    There was not a history of trauma.  Onset of symptoms began 12/2018. She reports that the thumb has started "sticking" when she bends it.  She is able to actively release it. She reports a history of a trigger thumb on the right hand, she believes it was surgically released.  She also has a history of right carpal tunnel release.  She was initially seen for right hand and wrist pain in 08/2018.  She has been treating right CMC arthritis with bracing and compound cream, noting some improvement in her pain.  She has also been using bilateral carpal tunnel braces at night, her finger numbness and tingling has improved with use of the braces.    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      Review of patient's allergies indicates:   Allergen Reactions    Keflex [cephalexin] Rash      blisters, fever    Penicillins Itching    Sulfa (sulfonamide antibiotics) Rash      blisters,fever             Current Outpatient Medications   Medication Sig Dispense Refill    amLODIPine (NORVASC) 10 MG tablet Take 1 tablet (10 mg total) by mouth once daily. 90 tablet 3    atorvastatin (LIPITOR) 40 MG tablet Take 1 tablet (40 mg total) by mouth once daily. 90 tablet 3 " "   blood-glucose meter (ONETOUCH VERIO IQ METER) kit Use as instructed 200 each 11    ergocalciferol (ERGOCALCIFEROL) 50,000 unit Cap Take 1 capsule (50,000 Units total) by mouth every 7 days. 4 capsule 2    flash glucose sensor (FREESTYLE WINSOME 10 DAY SENSOR) Kit 1 each by Misc.(Non-Drug; Combo Route) route continuous prn (every 10 days). 3 kit 5    fluticasone (FLONASE) 50 mcg/actuation nasal spray SHAKE LIQUID AND USE 1 SPRAY(50 MCG) IN EACH NOSTRIL EVERY DAY 16 mL 0    insulin degludec (TRESIBA FLEXTOUCH U-100) 100 unit/mL (3 mL) InPn Inject 60 Units into the skin once daily. 18 mL 2    lancets 33 gauge Misc 1 lancet by Misc.(Non-Drug; Combo Route) route 2 (two) times daily before meals. 200 each 11    levocetirizine (XYZAL) 5 MG tablet Take 1 tablet (5 mg total) by mouth once daily. 30 tablet 0    lidocaine-prilocaine (EMLA) cream       linagliptin (TRADJENTA) 5 mg Tab tablet Take 1 tablet (5 mg total) by mouth once daily. 30 tablet 2    losartan (COZAAR) 100 MG tablet Take 1 tablet (100 mg total) by mouth once daily. 90 tablet 3    metFORMIN (GLUCOPHAGE-XR) 500 MG 24 hr tablet Take 2 tablets (1,000 mg total) by mouth 2 (two) times daily with meals. 120 tablet 2    methocarbamol (ROBAXIN) 500 MG Tab Take 1 tablet (500 mg total) by mouth nightly as needed. 30 tablet 0    ONETOUCH VERIO Strp USE FOUR TIMES DAILY AS DIRECTED 200 strip 0    oxybutynin (DITROPAN) 5 MG Tab Take 1 tablet (5 mg total) by mouth 3 (three) times daily. 90 tablet 11    pen needle, diabetic (BD ULTRA-FINE SHORT PEN NEEDLE) 31 gauge x 5/16" Ndle Use as directed (two) times daily. 100 each 11    traMADol (ULTRAM) 50 mg tablet Take 1 tablet (50 mg total) by mouth every 12 (twelve) hours as needed for Pain. 60 tablet 0    VOLTAREN 1 % Gel       aspirin (ECOTRIN) 81 MG EC tablet Take 81 mg by mouth once daily.      nystatin-triamcinolone (MYCOLOG II) cream Apply topically 3 (three) times daily. 1 Tube 2     No current " facility-administered medications for this visit.        Past Medical History:   Diagnosis Date    Allergy     Anemia     Anxiety     Arthritis     Breast cyst     Cataract     DR (diabetic retinopathy)     Dyslipidemia     Essential hypertension 2012    Gastroesophageal reflux disease without esophagitis 2/3/2017    GERD (gastroesophageal reflux disease)     Glaucoma     Hypertension     Obesity (BMI 30-39.9) 10/24/2013    PN (peripheral neuropathy)     Sleep apnea     Type 2 diabetes mellitus with both eyes affected by mild nonproliferative retinopathy without macular edema, with long-term current use of insulin     Type 2 diabetes mellitus with diabetic polyneuropathy, with long-term current use of insulin     Type II or unspecified type diabetes mellitus with other specified manifestations, uncontrolled        Past Surgical History:   Procedure Laterality Date    ARTHROSCOPY, KNEE Right 2014    Performed [facility] at [facility] OR 2ND FLR    BREAST CYST EXCISION Right     1980s    CARPAL TUNNEL RELEASE Right     CATARACT EXTRACTION      CATARACT EXTRACTION W/  INTRAOCULAR LENS IMPLANT Right 2017    Dr Brewster     SECTION      INSERTION-INTRAOCULAR LENS (IOL) Right 2017    [facility]    INSERTION-INTRAOCULAR LENS (IOL) Left 3/17/2016    Performed by In[facility] at [facility] OR 1ST FLR    KNEE ARTHROSCOPY  14    right    MYOMECTOMY      PHACOEMULSIFICATION-ASPIRATION-CATARACT Right 2017    [facility]    PHACOEMULSIFICATION-ASPIRATION-CATARACT Left 3/17/2016    Performed by Ingrid Wagner [facility] at [facility] OR 1ST FLR         Review of Systems:  Review of Systems   Constitution: Negative for chills and fever.   Skin: Negative for rash and suspicious lesions.   Musculoskeletal:        See HPI   Neurological: Negative for dizziness, headaches, light-headedness, numbness and  "paresthesias.   Psychiatric/Behavioral: Negative for depression. The patient is not nervous/anxious.          OBJECTIVE:     PHYSICAL EXAM:  Height: 5' 5" (165.1 cm) Weight: 91.2 kg (201 lb)  Vitals:    03/07/19 1450   BP: 134/76   Pulse: 80   Weight: 91.2 kg (201 lb)   Height: 5' 5" (1.651 m)   PainSc:   5     General    Vitals reviewed.  Constitutional: She is oriented to person, place, and time. She appears well-developed and well-nourished.   HENT:   Head: Normocephalic and atraumatic.   Neck: Normal range of motion.   Cardiovascular: Normal rate.    Pulmonary/Chest: Effort normal. No respiratory distress.   Neurological: She is alert and oriented to person, place, and time.   Psychiatric: She has a normal mood and affect. Her behavior is normal. Judgment and thought content normal.            Musculoskeletal:  Well-healed surgical scars right hand.  Tender to palpation over the left thumb A1 pulley. Palpable thickening, triggering on exam today. Otherwise nontender to palpation over the hands and wrists bilaterally. Positive grind test on the right, negative on the left.  Neurovascularly intact-good sensation, however right hand sensation decreased compared to left in all distributions. Good equal motor function, good capillary refill, 2+ radial pulses.      RADIOGRAPHS:  Left Hand X-Ray, 1/10/19  FINDINGS:  DJD changes 1st MCP, 1st metacarpal-carpal and radiocarpal joint particularly medially and dorsally.      Impression     No fracture dislocation     Comments: I have personally reviewed the imaging and I agree with the above radiologist's report.      EMG, 10/2018:  Impression   This is an abnormal study. There is electrophysiologic evidence of:   1. Mild bilateral carpal tunnel syndrome, comparatively worse on the left;   2. 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     ASSESSMENT/PLAN:   Adriana was seen today for pain.    Diagnoses and all orders " for this visit:    Trigger finger of left thumb  -     Ambulatory Referral to Physical/Occupational Therapy           - We talked at length about the anatomy and pathophysiology of   Encounter Diagnosis   Name Primary?    Trigger finger of left thumb Yes       - discussed patient's symptoms and physical exam findings, discussed trigger finger.  Discussed treatment options including bracing, NSAIDs, massage/therapy, steroid injection, and surgery. She is not interested in repeat injection.  - Orders for OT, custom brace  - Discussed options, patient would like to try OT and schedule surgery for next month, in case it is not improving with OT.  Indications and risks of the procedure were discussed with the patient in detail. Her questions were answered. Consent forms signed today in clinic.  She is not interested in carpal tunnel release at this time.          Disclaimer: This note has been generated using voice-recognition software. There may be typographical errors that have been missed during proof-reading.

## 2019-03-08 ENCOUNTER — OFFICE VISIT (OUTPATIENT)
Dept: URGENT CARE | Facility: CLINIC | Age: 64
End: 2019-03-08
Payer: COMMERCIAL

## 2019-03-08 ENCOUNTER — TELEPHONE (OUTPATIENT)
Dept: ORTHOPEDICS | Facility: CLINIC | Age: 64
End: 2019-03-08

## 2019-03-08 VITALS
DIASTOLIC BLOOD PRESSURE: 71 MMHG | RESPIRATION RATE: 16 BRPM | WEIGHT: 201 LBS | OXYGEN SATURATION: 100 % | TEMPERATURE: 98 F | HEART RATE: 86 BPM | BODY MASS INDEX: 33.49 KG/M2 | HEIGHT: 65 IN | SYSTOLIC BLOOD PRESSURE: 133 MMHG

## 2019-03-08 DIAGNOSIS — L02.411 CUTANEOUS ABSCESS OF RIGHT AXILLA: Primary | ICD-10-CM

## 2019-03-08 PROCEDURE — 3078F PR MOST RECENT DIASTOLIC BLOOD PRESSURE < 80 MM HG: ICD-10-PCS | Mod: CPTII,S$GLB,, | Performed by: EMERGENCY MEDICINE

## 2019-03-08 PROCEDURE — 3008F BODY MASS INDEX DOCD: CPT | Mod: CPTII,S$GLB,, | Performed by: EMERGENCY MEDICINE

## 2019-03-08 PROCEDURE — 3008F PR BODY MASS INDEX (BMI) DOCUMENTED: ICD-10-PCS | Mod: CPTII,S$GLB,, | Performed by: EMERGENCY MEDICINE

## 2019-03-08 PROCEDURE — 99214 OFFICE O/P EST MOD 30 MIN: CPT | Mod: S$GLB,,, | Performed by: EMERGENCY MEDICINE

## 2019-03-08 PROCEDURE — 3078F DIAST BP <80 MM HG: CPT | Mod: CPTII,S$GLB,, | Performed by: EMERGENCY MEDICINE

## 2019-03-08 PROCEDURE — 99214 PR OFFICE/OUTPT VISIT, EST, LEVL IV, 30-39 MIN: ICD-10-PCS | Mod: S$GLB,,, | Performed by: EMERGENCY MEDICINE

## 2019-03-08 PROCEDURE — 3075F PR MOST RECENT SYSTOLIC BLOOD PRESS GE 130-139MM HG: ICD-10-PCS | Mod: CPTII,S$GLB,, | Performed by: EMERGENCY MEDICINE

## 2019-03-08 PROCEDURE — 3075F SYST BP GE 130 - 139MM HG: CPT | Mod: CPTII,S$GLB,, | Performed by: EMERGENCY MEDICINE

## 2019-03-08 RX ORDER — MUPIROCIN 20 MG/G
OINTMENT TOPICAL
Qty: 22 G | Refills: 1 | Status: SHIPPED | OUTPATIENT
Start: 2019-03-08 | End: 2019-04-11

## 2019-03-08 RX ORDER — HYDROCODONE BITARTRATE AND ACETAMINOPHEN 5; 325 MG/1; MG/1
1 TABLET ORAL EVERY 6 HOURS PRN
Qty: 9 TABLET | Refills: 0 | Status: SHIPPED | OUTPATIENT
Start: 2019-03-08 | End: 2019-03-25 | Stop reason: ALTCHOICE

## 2019-03-08 RX ORDER — CLINDAMYCIN HYDROCHLORIDE 150 MG/1
300 CAPSULE ORAL EVERY 6 HOURS
Qty: 28 CAPSULE | Refills: 0 | Status: SHIPPED | OUTPATIENT
Start: 2019-03-08 | End: 2019-03-15

## 2019-03-08 NOTE — TELEPHONE ENCOUNTER
----- Message from Shayy Reed sent at 3/8/2019  9:57 AM CST -----  Contact: Pt  Name of Who is CallingNALDO BARKSDALE [[id number]]:       What is the request in detail: Pt is requesting a call back from clinical team in regard to not doing PT. Pt just would like the surgery  Please contact to further discuss and advise.          Can the clinic reply by [facility]:       [facility] Back [facility][phone]

## 2019-03-09 NOTE — PATIENT INSTRUCTIONS
Go to the Emergency Room if symptoms or condition worsens in any way    Return to Clinic if symptoms do not improve      Abscess (Antibiotic Treatment Only)  An abscess (sometimes called a boil) happens when bacteria get trapped under the skin and start to grow. Pus forms inside the abscess as the body responds to the bacteria. An abscess can happen with an insect bite, ingrown hair, blocked oil gland, pimple, cyst, or puncture wound.  In the early stages, your wound may be red and tender. For this stage, you may get antibiotics. If the abscess does not get better with antibiotics, it will need to be drained with a small cut.  Home care  These tips will help you care for your abscess at home:  · Soak the wound in hot water or apply hot packs (small towel soaked in hot water) to the area for 20 minutes at a time. Do this 3 to 4 times a day.  · Do not cut, squeeze, or pop the boil yourself.  · Apply antibiotic cream or ointment to the skin 3 to 4 times a day, unless something else was prescribed. Some ointments include an antibiotic plus a pain reliever.  · If your doctor prescribed antibiotics, do not stop taking them until you have finished the medicine or the doctor tells you to stop.  · You may use an over-the-counter pain medicine to control pain, unless another pain medicine was prescribed. If you have chronic liver or kidney disease or ever had a stomach ulcer or gastrointestinal bleeding, talk with your doctor before using these any of these.  Follow-up care  Follow up with your healthcare provider, or as advised. Check your wound each day for the signs of worsening infection listed below.  When to seek medical advice  Get prompt medical attention if any of these occur:  · An increase in redness or swelling  · Red streaks in the skin leading away from the abscess  · An increase in local pain or swelling  · Fever of 100.4ºF (38ºC) or higher, or as directed by your healthcare provider  · Pus or fluid coming  from the abscess  · Boil returns after getting better  Date Last Reviewed: 9/1/2016  © 2241-5741 [employer]. [address] [place] [address], [place] [address]. All rights reserved. This information is not intended as a substitute for professional medical care. Always follow your healthcare professional's instructions.

## 2019-03-09 NOTE — PROGRESS NOTES
"Subjective:       Patient ID: Adriana Paula is a 63 y.o. female.    Vitals:    03/08/19 1819   BP: 133/71   Pulse: 86   Resp: 16   Temp: 98.1 °F (36.7 °C)   TempSrc: Oral   SpO2: 100%   Weight: 91.2 kg (201 lb)   Height: 5' 5" (1.651 m)       Chief Complaint: Abscess    Pt states she noticed a pea size pimple under right arm pit x3 weeks. Pt states x6 days size has increased to a quarter size and draining.       Abscess   Chronicity:  NewProgression Since Onset: gradually worsening  Location:  Shoulder/arm  Associated Symptoms: no fever, no chills  Characteristics: painful, redness and swelling    Pain Scale:  8/10  Treatments Tried:  Topical antibiotics    Review of Systems   Constitution: Negative for chills and fever.   HENT: Negative for sore throat.    Eyes: Negative for blurred vision.   Cardiovascular: Negative for chest pain.   Respiratory: Negative for shortness of breath.    Skin: Negative for rash.   Musculoskeletal: Negative for back pain and joint pain.   Gastrointestinal: Negative for abdominal pain, diarrhea, nausea and vomiting.   Neurological: Negative for headaches.   Psychiatric/Behavioral: The patient is not nervous/anxious.        Objective:      Physical Exam   Constitutional: She is oriented to person, place, and time. She appears well-developed and well-nourished.   HENT:   Head: Normocephalic and atraumatic. Head is without abrasion, without contusion and without laceration.   Right Ear: External ear normal.   Left Ear: External ear normal.   Nose: Nose normal.   Mouth/Throat: Oropharynx is clear and moist.   Eyes: Conjunctivae, EOM and lids are normal. Pupils are equal, round, and reactive to light.   Neck: Trachea normal, full passive range of motion without pain and phonation normal. Neck supple.   Cardiovascular: Normal rate, regular rhythm and normal heart sounds.   Pulmonary/Chest: Effort normal and breath sounds normal. No stridor. No respiratory distress.   Musculoskeletal: " Normal range of motion.   Neurological: She is alert and oriented to person, place, and time.   Skin: Skin is warm, dry and intact. Capillary refill takes less than 2 seconds. No abrasion, no bruising, no burn, no ecchymosis, no laceration, no lesion and no rash noted. No erythema.        Patient has a 3 cm wound under the right axilla there is active drainage there is slight surrounding edema with no induration or fluctuance there is no erythema wound shows moderate tenderness to palpation  Patient is neurovascularly intact distally to the right arm      Wound is not amenable to incision and drainage   Psychiatric: She has a normal mood and affect. Her speech is normal and behavior is normal. Judgment and thought content normal. Cognition and memory are normal.   Nursing note and vitals reviewed.      Assessment:       1. Cutaneous abscess of right axilla        Plan:       Adriana was seen today for abscess.    Diagnoses and all orders for this visit:    Cutaneous abscess of right axilla    Other orders  -     clindamycin (CLEOCIN HCL) 150 MG capsule; Take 2 capsules (300 mg total) by mouth every 6 (six) hours. for 7 days  -     HYDROcodone-acetaminophen (NORCO) 5-325 mg per tablet; Take 1 tablet by mouth every 6 (six) hours as needed for Pain.  -     mupirocin (BACTROBAN) 2 % ointment; Apply to affected area 3 times daily          Patient Instructions   Go to the Emergency Room if symptoms or condition worsens in any way    Return to Clinic if symptoms do not improve      Abscess (Antibiotic Treatment Only)  An abscess (sometimes called a boil) happens when bacteria get trapped under the skin and start to grow. Pus forms inside the abscess as the body responds to the bacteria. An abscess can happen with an insect bite, ingrown hair, blocked oil gland, pimple, cyst, or puncture wound.  In the early stages, your wound may be red and tender. For this stage, you may get antibiotics. If the abscess does not get  better with antibiotics, it will need to be drained with a small cut.  Home care  These tips will help you care for your abscess at home:  · Soak the wound in hot water or apply hot packs (small towel soaked in hot water) to the area for 20 minutes at a time. Do this 3 to 4 times a day.  · Do not cut, squeeze, or pop the boil yourself.  · Apply antibiotic cream or ointment to the skin 3 to 4 times a day, unless something else was prescribed. Some ointments include an antibiotic plus a pain reliever.  · If your doctor prescribed antibiotics, do not stop taking them until you have finished the medicine or the doctor tells you to stop.  · You may use an over-the-counter pain medicine to control pain, unless another pain medicine was prescribed. If you have chronic liver or kidney disease or ever had a stomach ulcer or gastrointestinal bleeding, talk with your doctor before using these any of these.  Follow-up care  Follow up with your healthcare provider, or as advised. Check your wound each day for the signs of worsening infection listed below.  When to seek medical advice  Get prompt medical attention if any of these occur:  · An increase in redness or swelling  · Red streaks in the skin leading away from the abscess  · An increase in local pain or swelling  · Fever of 100.4ºF (38ºC) or higher, or as directed by your healthcare provider  · Pus or fluid coming from the abscess  · Boil returns after getting better  Date Last Reviewed: 9/1/2016  © 4754-5961 [employer]. [address] [place] [address], [place] [address]. All rights reserved. This information is not intended as a substitute for professional medical care. Always follow your healthcare professional's instructions.

## 2019-03-24 DIAGNOSIS — M17.0 PRIMARY OSTEOARTHRITIS OF BOTH KNEES: ICD-10-CM

## 2019-03-25 RX ORDER — TRAMADOL HYDROCHLORIDE 50 MG/1
TABLET ORAL
Qty: 30 TABLET | Refills: 0 | Status: SHIPPED | OUTPATIENT
Start: 2019-03-25 | End: 2019-06-25 | Stop reason: SDUPTHER

## 2019-03-26 ENCOUNTER — TELEPHONE (OUTPATIENT)
Dept: INTERNAL MEDICINE | Facility: CLINIC | Age: 64
End: 2019-03-26

## 2019-03-26 ENCOUNTER — TELEPHONE (OUTPATIENT)
Dept: ORTHOPEDICS | Facility: CLINIC | Age: 64
End: 2019-03-26

## 2019-03-26 DIAGNOSIS — M17.0 PRIMARY OSTEOARTHRITIS OF BOTH KNEES: ICD-10-CM

## 2019-03-26 DIAGNOSIS — Z00.00 ROUTINE GENERAL MEDICAL EXAMINATION AT A HEALTH CARE FACILITY: Primary | ICD-10-CM

## 2019-03-26 DIAGNOSIS — Z79.4 TYPE 2 DIABETES MELLITUS WITH BOTH EYES AFFECTED BY MILD NONPROLIFERATIVE RETINOPATHY WITHOUT MACULAR EDEMA, WITH LONG-TERM CURRENT USE OF INSULIN: Chronic | ICD-10-CM

## 2019-03-26 DIAGNOSIS — E55.9 VITAMIN D INSUFFICIENCY: ICD-10-CM

## 2019-03-26 DIAGNOSIS — E11.3293 TYPE 2 DIABETES MELLITUS WITH BOTH EYES AFFECTED BY MILD NONPROLIFERATIVE RETINOPATHY WITHOUT MACULAR EDEMA, WITH LONG-TERM CURRENT USE OF INSULIN: Chronic | ICD-10-CM

## 2019-03-26 RX ORDER — TRAMADOL HYDROCHLORIDE 50 MG/1
TABLET ORAL
Qty: 60 TABLET | Refills: 0 | OUTPATIENT
Start: 2019-03-26

## 2019-03-26 NOTE — TELEPHONE ENCOUNTER
----- Message from Nicole Márquez MD sent at 3/25/2019  8:33 PM CDT -----  Needs to schedule annual. Will not be able to refill Ultram again after today until patient is seen again for annual.

## 2019-03-27 ENCOUNTER — TELEPHONE (OUTPATIENT)
Dept: ORTHOPEDICS | Facility: CLINIC | Age: 64
End: 2019-03-27

## 2019-03-27 NOTE — TELEPHONE ENCOUNTER
Spoke w/pt, surgery date being kept on 4/15/19. Pt states her PCP office told her she would need to push back her surgery for clearance. Notified pt no note of clearance needed noted on consent packet or in OV note from our office. Pt verbalized understanding and will keep sx on 4/15/19.

## 2019-03-27 NOTE — TELEPHONE ENCOUNTER
----- Message from Apryl Tapia sent at 3/27/2019  9:21 AM CDT -----  Contact: NALDO BARKSDALE [[id number]]  Name of Who is Calling: NALDO BARKSDALE [[id number]]      What is the request in detail: Patient would like a call back regarding scheduling surgery. Please call     Can the clinic reply by [facility]: no    What [facility] Back [facility]: [phone]

## 2019-04-02 ENCOUNTER — OFFICE VISIT (OUTPATIENT)
Dept: INTERNAL MEDICINE | Facility: CLINIC | Age: 64
End: 2019-04-02
Payer: COMMERCIAL

## 2019-04-02 ENCOUNTER — LAB VISIT (OUTPATIENT)
Dept: LAB | Facility: HOSPITAL | Age: 64
End: 2019-04-02
Attending: INTERNAL MEDICINE
Payer: COMMERCIAL

## 2019-04-02 VITALS
SYSTOLIC BLOOD PRESSURE: 126 MMHG | WEIGHT: 208.31 LBS | TEMPERATURE: 99 F | BODY MASS INDEX: 34.71 KG/M2 | HEART RATE: 71 BPM | DIASTOLIC BLOOD PRESSURE: 68 MMHG | HEIGHT: 65 IN

## 2019-04-02 DIAGNOSIS — Z00.00 ROUTINE GENERAL MEDICAL EXAMINATION AT A HEALTH CARE FACILITY: ICD-10-CM

## 2019-04-02 DIAGNOSIS — Z01.818 PRE-OP EXAMINATION: Primary | ICD-10-CM

## 2019-04-02 DIAGNOSIS — G47.33 OBSTRUCTIVE SLEEP APNEA SYNDROME: ICD-10-CM

## 2019-04-02 DIAGNOSIS — E11.65 TYPE 2 DIABETES MELLITUS WITH HYPERGLYCEMIA, WITH LONG-TERM CURRENT USE OF INSULIN: Chronic | ICD-10-CM

## 2019-04-02 DIAGNOSIS — M65.312 TRIGGER FINGER OF LEFT THUMB: Primary | ICD-10-CM

## 2019-04-02 DIAGNOSIS — J02.9 SORE THROAT: ICD-10-CM

## 2019-04-02 DIAGNOSIS — I15.2 HYPERTENSION ASSOCIATED WITH DIABETES: ICD-10-CM

## 2019-04-02 DIAGNOSIS — M65.312 TRIGGER FINGER OF LEFT THUMB: ICD-10-CM

## 2019-04-02 DIAGNOSIS — Z79.4 TYPE 2 DIABETES MELLITUS WITH HYPERGLYCEMIA, WITH LONG-TERM CURRENT USE OF INSULIN: Chronic | ICD-10-CM

## 2019-04-02 DIAGNOSIS — E11.59 HYPERTENSION ASSOCIATED WITH DIABETES: ICD-10-CM

## 2019-04-02 LAB
BILIRUB UR QL STRIP: NEGATIVE
CLARITY UR REFRACT.AUTO: CLEAR
COLOR UR AUTO: NORMAL
DEPRECATED S PYO AG THROAT QL EIA: NEGATIVE
GLUCOSE SERPL-MCNC: 78 MG/DL (ref 70–110)
GLUCOSE UR QL STRIP: NEGATIVE
HGB UR QL STRIP: NEGATIVE
KETONES UR QL STRIP: NEGATIVE
LEUKOCYTE ESTERASE UR QL STRIP: NEGATIVE
NITRITE UR QL STRIP: NEGATIVE
PH UR STRIP: 6 [PH] (ref 5–8)
PROT UR QL STRIP: NEGATIVE
SP GR UR STRIP: 1.01 (ref 1–1.03)
URN SPEC COLLECT METH UR: NORMAL

## 2019-04-02 PROCEDURE — 3045F PR MOST RECENT HEMOGLOBIN A1C LEVEL 7.0-9.0%: ICD-10-PCS | Mod: CPTII,S$GLB,, | Performed by: INTERNAL MEDICINE

## 2019-04-02 PROCEDURE — 3078F DIAST BP <80 MM HG: CPT | Mod: CPTII,S$GLB,, | Performed by: INTERNAL MEDICINE

## 2019-04-02 PROCEDURE — 3008F PR BODY MASS INDEX (BMI) DOCUMENTED: ICD-10-PCS | Mod: CPTII,S$GLB,, | Performed by: INTERNAL MEDICINE

## 2019-04-02 PROCEDURE — 82962 GLUCOSE BLOOD TEST: CPT | Mod: S$GLB,,, | Performed by: INTERNAL MEDICINE

## 2019-04-02 PROCEDURE — 99214 OFFICE O/P EST MOD 30 MIN: CPT | Mod: 25,S$GLB,, | Performed by: INTERNAL MEDICINE

## 2019-04-02 PROCEDURE — 3045F PR MOST RECENT HEMOGLOBIN A1C LEVEL 7.0-9.0%: CPT | Mod: CPTII,S$GLB,, | Performed by: INTERNAL MEDICINE

## 2019-04-02 PROCEDURE — 99999 PR PBB SHADOW E&M-EST. PATIENT-LVL III: CPT | Mod: PBBFAC,,, | Performed by: INTERNAL MEDICINE

## 2019-04-02 PROCEDURE — 3074F SYST BP LT 130 MM HG: CPT | Mod: CPTII,S$GLB,, | Performed by: INTERNAL MEDICINE

## 2019-04-02 PROCEDURE — 81003 URINALYSIS AUTO W/O SCOPE: CPT

## 2019-04-02 PROCEDURE — 93005 EKG 12-LEAD: ICD-10-PCS | Mod: S$GLB,,, | Performed by: INTERNAL MEDICINE

## 2019-04-02 PROCEDURE — 82962 POCT GLUCOSE, HAND-HELD DEVICE: ICD-10-PCS | Mod: S$GLB,,, | Performed by: INTERNAL MEDICINE

## 2019-04-02 PROCEDURE — 99999 PR PBB SHADOW E&M-EST. PATIENT-LVL III: ICD-10-PCS | Mod: PBBFAC,,, | Performed by: INTERNAL MEDICINE

## 2019-04-02 PROCEDURE — 87081 CULTURE SCREEN ONLY: CPT

## 2019-04-02 PROCEDURE — 99214 PR OFFICE/OUTPT VISIT, EST, LEVL IV, 30-39 MIN: ICD-10-PCS | Mod: 25,S$GLB,, | Performed by: INTERNAL MEDICINE

## 2019-04-02 PROCEDURE — 93005 ELECTROCARDIOGRAM TRACING: CPT | Mod: S$GLB,,, | Performed by: INTERNAL MEDICINE

## 2019-04-02 PROCEDURE — 87880 STREP A ASSAY W/OPTIC: CPT | Mod: PO

## 2019-04-02 PROCEDURE — 3008F BODY MASS INDEX DOCD: CPT | Mod: CPTII,S$GLB,, | Performed by: INTERNAL MEDICINE

## 2019-04-02 PROCEDURE — 3074F PR MOST RECENT SYSTOLIC BLOOD PRESSURE < 130 MM HG: ICD-10-PCS | Mod: CPTII,S$GLB,, | Performed by: INTERNAL MEDICINE

## 2019-04-02 PROCEDURE — 93010 EKG 12-LEAD: ICD-10-PCS | Mod: S$GLB,,, | Performed by: INTERNAL MEDICINE

## 2019-04-02 PROCEDURE — 93010 ELECTROCARDIOGRAM REPORT: CPT | Mod: S$GLB,,, | Performed by: INTERNAL MEDICINE

## 2019-04-02 PROCEDURE — 3078F PR MOST RECENT DIASTOLIC BLOOD PRESSURE < 80 MM HG: ICD-10-PCS | Mod: CPTII,S$GLB,, | Performed by: INTERNAL MEDICINE

## 2019-04-02 NOTE — PROGRESS NOTES
Subjective:       Patient ID: Adriana Paula is a 63 y.o. female who presents for Pre-op Exam; Sore Throat; Hypertension; and Diabetes      Sore Throat    This is a new problem. The current episode started in the past 7 days. The problem has been unchanged. Neither side of throat is experiencing more pain than the other. There has been no fever. The pain is moderate. Associated symptoms include congestion, coughing (with yellowish mucus production) and trouble swallowing. Pertinent negatives include no abdominal pain, diarrhea, ear discharge, ear pain, headaches, plugged ear sensation, shortness of breath, swollen glands or vomiting. She has had no exposure to strep. She has tried gargles for the symptoms. The treatment provided mild relief.   Hypertension   This is a chronic problem. The current episode started more than 1 year ago. The problem is unchanged. The problem is controlled. Pertinent negatives include no anxiety, chest pain, headaches, malaise/fatigue, palpitations, peripheral edema or shortness of breath. There are no associated agents to hypertension. Risk factors for coronary artery disease include diabetes mellitus and sedentary lifestyle. Past treatments include calcium channel blockers. The current treatment provides moderate improvement. Compliance problems include exercise.  There is no history of kidney disease. There is no history of chronic renal disease.   Diabetes   She presents for her follow-up diabetic visit. She has type 2 diabetes mellitus. Her disease course has been improving. There are no hypoglycemic associated symptoms. Pertinent negatives for hypoglycemia include no dizziness, headaches or nervousness/anxiousness. (Decreased insulin dose to avoid episodes of hypoglycemia) Pertinent negatives for diabetes include no chest pain, no polydipsia and no polyuria. There are no hypoglycemic complications. Symptoms are stable. There are no diabetic complications. Risk factors for  coronary artery disease include diabetes mellitus, dyslipidemia and hypertension. Current diabetic treatment includes insulin injections and oral agent (dual therapy). She is compliant with treatment all of the time. She is following a generally healthy diet. There is no change in her home blood glucose trend.      Planning left trigger finger release on 4/15/19 with Dr. Gonzales    Active cardiac issues:  Active decompensated heart failure? No   Unstable angina?  No   Significant uncontrolled arrhythmias? No   Severe valvular heart disease-Aortic or Mitral Stenosis? No   Recent MI or coronary revascularization < 30 days? No     Cardiac Risk Factors  History of CAD/ischemic heart disease? No   History of cerebrovascular disease? No   History of compensated heart failure? No   Type 2 diabetes requiring insulin? Yes   Serum Creatinine > 2? Labs today   Total cardiac risk factors 1     Functional mets >4      Review of Systems   Constitutional: Positive for appetite change (increased). Negative for chills, diaphoresis, fever and malaise/fatigue.   HENT: Positive for congestion, postnasal drip, sinus pressure, sore throat and trouble swallowing. Negative for ear discharge and ear pain.    Eyes: Negative for redness and itching.   Respiratory: Positive for cough (with yellowish mucus production). Negative for chest tightness and shortness of breath.    Cardiovascular: Negative for chest pain, palpitations and leg swelling.   Gastrointestinal: Negative for abdominal pain, constipation, diarrhea, nausea and vomiting.   Endocrine: Negative for polydipsia and polyuria.   Genitourinary: Negative for frequency and urgency.   Musculoskeletal: Negative for arthralgias and myalgias.   Skin: Negative for rash and wound (healed abscess right axilla).   Neurological: Negative for dizziness and headaches.   Hematological: Negative for adenopathy.   Psychiatric/Behavioral: Negative for dysphoric mood. The patient is not  nervous/anxious.        Objective:      Physical Exam   Constitutional: She is oriented to person, place, and time. Vital signs are normal. She appears well-developed and well-nourished. No distress.   HENT:   Head: Normocephalic and atraumatic.   Right Ear: Hearing, tympanic membrane, external ear and ear canal normal. Tympanic membrane is not erythematous and not bulging.   Left Ear: Hearing, tympanic membrane, external ear and ear canal normal. Tympanic membrane is not erythematous and not bulging.   Nose: Nose normal. Right sinus exhibits no maxillary sinus tenderness and no frontal sinus tenderness. Left sinus exhibits no maxillary sinus tenderness and no frontal sinus tenderness.   Mouth/Throat: Uvula is midline and mucous membranes are normal. Posterior oropharyngeal erythema present.   Eyes: Conjunctivae and lids are normal.   Neck: Normal range of motion. Neck supple.   Cardiovascular: Normal rate, regular rhythm, normal heart sounds and intact distal pulses.   No murmur heard.  Pulmonary/Chest: Effort normal and breath sounds normal. She has no wheezes.   Abdominal: Soft. Bowel sounds are normal. She exhibits no distension. There is no tenderness.   Musculoskeletal: Normal range of motion. She exhibits no edema.   Lymphadenopathy:        Head (right side): No submandibular, no preauricular and no posterior auricular adenopathy present.        Head (left side): No submandibular, no preauricular and no posterior auricular adenopathy present.     She has no cervical adenopathy.        Right cervical: No superficial cervical adenopathy present.       Left cervical: No superficial cervical adenopathy present.   Neurological: She is alert and oriented to person, place, and time.   Skin: Skin is warm, dry and intact. Lesion (healing lesion right axilla, no redness, no drainage) noted. No rash noted. She is not diaphoretic.   Psychiatric: She has a normal mood and affect.   Vitals reviewed.      Assessment/Plan:        1. Pre-op examination  - IN OFFICE EKG 12-LEAD (to Muse)  - Protime-INR; Future    2. Trigger finger of left thumb  - surgery planned on 4/15    3. Sore throat  - Throat Screen, Rapid  - chloraseptic spray as needed for sore throat    4. Type 2 diabetes mellitus with hyperglycemia, with long-term current use of insulin  - POCT Glucose, Hand-Held Device  - continue metformin and tradjenta with Tresiba 60 units daily     5. Hypertension associated with diabetes  - BP is controlled, continue amlodipine, losartan    6. Obstructive sleep apnea syndrome  - stable    RTC in 1 week for annual exam or sooner if needed    Nicole Márquez MD

## 2019-04-04 LAB — BACTERIA THROAT CULT: NORMAL

## 2019-04-09 ENCOUNTER — HOSPITAL ENCOUNTER (OUTPATIENT)
Dept: PREADMISSION TESTING | Facility: OTHER | Age: 64
Discharge: HOME | End: 2019-04-09
Attending: ORTHOPAEDIC SURGERY
Payer: COMMERCIAL

## 2019-04-09 ENCOUNTER — ANESTHESIA EVENT (OUTPATIENT)
Dept: SURGERY | Facility: OTHER | Age: 64
End: 2019-04-09
Payer: COMMERCIAL

## 2019-04-09 VITALS
DIASTOLIC BLOOD PRESSURE: 82 MMHG | TEMPERATURE: 99 F | WEIGHT: 205 LBS | HEIGHT: 65 IN | SYSTOLIC BLOOD PRESSURE: 147 MMHG | BODY MASS INDEX: 34.16 KG/M2 | HEART RATE: 74 BPM | OXYGEN SATURATION: 100 %

## 2019-04-09 RX ORDER — SODIUM CHLORIDE, SODIUM LACTATE, POTASSIUM CHLORIDE, CALCIUM CHLORIDE 600; 310; 30; 20 MG/100ML; MG/100ML; MG/100ML; MG/100ML
INJECTION, SOLUTION INTRAVENOUS CONTINUOUS
Status: CANCELLED | OUTPATIENT
Start: 2019-04-09

## 2019-04-09 RX ORDER — LIDOCAINE HYDROCHLORIDE 10 MG/ML
0.5 INJECTION, SOLUTION EPIDURAL; INFILTRATION; INTRACAUDAL; PERINEURAL ONCE
Status: CANCELLED | OUTPATIENT
Start: 2019-04-09 | End: 2019-04-09

## 2019-04-09 RX ORDER — FAMOTIDINE 20 MG/1
20 TABLET, FILM COATED ORAL
Status: CANCELLED | OUTPATIENT
Start: 2019-04-09 | End: 2019-04-09

## 2019-04-09 RX ORDER — ACETAMINOPHEN 500 MG
1000 TABLET ORAL
Status: CANCELLED | OUTPATIENT
Start: 2019-04-09 | End: 2019-04-09

## 2019-04-09 NOTE — ANESTHESIA PREPROCEDURE EVALUATION
04/09/2019  Adriana Paula is a 63 y.o., female.    Anesthesia Evaluation    I have reviewed the Patient Summary Reports.    I have reviewed the Nursing Notes.   I have reviewed the Medications.     Review of Systems  Anesthesia Hx:  No problems with previous Anesthesia  History of prior surgery of interest to airway management or planning:   Social:  Former Smoker, Social Alcohol Use    Hematology/Oncology:  Hematology Normal   Oncology Normal     EENT/Dental:EENT/Dental Normal   Cardiovascular:   Exercise tolerance: good Hypertension hyperlipidemia    Pulmonary:   Sleep Apnea    Renal/:  Renal/ Normal     Hepatic/GI:   GERD    Musculoskeletal:   Arthritis     Neurological:   Headaches    Endocrine:   Diabetes, type 1    Psych:  Psychiatric Normal           Physical Exam  General:  Obesity    Airway/Jaw/Neck:  Airway Findings: Mouth Opening: Normal Tongue: Normal  General Airway Assessment: Adult, Good  Mallampati: I  TM Distance: Normal, at least 6 cm  Jaw/Neck Findings:  Neck ROM: Normal ROM      Dental:  Dental Findings: Lower partial dentures        Mental Status:  Mental Status Findings:  Alert and Oriented         Anesthesia Plan  Type of Anesthesia, risks & benefits discussed:  Anesthesia Type:  general  Patient's Preference:   Intra-op Monitoring Plan: standard ASA monitors  Intra-op Monitoring Plan Comments:   Post Op Pain Control Plan: per primary service following discharge from PACU  Post Op Pain Control Plan Comments:   Induction:    Beta Blocker:         Informed Consent: Patient understands risks and agrees with Anesthesia plan.  Questions answered. Anesthesia consent signed with patient.  ASA Score: 3     Day of Surgery Review of History & Physical:    H&P update referred to the surgeon.     Anesthesia Plan Notes: Recent labs are in Epic.          Ready For Surgery From Anesthesia  Perspective.

## 2019-04-10 ENCOUNTER — TELEPHONE (OUTPATIENT)
Dept: ORTHOPEDICS | Facility: CLINIC | Age: 64
End: 2019-04-10

## 2019-04-10 RX ORDER — ACETAMINOPHEN AND CODEINE PHOSPHATE 300; 30 MG/1; MG/1
1 TABLET ORAL EVERY 6 HOURS PRN
Qty: 25 TABLET | Refills: 0 | Status: SHIPPED | OUTPATIENT
Start: 2019-04-10 | End: 2019-05-29

## 2019-04-10 NOTE — H&P
"Subjective:       Patient ID: Adriana Paula is a 63 y.o. female.     Chief Complaint: Pain of the Left Hand        HPI  Adriana Paula is a right hand dominant 63 y.o. female presenting today for follow up of left thumb pain.  She underwent steroid injection for trigger thumb at her last visit, she reports that it is not improved.  She reports that she has decreased use of the left hand due to pain and triggering when she moves the left thumb.  She reports that last trigger 1 hr ago.  She reports significant pain with triggering.  She has been using oral diclofenac and compound cream to aid in her pain, she uses these for general arthritis pain.     There was not a history of trauma.  Onset of symptoms began 12/2018. She reports that the thumb has started "sticking" when she bends it.  She is able to actively release it. She reports a history of a trigger thumb on the right hand, she believes it was surgically released.  She also has a history of right carpal tunnel release.  She was initially seen for right hand and wrist pain in 08/2018.  She has been treating right CMC arthritis with bracing and compound cream, noting some improvement in her pain.  She has also been using bilateral carpal tunnel braces at night, her finger numbness and tingling has improved with use of the braces.     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              Review of patient's allergies indicates:   Allergen Reactions    Keflex [cephalexin] Rash        blisters, fever    Penicillins Itching    Sulfa (sulfonamide antibiotics) Rash        blisters,fever                Current Medications          Current Outpatient Medications   Medication Sig Dispense Refill    amLODIPine (NORVASC) 10 MG tablet Take 1 tablet (10 mg total) by mouth once daily. 90 tablet 3    atorvastatin (LIPITOR) 40 MG tablet Take 1 " "tablet (40 mg total) by mouth once daily. 90 tablet 3    blood-glucose meter (ONETOUCH VERIO IQ METER) kit Use as instructed 200 each 11    ergocalciferol (ERGOCALCIFEROL) 50,000 unit Cap Take 1 capsule (50,000 Units total) by mouth every 7 days. 4 capsule 2    flash glucose sensor (FREESTYLE WINSOME 10 DAY SENSOR) Kit 1 each by Misc.(Non-Drug; Combo Route) route continuous prn (every 10 days). 3 kit 5    fluticasone (FLONASE) 50 mcg/actuation nasal spray SHAKE LIQUID AND USE 1 SPRAY(50 MCG) IN EACH NOSTRIL EVERY DAY 16 mL 0    insulin degludec (TRESIBA FLEXTOUCH U-100) 100 unit/mL (3 mL) InPn Inject 60 Units into the skin once daily. 18 mL 2    lancets 33 gauge Misc 1 lancet by Misc.(Non-Drug; Combo Route) route 2 (two) times daily before meals. 200 each 11    levocetirizine (XYZAL) 5 MG tablet Take 1 tablet (5 mg total) by mouth once daily. 30 tablet 0    lidocaine-prilocaine (EMLA) cream          linagliptin (TRADJENTA) 5 mg Tab tablet Take 1 tablet (5 mg total) by mouth once daily. 30 tablet 2    losartan (COZAAR) 100 MG tablet Take 1 tablet (100 mg total) by mouth once daily. 90 tablet 3    metFORMIN (GLUCOPHAGE-XR) 500 MG 24 hr tablet Take 2 tablets (1,000 mg total) by mouth 2 (two) times daily with meals. 120 tablet 2    methocarbamol (ROBAXIN) 500 MG Tab Take 1 tablet (500 mg total) by mouth nightly as needed. 30 tablet 0    ONETOUCH VERIO Strp USE FOUR TIMES DAILY AS DIRECTED 200 strip 0    oxybutynin (DITROPAN) 5 MG Tab Take 1 tablet (5 mg total) by mouth 3 (three) times daily. 90 tablet 11    pen needle, diabetic (BD ULTRA-FINE SHORT PEN NEEDLE) 31 gauge x 5/16" Ndle Use as directed (two) times daily. 100 each 11    traMADol (ULTRAM) 50 mg tablet Take 1 tablet (50 mg total) by mouth every 12 (twelve) hours as needed for Pain. 60 tablet 0    VOLTAREN 1 % Gel          aspirin (ECOTRIN) 81 MG EC tablet Take 81 mg by mouth once daily.        nystatin-triamcinolone (MYCOLOG II) cream Apply " topically 3 (three) times daily. 1 Tube 2      No current facility-administered medications for this visit.                  Past Medical History:   Diagnosis Date    Allergy      Anemia      Anxiety      Arthritis      Breast cyst      Cataract      DR (diabetic retinopathy)      Dyslipidemia      Essential hypertension 2012    Gastroesophageal reflux disease without esophagitis 2/3/2017    GERD (gastroesophageal reflux disease)      Glaucoma      Hypertension      Obesity (BMI 30-39.9) 10/24/2013    PN (peripheral neuropathy)      Sleep apnea      Type 2 diabetes mellitus with both eyes affected by mild nonproliferative retinopathy without macular edema, with long-term current use of insulin      Type 2 diabetes mellitus with diabetic polyneuropathy, with long-term current use of insulin      Type II or unspecified type diabetes mellitus with other specified manifestations, uncontrolled                 Past Surgical History:   Procedure Laterality Date    ARTHROSCOPY, KNEE Right 2014     [facility] 2ND [facility]    BREAST CYST EXCISION Right       1980s    CARPAL TUNNEL RELEASE Right      CATARACT EXTRACTION        CATARACT EXTRACTION W/  INTRAOCULAR LENS IMPLANT Right 2017     Dr Brewster     SECTION        INSERTION-INTRAOCULAR LENS (IOL) Right 2017     [facility]    INSERTION-INTRAOCULAR LENS (IOL) Left 3/17/2016     [facility]formed [facility] at [facility] 1ST FLR    KNEE ARTHROSCOPY   14     right    MYOMECTOMY        PHACOEMULSIFICATION-ASPIRATION-CATARACT Right 2017     [facility]    PHACOEMULSIFICATION-ASPIRATION-CATARACT Left 3/17/2016     Performed [facility] at [facility] 1ST FLR            Review of Systems:  Review of Systems   Constitution: Negative for chills and fever.   Skin: Negative for rash and suspicious lesions.  "  Musculoskeletal:        See HPI   Neurological: Negative for dizziness, headaches, light-headedness, numbness and paresthesias.   Psychiatric/Behavioral: Negative for depression. The patient is not nervous/anxious.             OBJECTIVE:      PHYSICAL EXAM:  Height: 5' 5" (165.1 cm) Weight: 91.2 kg (201 lb)  Vitals:     03/07/19 1450   BP: 134/76   Pulse: 80   Weight: 91.2 kg (201 lb)   Height: 5' 5" (1.651 m)   PainSc:   5      General     Vitals reviewed.  Constitutional: She is oriented to person, place, and time. She appears well-developed and well-nourished.   HENT:   Head: Normocephalic and atraumatic.   Neck: Normal range of motion.   Cardiovascular: Normal rate.    Pulmonary/Chest: Effort normal. No respiratory distress.   Neurological: She is alert and oriented to person, place, and time.   Psychiatric: She has a normal mood and affect. Her behavior is normal. Judgment and thought content normal.                Musculoskeletal:  Well-healed surgical scars right hand.  Tender to palpation over the left thumb A1 pulley. Palpable thickening, triggering on exam today. Otherwise nontender to palpation over the hands and wrists bilaterally. Positive grind test on the right, negative on the left.  Neurovascularly intact-good sensation, however right hand sensation decreased compared to left in all distributions. Good equal motor function, good capillary refill, 2+ radial pulses.        RADIOGRAPHS:  Left Hand X-Ray, 1/10/19       FINDINGS:  DJD changes 1st MCP, 1st metacarpal-carpal and radiocarpal joint particularly medially and dorsally.       Impression       No fracture dislocation      Comments: I have personally reviewed the imaging and I agree with the above radiologist's report.        EMG, 10/2018:  Impression   This is an abnormal study. There is electrophysiologic evidence of:   1. Mild bilateral carpal tunnel syndrome, comparatively worse on the left;   2. Mild bilateral and primarily axonal ulnar " sensory neuropathies with preserved conduction velocities.      ASSESSMENT/PLAN:   Adriana was seen today for pain.     Diagnoses and all orders for this visit:     Trigger finger of left thumb  -     Ambulatory Referral to Physical/Occupational Therapy              - We talked at length about the anatomy and pathophysiology of        Encounter Diagnosis   Name Primary?    Trigger finger of left thumb Yes         - discussed patient's symptoms and physical exam findings, discussed trigger finger.  Discussed treatment options including bracing, NSAIDs, massage/therapy, steroid injection, and surgery. She is not interested in repeat injection.  - Patient agreed to proceed with left trigger thumb release  Indications and risks of the procedure were discussed with the patient in detail. Her questions were answered. Consent forms signed previously in clinic.  She is not interested in carpal tunnel release at this time.

## 2019-04-10 NOTE — TELEPHONE ENCOUNTER
----- Message from Omero Glynn MD sent at 4/10/2019  9:41 AM CDT -----  Please schedule patient 2 week follow-up for left trigger thumb release on 4/15/19 with Nicki Espinoza. Thank you.

## 2019-04-11 ENCOUNTER — TELEPHONE (OUTPATIENT)
Dept: ORTHOPEDICS | Facility: CLINIC | Age: 64
End: 2019-04-11

## 2019-04-11 ENCOUNTER — OFFICE VISIT (OUTPATIENT)
Dept: INTERNAL MEDICINE | Facility: CLINIC | Age: 64
End: 2019-04-11
Payer: COMMERCIAL

## 2019-04-11 ENCOUNTER — TELEPHONE (OUTPATIENT)
Dept: INTERNAL MEDICINE | Facility: CLINIC | Age: 64
End: 2019-04-11

## 2019-04-11 VITALS
DIASTOLIC BLOOD PRESSURE: 56 MMHG | TEMPERATURE: 99 F | WEIGHT: 207 LBS | HEIGHT: 65 IN | SYSTOLIC BLOOD PRESSURE: 116 MMHG | HEART RATE: 74 BPM | BODY MASS INDEX: 34.49 KG/M2

## 2019-04-11 DIAGNOSIS — E11.49 OTHER DIABETIC NEUROLOGICAL COMPLICATION ASSOCIATED WITH TYPE 2 DIABETES MELLITUS: ICD-10-CM

## 2019-04-11 DIAGNOSIS — M65.312 TRIGGER FINGER OF LEFT THUMB: ICD-10-CM

## 2019-04-11 DIAGNOSIS — E11.59 HYPERTENSION ASSOCIATED WITH DIABETES: ICD-10-CM

## 2019-04-11 DIAGNOSIS — Z00.00 ANNUAL PHYSICAL EXAM: Primary | ICD-10-CM

## 2019-04-11 DIAGNOSIS — N89.8 VAGINAL DISCHARGE: ICD-10-CM

## 2019-04-11 DIAGNOSIS — I15.2 HYPERTENSION ASSOCIATED WITH DIABETES: ICD-10-CM

## 2019-04-11 DIAGNOSIS — R42 DIZZINESS: ICD-10-CM

## 2019-04-11 DIAGNOSIS — E78.5 DYSLIPIDEMIA: ICD-10-CM

## 2019-04-11 DIAGNOSIS — G47.33 OBSTRUCTIVE SLEEP APNEA SYNDROME: ICD-10-CM

## 2019-04-11 DIAGNOSIS — E55.9 VITAMIN D INSUFFICIENCY: ICD-10-CM

## 2019-04-11 PROCEDURE — 3074F PR MOST RECENT SYSTOLIC BLOOD PRESSURE < 130 MM HG: ICD-10-PCS | Mod: CPTII,S$GLB,, | Performed by: INTERNAL MEDICINE

## 2019-04-11 PROCEDURE — 99999 PR PBB SHADOW E&M-EST. PATIENT-LVL IV: ICD-10-PCS | Mod: PBBFAC,,, | Performed by: INTERNAL MEDICINE

## 2019-04-11 PROCEDURE — 3045F PR MOST RECENT HEMOGLOBIN A1C LEVEL 7.0-9.0%: CPT | Mod: CPTII,S$GLB,, | Performed by: INTERNAL MEDICINE

## 2019-04-11 PROCEDURE — 99999 PR PBB SHADOW E&M-EST. PATIENT-LVL IV: CPT | Mod: PBBFAC,,, | Performed by: INTERNAL MEDICINE

## 2019-04-11 PROCEDURE — 3074F SYST BP LT 130 MM HG: CPT | Mod: CPTII,S$GLB,, | Performed by: INTERNAL MEDICINE

## 2019-04-11 PROCEDURE — 3045F PR MOST RECENT HEMOGLOBIN A1C LEVEL 7.0-9.0%: ICD-10-PCS | Mod: CPTII,S$GLB,, | Performed by: INTERNAL MEDICINE

## 2019-04-11 PROCEDURE — 99396 PREV VISIT EST AGE 40-64: CPT | Mod: S$GLB,,, | Performed by: INTERNAL MEDICINE

## 2019-04-11 PROCEDURE — 3078F DIAST BP <80 MM HG: CPT | Mod: CPTII,S$GLB,, | Performed by: INTERNAL MEDICINE

## 2019-04-11 PROCEDURE — 3078F PR MOST RECENT DIASTOLIC BLOOD PRESSURE < 80 MM HG: ICD-10-PCS | Mod: CPTII,S$GLB,, | Performed by: INTERNAL MEDICINE

## 2019-04-11 PROCEDURE — 99396 PR PREVENTIVE VISIT,EST,40-64: ICD-10-PCS | Mod: S$GLB,,, | Performed by: INTERNAL MEDICINE

## 2019-04-11 RX ORDER — CHOLECALCIFEROL (VITAMIN D3) 25 MCG
2000 TABLET ORAL DAILY
COMMUNITY
End: 2020-07-01

## 2019-04-11 RX ORDER — MECLIZINE HYDROCHLORIDE 25 MG/1
25 TABLET ORAL 2 TIMES DAILY PRN
Qty: 30 TABLET | Refills: 0 | Status: SHIPPED | OUTPATIENT
Start: 2019-04-11 | End: 2019-07-16

## 2019-04-11 NOTE — TELEPHONE ENCOUNTER
----- Message from Radha Coley sent at 4/11/2019 11:34 AM CDT -----  Doctor appointment and lab have been scheduled.  Please link lab orders to the lab appointment.  Date of doctor appointment:  5/23/19  Date of lab appointment:  5/16  Physical or EP: Physical  Comments:

## 2019-04-11 NOTE — TELEPHONE ENCOUNTER
----- Message from Layla Whitaker LPN sent at 4/10/2019  5:23 PM CDT -----  Contact: damian      ----- Message -----  From: Marcelle Cat  Sent: 4/10/2019   1:49 PM  To: [facility] Staff    Name of Who is Calling: damian      What is the request in detail: Patient is requesting a call back she states she needs a letter stating she will be out of work for  two weeks  So that she can turn that in to her employer and her creditors       Can the clinic reply by [facility]: no      What Number to Call Back if not in [facility]: [phone]

## 2019-04-11 NOTE — PROGRESS NOTES
Subjective:      Adriana Paula is a 63 y.o. female who presents for annual exam.    Family History:  family history includes Arthritis in her mother; Breast cancer (age of onset: 40) in her cousin, cousin, and sister; Breast cancer (age of onset: 50) in her maternal grandmother and sister; Breast cancer (age of onset: 54) in her sister; Breast cancer (age of onset: 75) in her mother; Cancer in her mother; Diabetes in her mother and unknown relative; Heart disease in her mother; Miscarriages / Stillbirths in her mother; No Known Problems in her brother, father, maternal aunt, maternal grandfather, maternal uncle, paternal aunt, paternal grandfather, paternal grandmother, and paternal uncle; Vision loss in her mother.    Health Maintenance:  Health Maintenance       Date Due Completion Date    Sign Pain Contract 1972 ---    Complete Opioid Risk Tool 1972 ---    Foot Exam 2018 2/3/2017    Colonoscopy 2019 3/23/2009    Eye Exam 2019    Hemoglobin A1c 10/02/2019 2019    Pap Smear with HPV Cotest 2020    Mammogram 2020    Lipid Panel 2020    Low Dose Statin 2020    TETANUS VACCINE 10/23/2028 10/23/2018        Eye exam: 10/2018  OB/GYN: Dr. Boston    MM2019  Last Pap: 2017    Influenza: 10/2018  Tetanus: 10/2018    Exercise: minimal  Body mass index is 34.45 kg/m².    Meds:   Current Outpatient Medications:     acetaminophen-codeine 300-30mg (TYLENOL #3) 300-30 mg Tab, Take 1 tablet by mouth every 6 (six) hours as needed., Disp: 25 tablet, Rfl: 0    amLODIPine (NORVASC) 10 MG tablet, Take 1 tablet (10 mg total) by mouth once daily., Disp: 90 tablet, Rfl: 3    atorvastatin (LIPITOR) 40 MG tablet, Take 1 tablet (40 mg total) by mouth once daily., Disp: 90 tablet, Rfl: 3    blood-glucose meter (ONETOUCH VERIO IQ METER) kit, Use as instructed, Disp: 200 each, Rfl: 11    fluticasone (FLONASE) 50  "mcg/actuation nasal spray, SHAKE LIQUID AND USE 1 SPRAY(50 MCG) IN EACH NOSTRIL EVERY DAY, Disp: 16 mL, Rfl: 0    insulin degludec (TRESIBA FLEXTOUCH U-100) 100 unit/mL (3 mL) InPn, Inject 60 Units into the skin once daily. (Patient taking differently: Inject 42 Units into the skin once daily. ), Disp: 18 mL, Rfl: 2    lancets 33 gauge Misc, 1 lancet by Misc.(Non-Drug; Combo Route) route 2 (two) times daily before meals., Disp: 200 each, Rfl: 11    lidocaine-prilocaine (EMLA) cream, , Disp: , Rfl:     losartan (COZAAR) 100 MG tablet, Take 1 tablet (100 mg total) by mouth once daily., Disp: 90 tablet, Rfl: 3    metFORMIN (GLUCOPHAGE-XR) 500 MG 24 hr tablet, Take 2 tablets (1,000 mg total) by mouth 2 (two) times daily with meals. (Patient taking differently: Take 1,000 mg by mouth once daily. ), Disp: 120 tablet, Rfl: 2    pen needle, diabetic (BD ULTRA-FINE SHORT PEN NEEDLE) 31 gauge x 5/16" Ndle, Use as directed (two) times daily., Disp: 100 each, Rfl: 11    traMADol (ULTRAM) 50 mg tablet, TAKE 1 TABLET BY MOUTH EVERY 12 HOURS AS NEEDED FOR PAIN, Disp: 30 tablet, Rfl: 0    vitamin D (VITAMIN D3) 1000 units Tab, Take 2,000 Units by mouth once daily., Disp: , Rfl:     VOLTAREN 1 % Gel, , Disp: , Rfl:     aspirin (ECOTRIN) 81 MG EC tablet, Take 81 mg by mouth once daily. Instructed to stop 1 week prior to surgery on 4/15/19 per Dr. Conklin, Disp: , Rfl:     blood sugar diagnostic Strp, 1 strip by Misc.(Non-Drug; Combo Route) route 2 (two) times daily. One touch verio, Disp: 100 strip, Rfl: 5    BYDUREON 2 mg/0.65 mL PnIj, INJ 2 MG INTO THE SKIN Q 7 DAYS, Disp: , Rfl: 5    exenatide microspheres (BYDUREON BCISE) 2 mg/0.85 mL AtIn, Inject 2 mg into the skin every 7 days., Disp: 4 Syringe, Rfl: 5    meclizine (ANTIVERT) 25 mg tablet, Take 1 tablet (25 mg total) by mouth 2 (two) times daily as needed for Dizziness., Disp: 30 tablet, Rfl: 0    oxybutynin (DITROPAN) 5 MG Tab, Take 1 tablet (5 mg total) by " mouth 3 (three) times daily. (Patient taking differently: Take 5 mg by mouth once daily. ), Disp: 90 tablet, Rfl: 11  No current facility-administered medications for this visit.     Facility-Administered Medications Ordered in Other Visits:     0.9%  NaCl infusion, , Intravenous, Continuous, Omero Glynn MD    mupirocin 2 % ointment, , Nasal, On Call Procedure, Omero Glynn MD    PMHx:   Past Medical History:   Diagnosis Date    Allergy     Anemia     Anxiety     Arthritis     Breast cyst     Cataract     DR (diabetic retinopathy)     Dyslipidemia     Essential hypertension 2012    Gastroesophageal reflux disease without esophagitis 2/3/2017    GERD (gastroesophageal reflux disease)     Glaucoma     Hypertension     Obesity (BMI 30-39.9) 10/24/2013    PN (peripheral neuropathy)     Sleep apnea     Type 2 diabetes mellitus with both eyes affected by mild nonproliferative retinopathy without macular edema, with long-term current use of insulin     Type 2 diabetes mellitus with diabetic polyneuropathy, with long-term current use of insulin     Type II or unspecified type diabetes mellitus with other specified manifestations, uncontrolled        PSHx:     Past Surgical History:   Procedure Laterality Date    ARTHROSCOPY, KNEE Right 2014    [facility] 2ND FLR    BREAST CYST EXCISION Right     1980s    CARPAL TUNNEL RELEASE Right     CATARACT EXTRACTION      CATARACT EXTRACTION W/  INTRAOCULAR LENS IMPLANT Right 2017    Dr Brewster     SECTION      INSERTION-INTRAOCULAR LENS (IOL) Right 2017    [facility]    INSERTION-INTRAOCULAR LENS (IOL) Left 3/17/2016    [facility]formed [facility] at [facility] 1ST FLR    KNEE ARTHROSCOPY  14    right    MYOMECTOMY      PHACOEMULSIFICATION-ASPIRATION-CATARACT Right 2017    [facility]     PHACOEMULSIFICATION-ASPIRATION-CATARACT Left 3/17/2016    [facility]ST F[facility]    RELEASE, TRIGGER FINGER left thumb Left 4/15/2019    [facility]    TRIGGER FINGER RELEASE         SocHx:   Social History     Socioeconomic History    Marital status: Single     Spouse name: Not on file    Number of children: Not on file    Years of education: Not on file    Highest education level: Not on file   Occupational History    Not on file   Social Needs    Financial resource strain: Somewhat hard    Food insecurity:     Worry: Never true     Inability: Never true    Transportation needs:     Medical: No     Non-medical: No   Tobacco Use    Smoking status: Former Smoker     Start date: 12/9/2002    Smokeless tobacco: Never Used   Substance and Sexual Activity    Alcohol use: Yes     Frequency: 2-4 times a month     Drinks per session: 1 or 2     Binge frequency: Never     Comment: socially    Drug use: No    Sexual activity: Never     Birth control/protection: None   Lifestyle    Physical activity:     Days per week: 1 day     Minutes per session: 10 min    Stress: To some extent   Relationships    Social connections:     Talks on phone: More than three times a week     Gets together: Twice a week     Attends Sabianism service: Not on file     Active member of club or organization: No     Attends meetings of clubs or organizations: Never     Relationship status:    Other Topics Concern    Not on file   Social History Narrative    . 1 daughter. Works as  at [employer].        Review of Systems   Constitutional: Negative for chills, diaphoresis, fatigue and fever.   HENT: Negative for congestion, dental problem, ear discharge, ear pain, postnasal drip, rhinorrhea, sinus pressure and sore throat.    Eyes: Negative for redness, itching and visual disturbance.   Respiratory: Positive for shortness of breath.  Negative for cough, chest tightness and wheezing.    Cardiovascular: Positive for chest pain. Negative for palpitations and leg swelling.   Gastrointestinal: Negative for abdominal pain, blood in stool, constipation, diarrhea, nausea and vomiting.   Endocrine: Negative for polydipsia and polyuria.   Genitourinary: Positive for vaginal discharge. Negative for dysuria, frequency, hematuria and urgency.   Musculoskeletal: Positive for arthralgias (hand pain). Negative for back pain, myalgias and neck pain.   Skin: Negative for color change and rash.   Neurological: Positive for dizziness and headaches. Negative for weakness and numbness.   Hematological: Negative for adenopathy.   Psychiatric/Behavioral: Negative for dysphoric mood. The patient is not nervous/anxious.        Objective:      Physical Exam   Constitutional: She is oriented to person, place, and time. Vital signs are normal. She appears well-developed and well-nourished. No distress.   HENT:   Head: Normocephalic and atraumatic.   Right Ear: Hearing, tympanic membrane, external ear and ear canal normal. Tympanic membrane is not erythematous and not bulging.   Left Ear: Hearing, tympanic membrane, external ear and ear canal normal. Tympanic membrane is not erythematous and not bulging.   Nose: Nose normal.   Mouth/Throat: Uvula is midline, oropharynx is clear and moist and mucous membranes are normal. No oropharyngeal exudate or posterior oropharyngeal erythema.   Eyes: Pupils are equal, round, and reactive to light. Conjunctivae, EOM and lids are normal. No scleral icterus.   Neck: Normal range of motion. Neck supple. No thyroid mass and no thyromegaly present.   Cardiovascular: Normal rate, regular rhythm, normal heart sounds and intact distal pulses.   No murmur heard.  Pulmonary/Chest: Effort normal and breath sounds normal. She has no wheezes.   Abdominal: Soft. Bowel sounds are normal. She exhibits no distension. There is no hepatosplenomegaly. There  is no tenderness. There is no rigidity, no rebound and no guarding.   Musculoskeletal: Normal range of motion. She exhibits no edema.   Lymphadenopathy:     She has no cervical adenopathy.        Right: No supraclavicular adenopathy present.        Left: No supraclavicular adenopathy present.   Neurological: She is alert and oriented to person, place, and time. She has normal reflexes. Coordination and gait normal.   Skin: Skin is warm, dry and intact. No rash noted. She is not diaphoretic.   Psychiatric: She has a normal mood and affect.   Vitals reviewed.      Assessment:       1. Annual physical exam    2. Hypertension associated with diabetes    3. Other diabetic neurological complication associated with type 2 diabetes mellitus    4. Dyslipidemia    5. Vaginal discharge    6. Dizziness    7. Vitamin D insufficiency    8. Trigger finger of left thumb    9. Obstructive sleep apnea syndrome        Plan:       1. Annual physical exam  - reviewed recent labs with patient    2. Hypertension associated with diabetes  - controlled, continue amlodipine, losartan    3. Other diabetic neurological complication associated with type 2 diabetes mellitus  - Ambulatory Referral to Podiatry  - continue metformin and tresiba, start bydureon    4. Dyslipidemia  - stable, continue lipitor    5. Vaginal discharge  - Ambulatory referral to Obstetrics / Gynecology    6. Dizziness  - Ultrasound doppler carotid (Cupid Only); Future  - meclizine (ANTIVERT) 25 mg tablet; Take 1 tablet (25 mg total) by mouth 2 (two) times daily as needed for Dizziness.  Dispense: 30 tablet; Refill: 0    7. Vitamin D insufficiency  - vitamin D (VITAMIN D3) 1000 units Tab; Take 2,000 Units by mouth once daily.    8. Trigger finger of left thumb  - planning surgery soon    9. Obstructive sleep apnea syndrome  - use cpap regularly    RTC in 3 months or sooner if needed    Nicole Márquez MD

## 2019-04-11 NOTE — TELEPHONE ENCOUNTER
Please cancel annual and labs. She had annual with labs today.     Needs 1 year recall and 3 mo fu

## 2019-04-11 NOTE — TELEPHONE ENCOUNTER
Patient is requesting a letter stating that she cannot go to work two weeks after surgery (4/15/19).

## 2019-04-12 ENCOUNTER — TELEPHONE (OUTPATIENT)
Dept: ORTHOPEDICS | Facility: CLINIC | Age: 64
End: 2019-04-12

## 2019-04-12 ENCOUNTER — CLINICAL SUPPORT (OUTPATIENT)
Dept: CARDIOLOGY | Facility: CLINIC | Age: 64
End: 2019-04-12
Attending: INTERNAL MEDICINE
Payer: COMMERCIAL

## 2019-04-12 DIAGNOSIS — R42 DIZZINESS: ICD-10-CM

## 2019-04-12 LAB
LEFT ARM DIASTOLIC BLOOD PRESSURE: 60 MMHG
LEFT ARM SYSTOLIC BLOOD PRESSURE: 120 MMHG
LEFT CBA DIAS: 9 CM/S
LEFT CBA SYS: 47 CM/S
LEFT CCA DIST DIAS: 14 CM/S
LEFT CCA DIST SYS: 59 CM/S
LEFT CCA MID DIAS: 17 CM/S
LEFT CCA MID SYS: 75 CM/S
LEFT CCA PROX DIAS: 12 CM/S
LEFT CCA PROX SYS: 93 CM/S
LEFT ECA DIAS: 5 CM/S
LEFT ECA SYS: 45 CM/S
LEFT ICA DIST DIAS: 21 CM/S
LEFT ICA DIST SYS: 101 CM/S
LEFT ICA MID DIAS: 29 CM/S
LEFT ICA MID SYS: 112 CM/S
LEFT ICA PROX DIAS: 9 CM/S
LEFT ICA PROX SYS: 56 CM/S
LEFT VERTEBRAL DIAS: 20 CM/S
LEFT VERTEBRAL SYS: 64 CM/S
OHS CV CAROTID RIGHT ICA EDV HIGHEST: 19
OHS CV CAROTID ULTRASOUND LEFT ICA/CCA RATIO: 1.2
OHS CV CAROTID ULTRASOUND RIGHT ICA/CCA RATIO: 1.46
OHS CV PV CAROTID LEFT HIGHEST CCA: 93
OHS CV PV CAROTID LEFT HIGHEST ICA: 112
OHS CV PV CAROTID RIGHT HIGHEST CCA: 106
OHS CV PV CAROTID RIGHT HIGHEST ICA: 155
OHS CV US CAROTID LEFT HIGHEST EDV: 29
RIGHT ARM DIASTOLIC BLOOD PRESSURE: 60 MMHG
RIGHT ARM SYSTOLIC BLOOD PRESSURE: 120 MMHG
RIGHT CBA DIAS: 8 CM/S
RIGHT CBA SYS: 46 CM/S
RIGHT CCA DIST DIAS: 13 CM/S
RIGHT CCA DIST SYS: 61 CM/S
RIGHT CCA MID DIAS: 12 CM/S
RIGHT CCA MID SYS: 67 CM/S
RIGHT CCA PROX DIAS: 6 CM/S
RIGHT CCA PROX SYS: 106 CM/S
RIGHT ECA DIAS: 6 CM/S
RIGHT ECA SYS: 62 CM/S
RIGHT ICA DIST DIAS: 19 CM/S
RIGHT ICA DIST SYS: 58 CM/S
RIGHT ICA MID DIAS: 19 CM/S
RIGHT ICA MID SYS: 155 CM/S
RIGHT ICA PROX DIAS: 11 CM/S
RIGHT ICA PROX SYS: 50 CM/S
RIGHT VERTEBRAL DIAS: 15 CM/S
RIGHT VERTEBRAL SYS: 65 CM/S

## 2019-04-12 PROCEDURE — 93880 EXTRACRANIAL BILAT STUDY: CPT | Mod: S$GLB,,, | Performed by: INTERNAL MEDICINE

## 2019-04-12 PROCEDURE — 93880 CV US DOPPLER CAROTID (CUPID ONLY): ICD-10-PCS | Mod: S$GLB,,, | Performed by: INTERNAL MEDICINE

## 2019-04-15 ENCOUNTER — HOSPITAL ENCOUNTER (OUTPATIENT)
Facility: OTHER | Age: 64
Discharge: HOME | End: 2019-04-15
Attending: ORTHOPAEDIC SURGERY | Admitting: ORTHOPAEDIC SURGERY
Payer: COMMERCIAL

## 2019-04-15 ENCOUNTER — ANESTHESIA (OUTPATIENT)
Dept: SURGERY | Facility: OTHER | Age: 64
End: 2019-04-15
Payer: COMMERCIAL

## 2019-04-15 VITALS
DIASTOLIC BLOOD PRESSURE: 73 MMHG | SYSTOLIC BLOOD PRESSURE: 148 MMHG | HEART RATE: 72 BPM | HEIGHT: 65 IN | RESPIRATION RATE: 18 BRPM | WEIGHT: 205 LBS | OXYGEN SATURATION: 96 % | BODY MASS INDEX: 34.16 KG/M2 | TEMPERATURE: 98 F

## 2019-04-15 DIAGNOSIS — M65.312 TRIGGER THUMB OF LEFT HAND: ICD-10-CM

## 2019-04-15 DIAGNOSIS — M65.312 TRIGGER FINGER OF LEFT THUMB: Primary | ICD-10-CM

## 2019-04-15 LAB
POCT GLUCOSE: 75 MG/DL (ref 70–110)
POCT GLUCOSE: 80 MG/DL (ref 70–110)

## 2019-04-15 PROCEDURE — 37000008 HC ANESTHESIA 1ST 15 MINUTES: Performed by: ORTHOPAEDIC SURGERY

## 2019-04-15 PROCEDURE — 36000706: Performed by: ORTHOPAEDIC SURGERY

## 2019-04-15 PROCEDURE — 26055 PR INCISE FINGER TENDON SHEATH: ICD-10-PCS | Mod: FA,,, | Performed by: ORTHOPAEDIC SURGERY

## 2019-04-15 PROCEDURE — 36000707: Performed by: ORTHOPAEDIC SURGERY

## 2019-04-15 PROCEDURE — 63600175 PHARM REV CODE 636 W HCPCS: Performed by: NURSE ANESTHETIST, CERTIFIED REGISTERED

## 2019-04-15 PROCEDURE — 25000003 PHARM REV CODE 250: Performed by: SPECIALIST

## 2019-04-15 PROCEDURE — 25000003 PHARM REV CODE 250: Performed by: STUDENT IN AN ORGANIZED HEALTH CARE EDUCATION/TRAINING PROGRAM

## 2019-04-15 PROCEDURE — 71000015 HC POSTOP RECOV 1ST HR: Performed by: ORTHOPAEDIC SURGERY

## 2019-04-15 PROCEDURE — S0077 INJECTION, CLINDAMYCIN PHOSP: HCPCS | Performed by: STUDENT IN AN ORGANIZED HEALTH CARE EDUCATION/TRAINING PROGRAM

## 2019-04-15 PROCEDURE — 25000003 PHARM REV CODE 250: Performed by: ORTHOPAEDIC SURGERY

## 2019-04-15 PROCEDURE — 25000003 PHARM REV CODE 250: Performed by: NURSE ANESTHETIST, CERTIFIED REGISTERED

## 2019-04-15 PROCEDURE — 82962 GLUCOSE BLOOD TEST: CPT | Performed by: ORTHOPAEDIC SURGERY

## 2019-04-15 PROCEDURE — 37000009 HC ANESTHESIA EA ADD 15 MINS: Performed by: ORTHOPAEDIC SURGERY

## 2019-04-15 PROCEDURE — 26055 INCISE FINGER TENDON SHEATH: CPT | Mod: FA,,, | Performed by: ORTHOPAEDIC SURGERY

## 2019-04-15 RX ORDER — MEPERIDINE HYDROCHLORIDE 25 MG/ML
12.5 INJECTION INTRAMUSCULAR; INTRAVENOUS; SUBCUTANEOUS ONCE AS NEEDED
Status: DISCONTINUED | OUTPATIENT
Start: 2019-04-15 | End: 2019-04-15 | Stop reason: HOSPADM

## 2019-04-15 RX ORDER — ACETAMINOPHEN 500 MG
1000 TABLET ORAL
Status: COMPLETED | OUTPATIENT
Start: 2019-04-15 | End: 2019-04-15

## 2019-04-15 RX ORDER — SODIUM CHLORIDE, SODIUM LACTATE, POTASSIUM CHLORIDE, CALCIUM CHLORIDE 600; 310; 30; 20 MG/100ML; MG/100ML; MG/100ML; MG/100ML
INJECTION, SOLUTION INTRAVENOUS CONTINUOUS
Status: DISCONTINUED | OUTPATIENT
Start: 2019-04-15 | End: 2019-04-15 | Stop reason: HOSPADM

## 2019-04-15 RX ORDER — ONDANSETRON 2 MG/ML
4 INJECTION INTRAMUSCULAR; INTRAVENOUS DAILY PRN
Status: DISCONTINUED | OUTPATIENT
Start: 2019-04-15 | End: 2019-04-15 | Stop reason: HOSPADM

## 2019-04-15 RX ORDER — FENTANYL CITRATE 50 UG/ML
INJECTION, SOLUTION INTRAMUSCULAR; INTRAVENOUS
Status: DISCONTINUED | OUTPATIENT
Start: 2019-04-15 | End: 2019-04-15

## 2019-04-15 RX ORDER — BUPIVACAINE HYDROCHLORIDE 2.5 MG/ML
INJECTION, SOLUTION EPIDURAL; INFILTRATION; INTRACAUDAL
Status: DISCONTINUED | OUTPATIENT
Start: 2019-04-15 | End: 2019-04-15 | Stop reason: HOSPADM

## 2019-04-15 RX ORDER — OXYCODONE HYDROCHLORIDE 5 MG/1
5 TABLET ORAL
Status: DISCONTINUED | OUTPATIENT
Start: 2019-04-15 | End: 2019-04-15 | Stop reason: HOSPADM

## 2019-04-15 RX ORDER — CLINDAMYCIN PHOSPHATE 900 MG/50ML
900 INJECTION, SOLUTION INTRAVENOUS
Status: COMPLETED | OUTPATIENT
Start: 2019-04-15 | End: 2019-04-15

## 2019-04-15 RX ORDER — SODIUM CHLORIDE 9 MG/ML
INJECTION, SOLUTION INTRAVENOUS CONTINUOUS
Status: DISCONTINUED | OUTPATIENT
Start: 2019-04-15 | End: 2020-01-21

## 2019-04-15 RX ORDER — MUPIROCIN 20 MG/G
OINTMENT TOPICAL
Status: DISCONTINUED | OUTPATIENT
Start: 2019-04-15 | End: 2020-01-21

## 2019-04-15 RX ORDER — ACETAMINOPHEN 325 MG/1
650 TABLET ORAL EVERY 4 HOURS PRN
Status: DISCONTINUED | OUTPATIENT
Start: 2019-04-15 | End: 2019-04-15 | Stop reason: HOSPADM

## 2019-04-15 RX ORDER — LIDOCAINE HCL/PF 100 MG/5ML
SYRINGE (ML) INTRAVENOUS
Status: DISCONTINUED | OUTPATIENT
Start: 2019-04-15 | End: 2019-04-15

## 2019-04-15 RX ORDER — SODIUM CHLORIDE 0.9 % (FLUSH) 0.9 %
3 SYRINGE (ML) INJECTION
Status: DISCONTINUED | OUTPATIENT
Start: 2019-04-15 | End: 2019-04-15 | Stop reason: HOSPADM

## 2019-04-15 RX ORDER — HYDROCODONE BITARTRATE AND ACETAMINOPHEN 5; 325 MG/1; MG/1
1 TABLET ORAL EVERY 4 HOURS PRN
Status: DISCONTINUED | OUTPATIENT
Start: 2019-04-15 | End: 2019-04-15 | Stop reason: HOSPADM

## 2019-04-15 RX ORDER — LIDOCAINE HYDROCHLORIDE 10 MG/ML
0.5 INJECTION, SOLUTION EPIDURAL; INFILTRATION; INTRACAUDAL; PERINEURAL ONCE
Status: DISCONTINUED | OUTPATIENT
Start: 2019-04-15 | End: 2019-04-15 | Stop reason: HOSPADM

## 2019-04-15 RX ORDER — PROPOFOL 10 MG/ML
VIAL (ML) INTRAVENOUS
Status: DISCONTINUED | OUTPATIENT
Start: 2019-04-15 | End: 2019-04-15

## 2019-04-15 RX ORDER — FAMOTIDINE 20 MG/1
20 TABLET, FILM COATED ORAL
Status: COMPLETED | OUTPATIENT
Start: 2019-04-15 | End: 2019-04-15

## 2019-04-15 RX ORDER — PROPOFOL 10 MG/ML
VIAL (ML) INTRAVENOUS CONTINUOUS PRN
Status: DISCONTINUED | OUTPATIENT
Start: 2019-04-15 | End: 2019-04-15

## 2019-04-15 RX ORDER — HYDROCODONE BITARTRATE AND ACETAMINOPHEN 10; 325 MG/1; MG/1
1 TABLET ORAL EVERY 4 HOURS PRN
Status: DISCONTINUED | OUTPATIENT
Start: 2019-04-15 | End: 2019-04-15 | Stop reason: HOSPADM

## 2019-04-15 RX ORDER — GLYCOPYRROLATE 0.2 MG/ML
INJECTION INTRAMUSCULAR; INTRAVENOUS
Status: DISCONTINUED | OUTPATIENT
Start: 2019-04-15 | End: 2019-04-15

## 2019-04-15 RX ORDER — HYDROMORPHONE HYDROCHLORIDE 2 MG/ML
0.4 INJECTION, SOLUTION INTRAMUSCULAR; INTRAVENOUS; SUBCUTANEOUS EVERY 5 MIN PRN
Status: DISCONTINUED | OUTPATIENT
Start: 2019-04-15 | End: 2019-04-15 | Stop reason: HOSPADM

## 2019-04-15 RX ORDER — LIDOCAINE HYDROCHLORIDE 10 MG/ML
INJECTION, SOLUTION EPIDURAL; INFILTRATION; INTRACAUDAL; PERINEURAL
Status: DISCONTINUED | OUTPATIENT
Start: 2019-04-15 | End: 2019-04-15 | Stop reason: HOSPADM

## 2019-04-15 RX ORDER — PHENYLEPHRINE HYDROCHLORIDE 10 MG/ML
INJECTION INTRAVENOUS
Status: DISCONTINUED | OUTPATIENT
Start: 2019-04-15 | End: 2019-04-15

## 2019-04-15 RX ORDER — SODIUM CHLORIDE 0.9 % (FLUSH) 0.9 %
10 SYRINGE (ML) INJECTION
Status: DISCONTINUED | OUTPATIENT
Start: 2019-04-15 | End: 2019-04-15 | Stop reason: HOSPADM

## 2019-04-15 RX ADMIN — CLINDAMYCIN PHOSPHATE 900 MG: 18 INJECTION, SOLUTION INTRAVENOUS at 08:04

## 2019-04-15 RX ADMIN — FAMOTIDINE 20 MG: 20 TABLET, FILM COATED ORAL at 07:04

## 2019-04-15 RX ADMIN — ACETAMINOPHEN 1000 MG: 500 TABLET ORAL at 07:04

## 2019-04-15 RX ADMIN — PHENYLEPHRINE HYDROCHLORIDE 100 MCG: 10 INJECTION INTRAVENOUS at 08:04

## 2019-04-15 RX ADMIN — PROPOFOL 100 MCG/KG/MIN: 10 INJECTION, EMULSION INTRAVENOUS at 08:04

## 2019-04-15 RX ADMIN — FENTANYL CITRATE 50 MCG: 50 INJECTION, SOLUTION INTRAMUSCULAR; INTRAVENOUS at 07:04

## 2019-04-15 RX ADMIN — PROPOFOL 50 MG: 10 INJECTION, EMULSION INTRAVENOUS at 08:04

## 2019-04-15 RX ADMIN — LIDOCAINE HYDROCHLORIDE 30 MG: 20 INJECTION, SOLUTION INTRAVENOUS at 08:04

## 2019-04-15 RX ADMIN — GLYCOPYRROLATE 0.2 MG: 0.2 INJECTION, SOLUTION INTRAMUSCULAR; INTRAVENOUS at 08:04

## 2019-04-15 RX ADMIN — MUPIROCIN: 20 OINTMENT TOPICAL at 07:04

## 2019-04-15 RX ADMIN — SODIUM CHLORIDE, SODIUM LACTATE, POTASSIUM CHLORIDE, AND CALCIUM CHLORIDE: 600; 310; 30; 20 INJECTION, SOLUTION INTRAVENOUS at 07:04

## 2019-04-15 NOTE — DISCHARGE INSTRUCTIONS
Anesthesia: Monitored Anesthesia Care (MAC)    Youre due to have surgery. During surgery, youll be given medicine called anesthesia. This will keep you comfortable and pain-free. Your surgeon will use monitored anesthesia care (MAC). This sheet tells you more about this type of anesthesia.  What is monitored anesthesia care?  MAC keeps you very drowsy during surgery. You may be awake, but you will likely not remember much. And you wont feel pain. With MAC, medicines are given through an IV line into a vein in your arm or hand. A local anesthetic will usually be injected into the skin and muscle around the surgical site to numb it. The anesthesia provider monitors you during the procedure. He or she checks your heart rate and rhythm, blood pressure, and blood oxygen level.  Anesthesia tools and medicines that may be near you during your procedure  You will likely have:  · A pulse oximeter on the end of your finger. This measures your blood oxygen level.  · Electrocardiography leads (electrodes) on your chest. These record your heart rate and rhythm.  · Medicines given through an IV. These relax you and prevent pain. You may be awake or sleep lightly. If you have local anesthetic, it is injected directly into your skin.  · A facemask to give you oxygen, if needed.  Risks and possible complications  MAC has some risks. These include:  · Breathing problems  · Nausea and vomiting  · Allergic reaction to the anesthetic    Anesthesia safety  Tips for anesthesia safety include the following:   · Follow all instructions you are given for how long not to eat or drink before your procedure.  · Be sure your healthcare provider knows what medicines you take, especially any anti-inflammatory medicine or blood thinners. This includes aspirin and any other over-the-counter medicines, herbs, and supplements.  · Have an adult family member or friend drive you home after the procedure.  · For the first 24 hours after your  surgery:  ¨ Do not drive or use heavy equipment.  ¨ Do not make important decisions or sign documents.  ¨ Avoid alcohol.  ¨ Have someone stay with you, if possible. They can watch for problems and help keep you safe.  Date Last Reviewed: 12/1/2016  © 6089-5127 [employer] [address] [place] [address], [place] [address]. All rights reserved. This information is not intended as a substitute for professional medical care. Always follow your healthcare professional's instructions.

## 2019-04-15 NOTE — BRIEF OP NOTE
[facility] [place]  Brief Operative Note     SUMMARY     Surgery Date: 4/15/2019     Surgeon(s) and Role:     * Yuridia Gonzales MD - Primary    Assisting Surgeon: None    Pre-op Diagnosis:  Trigger finger of left thumb [M65.312]    Post-op Diagnosis:  Post-Op Diagnosis Codes:     * Trigger finger of left thumb [M65.312]    Procedure(s) (LRB):  RELEASE, TRIGGER FINGER left thumb (Left)    Anesthesia: Local MAC    Description of the findings of the procedure: see op note    Findings/Key Components: see op note     Estimated Blood Loss: * No values recorded between 4/15/2019  8:18 AM and 4/15/2019  8:31 AM *         Specimens:   Specimen (12h ago, onward)    None          Discharge Note    SUMMARY     Admit Date: 4/15/2019    Discharge Date and Time: No discharge date for patient encounter.    Hospital Course (synopsis of major diagnoses, care, treatment, and services provided during the course of the hospital stay):     On the day of surgery, Adriana Paula arrived to the day of surgery center. The patient was identified in the pre-operative area and the operative site was marked. All consents were verified. The patient was taken to the operative suite and underwent a left trigger thumb release without complication. The patient tolerated the procedure well and was then taken to the PACU and monitored post-operatively. The patient's pain was controlled with oral medications and the decision was made to discharge the patient home with close follow up.     Follow up appointment scheduled for 2 weeks with [facility]  Weight Bearing Status: As tolerated   Prescriptions:   Diet: Regular          Final Diagnosis: Post-Op Diagnosis Codes:     * Trigger finger of left thumb [M65.312]    Disposition: Home or Self Care    Follow Up/Patient Instructions:     Medications:  Reconciled Home Medications:      Medication List      CHANGE how you take these medications    insulin degludec 100 unit/mL (3 mL)  "Inpn  Commonly known as:  TRESIBA FLEXTOUCH U-100  Inject 60 Units into the skin once daily.  What changed:  how much to take     metFORMIN 500 MG 24 hr tablet  Commonly known as:  GLUCOPHAGE-XR  Take 2 tablets (1,000 mg total) by mouth 2 (two) times daily with meals.  What changed:  when to take this     oxybutynin 5 MG Tab  Commonly known as:  DITROPAN  Take 1 tablet (5 mg total) by mouth 3 (three) times daily.  What changed:  when to take this        CONTINUE taking these medications    acetaminophen-codeine 300-30mg 300-30 mg Tab  Commonly known as:  TYLENOL #3  Take 1 tablet by mouth every 6 (six) hours as needed.     amLODIPine 10 MG tablet  Commonly known as:  NORVASC  Take 1 tablet (10 mg total) by mouth once daily.     aspirin 81 MG EC tablet  Commonly known as:  ECOTRIN  Take 81 mg by mouth once daily. Instructed to stop 1 week prior to surgery on 4/15/19 per Dr. Conklin     atorvastatin 40 MG tablet  Commonly known as:  LIPITOR  Take 1 tablet (40 mg total) by mouth once daily.     blood sugar diagnostic Strp  1 strip by Misc.(Non-Drug; Combo Route) route 2 (two) times daily. One touch verio     blood-glucose meter kit  Commonly known as:  ONETOUCH VERIO IQ METER  Use as instructed     exenatide microspheres 2 mg/0.85 mL Atin  Commonly known as:  BYDUREON BCISE  Inject 2 mg into the skin every 7 days.     fluticasone 50 mcg/actuation nasal spray  Commonly known as:  FLONASE  SHAKE LIQUID AND USE 1 SPRAY(50 MCG) IN EACH NOSTRIL EVERY DAY     lancets 33 gauge Misc  1 lancet by Misc.(Non-Drug; Combo Route) route 2 (two) times daily before meals.     lidocaine-prilocaine cream  Commonly known as:  EMLA     losartan 100 MG tablet  Commonly known as:  COZAAR  Take 1 tablet (100 mg total) by mouth once daily.     meclizine 25 mg tablet  Commonly known as:  ANTIVERT  Take 1 tablet (25 mg total) by mouth 2 (two) times daily as needed for Dizziness.     pen needle, diabetic 31 gauge x 5/16" Ndle  Commonly known " as:  BD ULTRA-FINE SHORT PEN NEEDLE  Use as directed (two) times daily.     traMADol 50 mg tablet  Commonly known as:  ULTRAM  TAKE 1 TABLET BY MOUTH EVERY 12 HOURS AS NEEDED FOR PAIN     vitamin D 1000 units Tab  Commonly known as:  VITAMIN D3  Take 2,000 Units by mouth once daily.     VOLTAREN 1 % Gel  Generic drug:  diclofenac sodium          Discharge Procedure Orders   Diet general     Call MD for:  temperature >100.4     Call MD for:  persistent nausea and vomiting     Call MD for:  severe uncontrolled pain     Call MD for:  difficulty breathing, headache or visual disturbances     Call MD for:  redness, tenderness, or signs of infection (pain, swelling, redness, odor or green/yellow discharge around incision site)     Call MD for:  hives     Call MD for:  persistent dizziness or light-headedness     Call MD for:  extreme fatigue     Keep surgical extremity elevated     Leave dressing on - Keep it clean, dry, and intact until clinic visit     Follow-up Information     [facility] In 2 weeks.    Specialty:  Outpatient Rehab  Why:  For suture removal, For wound re-check  Contact information:  [address] [facility] [address]  [facility] [id number] [phone]  Additional information:  [facility] - [facility], 9th Floor [address]   Please park in Beryl Silva

## 2019-04-15 NOTE — OP NOTE
DATE OF PROCEDURE: 04/15/2019  SERVICE: Orthopedics.   ATTENDING SURGEON: Yuridia Gonzales M.D.   ASSISTANT SURGEON: Jalen   PREOPERATIVE DIAGNOSIS:left thumb trigger finger.   POSTOPERATIVE DIAGNOSIS:left thumb finger trigger finger.   PROCEDURE: left thumb A1 pulley release.   ANESTHESIA: Local placed by surgeon and MAC.   FLUID: Lactated Ringer's.   BLOOD LOSS: None, no blood was given.   TOURNIQUET TIME: 10 minutes.   PACKS AND DRAINS: None.   IMPLANTS: None.   SPECIMENS: None.   COMPLICATIONS: None.     INDICATIONS FOR PROCEDURE: Adriana Paula is a 63 y.o.year-old who has failed   conservative treatment for  left thumb trigger finger; therefore,   operative intervention was deemed necessary. Risks and benefits were explained   to the patient in clinic. Consents were performed in clinic.     PROCEDURE IN DETAIL: After the correct site was marked with the patient's   participation in the Holding Area, the patient was brought to the Operating   Room, placed in supine position, underwent MAC anesthesia. A well-padded   nonsterile tourniquet was placed on the right forearm. Time-out was called for   correct site, procedure and patient to be indicated. Under sterile conditions,   an injection of lidocaine 1% without epinephrine was injected at the A1 pulley   space. The arm was prepped and draped in normal sterile fashion. Incision was   marked out in a longitudinal fashion along the pulley. The arm was   exsanguinated using Esmarch. Tourniquet was insufflated 250 mmHg and that is   where it remained for 10 minutes. The incision was made. Careful dissection   down was maintained to the A1 pulley. The neurovascular structures were   retracted out of the way. The A1 pulley was identified. It was sharply   incised. It was completely incised proximally and distally. Portion of    pulley was also incised. The tendon was then retracted out of the tendon sheath   using a right angle. The finger was placed through  range of motion, showed it   did not trigger. The area was irrigated with copious amounts of normal saline.   Nylon closed the skin. Sterile dressing was applied. Tourniquet was deflated.   Brisk cap refill ensued. The patient was transferred the Recovery Room in   stable condition.     POSTOPERATIVE PLAN FOR THIS PATIENT: The patient is to keep the dressing clean, dry and   intact. We will take the sutures out at two weeks.

## 2019-04-15 NOTE — ANESTHESIA POSTPROCEDURE EVALUATION
Anesthesia Post Evaluation    Patient: Adriana Paula    Procedure(s) Performed: Procedure(s) (LRB):  RELEASE, TRIGGER FINGER left thumb (Left)    Final Anesthesia Type: general  Patient location during evaluation: [facility]  Patient participation: Yes- Able to Participate  Level of consciousness: awake and alert  Post-procedure vital signs: reviewed and stable  Pain management: adequate  Airway patency: patent  PONV status at discharge: No PONV  Anesthetic complications: no      Cardiovascular status: blood pressure returned to baseline and hemodynamically stable  Respiratory status: unassisted  Hydration status: euvolemic  Follow-up not needed.          Vitals Value Taken Time   /70 4/15/2019  7:05 AM   Temp 36.6 °C (97.8 °F) 4/15/2019  7:05 AM   Pulse 74 4/15/2019  7:05 AM   Resp 18 4/15/2019  7:05 AM   SpO2 98 % 4/15/2019  7:05 AM         No case tracking events are documented in the log.      Pain/Eric Score: Pain Rating Prior to Med Admin: 0 (4/15/2019  7:15 AM)

## 2019-04-15 NOTE — PLAN OF CARE
Adriana Paula has met all discharge criteria from Phase II. Vital Signs are stable, ambulating  without difficulty. Discharge instructions given, patient verbalized understanding. Discharged from facility via wheelchair in stable condition.

## 2019-04-17 DIAGNOSIS — Z12.11 ENCOUNTER FOR FIT (FECAL IMMUNOCHEMICAL TEST) SCREENING: Primary | ICD-10-CM

## 2019-04-18 ENCOUNTER — TELEPHONE (OUTPATIENT)
Dept: ORTHOPEDICS | Facility: CLINIC | Age: 64
End: 2019-04-18

## 2019-04-18 NOTE — TELEPHONE ENCOUNTER
Spoke with pt this morning in regards to her concerns that she is having. Pt stated that she's experiencing some pain in the top of her hand closer to her wrist with a little swelling. Pt wanted to know if it was okay if it was okay to mix her Tylenol with codeine and Tramadol's together when it came to taking her medication. I reassured the pt that it was okay and that she had to take them as directed. Pt verbalized understanding and that she had no further question/concerns at this time.      Thank you,   Drew SALDAÑA MA

## 2019-04-18 NOTE — TELEPHONE ENCOUNTER
----- Message from Katia Sydnieanderson sent at 4/18/2019  7:11 AM CDT -----  Contact: NALDO BARKSDALE [[id number]]  Name of Who is Calling: NALDO BARKSDALE [[id number]]    What is the request in detail: Patient called she stated that she is having pain and tingling in her hand. Please call her to advise.       Can the clinic reply by [facility]: no      What [facility]: [phone]

## 2019-04-22 ENCOUNTER — TELEPHONE (OUTPATIENT)
Dept: ORTHOPEDICS | Facility: CLINIC | Age: 64
End: 2019-04-22

## 2019-04-22 ENCOUNTER — OFFICE VISIT (OUTPATIENT)
Dept: OBSTETRICS AND GYNECOLOGY | Facility: CLINIC | Age: 64
End: 2019-04-22
Attending: OBSTETRICS & GYNECOLOGY
Payer: COMMERCIAL

## 2019-04-22 VITALS
DIASTOLIC BLOOD PRESSURE: 70 MMHG | WEIGHT: 210.31 LBS | HEIGHT: 65 IN | SYSTOLIC BLOOD PRESSURE: 135 MMHG | BODY MASS INDEX: 35.04 KG/M2

## 2019-04-22 DIAGNOSIS — N95.1 MENOPAUSAL VAGINAL DRYNESS: ICD-10-CM

## 2019-04-22 DIAGNOSIS — R30.0 DYSURIA: ICD-10-CM

## 2019-04-22 DIAGNOSIS — N76.0 VAGINITIS AND VULVOVAGINITIS: Primary | ICD-10-CM

## 2019-04-22 PROCEDURE — 99213 PR OFFICE/OUTPT VISIT, EST, LEVL III, 20-29 MIN: ICD-10-PCS | Mod: S$GLB,,, | Performed by: OBSTETRICS & GYNECOLOGY

## 2019-04-22 PROCEDURE — 87510 GARDNER VAG DNA DIR PROBE: CPT

## 2019-04-22 PROCEDURE — 87480 CANDIDA DNA DIR PROBE: CPT

## 2019-04-22 PROCEDURE — 3075F SYST BP GE 130 - 139MM HG: CPT | Mod: CPTII,S$GLB,, | Performed by: OBSTETRICS & GYNECOLOGY

## 2019-04-22 PROCEDURE — 3008F PR BODY MASS INDEX (BMI) DOCUMENTED: ICD-10-PCS | Mod: CPTII,S$GLB,, | Performed by: OBSTETRICS & GYNECOLOGY

## 2019-04-22 PROCEDURE — 99999 PR PBB SHADOW E&M-EST. PATIENT-LVL III: CPT | Mod: PBBFAC,,, | Performed by: OBSTETRICS & GYNECOLOGY

## 2019-04-22 PROCEDURE — 3078F PR MOST RECENT DIASTOLIC BLOOD PRESSURE < 80 MM HG: ICD-10-PCS | Mod: CPTII,S$GLB,, | Performed by: OBSTETRICS & GYNECOLOGY

## 2019-04-22 PROCEDURE — 99999 PR PBB SHADOW E&M-EST. PATIENT-LVL III: ICD-10-PCS | Mod: PBBFAC,,, | Performed by: OBSTETRICS & GYNECOLOGY

## 2019-04-22 PROCEDURE — 3008F BODY MASS INDEX DOCD: CPT | Mod: CPTII,S$GLB,, | Performed by: OBSTETRICS & GYNECOLOGY

## 2019-04-22 PROCEDURE — 87086 URINE CULTURE/COLONY COUNT: CPT

## 2019-04-22 PROCEDURE — 3075F PR MOST RECENT SYSTOLIC BLOOD PRESS GE 130-139MM HG: ICD-10-PCS | Mod: CPTII,S$GLB,, | Performed by: OBSTETRICS & GYNECOLOGY

## 2019-04-22 PROCEDURE — 99213 OFFICE O/P EST LOW 20 MIN: CPT | Mod: S$GLB,,, | Performed by: OBSTETRICS & GYNECOLOGY

## 2019-04-22 PROCEDURE — 3078F DIAST BP <80 MM HG: CPT | Mod: CPTII,S$GLB,, | Performed by: OBSTETRICS & GYNECOLOGY

## 2019-04-22 NOTE — TELEPHONE ENCOUNTER
----- Message from Irischolo Jv sent at 4/22/2019  9:19 AM CDT -----  Contact: NALDO BARKSDALE [[id number]]  Name of Who is Calling:NALDO BARKSDALE [[id number]]        What is the request in detail: Pt called stating her bandage is wet. Requesting to speak with staff regarding issue.  Please advise.      Can the clinic reply by [facility]:N     What Number to Call Back if not in [facility]: [phone]

## 2019-04-22 NOTE — PROGRESS NOTES
HISTORY OF PRESENT ILLNESS:    Adriana Paula is a 63 y.o. female, , No LMP recorded. Patient is postmenopausal.,  presents for a problem visit, complaining of dysuria and frequency for about a month.   She normally takes ditropan for urinary urgency but hasn't taken it in a few weeks.  Also complains of brown vaginal discharge and odor for a few weeks.  Reports vaginal dryness, but not sexually active in about 12 years.      Past Medical History:   Diagnosis Date    Allergy     Anemia     Anxiety     Arthritis     Breast cyst     Cataract     DR (diabetic retinopathy)     Dyslipidemia     Essential hypertension 2012    Gastroesophageal reflux disease without esophagitis 2/3/2017    GERD (gastroesophageal reflux disease)     Glaucoma     Hypertension     Obesity (BMI 30-39.9) 10/24/2013    PN (peripheral neuropathy)     Sleep apnea     Type 2 diabetes mellitus with both eyes affected by mild nonproliferative retinopathy without macular edema, with long-term current use of insulin     Type 2 diabetes mellitus with diabetic polyneuropathy, with long-term current use of insulin     Type II or unspecified type diabetes mellitus with other specified manifestations, uncontrolled        Past Surgical History:   Procedure Laterality Date    ARTHROSCOPY, KNEE Right 2014    [facility]formed [facility] 2ND FLR    BREAST CYST EXCISION Right     1980s    CARPAL TUNNEL RELEASE Right     CATARACT EXTRACTION      CATARACT EXTRACTION W/  INTRAOCULAR LENS IMPLANT Right 2017    Dr Brewster     SECTION      INSERTION-INTRAOCULAR LENS (IOL) Right 2017    [facility] at [facility] OR    INSERTION-INTRAOCULAR LENS (IOL) Left 3/17/2016    [facility]formed by [facility] at [facility] OR 1ST FLR    KNEE ARTHROSCOPY  14    right    MYOMECTOMY      PHACOEMULSIFICATION-ASPIRATION-CATARACT Right 2017    [facility] at  [facility] OR    PHACOEMULSIFICATION-ASPIRATION-CATARACT Left 3/17/2016    Performed by [facility]grid [facility] at [facility] OR 1ST FLR    RELEASE, TRIGGER FINGER left thumb Left 4/15/2019    [facility] by [facility] at [facility] OR    TRIGGER FINGER RELEASE         MEDICATIONS AND ALLERGIES:      Current Outpatient Medications:     acetaminophen-codeine 300-30mg (TYLENOL #3) 300-30 mg Tab, Take 1 tablet by mouth every 6 (six) hours as needed., Disp: 25 tablet, Rfl: 0    amLODIPine (NORVASC) 10 MG tablet, Take 1 tablet (10 mg total) by mouth once daily., Disp: 90 tablet, Rfl: 3    atorvastatin (LIPITOR) 40 MG tablet, Take 1 tablet (40 mg total) by mouth once daily., Disp: 90 tablet, Rfl: 3    blood sugar diagnostic Strp, 1 strip by Misc.(Non-Drug; Combo Route) route 2 (two) times daily. One touch verio, Disp: 100 strip, Rfl: 5    blood-glucose meter (ONETOUCH VERIO IQ METER) kit, Use as instructed, Disp: 200 each, Rfl: 11    BYDUREON 2 mg/0.65 mL PnIj, INJ 2 MG INTO THE SKIN Q 7 DAYS, Disp: , Rfl: 5    exenatide microspheres (BYDUREON BCISE) 2 mg/0.85 mL AtIn, Inject 2 mg into the skin every 7 days., Disp: 4 Syringe, Rfl: 5    fluticasone (FLONASE) 50 mcg/actuation nasal spray, SHAKE LIQUID AND USE 1 SPRAY(50 MCG) IN EACH NOSTRIL EVERY DAY, Disp: 16 mL, Rfl: 0    insulin degludec (TRESIBA FLEXTOUCH U-100) 100 unit/mL (3 mL) InPn, Inject 60 Units into the skin once daily. (Patient taking differently: Inject 42 Units into the skin once daily. ), Disp: 18 mL, Rfl: 2    lancets 33 gauge Misc, 1 lancet by Misc.(Non-Drug; Combo Route) route 2 (two) times daily before meals., Disp: 200 each, Rfl: 11    lidocaine-prilocaine (EMLA) cream, , Disp: , Rfl:     losartan (COZAAR) 100 MG tablet, Take 1 tablet (100 mg total) by mouth once daily., Disp: 90 tablet, Rfl: 3    meclizine (ANTIVERT) 25 mg tablet, Take 1 tablet (25 mg total) by mouth 2 (two) times daily as needed for Dizziness., Disp: 30 tablet, Rfl: 0     "metFORMIN (GLUCOPHAGE-XR) 500 MG 24 hr tablet, Take 2 tablets (1,000 mg total) by mouth 2 (two) times daily with meals. (Patient taking differently: Take 1,000 mg by mouth once daily. ), Disp: 120 tablet, Rfl: 2    pen needle, diabetic (BD ULTRA-FINE SHORT PEN NEEDLE) 31 gauge x 5/16" Ndle, Use as directed (two) times daily., Disp: 100 each, Rfl: 11    traMADol (ULTRAM) 50 mg tablet, TAKE 1 TABLET BY MOUTH EVERY 12 HOURS AS NEEDED FOR PAIN, Disp: 30 tablet, Rfl: 0    vitamin D (VITAMIN D3) 1000 units Tab, Take 2,000 Units by mouth once daily., Disp: , Rfl:     VOLTAREN 1 % Gel, , Disp: , Rfl:     aspirin (ECOTRIN) 81 MG EC tablet, Take 81 mg by mouth once daily. Instructed to stop 1 week prior to surgery on 4/15/19 per Dr. Conklin, Disp: , Rfl:     oxybutynin (DITROPAN) 5 MG Tab, Take 1 tablet (5 mg total) by mouth 3 (three) times daily. (Patient taking differently: Take 5 mg by mouth once daily. ), Disp: 90 tablet, Rfl: 11  No current facility-administered medications for this visit.     Facility-Administered Medications Ordered in Other Visits:     0.9%  NaCl infusion, , Intravenous, Continuous, Omero Glynn MD    mupirocin 2 % ointment, , Nasal, On Call Procedure, Omero Glynn MD    Review of patient's allergies indicates:   Allergen Reactions    Keflex [cephalexin] Rash      blisters, fever    Penicillins Itching    Sulfamethoxazole-trimethoprim      Other reaction(s): blisters    Vicodin [hydrocodone-acetaminophen] Swelling    Sulfa (sulfonamide antibiotics) Rash      blisters,fever           Family History   Problem Relation Age of Onset    Arthritis Mother     Diabetes Mother     Heart disease Mother     Miscarriages / Stillbirths Mother     Vision loss Mother     Breast cancer Mother 75        70s    Cancer Mother     No Known Problems Father     Diabetes Unknown         "Half of my family"    Breast cancer Maternal Grandmother 50        50s    Breast cancer " Sister 50        50s    Breast cancer Cousin 40        40s    No Known Problems Brother     No Known Problems Maternal Aunt     No Known Problems Maternal Uncle     No Known Problems Paternal Aunt     No Known Problems Paternal Uncle     No Known Problems Maternal Grandfather     No Known Problems Paternal Grandmother     No Known Problems Paternal Grandfather     Breast cancer Cousin 40        40s    Breast cancer Sister 40    Breast cancer Sister 54    Thyroid disease Neg Hx     Ovarian cancer Neg Hx     Amblyopia Neg Hx     Blindness Neg Hx     Cataracts Neg Hx     Glaucoma Neg Hx     Hypertension Neg Hx     Macular degeneration Neg Hx     Retinal detachment Neg Hx     Strabismus Neg Hx     Stroke Neg Hx        Social History     Socioeconomic History    Marital status: Single     Spouse name: Not on file    Number of children: Not on file    Years of education: Not on file    Highest education level: Not on file   Occupational History    Not on file   Social Needs    Financial resource strain: Somewhat hard    Food insecurity:     Worry: Never true     Inability: Never true    Transportation needs:     Medical: No     Non-medical: No   Tobacco Use    Smoking status: Former Smoker     Start date: 12/9/2002    Smokeless tobacco: Never Used   Substance and Sexual Activity    Alcohol use: Yes     Frequency: 2-4 times a month     Drinks per session: 1 or 2     Binge frequency: Never     Comment: socially    Drug use: No    Sexual activity: Never     Birth control/protection: None   Lifestyle    Physical activity:     Days per week: 1 day     Minutes per session: 10 min    Stress: To some extent   Relationships    Social connections:     Talks on phone: More than three times a week     Gets together: Twice a week     Attends Baptism service: Not on file     Active member of club or organization: No     Attends meetings of clubs or organizations: Never     Relationship status:  "   Other Topics Concern    Not on file   Social History Narrative    . 1 daughter. Works as  at [employer].        ROS:  GENERAL: No weight changes. No swelling. No fatigue. No fever.  CARDIOVASCULAR: No chest pain. No shortness of breath. No leg cramps.   NEUROLOGICAL: No headaches. No vision changes.  BREASTS: No pain. No lumps. No discharge.  ABDOMEN: No pain. No nausea. No vomiting. No diarrhea. No constipation.  REPRODUCTIVE: No abnormal bleeding.   VULVA: see above  VAGINA: see above  URINARY: see above    /70   Ht 5' 5" (1.651 m)   Wt 95.4 kg (210 lb 5.1 oz)   BMI 35.00 kg/m²     PE:  APPEARANCE: Well nourished, well developed, in no acute distress.  ABDOMEN: Soft. No tenderness or masses. No hepatosplenomegaly. No hernias.  BREASTS, FUNDOSCOPIC, RECTAL DEFERRED  PELVIC: External female genitalia without lesions.  Female hair distribution. Adequate perineal body, Normal urethral meatus. Vagina atrophic without lesions or discharge. Cervix pink without lesions, discharge or tenderness. Uterus is normal size, regular, mobile and nontender. Adnexa without masses or tenderness.  EXTREMITIES: No edema      DIAGNOSIS & PLAN  1. Vaginitis and vulvovaginitis  Vaginosis Screen by DNA Probe   2. Dysuria  Urine culture   3. Menopausal vaginal dryness         COUNSELING:  Discussed resuming Ditropan.  Also discussed vaginal ERT.      "

## 2019-04-22 NOTE — TELEPHONE ENCOUNTER
Spoke w/pt daughter, she states they are currently at another appt at [facility] and will come for dressing change today after they are done with that appt.

## 2019-04-24 LAB — BACTERIA UR CULT: NORMAL

## 2019-04-25 ENCOUNTER — TELEPHONE (OUTPATIENT)
Dept: ORTHOPEDICS | Facility: CLINIC | Age: 64
End: 2019-04-25

## 2019-04-25 LAB
BACTERIAL VAGINOSIS DNA: NEGATIVE
CANDIDA GLABRATA DNA: NEGATIVE
CANDIDA KRUSEI DNA: NEGATIVE
CANDIDA RRNA VAG QL PROBE: NEGATIVE
T VAGINALIS RRNA GENITAL QL PROBE: NEGATIVE

## 2019-04-25 NOTE — TELEPHONE ENCOUNTER
----- Message from Fiona Roger sent at 4/25/2019 11:34 AM CDT -----  Contact: Pt    Name of Who is Calling:NALDO BARKSDALE [[id number]]    What is the request in detail: Patient would like to know if her FMLA paperwork was filled out , patient states the paperwork was faxed on April 15,2019 and what is the static on paperwork Please contact to further discuss and advise     Can the clinic reply by [facility]: no    What Number to Call Back if not in [facility]: [phone]

## 2019-04-25 NOTE — TELEPHONE ENCOUNTER
Spoke with the pt this morning in regards to her concerns of her [place] paperwork. I was able to confirm with the pt if we received her faxed paperwork or not. I did advise the pt to bring the hard copy to her post-op appt on 4/29/19 with our provider that way she knows that we have it. I also let the pt know that once it's sent off to the FMLA department we will not know the status on how long it will take. Pt verbalized understanding and that she had no further question/concerns at this time.     Thank you,  Drew SALDAÑA MA

## 2019-04-29 ENCOUNTER — TELEPHONE (OUTPATIENT)
Dept: INTERNAL MEDICINE | Facility: CLINIC | Age: 64
End: 2019-04-29

## 2019-04-29 ENCOUNTER — OFFICE VISIT (OUTPATIENT)
Dept: ORTHOPEDICS | Facility: CLINIC | Age: 64
End: 2019-04-29
Payer: COMMERCIAL

## 2019-04-29 VITALS
BODY MASS INDEX: 35.04 KG/M2 | DIASTOLIC BLOOD PRESSURE: 78 MMHG | HEIGHT: 65 IN | HEART RATE: 71 BPM | SYSTOLIC BLOOD PRESSURE: 136 MMHG | WEIGHT: 210.31 LBS

## 2019-04-29 DIAGNOSIS — Z98.890 S/P TRIGGER FINGER RELEASE: Primary | ICD-10-CM

## 2019-04-29 PROCEDURE — 99999 PR PBB SHADOW E&M-EST. PATIENT-LVL IV: ICD-10-PCS | Mod: PBBFAC,,, | Performed by: PHYSICIAN ASSISTANT

## 2019-04-29 PROCEDURE — 99024 PR POST-OP FOLLOW-UP VISIT: ICD-10-PCS | Mod: S$GLB,,, | Performed by: PHYSICIAN ASSISTANT

## 2019-04-29 PROCEDURE — 99024 POSTOP FOLLOW-UP VISIT: CPT | Mod: S$GLB,,, | Performed by: PHYSICIAN ASSISTANT

## 2019-04-29 PROCEDURE — 99999 PR PBB SHADOW E&M-EST. PATIENT-LVL IV: CPT | Mod: PBBFAC,,, | Performed by: PHYSICIAN ASSISTANT

## 2019-04-29 NOTE — PROGRESS NOTES
"Ms. Paula is here today for a post-operative visit.  She is 14 days status post left thumb A1 pulley release by Dr. Gonzales on 4/15/19. She reports that she is doing okay. She does have stiffness of the fingers.  Pain is minimal.  She is taking pain medication.  She denies fever, chills, and sweats since the time of the surgery.     Physical exam:    Vitals:    04/29/19 0923   BP: 136/78   Pulse: 71   Weight: 95.4 kg (210 lb 5.1 oz)   Height: 5' 5" (1.651 m)   PainSc: 0-No pain     Vital signs are stable, patient is afebrile.  Patient is well dressed and well groomed, no acute distress.  Alert and oriented to person, place, and time.  Post op dressing taken down.  Incision is clean, dry and intact.  There is no erythema or exudate.  There is no sign of any infection. She is NVI. Sutures removed without difficulty.  She has some stiffness at the MCP joints.    Assessment:  status post left thumb A1 pulley release by Dr. Gonzales on 4/15/19    Plan:  Adriana was seen today for post-op evaluation.    Diagnoses and all orders for this visit:    S/P trigger finger release    - PO instruction reviewed and provided to patient  -pt wishes to work on home exercises, handout given   -RTC 4 wks if needed      Olga Washington PA-C  [facility]   [facility] [place], [place]        "

## 2019-05-01 ENCOUNTER — TELEPHONE (OUTPATIENT)
Dept: SURGERY | Facility: CLINIC | Age: 64
End: 2019-05-01

## 2019-05-01 NOTE — TELEPHONE ENCOUNTER
Contacted the patient regarding the message below.  The patient stated that she does not wish to schedule an appointment at this time due to medical procedures she has recently had and would like to wait before scheduling the appointment.  Stated to the patient to contact the breast center when she is ready to schedule the appointment.  The patient voiced understanding of this information.

## 2019-05-01 NOTE — TELEPHONE ENCOUNTER
----- Message from Kiersten Andrade MA sent at 5/1/2019  2:57 PM CDT -----  Hey this pt has a Tyrer-Cuzick: 36.69 % and needs a appointment.  Thanks Kiersten

## 2019-05-06 RX ORDER — INSULIN DEGLUDEC 100 U/ML
42 INJECTION, SOLUTION SUBCUTANEOUS DAILY
Qty: 13 SYRINGE | Refills: 0 | Status: SHIPPED | OUTPATIENT
Start: 2019-05-06 | End: 2019-08-20 | Stop reason: SDUPTHER

## 2019-05-06 RX ORDER — INSULIN DEGLUDEC 100 U/ML
INJECTION, SOLUTION SUBCUTANEOUS
Qty: 12 SYRINGE | Refills: 0 | Status: SHIPPED | OUTPATIENT
Start: 2019-05-06 | End: 2019-05-06 | Stop reason: SDUPTHER

## 2019-05-06 NOTE — TELEPHONE ENCOUNTER
Called pt; states she is down to 42 units because anything above 45 she has higher blood sugars. States she had an injection (other insulin) about 2 weeks ago. Pt concerned that she is taking too much DM medication. Please advise. Thank you

## 2019-05-06 NOTE — TELEPHONE ENCOUNTER
Is she taking the weekly Bydureon?    Has she been checking her blood sugars regularly? What is her lowest reading?

## 2019-05-10 ENCOUNTER — PATIENT MESSAGE (OUTPATIENT)
Dept: INTERNAL MEDICINE | Facility: CLINIC | Age: 64
End: 2019-05-10

## 2019-05-10 DIAGNOSIS — E11.65 TYPE 2 DIABETES MELLITUS WITH HYPERGLYCEMIA, WITH LONG-TERM CURRENT USE OF INSULIN: Chronic | ICD-10-CM

## 2019-05-10 DIAGNOSIS — Z79.4 TYPE 2 DIABETES MELLITUS WITH HYPERGLYCEMIA, WITH LONG-TERM CURRENT USE OF INSULIN: Chronic | ICD-10-CM

## 2019-05-13 DIAGNOSIS — R35.0 FREQUENCY OF URINATION: ICD-10-CM

## 2019-05-13 RX ORDER — OXYBUTYNIN CHLORIDE 5 MG/1
5 TABLET ORAL 3 TIMES DAILY
Qty: 90 TABLET | Refills: 2 | Status: SHIPPED | OUTPATIENT
Start: 2019-05-13 | End: 2019-10-14 | Stop reason: SDUPTHER

## 2019-05-13 RX ORDER — METFORMIN HYDROCHLORIDE 500 MG/1
1000 TABLET, EXTENDED RELEASE ORAL DAILY
Start: 2019-05-13 | End: 2019-10-07 | Stop reason: SDUPTHER

## 2019-05-13 NOTE — TELEPHONE ENCOUNTER
OK to change metformin XR to 1000mg daily. Contrinue Tresiba 42 units daily.    If the Bydureon is too bulky, then she can hold it for now.    Emailed patient.

## 2019-05-14 ENCOUNTER — OFFICE VISIT (OUTPATIENT)
Dept: OPTOMETRY | Facility: CLINIC | Age: 64
End: 2019-05-14
Payer: COMMERCIAL

## 2019-05-14 DIAGNOSIS — E11.65 TYPE 2 DIABETES MELLITUS WITH HYPERGLYCEMIA, WITH LONG-TERM CURRENT USE OF INSULIN: Chronic | ICD-10-CM

## 2019-05-14 DIAGNOSIS — H52.4 PRESBYOPIA: Primary | ICD-10-CM

## 2019-05-14 DIAGNOSIS — H04.123 DRY EYE SYNDROME, BILATERAL: ICD-10-CM

## 2019-05-14 DIAGNOSIS — I15.2 HYPERTENSION ASSOCIATED WITH DIABETES: ICD-10-CM

## 2019-05-14 DIAGNOSIS — E11.9 TYPE 2 DIABETES MELLITUS WITHOUT OPHTHALMIC MANIFESTATIONS: ICD-10-CM

## 2019-05-14 DIAGNOSIS — Z96.1 PSEUDOPHAKIA, BOTH EYES: ICD-10-CM

## 2019-05-14 DIAGNOSIS — H43.812 PVD (POSTERIOR VITREOUS DETACHMENT), LEFT EYE: ICD-10-CM

## 2019-05-14 DIAGNOSIS — E11.59 HYPERTENSION ASSOCIATED WITH DIABETES: ICD-10-CM

## 2019-05-14 DIAGNOSIS — Z79.4 TYPE 2 DIABETES MELLITUS WITH HYPERGLYCEMIA, WITH LONG-TERM CURRENT USE OF INSULIN: Chronic | ICD-10-CM

## 2019-05-14 PROCEDURE — 92014 PR EYE EXAM, EST PATIENT,COMPREHESV: ICD-10-PCS | Mod: S$GLB,,, | Performed by: OPTOMETRIST

## 2019-05-14 PROCEDURE — 92015 PR REFRACTION: ICD-10-PCS | Mod: S$GLB,,, | Performed by: OPTOMETRIST

## 2019-05-14 PROCEDURE — 92014 COMPRE OPH EXAM EST PT 1/>: CPT | Mod: S$GLB,,, | Performed by: OPTOMETRIST

## 2019-05-14 PROCEDURE — 99999 PR PBB SHADOW E&M-EST. PATIENT-LVL II: CPT | Mod: PBBFAC,,, | Performed by: OPTOMETRIST

## 2019-05-14 PROCEDURE — 92015 DETERMINE REFRACTIVE STATE: CPT | Mod: S$GLB,,, | Performed by: OPTOMETRIST

## 2019-05-14 PROCEDURE — 99999 PR PBB SHADOW E&M-EST. PATIENT-LVL II: ICD-10-PCS | Mod: PBBFAC,,, | Performed by: OPTOMETRIST

## 2019-05-14 NOTE — PROGRESS NOTES
HPI     Last eye exam was 5/7/18 with Dr Batista.    Says OU have been itching last few days OD>OS.  Sees floaters off and on   Saw a flash of light when driving at night a few times before she saw dr. Batista last year   Wears glasses for reading and computer distance ; feels like she never   fully got used to the rx.   Has headaches 2-3 times a week when waking up    Patient denies diplopia,  pain, and burning/tearing.      Using any eye gtts? no        Last edited by Flaquita Saavedra on 5/14/2019 10:50 AM. (History)            Assessment /Plan     For exam results, see Encounter Report.    Presbyopia  Rx specs       Type 2 diabetes mellitus without ophthalmic manifestations  Type 2 diabetes mellitus with hyperglycemia, with long-term current use of insulin  Essential hypertension              No retinopathy, monitor yearly       Pseudophakia, both eyes  Dry eye syndrome, bilateral   use refresh or systane PRN daily    PVD (posterior vitreous detachment), left eye                  No retinal holes, tears, or detachments              Discussed RD precautions               RTC 1 year, sooner PRN

## 2019-05-21 ENCOUNTER — OFFICE VISIT (OUTPATIENT)
Dept: PODIATRY | Facility: CLINIC | Age: 64
End: 2019-05-21
Payer: COMMERCIAL

## 2019-05-21 VITALS
SYSTOLIC BLOOD PRESSURE: 129 MMHG | HEART RATE: 76 BPM | DIASTOLIC BLOOD PRESSURE: 77 MMHG | BODY MASS INDEX: 34.99 KG/M2 | WEIGHT: 210 LBS | HEIGHT: 65 IN

## 2019-05-21 DIAGNOSIS — E11.42 DIABETIC POLYNEUROPATHY ASSOCIATED WITH TYPE 2 DIABETES MELLITUS: Primary | ICD-10-CM

## 2019-05-21 PROCEDURE — 99213 PR OFFICE/OUTPT VISIT, EST, LEVL III, 20-29 MIN: ICD-10-PCS | Mod: S$GLB,,, | Performed by: PODIATRIST

## 2019-05-21 PROCEDURE — 99999 PR PBB SHADOW E&M-EST. PATIENT-LVL III: ICD-10-PCS | Mod: PBBFAC,,, | Performed by: PODIATRIST

## 2019-05-21 PROCEDURE — 99213 OFFICE O/P EST LOW 20 MIN: CPT | Mod: S$GLB,,, | Performed by: PODIATRIST

## 2019-05-21 PROCEDURE — 99213 OFFICE O/P EST LOW 20 MIN: CPT | Mod: PBBFAC | Performed by: PODIATRIST

## 2019-05-21 PROCEDURE — 99999 PR PBB SHADOW E&M-EST. PATIENT-LVL III: CPT | Mod: PBBFAC,,, | Performed by: PODIATRIST

## 2019-05-21 RX ORDER — ECONAZOLE NITRATE 10 MG/G
CREAM TOPICAL 2 TIMES DAILY
Qty: 30 G | Refills: 2 | Status: SHIPPED | OUTPATIENT
Start: 2019-05-21 | End: 2019-05-23

## 2019-05-23 RX ORDER — KETOCONAZOLE 20 MG/G
CREAM TOPICAL DAILY
Qty: 15 G | Refills: 2 | Status: SHIPPED | OUTPATIENT
Start: 2019-05-23 | End: 2020-01-21

## 2019-05-23 RX ORDER — ECONAZOLE NITRATE 10 MG/G
CREAM TOPICAL DAILY
Qty: 30 G | Refills: 2 | Status: SHIPPED | OUTPATIENT
Start: 2019-05-23 | End: 2019-10-07

## 2019-05-23 NOTE — PROGRESS NOTES
Subjective:      Patient ID: Adriana Paula is a 64 y.o. female.    Chief Complaint: Diabetic Foot Exam (4/11/19 dr nicole puga)    Adriana is a 64 y.o. female who presents to the clinic upon referral from Dr. Vides  for evaluation and treatment of diabetic feet. Adriana has a past medical history of Allergy, Anemia, Anxiety, Arthritis, Breast cyst, Cataract, DR (diabetic retinopathy), Dyslipidemia, Essential hypertension (8/1/2012), Gastroesophageal reflux disease without esophagitis (2/3/2017), GERD (gastroesophageal reflux disease), Glaucoma, Hypertension, Obesity (BMI 30-39.9) (10/24/2013), PN (peripheral neuropathy), Sleep apnea, Type 2 diabetes mellitus with both eyes affected by mild nonproliferative retinopathy without macular edema, with long-term current use of insulin, Type 2 diabetes mellitus with diabetic polyneuropathy, with long-term current use of insulin, and Type II or unspecified type diabetes mellitus with other specified manifestations, uncontrolled. Patient relates no major problem with feet. Only complaints today consist of routine diabetic foot evaluation.    PCP: Nicole Puga MD    Date Last Seen by PCP:   Chief Complaint   Patient presents with    Diabetic Foot Exam     4/11/19 dr nicole puga         Current shoe gear: Casual shoes    Hemoglobin A1C   Date Value Ref Range Status   04/02/2019 7.2 (H) 4.0 - 5.6 % Final     Comment:     ADA Screening Guidelines:  5.7-6.4%  Consistent with prediabetes  >or=6.5%  Consistent with diabetes  High levels of fetal hemoglobin interfere with the HbA1C  assay. Heterozygous hemoglobin variants (HbS, HgC, etc)do  not significantly interfere with this assay.   However, presence of multiple variants may affect accuracy.     12/06/2018 10.4 (H) 4.0 - 5.6 % Final     Comment:     ADA Screening Guidelines:  5.7-6.4%  Consistent with prediabetes  >or=6.5%  Consistent with diabetes  High levels of fetal hemoglobin interfere with the HbA1C  assay.  Heterozygous hemoglobin variants (HbS, HgC, etc)do  not significantly interfere with this assay.   However, presence of multiple variants may affect accuracy.     08/01/2018 8.3 (H) 4.0 - 5.6 % Final     Comment:     ADA Screening Guidelines:  5.7-6.4%  Consistent with prediabetes  >or=6.5%  Consistent with diabetes  High levels of fetal hemoglobin interfere with the HbA1C  assay. Heterozygous hemoglobin variants (HbS, HgC, etc)do  not significantly interfere with this assay.   However, presence of multiple variants may affect accuracy.             Review of Systems   Constitution: Negative for chills and fever.   HENT: Negative for congestion and tinnitus.    Eyes: Negative for double vision and visual disturbance.   Cardiovascular: Negative for chest pain and claudication.   Respiratory: Negative for hemoptysis and shortness of breath.    Endocrine: Negative for cold intolerance and heat intolerance.   Hematologic/Lymphatic: Negative for adenopathy and bleeding problem.   Skin: Positive for color change and dry skin. Negative for nail changes.   Musculoskeletal: Negative for myalgias and stiffness.   Gastrointestinal: Negative for nausea and vomiting.   Genitourinary: Negative for dysuria and hematuria.   Neurological: Negative for numbness and paresthesias.   Psychiatric/Behavioral: Negative for altered mental status and suicidal ideas.   Allergic/Immunologic: Negative for environmental allergies and persistent infections.           Objective:      Physical Exam   Constitutional: She is oriented to person, place, and time. She appears well-developed and well-nourished.   Cardiovascular:   Pulses:       Dorsalis pedis pulses are 2+ on the right side, and 2+ on the left side.        Posterior tibial pulses are 2+ on the right side, and 2+ on the left side.   Pulmonary/Chest: Effort normal.   Musculoskeletal: Normal range of motion.   Inspection and palpation of the muscles joints and bones of both lower extremities  reveal that muscle strength for the anterior lateral and posterior muscle groups and intrinsic muscle groups of the foot are all 5 over 5 symmetrical.  Ankle subtalar midtarsal and digital joint range of motion are within normal limits, nonpainful, without crepitus or effusion.  Patient exhibits a normal angle and base of gait.  Palpation of the tendons reveal no defects.   Neurological: She is alert and oriented to person, place, and time.   Sharp dull light touch vibratory proprioceptive sensation are intact bilaterally.  Deep tendon reflexes to patellar and Achilles tendon are symmetrical 2 over 4 bilaterally.  No ankle clonus or Babinski reflexes noted bilaterally.  Coordination is normal to both feet and lower extremities.   Skin: Skin is warm and dry. Capillary refill takes 2 to 3 seconds.   Skin turgor is normal bilaterally.  Skin texture is dry to both lower extremities.  No lesions or rashes or wounds appreciated bilaterally.  Nail plates 1 through 5 bilaterally are within normal limits for length and thickness with minimal discoloration.  No nail clubbing or incurvation noted.   Psychiatric: She has a normal mood and affect.   Vitals reviewed.            Assessment:       Encounter Diagnosis   Name Primary?    Diabetic polyneuropathy associated with type 2 diabetes mellitus Yes         Plan:       Adriana was seen today for diabetic foot exam.    Diagnoses and all orders for this visit:    Diabetic polyneuropathy associated with type 2 diabetes mellitus    Other orders  -     econazole nitrate 1 % cream; Apply topically 2 (two) times daily.      I counseled the patient on her conditions, their implications and medical management.      Shoe inspection. Diabetic Foot Education. Patient reminded of the importance of good nutrition and blood sugar control to help prevent podiatric complications of diabetes. Patient instructed on proper foot hygeine. We discussed wearing proper shoe gear, daily foot  inspections and Diabetic foot education in detail.    Return to clinic in 3-6 months or sooner if problems arise   .

## 2019-05-29 ENCOUNTER — OFFICE VISIT (OUTPATIENT)
Dept: ORTHOPEDICS | Facility: CLINIC | Age: 64
End: 2019-05-29
Payer: COMMERCIAL

## 2019-05-29 VITALS
HEART RATE: 67 BPM | BODY MASS INDEX: 34.99 KG/M2 | HEIGHT: 65 IN | WEIGHT: 210 LBS | DIASTOLIC BLOOD PRESSURE: 72 MMHG | SYSTOLIC BLOOD PRESSURE: 124 MMHG

## 2019-05-29 DIAGNOSIS — Z98.890 S/P TRIGGER FINGER RELEASE: Primary | ICD-10-CM

## 2019-05-29 PROCEDURE — 99999 PR PBB SHADOW E&M-EST. PATIENT-LVL IV: CPT | Mod: PBBFAC,,, | Performed by: PHYSICIAN ASSISTANT

## 2019-05-29 PROCEDURE — 99999 PR PBB SHADOW E&M-EST. PATIENT-LVL IV: ICD-10-PCS | Mod: PBBFAC,,, | Performed by: PHYSICIAN ASSISTANT

## 2019-05-29 PROCEDURE — 99024 POSTOP FOLLOW-UP VISIT: CPT | Mod: S$GLB,,, | Performed by: PHYSICIAN ASSISTANT

## 2019-05-29 PROCEDURE — 99024 PR POST-OP FOLLOW-UP VISIT: ICD-10-PCS | Mod: S$GLB,,, | Performed by: PHYSICIAN ASSISTANT

## 2019-05-29 NOTE — PROGRESS NOTES
"Ms. Paula is here today for a post-operative visit.  She is 6 weeks status post left thumb A1 pulley release by Dr. Gonzales on 4/15/19. She reports that she is doing well. She has good motion. She continues to have small amount of eschar over the incision. She is not taking pain medication.  She denies fever, chills, and sweats since the time of the surgery.     Physical exam:    Vitals:    05/29/19 1446   BP: 124/72   Pulse: 67   Weight: 95.3 kg (210 lb)   Height: 5' 5" (1.651 m)   PainSc: 0-No pain     Vital signs are stable, patient is afebrile.  Patient is well dressed and well groomed, no acute distress.  Alert and oriented to person, place, and time.  Incision is clean, dry and intact, well healed, small amount of eschar present.  There is no erythema or exudate.  There is no sign of any infection. She is NVI.     Assessment:  status post left thumb A1 pulley release by Dr. Gonzales on 4/15/19    Plan:  Adriana was seen today for pain.    Diagnoses and all orders for this visit:    S/P trigger finger release     - PO instruction reviewed and provided to patient  -add vitamin E oil   -RTC as needed       Olga Washington PA-C  [facility]   [facility] [place]        "

## 2019-06-21 ENCOUNTER — OFFICE VISIT (OUTPATIENT)
Dept: URGENT CARE | Facility: CLINIC | Age: 64
End: 2019-06-21
Payer: COMMERCIAL

## 2019-06-21 VITALS
OXYGEN SATURATION: 100 % | SYSTOLIC BLOOD PRESSURE: 145 MMHG | RESPIRATION RATE: 16 BRPM | TEMPERATURE: 98 F | HEART RATE: 65 BPM | DIASTOLIC BLOOD PRESSURE: 72 MMHG | BODY MASS INDEX: 34.99 KG/M2 | HEIGHT: 65 IN | WEIGHT: 210 LBS

## 2019-06-21 DIAGNOSIS — E11.9 TYPE 2 DIABETES MELLITUS WITHOUT COMPLICATION, WITH LONG-TERM CURRENT USE OF INSULIN: ICD-10-CM

## 2019-06-21 DIAGNOSIS — M54.31 SCIATICA, RIGHT SIDE: Primary | ICD-10-CM

## 2019-06-21 DIAGNOSIS — Z79.4 TYPE 2 DIABETES MELLITUS WITHOUT COMPLICATION, WITH LONG-TERM CURRENT USE OF INSULIN: ICD-10-CM

## 2019-06-21 PROCEDURE — 3077F PR MOST RECENT SYSTOLIC BLOOD PRESSURE >= 140 MM HG: ICD-10-PCS | Mod: CPTII,S$GLB,, | Performed by: NURSE PRACTITIONER

## 2019-06-21 PROCEDURE — 99214 PR OFFICE/OUTPT VISIT, EST, LEVL IV, 30-39 MIN: ICD-10-PCS | Mod: 25,S$GLB,, | Performed by: NURSE PRACTITIONER

## 2019-06-21 PROCEDURE — 3078F PR MOST RECENT DIASTOLIC BLOOD PRESSURE < 80 MM HG: ICD-10-PCS | Mod: CPTII,S$GLB,, | Performed by: NURSE PRACTITIONER

## 2019-06-21 PROCEDURE — 3078F DIAST BP <80 MM HG: CPT | Mod: CPTII,S$GLB,, | Performed by: NURSE PRACTITIONER

## 2019-06-21 PROCEDURE — 1101F PR PT FALLS ASSESS DOC 0-1 FALLS W/OUT INJ PAST YR: ICD-10-PCS | Mod: CPTII,S$GLB,, | Performed by: NURSE PRACTITIONER

## 2019-06-21 PROCEDURE — 1101F PT FALLS ASSESS-DOCD LE1/YR: CPT | Mod: CPTII,S$GLB,, | Performed by: NURSE PRACTITIONER

## 2019-06-21 PROCEDURE — 96372 THER/PROPH/DIAG INJ SC/IM: CPT | Mod: S$GLB,,, | Performed by: NURSE PRACTITIONER

## 2019-06-21 PROCEDURE — 3008F BODY MASS INDEX DOCD: CPT | Mod: CPTII,S$GLB,, | Performed by: NURSE PRACTITIONER

## 2019-06-21 PROCEDURE — 3008F PR BODY MASS INDEX (BMI) DOCUMENTED: ICD-10-PCS | Mod: CPTII,S$GLB,, | Performed by: NURSE PRACTITIONER

## 2019-06-21 PROCEDURE — 96372 PR INJECTION,THERAP/PROPH/DIAG2ST, IM OR SUBCUT: ICD-10-PCS | Mod: S$GLB,,, | Performed by: NURSE PRACTITIONER

## 2019-06-21 PROCEDURE — 99214 OFFICE O/P EST MOD 30 MIN: CPT | Mod: 25,S$GLB,, | Performed by: NURSE PRACTITIONER

## 2019-06-21 PROCEDURE — 3045F PR MOST RECENT HEMOGLOBIN A1C LEVEL 7.0-9.0%: CPT | Mod: CPTII,S$GLB,, | Performed by: NURSE PRACTITIONER

## 2019-06-21 PROCEDURE — 3045F PR MOST RECENT HEMOGLOBIN A1C LEVEL 7.0-9.0%: ICD-10-PCS | Mod: CPTII,S$GLB,, | Performed by: NURSE PRACTITIONER

## 2019-06-21 PROCEDURE — 3077F SYST BP >= 140 MM HG: CPT | Mod: CPTII,S$GLB,, | Performed by: NURSE PRACTITIONER

## 2019-06-21 RX ORDER — PREDNISONE 20 MG/1
40 TABLET ORAL DAILY
Qty: 6 TABLET | Refills: 0 | Status: SHIPPED | OUTPATIENT
Start: 2019-06-21 | End: 2019-06-24

## 2019-06-21 RX ORDER — KETOROLAC TROMETHAMINE 30 MG/ML
30 INJECTION, SOLUTION INTRAMUSCULAR; INTRAVENOUS
Status: COMPLETED | OUTPATIENT
Start: 2019-06-21 | End: 2019-06-21

## 2019-06-21 RX ADMIN — KETOROLAC TROMETHAMINE 30 MG: 30 INJECTION, SOLUTION INTRAMUSCULAR; INTRAVENOUS at 10:06

## 2019-06-21 NOTE — PATIENT INSTRUCTIONS
Return to Urgent Care or go to ER if symptoms worsen or fail to improve.  Follow up with PCP as recommended for further management.     Monitor your blood sugars-- notify your PCP if blood sugars > 250.    Do not take diclofenac today-- may resume tomorrow afternoon.      You received a prescription for steroids today. Possible risks include, but are not limited to increased blood pressure and blood sugar, difficulty sleeping, possible mental and behavior changes.  These symptoms are usually mild and resolve within a few days.  If the symptoms are severe or prolonged, or you develop a fever or other unusual symptoms,  please return to Urgent Care or go to your PCP for further management.         Sciatica    Sciatica is a condition that causes pain in the lower back that spreads down into the buttock, hip, and leg. Sometimes the leg pain can happen without any back pain. Sciatica happens when a spinal nerve is irritated or has pressure put on it as comes out of the spinal canal in the lower back. This most often happens when a bulge or rupture of a nearby spinal disk presses on the nerve. Sciatica can also be caused by a narrowing of the spinal canal (spinal stenosis) or spasm of the muscle in the buttocks that the sciatic nerve passes through (pyriform muscle). Sciatica is also called lumbar radiculopathy.  Sciatica may begin after a sudden twisting or bending force, such as in a car accident. Or it can happen after a simple awkward movement. In either case, muscle spasm often also happens. Muscle spasm makes the pain worse.  A healthcare provider makes a diagnosis of sciatica from your symptoms and a physical exam. Unless you had an injury from a car accident or fall, you usually wont have X-rays taken at this time. This is because the nerves and disks in your back cant be seen on an X-ray. If the provider sees signs of a compressed nerve, you will need to schedule an MRI scan as an outpatient. Signs of a  compressed nerve include loss of strength in a leg.  Most sciatica gets better with medicine, exercise, and physical therapy. If your symptoms continue after at least 3 months of medical treatment, you may need surgery or injections to your lower back.  Home care  Follow these tips when caring for yourself at home:  · You may need to stay in bed the first few days. But as soon as possible, begin sitting up or walking. This will help you avoid problems that come from staying in bed for long periods.  · When in bed, try to find a position that is comfortable. A firm mattress is best. Try lying flat on your back with pillows under your knees. You can also try lying on your side with your knees bent up toward your chest and a pillow between your knees.  · Avoid sitting for long periods. This puts more stress on your lower back than standing or walking.  · Use heat from a hot shower, hot bath, or heating pad to help ease pain. Massage can also help. You can also try using an ice pack. You can make your own ice pack by putting ice cubes in a plastic bag. Wrap the bag in a thin towel. Try both heat and cold to see which works best. Use the method that feels best for 20 minutes several times a day.  · You may use acetaminophen or ibuprofen to ease pain, unless another pain medicine was prescribed. Note: If you have chronic liver or kidney disease, talk with your healthcare provider before taking these medicines. Also talk with your provider if youve had a stomach ulcer or gastrointestinal bleeding.  · Use safe lifting methods. Dont lift anything heavier than 15 pounds until all of the pain is gone.  Follow-up care  Follow up with your healthcare provider, or as advised. You may need physical therapy or additional tests.  If X-rays were taken, a radiologist will look at them. You will be told of any new findings that may affect your care.  When to seek medical advice  Call your healthcare provider right away if any of  these occur:  · Pain gets worse even after taking prescribed medicine  · Weakness or numbness in 1 or both legs or hips  · Numbness in your groin or genital area  · You cant control your bowel or bladder  · Fever  · Redness or swelling over your back or spine   Date Last Reviewed: 8/1/2016  © 7661-7704 [employer]. [address] [place] [address], [place] [address]. All rights reserved. This information is not intended as a substitute for professional medical care. Always follow your healthcare professional's instructions.

## 2019-06-21 NOTE — PROGRESS NOTES
"Subjective:       Patient ID: Adriana Paula is a 64 y.o. female.    Vitals:    06/21/19 1012   BP: (!) 145/72   Pulse: 65   Resp: 16   Temp: 98 °F (36.7 °C)   TempSrc: Oral   SpO2: 100%   Weight: 95.3 kg (210 lb)   Height: 5' 5" (1.651 m)       Chief Complaint: Hip Pain (Right Hip)    Patient reports right hip pain for 1 month. Patient reports HX of sciatica 15 years ago in same hip. Right foot tightness/numbness in ball of foot. Patient reports she didn't take any of her medicines today. Patient has HX of diabetes. HgA1c 2 months ago: 7.2.  Pt. Reports that her current blood sugars are 120s.     Hip Pain    Incident onset: 1 month. There was no injury mechanism. The pain is present in the right hip. The quality of the pain is described as aching and shooting. The pain is at a severity of 10/10. The pain has been fluctuating since onset. Associated symptoms include an inability to bear weight, numbness (right foot. ) and tingling. She reports no foreign bodies present. The symptoms are aggravated by weight bearing and movement. She has tried ice and heat (voltaren gel tramadol;diclofenac lido-prilo) for the symptoms. The treatment provided mild relief.     Review of Systems   Constitution: Negative for chills and fever.   HENT: Negative for sore throat.    Eyes: Negative for blurred vision.   Cardiovascular: Negative for chest pain.   Respiratory: Negative for shortness of breath.    Skin: Negative for rash.   Musculoskeletal: Negative for back pain, falls and joint pain.        Right hip pain that radiates to posterior thigh-- with stocking foot numbness/tightness to right foot. Has history of diabetic neuropathy on the left foot.    Gastrointestinal: Negative for abdominal pain, diarrhea, nausea and vomiting.   Neurological: Positive for numbness (right foot. ), paresthesias (bilateral feet) and tingling. Negative for brief paralysis, focal weakness and headaches.   Psychiatric/Behavioral: The patient is not " nervous/anxious.        Objective:      Physical Exam   Constitutional: She is oriented to person, place, and time. Vital signs are normal. She appears well-developed and well-nourished. She is active and cooperative.  Non-toxic appearance. No distress.   HENT:   Head: Normocephalic and atraumatic.   Nose: Nose normal.   Mouth/Throat: Oropharynx is clear and moist and mucous membranes are normal.   Eyes: Conjunctivae and lids are normal.   Neck: Trachea normal, normal range of motion, full passive range of motion without pain and phonation normal. Neck supple.   Cardiovascular: Normal heart sounds, intact distal pulses and normal pulses.   Pulses:       Dorsalis pedis pulses are 2+ on the right side, and 2+ on the left side.   Pulmonary/Chest: Effort normal.   Abdominal: Soft. Normal appearance and bowel sounds are normal. She exhibits no abdominal bruit, no pulsatile midline mass and no mass.   Musculoskeletal: She exhibits no edema or deformity.        Lumbar back: She exhibits normal range of motion, no tenderness, no bony tenderness, no swelling and no edema.        Back:         Legs:  Neurological: She is alert and oriented to person, place, and time. She has normal strength. She displays no atrophy. She exhibits normal muscle tone. Gait (favoring the right leg) abnormal. Coordination normal.   Reflex Scores:       Patellar reflexes are 1+ on the right side and 1+ on the left side.  Right thigh strength: 5/5  Left thigh strength: 5/5  Right lower leg strength: 5/5  Left lower leg strength: 5/5  Bilateral ankle flexion and extension noted  Bilateral ankle inversion and eversion noted  Pt able to bend forward at the hip without difficulty  + sensation to right toes/ + movement of right toes   Skin: Skin is warm, dry and intact. She is not diaphoretic.   Psychiatric: She has a normal mood and affect. Her speech is normal and behavior is normal. Judgment and thought content normal. Cognition and memory are normal.    Nursing note and vitals reviewed.      Assessment:       1. Sciatica, right side    2. Type 2 diabetes mellitus without complication, with long-term current use of insulin        Plan:       Adriana was seen today for hip pain.    Diagnoses and all orders for this visit:    Sciatica, right side  -     ketorolac injection 30 mg  -     predniSONE (DELTASONE) 20 MG tablet; Take 2 tablets (40 mg total) by mouth once daily. for 3 days    Type 2 diabetes mellitus without complication, with long-term current use of insulin

## 2019-06-25 ENCOUNTER — PATIENT MESSAGE (OUTPATIENT)
Dept: INTERNAL MEDICINE | Facility: CLINIC | Age: 64
End: 2019-06-25

## 2019-06-25 DIAGNOSIS — M54.41 LOW BACK PAIN DUE TO BILATERAL SCIATICA: ICD-10-CM

## 2019-06-25 DIAGNOSIS — M54.42 LOW BACK PAIN DUE TO BILATERAL SCIATICA: ICD-10-CM

## 2019-06-25 DIAGNOSIS — R20.0 NUMBNESS OF RIGHT FOOT: Primary | ICD-10-CM

## 2019-06-25 DIAGNOSIS — M17.0 PRIMARY OSTEOARTHRITIS OF BOTH KNEES: ICD-10-CM

## 2019-06-25 RX ORDER — TRAMADOL HYDROCHLORIDE 50 MG/1
50 TABLET ORAL EVERY 8 HOURS PRN
Qty: 30 TABLET | Refills: 0 | Status: SHIPPED | OUTPATIENT
Start: 2019-06-25 | End: 2019-08-16 | Stop reason: SDUPTHER

## 2019-06-25 NOTE — TELEPHONE ENCOUNTER
Called in as follows-     vishnu- [phone]    Tramadol 50mg tabs  1 po q8h prn pain  #30  0 refills      Pt informed

## 2019-06-25 NOTE — TELEPHONE ENCOUNTER
Will order x-ray lumbar spine. Ortho referral.     She was prescribed tramadol a few months ago. Does she have more of this? OK to take tramadol 50mg + tylenol 500mg three times daily as needed for back pain.

## 2019-06-25 NOTE — TELEPHONE ENCOUNTER
Spoke with pt; agrees to have xray and ortho appts; also requests tramadol to be called in to pharmacy.     Please schedule; thank you

## 2019-06-27 ENCOUNTER — TELEPHONE (OUTPATIENT)
Dept: ORTHOPEDICS | Facility: CLINIC | Age: 64
End: 2019-06-27

## 2019-06-27 ENCOUNTER — PATIENT OUTREACH (OUTPATIENT)
Dept: ADMINISTRATIVE | Facility: OTHER | Age: 64
End: 2019-06-27

## 2019-06-27 ENCOUNTER — HOSPITAL ENCOUNTER (OUTPATIENT)
Dept: RADIOLOGY | Facility: HOSPITAL | Age: 64
Discharge: HOME | End: 2019-06-27
Attending: INTERNAL MEDICINE
Payer: COMMERCIAL

## 2019-06-27 DIAGNOSIS — M54.42 LOW BACK PAIN DUE TO BILATERAL SCIATICA: ICD-10-CM

## 2019-06-27 DIAGNOSIS — R20.0 NUMBNESS OF RIGHT FOOT: ICD-10-CM

## 2019-06-27 DIAGNOSIS — M54.41 LOW BACK PAIN DUE TO BILATERAL SCIATICA: ICD-10-CM

## 2019-06-27 PROBLEM — M47.816 ARTHRITIS OF LUMBAR SPINE: Status: ACTIVE | Noted: 2019-06-27

## 2019-06-27 PROCEDURE — 72110 XR LUMBAR SPINE COMPLETE 5 VIEW: ICD-10-PCS | Mod: 26,,, | Performed by: RADIOLOGY

## 2019-06-27 PROCEDURE — 72110 X-RAY EXAM L-2 SPINE 4/>VWS: CPT | Mod: TC

## 2019-06-27 PROCEDURE — 72110 X-RAY EXAM L-2 SPINE 4/>VWS: CPT | Mod: 26,,, | Performed by: RADIOLOGY

## 2019-06-27 NOTE — TELEPHONE ENCOUNTER
Lt message for pt to inform her that her 11am with Sebas Ferro is  with the wrong provider she need to see back and spine.

## 2019-06-28 ENCOUNTER — OFFICE VISIT (OUTPATIENT)
Dept: SPINE | Facility: CLINIC | Age: 64
End: 2019-06-28
Payer: COMMERCIAL

## 2019-06-28 VITALS
DIASTOLIC BLOOD PRESSURE: 68 MMHG | HEIGHT: 65 IN | BODY MASS INDEX: 34.99 KG/M2 | WEIGHT: 210 LBS | SYSTOLIC BLOOD PRESSURE: 137 MMHG | HEART RATE: 70 BPM

## 2019-06-28 DIAGNOSIS — M51.36 DDD (DEGENERATIVE DISC DISEASE), LUMBAR: ICD-10-CM

## 2019-06-28 DIAGNOSIS — M47.26 OTHER SPONDYLOSIS WITH RADICULOPATHY, LUMBAR REGION: ICD-10-CM

## 2019-06-28 DIAGNOSIS — G89.29 CHRONIC BILATERAL LOW BACK PAIN WITH RIGHT-SIDED SCIATICA: Primary | ICD-10-CM

## 2019-06-28 DIAGNOSIS — M54.41 CHRONIC BILATERAL LOW BACK PAIN WITH RIGHT-SIDED SCIATICA: Primary | ICD-10-CM

## 2019-06-28 DIAGNOSIS — M54.16 LUMBAR RADICULOPATHY: ICD-10-CM

## 2019-06-28 PROCEDURE — 99204 PR OFFICE/OUTPT VISIT, NEW, LEVL IV, 45-59 MIN: ICD-10-PCS | Mod: S$GLB,,, | Performed by: PHYSICIAN ASSISTANT

## 2019-06-28 PROCEDURE — 1101F PR PT FALLS ASSESS DOC 0-1 FALLS W/OUT INJ PAST YR: ICD-10-PCS | Mod: CPTII,S$GLB,, | Performed by: PHYSICIAN ASSISTANT

## 2019-06-28 PROCEDURE — 3008F BODY MASS INDEX DOCD: CPT | Mod: CPTII,S$GLB,, | Performed by: PHYSICIAN ASSISTANT

## 2019-06-28 PROCEDURE — 3078F PR MOST RECENT DIASTOLIC BLOOD PRESSURE < 80 MM HG: ICD-10-PCS | Mod: CPTII,S$GLB,, | Performed by: PHYSICIAN ASSISTANT

## 2019-06-28 PROCEDURE — 3008F PR BODY MASS INDEX (BMI) DOCUMENTED: ICD-10-PCS | Mod: CPTII,S$GLB,, | Performed by: PHYSICIAN ASSISTANT

## 2019-06-28 PROCEDURE — 3075F SYST BP GE 130 - 139MM HG: CPT | Mod: CPTII,S$GLB,, | Performed by: PHYSICIAN ASSISTANT

## 2019-06-28 PROCEDURE — 3075F PR MOST RECENT SYSTOLIC BLOOD PRESS GE 130-139MM HG: ICD-10-PCS | Mod: CPTII,S$GLB,, | Performed by: PHYSICIAN ASSISTANT

## 2019-06-28 PROCEDURE — 99999 PR PBB SHADOW E&M-EST. PATIENT-LVL V: CPT | Mod: PBBFAC,,, | Performed by: PHYSICIAN ASSISTANT

## 2019-06-28 PROCEDURE — 99204 OFFICE O/P NEW MOD 45 MIN: CPT | Mod: S$GLB,,, | Performed by: PHYSICIAN ASSISTANT

## 2019-06-28 PROCEDURE — 3078F DIAST BP <80 MM HG: CPT | Mod: CPTII,S$GLB,, | Performed by: PHYSICIAN ASSISTANT

## 2019-06-28 PROCEDURE — 1101F PT FALLS ASSESS-DOCD LE1/YR: CPT | Mod: CPTII,S$GLB,, | Performed by: PHYSICIAN ASSISTANT

## 2019-06-28 PROCEDURE — 99999 PR PBB SHADOW E&M-EST. PATIENT-LVL V: ICD-10-PCS | Mod: PBBFAC,,, | Performed by: PHYSICIAN ASSISTANT

## 2019-06-28 RX ORDER — DICLOFENAC SODIUM 50 MG/1
50 TABLET, DELAYED RELEASE ORAL 2 TIMES DAILY
COMMUNITY
End: 2019-10-07

## 2019-06-28 RX ORDER — PREGABALIN 75 MG/1
75 CAPSULE ORAL 2 TIMES DAILY
Qty: 60 CAPSULE | Refills: 2 | Status: SHIPPED | OUTPATIENT
Start: 2019-06-28 | End: 2019-08-22 | Stop reason: SDUPTHER

## 2019-06-28 NOTE — PROGRESS NOTES
Subjective:     Patient ID:  Adriana Paula is a 64 y.o. female.    [facility]    Chief Complaint: Back and right leg pain    HPI    Adriana Paula is a 64 y.o. female who presents with the above CC.  Patient had similar symptoms about 12 years ago that went away after a few weeks with PT.  Pain has been present for the past 1.5 months.  Pain is across the low back rated 8/10 and radiates down the right posterior leg to the bottom of the foot rated 10/10.  Pain worse in the morning and with sitting and better with walking.  No left leg pain.      Numbness in the whole foot but more on the bottom of the foot.    Patient has not had recent PT or ESIs. She had cervical ESIs in the past and saw me before for her neck.  No spine surgery.  Patient is currently taking diclofenac and tramadol prn.    Patient denies any recent accidents or trauma, no saddle anesthesias, and no bowel or bladder incontinence.      Review of Systems:    Review of Systems   Constitutional: Negative for chills, diaphoresis, fever, malaise/fatigue and weight loss.   HENT: Positive for congestion and sinus pain. Negative for ear discharge, ear pain, hearing loss, nosebleeds, sore throat and tinnitus.    Eyes: Positive for photophobia. Negative for blurred vision, double vision, pain, discharge and redness.   Respiratory: Negative for cough, hemoptysis, sputum production, shortness of breath, wheezing and stridor.    Cardiovascular: Negative for chest pain, palpitations, orthopnea, leg swelling and PND.   Gastrointestinal: Positive for nausea. Negative for abdominal pain, blood in stool, constipation, diarrhea, heartburn, melena and vomiting.   Genitourinary: Negative for dysuria, flank pain, frequency, hematuria and urgency.   Musculoskeletal: Positive for back pain, joint pain, myalgias and neck pain. Negative for falls.   Skin: Negative for itching and rash.   Neurological: Positive for weakness and headaches. Negative for dizziness,  tingling, tremors, sensory change, speech change, seizures and loss of consciousness.   Endo/Heme/Allergies: Negative for environmental allergies and polydipsia. Does not bruise/bleed easily.   Psychiatric/Behavioral: Negative for depression, hallucinations, memory loss and substance abuse. The patient has insomnia. The patient is not nervous/anxious.          Past Medical History:   Diagnosis Date    Allergy     Anemia     Anxiety     Arthritis     Breast cyst     Cataract     DR (diabetic retinopathy)     Dyslipidemia     Essential hypertension 2012    Gastroesophageal reflux disease without esophagitis 2/3/2017    GERD (gastroesophageal reflux disease)     Glaucoma     Hypertension     Obesity (BMI 30-39.9) 10/24/2013    PN (peripheral neuropathy)     Sleep apnea     Type 2 diabetes mellitus with both eyes affected by mild nonproliferative retinopathy without macular edema, with long-term current use of insulin     Type 2 diabetes mellitus with diabetic polyneuropathy, with long-term current use of insulin     Type II or unspecified type diabetes mellitus with other specified manifestations, uncontrolled      Past Surgical History:   Procedure Laterality Date    ARTHROSCOPY, KNEE Right 2014    Performed by [facility] at [facility] OR 2ND FLR    BREAST CYST EXCISION Right     1980s    CARPAL TUNNEL RELEASE Right     CATARACT EXTRACTION      CATARACT EXTRACTION W/  INTRAOCULAR LENS IMPLANT Right 2017    Dr Brewster     SECTION      INSERTION-INTRAOCULAR LENS (IOL) Right 2017    [facility]    INSERTION-INTRAOCULAR LENS (IOL) Left 3/17/2016    Performed by Ingrid [facility] at [facility] OR 1ST FLR    KNEE ARTHROSCOPY  14    right    MYOMECTOMY      PHACOEMULSIFICATION-ASPIRATION-CATARACT Right 2017    [facility]    PHACOEMULSIFICATION-ASPIRATION-CATARACT Left 3/17/2016     Performed [facility] at [facility] OR 1ST FLR    RELEASE, TRIGGER FINGER left thumb Left 4/15/2019    Per[facility]ur[facility] Gonzales [facility] at [facility] OR    TRIGGER FINGER RELEASE       Current Outpatient Medications on File Prior to Visit   Medication Sig Dispense Refill    amLODIPine (NORVASC) 10 MG tablet Take 1 tablet (10 mg total) by mouth once daily. 90 tablet 3    atorvastatin (LIPITOR) 40 MG tablet Take 1 tablet (40 mg total) by mouth once daily. 90 tablet 3    blood sugar diagnostic Strp 1 strip by Misc.(Non-Drug; Combo Route) route 2 (two) times daily. One touch verio 100 strip 5    blood-glucose meter (ONETOUCH VERIO IQ METER) kit Use as instructed 200 each 11    diclofenac (VOLTAREN) 50 MG EC tablet Take 50 mg by mouth 2 (two) times daily.      econazole nitrate 1 % cream Apply topically once daily. 30 g 2    exenatide microspheres (BYDUREON BCISE) 2 mg/0.85 mL AtIn Inject 2 mg into the skin every 7 days. 4 Syringe 5    fluticasone (FLONASE) 50 mcg/actuation nasal spray SHAKE LIQUID AND USE 1 SPRAY(50 MCG) IN EACH NOSTRIL EVERY DAY 16 mL 0    insulin degludec (TRESIBA FLEXTOUCH U-100) 100 unit/mL (3 mL) InPn Inject 42 Units into the skin once daily. 13 Syringe 0    ketoconazole (NIZORAL) 2 % cream Apply topically once daily. 15 g 2    lancets 33 gauge Misc 1 lancet by Misc.(Non-Drug; Combo Route) route 2 (two) times daily before meals. 200 each 11    lidocaine-prilocaine (EMLA) cream       losartan (COZAAR) 100 MG tablet Take 1 tablet (100 mg total) by mouth once daily. 90 tablet 3    meclizine (ANTIVERT) 25 mg tablet Take 1 tablet (25 mg total) by mouth 2 (two) times daily as needed for Dizziness. 30 tablet 0    metFORMIN (GLUCOPHAGE-XR) 500 MG 24 hr tablet Take 2 tablets (1,000 mg total) by mouth once daily.      oxybutynin (DITROPAN) 5 MG Tab Take 1 tablet (5 mg total) by mouth 3 (three) times daily. 90 tablet 2    pen needle, diabetic (BD ULTRA-FINE SHORT PEN NEEDLE) 31 gauge x  "5/16" Ndle Use as directed (two) times daily. 100 each 11    traMADol (ULTRAM) 50 mg tablet Take 1 tablet (50 mg total) by mouth every 8 (eight) hours as needed for Pain. May take this with tylenol 500mg 30 tablet 0    vitamin D (VITAMIN D3) 1000 units Tab Take 2,000 Units by mouth once daily.      VOLTAREN 1 % Gel       aspirin (ECOTRIN) 81 MG EC tablet Take 81 mg by mouth once daily. Instructed to stop 1 week prior to surgery on 4/15/19 per Dr. Conklin       Current Facility-Administered Medications on File Prior to Visit   Medication Dose Route Frequency Provider Last Rate Last Dose    0.9%  NaCl infusion   Intravenous Continuous Omero Glynn MD        mupirocin 2 % ointment   Nasal On Call Procedure Omero Glynn MD         Review of patient's allergies indicates:   Allergen Reactions    Keflex [cephalexin] Rash      blisters, fever    Penicillins Itching    Sulfamethoxazole-trimethoprim      Other reaction(s): blisters    Vicodin [hydrocodone-acetaminophen] Swelling    Sulfa (sulfonamide antibiotics) Rash      blisters,fever         Social History     Socioeconomic History    Marital status: Single     Spouse name: Not on file    Number of children: Not on file    Years of education: Not on file    Highest education level: Not on file   Occupational History    Not on file   Social Needs    Financial resource strain: Somewhat hard    Food insecurity:     Worry: Never true     Inability: Never true    Transportation needs:     Medical: No     Non-medical: No   Tobacco Use    Smoking status: Former Smoker     Start date: 12/9/2002    Smokeless tobacco: Never Used   Substance and Sexual Activity    Alcohol use: Yes     Frequency: 2-4 times a month     Drinks per session: 1 or 2     Binge frequency: Never     Comment: socially    Drug use: No    Sexual activity: Never     Birth control/protection: None   Lifestyle    Physical activity:     Days per week: 1 day     " "Minutes per session: 10 min    Stress: To some extent   Relationships    Social connections:     Talks on phone: More than three times a week     Gets together: Twice a week     Attends Buddhism service: Not on file     Active member of club or organization: No     Attends meetings of clubs or organizations: Never     Relationship status:    Other Topics Concern    Not on file   Social History Narrative    . 1 daughter. Works as  at [employer].      Family History   Problem Relation Age of Onset    Arthritis Mother     Diabetes Mother     Heart disease Mother     Miscarriages / Stillbirths Mother     Vision loss Mother     Breast cancer Mother 75        70s    Cancer Mother     No Known Problems Father     Diabetes Unknown         "Half of my family"    Breast cancer Maternal Grandmother 50        50s    Breast cancer Sister 50        50s    Breast cancer Cousin 40        40s    No Known Problems Brother     No Known Problems Maternal Aunt     No Known Problems Maternal Uncle     No Known Problems Paternal Aunt     No Known Problems Paternal Uncle     No Known Problems Maternal Grandfather     No Known Problems Paternal Grandmother     No Known Problems Paternal Grandfather     Breast cancer Cousin 40        40s    Breast cancer Sister 40    Breast cancer Sister 54    Thyroid disease Neg Hx     Ovarian cancer Neg Hx     Amblyopia Neg Hx     Blindness Neg Hx     Cataracts Neg Hx     Glaucoma Neg Hx     Hypertension Neg Hx     Macular degeneration Neg Hx     Retinal detachment Neg Hx     Strabismus Neg Hx     Stroke Neg Hx        Objective:      Vitals:    06/28/19 0730   BP: 137/68   Pulse: 70   Weight: 95.3 kg (210 lb)   Height: 5' 5" (1.651 m)   PainSc: 10-Worst pain ever   PainLoc: Back         Physical Exam:    General:  Adriana Paula is well-developed, well-nourished, appears stated age, in no acute distress, alert and oriented to " person, place, and time.    Pulmonary/Chest:  Respiratory effort normal  Abdominal: Exhibits no distension  Psychiatric:  Normal mood and affect.  Behavior is normal.  Judgement and thought content normal    Musculoskeletal:    Patient arises from a sitting to standing position without difficulty.  Patient walks to the door without evidence of limp, pain, or abnormality of gait. Patient is able to walk on heels and toes without difficulty.    Lumbar ROM:   No pain in lumbar flexion.  Pain in extension, right lateral bending, and left lateral bending.    Lumbar Spine Inspection:  Normal with no surgical scars and no visible rashes.    Lumbar Spine Palpation:  No tenderness to low back palpation.    SI Joint Palpation:  No tenderness to SI Joint palpation.    Straight Leg Raise:  Positive right and negative left SLR.    Neurological: Alert and oriented to person, place, and time    Muscle strength against resistance:     Right Left   Hip flexion  5 / 5 5 / 5   Hip extension 5 / 5 5 / 5   Hip abduction 5 / 5 5 / 5   Hip adduction  5 / 5 5 / 5   Knee extension  5 / 5 5 / 5   Knee flexion 5 / 5 5 / 5   Dorsiflexion  5 / 5 5 / 5   EHL  5 / 5 5 / 5   Plantar flexion  5 / 5 5 / 5   Inversion of the feet 5 / 5 5 / 5   Eversion of the feet  5 / 5 5 / 5     Reflexes:     Right Left   Patellar 2+ 2+   Achilles 2+ 2+     Clonus:  Negative bilaterally    On gross examination of the bilateral upper extremities, patient has full painfree ROM with no signs of clubbing, cyanosis, edema, or weakness.     XRAY Interpretation:     Lumbar spine ap/lateral xrays were personally reviewed today.  No fractures.  Normal alignment.  Mild DDD at L4-5 and L5-S1.      Assessment:          1. Chronic bilateral low back pain with right-sided sciatica    2. DDD (degenerative disc disease), lumbar    3. Other spondylosis with radiculopathy, lumbar region    4. Lumbar radiculopathy            Plan:          Orders Placed This Encounter    MRI Lumbar  Spine Without Contrast    Ambulatory Referral to Physical/Occupational Therapy    pregabalin (LYRICA) 75 MG capsule    gabapentin (NEURONTIN) 300 MG capsule       Patient with L4-5 and L5-S1 and right S1 radiculopathy    -PT through [facility]  -Continue Diclofenac and Tramadol from another provider  -She has tried gabapentin in the past with side effects  -Will try Lyriica  -MRI lumbar spine and fu after for further recommendations    Follow-Up:  Follow up in about 2 weeks (around 7/12/2019). If there are any questions prior to this, the patient was instructed to contact the office.       SABRINA Patel PA-C  Neurosurgery  [facility]  [facility]    Addendum:  07/01/19    Gabapentin prescribed.    SABRINA Patel PA-C  Neurosurgery  [facility]  [facility]

## 2019-06-28 NOTE — LETTER
June 28, 2019      Sebas Ferro, SARAH  [address]  [facility] [address]           [facility] [place] [address] [facility] [address]  [address], [address]  [facility] [address]  Phone: [phone]  Fax: [phone]          Patient: Adriana Paula   MR Number: [id number]   YOB: 1955   Date of Visit: 6/28/2019       Dear Sebas Ferro:    Thank you for referring Adriana Paula to me for evaluation. Attached you will find relevant portions of my assessment and plan of care.    If you have questions, please do not hesitate to call me. I look forward to following Adriana Paula along with you.    Sincerely,    Cece Dowell PA-C    Enclosure  CC:  No Recipients    If you would like to receive this communication electronically, please contact [email or web address] or [phone] to request more information on [employer] access.    For providers and/or their staff who would like to refer a patient to [facility], please contact us through our one-stop-shop provider referral line, [facility], at [phone].    If you feel you have received this communication in error or would no longer like to receive these types of communications, please e-mail [email or web address]

## 2019-07-01 ENCOUNTER — PATIENT MESSAGE (OUTPATIENT)
Dept: SPINE | Facility: CLINIC | Age: 64
End: 2019-07-01

## 2019-07-01 RX ORDER — GABAPENTIN 300 MG/1
CAPSULE ORAL
Qty: 270 CAPSULE | Refills: 0 | Status: SHIPPED | OUTPATIENT
Start: 2019-07-01 | End: 2019-08-22 | Stop reason: ALTCHOICE

## 2019-07-05 ENCOUNTER — HOSPITAL ENCOUNTER (OUTPATIENT)
Dept: RADIOLOGY | Facility: OTHER | Age: 64
Discharge: HOME | End: 2019-07-05
Attending: PHYSICIAN ASSISTANT
Payer: COMMERCIAL

## 2019-07-05 DIAGNOSIS — M51.36 DDD (DEGENERATIVE DISC DISEASE), LUMBAR: ICD-10-CM

## 2019-07-05 DIAGNOSIS — M54.16 LUMBAR RADICULOPATHY: ICD-10-CM

## 2019-07-05 DIAGNOSIS — G89.29 CHRONIC BILATERAL LOW BACK PAIN WITH RIGHT-SIDED SCIATICA: ICD-10-CM

## 2019-07-05 DIAGNOSIS — M47.26 OTHER SPONDYLOSIS WITH RADICULOPATHY, LUMBAR REGION: ICD-10-CM

## 2019-07-05 DIAGNOSIS — M54.41 CHRONIC BILATERAL LOW BACK PAIN WITH RIGHT-SIDED SCIATICA: ICD-10-CM

## 2019-07-05 PROCEDURE — 72148 MRI LUMBAR SPINE WITHOUT CONTRAST: ICD-10-PCS | Mod: 26,,, | Performed by: RADIOLOGY

## 2019-07-05 PROCEDURE — 72148 MRI LUMBAR SPINE W/O DYE: CPT | Mod: 26,,, | Performed by: RADIOLOGY

## 2019-07-05 PROCEDURE — 72148 MRI LUMBAR SPINE W/O DYE: CPT | Mod: TC

## 2019-07-15 ENCOUNTER — PATIENT OUTREACH (OUTPATIENT)
Dept: ADMINISTRATIVE | Facility: OTHER | Age: 64
End: 2019-07-15

## 2019-07-16 ENCOUNTER — OFFICE VISIT (OUTPATIENT)
Dept: SPINE | Facility: CLINIC | Age: 64
End: 2019-07-16
Payer: MEDICARE

## 2019-07-16 VITALS
BODY MASS INDEX: 34.99 KG/M2 | SYSTOLIC BLOOD PRESSURE: 152 MMHG | TEMPERATURE: 99 F | HEIGHT: 65 IN | WEIGHT: 210 LBS | DIASTOLIC BLOOD PRESSURE: 63 MMHG | HEART RATE: 77 BPM

## 2019-07-16 DIAGNOSIS — M54.41 CHRONIC BILATERAL LOW BACK PAIN WITH RIGHT-SIDED SCIATICA: Primary | ICD-10-CM

## 2019-07-16 DIAGNOSIS — M47.26 OTHER SPONDYLOSIS WITH RADICULOPATHY, LUMBAR REGION: ICD-10-CM

## 2019-07-16 DIAGNOSIS — M51.36 DDD (DEGENERATIVE DISC DISEASE), LUMBAR: ICD-10-CM

## 2019-07-16 DIAGNOSIS — M54.16 LUMBAR RADICULOPATHY: ICD-10-CM

## 2019-07-16 DIAGNOSIS — G89.29 CHRONIC BILATERAL LOW BACK PAIN WITH RIGHT-SIDED SCIATICA: Primary | ICD-10-CM

## 2019-07-16 PROCEDURE — 99214 PR OFFICE/OUTPT VISIT, EST, LEVL IV, 30-39 MIN: ICD-10-PCS | Mod: S$PBB,,, | Performed by: PHYSICIAN ASSISTANT

## 2019-07-16 PROCEDURE — 99214 OFFICE O/P EST MOD 30 MIN: CPT | Mod: S$PBB,,, | Performed by: PHYSICIAN ASSISTANT

## 2019-07-16 PROCEDURE — 99215 OFFICE O/P EST HI 40 MIN: CPT | Mod: PBBFAC | Performed by: PHYSICIAN ASSISTANT

## 2019-07-16 PROCEDURE — 99999 PR PBB SHADOW E&M-EST. PATIENT-LVL V: CPT | Mod: PBBFAC,,, | Performed by: PHYSICIAN ASSISTANT

## 2019-07-16 PROCEDURE — 99999 PR PBB SHADOW E&M-EST. PATIENT-LVL V: ICD-10-PCS | Mod: PBBFAC,,, | Performed by: PHYSICIAN ASSISTANT

## 2019-07-16 NOTE — PROGRESS NOTES
"Subjective:     Patient ID:  Adriana Paula is a 64 y.o. female.    [facility]    Chief Complaint: Back and right leg pain     HPI     Adriana Paula is a 64 y.o. female who presents with the above CC.   Here for MRI lumbar spine fu.  Patient had similar symptoms about 12 years ago that went away after a few weeks with PT.  Pain has been present for the past 1.5 months.  Pain is across the low back rated 8/10 and radiates down the right posterior leg to the bottom of the foot rated 10/10.  Pain worse in the morning and with sitting and better with walking.  No left leg pain.       Numbness in the whole foot but more on the bottom of the foot.     Patient has not had recent PT or ESIs. She had cervical ESIs in the past and saw me before for her neck.  No spine surgery.  Patient is currently taking gabapentin which has been helping some.  She has not started PT yet.     Patient denies any recent accidents or trauma, no saddle anesthesias, and no bowel or bladder incontinence.       Review of Systems:  Constitution: Negative for chills, fever, night sweats and weight loss.   Musculoskeletal: Negative for falls.   Gastrointestinal: Negative for bowel incontinence, nausea and vomiting.   Genitourinary: Negative for bladder incontinence.   Neurological: Negative for disturbances in coordination and loss of balance.      Objective:      Vitals:    07/16/19 1552   BP: (!) 152/63   Pulse: 77   Temp: 99.1 °F (37.3 °C)   Weight: 95.3 kg (210 lb)   Height: 5' 5" (1.651 m)   PainSc:   9   PainLoc: Back         Physical Exam:    General:  Adriana Paula is well-developed, well-nourished, appears stated age, in no acute distress, alert and oriented to person, place, and time.    Patient sits comfortably in the exam room and answers questions appropriately. Grossly patient is able to move bilateral lower extremities without difficulty.     XRAY Interpretation:      Lumbar spine ap/lateral xrays were personally reviewed " today.  No fractures.  Normal alignment.  Mild DDD at L4-5 and L5-S1.    MRI Interpretation:     Lumbar spine MRI was personally reviewed today.  L5-S1 DDD.  BBDD at L4-5 and L5-S1 with bilateral NFS.  Probable lumbarlized sacrum.    Assessment:          1. Chronic bilateral low back pain with right-sided sciatica    2. DDD (degenerative disc disease), lumbar    3. Other spondylosis with radiculopathy, lumbar region    4. Lumbar radiculopathy            Plan:          Orders Placed This Encounter    [facility]    Procedure [facility] to [facility]       L4-5, L5-S1 DDD/spondylosis with BBDD    -L5-S1 IL KARLA  -Start PT  -Continue gabapentin  -FU in three months    Follow-Up:  Follow up in about 3 months (around 10/16/2019). If there are any questions prior to this, the patient was instructed to contact the office.       SABRINA Patel PA-C  Neurosurgery  [facility]  [facility]    Addendum:  09/10/19    L5-S1 IL KARLA ordered through the pain clinic    SABRINA Patel PA-C  Neurosurgery  [facility]  [facility]

## 2019-07-17 ENCOUNTER — TELEPHONE (OUTPATIENT)
Dept: PAIN MEDICINE | Facility: CLINIC | Age: 64
End: 2019-07-17

## 2019-07-17 DIAGNOSIS — M51.36 DDD (DEGENERATIVE DISC DISEASE), LUMBAR: Primary | ICD-10-CM

## 2019-07-19 ENCOUNTER — TELEPHONE (OUTPATIENT)
Dept: SPINE | Facility: CLINIC | Age: 64
End: 2019-07-19

## 2019-07-19 NOTE — TELEPHONE ENCOUNTER
Called and spoke with patient.  Informed her that would get a message to Cece for a referral to Physical Therapy.  Order already in system awaiting insurance approval.

## 2019-07-19 NOTE — TELEPHONE ENCOUNTER
----- Message from Cristina Cole sent at 7/19/2019 11:47 AM CDT -----  Contact: Self  Name of Who is Calling: NALDO BARKSDALE [[id number]]      What is the request in detail: pt would like to have orders places for physical therapy. Please contact to further discuss and advise.      Can the clinic reply by [facility]: N       What [facility]: [phone]

## 2019-07-29 ENCOUNTER — TELEPHONE (OUTPATIENT)
Dept: INTERNAL MEDICINE | Facility: CLINIC | Age: 64
End: 2019-07-29

## 2019-07-29 ENCOUNTER — TELEPHONE (OUTPATIENT)
Dept: REHABILITATION | Facility: HOSPITAL | Age: 64
End: 2019-07-29

## 2019-07-29 DIAGNOSIS — E11.65 TYPE 2 DIABETES MELLITUS WITH HYPERGLYCEMIA, WITH LONG-TERM CURRENT USE OF INSULIN: Primary | Chronic | ICD-10-CM

## 2019-07-29 DIAGNOSIS — Z79.4 TYPE 2 DIABETES MELLITUS WITH HYPERGLYCEMIA, WITH LONG-TERM CURRENT USE OF INSULIN: Primary | Chronic | ICD-10-CM

## 2019-07-29 NOTE — TELEPHONE ENCOUNTER
Spoke with pt- dose for trulicity is 2mg q7d; pt states she is not using this.     Metformin- (2) 500mg tabs after breakfast.

## 2019-07-29 NOTE — TELEPHONE ENCOUNTER
Received fax from vishnu- pt is requesting Tradjenta 5mg- not in current med rec- please advise; thank you     vishnu- [phone]  Fax- [phone]

## 2019-07-29 NOTE — TELEPHONE ENCOUNTER
Refilled Tradjenta.     How much Trulicity is she using and how much metformin?    Please schedule HbA1c and BMP now.     Please remind her to bring in the Fitkit. Make sure she has the supplies.

## 2019-07-29 NOTE — TELEPHONE ENCOUNTER
Her last email on 05/10/2019, patient was changed from Tradjenta to Bydureon.  She was using Bydureon but blood sugars were low.  Adjusted the dose of metformin.    She was advised to report blood sugars to us so adjustments could be made.  What medications has she been taking?      Could she send us a list of blood sugar readings?    She needs to reschedule her 3-month follow-up as soon as possible.

## 2019-07-29 NOTE — TELEPHONE ENCOUNTER
"Spoke with pt; would like to go back to [facility]- she took the Byureon 1 time but discontinued because "it was awkward"; she is currently only taking the Tresiba insulin and Metformin.     Pt is scheduled to have an epidural on Wednesday; would like medication to be filled before then.       "

## 2019-07-30 ENCOUNTER — TELEPHONE (OUTPATIENT)
Dept: INTERNAL MEDICINE | Facility: CLINIC | Age: 64
End: 2019-07-30

## 2019-07-30 NOTE — TELEPHONE ENCOUNTER
appt scheduled for pt to come in; is there a comparable alternative to Tradjenta? Please advise thank you

## 2019-07-30 NOTE — TELEPHONE ENCOUNTER
----- Message from Faviola Washington sent at 7/30/2019 10:08 AM CDT -----  Contact: self   Patient called in concerning the medication Tranjenta patient states she will no longer take anymore due to the cost to expensive.

## 2019-07-31 ENCOUNTER — HOSPITAL ENCOUNTER (OUTPATIENT)
Facility: OTHER | Age: 64
Discharge: HOME | End: 2019-07-31
Attending: ANESTHESIOLOGY | Admitting: ANESTHESIOLOGY
Payer: MEDICARE

## 2019-07-31 VITALS
WEIGHT: 210 LBS | BODY MASS INDEX: 34.99 KG/M2 | HEART RATE: 71 BPM | DIASTOLIC BLOOD PRESSURE: 63 MMHG | TEMPERATURE: 98 F | SYSTOLIC BLOOD PRESSURE: 131 MMHG | RESPIRATION RATE: 16 BRPM | OXYGEN SATURATION: 93 % | HEIGHT: 65 IN

## 2019-07-31 DIAGNOSIS — M51.36 DDD (DEGENERATIVE DISC DISEASE), LUMBAR: Primary | ICD-10-CM

## 2019-07-31 LAB — POCT GLUCOSE: 121 MG/DL (ref 70–110)

## 2019-07-31 PROCEDURE — 62323 PR INJ LUMBAR/SACRAL, W/IMAGING GUIDANCE: ICD-10-PCS | Mod: ,,, | Performed by: ANESTHESIOLOGY

## 2019-07-31 PROCEDURE — 62323 NJX INTERLAMINAR LMBR/SAC: CPT | Mod: ,,, | Performed by: ANESTHESIOLOGY

## 2019-07-31 PROCEDURE — 25000003 PHARM REV CODE 250: Performed by: ANESTHESIOLOGY

## 2019-07-31 PROCEDURE — 62323 NJX INTERLAMINAR LMBR/SAC: CPT | Performed by: ANESTHESIOLOGY

## 2019-07-31 PROCEDURE — 63600175 PHARM REV CODE 636 W HCPCS: Performed by: GENERAL PRACTICE

## 2019-07-31 PROCEDURE — 63600175 PHARM REV CODE 636 W HCPCS: Performed by: ANESTHESIOLOGY

## 2019-07-31 PROCEDURE — 25500020 PHARM REV CODE 255: Performed by: ANESTHESIOLOGY

## 2019-07-31 RX ORDER — LIDOCAINE HYDROCHLORIDE 5 MG/ML
INJECTION, SOLUTION INFILTRATION; INTRAVENOUS
Status: DISCONTINUED | OUTPATIENT
Start: 2019-07-31 | End: 2019-07-31 | Stop reason: HOSPADM

## 2019-07-31 RX ORDER — DEXAMETHASONE SODIUM PHOSPHATE 4 MG/ML
INJECTION, SOLUTION INTRA-ARTICULAR; INTRALESIONAL; INTRAMUSCULAR; INTRAVENOUS; SOFT TISSUE
Status: DISCONTINUED | OUTPATIENT
Start: 2019-07-31 | End: 2019-07-31 | Stop reason: HOSPADM

## 2019-07-31 RX ORDER — LIDOCAINE HYDROCHLORIDE 10 MG/ML
INJECTION INFILTRATION; PERINEURAL
Status: DISCONTINUED | OUTPATIENT
Start: 2019-07-31 | End: 2019-07-31 | Stop reason: HOSPADM

## 2019-07-31 RX ORDER — SODIUM CHLORIDE 9 MG/ML
500 INJECTION, SOLUTION INTRAVENOUS CONTINUOUS
Status: DISCONTINUED | OUTPATIENT
Start: 2019-07-31 | End: 2019-07-31 | Stop reason: HOSPADM

## 2019-07-31 RX ORDER — MIDAZOLAM HYDROCHLORIDE 1 MG/ML
INJECTION INTRAMUSCULAR; INTRAVENOUS
Status: DISCONTINUED | OUTPATIENT
Start: 2019-07-31 | End: 2019-07-31 | Stop reason: HOSPADM

## 2019-07-31 RX ADMIN — SODIUM CHLORIDE 500 ML: 0.9 INJECTION, SOLUTION INTRAVENOUS at 11:07

## 2019-07-31 NOTE — DISCHARGE INSTRUCTIONS
Adult Procedural Sedation Instructions    Recovery After Procedural Sedation (Adult)  You have been given medicine by vein to make you sleep during your surgery. This may have included both a pain medicine and sleeping medicine. Most of the effects have worn off. But you may still have some drowsiness for the next 6 to 8 hours.  Home care  Follow these guidelines when you get home:  · For the next 8 hours, you should be watched by a responsible adult. This person should make sure your condition is not getting worse.  · Don't drink any alcohol for the next 24 hours.  · Don't drive, operate dangerous machinery, or make important business or personal decisions during the next 24 hours.  Note: Your healthcare provider may tell you not to take any medicine by mouth for pain or sleep in the next 4 hours. These medicines may react with the medicines you were given in the hospital. This could cause a much stronger response than usual.  Follow-up care  Follow up with your healthcare provider if you are not alert and back to your usual level of activity within 12 hours.  When to seek medical advice  Call your healthcare provider right away if any of these occur:  · Drowsiness gets worse  · Weakness or dizziness gets worse  · Repeated vomiting  · You can't be awakened   Date Last Reviewed: 10/18/2016  © 9847-8243 [employer]. [address] [place] [address], [place] [address]. All rights reserved. This information is not intended as a substitute for professional medical care. Always follow your healthcare professional's instructions.       Thank you for allowing us to care for you today. You may receive a survey about the care we provided. Your feedback is valuable and helps us provide excellent care throughout the community.     Home Care Instructions for Pain Management:    1. DIET:   You may resume your normal diet today.   2. BATHING:   You may shower with luke warm water. No tub baths or anything that will soak  injection sites under water for the next 24 hours.  3. DRESSING:   You may remove your bandage today.   4. ACTIVITY LEVEL:   You may resume your normal activities 24 hrs after your procedure. Nothing strenuous today.  5. MEDICATIONS:   You may resume your normal medications today. To restart blood thinners, ask your doctor.  6. DRIVING    If you have received any sedatives by mouth today, you may not drive for 12 hours.    If you have received any sedation through your IV, you may not drive for 24 hrs.   7. SPECIAL INSTRUCTIONS:   No heat to the injection site for 24 hrs including, hot bath or shower, heating pad, moist heat, or hot tubs.    Use ice pack to injection site for any pain or discomfort.  Apply ice packs for 20 minute intervals as needed.    IF you have diabetes, be sure to monitor your blood sugar more closely. IF your injection contained steroids your blood sugar levels may become higher than normal.    If you are still having pain upon discharge:  Your pain may improve over the next 48 hours. The anesthetic (numbing medication) works immediately to 48 hours. IF your injection contained a steroid (anti-inflammatory medication), it takes approximately 3 days to start feeling relief and 7-10 days to see your greatest results from the medication. It is possible you may need subsequent injections. This would be discussed at your follow up appointment with pain management or your referring doctor.      PLEASE CALL YOUR DOCTOR IF:  1. Redness or swelling around the injection site.  2. Fever of 101 degrees or more  3. Drainage (pus) from the injection site.  4. For any continuous bleeding (some dried blood over the incision is normal.)    FOR EMERGENCIES:   If any unusual problems or difficulties occur during clinic hours, call [phone] or 660.

## 2019-07-31 NOTE — OP NOTE
"Patient Name: Adriana Paula  MRN: [id number]    INFORMED CONSENT: The procedure, risks, benefits and options were discussed with patient. There are no contraindications to the procedure. The patient expressed understanding and agreed to proceed. The personnel performing the procedure was discussed. I verify that I personally obtained Adriana's consent prior to the start of the procedure and the signed consent can be found on the patient's chart.    Procedure Date: 07/31/2019    Anesthesia: Topical    Pre Procedure diagnosis: DDD (degenerative disc disease), lumbar [M51.36]  1. DDD (degenerative disc disease), lumbar      Post-Procedure diagnosis: SAME      Moderate Sedation: None    PROCEDURE: L5-S1 INTERLAMINAR EPIDURAL STEROID INJECTION - LUMBAR      DESCRIPTION OF PROCEDURE: The patient was brought to the procedure room.  After performing time out IV access was obtained prior to the procedure. The patient was positioned prone on the fluoroscopy table. Continuous hemodynamic monitoring was initiated including blood pressure, EKG, and pulse oximetry.   The area of the spine was prepped with chlorhexidine three times and draped in a sterile fashion.  Skin anesthesia was achieved using 3 mL of lidocaine 1% over the respective injection site. The L5-S1 interspace was visualized under fluoroscopic imaging. An 18 gauge 3 1/2" Tuohy needle was slowly inserted and advanced using loss of resistance to saline with AP and lateral fluoroscopic imaging for needle guidance. Negative aspiration for blood or CSF was confirmed. Epidural contrast spread was confirmed using 2mL of Omnipaque 300 contrast spreading in the lumbar epidural space.6 mL of Lidocaine 0.5% and 10 mg decadron was injected. The needle was removed and bleeding was nil.  A sterile dressing was applied. Adriana was taken back to the recovery room for further observation.     Blood Loss: Nill  Specimen: None      Jef García MD    "

## 2019-07-31 NOTE — H&P
HPI  Patient presenting for Procedure(s) (LRB):  Injection, Steroid, Epidural LUMBAR/CAUDAL L5-S1 IL KARLA (N/A)     Patient on Anti-coagulation No     Today patient denies any fevers, chills, nausea, vomiting, changes in health, procedures such as colonoscopy or dental procedure in the last 2 weeks, or infections.    No health changes since previous encounter    Past Medical History:   Diagnosis Date    Allergy     Anemia     Anxiety     Arthritis     Breast cyst     Cataract     DR (diabetic retinopathy)     Dyslipidemia     Essential hypertension 2012    Gastroesophageal reflux disease without esophagitis 2/3/2017    GERD (gastroesophageal reflux disease)     Glaucoma     Hypertension     Obesity (BMI 30-39.9) 10/24/2013    PN (peripheral neuropathy)     Sleep apnea     Type 2 diabetes mellitus with both eyes affected by mild nonproliferative retinopathy without macular edema, with long-term current use of insulin     Type 2 diabetes mellitus with diabetic polyneuropathy, with long-term current use of insulin     Type II or unspecified type diabetes mellitus with other specified manifestations, uncontrolled      Past Surgical History:   Procedure Laterality Date    ARTHROSCOPY, KNEE Right 2014    [facility] OR 2ND FLR    BREAST CYST EXCISION Right     1980s    CARPAL TUNNEL RELEASE Right     CATARACT EXTRACTION      CATARACT EXTRACTION W/  INTRAOCULAR LENS IMPLANT Right 2017    Dr Brewster     SECTION      INSERTION-INTRAOCULAR LENS (IOL) Right 2017    [facility] at [facility] OR    INSERTION-INTRAOCULAR LENS (IOL) Left 3/17/2016    Performed by Ingrid [facility] at [facility] OR 1ST FLR    KNEE ARTHROSCOPY  14    right    MYOMECTOMY      PHACOEMULSIFICATION-ASPIRATION-CATARACT Right 2017    Performed by [facility] at [facility] OR    PHACOEMULSIFICATION-ASPIRATION-CATARACT Left 3/17/2016     "[facility]formed [facility] at [facility] 1ST FLR    RELEASE, TRIGGER FINGER left thumb Left 4/15/2019    [facility] OR    TRIGGER FINGER RELEASE       Review of patient's allergies indicates:   Allergen Reactions    Keflex [cephalexin] Rash      blisters, fever    Penicillins Itching    Sulfamethoxazole-trimethoprim      Other reaction(s): blisters    Vicodin [hydrocodone-acetaminophen] Swelling    Sulfa (sulfonamide antibiotics) Rash      blisters,fever          Current Facility-Administered Medications   Medication    0.9%  NaCl infusion     Facility-Administered Medications Ordered in Other Encounters   Medication    0.9%  NaCl infusion    mupirocin 2 % ointment       PMHx, PSHx, Allergies, Medications reviewed in epic    ROS negative except pain complaints in HPI    OBJECTIVE:    BP (!) 156/68 (BP Location: Right arm, Patient Position: Sitting)   Pulse 82   Temp 98.4 °F (36.9 °C) (Oral)   Ht 5' 5" (1.651 m)   Wt 95.3 kg (210 lb)   SpO2 97%   BMI 34.95 kg/m²     PHYSICAL EXAMINATION:    GENERAL: Well appearing, in no acute distress, alert and oriented x3.  PSYCH:  Mood and affect appropriate.  SKIN: Skin color, texture, turgor normal, no rashes or lesions which will impact the procedure.  CV: RRR with palpation of the radial artery.  PULM: No evidence of respiratory difficulty, symmetric chest rise. Clear to auscultation.  NEURO: Cranial nerves grossly intact.    Plan:    Proceed with procedure as planned Procedure(s) (LRB):  Injection, Steroid, Epidural LUMBAR/CAUDAL L5-S1 IL KARLA (N/A)    SAL Christy  07/31/2019            "

## 2019-07-31 NOTE — DISCHARGE SUMMARY
Discharge Note  Short Stay      SUMMARY     Admit Date: 7/31/2019    Attending Physician: Jef García      Discharge Physician: Jef García      Discharge Date: 7/31/2019 1:12 PM    Procedure(s) (LRB):  Injection, Steroid, Epidural LUMBAR/CAUDAL L5-S1 IL KARLA (N/A)    Final Diagnosis: DDD (degenerative disc disease), lumbar [M51.36]    Disposition: Home or self care    Patient Instructions:   Discharge Medication List as of 7/31/2019 12:40 PM      CONTINUE these medications which have NOT CHANGED    Details   amLODIPine (NORVASC) 10 MG tablet Take 1 tablet (10 mg total) by mouth once daily., Starting Fri 10/19/2018, Normal      aspirin (ECOTRIN) 81 MG EC tablet Take 81 mg by mouth once daily. Instructed to stop 1 week prior to surgery on 4/15/19 per Dr. Conklin, Starting Sat 7/12/2014, Until Tue 7/16/2019, Historical Med      atorvastatin (LIPITOR) 40 MG tablet Take 1 tablet (40 mg total) by mouth once daily., Starting Fri 10/19/2018, Normal      blood sugar diagnostic Strp 1 strip by Misc.(Non-Drug; Combo Route) route 2 (two) times daily. One touch verio, Starting Thu 4/11/2019, Normal      blood-glucose meter (ONETOUCH VERIO IQ METER) kit Use as instructed, Normal      diclofenac (VOLTAREN) 50 MG EC tablet Take 50 mg by mouth 2 (two) times daily., Historical Med      econazole nitrate 1 % cream Apply topically once daily., Starting Thu 5/23/2019, Normal      fluticasone (FLONASE) 50 mcg/actuation nasal spray SHAKE LIQUID AND USE 1 SPRAY(50 MCG) IN EACH NOSTRIL EVERY DAY, Normal      gabapentin (NEURONTIN) 300 MG capsule Take one cap PO QHS for 7 days, then one cap PO BID for the next 7 days, and then take one cap PO TID and continue, Normal      insulin degludec (TRESIBA FLEXTOUCH U-100) 100 unit/mL (3 mL) InPn Inject 42 Units into the skin once daily., Starting Mon 5/6/2019, Normal      ketoconazole (NIZORAL) 2 % cream Apply topically once daily., Starting Thu 5/23/2019, Normal      lancets 33 gauge  "Misc 1 lancet by Misc.(Non-Drug; Combo Route) route 2 (two) times daily before meals., Starting Wed 8/9/2017, Normal      lidocaine-prilocaine (EMLA) cream Starting Fri 11/2/2018, Historical Med      linaGLIPtin (TRADJENTA) 5 mg Tab tablet Take 1 tablet (5 mg total) by mouth once daily., Starting Mon 7/29/2019, Normal      losartan (COZAAR) 100 MG tablet Take 1 tablet (100 mg total) by mouth once daily., Starting Fri 10/19/2018, Normal      MAGNESIUM ORAL Take 500 mg by mouth., Historical Med      metFORMIN (GLUCOPHAGE-XR) 500 MG 24 hr tablet Take 2 tablets (1,000 mg total) by mouth once daily., Starting Mon 5/13/2019, No Print      oxybutynin (DITROPAN) 5 MG Tab Take 1 tablet (5 mg total) by mouth 3 (three) times daily., Starting Mon 5/13/2019, Normal      pen needle, diabetic (BD ULTRA-FINE SHORT PEN NEEDLE) 31 gauge x 5/16" Ndle Use as directed (two) times daily., Starting Tue 1/8/2019, Normal      pregabalin (LYRICA) 75 MG capsule Take 1 capsule (75 mg total) by mouth 2 (two) times daily., Starting Fri 6/28/2019, Until Fri 12/27/2019, Print      traMADol (ULTRAM) 50 mg tablet Take 1 tablet (50 mg total) by mouth every 8 (eight) hours as needed for Pain. May take this with tylenol 500mg, Starting Tue 6/25/2019, Phone In      vitamin D (VITAMIN D3) 1000 units Tab Take 2,000 Units by mouth once daily., Historical Med      VOLTAREN 1 % Gel Starting Fri 11/2/2018, Historical Med                 Discharge Diagnosis: DDD (degenerative disc disease), lumbar [M51.36]  Condition on Discharge: Stable with no complications to procedure   Diet on Discharge: Same as before.  Activity: as per instruction sheet.  Discharge to: Home with a responsible adult.  Follow up: 2-4 weeks    Please call my office or pager at [phone] if experienced any weakness or loss of sensation, fever > 101.5, pain uncontrolled with oral medications, persistent nausea/vomiting/or diarrhea, redness or drainage from the incisions, or any other " worrisome concerns. If physician on call was not reached or could not communicate with our office for any reason please go to the nearest emergency department

## 2019-08-01 ENCOUNTER — TELEPHONE (OUTPATIENT)
Dept: INTERNAL MEDICINE | Facility: CLINIC | Age: 64
End: 2019-08-01

## 2019-08-01 NOTE — TELEPHONE ENCOUNTER
[facility] called- sp with scott       Gave ICD 10 codes for diagnosis.     Gave names of all tried and failed medications.     Will fax decision of p/a for tradjenta within 24 hours

## 2019-08-01 NOTE — TELEPHONE ENCOUNTER
How long ago did she stop Tradjenta? OK to stop but I need to check her HbA1c.     She needs to come in for labs. Please schedule BMP, HbA1c and urine microalb-cr ratio.     Will adjust the Tresiba insulin dose. But she needs to contact us with readings. See if she needs a blood sugar log.

## 2019-08-05 ENCOUNTER — PATIENT MESSAGE (OUTPATIENT)
Dept: INTERNAL MEDICINE | Facility: CLINIC | Age: 64
End: 2019-08-05

## 2019-08-05 NOTE — TELEPHONE ENCOUNTER
Spoke to pt.  Schedule nurse visit this fri 8/9 for pen injection instruction.    Pt also scheduled labs, same day.

## 2019-08-08 ENCOUNTER — PATIENT MESSAGE (OUTPATIENT)
Dept: SPINE | Facility: CLINIC | Age: 64
End: 2019-08-08

## 2019-08-09 ENCOUNTER — LAB VISIT (OUTPATIENT)
Dept: LAB | Facility: HOSPITAL | Age: 64
End: 2019-08-09
Attending: INTERNAL MEDICINE
Payer: MEDICARE

## 2019-08-09 DIAGNOSIS — Z79.4 TYPE 2 DIABETES MELLITUS WITH HYPERGLYCEMIA, WITH LONG-TERM CURRENT USE OF INSULIN: Chronic | ICD-10-CM

## 2019-08-09 DIAGNOSIS — E11.65 TYPE 2 DIABETES MELLITUS WITH HYPERGLYCEMIA, WITH LONG-TERM CURRENT USE OF INSULIN: Chronic | ICD-10-CM

## 2019-08-09 LAB
ANION GAP SERPL CALC-SCNC: 9 MMOL/L (ref 8–16)
BUN SERPL-MCNC: 9 MG/DL (ref 8–23)
CALCIUM SERPL-MCNC: 9.9 MG/DL (ref 8.7–10.5)
CHLORIDE SERPL-SCNC: 105 MMOL/L (ref 95–110)
CO2 SERPL-SCNC: 26 MMOL/L (ref 23–29)
CREAT SERPL-MCNC: 0.7 MG/DL (ref 0.5–1.4)
EST. GFR  (AFRICAN AMERICAN): >60 ML/MIN/1.73 M^2
EST. GFR  (NON AFRICAN AMERICAN): >60 ML/MIN/1.73 M^2
ESTIMATED AVG GLUCOSE: 177 MG/DL (ref 68–131)
GLUCOSE SERPL-MCNC: 84 MG/DL (ref 70–110)
HBA1C MFR BLD HPLC: 7.8 % (ref 4–5.6)
POTASSIUM SERPL-SCNC: 4 MMOL/L (ref 3.5–5.1)
SODIUM SERPL-SCNC: 140 MMOL/L (ref 136–145)

## 2019-08-09 PROCEDURE — 80048 BASIC METABOLIC PNL TOTAL CA: CPT

## 2019-08-09 PROCEDURE — 83036 HEMOGLOBIN GLYCOSYLATED A1C: CPT

## 2019-08-09 PROCEDURE — 36415 COLL VENOUS BLD VENIPUNCTURE: CPT | Mod: PO

## 2019-08-13 ENCOUNTER — TELEPHONE (OUTPATIENT)
Dept: REHABILITATION | Facility: HOSPITAL | Age: 64
End: 2019-08-13

## 2019-08-14 ENCOUNTER — CLINICAL SUPPORT (OUTPATIENT)
Dept: REHABILITATION | Facility: HOSPITAL | Age: 64
End: 2019-08-14
Payer: MEDICARE

## 2019-08-14 DIAGNOSIS — R26.2 DIFFICULTY WALKING: ICD-10-CM

## 2019-08-14 DIAGNOSIS — M53.86 DECREASED ROM OF LUMBAR SPINE: ICD-10-CM

## 2019-08-14 PROBLEM — M54.40 ACUTE RIGHT-SIDED LOW BACK PAIN WITH SCIATICA: Status: ACTIVE | Noted: 2019-08-14

## 2019-08-14 PROCEDURE — 97110 THERAPEUTIC EXERCISES: CPT | Mod: PO

## 2019-08-14 PROCEDURE — 97161 PT EVAL LOW COMPLEX 20 MIN: CPT | Mod: PO

## 2019-08-14 NOTE — PLAN OF CARE
[facility]  Physical Therapy Initial Evaluation    Name: Adriana Paula  Clinic Number: [id number]    Therapy Diagnosis:   Encounter Diagnoses   Name Primary?    Decreased ROM of lumbar spine     Difficulty walking      Physician: Cece Dowell, *    Physician Orders: PT Eval and Treat   Medical Diagnosis from Referral:   M54.41,G89.29 (ICD-10-CM) - Chronic bilateral low back pain with right-sided sciatica   M51.36 (ICD-10-CM) - DDD (degenerative disc disease), lumbar   M47.26 (ICD-10-CM) - Other spondylosis with radiculopathy, lumbar region   M54.16 (ICD-10-CM) - Lumbar radiculopathy       Evaluation Date: 8/14/2019  Authorization Period Expiration: 12/31/2019  Plan of Care Expiration: 10/9/2019  Visit # / Visits authorized: 1/ 20    Time In: 9:30  Time Out: 10:00  Total Billable Time: 30 minutes    Precautions: Standard    Subjective   Date of onset: 3 months ago  History of current condition - Adriana reports: insiduous onset of LBP, no specific LISA.  States she had been out of work for a while due to L thumb surgery, when she returned to work (primarily sitting), began with LBP. Pain started on the R side of her low back, pain then began in her R buttock and down the back of her leg.    C/o sharp pain in R low back with intermittent pain down the back of her R leg, all the way down to her foot. C/o numbness in R foot.       Medical History:   Past Medical History:   Diagnosis Date    Allergy     Anemia     Anxiety     Arthritis     Breast cyst     Cataract     DR (diabetic retinopathy)     Dyslipidemia     Essential hypertension 8/1/2012    Gastroesophageal reflux disease without esophagitis 2/3/2017    GERD (gastroesophageal reflux disease)     Glaucoma     Hypertension     Obesity (BMI 30-39.9) 10/24/2013    PN (peripheral neuropathy)     Sleep apnea     Type 2 diabetes mellitus with both eyes affected by mild nonproliferative retinopathy without macular  edema, with long-term current use of insulin     Type 2 diabetes mellitus with diabetic polyneuropathy, with long-term current use of insulin     Type II or unspecified type diabetes mellitus with other specified manifestations, uncontrolled        Surgical History:   Adriana Paula  has a past surgical history that includes  section; Myomectomy; Carpal tunnel release (Right); Knee arthroscopy (14); Cataract extraction; Cataract extraction w/  intraocular lens implant (Right, 2017); Breast cyst excision (Right); Trigger finger release; Trigger finger release (Left, 4/15/2019); and Epidural steroid injection (N/A, 2019).    Medications:   Adriana has a current medication list which includes the following prescription(s): amlodipine, aspirin, atorvastatin, blood sugar diagnostic, blood-glucose meter, diclofenac, econazole nitrate, fluticasone propionate, gabapentin, insulin degludec, ketoconazole, lancets, lidocaine-prilocaine, losartan, magnesium, metformin, oxybutynin, pen needle, diabetic, pregabalin, tramadol, vitamin d, and voltaren, and the following Facility-Administered Medications: sodium chloride 0.9% and mupirocin.    Allergies:   Review of patient's allergies indicates:   Allergen Reactions    Keflex [cephalexin] Rash      blisters, fever    Penicillins Itching    Sulfamethoxazole-trimethoprim      Other reaction(s): blisters    Vicodin [hydrocodone-acetaminophen] Swelling    Sulfa (sulfonamide antibiotics) Rash      blisters,fever            Imaging, MRI studies: Spondylo degenerative change lumbar spine most pronounced at L5/S1 with posterior disc osteophyte, ligamentum flavum hypertrophy and facet joint arthropathy with mild central canal stenosis and moderate to severe bilateral foraminal stenosis as detailed above.  Significant medical hx: B knee OA, DM  Prior Therapy: none; recent tx involves KARLA with temporary relief   Social History: lives at home  Occupation:  "recently retired  Prior Level of Function: no limitations with all work/daily activity, ambulating without cane  Current Level of Function:     Sitting: can jackelyn no more than 20 minutes before LE pain starts  Standing/walking: can tolerate no more than 15 minutes, has started using cane in the last month to take pressure off her low back  Driving: increased pain with car transfers, increased R thigh pain with pressing on gas pedal  Housework: increased pain after 10 minutes in kitchen, needs assist for activity involving bending/lifitng; unable to vaccuum  Sleep: wakes up after approx 5-6 hours due to LBP/R LE pain    Pain:  Current 8/10, worst 10/10, best 5/10   Easing Factors: ice pack, lying down    Pts goals: decreased pain, be able to walk for at least 20 minutes, no longer need cane "get back to normal"    Objective       Observation/Posture: Enters clinic ambulating with SPC on R, demo weight shift to L LE with static stand    Lumbar AROM:    Percent Tolerance   Flexion 100      Extension 50 Pain R post thigh   Left Side Bending 75 Pain middle lsp   Right Side Bending 50 Pain R LE   Left rotation 50    Right Rotation 50 Pain R LE     LOWER EXTREMITY STRENGTH:   RIGHT LEFT   Quadriceps 4/5 4+/5   Hamstrings 4+/5 4+/5     Hip Flexion 4-/5 4+/5   Hip Abduction 3+/5 4-/5   Hip IR 4-/5 4/5   Hip ER 4/5 4/5   Hip Ext 3+/5 3+/5       Flexibility: R hip passive IR severely limited (<10 deg), c/o pain in lumbar spine at end range  Palpation: moderate tightness/tenderness noted R gluteals    Special Tests:   Left Right   Slump - + for R LBP   SLR - + at 60 deg for LE pain         CMS Impairment/Limitation/Restriction for FOTO lumbar Survey    Therapist reviewed FOTO scores for Adriana Paula on 8/14/2019.   FOTO documents entered into [employer] - see Media section.    Limitation Score: 53%  Category: Mobility    Current : CL = least 60% but < 80% impaired, limited or restricted  Goal: CJ = at least 20% but < 40% " "impaired, limited or restricted           TREATMENT   Treatment Time In: 10:00  Treatment Time Out: 10:30  Total Treatment time separate from Evaluation: 30 minutes    Adriana received therapeutic exercises to develop strength, endurance, ROM, flexibility and core stabilization for 25 minutes including:    LTR x 15  Pelvic tilt 10 x 5"  Piriformis stretch 2 x 30"  Prone prop x 1 minute, 1 minute at end of session  Supine add squeeze 5" hold x 20-next session    Progression: Supine clams, supine march with tilt,     Home Exercises and Patient Education Provided    Education provided:   - avoiding sitting with slumped posture  - use of ice with prone prop    Written Home Exercises Provided: yes.  Exercises were reviewed and Adriana was able to demonstrate them prior to the end of the session.  Adriana demonstrated good  understanding of the education provided.     See EMR under Patient Instructions for exercises provided 8/14/2019.    Assessment   Adriana is a 64 y.o. female referred to outpatient Physical Therapy with a medical diagnosis of lumbar DDD with radiculopathy. Patient presents with moderate functional limitations secondary to persistent R LE pain with signs and symptoms consistent with lumbar disc dysfunction.  Patient demonstrates limitations in segmental and gross mobility, weakness, and referred pain into R LE. Patient responds well this date to Lupe extension ex's, will benefit from progressive core stab program along with low level stretching. Pt demo good understanding of HEP and PT education on positioning.      Pt prognosis is Good.   Pt will benefit from skilled outpatient Physical Therapy to address the deficits stated above and in the chart below, provide pt/family education, and to maximize pt's level of independence.     Plan of care discussed with patient: Yes  Pt's spiritual, cultural and educational needs considered and patient is agreeable to the plan of care and goals as stated below: "     Anticipated Barriers for therapy: none    Medical Necessity is demonstrated by the following  History  Co-morbidities and personal factors that may impact the plan of care Co-morbidities:   See PMH above    Personal Factors:   no deficits     low   Examination  Body Structures and Functions, activity limitations and participation restrictions that may impact the plan of care Body Regions:   back  lower extremities  trunk    Body Systems:    gross symmetry  ROM  strength  gross coordinated movement  balance  gait  transfers    Participation Restrictions:   none    Activity limitations:   Learning and applying knowledge  no deficits    General Tasks and Commands  no deficits    Communication  no deficits    Mobility  lifting and carrying objects  walking  driving (bike, car, motorcycle)    Self care  caring for body parts (brushing teeth, shaving, grooming)  dressing    Domestic Life  no deficits    Interactions/Relationships  no deficits    Life Areas  no deficits    Community and Social Life  no deficits         low   Clinical Presentation stable and uncomplicated low   Decision Making/ Complexity Score: low     Goals:  Short Term Goals:   1. Patient to consistently report pain at rest less than 3/10 within 3 weeks   2. Patient able to tolerate at least 15 minutes sustained standing/walking with low back pain less than 5/10 within 3 weeks to improve jackelyn to functional activity  3. Patient to report at least 50% decrease in LE symptoms within 3 weeks      Long Term Goals:  1. Patient to deny LE symptoms with all functional activity within 8 weeks in order to return patient to prior level of function  2. Patient able to tolerate at least 60 minutes sustained standing/walking with LBP less than 2/10 within 8 weeks for improved functional mobility  3. Increase lumbar AROM to WFL all planes, pain-free, within 6 weeks for improved functional mobility  4. Patient able to perform all housework, including  sweeping/vaccuuming, with low back pain less than 3/10 within 8 weeks    Plan   Plan of care Certification: 8/14/2019 to 10/9/2019.    Outpatient Physical Therapy 2 times weekly for 0 weeks to include the following interventions: Manual Therapy, Moist Heat/ Ice and Therapeutic Exercise.     Lizette Bond, PT

## 2019-08-16 ENCOUNTER — CLINICAL SUPPORT (OUTPATIENT)
Dept: REHABILITATION | Facility: HOSPITAL | Age: 64
End: 2019-08-16
Payer: MEDICARE

## 2019-08-16 DIAGNOSIS — M54.41 ACUTE RIGHT-SIDED LOW BACK PAIN WITH RIGHT-SIDED SCIATICA: ICD-10-CM

## 2019-08-16 DIAGNOSIS — M17.0 PRIMARY OSTEOARTHRITIS OF BOTH KNEES: ICD-10-CM

## 2019-08-16 DIAGNOSIS — R26.2 DIFFICULTY WALKING: ICD-10-CM

## 2019-08-16 DIAGNOSIS — M53.86 DECREASED ROM OF LUMBAR SPINE: ICD-10-CM

## 2019-08-16 PROCEDURE — 97110 THERAPEUTIC EXERCISES: CPT | Mod: PO

## 2019-08-16 PROCEDURE — 97140 MANUAL THERAPY 1/> REGIONS: CPT | Mod: PO

## 2019-08-16 NOTE — PROGRESS NOTES
"  Physical Therapy Daily Treatment Note     Name: Adriana Paula  Clinic Number: [id number]    Therapy Diagnosis:   Encounter Diagnoses   Name Primary?    Acute right-sided low back pain with right-sided sciatica     Decreased ROM of lumbar spine     Difficulty walking      Physician: Cece Dowell, *    Visit Date: 8/16/2019       Physician Orders: PT Eval and Treat   Medical Diagnosis from Referral:   M54.41,G89.29 (ICD-10-CM) - Chronic bilateral low back pain with right-sided sciatica   M51.36 (ICD-10-CM) - DDD (degenerative disc disease), lumbar   M47.26 (ICD-10-CM) - Other spondylosis with radiculopathy, lumbar region   M54.16 (ICD-10-CM) - Lumbar radiculopathy         Evaluation Date: 8/14/2019  Authorization Period Expiration: 12/31/2019  Plan of Care Expiration: 10/9/2019    Visit #/Visits authorized: 2/ 20     Time In: 11:09  Time Out: 11:50  Total Billable Time: 39 minutes    Precautions: Standard    Subjective     Pt reports: her pain has been better the last two days, has been doing her ex's.  Current pain in R LE 4/10, not going past her knee.  Also reporting decreased parasthesias in R foot.  She was compliant with home exercise program.  Response to previous treatment: good  Functional change: decreased pain with sit to stand    Pain: 4/10  Location: right leg, not below her knee    Objective     Adriana received therapeutic exercises to develop strength, endurance, ROM, flexibility and core stabilization for 25 minutes including:    LTR x 2 minutes  Pelvic tilt 2 minutes  5"  Piriformis stretch 3 x 30"  Prone prop x 1 minute, 1 minute at end of session  Supine add squeeze 5" hold x 20-next session  Supine clams GTB x 2 minutes  Supine march with tilt 3 x 10  REIL x 10         Adriana received the following manual therapy techniques: Joint mobilizations were applied to the: lsp for 8 minutes, including:  Prone P/A mobs tsp/lsp grade II/III      Adriana received cold pack for 5 minutes to " lsp in sustained lumbar extension.      Home Exercises Provided and Patient Education Provided     Education provided:   - reviewed HEP, corrected technique as needed    Written Home Exercises Provided: Patient instructed to cont prior HEP.  Exercises were reviewed and Adriana was able to demonstrate them prior to the end of the session.  Adriana demonstrated good  understanding of the education provided.     See EMR under Patient Instructions for exercises provided 8/16/2019.    Assessment     Patient reporting centralization of symptoms upon arrival.  Fair tolerance to hip strengthening as she reports increased R posterior hip pain, symptoms reduced at end of session with sustained prone extension.     Adriana is progressing well towards her goals.   Pt prognosis is Good.     Pt will continue to benefit from skilled outpatient physical therapy to address the deficits listed in the problem list box on initial evaluation, provide pt/family education and to maximize pt's level of independence in the home and community environment.     Pt's spiritual, cultural and educational needs considered and pt agreeable to plan of care and goals.     Anticipated barriers to physical therapy: none    Goals:  Short Term Goals:   1. Patient to consistently report pain at rest less than 3/10 within 3 weeks   2. Patient able to tolerate at least 15 minutes sustained standing/walking with low back pain less than 5/10 within 3 weeks to improve jackelyn to functional activity  3. Patient to report at least 50% decrease in LE symptoms within 3 weeks        Long Term Goals:  1. Patient to deny LE symptoms with all functional activity within 8 weeks in order to return patient to prior level of function  2. Patient able to tolerate at least 60 minutes sustained standing/walking with LBP less than 2/10 within 8 weeks for improved functional mobility  3. Increase lumbar AROM to WFL all planes, pain-free, within 6 weeks for improved functional  mobility  4. Patient able to perform all housework, including sweeping/vaccuuming, with low back pain less than 3/10 within 8 weeks    Plan     Plan of care Certification: 8/14/2019 to 10/9/2019.     Outpatient Physical Therapy 2 times weekly for 0 weeks to include the following interventions: Manual Therapy, Moist Heat/ Ice and Therapeutic Exercise.      Lizette Bond, PT    Lizette Bond, PT

## 2019-08-18 RX ORDER — TRAMADOL HYDROCHLORIDE 50 MG/1
TABLET ORAL
Qty: 30 TABLET | Refills: 1 | Status: SHIPPED | OUTPATIENT
Start: 2019-08-18 | End: 2019-08-21 | Stop reason: SDUPTHER

## 2019-08-19 ENCOUNTER — PATIENT MESSAGE (OUTPATIENT)
Dept: INTERNAL MEDICINE | Facility: CLINIC | Age: 64
End: 2019-08-19

## 2019-08-20 RX ORDER — INSULIN DEGLUDEC 100 U/ML
38 INJECTION, SOLUTION SUBCUTANEOUS DAILY
Qty: 13 SYRINGE | Refills: 0
Start: 2019-08-20 | End: 2019-10-07 | Stop reason: SDUPTHER

## 2019-08-20 NOTE — TELEPHONE ENCOUNTER
Spoke with patient.    Some low BG readings with weakness, has been taking Bydureon regularly. Will decrease Tresiba to 38 units daily and monitor. Continue Bydureon and metformin.    Continued back pain after KARLA. Could not fill Lyrica due to cost so now on gabapentin 300mg tid. Reports some drowsiness and mild improvement in pain.    Advised to sign up for the Lyrica copay savings card.

## 2019-08-21 ENCOUNTER — CLINICAL SUPPORT (OUTPATIENT)
Dept: REHABILITATION | Facility: HOSPITAL | Age: 64
End: 2019-08-21
Payer: MEDICARE

## 2019-08-21 ENCOUNTER — PATIENT MESSAGE (OUTPATIENT)
Dept: SPINE | Facility: CLINIC | Age: 64
End: 2019-08-21

## 2019-08-21 ENCOUNTER — PATIENT MESSAGE (OUTPATIENT)
Dept: INTERNAL MEDICINE | Facility: CLINIC | Age: 64
End: 2019-08-21

## 2019-08-21 DIAGNOSIS — M17.0 PRIMARY OSTEOARTHRITIS OF BOTH KNEES: ICD-10-CM

## 2019-08-21 DIAGNOSIS — M54.41 ACUTE RIGHT-SIDED LOW BACK PAIN WITH RIGHT-SIDED SCIATICA: ICD-10-CM

## 2019-08-21 DIAGNOSIS — R26.2 DIFFICULTY WALKING: ICD-10-CM

## 2019-08-21 DIAGNOSIS — M53.86 DECREASED ROM OF LUMBAR SPINE: ICD-10-CM

## 2019-08-21 PROCEDURE — 97110 THERAPEUTIC EXERCISES: CPT | Mod: PO

## 2019-08-21 NOTE — TELEPHONE ENCOUNTER
Pt requesting refill of BYDUREON and TRAMADOL   Blood sugar average last week 95 fasting.  Couple of lows, 62 & 65.    Not taking Trajenta at all.    Annual scheduled:  10/7/19    I don't see Bydureon on the med list.

## 2019-08-21 NOTE — PROGRESS NOTES
"  Physical Therapy Daily Treatment Note     Name: Adriana Paula  Clinic Number: [id number]    Therapy Diagnosis:   Encounter Diagnoses   Name Primary?    Acute right-sided low back pain with right-sided sciatica     Decreased ROM of lumbar spine     Difficulty walking      Physician: Cece Dowell, *    Visit Date: 8/21/2019       Physician Orders: PT Eval and Treat   Medical Diagnosis from Referral:   M54.41,G89.29 (ICD-10-CM) - Chronic bilateral low back pain with right-sided sciatica   M51.36 (ICD-10-CM) - DDD (degenerative disc disease), lumbar   M47.26 (ICD-10-CM) - Other spondylosis with radiculopathy, lumbar region   M54.16 (ICD-10-CM) - Lumbar radiculopathy         Evaluation Date: 8/14/2019  Authorization Period Expiration: 12/31/2019  Plan of Care Expiration: 10/9/2019    Visit #/Visits authorized: 3/ 20     Time In: 10:35  Time Out: 11:23  Total Billable Time:  43 minutes    Precautions: Standard    Subjective     Pt reports: "I might have overdone it yesterday", states she sat for approx 45 minutes then stood/walked while shopping for 45 minutes.  Increased R LE pain today. Has been trying not to use her cane.  Pain is usually worse first getting out of bed in the morning.  Patient states she's not getting the sharp pain as much in her low back and states the intensity of the parasthesias in her R foot are easing. Has been able to sleep through the night without waking up due to pain.    Objective     Enters clinic ambulating without AD    Adriana received therapeutic exercises to develop strength, endurance, ROM, flexibility and core stabilization for 43 minutes including:    LTR x 3 minutes  Pelvic tilt 2 minutes  5"  Piriformis stretch 3 x 30"  Prone prop x 1 minute, 1 minute at end of session  Supine add squeeze 5" hold x 2 minutes  Supine clams GTB x 2 minutes  Supine march with tilt 3 x 10  REIL x 10  SL clams 3 x 10  Figure 4 stretch 2 x 30" in supine with OP to knee     Progression: " supine ab iso, seated UE/LE flexion with neutral spine, bridging      Adriana received the following manual therapy techniques: Joint mobilizations were applied to the: lsp for 8 minutes, including:    Prone P/A mobs tsp/lsp grade II/III-not performed  Prone R hip IR/ER passive stretch-next session    Adriana received cold pack for 5 minutes to lsp in sustained lumbar extension.      Home Exercises Provided and Patient Education Provided     Education provided:   - reviewed HEP, corrected technique as needed    Written Home Exercises Provided: Patient instructed to cont prior HEP.  Exercises were reviewed and Adriana was able to demonstrate them prior to the end of the session.  Adriana demonstrated good  understanding of the education provided.     See EMR under Patient Instructions for exercises provided 8/16/2019.    Assessment     Continues with mild R antalgic gait pattern.  Good tolerance to low level strengthening this date, reports relief of L LE pain as session progressed this date.     Adriana is progressing well towards her goals.   Pt prognosis is Good.     Pt will continue to benefit from skilled outpatient physical therapy to address the deficits listed in the problem list box on initial evaluation, provide pt/family education and to maximize pt's level of independence in the home and community environment.     Pt's spiritual, cultural and educational needs considered and pt agreeable to plan of care and goals.     Anticipated barriers to physical therapy: none    Goals:  Short Term Goals:   1. Patient to consistently report pain at rest less than 3/10 within 3 weeks   2. Patient able to tolerate at least 15 minutes sustained standing/walking with low back pain less than 5/10 within 3 weeks to improve jackelyn to functional activity  3. Patient to report at least 50% decrease in LE symptoms within 3 weeks        Long Term Goals:  1. Patient to deny LE symptoms with all functional activity within 8 weeks in  order to return patient to prior level of function  2. Patient able to tolerate at least 60 minutes sustained standing/walking with LBP less than 2/10 within 8 weeks for improved functional mobility  3. Increase lumbar AROM to WFL all planes, pain-free, within 6 weeks for improved functional mobility  4. Patient able to perform all housework, including sweeping/vaccuuming, with low back pain less than 3/10 within 8 weeks    Plan     Plan of care Certification: 8/14/2019 to 10/9/2019.     Outpatient Physical Therapy 2 times weekly for 0 weeks to include the following interventions: Manual Therapy, Moist Heat/ Ice and Therapeutic Exercise.        Lizette Bond, PT

## 2019-08-22 PROBLEM — M17.0 PRIMARY OSTEOARTHRITIS OF BOTH KNEES: Status: ACTIVE | Noted: 2019-08-22

## 2019-08-22 RX ORDER — PREGABALIN 75 MG/1
75 CAPSULE ORAL 2 TIMES DAILY
Qty: 60 CAPSULE | Refills: 2 | Status: SHIPPED | OUTPATIENT
Start: 2019-08-22 | End: 2020-01-08 | Stop reason: SDUPTHER

## 2019-08-22 RX ORDER — TRAMADOL HYDROCHLORIDE 50 MG/1
50 TABLET ORAL EVERY 8 HOURS PRN
Qty: 60 TABLET | Refills: 0 | Status: SHIPPED | OUTPATIENT
Start: 2019-08-22 | End: 2019-10-07 | Stop reason: SDUPTHER

## 2019-08-22 NOTE — TELEPHONE ENCOUNTER
Phone in [facility] and let the patient know.    Cece Dowell, SABRINA, PA-C  Neurosurgery  [facility]  [facility]

## 2019-08-23 ENCOUNTER — CLINICAL SUPPORT (OUTPATIENT)
Dept: REHABILITATION | Facility: HOSPITAL | Age: 64
End: 2019-08-23
Payer: MEDICARE

## 2019-08-23 ENCOUNTER — PATIENT MESSAGE (OUTPATIENT)
Dept: ADMINISTRATIVE | Facility: OTHER | Age: 64
End: 2019-08-23

## 2019-08-23 DIAGNOSIS — R26.2 DIFFICULTY WALKING: ICD-10-CM

## 2019-08-23 DIAGNOSIS — M53.86 DECREASED ROM OF LUMBAR SPINE: ICD-10-CM

## 2019-08-23 DIAGNOSIS — M54.41 ACUTE RIGHT-SIDED LOW BACK PAIN WITH RIGHT-SIDED SCIATICA: ICD-10-CM

## 2019-08-23 PROCEDURE — 97140 MANUAL THERAPY 1/> REGIONS: CPT | Mod: PO

## 2019-08-23 PROCEDURE — 97110 THERAPEUTIC EXERCISES: CPT | Mod: PO

## 2019-08-23 NOTE — PROGRESS NOTES
"  Physical Therapy Daily Treatment Note     Name: Adriana Paula  Clinic Number: [id number]    Therapy Diagnosis:   Encounter Diagnoses   Name Primary?    Acute right-sided low back pain with right-sided sciatica     Decreased ROM of lumbar spine     Difficulty walking      Physician: Cece Dowell, *    Visit Date: 8/23/2019       Physician Orders: PT Eval and Treat   Medical Diagnosis from Referral:   M54.41,G89.29 (ICD-10-CM) - Chronic bilateral low back pain with right-sided sciatica   M51.36 (ICD-10-CM) - DDD (degenerative disc disease), lumbar   M47.26 (ICD-10-CM) - Other spondylosis with radiculopathy, lumbar region   M54.16 (ICD-10-CM) - Lumbar radiculopathy         Evaluation Date: 8/14/2019  Authorization Period Expiration: 12/31/2019  Plan of Care Expiration: 10/9/2019    Visit #/Visits authorized: 4/ 20     Time In: 8:02  Time Out: 8:59  Total Billable Time:  29 minutes    Precautions: Standard    Subjective     Pt reports: continued improvement, reports 4/10 R hip/LBP upon arrival.    Objective     Enters clinic ambulating without AD    Adriana received therapeutic exercises to develop strength, endurance, ROM, flexibility and core stabilization for 43 minutes including:    LTR x 3 minutes  Pelvic tilt 2 minutes  5"  Piriformis stretch 3 x 30"  Prone prop x 1 minute, 1 minute at end of session  Supine add squeeze 5" hold x 2 minutes  Supine march with tilt 3 x 10  REIL x 10  SL clams 3 x 10 YTB  Figure 4 stretch 2 x 30" in supine with OP to knee   Supine ab iso 15 x 3"  Seated UE/LE flexion with neutral spine 2 x 10  Bridging 3 x 10      Adriana received the following manual therapy techniques: Joint mobilizations were applied to the: lsp for 11 minutes, including:    STM and MFR R lumbar paraspinals and proximal gluteals  Prone R hip IR/ER passive stretch    Home Exercises Provided and Patient Education Provided     Education provided:   - reviewed HEP, corrected technique as " needed    Written Home Exercises Provided: Patient instructed to cont prior HEP.  Exercises were reviewed and Adriana was able to demonstrate them prior to the end of the session.  Adriana demonstrated good  understanding of the education provided.     See EMR under Patient Instructions for exercises provided 8/16/2019.    Assessment     Denies R hip pain at end of session, symptom consistently centralizing and pt reporting decreased reliance on AD.    Adriana is progressing well towards her goals.   Pt prognosis is Good.     Pt will continue to benefit from skilled outpatient physical therapy to address the deficits listed in the problem list box on initial evaluation, provide pt/family education and to maximize pt's level of independence in the home and community environment.     Pt's spiritual, cultural and educational needs considered and pt agreeable to plan of care and goals.     Anticipated barriers to physical therapy: none    Goals:  Short Term Goals:   1. Patient to consistently report pain at rest less than 3/10 within 3 weeks   2. Patient able to tolerate at least 15 minutes sustained standing/walking with low back pain less than 5/10 within 3 weeks to improve jackelyn to functional activity  3. Patient to report at least 50% decrease in LE symptoms within 3 weeks        Long Term Goals:  1. Patient to deny LE symptoms with all functional activity within 8 weeks in order to return patient to prior level of function  2. Patient able to tolerate at least 60 minutes sustained standing/walking with LBP less than 2/10 within 8 weeks for improved functional mobility  3. Increase lumbar AROM to WFL all planes, pain-free, within 6 weeks for improved functional mobility  4. Patient able to perform all housework, including sweeping/vaccuuming, with low back pain less than 3/10 within 8 weeks    Plan     Plan of care Certification: 8/14/2019 to 10/9/2019.     Outpatient Physical Therapy 2 times weekly for 0 weeks to  include the following interventions: Manual Therapy, Moist Heat/ Ice and Therapeutic Exercise.        Lizette Bond, PT

## 2019-08-26 ENCOUNTER — CLINICAL SUPPORT (OUTPATIENT)
Dept: REHABILITATION | Facility: HOSPITAL | Age: 64
End: 2019-08-26
Payer: MEDICARE

## 2019-08-26 DIAGNOSIS — M54.41 ACUTE RIGHT-SIDED LOW BACK PAIN WITH RIGHT-SIDED SCIATICA: ICD-10-CM

## 2019-08-26 DIAGNOSIS — R26.2 DIFFICULTY WALKING: ICD-10-CM

## 2019-08-26 DIAGNOSIS — M53.86 DECREASED ROM OF LUMBAR SPINE: ICD-10-CM

## 2019-08-26 PROCEDURE — 97140 MANUAL THERAPY 1/> REGIONS: CPT | Mod: PO

## 2019-08-26 PROCEDURE — 97110 THERAPEUTIC EXERCISES: CPT | Mod: PO

## 2019-08-26 NOTE — PROGRESS NOTES
"  Physical Therapy Daily Treatment Note     Name: Adriana Paula  Clinic Number: [id number]    Therapy Diagnosis:   Encounter Diagnoses   Name Primary?    Acute right-sided low back pain with right-sided sciatica     Decreased ROM of lumbar spine     Difficulty walking      Physician: Cece Dowell, *    Visit Date: 8/26/2019       Physician Orders: PT Eval and Treat   Medical Diagnosis from Referral:   M54.41,G89.29 (ICD-10-CM) - Chronic bilateral low back pain with right-sided sciatica   M51.36 (ICD-10-CM) - DDD (degenerative disc disease), lumbar   M47.26 (ICD-10-CM) - Other spondylosis with radiculopathy, lumbar region   M54.16 (ICD-10-CM) - Lumbar radiculopathy         Evaluation Date: 8/14/2019  Authorization Period Expiration: 12/31/2019  Plan of Care Expiration: 10/9/2019    Visit #/Visits authorized: 5/ 20     Time In: 10:30  Time Out: 11:30  Total Billable Time:  30 minutes    Precautions: Standard    Subjective     Pt reports: she was on her feet a lot over the weekend "I think I overdid it".  Patient states she has stopped taking her Gabapentin approx 4-5 days ago, has rec'd rx for Lyrica but is waiting to get it filled and isn't sure if she wants to take it since she's starting to feel a little better. Has had more N&T in her R leg in the last few days but pain has not increased.     Objective     Enters clinic ambulating without AD    Adriana received therapeutic exercises to develop strength, endurance, ROM, flexibility and core stabilization for 43 minutes including:    LTR x 3 minutes  Pelvic tilt 2 minutes  5"  Piriformis stretch 3 x 30"  Prone prop x 1 minute, 1 minute at end of session  Supine add squeeze 5" hold x 2 minutes  Supine march with tilt 3 x 10  REIL x 10  SL clams 3 x 10 YTB  Figure 4 stretch 2 x 30" in supine with OP to knee   Supine ab iso 2 min x 3"  Seated UE/LE flexion with neutral spine 2 x 10  Bridging 3 x 10      Adriana received the following manual therapy " techniques: Joint mobilizations were applied to the: lsp for 11 minutes, including:    STM and MFR R lumbar paraspinals and proximal gluteals  Prone R hip IR/ER passive stretch    Home Exercises Provided and Patient Education Provided     Education provided:   - reviewed HEP, corrected technique as needed    Written Home Exercises Provided: Patient instructed to cont prior HEP.  Exercises were reviewed and Adriana was able to demonstrate them prior to the end of the session.  Adriana demonstrated good  understanding of the education provided.     See EMR under Patient Instructions for exercises provided 8/16/2019.    Assessment     Glut strength improving.  Patient reporting more consistent parasthesias since stopping Gabapentin, yet no changes reported in frequency or intensity of R LE pain.  Pt continues to rely on cane for long distance ambulation/sustained standing and will benefit from continued skilled PT to improve jackelyn to sustained activity and eliminate R LE pain.    Adriana is progressing well towards her goals.   Pt prognosis is Good.     Pt will continue to benefit from skilled outpatient physical therapy to address the deficits listed in the problem list box on initial evaluation, provide pt/family education and to maximize pt's level of independence in the home and community environment.     Pt's spiritual, cultural and educational needs considered and pt agreeable to plan of care and goals.     Anticipated barriers to physical therapy: none    Goals:  Short Term Goals:   1. Patient to consistently report pain at rest less than 3/10 within 3 weeks   2. Patient able to tolerate at least 15 minutes sustained standing/walking with low back pain less than 5/10 within 3 weeks to improve jackelyn to functional activity  3. Patient to report at least 50% decrease in LE symptoms within 3 weeks        Long Term Goals:  1. Patient to deny LE symptoms with all functional activity within 8 weeks in order to return  patient to prior level of function  2. Patient able to tolerate at least 60 minutes sustained standing/walking with LBP less than 2/10 within 8 weeks for improved functional mobility  3. Increase lumbar AROM to WFL all planes, pain-free, within 6 weeks for improved functional mobility  4. Patient able to perform all housework, including sweeping/vaccuuming, with low back pain less than 3/10 within 8 weeks    Plan     Plan of care Certification: 8/14/2019 to 10/9/2019.     Outpatient Physical Therapy 2 times weekly for 0 weeks to include the following interventions: Manual Therapy, Moist Heat/ Ice and Therapeutic Exercise.        Lizette Bond, PT

## 2019-08-28 ENCOUNTER — CLINICAL SUPPORT (OUTPATIENT)
Dept: REHABILITATION | Facility: HOSPITAL | Age: 64
End: 2019-08-28
Payer: MEDICARE

## 2019-08-28 DIAGNOSIS — M54.41 ACUTE RIGHT-SIDED LOW BACK PAIN WITH RIGHT-SIDED SCIATICA: ICD-10-CM

## 2019-08-28 DIAGNOSIS — R26.2 DIFFICULTY WALKING: ICD-10-CM

## 2019-08-28 DIAGNOSIS — M53.86 DECREASED ROM OF LUMBAR SPINE: ICD-10-CM

## 2019-08-28 PROCEDURE — 97140 MANUAL THERAPY 1/> REGIONS: CPT | Mod: PO

## 2019-08-28 PROCEDURE — 97110 THERAPEUTIC EXERCISES: CPT | Mod: PO

## 2019-08-28 NOTE — PROGRESS NOTES
"  Physical Therapy Daily Treatment Note     Name: Adriana Patterson Chai  Clinic Number: [id number]    Therapy Diagnosis:   Encounter Diagnoses   Name Primary?    Acute right-sided low back pain with right-sided sciatica     Decreased ROM of lumbar spine     Difficulty walking      Physician: Cece Dowell, *    Visit Date: 8/28/2019       Physician Orders: PT Eval and Treat   Medical Diagnosis from Referral:   M54.41,G89.29 (ICD-10-CM) - Chronic bilateral low back pain with right-sided sciatica   M51.36 (ICD-10-CM) - DDD (degenerative disc disease), lumbar   M47.26 (ICD-10-CM) - Other spondylosis with radiculopathy, lumbar region   M54.16 (ICD-10-CM) - Lumbar radiculopathy         Evaluation Date: 8/14/2019  Authorization Period Expiration: 12/31/2019  Plan of Care Expiration: 10/9/2019    Visit #/Visits authorized: 5/ 20     Time In: 10:30  Time Out: 11:30  Total Billable Time:  30 minutes    Precautions: Standard    Subjective     Pt reports: she was able to vaccuum yesterday without increased pain, but states afterward she's had a little more R LBP.  Decreased N&T in R LE the past two days and reports less frequent R thigh pain yesterday.    Objective     Enters clinic ambulating without AD    Adriana received therapeutic exercises to develop strength, endurance, ROM, flexibility and core stabilization for 43 minutes including:    LTR x 3 minutes  Pelvic tilt 2 minutes  5"  Piriformis stretch 3 x 30"  Prone prop x 1 minute, 1 minute at end of session  Supine add squeeze with abdominal bracing  Supine dead bug 2 x 10  REIL x 10  SL clams 3 x 10 YTB  Figure 4 stretch 2 x 30" in supine with OP to knee   Supine ab iso 2 min x 3"  Seated UE/LE flexion with neutral spine 2 x 10  Bridging 3 x 10-hold secondary to LBP  Slow march on ball-next session      Adriana received the following manual therapy techniques: Joint mobilizations were applied to the: lsp for 11 minutes, including:    STM and MFR R lumbar " paraspinals and proximal gluteals  Prone R hip IR/ER passive stretch  After obtaining verbal and written consent, PT performed dry needling to R glut medius/minimus/piriformis    Home Exercises Provided and Patient Education Provided     Education provided:   - reviewed HEP, corrected technique as needed    Written Home Exercises Provided: Patient instructed to cont prior HEP.  Exercises were reviewed and Adriana was able to demonstrate them prior to the end of the session.  Adriana demonstrated good  understanding of the education provided.     See EMR under Patient Instructions for exercises provided 8/16/2019.    Assessment     Patient responds well to manual tx this date with decreased pain at end of session. Gait pattern much improved since initial session with equal stance time and pt maintaining neutral spine without AD.      Adriana is progressing well towards her goals.   Pt prognosis is Good.     Pt will continue to benefit from skilled outpatient physical therapy to address the deficits listed in the problem list box on initial evaluation, provide pt/family education and to maximize pt's level of independence in the home and community environment.     Pt's spiritual, cultural and educational needs considered and pt agreeable to plan of care and goals.     Anticipated barriers to physical therapy: none    Goals:  Short Term Goals:   1. Patient to consistently report pain at rest less than 3/10 within 3 weeks   2. Patient able to tolerate at least 15 minutes sustained standing/walking with low back pain less than 5/10 within 3 weeks to improve jackelyn to functional activity  3. Patient to report at least 50% decrease in LE symptoms within 3 weeks        Long Term Goals:  1. Patient to deny LE symptoms with all functional activity within 8 weeks in order to return patient to prior level of function  2. Patient able to tolerate at least 60 minutes sustained standing/walking with LBP less than 2/10 within 8 weeks  for improved functional mobility  3. Increase lumbar AROM to WFL all planes, pain-free, within 6 weeks for improved functional mobility  4. Patient able to perform all housework, including sweeping/vaccuuming, with low back pain less than 3/10 within 8 weeks    Plan     Plan of care Certification: 8/14/2019 to 10/9/2019.     Outpatient Physical Therapy 2 times weekly for 0 weeks to include the following interventions: Manual Therapy, Moist Heat/ Ice and Therapeutic Exercise.        Lizette Bond, PT

## 2019-08-30 ENCOUNTER — PATIENT MESSAGE (OUTPATIENT)
Dept: INTERNAL MEDICINE | Facility: CLINIC | Age: 64
End: 2019-08-30

## 2019-08-30 ENCOUNTER — CLINICAL SUPPORT (OUTPATIENT)
Dept: REHABILITATION | Facility: HOSPITAL | Age: 64
End: 2019-08-30
Payer: MEDICARE

## 2019-08-30 DIAGNOSIS — R26.2 DIFFICULTY WALKING: ICD-10-CM

## 2019-08-30 DIAGNOSIS — M54.41 ACUTE RIGHT-SIDED LOW BACK PAIN WITH RIGHT-SIDED SCIATICA: ICD-10-CM

## 2019-08-30 DIAGNOSIS — M53.86 DECREASED ROM OF LUMBAR SPINE: ICD-10-CM

## 2019-08-30 PROCEDURE — 97110 THERAPEUTIC EXERCISES: CPT | Mod: PO

## 2019-08-30 NOTE — PROGRESS NOTES
"  Physical Therapy Daily Treatment Note     Name: Adriana Patterson Chai  Clinic Number: [id number]    Therapy Diagnosis:   Encounter Diagnoses   Name Primary?    Acute right-sided low back pain with right-sided sciatica     Decreased ROM of lumbar spine     Difficulty walking      Physician: Cece Dowell, *    Visit Date: 8/30/2019       Physician Orders: PT Eval and Treat   Medical Diagnosis from Referral:   M54.41,G89.29 (ICD-10-CM) - Chronic bilateral low back pain with right-sided sciatica   M51.36 (ICD-10-CM) - DDD (degenerative disc disease), lumbar   M47.26 (ICD-10-CM) - Other spondylosis with radiculopathy, lumbar region   M54.16 (ICD-10-CM) - Lumbar radiculopathy         Evaluation Date: 8/14/2019  Authorization Period Expiration: 12/31/2019  Plan of Care Expiration: 10/9/2019    Visit #/Visits authorized: 6/ 20     Time In: 11:00  Time Out: 12:00  Total Billable Time:  30 minutes    Precautions: Standard    Subjective     Pt reports: she continues with "soreness" in R low back and hip along with intermittent "tightness" in R lower leg.  N&T continues to improve.   States she always limps first thing in the morning due to feeling of weakness in R LE.    Objective     Enters clinic ambulating without AD    Adriana received therapeutic exercises to develop strength, endurance, ROM, flexibility and core stabilization for 43 minutes including:    LTR x 3 minutes  Pelvic tilt 2 minutes  5"  Piriformis stretch 3 x 30"  Prone prop x 1 minute, 1 minute at end of session  Supine add squeeze with abdominal bracing  Supine dead bug 3 x 8  REIL x 10  SL clams 3 x 10 YTB  Figure 4 stretch 2 x 30" in supine with OP to knee   Supine ab iso 2 min x 3"  Seated UE/LE flexion with neutral spine 2 x 10  Lat pulldowns seated on ball BTB 3 x 10      Adriana received the following manual therapy techniques: Joint mobilizations were applied to the: lsp for 11 minutes, including:    STM and MFR R lumbar paraspinals and " proximal gluteals  Prone R hip IR/ER passive stretch  After obtaining verbal and written consent, PT performed dry needling to R glut medius/minimus/piriformis- not performed     Home Exercises Provided and Patient Education Provided     Education provided:   - reviewed HEP, corrected technique as needed    Written Home Exercises Provided: Patient instructed to cont prior HEP.  Exercises were reviewed and Adriana was able to demonstrate them prior to the end of the session.  Adriana demonstrated good  understanding of the education provided.     See EMR under Patient Instructions for exercises provided 8/16/2019.    Assessment     Patient continues to respond well to treatment. Gait pattern much improved since initial session with equal stance time and pt maintaining neutral spine without AD.      Adriana is progressing well towards her goals.   Pt prognosis is Good.     Pt will continue to benefit from skilled outpatient physical therapy to address the deficits listed in the problem list box on initial evaluation, provide pt/family education and to maximize pt's level of independence in the home and community environment.     Pt's spiritual, cultural and educational needs considered and pt agreeable to plan of care and goals.     Anticipated barriers to physical therapy: none    Goals:  Short Term Goals:   1. Patient to consistently report pain at rest less than 3/10 within 3 weeks   2. Patient able to tolerate at least 15 minutes sustained standing/walking with low back pain less than 5/10 within 3 weeks to improve jackelyn to functional activity  3. Patient to report at least 50% decrease in LE symptoms within 3 weeks        Long Term Goals:  1. Patient to deny LE symptoms with all functional activity within 8 weeks in order to return patient to prior level of function  2. Patient able to tolerate at least 60 minutes sustained standing/walking with LBP less than 2/10 within 8 weeks for improved functional mobility  3.  Increase lumbar AROM to WFL all planes, pain-free, within 6 weeks for improved functional mobility  4. Patient able to perform all housework, including sweeping/vaccuuming, with low back pain less than 3/10 within 8 weeks    Plan     Plan of care Certification: 8/14/2019 to 10/9/2019.     Outpatient Physical Therapy 2 times weekly for 0 weeks to include the following interventions: Manual Therapy, Moist Heat/ Ice and Therapeutic Exercise.        Lizette Bond, PT

## 2019-09-06 ENCOUNTER — PATIENT MESSAGE (OUTPATIENT)
Dept: SPINE | Facility: CLINIC | Age: 64
End: 2019-09-06

## 2019-09-06 NOTE — TELEPHONE ENCOUNTER
I don't understand this message.    What is a Good RX and why can't it be used with a phoned in Lyrica prescription?    Please find out more details.    I can order another KARLA if she would like to go forward with that.    Let me know.    Thanks,  Cece Dowell, [facility], PA-C  Neurosurgery  [facility]  [facility]

## 2019-09-09 ENCOUNTER — PATIENT MESSAGE (OUTPATIENT)
Dept: SPINE | Facility: CLINIC | Age: 64
End: 2019-09-09

## 2019-09-09 ENCOUNTER — CLINICAL SUPPORT (OUTPATIENT)
Dept: REHABILITATION | Facility: HOSPITAL | Age: 64
End: 2019-09-09
Payer: MEDICARE

## 2019-09-09 DIAGNOSIS — M54.41 ACUTE RIGHT-SIDED LOW BACK PAIN WITH RIGHT-SIDED SCIATICA: ICD-10-CM

## 2019-09-09 DIAGNOSIS — M53.86 DECREASED ROM OF LUMBAR SPINE: ICD-10-CM

## 2019-09-09 DIAGNOSIS — R26.2 DIFFICULTY WALKING: ICD-10-CM

## 2019-09-09 PROCEDURE — 97140 MANUAL THERAPY 1/> REGIONS: CPT | Mod: PO

## 2019-09-09 PROCEDURE — 97110 THERAPEUTIC EXERCISES: CPT | Mod: PO

## 2019-09-09 NOTE — PROGRESS NOTES
"  Physical Therapy Daily Treatment Note     Name: Adriana Patterson Chai  Clinic Number: [id number]    Therapy Diagnosis:   Encounter Diagnoses   Name Primary?    Acute right-sided low back pain with right-sided sciatica     Decreased ROM of lumbar spine     Difficulty walking      Physician: Cece Dowell, *    Visit Date: 9/9/2019       Physician Orders: PT Eval and Treat   Medical Diagnosis from Referral:   M54.41,G89.29 (ICD-10-CM) - Chronic bilateral low back pain with right-sided sciatica   M51.36 (ICD-10-CM) - DDD (degenerative disc disease), lumbar   M47.26 (ICD-10-CM) - Other spondylosis with radiculopathy, lumbar region   M54.16 (ICD-10-CM) - Lumbar radiculopathy         Evaluation Date: 8/14/2019  Authorization Period Expiration: 12/31/2019  Plan of Care Expiration: 10/9/2019    Visit #/Visits authorized: 7/ 20     Time In: 2:05 PM  Time Out: 2:55 PM  Total Billable Time:  25 minutes    Precautions: Standard    Subjective     Pt reports: she just requested for her 2nd epidural.  She is walking better but sh is still having pain. 8/10 pain today.    Objective     Enters clinic ambulating without AD    Adriana received therapeutic exercises to develop strength, endurance, ROM, flexibility and core stabilization for 40 minutes including:    LTR x 3 minutes  Pelvic tilt 2 minutes  5"  Piriformis stretch 3 x 30"  Prone prop x 1 minute, 1 minute at end of session  Supine add squeeze with abdominal bracing  Supine dead bug 3 x 8  REIL x 10  SL clams 3 x 10 YTB  Figure 4 stretch 2 x 30" in supine with OP to knee   Supine ab iso 2 min x 3"  Seated UE/LE flexion with neutral spine 2 x 10  Lat pulldowns seated on ball BTB 3 x 10      Adriana received the following manual therapy techniques: Joint mobilizations were applied to the: lsp for 10 minutes, including:    STM and MFR R lumbar paraspinals and proximal gluteals  Prone R hip IR/ER passive stretch    Home Exercises Provided and Patient Education Provided "     Education provided:   - reviewed HEP, corrected technique as needed    Written Home Exercises Provided: Patient instructed to cont prior HEP.  Exercises were reviewed and Adriana was able to demonstrate them prior to the end of the session.  Adriana demonstrated good  understanding of the education provided.     See EMR under Patient Instructions for exercises provided 8/16/2019.    Assessment     Patient continues to respond well to treatment. Gait pattern much improved since initial session with equal stance time and pt maintaining neutral spine without AD.  Decreased pain to 6/10 at end of session. Continue to progress based on pt's tolerance.      Adriana is progressing well towards her goals.   Pt prognosis is Good.     Pt will continue to benefit from skilled outpatient physical therapy to address the deficits listed in the problem list box on initial evaluation, provide pt/family education and to maximize pt's level of independence in the home and community environment.     Pt's spiritual, cultural and educational needs considered and pt agreeable to plan of care and goals.     Anticipated barriers to physical therapy: none    Goals:  Short Term Goals:   1. Patient to consistently report pain at rest less than 3/10 within 3 weeks   2. Patient able to tolerate at least 15 minutes sustained standing/walking with low back pain less than 5/10 within 3 weeks to improve jackelyn to functional activity  3. Patient to report at least 50% decrease in LE symptoms within 3 weeks        Long Term Goals:  1. Patient to deny LE symptoms with all functional activity within 8 weeks in order to return patient to prior level of function  2. Patient able to tolerate at least 60 minutes sustained standing/walking with LBP less than 2/10 within 8 weeks for improved functional mobility  3. Increase lumbar AROM to WFL all planes, pain-free, within 6 weeks for improved functional mobility  4. Patient able to perform all housework,  including sweeping/vaccuuming, with low back pain less than 3/10 within 8 weeks    Plan     Plan of care Certification: 8/14/2019 to 10/9/2019.     Outpatient Physical Therapy 2 times weekly for 0 weeks to include the following interventions: Manual Therapy, Moist Heat/ Ice and Therapeutic Exercise.        Pramod Rizzo, PT,DPT  9/9/2019

## 2019-09-10 ENCOUNTER — PATIENT MESSAGE (OUTPATIENT)
Dept: ADMINISTRATIVE | Facility: OTHER | Age: 64
End: 2019-09-10

## 2019-09-10 NOTE — TELEPHONE ENCOUNTER
Injection ordered through the pain clinic.    SABRINA Patel, PA-C  Neurosurgery  [facility]  [facility]

## 2019-09-10 NOTE — PROGRESS NOTES
"  Physical Therapy Daily Treatment Note     Name: Adriana Paula  Clinic Number: [id number]    Therapy Diagnosis:   Encounter Diagnoses   Name Primary?    Acute right-sided low back pain with right-sided sciatica     Decreased ROM of lumbar spine     Difficulty walking      Physician: Cece Dowell, *    Visit Date: 9/11/2019       Physician Orders: PT Eval and Treat   Medical Diagnosis from Referral:   M54.41,G89.29 (ICD-10-CM) - Chronic bilateral low back pain with right-sided sciatica   M51.36 (ICD-10-CM) - DDD (degenerative disc disease), lumbar   M47.26 (ICD-10-CM) - Other spondylosis with radiculopathy, lumbar region   M54.16 (ICD-10-CM) - Lumbar radiculopathy         Evaluation Date: 8/14/2019  Authorization Period Expiration: 12/31/2019  Plan of Care Expiration: 10/9/2019    Visit #/Visits authorized: 8/ 20     Time In: 10:58  Time Out: 11:55  Total Billable Time:  30 minutes    Precautions: Standard    Subjective     Pt reports: that she felt a little bit better after last session, but she is still sore.  She has her 2nd epidural on Monday so she is cancelling next therapy appt.  6/10 pain today.    Objective     Enters clinic ambulating without AD    Adriana received therapeutic exercises to develop strength, endurance, ROM, flexibility and core stabilization for 37 minutes including:    LTR x 3 minutes  Pelvic tilt 2 minutes  5"  Piriformis stretch 3 x 30"  Prone prop x 1 minute, 1 minute at end of session  Supine add squeeze with abdominal bracing  Supine dead bug 3 x 8  REIL x 10  SL clams 3 x 10 YTB  Figure 4 stretch 2 x 30" in supine with OP to knee   Supine ab iso 2 min x 3"  Seated UE/LE flexion with neutral spine 2 x 10  Lat pulldowns seated on ball BTB 3 x 10      Adriana received the following manual therapy techniques: Joint mobilizations were applied to the: lsp for 10 minutes, including:    STM and MFR R lumbar paraspinals and proximal gluteals  Prone R hip IR/ER passive " stretch    Coldpack to low back for 10 minutes in prone position    Home Exercises Provided and Patient Education Provided     Education provided:   - reviewed HEP, corrected technique as needed    Written Home Exercises Provided: Patient instructed to cont prior HEP.  Exercises were reviewed and Adriana was able to demonstrate them prior to the end of the session.  Adriana demonstrated good  understanding of the education provided.     See EMR under Patient Instructions for exercises provided 8/16/2019.    Assessment     Patient continues to respond well to treatment. Verbal cues required for proper technique during supine exercises. Gait pattern much improved since initial session with equal stance time and pt maintaining neutral spine without AD.  Decreased pain at end of session. Continue to progress based on pt's tolerance.      Adriana is progressing well towards her goals.   Pt prognosis is Good.     Pt will continue to benefit from skilled outpatient physical therapy to address the deficits listed in the problem list box on initial evaluation, provide pt/family education and to maximize pt's level of independence in the home and community environment.     Pt's spiritual, cultural and educational needs considered and pt agreeable to plan of care and goals.     Anticipated barriers to physical therapy: none    Goals:  Short Term Goals:   1. Patient to consistently report pain at rest less than 3/10 within 3 weeks   2. Patient able to tolerate at least 15 minutes sustained standing/walking with low back pain less than 5/10 within 3 weeks to improve jackelyn to functional activity  3. Patient to report at least 50% decrease in LE symptoms within 3 weeks        Long Term Goals:  1. Patient to deny LE symptoms with all functional activity within 8 weeks in order to return patient to prior level of function  2. Patient able to tolerate at least 60 minutes sustained standing/walking with LBP less than 2/10 within 8 weeks  for improved functional mobility  3. Increase lumbar AROM to WFL all planes, pain-free, within 6 weeks for improved functional mobility  4. Patient able to perform all housework, including sweeping/vaccuuming, with low back pain less than 3/10 within 8 weeks    Plan     Plan of care Certification: 8/14/2019 to 10/9/2019.     Outpatient Physical Therapy 2 times weekly for 10 weeks to include the following interventions: Manual Therapy, Moist Heat/ Ice and Therapeutic Exercise.        Pramod Rizzo, PT,DPT  9/11/2019

## 2019-09-11 ENCOUNTER — TELEPHONE (OUTPATIENT)
Dept: PAIN MEDICINE | Facility: CLINIC | Age: 64
End: 2019-09-11

## 2019-09-11 ENCOUNTER — CLINICAL SUPPORT (OUTPATIENT)
Dept: REHABILITATION | Facility: HOSPITAL | Age: 64
End: 2019-09-11
Payer: MEDICARE

## 2019-09-11 DIAGNOSIS — M54.41 ACUTE RIGHT-SIDED LOW BACK PAIN WITH RIGHT-SIDED SCIATICA: ICD-10-CM

## 2019-09-11 DIAGNOSIS — R26.2 DIFFICULTY WALKING: ICD-10-CM

## 2019-09-11 DIAGNOSIS — M51.36 DDD (DEGENERATIVE DISC DISEASE), LUMBAR: Primary | ICD-10-CM

## 2019-09-11 DIAGNOSIS — M53.86 DECREASED ROM OF LUMBAR SPINE: ICD-10-CM

## 2019-09-11 PROCEDURE — 97140 MANUAL THERAPY 1/> REGIONS: CPT | Mod: PO

## 2019-09-11 PROCEDURE — 97110 THERAPEUTIC EXERCISES: CPT | Mod: PO

## 2019-09-11 NOTE — TELEPHONE ENCOUNTER
Contacted patient to schedule procedure. Date, time, and instructions given. Patient verbalized understanding.

## 2019-09-16 ENCOUNTER — HOSPITAL ENCOUNTER (OUTPATIENT)
Facility: OTHER | Age: 64
Discharge: HOME | End: 2019-09-16
Attending: ANESTHESIOLOGY | Admitting: ANESTHESIOLOGY
Payer: MEDICARE

## 2019-09-16 VITALS
OXYGEN SATURATION: 100 % | RESPIRATION RATE: 14 BRPM | HEART RATE: 84 BPM | SYSTOLIC BLOOD PRESSURE: 177 MMHG | TEMPERATURE: 99 F | DIASTOLIC BLOOD PRESSURE: 81 MMHG

## 2019-09-16 DIAGNOSIS — G89.29 CHRONIC PAIN: ICD-10-CM

## 2019-09-16 DIAGNOSIS — M51.36 DDD (DEGENERATIVE DISC DISEASE), LUMBAR: ICD-10-CM

## 2019-09-16 DIAGNOSIS — G89.4 CHRONIC PAIN SYNDROME: Primary | ICD-10-CM

## 2019-09-16 LAB — POCT GLUCOSE: 85 MG/DL (ref 70–110)

## 2019-09-16 PROCEDURE — 62323 NJX INTERLAMINAR LMBR/SAC: CPT | Performed by: ANESTHESIOLOGY

## 2019-09-16 PROCEDURE — 25000003 PHARM REV CODE 250: Performed by: ANESTHESIOLOGY

## 2019-09-16 PROCEDURE — 62323 PR INJ LUMBAR/SACRAL, W/IMAGING GUIDANCE: ICD-10-PCS | Mod: ,,, | Performed by: ANESTHESIOLOGY

## 2019-09-16 PROCEDURE — 25500020 PHARM REV CODE 255: Performed by: ANESTHESIOLOGY

## 2019-09-16 PROCEDURE — 62323 NJX INTERLAMINAR LMBR/SAC: CPT | Mod: ,,, | Performed by: ANESTHESIOLOGY

## 2019-09-16 PROCEDURE — 63600175 PHARM REV CODE 636 W HCPCS: Performed by: ANESTHESIOLOGY

## 2019-09-16 RX ORDER — SODIUM CHLORIDE 9 MG/ML
500 INJECTION, SOLUTION INTRAVENOUS CONTINUOUS
Status: DISCONTINUED | OUTPATIENT
Start: 2019-09-16 | End: 2019-09-16 | Stop reason: HOSPADM

## 2019-09-16 RX ORDER — LIDOCAINE HYDROCHLORIDE 10 MG/ML
INJECTION INFILTRATION; PERINEURAL
Status: DISCONTINUED | OUTPATIENT
Start: 2019-09-16 | End: 2019-09-16 | Stop reason: HOSPADM

## 2019-09-16 RX ORDER — LIDOCAINE HYDROCHLORIDE 5 MG/ML
INJECTION, SOLUTION INFILTRATION; INTRAVENOUS
Status: DISCONTINUED | OUTPATIENT
Start: 2019-09-16 | End: 2019-09-16 | Stop reason: HOSPADM

## 2019-09-16 RX ORDER — DEXAMETHASONE SODIUM PHOSPHATE 4 MG/ML
INJECTION, SOLUTION INTRA-ARTICULAR; INTRALESIONAL; INTRAMUSCULAR; INTRAVENOUS; SOFT TISSUE
Status: DISCONTINUED | OUTPATIENT
Start: 2019-09-16 | End: 2019-09-16 | Stop reason: HOSPADM

## 2019-09-16 RX ORDER — MIDAZOLAM HYDROCHLORIDE 1 MG/ML
INJECTION INTRAMUSCULAR; INTRAVENOUS
Status: DISCONTINUED | OUTPATIENT
Start: 2019-09-16 | End: 2019-09-16 | Stop reason: HOSPADM

## 2019-09-16 NOTE — DISCHARGE SUMMARY
Discharge Note  Short Stay      SUMMARY     Admit Date: 9/16/2019    Attending Physician: Jef García      Discharge Physician: Jef García      Discharge Date: 9/16/2019 12:08 PM    Procedure(s) (LRB):  Injection, Steroid, Epidural LUMBAR/CAUDAL L5-S1 IL KARLA (N/A)    Final Diagnosis: DDD (degenerative disc disease), lumbar [M51.36]    Disposition: Home or self care    Patient Instructions:   Current Discharge Medication List      CONTINUE these medications which have NOT CHANGED    Details   amLODIPine (NORVASC) 10 MG tablet Take 1 tablet (10 mg total) by mouth once daily.  Qty: 90 tablet, Refills: 3    Associated Diagnoses: Essential hypertension      losartan (COZAAR) 100 MG tablet Take 1 tablet (100 mg total) by mouth once daily.  Qty: 90 tablet, Refills: 3    Associated Diagnoses: Essential hypertension      aspirin (ECOTRIN) 81 MG EC tablet Take 81 mg by mouth once daily. Instructed to stop 1 week prior to surgery on 4/15/19 per Dr. Conklin      atorvastatin (LIPITOR) 40 MG tablet Take 1 tablet (40 mg total) by mouth once daily.  Qty: 90 tablet, Refills: 3    Associated Diagnoses: Dyslipidemia      blood sugar diagnostic Strp 1 strip by Misc.(Non-Drug; Combo Route) route 2 (two) times daily. One touch verio  Qty: 100 strip, Refills: 5      blood-glucose meter (ONETOUCH VERIO IQ METER) kit Use as instructed  Qty: 200 each, Refills: 11    Associated Diagnoses: Type 2 diabetes mellitus without complication      diclofenac (VOLTAREN) 50 MG EC tablet Take 50 mg by mouth 2 (two) times daily.      econazole nitrate 1 % cream Apply topically once daily.  Qty: 30 g, Refills: 2      exenatide microspheres (BYDUREON) 2 mg/0.65 mL PnIj Inject 2 mg into the skin once a week.  Qty: 4 each, Refills: 2      fluticasone (FLONASE) 50 mcg/actuation nasal spray SHAKE LIQUID AND USE 1 SPRAY(50 MCG) IN EACH NOSTRIL EVERY DAY  Qty: 16 mL, Refills: 0    Associated Diagnoses: Acute non-recurrent maxillary sinusitis     "  insulin degludec (TRESIBA FLEXTOUCH U-100) 100 unit/mL (3 mL) InPn Inject 38 Units into the skin once daily.  Qty: 13 Syringe, Refills: 0    Comments: Change to 38 units daily      ketoconazole (NIZORAL) 2 % cream Apply topically once daily.  Qty: 15 g, Refills: 2      lancets 33 gauge Misc 1 lancet by Misc.(Non-Drug; Combo Route) route 2 (two) times daily before meals.  Qty: 200 each, Refills: 11      lidocaine-prilocaine (EMLA) cream       MAGNESIUM ORAL Take 500 mg by mouth.      metFORMIN (GLUCOPHAGE-XR) 500 MG 24 hr tablet Take 2 tablets (1,000 mg total) by mouth once daily.    Associated Diagnoses: Type 2 diabetes mellitus with hyperglycemia, with long-term current use of insulin      oxybutynin (DITROPAN) 5 MG Tab Take 1 tablet (5 mg total) by mouth 3 (three) times daily.  Qty: 90 tablet, Refills: 2    Associated Diagnoses: Frequency of urination      pen needle, diabetic (BD ULTRA-FINE SHORT PEN NEEDLE) 31 gauge x 5/16" Ndle Use as directed (two) times daily.  Qty: 100 each, Refills: 11      pregabalin (LYRICA) 75 MG capsule Take 1 capsule (75 mg total) by mouth 2 (two) times daily.  Qty: 60 capsule, Refills: 2      traMADol (ULTRAM) 50 mg tablet Take 1 tablet (50 mg total) by mouth every 8 (eight) hours as needed.  Qty: 60 tablet, Refills: 0    Comments: Greater than 7 day supply is medically necessary  Associated Diagnoses: Primary osteoarthritis of both knees      vitamin D (VITAMIN D3) 1000 units Tab Take 2,000 Units by mouth once daily.    Associated Diagnoses: Vitamin D insufficiency      VOLTAREN 1 % Gel     Associated Diagnoses: Primary osteoarthritis of both knees                 Discharge Diagnosis: DDD (degenerative disc disease), lumbar [M51.36]  Condition on Discharge: Stable with no complications to procedure   Diet on Discharge: Same as before.  Activity: as per instruction sheet.  Discharge to: Home with a responsible adult.  Follow up: 2-4 weeks    Please call my office or pager at " [phone] if experienced any weakness or loss of sensation, fever > 101.5, pain uncontrolled with oral medications, persistent nausea/vomiting/or diarrhea, redness or drainage from the incisions, or any other worrisome concerns. If physician on call was not reached or could not communicate with our office for any reason please go to the nearest emergency department

## 2019-09-16 NOTE — DISCHARGE INSTRUCTIONS

## 2019-09-16 NOTE — H&P
HPI:  Ms. Paula is a 63 y/o lady with chronic low back pain who presents for the below procedure.     Patient presenting for Procedure(s) (LRB):  Injection, Steroid, Epidural LUMBAR/CAUDAL L5-S1 IL KARLA (N/A)     Patient on Anti-coagulation No    No health changes since previous encounter    Past Medical History:   Diagnosis Date    Allergy     Anemia     Anxiety     Arthritis     Breast cyst     Cataract     DR (diabetic retinopathy)     Dyslipidemia     Essential hypertension 2012    Gastroesophageal reflux disease without esophagitis 2/3/2017    GERD (gastroesophageal reflux disease)     Glaucoma     Hypertension     Obesity (BMI 30-39.9) 10/24/2013    PN (peripheral neuropathy)     Sleep apnea     Type 2 diabetes mellitus with both eyes affected by mild nonproliferative retinopathy without macular edema, with long-term current use of insulin     Type 2 diabetes mellitus with diabetic polyneuropathy, with long-term current use of insulin     Type II or unspecified type diabetes mellitus with other specified manifestations, uncontrolled      Past Surgical History:   Procedure Laterality Date    ARTHROSCOPY, KNEE Right 2014    Performed [facility] L[facility] Chapin [facility] OR 2ND FLR    BREAST CYST EXCISION Right     1980s    CARPAL TUNNEL RELEASE Right     CATARACT EXTRACTION      CATARACT EXTRACTION W/  INTRAOCULAR LENS IMPLANT Right 2017    Dr Brewster     SECTION      Injection, Steroid, Epidural LUMBAR/CAUDAL L5-S1 IL KARLA N/A 2019    Performed by Jef [facility] at [facility] PAIN MGT    INSERTION-INTRAOCULAR LENS (IOL) Right 2017    Per[facility] by Ingrid [facility] at [facility] OR    INSERTION-INTRAOCULAR LENS (IOL) Left 3/17/2016    Performed by Ingrid [facility] at [facility] OR 1ST FLR    KNEE ARTHROSCOPY  14    right    MYOMECTOMY      PHACOEMULSIFICATION-ASPIRATION-CATARACT Right 2017    Performed by Ingrid Wagner MD at [facility] OR     PHACOEMULSIFICATION-ASPIRATION-CATARACT Left 3/17/2016    Performed by [facility] at [facility] OR 1ST FLR    RELEASE, TRIGGER FINGER left thumb Left 4/15/2019    [facility] OR    TRIGGER FINGER RELEASE       Review of patient's allergies indicates:   Allergen Reactions    Keflex [cephalexin] Rash      blisters, fever    Penicillins Itching    Sulfamethoxazole-trimethoprim      Other reaction(s): blisters    Vicodin [hydrocodone-acetaminophen] Swelling    Sulfa (sulfonamide antibiotics) Rash      blisters,fever          Current Facility-Administered Medications   Medication    0.9%  NaCl infusion    dexamethasone injection    iohexol (OMNIPAQUE 300) injection    lidocaine (PF) 5 mg/ml (0.5%) injection    lidocaine HCL 10 mg/ml (1%) injection     Facility-Administered Medications Ordered in Other Encounters   Medication    0.9%  NaCl infusion    mupirocin 2 % ointment       PMHx, PSHx, Allergies, Medications reviewed in epic    ROS negative except pain complaints in HPI    OBJECTIVE:    BP (!) 144/67 (BP Location: Right arm, Patient Position: Lying)   Pulse 77   Temp 98.5 °F (36.9 °C) (Oral)   Resp 16   SpO2 96%   Breastfeeding? No     PHYSICAL EXAMINATION:    GENERAL: Well appearing, in no acute distress, alert and oriented x3.  PSYCH:  Mood and affect appropriate.  SKIN: Skin color, texture, turgor normal, no rashes or lesions which will impact the procedure.  CV: RRR with palpation of the radial artery.  PULM: No evidence of respiratory difficulty, symmetric chest rise. Clear to auscultation.  NEURO: Cranial nerves grossly intact.    Plan:    Proceed with procedure as planned Procedure(s) (LRB):  Injection, Steroid, Epidural LUMBAR/CAUDAL L5-S1 IL KARLA (N/A)    Sheldon Ortiz MD  09/16/2019

## 2019-09-16 NOTE — OP NOTE
"Patient Name: Adriana Paula  MRN: [id number]    INFORMED CONSENT: The procedure, risks, benefits and options were discussed with patient. There are no contraindications to the procedure. The patient expressed understanding and agreed to proceed. The personnel performing the procedure was discussed. I verify that I personally obtained Adriana's consent prior to the start of the procedure and the signed consent can be found on the patient's chart.    Procedure Date: 09/16/2019    Anesthesia: Topical    Pre Procedure diagnosis: DDD (degenerative disc disease), lumbar [M51.36]  1. Chronic pain syndrome    2. DDD (degenerative disc disease), lumbar    3. Chronic pain      Post-Procedure diagnosis: SAME      Moderate Sedation: None    PROCEDURE: L5-S1 INTERLAMINAR EPIDURAL STEROID INJECTION - LUMBAR      DESCRIPTION OF PROCEDURE: The patient was brought to the procedure room.  After performing time out IV access was obtained prior to the procedure. The patient was positioned prone on the fluoroscopy table. Continuous hemodynamic monitoring was initiated including blood pressure, EKG, and pulse oximetry.   The area of the spine was prepped with chlorhexidine three times and draped in a sterile fashion.  Skin anesthesia was achieved using 3 mL of lidocaine 1% over the respective injection site. The L5-S1 interspace was visualized under fluoroscopic imaging. An 18 gauge 3 1/2" Tuohy needle was slowly inserted and advanced using loss of resistance to saline with AP and lateral fluoroscopic imaging for needle guidance. Negative aspiration for blood or CSF was confirmed. Epidural contrast spread was confirmed using 2mL of Omnipaque 300 contrast spreading in the lumbar epidural space.6 mL of Lidocaine 0.5% and 10 mg decadron was injected. The needle was removed and bleeding was nil.  A sterile dressing was applied. Adriana was taken back to the recovery room for further observation.     Blood Loss: Nill  Specimen: " None      Jef García MD

## 2019-09-17 NOTE — PROGRESS NOTES
"  Physical Therapy Daily Treatment Note     Name: Adraina Patterson Chai  Clinic Number: [id number]    Therapy Diagnosis:   Encounter Diagnoses   Name Primary?    Acute right-sided low back pain with right-sided sciatica     Decreased ROM of lumbar spine     Difficulty walking      Physician: Cece Dowell, *    Visit Date: 9/18/2019       Physician Orders: PT Eval and Treat   Medical Diagnosis from Referral:   M54.41,G89.29 (ICD-10-CM) - Chronic bilateral low back pain with right-sided sciatica   M51.36 (ICD-10-CM) - DDD (degenerative disc disease), lumbar   M47.26 (ICD-10-CM) - Other spondylosis with radiculopathy, lumbar region   M54.16 (ICD-10-CM) - Lumbar radiculopathy         Evaluation Date: 8/14/2019  Authorization Period Expiration: 12/31/2019  Plan of Care Expiration: 10/9/2019    Visit #/Visits authorized: 9/ 20     Time In: 9:55  Time Out: 10:53  Total Billable Time:  25 minutes    Precautions: Standard    Subjective     Pt reports: that she had her injections on Monday and she is feeling a little sore.  However, she is able to walk better. Pain is 3/10.    Objective     Enters clinic ambulating without AD    Adriana received therapeutic exercises to develop strength, endurance, ROM, flexibility and core stabilization for 48 minutes including:    LTR x 3 minutes  Pelvic tilt 2 minutes  5"  Piriformis stretch 3 x 30"  Prone prop x 1 minute, 1 minute at end of session  Supine add squeeze with abdominal bracing 2 minutes c/ 5" holds  Supine dead bug 3 x 8  REIL x 10  SL clams 3 x 10 GTB  Reverse clamshells 2 x 10  Figure 4 stretch 2 x 30" in supine with OP to knee   Supine ab iso 2 min x 3"  Seated UE/LE flexion with neutral spine 2 x 10  Lat pulldowns seated on ball BTB 3 x 10      Adriana received the following manual therapy techniques: Joint mobilizations were applied to the: lsp for 0 minutes, including: not performed today    STM and MFR R lumbar paraspinals and proximal gluteals  Prone R hip IR/ER " passive stretch    Coldpack to low back for 10 minutes in prone position    Home Exercises Provided and Patient Education Provided     Education provided:   - reviewed HEP, corrected technique as needed    Written Home Exercises Provided: Patient instructed to cont prior HEP.  Exercises were reviewed and Adriana was able to demonstrate them prior to the end of the session.  Adriana demonstrated good  understanding of the education provided.     See EMR under Patient Instructions for exercises provided 8/16/2019.    Assessment     Patient continues to respond well to treatment. Pt required less verbal cues during therex.  Gait pattern much improved since initial session with equal stance time and pt maintaining neutral spine without AD.  Decreased pain at end of session. Continue to progress based on pt's tolerance.      Adriana is progressing well towards her goals.   Pt prognosis is Good.     Pt will continue to benefit from skilled outpatient physical therapy to address the deficits listed in the problem list box on initial evaluation, provide pt/family education and to maximize pt's level of independence in the home and community environment.     Pt's spiritual, cultural and educational needs considered and pt agreeable to plan of care and goals.     Anticipated barriers to physical therapy: none    Goals:  Short Term Goals:   1. Patient to consistently report pain at rest less than 3/10 within 3 weeks   2. Patient able to tolerate at least 15 minutes sustained standing/walking with low back pain less than 5/10 within 3 weeks to improve jackelyn to functional activity  3. Patient to report at least 50% decrease in LE symptoms within 3 weeks        Long Term Goals:  1. Patient to deny LE symptoms with all functional activity within 8 weeks in order to return patient to prior level of function  2. Patient able to tolerate at least 60 minutes sustained standing/walking with LBP less than 2/10 within 8 weeks for improved  functional mobility  3. Increase lumbar AROM to WFL all planes, pain-free, within 6 weeks for improved functional mobility  4. Patient able to perform all housework, including sweeping/vaccuuming, with low back pain less than 3/10 within 8 weeks    Plan     Plan of care Certification: 8/14/2019 to 10/9/2019.     Outpatient Physical Therapy 2 times weekly for 10 weeks to include the following interventions: Manual Therapy, Moist Heat/ Ice and Therapeutic Exercise.        Pramod Rizzo, PT,DPT  9/18/2019

## 2019-09-18 ENCOUNTER — CLINICAL SUPPORT (OUTPATIENT)
Dept: REHABILITATION | Facility: HOSPITAL | Age: 64
End: 2019-09-18
Payer: MEDICARE

## 2019-09-18 DIAGNOSIS — R26.2 DIFFICULTY WALKING: ICD-10-CM

## 2019-09-18 DIAGNOSIS — M53.86 DECREASED ROM OF LUMBAR SPINE: ICD-10-CM

## 2019-09-18 DIAGNOSIS — M54.41 ACUTE RIGHT-SIDED LOW BACK PAIN WITH RIGHT-SIDED SCIATICA: ICD-10-CM

## 2019-09-18 PROCEDURE — 97110 THERAPEUTIC EXERCISES: CPT | Mod: PO

## 2019-09-19 ENCOUNTER — PATIENT MESSAGE (OUTPATIENT)
Dept: INTERNAL MEDICINE | Facility: CLINIC | Age: 64
End: 2019-09-19

## 2019-09-23 ENCOUNTER — CLINICAL SUPPORT (OUTPATIENT)
Dept: REHABILITATION | Facility: HOSPITAL | Age: 64
End: 2019-09-23
Payer: MEDICARE

## 2019-09-23 DIAGNOSIS — R26.2 DIFFICULTY WALKING: ICD-10-CM

## 2019-09-23 DIAGNOSIS — M54.41 ACUTE RIGHT-SIDED LOW BACK PAIN WITH RIGHT-SIDED SCIATICA: ICD-10-CM

## 2019-09-23 DIAGNOSIS — M53.86 DECREASED ROM OF LUMBAR SPINE: ICD-10-CM

## 2019-09-23 PROCEDURE — G8978 MOBILITY CURRENT STATUS: HCPCS | Mod: CK,PO

## 2019-09-23 PROCEDURE — 97110 THERAPEUTIC EXERCISES: CPT | Mod: PO

## 2019-09-23 PROCEDURE — G8979 MOBILITY GOAL STATUS: HCPCS | Mod: CK,PO

## 2019-09-23 NOTE — PROGRESS NOTES
"  Physical Therapy Daily Treatment Note     Name: Adriana Paula  Clinic Number: [id number]    Therapy Diagnosis:   Encounter Diagnoses   Name Primary?    Acute right-sided low back pain with right-sided sciatica     Decreased ROM of lumbar spine     Difficulty walking      Physician: Cece Dowell, *    Visit Date: 9/23/2019       Physician Orders: PT Eval and Treat   Medical Diagnosis from Referral:   M54.41,G89.29 (ICD-10-CM) - Chronic bilateral low back pain with right-sided sciatica   M51.36 (ICD-10-CM) - DDD (degenerative disc disease), lumbar   M47.26 (ICD-10-CM) - Other spondylosis with radiculopathy, lumbar region   M54.16 (ICD-10-CM) - Lumbar radiculopathy         Evaluation Date: 8/14/2019  Authorization Period Expiration: 12/31/2019  Plan of Care Expiration: 10/9/2019    Visit #/Visits authorized: 10/ 20     Time In: 10:00  Time Out: 11:00  Total Billable Time:  25 minutes    Precautions: Standard    Subjective     Pt reports: that she is feeling much better and will be beginning water aerobics next week.    Objective     Enters clinic ambulating without AD    Adriana received therapeutic exercises to develop strength, endurance, ROM, flexibility and core stabilization for 50 minutes including:    LTR x 3 minutes-  Pelvic tilt 2 minutes  5"-  Piriformis stretch 3 x 30"-  Prone prop x 1 minute, 1 minute at end of session  Supine add squeeze with abdominal bracing 2 minutes c/ 5" holds-  Supine dead bug 3 x 8-  REIL x 10  SL clams 3 x 10 GTB  Reverse clamshells 2 x 10  Figure 4 stretch 2 x 30" in supine with OP to knee   Supine ab iso 2 min x 3"  Seated UE/LE flexion with neutral spine 2 x 10  Lat pulldowns seated on ball BTB 3 x 10  Seated marches on ball x 20  Standing ab iso contraction c/ swiss ball 20x3"    Coldpack to low back for 10 minutes in prone position    Home Exercises Provided and Patient Education Provided     Education provided:   - reviewed HEP, corrected technique as " needed    Written Home Exercises Provided: Patient instructed to cont prior HEP.  Exercises were reviewed and Adriana was able to demonstrate them prior to the end of the session.  Adriana demonstrated good  understanding of the education provided.     See EMR under Patient Instructions for exercises provided 8/16/2019.    Assessment     Patient continues to respond well to treatment. Pt required less verbal cues during therex.  Gait pattern much improved since initial session with equal stance time and pt maintaining neutral spine without AD.  Decreased pain at end of session. Continue to progress based on pt's tolerance.  PT and pt discussed possible discharge at next session.    Adriana is progressing well towards her goals.   Pt prognosis is Good.     Pt will continue to benefit from skilled outpatient physical therapy to address the deficits listed in the problem list box on initial evaluation, provide pt/family education and to maximize pt's level of independence in the home and community environment.     Pt's spiritual, cultural and educational needs considered and pt agreeable to plan of care and goals.     Anticipated barriers to physical therapy: none    Goals:  Short Term Goals:   1. Patient to consistently report pain at rest less than 3/10 within 3 weeks   2. Patient able to tolerate at least 15 minutes sustained standing/walking with low back pain less than 5/10 within 3 weeks to improve jackelyn to functional activity  3. Patient to report at least 50% decrease in LE symptoms within 3 weeks        Long Term Goals:  1. Patient to deny LE symptoms with all functional activity within 8 weeks in order to return patient to prior level of function  2. Patient able to tolerate at least 60 minutes sustained standing/walking with LBP less than 2/10 within 8 weeks for improved functional mobility  3. Increase lumbar AROM to WFL all planes, pain-free, within 6 weeks for improved functional mobility  4. Patient able  to perform all housework, including sweeping/vaccuuming, with low back pain less than 3/10 within 8 weeks    FOTO score on 9/23/19: 48% limitation    Plan     Possible D/C at next session    Plan of care Certification: 8/14/2019 to 10/9/2019.     Outpatient Physical Therapy 2 times weekly for 10 weeks to include the following interventions: Manual Therapy, Moist Heat/ Ice and Therapeutic Exercise.        Pramod Rizzo, PT,DPT  9/23/2019

## 2019-09-24 NOTE — PROGRESS NOTES
"  Physical Therapy Daily Treatment Note/ Discharge     Name: Adriana Paula  Clinic Number: [id number]    Therapy Diagnosis:   Encounter Diagnoses   Name Primary?    Acute right-sided low back pain with right-sided sciatica     Decreased ROM of lumbar spine     Difficulty walking      Physician: Cece Dowell, *    Visit Date: 9/25/2019       Physician Orders: PT Eval and Treat   Medical Diagnosis from Referral:   M54.41,G89.29 (ICD-10-CM) - Chronic bilateral low back pain with right-sided sciatica   M51.36 (ICD-10-CM) - DDD (degenerative disc disease), lumbar   M47.26 (ICD-10-CM) - Other spondylosis with radiculopathy, lumbar region   M54.16 (ICD-10-CM) - Lumbar radiculopathy         Evaluation Date: 8/14/2019  Authorization Period Expiration: 12/31/2019  Plan of Care Expiration: 10/9/2019    Visit #/Visits authorized: 11/ 20     Time In: 10:05 (pt arrived late)  Time Out: 10:35  Total Billable Time:  25 minutes    Precautions: Standard    Subjective     Pt reports: that she is feeling much better and will be beginning water aerobics next week. Pt reports pain is 4/10.  She feels she can continue c/ exercises at home.    Objective     Observation/Posture: Enters clinic ambulating with SPC on R, demo weight shift to L LE with static stand     Lumbar AROM:     Percent   Flexion 100      Extension 75   Left Side Bending 75   Right Side Bending 75   Left rotation 75   Right Rotation 75      LOWER EXTREMITY STRENGTH:    RIGHT LEFT   Quadriceps 5/5 5/5   Hamstrings 5/5 5/5      Hip Flexion 4+/5 5/5   Hip Abduction 4+/5 4+/5   Hip IR 4+/5 4+/5   Hip ER 4+/5 4+/5   Hip Ext 3+/5 3+/5         Adriana received therapeutic exercises and assessment to develop strength, endurance, ROM, flexibility and core stabilization for 30 minutes including:    LTR x 3 minutes  Pelvic tilt 2 minutes  5"  Piriformis stretch 3 x 30"  Prone prop x 1 minute, 1 minute at end of session  Supine add squeeze with abdominal bracing 2 " "minutes c/ 5" holds-    Coldpack to low back for 10 minutes in prone position    Home Exercises Provided and Patient Education Provided     Education provided:   - reviewed HEP, corrected technique as needed    Written Home Exercises Provided: Patient instructed to cont prior HEP.  Exercises were reviewed and Adriana was able to demonstrate them prior to the end of the session.  Adriana demonstrated good  understanding of the education provided.     See EMR under Patient Instructions for exercises provided 8/16/2019.    Assessment     Patient has met all short term goals and is making progress c/ all long term goals.  Pt reports that her symptoms have improved by at least 50% since beginning therapy.  Pt would like to be discharged today and will continue c/ HEP and aquatic exercises.  PT encouraged pt to contact PT c/ any questions, concerns, or an increase in familiar symptoms.    Goals:  Short Term Goals:   1. Patient to consistently report pain at rest less than 3/10 within 3 weeks  (Met 9/25/19)  2. Patient able to tolerate at least 15 minutes sustained standing/walking with low back pain less than 5/10 within 3 weeks to improve jackelyn to functional activity (Met 9/25/19)  3. Patient to report at least 50% decrease in LE symptoms within 3 weeks (Met 9/25/19)        Long Term Goals:  1. Patient to deny LE symptoms with all functional activity within 8 weeks in order to return patient to prior level of function (In progress)  2. Patient able to tolerate at least 60 minutes sustained standing/walking with LBP less than 2/10 within 8 weeks for improved functional mobility (In progress)  3. Increase lumbar AROM to WFL all planes, pain-free, within 6 weeks for improved functional mobility (In progress)  4. Patient able to perform all housework, including sweeping/vaccuuming, with low back pain less than 3/10 within 8 weeks (In progress)    FOTO score on 9/23/19: 48% limitation    Plan     Discharge from outpatient PT c/ " HEP    Pramod Rizzo, PT,DPT  9/25/2019

## 2019-09-25 ENCOUNTER — CLINICAL SUPPORT (OUTPATIENT)
Dept: REHABILITATION | Facility: HOSPITAL | Age: 64
End: 2019-09-25
Payer: MEDICARE

## 2019-09-25 ENCOUNTER — TELEPHONE (OUTPATIENT)
Dept: INTERNAL MEDICINE | Facility: CLINIC | Age: 64
End: 2019-09-25

## 2019-09-25 DIAGNOSIS — M53.86 DECREASED ROM OF LUMBAR SPINE: ICD-10-CM

## 2019-09-25 DIAGNOSIS — M54.41 ACUTE RIGHT-SIDED LOW BACK PAIN WITH RIGHT-SIDED SCIATICA: ICD-10-CM

## 2019-09-25 DIAGNOSIS — R26.2 DIFFICULTY WALKING: ICD-10-CM

## 2019-09-25 PROCEDURE — 97110 THERAPEUTIC EXERCISES: CPT | Mod: PO

## 2019-09-25 NOTE — TELEPHONE ENCOUNTER
Portal message not yet read by pt.  Called her for update.    Pt states her BS has been higher since she's started the steroid injections.  Average 115/120 in the morning.  This morning was 196    Started Lyrica a week ago.    Has been very constipated.  Had her first bowel movement in a week, today.    She's not feeling well.  On her way to PT now.  Said she'll send an email later.      Ms. Paula,     Have the blood sugars been under control?     I noticed that your blood sugar was checked before the epidural steroid injection and it was 85. That is wonderful!     Do you feel ok with the medications?     MD Judi

## 2019-09-26 ENCOUNTER — PATIENT MESSAGE (OUTPATIENT)
Dept: INTERNAL MEDICINE | Facility: CLINIC | Age: 64
End: 2019-09-26

## 2019-09-27 NOTE — PROGRESS NOTES
"Physical Therapy Daily Treatment Note     Name: Adriana Paula  Clinic Number: [id number]    Therapy Diagnosis:   Encounter Diagnoses   Name Primary?    Acute right-sided low back pain with right-sided sciatica     Decreased ROM of lumbar spine     Difficulty walking      Physician: Cece Dowell, *    Visit Date: 9/30/2019  Physician Orders: PT Eval and Treat   Medical Diagnosis from Referral:   M54.41,G89.29 (ICD-10-CM) - Chronic bilateral low back pain with right-sided sciatica   M51.36 (ICD-10-CM) - DDD (degenerative disc disease), lumbar   M47.26 (ICD-10-CM) - Other spondylosis with radiculopathy, lumbar region   M54.16 (ICD-10-CM) - Lumbar radiculopathy         Evaluation Date: 8/14/2019; reassess 9/30/2019  Authorization Period Expiration: 12/31/2019  Plan of Care Expiration: 12/23/2019  Visit # / Visits authorized: 1/ 20  Total visits: 12 land; 1 aquatic    Time In: 1300  Time Out: 1400  Total Billable Time: 30 minutes    Precautions: HTN, DM, peripheral neuropathy    PROCEDURES/IMAGING:  Subjective     Pt reports: "This right side is always hurting"  She was compliant with home exercise program.  Response to previous treatment: 1st pool treatment  Functional change: none, walks into clinic    Pain: 5/10  Location: right back  & leg    Objective     Adriana received aquatic therapeutic exercises to develop strength, endurance, ROM, flexibility, posture and core stabilization for 60 minutes including:    WARMUP x 2 laps each  Walk fwd/back/side    STRETCHES 2 x 20sec  Hamstring  Piriformis  Gastroc    LE EX x 20  Mini squat  Heel raise with GS  Hip abd/add  Hip flex/ext      UE EX/CORE  x 20  Shoulder flex/ext TA activation APO  Shoulder horizontal abd/add TA activation APO  Shoulder abd/add TA activation APO  Lower truncal rotation // bars     ENDURANCE  Bicycle // bars x 3 min    COOL DOWN x 1 lap each  Walk fwd/back/side    Reassessment 9/30/2019  TUG:15 sec B hands on chair armrest  FTSTS: 23 " sec B hands on chair armrest    Strength:  Hip Left Right- pain##   Flexion 4-/5 3+/5   Abduction 4-/5 3+/5   Adduction 4-/5 3+/5   Extension 4-/5 3+/5   External Rot 4-/5 4-/5   Internal Rot 4-/5 4-/5     Knee Left Right   Extension 4-/5 4-/5   Flexion 4-/5 4-/5     Ankle Left Right   Dorsiflexion 4/5 4/5   Plantarflexion 4/5 4/5       Home Exercises Provided and Patient Education Provided     Education provided:   Role of aquatic therapy  Hydration post therapy      Written Home Exercises Provided: Patient instructed to cont prior HEP.  Exercises were reviewed and Adriana was able to demonstrate them prior to the end of the session.  Adriana demonstrated good  understanding of the education provided.     See EMR under Patient Instructions for exercises provided prior visit.    Assessment     Patient required frequent verbal cues  for proper technique & core stabilization. Patient did well post instruction. Patient will benefit from aquatic environment to unload  spine for strengthening, core stabilization & postural awareness. New goals set.      Adriana is progressing well towards her goals.   Pt prognosis is Fair.     Pt will continue to benefit from skilled aquatic outpatient physical therapy to address the deficits listed in the problem list box on initial evaluation, provide pt/family education and to maximize pt's level of independence in the home and community environment.     Pt's spiritual, cultural and educational needs considered and pt agreeable to plan of care and goals.    Anticipated barriers to physical therapy: none    Goals:   Short Term Goals: 6 weeks  1. Patient will be independent with HEP & progressions (progressing- not met)  2. Patient will achieve TUG score of 13 to demonstrate improved mobility (progressing- not met)     Long Term Goals: 12 weeks  1. Patient will achieve FOTO limitations score of 46%  To demonstrate improved function & decreased pain (progressing- not met)  2. Patient  will achieve FTSTS score of 18 or less to demonstrate improved transfers & endurance (progressing- not met)  3. Patient will increase R hip MMT to 4-/5 to demonstrate improved strength (progressing- not met)      Plan     Increase reps & exercises slowly to prevent pain    Outpatient PT 1-2x/wk for 12 weeks to include manual therapy, patient education, therapeutic exercise, aquatic therapy. Patient may be seen by PTA as part of the rehabilitation team.    Delaney Mace, PT

## 2019-09-30 ENCOUNTER — CLINICAL SUPPORT (OUTPATIENT)
Dept: REHABILITATION | Facility: HOSPITAL | Age: 64
End: 2019-09-30
Payer: MEDICARE

## 2019-09-30 DIAGNOSIS — R26.2 DIFFICULTY WALKING: ICD-10-CM

## 2019-09-30 DIAGNOSIS — M54.41 ACUTE RIGHT-SIDED LOW BACK PAIN WITH RIGHT-SIDED SCIATICA: ICD-10-CM

## 2019-09-30 DIAGNOSIS — M53.86 DECREASED ROM OF LUMBAR SPINE: ICD-10-CM

## 2019-09-30 PROCEDURE — 97113 AQUATIC THERAPY/EXERCISES: CPT

## 2019-09-30 NOTE — PLAN OF CARE
"  Outpatient Therapy Updated Plan of Care     Visit Date: 9/30/2019  Name: Adriana Paula  Clinic Number: [id number]    Therapy Diagnosis:   Encounter Diagnoses   Name Primary?    Acute right-sided low back pain with right-sided sciatica     Decreased ROM of lumbar spine     Difficulty walking      Physician: Cece Dowell, *    Physician Orders: eval & treat  Medical Diagnosis: LBP with R sciatica  Evaluation Date: 8/14/2019    Total Visits Received: 13  Cancelled Visits: none  No Show Visits: none    Current Certification Period:  8/4/2019 to 10/2/2019  Precautions:  none  Visits from Evaluation Date:  13  Functional Level Prior to Evaluation:  I with mobility but distance limited 2/2 pain & fear of falling    Subjective     Update: "I'm ready for this"    Objective     Update: TUG:15 sec B hands on chair armrest  FTSTS: 23 sec B hands on chair armrest  R hip strength 3+/5 2/2 pain with resistance    Assessment     Update: Patient has transferred to aquatic therapy for continued strengthening    Previous Short Term Goals Status:   Progressing- not met  New Short Term Goals Status:   none  Long Term Goal Status:  Progressing - not met  Reasons for Recertification of Therapy:   strengthening of LEs & core in the aquatic environment    Plan     Updated Certification Period: 9/30/2019 to 12/23/2019  Recommended Treatment Plan: 1-2 times per week for 12 weeks: Aquatic Therapy, Manual Therapy, Patient Education and Therapeutic Exercise  Other Recommendations: none    Delaney Mace, PT  9/30/2019      "

## 2019-10-03 NOTE — PROGRESS NOTES
Physical Therapy Daily Treatment Note     Name: Adriana Paula  Clinic Number: [id number]    Therapy Diagnosis:   Encounter Diagnoses   Name Primary?    Acute right-sided low back pain with right-sided sciatica     Decreased ROM of lumbar spine     Difficulty walking      Physician: Cece Dowell, *    Visit Date: 10/4/2019  Physician Orders: PT Eval and Treat   Medical Diagnosis from Referral:   M54.41,G89.29 (ICD-10-CM) - Chronic bilateral low back pain with right-sided sciatica   M51.36 (ICD-10-CM) - DDD (degenerative disc disease), lumbar   M47.26 (ICD-10-CM) - Other spondylosis with radiculopathy, lumbar region   M54.16 (ICD-10-CM) - Lumbar radiculopathy         Evaluation Date: 8/14/2019; reassess 9/30/2019  Authorization Period Expiration: 12/31/2019  Plan of Care Expiration: 12/23/2019  Visit # / Visits authorized: 2/ 20  Total visits: 12 land; 2 aquatic    Time In: 1300  Time Out: 1400  Total Billable Time: 60 minutes    Precautions: HTN, DM, peripheral neuropathy    PROCEDURES/IMAGING:  Subjective     Pt reports: It is not as bad today  She was compliant with home exercise program.  Response to previous treatment: Muscle soreness x 1 day  Functional change: none, walks into clinic    Pain: 4/10  Location: right back  & leg    Objective     Adriana received aquatic therapeutic exercises to develop strength, endurance, ROM, flexibility, posture and core stabilization for 60 minutes including:    WARMUP x 2 laps each  Walk fwd/back/side    STRETCHES 2 x 20sec  Hamstring  Piriformis  Gastroc    LE EX x 20  Mini squat  Heel raise with GS  Hip abd/add  Hip flex/ext  Lunge forward    UE EX/CORE  x 20  Shoulder flex/ext TA activation APO modified tandem  Shoulder horizontal abd/add TA activation APO modified tandem  Shoulder abd/add TA activation APO modified tandem  Lower truncal rotation // bars     ENDURANCE  Bicycle // bars x 4 min    COOL DOWN x 1 lap each  Walk fwd/back/side      Home Exercises  Provided and Patient Education Provided     Education provided:   Role of aquatic therapy  Hydration post therapy      Written Home Exercises Provided: Patient instructed to cont prior HEP.  Exercises were reviewed and Adriana was able to demonstrate them prior to the end of the session.  Adriana demonstrated good  understanding of the education provided.     See EMR under Patient Instructions for exercises provided prior visit.    Assessment     Patient required frequent verbal cues  for proper technique & core stabilization especially with paddle exercises. Patient did well post instruction. Patient was able to tolerate core challenge of modified tandem stance with paddle exercises with only minimal c/o increased back pain. Patient will benefit from aquatic environment to unload  spine for strengthening, core stabilization & postural awareness. New goals set.      Adriana is progressing well towards her goals.   Pt prognosis is Fair.     Pt will continue to benefit from skilled aquatic outpatient physical therapy to address the deficits listed in the problem list box on initial evaluation, provide pt/family education and to maximize pt's level of independence in the home and community environment.     Pt's spiritual, cultural and educational needs considered and pt agreeable to plan of care and goals.    Anticipated barriers to physical therapy: none    Goals:   Short Term Goals: 6 weeks  1. Patient will be independent with HEP & progressions (progressing- not met)  2. Patient will achieve TUG score of 13 to demonstrate improved mobility (progressing- not met)     Long Term Goals: 12 weeks  1. Patient will achieve FOTO limitations score of 46%  To demonstrate improved function & decreased pain (progressing- not met)  2. Patient will achieve FTSTS score of 18 or less to demonstrate improved transfers & endurance (progressing- not met)  3. Patient will increase R hip MMT to 4-/5 to demonstrate improved strength  (progressing- not met)      Plan     Increase reps & exercises slowly to prevent pain    Outpatient PT 1-2x/wk for 12 weeks to include manual therapy, patient education, therapeutic exercise, aquatic therapy. Patient may be seen by PTA as part of the rehabilitation team.    Delaney Mace, PT

## 2019-10-04 ENCOUNTER — CLINICAL SUPPORT (OUTPATIENT)
Dept: REHABILITATION | Facility: HOSPITAL | Age: 64
End: 2019-10-04
Payer: MEDICARE

## 2019-10-04 DIAGNOSIS — R26.2 DIFFICULTY WALKING: ICD-10-CM

## 2019-10-04 DIAGNOSIS — M54.41 ACUTE RIGHT-SIDED LOW BACK PAIN WITH RIGHT-SIDED SCIATICA: ICD-10-CM

## 2019-10-04 DIAGNOSIS — M53.86 DECREASED ROM OF LUMBAR SPINE: ICD-10-CM

## 2019-10-04 PROCEDURE — 97113 AQUATIC THERAPY/EXERCISES: CPT

## 2019-10-07 ENCOUNTER — OFFICE VISIT (OUTPATIENT)
Dept: INTERNAL MEDICINE | Facility: CLINIC | Age: 64
End: 2019-10-07
Payer: MEDICARE

## 2019-10-07 VITALS
OXYGEN SATURATION: 96 % | SYSTOLIC BLOOD PRESSURE: 114 MMHG | BODY MASS INDEX: 36.03 KG/M2 | HEART RATE: 90 BPM | RESPIRATION RATE: 15 BRPM | WEIGHT: 216.5 LBS | DIASTOLIC BLOOD PRESSURE: 58 MMHG | TEMPERATURE: 99 F

## 2019-10-07 DIAGNOSIS — Z79.4 TYPE 2 DIABETES MELLITUS WITH DIABETIC POLYNEUROPATHY, WITH LONG-TERM CURRENT USE OF INSULIN: Chronic | ICD-10-CM

## 2019-10-07 DIAGNOSIS — M17.0 PRIMARY OSTEOARTHRITIS OF BOTH KNEES: ICD-10-CM

## 2019-10-07 DIAGNOSIS — R09.89 BRUIT: ICD-10-CM

## 2019-10-07 DIAGNOSIS — E11.59 HYPERTENSION ASSOCIATED WITH DIABETES: Primary | ICD-10-CM

## 2019-10-07 DIAGNOSIS — R93.89 ABNORMAL CAROTID ULTRASOUND: ICD-10-CM

## 2019-10-07 DIAGNOSIS — I15.2 HYPERTENSION ASSOCIATED WITH DIABETES: Primary | ICD-10-CM

## 2019-10-07 DIAGNOSIS — M54.41 ACUTE RIGHT-SIDED LOW BACK PAIN WITH RIGHT-SIDED SCIATICA: ICD-10-CM

## 2019-10-07 DIAGNOSIS — E11.42 TYPE 2 DIABETES MELLITUS WITH DIABETIC POLYNEUROPATHY, WITH LONG-TERM CURRENT USE OF INSULIN: Chronic | ICD-10-CM

## 2019-10-07 DIAGNOSIS — R93.0 ABNORMAL FINDINGS ON DIAGNOSTIC IMAGING OF SKULL AND HEAD, NOT ELSEWHERE CLASSIFIED: ICD-10-CM

## 2019-10-07 PROCEDURE — 99214 OFFICE O/P EST MOD 30 MIN: CPT | Mod: PBBFAC,PO,25 | Performed by: INTERNAL MEDICINE

## 2019-10-07 PROCEDURE — 99999 PR PBB SHADOW E&M-EST. PATIENT-LVL IV: CPT | Mod: PBBFAC,,, | Performed by: INTERNAL MEDICINE

## 2019-10-07 PROCEDURE — 99214 OFFICE O/P EST MOD 30 MIN: CPT | Mod: S$PBB,,, | Performed by: INTERNAL MEDICINE

## 2019-10-07 PROCEDURE — 99214 PR OFFICE/OUTPT VISIT, EST, LEVL IV, 30-39 MIN: ICD-10-PCS | Mod: S$PBB,,, | Performed by: INTERNAL MEDICINE

## 2019-10-07 PROCEDURE — 90686 IIV4 VACC NO PRSV 0.5 ML IM: CPT | Mod: PBBFAC,PO

## 2019-10-07 PROCEDURE — 99999 PR PBB SHADOW E&M-EST. PATIENT-LVL IV: ICD-10-PCS | Mod: PBBFAC,,, | Performed by: INTERNAL MEDICINE

## 2019-10-07 RX ORDER — INSULIN DEGLUDEC 100 U/ML
14 INJECTION, SOLUTION SUBCUTANEOUS DAILY
Qty: 13 SYRINGE | Refills: 0
Start: 2019-10-07 | End: 2019-10-16 | Stop reason: SDUPTHER

## 2019-10-07 RX ORDER — METFORMIN HYDROCHLORIDE 500 MG/1
TABLET, EXTENDED RELEASE ORAL
Start: 2019-10-07 | End: 2019-11-08

## 2019-10-07 RX ORDER — AMOXICILLIN 250 MG
1 CAPSULE ORAL DAILY
Qty: 90 TABLET | Refills: 0 | Status: SHIPPED | OUTPATIENT
Start: 2019-10-07 | End: 2020-07-01

## 2019-10-07 RX ORDER — DICLOFENAC SODIUM 10 MG/G
GEL TOPICAL 3 TIMES DAILY
Qty: 100 G | Refills: 2 | Status: SHIPPED | OUTPATIENT
Start: 2019-10-07 | End: 2020-01-21

## 2019-10-07 RX ORDER — TRAMADOL HYDROCHLORIDE 50 MG/1
50 TABLET ORAL EVERY 12 HOURS PRN
Qty: 60 TABLET | Refills: 0 | Status: SHIPPED | OUTPATIENT
Start: 2019-10-07 | End: 2020-01-21 | Stop reason: SDUPTHER

## 2019-10-07 NOTE — PROGRESS NOTES
Subjective:       Patient ID: Adriana Paula is a 64 y.o. female who presents for Diabetes; Hypertension; and Back Pain      Diabetes   She presents for her follow-up diabetic visit. She has type 2 diabetes mellitus. Her disease course has been improving. Hypoglycemia symptoms include dizziness (mild) and headaches (with light sensitivity). Pertinent negatives for hypoglycemia include no confusion, nervousness/anxiousness, sweats or tremors. Pertinent negatives for diabetes include no chest pain, no polydipsia, no polyuria and no weakness. There are no hypoglycemic complications. Symptoms are stable. Diabetic complications include peripheral neuropathy. Pertinent negatives for diabetic complications include no CVA. Risk factors for coronary artery disease include hypertension, diabetes mellitus and sedentary lifestyle. Current diabetic treatment includes insulin injections and oral agent (dual therapy). She is compliant with treatment most of the time. Her weight is stable. She is following a generally healthy diet. She participates in exercise intermittently. There is no change in her home blood glucose trend. Her breakfast blood glucose range is generally 130-140 mg/dl.   Hypertension   This is a chronic problem. The current episode started more than 1 year ago. The problem is unchanged. The problem is controlled. Associated symptoms include headaches (with light sensitivity) and neck pain (right-sided). Pertinent negatives include no chest pain, palpitations, peripheral edema, shortness of breath or sweats. There are no associated agents to hypertension. Risk factors for coronary artery disease include diabetes mellitus, dyslipidemia, family history and sedentary lifestyle. Past treatments include angiotensin blockers and calcium channel blockers. The current treatment provides moderate improvement. There are no compliance problems.  There is no history of CVA. There is no history of a hypertension causing  med.   Low-back Pain   This is a chronic problem. The current episode started more than 1 month ago. The problem occurs constantly. The problem has been gradually improving. Associated symptoms include arthralgias, headaches (with light sensitivity) and neck pain (right-sided). Pertinent negatives include no abdominal pain, chest pain, chills, congestion, coughing, diaphoresis, fever, joint swelling, rash, urinary symptoms, vomiting or weakness. The symptoms are aggravated by standing and walking. She has tried NSAIDs (s/p 2 KARLA's) for the symptoms.      BG has been 110-130 in AM fasting,  stopped bydureon 2 weeks ago. Reports ~50% improvement in back pain since completed the injections and PT.      Review of Systems   Constitutional: Positive for activity change and unexpected weight change. Negative for chills, diaphoresis and fever.   HENT: Negative for congestion, hearing loss, rhinorrhea and trouble swallowing.         Reports dry mouth   Eyes: Negative for discharge and visual disturbance.   Respiratory: Negative for cough, chest tightness, shortness of breath and wheezing.    Cardiovascular: Negative for chest pain and palpitations.   Gastrointestinal: Positive for constipation (due to Lyrica, minimal improvement after starting Colace). Negative for abdominal pain, blood in stool, diarrhea and vomiting.   Endocrine: Negative for polydipsia and polyuria.   Genitourinary: Negative for difficulty urinating, dysuria, frequency, hematuria and menstrual problem.   Musculoskeletal: Positive for arthralgias, back pain (reports improvement with Lyricaq but stopped due to the constipations) and neck pain (right-sided). Negative for joint swelling.   Skin: Negative for rash.   Neurological: Positive for dizziness (mild) and headaches (with light sensitivity). Negative for tremors and weakness.   Psychiatric/Behavioral: Positive for dysphoric mood. Negative for confusion. The patient is not nervous/anxious.          Reports feeling mildly depressed due to continued problems with low back pain         Answers for HPI/ROS submitted by the patient on 10/4/2019   activity change: Yes  unexpected weight change: Yes  neck pain: Yes  hearing loss: No  rhinorrhea: No  trouble swallowing: No  eye discharge: No  visual disturbance: No  chest tightness: No  wheezing: No  chest pain: No  palpitations: No  blood in stool: No  constipation: Yes  vomiting: No  diarrhea: No  polydipsia: No  polyuria: No  difficulty urinating: No  hematuria: No  menstrual problem: No  dysuria: No  joint swelling: No  arthralgias: Yes  headaches: Yes  weakness: No  confusion: No  dysphoric mood: Yes        Objective:      Physical Exam   Constitutional: She is oriented to person, place, and time. Vital signs are normal. She appears well-developed and well-nourished. No distress.   HENT:   Head: Normocephalic and atraumatic.   Right Ear: Hearing and external ear normal.   Left Ear: Hearing and external ear normal.   Nose: Nose normal.   Mouth/Throat: Uvula is midline and mucous membranes are normal.   Eyes: Lids are normal.   Neck: Full passive range of motion without pain. Carotid bruit is present (faint, bilateral).   Cardiovascular: Normal rate, regular rhythm, normal heart sounds and intact distal pulses.   No murmur heard.  Pulmonary/Chest: Effort normal and breath sounds normal. She has no wheezes.   Abdominal: Soft. Bowel sounds are normal. She exhibits no distension. There is no tenderness.   Musculoskeletal: Normal range of motion. She exhibits no edema.   Neurological: She is alert and oriented to person, place, and time.   Skin: Skin is warm, dry and intact. No rash noted. She is not diaphoretic.   Psychiatric: She has a normal mood and affect.   Vitals reviewed.      Assessment/Plan:       1. Hypertension associated with diabetes  - BP is controlled, continue amlodipine, losartan  - Comprehensive metabolic panel; Future    2. Type 2 diabetes mellitus  with diabetic polyneuropathy, with long-term current use of insulin  - patient stopped bydureon, will increase metformin  - monitor BP closely  - insulin degludec (TRESIBA FLEXTOUCH U-100) 100 unit/mL (3 mL) InPn; Inject 14 Units into the skin once daily.  Dispense: 13 Syringe; Refill: 0  - metFORMIN (GLUCOPHAGE-XR) 500 MG 24 hr tablet; Take 1000mg at lunch and 500mg at dinner  - Hemoglobin A1c; Future    3. Primary osteoarthritis of both knees  - diclofenac sodium (VOLTAREN) 1 % Gel; Apply topically 3 (three) times daily.  Dispense: 100 g; Refill: 2  - traMADol (ULTRAM) 50 mg tablet; Take 1 tablet (50 mg total) by mouth every 12 (twelve) hours as needed. M54.40 Greated than 7 day supply is medically necessary  Dispense: 60 tablet; Refill: 0    4. Acute right-sided low back pain with right-sided sciatica  - improving, continue tramadol, signed pain contract, continue PT    5. Bruit/ Abnormal carotid ultrasound  - MRA Neck without contrast; Future    RTC in 3 months or sooner if needed    Nicole Márquez MD

## 2019-10-08 ENCOUNTER — CLINICAL SUPPORT (OUTPATIENT)
Dept: REHABILITATION | Facility: HOSPITAL | Age: 64
End: 2019-10-08
Payer: MEDICARE

## 2019-10-08 DIAGNOSIS — M53.86 DECREASED ROM OF LUMBAR SPINE: ICD-10-CM

## 2019-10-08 DIAGNOSIS — M54.41 ACUTE RIGHT-SIDED LOW BACK PAIN WITH RIGHT-SIDED SCIATICA: ICD-10-CM

## 2019-10-08 DIAGNOSIS — R26.2 DIFFICULTY WALKING: ICD-10-CM

## 2019-10-08 PROCEDURE — 97113 AQUATIC THERAPY/EXERCISES: CPT

## 2019-10-08 NOTE — PROGRESS NOTES
Physical Therapy Daily Treatment Note     Name: Adriana Paula  Clinic Number: [id number]    Therapy Diagnosis:   Encounter Diagnoses   Name Primary?    Acute right-sided low back pain with right-sided sciatica     Decreased ROM of lumbar spine     Difficulty walking      Physician: Cece Dowell, *    Visit Date: 10/8/2019  Physician Orders: PT Eval and Treat   Medical Diagnosis from Referral:   M54.41,G89.29 (ICD-10-CM) - Chronic bilateral low back pain with right-sided sciatica   M51.36 (ICD-10-CM) - DDD (degenerative disc disease), lumbar   M47.26 (ICD-10-CM) - Other spondylosis with radiculopathy, lumbar region   M54.16 (ICD-10-CM) - Lumbar radiculopathy         Evaluation Date: 8/14/2019; reassess 9/30/2019  Authorization Period Expiration: 12/31/2019  Plan of Care Expiration: 12/23/2019  Visit # / Visits authorized: 3/ 20  Total visits: 12 land; 3 aquatic    Time In: 1110  Time Out: 1200  Total Billable Time: 45 minutes    Precautions: HTN, DM, peripheral neuropathy    PROCEDURES/IMAGING:  Subjective     Pt reports: I have a lot of stress - my mom is in hospice  She was compliant with home exercise program.  Response to previous treatment: Muscle soreness x 1 day  Functional change: none, walks into clinic    Pain: 5/10  Location: right back  & leg    Objective     Adriana received aquatic therapeutic exercises to develop strength, endurance, ROM, flexibility, posture and core stabilization for 50  minutes including:    WARMUP x 2 laps each  Walk fwd/back/side    STRETCHES 2 x 20sec  Hamstring  Piriformis  Gastroc    LE EX x 20  Mini squat  Heel raise with GS  Hip abd/add  Hip flex/ext  Lunge forward  Lunge lateral x 10 each LE    UE EX/CORE  x 20  Shoulder flex/ext TA activation APO modified tandem-np  Shoulder horizontal abd/add TA activation APO modified tandem-np  Shoulder abd/add TA activation APO modified tandem-np  Lower truncal rotation // bars     ENDURANCE  Bicycle // bars x 4 min    COOL  DOWN x 1 lap each  Walk fwd/back/side      Home Exercises Provided and Patient Education Provided     Education provided:   Role of aquatic therapy  Hydration post therapy      Written Home Exercises Provided: Patient instructed to cont prior HEP.  Exercises were reviewed and Adriana was able to demonstrate them prior to the end of the session.  Adriana demonstrated good  understanding of the education provided.     See EMR under Patient Instructions for exercises provided prior visit.    Assessment     Patient required frequent verbal cues  for proper technique & core stabilization especially with paddle exercises. Patient did well post instruction. Patient was able to tolerate addition of lateral lunges with minimal  c/o increased back pain.Patient was unable to complete entire program 2/2 late arrival Patient will benefit from aquatic environment to unload  spine for strengthening, core stabilization & postural awareness. New goals set.      Adriana is progressing well towards her goals.   Pt prognosis is Fair.     Pt will continue to benefit from skilled aquatic outpatient physical therapy to address the deficits listed in the problem list box on initial evaluation, provide pt/family education and to maximize pt's level of independence in the home and community environment.     Pt's spiritual, cultural and educational needs considered and pt agreeable to plan of care and goals.    Anticipated barriers to physical therapy: none    Goals:   Short Term Goals: 6 weeks  1. Patient will be independent with HEP & progressions (progressing- not met)  2. Patient will achieve TUG score of 13 to demonstrate improved mobility (progressing- not met)     Long Term Goals: 12 weeks  1. Patient will achieve FOTO limitations score of 46%  To demonstrate improved function & decreased pain (progressing- not met)  2. Patient will achieve FTSTS score of 18 or less to demonstrate improved transfers & endurance (progressing- not  met)  3. Patient will increase R hip MMT to 4-/5 to demonstrate improved strength (progressing- not met)      Plan     Increase reps & exercises slowly to prevent pain    Outpatient PT 1-2x/wk for 12 weeks to include manual therapy, patient education, therapeutic exercise, aquatic therapy. Patient may be seen by PTA as part of the rehabilitation team.    Delaney Mace, PT

## 2019-10-09 DIAGNOSIS — E78.5 DYSLIPIDEMIA: ICD-10-CM

## 2019-10-09 DIAGNOSIS — I10 ESSENTIAL HYPERTENSION: Chronic | ICD-10-CM

## 2019-10-09 RX ORDER — ATORVASTATIN CALCIUM 40 MG/1
TABLET, FILM COATED ORAL
Qty: 90 TABLET | Refills: 1 | Status: SHIPPED | OUTPATIENT
Start: 2019-10-09 | End: 2020-06-25 | Stop reason: SDUPTHER

## 2019-10-09 RX ORDER — LOSARTAN POTASSIUM 100 MG/1
TABLET ORAL
Qty: 90 TABLET | Refills: 1 | Status: SHIPPED | OUTPATIENT
Start: 2019-10-09 | End: 2020-06-10

## 2019-10-09 RX ORDER — AMLODIPINE BESYLATE 10 MG/1
TABLET ORAL
Qty: 90 TABLET | Refills: 1 | Status: SHIPPED | OUTPATIENT
Start: 2019-10-09 | End: 2020-06-10

## 2019-10-09 NOTE — PROGRESS NOTES
Physical Therapy Daily Treatment Note     Name: Adriana Paula  Clinic Number: [id number]    Therapy Diagnosis:   Encounter Diagnoses   Name Primary?    Acute right-sided low back pain with right-sided sciatica     Decreased ROM of lumbar spine     Difficulty walking      Physician: Cece Dowell, *    Visit Date: 10/11/2019  Physician Orders: PT Eval and Treat   Medical Diagnosis from Referral:   M54.41,G89.29 (ICD-10-CM) - Chronic bilateral low back pain with right-sided sciatica   M51.36 (ICD-10-CM) - DDD (degenerative disc disease), lumbar   M47.26 (ICD-10-CM) - Other spondylosis with radiculopathy, lumbar region   M54.16 (ICD-10-CM) - Lumbar radiculopathy         Evaluation Date: 8/14/2019; reassess 9/30/2019  Authorization Period Expiration: 12/31/2019  Plan of Care Expiration: 12/23/2019  Visit # / Visits authorized: 4/ 20  Total visits: 12 land; 4 aquatic    Time In: 1300  Time Out: 1400  Total Billable Time: 15 minutes    Precautions: HTN, DM, peripheral neuropathy    PROCEDURES/IMAGING:  Subjective     Pt reports: I am going to restart my lyrica tomorrow  She was compliant with home exercise program.  Response to previous treatment: Muscle soreness x 1 day  Functional change: none, walks into clinic    Pain: 4/10  Location: right back  & leg    Objective     Adriana received aquatic therapeutic exercises to develop strength, endurance, ROM, flexibility, posture and core stabilization for 50  minutes including:    WARMUP x 2 laps each  Walk fwd/back/side    STRETCHES 2 x 20sec  Hamstring  Piriformis  Gastroc    LE EX x 20  Mini squat  Heel raise with GS  Hip abd/add  Hip flex/ext  Lunge forward x 12 each LE  Lunge lateral x 12 each LE  Step up forward x 10 each LE    UE EX/CORE  x 20  Shoulder flex/ext TA activation APO modified tandem-np  Shoulder horizontal abd/add TA activation APO modified tandem-np  Shoulder abd/add TA activation APO modified tandem-np  Lower truncal rotation // bars      ENDURANCE  Bicycle // bars x 5 min    COOL DOWN x 1 lap each  Walk fwd/back/side      Home Exercises Provided and Patient Education Provided     Education provided:   Role of aquatic therapy  Hydration post therapy      Written Home Exercises Provided: Patient instructed to cont prior HEP.  Exercises were reviewed and Adriana was able to demonstrate them prior to the end of the session.  Adriana demonstrated good  understanding of the education provided.     See EMR under Patient Instructions for exercises provided prior visit.    Assessment     Patient required intermittent verbal cues  for proper technique, movement in pain free range & core stabilization . Patient did well post instruction. Patient was able to tolerate increased reps of  lunges with minimal  c/o increased back pain. Patient will benefit from aquatic environment to unload  spine for strengthening, core stabilization & postural awareness. New goals set.      Adriana is progressing well towards her goals.   Pt prognosis is Fair.     Pt will continue to benefit from skilled aquatic outpatient physical therapy to address the deficits listed in the problem list box on initial evaluation, provide pt/family education and to maximize pt's level of independence in the home and community environment.     Pt's spiritual, cultural and educational needs considered and pt agreeable to plan of care and goals.    Anticipated barriers to physical therapy: none    Goals:   Short Term Goals: 6 weeks  1. Patient will be independent with HEP & progressions (progressing- not met)  2. Patient will achieve TUG score of 13 to demonstrate improved mobility (progressing- not met)     Long Term Goals: 12 weeks  1. Patient will achieve FOTO limitations score of 46%  To demonstrate improved function & decreased pain (progressing- not met)  2. Patient will achieve FTSTS score of 18 or less to demonstrate improved transfers & endurance (progressing- not met)  3. Patient will  increase R hip MMT to 4-/5 to demonstrate improved strength (progressing- not met)      Plan     Increase reps & exercises slowly to prevent pain    Outpatient PT 1-2x/wk for 12 weeks to include manual therapy, patient education, therapeutic exercise, aquatic therapy. Patient may be seen by PTA as part of the rehabilitation team.    Delaney Mace, PT

## 2019-10-11 ENCOUNTER — CLINICAL SUPPORT (OUTPATIENT)
Dept: REHABILITATION | Facility: HOSPITAL | Age: 64
End: 2019-10-11
Payer: MEDICARE

## 2019-10-11 DIAGNOSIS — R26.2 DIFFICULTY WALKING: ICD-10-CM

## 2019-10-11 DIAGNOSIS — M53.86 DECREASED ROM OF LUMBAR SPINE: ICD-10-CM

## 2019-10-11 DIAGNOSIS — M54.41 ACUTE RIGHT-SIDED LOW BACK PAIN WITH RIGHT-SIDED SCIATICA: ICD-10-CM

## 2019-10-11 PROCEDURE — 97113 AQUATIC THERAPY/EXERCISES: CPT

## 2019-10-14 ENCOUNTER — PATIENT OUTREACH (OUTPATIENT)
Dept: ADMINISTRATIVE | Facility: OTHER | Age: 64
End: 2019-10-14

## 2019-10-14 DIAGNOSIS — R35.0 FREQUENCY OF URINATION: ICD-10-CM

## 2019-10-14 RX ORDER — OXYBUTYNIN CHLORIDE 5 MG/1
TABLET ORAL
Qty: 270 TABLET | Refills: 1 | Status: SHIPPED | OUTPATIENT
Start: 2019-10-14 | End: 2020-01-21 | Stop reason: SDUPTHER

## 2019-10-16 ENCOUNTER — PATIENT MESSAGE (OUTPATIENT)
Dept: INTERNAL MEDICINE | Facility: CLINIC | Age: 64
End: 2019-10-16

## 2019-10-16 ENCOUNTER — TELEPHONE (OUTPATIENT)
Dept: PAIN MEDICINE | Facility: CLINIC | Age: 64
End: 2019-10-16

## 2019-10-16 ENCOUNTER — OFFICE VISIT (OUTPATIENT)
Dept: SPINE | Facility: CLINIC | Age: 64
End: 2019-10-16
Payer: MEDICARE

## 2019-10-16 VITALS
HEART RATE: 79 BPM | TEMPERATURE: 98 F | SYSTOLIC BLOOD PRESSURE: 151 MMHG | HEIGHT: 65 IN | DIASTOLIC BLOOD PRESSURE: 68 MMHG | WEIGHT: 216.5 LBS | BODY MASS INDEX: 36.07 KG/M2

## 2019-10-16 DIAGNOSIS — M54.16 LUMBAR RADICULOPATHY: Primary | ICD-10-CM

## 2019-10-16 DIAGNOSIS — G89.29 CHRONIC BILATERAL LOW BACK PAIN WITH RIGHT-SIDED SCIATICA: Primary | ICD-10-CM

## 2019-10-16 DIAGNOSIS — Z79.4 TYPE 2 DIABETES MELLITUS WITH DIABETIC POLYNEUROPATHY, WITH LONG-TERM CURRENT USE OF INSULIN: Chronic | ICD-10-CM

## 2019-10-16 DIAGNOSIS — M47.26 OTHER SPONDYLOSIS WITH RADICULOPATHY, LUMBAR REGION: ICD-10-CM

## 2019-10-16 DIAGNOSIS — M51.36 DDD (DEGENERATIVE DISC DISEASE), LUMBAR: ICD-10-CM

## 2019-10-16 DIAGNOSIS — M54.41 CHRONIC BILATERAL LOW BACK PAIN WITH RIGHT-SIDED SCIATICA: Primary | ICD-10-CM

## 2019-10-16 DIAGNOSIS — E11.42 TYPE 2 DIABETES MELLITUS WITH DIABETIC POLYNEUROPATHY, WITH LONG-TERM CURRENT USE OF INSULIN: Chronic | ICD-10-CM

## 2019-10-16 DIAGNOSIS — M54.16 LUMBAR RADICULOPATHY: ICD-10-CM

## 2019-10-16 PROCEDURE — 99999 PR PBB SHADOW E&M-EST. PATIENT-LVL V: ICD-10-PCS | Mod: PBBFAC,,, | Performed by: PHYSICIAN ASSISTANT

## 2019-10-16 PROCEDURE — 99999 PR PBB SHADOW E&M-EST. PATIENT-LVL V: CPT | Mod: PBBFAC,,, | Performed by: PHYSICIAN ASSISTANT

## 2019-10-16 PROCEDURE — 99215 OFFICE O/P EST HI 40 MIN: CPT | Mod: PBBFAC | Performed by: PHYSICIAN ASSISTANT

## 2019-10-16 PROCEDURE — 99214 PR OFFICE/OUTPT VISIT, EST, LEVL IV, 30-39 MIN: ICD-10-PCS | Mod: S$PBB,,, | Performed by: PHYSICIAN ASSISTANT

## 2019-10-16 PROCEDURE — 99214 OFFICE O/P EST MOD 30 MIN: CPT | Mod: S$PBB,,, | Performed by: PHYSICIAN ASSISTANT

## 2019-10-16 RX ORDER — INSULIN DEGLUDEC 100 U/ML
36 INJECTION, SOLUTION SUBCUTANEOUS DAILY
Qty: 4 SYRINGE | Refills: 5 | Status: SHIPPED | OUTPATIENT
Start: 2019-10-16 | End: 2020-03-03

## 2019-10-16 RX ORDER — INSULIN DEGLUDEC 100 U/ML
36 INJECTION, SOLUTION SUBCUTANEOUS DAILY
Qty: 11 SYRINGE | Refills: 1 | Status: SHIPPED | OUTPATIENT
Start: 2019-10-16 | End: 2019-10-16 | Stop reason: SDUPTHER

## 2019-10-16 NOTE — PROGRESS NOTES
"Subjective:     Patient ID:  Adriana Paula is a 64 y.o. female.    [facility]    Chief Complaint: Low back and right leg pain    HPI    Adriana Paula is a 64 y.o. female who presents for follow up.  The PT has been helping and she is doing aqua exercises also.  She stopped the gabapentin and is taking Lyrica BID which is helping some.  The L5-S1 IL KARLA helped the pain for a few days and then the pain came back.  She has pain in the low back with radiation to the right lateral leg to the lateral right calf.  No left leg pain.    The right buttock and leg bother her the most at this time.    Patient denies any recent accidents or trauma, no saddle anesthesias, and no bowel or bladder incontinence.      Review of Systems:  Constitution: Negative for chills, fever, night sweats and weight loss.   Musculoskeletal: Negative for falls.   Gastrointestinal: Negative for bowel incontinence, nausea and vomiting.   Genitourinary: Negative for bladder incontinence.   Neurological: Negative for disturbances in coordination and loss of balance.      Objective:      Vitals:    10/16/19 1014   BP: (!) 151/68   Pulse: 79   Temp: 98.4 °F (36.9 °C)   Weight: 98.2 kg (216 lb 7.9 oz)   Height: 5' 5" (1.651 m)   PainSc:   3   PainLoc: Back         Physical Exam:    General:  Adriana Paula is well-developed, well-nourished, appears stated age, in no acute distress, alert and oriented to person, place, and time.    Patient sits comfortably in the exam room and answers questions appropriately. Grossly patient is able to move bilateral lower extremities without difficulty.     XRAY Interpretation:      Lumbar spine ap/lateral xrays were personally reviewed today.  No fractures.  Normal alignment.  Mild DDD at L4-5 and L5-S1.     MRI Interpretation:      Lumbar spine MRI was personally reviewed today.  L5-S1 DDD.  BBDD at L4-5 and L5-S1 with bilateral NFS.  Probable lumbarlized sacrum.  More severe right L5-S1 NFS.    Assessment:     "      1. Chronic bilateral low back pain with right-sided sciatica    2. DDD (degenerative disc disease), lumbar    3. Other spondylosis with radiculopathy, lumbar region    4. Lumbar radiculopathy            Plan:          Orders Placed This Encounter    Procedure Order to [facility]     L4-5, L5-S1 DDD/spondylosis with BBDD     -Right L5 TESI with Dr. Hays  -Continue home PT and aqua PT  -Continue Lyrica, tramadol, and tylenol  -FU in two months      Follow-Up:  Follow up in about 2 months (around 12/16/2019). If there are any questions prior to this, the patient was instructed to contact the office.       SABRINA Patel, PA-C  Neurosurgery  [facility]  [facility]

## 2019-10-16 NOTE — H&P (VIEW-ONLY)
"Subjective:     Patient ID:  Adriana Paula is a 64 y.o. female.    [facility]    Chief Complaint: Low back and right leg pain    HPI    Adriana Paula is a 64 y.o. female who presents for follow up.  The PT has been helping and she is doing aqua exercises also.  She stopped the gabapentin and is taking Lyrica BID which is helping some.  The L5-S1 IL KARLA helped the pain for a few days and then the pain came back.  She has pain in the low back with radiation to the right lateral leg to the lateral right calf.  No left leg pain.    The right buttock and leg bother her the most at this time.    Patient denies any recent accidents or trauma, no saddle anesthesias, and no bowel or bladder incontinence.      Review of Systems:  Constitution: Negative for chills, fever, night sweats and weight loss.   Musculoskeletal: Negative for falls.   Gastrointestinal: Negative for bowel incontinence, nausea and vomiting.   Genitourinary: Negative for bladder incontinence.   Neurological: Negative for disturbances in coordination and loss of balance.      Objective:      Vitals:    10/16/19 1014   BP: (!) 151/68   Pulse: 79   Temp: 98.4 °F (36.9 °C)   Weight: 98.2 kg (216 lb 7.9 oz)   Height: 5' 5" (1.651 m)   PainSc:   3   PainLoc: Back         Physical Exam:    General:  Adriana Paula is well-developed, well-nourished, appears stated age, in no acute distress, alert and oriented to person, place, and time.    Patient sits comfortably in the exam room and answers questions appropriately. Grossly patient is able to move bilateral lower extremities without difficulty.     XRAY Interpretation:      Lumbar spine ap/lateral xrays were personally reviewed today.  No fractures.  Normal alignment.  Mild DDD at L4-5 and L5-S1.     MRI Interpretation:      Lumbar spine MRI was personally reviewed today.  L5-S1 DDD.  BBDD at L4-5 and L5-S1 with bilateral NFS.  Probable lumbarlized sacrum.  More severe right L5-S1 NFS.    Assessment:     "      1. Chronic bilateral low back pain with right-sided sciatica    2. DDD (degenerative disc disease), lumbar    3. Other spondylosis with radiculopathy, lumbar region    4. Lumbar radiculopathy            Plan:          Orders Placed This Encounter    Procedure Order to [facility]     L4-5, L5-S1 DDD/spondylosis with BBDD     -Right L5 TESI with Dr. Hays  -Continue home PT and aqua PT  -Continue Lyrica, tramadol, and tylenol  -FU in two months      Follow-Up:  Follow up in about 2 months (around 12/16/2019). If there are any questions prior to this, the patient was instructed to contact the office.       SABRINA Patel, PA-C  Neurosurgery  [facility]  [facility]

## 2019-10-24 ENCOUNTER — HOSPITAL ENCOUNTER (OUTPATIENT)
Dept: RADIOLOGY | Facility: OTHER | Age: 64
Discharge: HOME | End: 2019-10-24
Attending: INTERNAL MEDICINE
Payer: MEDICARE

## 2019-10-24 DIAGNOSIS — I10 ESSENTIAL HYPERTENSION, MALIGNANT: Primary | ICD-10-CM

## 2019-10-24 DIAGNOSIS — R93.89 ABNORMAL CAROTID ULTRASOUND: ICD-10-CM

## 2019-10-24 DIAGNOSIS — R93.0 ABNORMAL FINDINGS ON DIAGNOSTIC IMAGING OF SKULL AND HEAD, NOT ELSEWHERE CLASSIFIED: ICD-10-CM

## 2019-10-24 DIAGNOSIS — R09.89 BRUIT: ICD-10-CM

## 2019-10-24 PROCEDURE — 70548 MR ANGIOGRAPHY NECK W/DYE: CPT | Mod: TC

## 2019-10-24 PROCEDURE — 25500020 PHARM REV CODE 255: Performed by: INTERNAL MEDICINE

## 2019-10-24 PROCEDURE — 70548 MR ANGIOGRAPHY NECK W/DYE: CPT | Mod: 26,,, | Performed by: RADIOLOGY

## 2019-10-24 PROCEDURE — A9585 GADOBUTROL INJECTION: HCPCS | Performed by: INTERNAL MEDICINE

## 2019-10-24 PROCEDURE — 70548 MRA NECK WITH CONTRAST: ICD-10-PCS | Mod: 26,,, | Performed by: RADIOLOGY

## 2019-10-24 RX ORDER — GADOBUTROL 604.72 MG/ML
10 INJECTION INTRAVENOUS
Status: COMPLETED | OUTPATIENT
Start: 2019-10-24 | End: 2019-10-24

## 2019-10-24 RX ADMIN — GADOBUTROL 10 ML: 604.72 INJECTION INTRAVENOUS at 02:10

## 2019-10-28 DIAGNOSIS — M54.16 LUMBAR RADICULOPATHY: Primary | ICD-10-CM

## 2019-11-01 ENCOUNTER — CLINICAL SUPPORT (OUTPATIENT)
Dept: REHABILITATION | Facility: HOSPITAL | Age: 64
End: 2019-11-01
Payer: MEDICARE

## 2019-11-01 DIAGNOSIS — R26.2 DIFFICULTY WALKING: ICD-10-CM

## 2019-11-01 DIAGNOSIS — M53.86 DECREASED ROM OF LUMBAR SPINE: ICD-10-CM

## 2019-11-01 DIAGNOSIS — M54.41 ACUTE RIGHT-SIDED LOW BACK PAIN WITH RIGHT-SIDED SCIATICA: ICD-10-CM

## 2019-11-01 PROCEDURE — 97113 AQUATIC THERAPY/EXERCISES: CPT

## 2019-11-01 NOTE — PROGRESS NOTES
Physical Therapy Daily Treatment Note     Name: Adriana Paula  Clinic Number: [id number]    Therapy Diagnosis:   Encounter Diagnoses   Name Primary?    Acute right-sided low back pain with right-sided sciatica     Decreased ROM of lumbar spine     Difficulty walking      Physician: Cece Dowell, *    Visit Date: 11/1/2019  Physician Orders: PT Eval and Treat   Medical Diagnosis from Referral:   M54.41,G89.29 (ICD-10-CM) - Chronic bilateral low back pain with right-sided sciatica   M51.36 (ICD-10-CM) - DDD (degenerative disc disease), lumbar   M47.26 (ICD-10-CM) - Other spondylosis with radiculopathy, lumbar region   M54.16 (ICD-10-CM) - Lumbar radiculopathy         Evaluation Date: 8/14/2019; reassess 9/30/2019  Authorization Period Expiration: 12/31/2019  Plan of Care Expiration: 12/23/2019  Visit # / Visits authorized: 5/ 20  Total visits: 12 land; 5 aquatic    Time In: 1300  Time Out: 1400  Total Billable Time: 30 minutes    Precautions: HTN, DM, peripheral neuropathy    PROCEDURES/IMAGING:  Subjective     Pt reports: I'm having a back injection on Monday (11/4/2019)  She was compliant with home exercise program.  Response to previous treatment: Muscle soreness x 1 day  Functional change: none, walks into clinic    Pain: 8/10; 6/10 during therapy  Location: right back  & leg    Objective     Adriana received aquatic therapeutic exercises to develop strength, endurance, ROM, flexibility, posture and core stabilization for 50  minutes including:    WARMUP x 2 laps each  Walk fwd/back/side    STRETCHES 2 x 20sec  Hamstring  Piriformis  Gastroc    LE EX x 20  Mini squat  Heel raise with GS  Hip abd/add  Hip flex/ext  Lunge forward x 12 each LE  Lunge lateral chest press blue disc x 12 each LE  Step up forward x 10 each LE  Step up lateral x 10 each LE    UE EX/CORE  x 20  Shoulder flex/ext TA activation APO modified tandem-np  Shoulder horizontal abd/add TA activation APO modified tandem-np  Shoulder  abd/add TA activation APO modified tandem-np  Lower truncal rotation // bars     ENDURANCE  Bicycle // bars x 5 min    COOL DOWN x 1 lap each  Walk fwd/back/side      Home Exercises Provided and Patient Education Provided     Education provided:   Role of aquatic therapy  Hydration post therapy      Written Home Exercises Provided: Patient instructed to cont prior HEP.  Exercises were reviewed and Adriana was able to demonstrate them prior to the end of the session.  Adriana demonstrated good  understanding of the education provided.     See EMR under Patient Instructions for exercises provided prior visit.; 11/1/2019(dead bug & bird dog)    Assessment   Patient has not attended therapy since 10/11/2019 2/2 mother's death.  Patient required intermittent verbal cues  for proper technique, movement in pain free range & TA activation especially with lunges . Patient did well post instruction. Patient does get relief of pain while in the water 8/10 to 6/10. Patient was able to tolerate addition of step ups with only minimal c/o back pain Reviewed & issued HEP. Patient will benefit from aquatic environment to unload  spine for strengthening, core stabilization & postural awareness. New goals set.      Adriana is progressing well towards her goals.   Pt prognosis is Fair.     Pt will continue to benefit from skilled aquatic outpatient physical therapy to address the deficits listed in the problem list box on initial evaluation, provide pt/family education and to maximize pt's level of independence in the home and community environment.     Pt's spiritual, cultural and educational needs considered and pt agreeable to plan of care and goals.    Anticipated barriers to physical therapy: none    Goals:   Short Term Goals: 6 weeks  1. Patient will be independent with HEP & progressions (progressing- not met)  2. Patient will achieve TUG score of 13 to demonstrate improved mobility (progressing- not met)     Long Term Goals: 12  weeks  1. Patient will achieve FOTO limitations score of 46%  To demonstrate improved function & decreased pain (progressing- not met)  2. Patient will achieve FTSTS score of 18 or less to demonstrate improved transfers & endurance (progressing- not met)  3. Patient will increase R hip MMT to 4-/5 to demonstrate improved strength (progressing- not met)      Plan     Increase reps & exercises slowly to prevent pain    Outpatient PT 1-2x/wk for 12 weeks to include manual therapy, patient education, therapeutic exercise, aquatic therapy. Patient may be seen by PTA as part of the rehabilitation team.    Delaney Mcae, PT

## 2019-11-04 ENCOUNTER — HOSPITAL ENCOUNTER (OUTPATIENT)
Facility: OTHER | Age: 64
Discharge: HOME | End: 2019-11-04
Attending: ANESTHESIOLOGY | Admitting: ANESTHESIOLOGY
Payer: MEDICARE

## 2019-11-04 VITALS
DIASTOLIC BLOOD PRESSURE: 69 MMHG | HEART RATE: 63 BPM | TEMPERATURE: 98 F | SYSTOLIC BLOOD PRESSURE: 134 MMHG | OXYGEN SATURATION: 98 % | RESPIRATION RATE: 18 BRPM | HEIGHT: 65 IN | BODY MASS INDEX: 35.82 KG/M2 | WEIGHT: 215 LBS

## 2019-11-04 DIAGNOSIS — M54.16 LUMBAR RADICULOPATHY: Primary | ICD-10-CM

## 2019-11-04 DIAGNOSIS — M51.36 DDD (DEGENERATIVE DISC DISEASE), LUMBAR: ICD-10-CM

## 2019-11-04 DIAGNOSIS — G89.29 CHRONIC PAIN: ICD-10-CM

## 2019-11-04 LAB — POCT GLUCOSE: 108 MG/DL (ref 70–110)

## 2019-11-04 PROCEDURE — 63600175 PHARM REV CODE 636 W HCPCS: Performed by: PHYSICAL MEDICINE & REHABILITATION

## 2019-11-04 PROCEDURE — 99152 PR MOD CONSCIOUS SEDATION, SAME PHYS, 5+ YRS, FIRST 15 MIN: ICD-10-PCS | Mod: ,,, | Performed by: ANESTHESIOLOGY

## 2019-11-04 PROCEDURE — 64483 NJX AA&/STRD TFRM EPI L/S 1: CPT | Performed by: ANESTHESIOLOGY

## 2019-11-04 PROCEDURE — 25000003 PHARM REV CODE 250: Performed by: ANESTHESIOLOGY

## 2019-11-04 PROCEDURE — 64483 NJX AA&/STRD TFRM EPI L/S 1: CPT | Mod: LT,,, | Performed by: ANESTHESIOLOGY

## 2019-11-04 PROCEDURE — 63600175 PHARM REV CODE 636 W HCPCS: Performed by: ANESTHESIOLOGY

## 2019-11-04 PROCEDURE — 99152 MOD SED SAME PHYS/QHP 5/>YRS: CPT | Mod: ,,, | Performed by: ANESTHESIOLOGY

## 2019-11-04 PROCEDURE — 64483 PR EPIDURAL INJ, ANES/STEROID, TRANSFORAMINAL, LUMB/SACR, SNGL LEVL: ICD-10-PCS | Mod: LT,,, | Performed by: ANESTHESIOLOGY

## 2019-11-04 PROCEDURE — 25500020 PHARM REV CODE 255: Performed by: ANESTHESIOLOGY

## 2019-11-04 RX ORDER — MIDAZOLAM HYDROCHLORIDE 1 MG/ML
INJECTION INTRAMUSCULAR; INTRAVENOUS
Status: DISCONTINUED | OUTPATIENT
Start: 2019-11-04 | End: 2019-11-04 | Stop reason: HOSPADM

## 2019-11-04 RX ORDER — SODIUM CHLORIDE 9 MG/ML
500 INJECTION, SOLUTION INTRAVENOUS CONTINUOUS
Status: DISCONTINUED | OUTPATIENT
Start: 2019-11-04 | End: 2019-11-04 | Stop reason: HOSPADM

## 2019-11-04 RX ORDER — LIDOCAINE HYDROCHLORIDE 10 MG/ML
INJECTION INFILTRATION; PERINEURAL
Status: DISCONTINUED | OUTPATIENT
Start: 2019-11-04 | End: 2019-11-04 | Stop reason: HOSPADM

## 2019-11-04 RX ORDER — FENTANYL CITRATE 50 UG/ML
INJECTION, SOLUTION INTRAMUSCULAR; INTRAVENOUS
Status: DISCONTINUED | OUTPATIENT
Start: 2019-11-04 | End: 2019-11-04 | Stop reason: HOSPADM

## 2019-11-04 RX ORDER — LIDOCAINE HYDROCHLORIDE 5 MG/ML
INJECTION, SOLUTION INFILTRATION; INTRAVENOUS
Status: DISCONTINUED | OUTPATIENT
Start: 2019-11-04 | End: 2019-11-04 | Stop reason: HOSPADM

## 2019-11-04 RX ORDER — DEXAMETHASONE SODIUM PHOSPHATE 4 MG/ML
INJECTION, SOLUTION INTRA-ARTICULAR; INTRALESIONAL; INTRAMUSCULAR; INTRAVENOUS; SOFT TISSUE
Status: DISCONTINUED | OUTPATIENT
Start: 2019-11-04 | End: 2019-11-04 | Stop reason: HOSPADM

## 2019-11-04 RX ADMIN — SODIUM CHLORIDE 500 ML: 0.9 INJECTION, SOLUTION INTRAVENOUS at 07:11

## 2019-11-04 NOTE — DISCHARGE INSTRUCTIONS

## 2019-11-04 NOTE — DISCHARGE SUMMARY
Discharge Note  Short Stay      SUMMARY     Admit Date: 11/4/2019    Attending Physician: Jef García      Discharge Physician: Jef García      Discharge Date: 11/4/2019 10:11 AM    Procedure(s) (LRB):  LUMBAR TRANSFORAMINAL RIGHT L5-S1  TF KARLA (Right)    Final Diagnosis: Lumbar radiculopathy [M54.16]    Disposition: Home or self care    Patient Instructions:   Discharge Medication List as of 11/4/2019  9:03 AM      CONTINUE these medications which have NOT CHANGED    Details   amLODIPine (NORVASC) 10 MG tablet TAKE 1 TABLET(10 MG) BY MOUTH EVERY DAY, Normal      aspirin (ECOTRIN) 81 MG EC tablet Take 81 mg by mouth once daily. Instructed to stop 1 week prior to surgery on 4/15/19 per Dr. Conklin, Starting Sat 7/12/2014, Until Wed 10/16/2019, Historical Med      atorvastatin (LIPITOR) 40 MG tablet TAKE 1 TABLET(40 MG) BY MOUTH EVERY DAY, Normal      blood sugar diagnostic Strp 1 strip by Misc.(Non-Drug; Combo Route) route 2 (two) times daily. One touch verio, Starting Thu 4/11/2019, Normal      blood-glucose meter (ONETOUCH VERIO IQ METER) kit Use as instructed, Normal      diclofenac sodium (VOLTAREN) 1 % Gel Apply topically 3 (three) times daily., Starting Mon 10/7/2019, Print      fluticasone (FLONASE) 50 mcg/actuation nasal spray SHAKE LIQUID AND USE 1 SPRAY(50 MCG) IN EACH NOSTRIL EVERY DAY, Normal      insulin degludec (TRESIBA FLEXTOUCH U-100) 100 unit/mL (3 mL) InPn Inject 36 Units into the skin once daily., Starting Wed 10/16/2019, Normal      ketoconazole (NIZORAL) 2 % cream Apply topically once daily., Starting Thu 5/23/2019, Normal      lancets 33 gauge Misc 1 lancet by Misc.(Non-Drug; Combo Route) route 2 (two) times daily before meals., Starting Wed 8/9/2017, Normal      losartan (COZAAR) 100 MG tablet TAKE 1 TABLET(100 MG) BY MOUTH EVERY DAY, Normal      MAGNESIUM ORAL Take 500 mg by mouth., Historical Med      metFORMIN (GLUCOPHAGE-XR) 500 MG 24 hr tablet Take 1000mg at lunch and 500mg  "at dinner, No Print      oxybutynin (DITROPAN) 5 MG Tab TAKE 1 TABLET BY MOUTH THREE TIMES DAILY, Normal      pen needle, diabetic (BD ULTRA-FINE SHORT PEN NEEDLE) 31 gauge x 5/16" Ndle Use as directed (two) times daily., Starting Tue 1/8/2019, Normal      pregabalin (LYRICA) 75 MG capsule Take 1 capsule (75 mg total) by mouth 2 (two) times daily., Starting Thu 8/22/2019, Until Thu 2/20/2020, Phone In      senna-docusate 8.6-50 mg (SENNA WITH DOCUSATE SODIUM) 8.6-50 mg per tablet Take 1 tablet by mouth once daily., Starting Mon 10/7/2019, Normal      traMADol (ULTRAM) 50 mg tablet Take 1 tablet (50 mg total) by mouth every 12 (twelve) hours as needed. M54.40 Greated than 7 day supply is medically necessary, Starting Mon 10/7/2019, Normal      vitamin D (VITAMIN D3) 1000 units Tab Take 2,000 Units by mouth once daily., Historical Med                 Discharge Diagnosis: Lumbar radiculopathy [M54.16]  Condition on Discharge: Stable with no complications to procedure   Diet on Discharge: Same as before.  Activity: as per instruction sheet.  Discharge to: Home with a responsible adult.  Follow up: 2-4 weeks    Please call my office or pager at [phone] if experienced any weakness or loss of sensation, fever > 101.5, pain uncontrolled with oral medications, persistent nausea/vomiting/or diarrhea, redness or drainage from the incisions, or any other worrisome concerns. If physician on call was not reached or could not communicate with our office for any reason please go to the nearest emergency department     "

## 2019-11-04 NOTE — OP NOTE
Patient Name: Adriana Paula  MRN: [id number]    INFORMED CONSENT: The procedure, risks, benefits and options were discussed with patient. There are no contraindications to the procedure. The patient expressed understanding and agreed to proceed. The personnel performing the procedure was discussed. I verify that I personally obtained Adriana's consent prior to the start of the procedure and the signed consent can be found on the patient's chart.    Procedure Date: 11/04/2019    Anesthesia: Topical    Pre Procedure diagnosis: Lumbar radiculopathy [M54.16]  1. Lumbar radiculopathy    2. DDD (degenerative disc disease), lumbar    3. Chronic pain      Post-Procedure diagnosis: SAME      Sedation: Yes - Fentanyl 50 mcg and Midazolam 2 mg    PROCEDURE:Left L5-S1   TRANSFORAMINAL EPIDURAL STEROID INJECTION        DESCRIPTION OF PROCEDURE: The patient was brought to the procedure room. After performing time out IV access was obtained prior to the procedure. The patient was positioned prone on the fluoroscopy table. Continuous hemodynamic monitoring was initiated including blood pressure, EKG, and pulse oximetry. . The skin was prepped with chlorhexidine three times and draped in a sterile fashion. Skin anesthesia was achieved using 3 mL of lidocaine 1% over the respective injection site.     An oblique fluoroscopic view was obtained, with the superior articular process of the inferior vertebral body aligned with the pedicle. The tip of a 22-gauge 3.5-inch Quincke-type spinal needle was advanced toward the 6 oclock position of the pedicle under intermittent fluoroscopic guidance. Confirmation of proper needle position was made with AP, oblique, and lateral fluoroscopic views. Negative aspiration for blood or CSF was confirmed. 2 mL of Omnipaque 300 was injected. Live fluoroscopic imaging revealed a clear outline of the spinal nerve with proximal spread of agent through the neural foramen into the anterior epidural space. A  total combination of 3 mL of Lidocaine 0.5% and 10 mg decadron was injected at each level. Contrast spread was noted from L4 to S1 level. There was no pain on injection. The needle was removed and bleeding was nil.  A sterile dressing was applied. Adriana was taken back to the recovery room for further observation.     Blood Loss: Nill  Specimen: None    Jef García MD

## 2019-11-04 NOTE — INTERVAL H&P NOTE
The patient has been examined and the H&P has been reviewed:    I concur with the findings and no changes have occurred since H&P was written.    Anesthesia/Surgery risks, benefits and alternative options discussed and understood by patient/family.    HPI  Patient presenting for Procedure(s) (LRB):  LUMBAR TRANSFORAMINAL RIGHT L5 TF KARLA (Right)     Patient on Anti-coagulation No, stopped 81mg ASA 5 days ago    No health changes since previous encounter    Past Medical History:   Diagnosis Date    Allergy     Anemia     Anxiety     Arthritis     Breast cyst     Cataract     DR (diabetic retinopathy)     Dyslipidemia     Essential hypertension 2012    Gastroesophageal reflux disease without esophagitis 2/3/2017    GERD (gastroesophageal reflux disease)     Glaucoma     Hypertension     Obesity (BMI 30-39.9) 10/24/2013    PN (peripheral neuropathy)     Sleep apnea     Type 2 diabetes mellitus with both eyes affected by mild nonproliferative retinopathy without macular edema, with long-term current use of insulin     Type 2 diabetes mellitus with diabetic polyneuropathy, with long-term current use of insulin     Type II or unspecified type diabetes mellitus with other specified manifestations, uncontrolled      Past Surgical History:   Procedure Laterality Date    BREAST CYST EXCISION Right     1980s    CARPAL TUNNEL RELEASE Right     CATARACT EXTRACTION      CATARACT EXTRACTION W/  INTRAOCULAR LENS IMPLANT Right 2017    Dr Brewster     SECTION      EPIDURAL STEROID INJECTION N/A 2019    Procedure: Injection, Steroid, Epidural LUMBAR/CAUDAL L5-S1 IL KARLA;  Surgeon: Jef García MD;  Location: [facility];  Service: Pain Management;  Laterality: N/A;  NEEDS CONSENT    EPIDURAL STEROID INJECTION N/A 2019    Procedure: Injection, Steroid, Epidural LUMBAR/CAUDAL L5-S1 IL KARLA;  Surgeon: Jef García MD;  Location: [facility];  Service: Pain Management;   "Laterality: N/A;  NEEDS CONSENT    KNEE ARTHROSCOPY  1-30-14    right    MYOMECTOMY      TRIGGER FINGER RELEASE      TRIGGER FINGER RELEASE Left 4/15/2019    Procedure: RELEASE, TRIGGER FINGER left thumb;  Surgeon: Yuridia Gonzales MD;  Location: [facility];  Service: Orthopedics;  Laterality: Left;  stretcher, supine, hand pan 1 and pan 2     Review of patient's allergies indicates:   Allergen Reactions    Keflex [cephalexin] Rash      blisters, fever    Penicillins Itching    Sulfamethoxazole-trimethoprim      Other reaction(s): blisters    Vicodin [hydrocodone-acetaminophen] Swelling    Sulfa (sulfonamide antibiotics) Rash      blisters,fever          Current Facility-Administered Medications   Medication    0.9%  NaCl infusion     Facility-Administered Medications Ordered in Other Encounters   Medication    0.9%  NaCl infusion    mupirocin 2 % ointment       PMHx, PSHx, Allergies, Medications reviewed in epic    ROS negative except pain complaints in HPI    OBJECTIVE:    BP (!) 164/79 (BP Location: Right arm, Patient Position: Sitting)   Pulse 72   Temp 98 °F (36.7 °C)   Ht 5' 5" (1.651 m)   Wt 97.5 kg (215 lb)   SpO2 97%   BMI 35.78 kg/m²     PHYSICAL EXAMINATION:    GENERAL: Well appearing, in no acute distress, alert and oriented x3.  PSYCH:  Mood and affect appropriate.  SKIN: Skin color, texture, turgor normal, no rashes or lesions which will impact the procedure.  CV: RRR with palpation of the radial artery.  PULM: No evidence of respiratory difficulty, symmetric chest rise. Clear to auscultation.  NEURO: Cranial nerves grossly intact.    Plan:    Proceed with procedure as planned Procedure(s) (LRB):  LUMBAR TRANSFORAMINAL RIGHT L5 TF KARLA (Right)    Ned Nava  11/04/2019                Active Hospital Problems    Diagnosis  POA    Chronic pain [G89.29]  Yes      Resolved Hospital Problems   No resolved problems to display.     "

## 2019-11-07 ENCOUNTER — LAB VISIT (OUTPATIENT)
Dept: LAB | Facility: HOSPITAL | Age: 64
End: 2019-11-07
Attending: INTERNAL MEDICINE
Payer: MEDICARE

## 2019-11-07 DIAGNOSIS — I15.2 HYPERTENSION ASSOCIATED WITH DIABETES: ICD-10-CM

## 2019-11-07 DIAGNOSIS — E11.59 HYPERTENSION ASSOCIATED WITH DIABETES: ICD-10-CM

## 2019-11-07 DIAGNOSIS — E11.42 TYPE 2 DIABETES MELLITUS WITH DIABETIC POLYNEUROPATHY, WITH LONG-TERM CURRENT USE OF INSULIN: Chronic | ICD-10-CM

## 2019-11-07 DIAGNOSIS — Z79.4 TYPE 2 DIABETES MELLITUS WITH DIABETIC POLYNEUROPATHY, WITH LONG-TERM CURRENT USE OF INSULIN: Chronic | ICD-10-CM

## 2019-11-07 LAB
ALBUMIN SERPL BCP-MCNC: 3.6 G/DL (ref 3.5–5.2)
ALP SERPL-CCNC: 81 U/L (ref 55–135)
ALT SERPL W/O P-5'-P-CCNC: 17 U/L (ref 10–44)
ANION GAP SERPL CALC-SCNC: 7 MMOL/L (ref 8–16)
AST SERPL-CCNC: 16 U/L (ref 10–40)
BILIRUB SERPL-MCNC: 0.9 MG/DL (ref 0.1–1)
BUN SERPL-MCNC: 8 MG/DL (ref 8–23)
CALCIUM SERPL-MCNC: 9.9 MG/DL (ref 8.7–10.5)
CHLORIDE SERPL-SCNC: 104 MMOL/L (ref 95–110)
CO2 SERPL-SCNC: 30 MMOL/L (ref 23–29)
CREAT SERPL-MCNC: 0.8 MG/DL (ref 0.5–1.4)
EST. GFR  (AFRICAN AMERICAN): >60 ML/MIN/1.73 M^2
EST. GFR  (NON AFRICAN AMERICAN): >60 ML/MIN/1.73 M^2
ESTIMATED AVG GLUCOSE: 174 MG/DL (ref 68–131)
GLUCOSE SERPL-MCNC: 126 MG/DL (ref 70–110)
HBA1C MFR BLD HPLC: 7.7 % (ref 4–5.6)
POTASSIUM SERPL-SCNC: 4.3 MMOL/L (ref 3.5–5.1)
PROT SERPL-MCNC: 7.2 G/DL (ref 6–8.4)
SODIUM SERPL-SCNC: 141 MMOL/L (ref 136–145)

## 2019-11-07 PROCEDURE — 80053 COMPREHEN METABOLIC PANEL: CPT

## 2019-11-07 PROCEDURE — 83036 HEMOGLOBIN GLYCOSYLATED A1C: CPT

## 2019-11-07 PROCEDURE — 36415 COLL VENOUS BLD VENIPUNCTURE: CPT | Mod: PO

## 2019-11-08 ENCOUNTER — CLINICAL SUPPORT (OUTPATIENT)
Dept: REHABILITATION | Facility: HOSPITAL | Age: 64
End: 2019-11-08
Payer: MEDICARE

## 2019-11-08 DIAGNOSIS — Z79.4 TYPE 2 DIABETES MELLITUS WITH HYPERGLYCEMIA, WITH LONG-TERM CURRENT USE OF INSULIN: Chronic | ICD-10-CM

## 2019-11-08 DIAGNOSIS — E11.65 TYPE 2 DIABETES MELLITUS WITH HYPERGLYCEMIA, WITH LONG-TERM CURRENT USE OF INSULIN: Chronic | ICD-10-CM

## 2019-11-08 DIAGNOSIS — M53.86 DECREASED ROM OF LUMBAR SPINE: ICD-10-CM

## 2019-11-08 DIAGNOSIS — M54.41 ACUTE RIGHT-SIDED LOW BACK PAIN WITH RIGHT-SIDED SCIATICA: ICD-10-CM

## 2019-11-08 DIAGNOSIS — R26.2 DIFFICULTY WALKING: ICD-10-CM

## 2019-11-08 PROCEDURE — 97113 AQUATIC THERAPY/EXERCISES: CPT

## 2019-11-08 RX ORDER — METFORMIN HYDROCHLORIDE 500 MG/1
TABLET, EXTENDED RELEASE ORAL
Qty: 60 TABLET | Refills: 0 | OUTPATIENT
Start: 2019-11-08

## 2019-11-08 RX ORDER — METFORMIN HYDROCHLORIDE 500 MG/1
TABLET ORAL
Qty: 270 TABLET | Refills: 1 | Status: SHIPPED | OUTPATIENT
Start: 2019-11-08 | End: 2020-07-01 | Stop reason: SDUPTHER

## 2019-11-08 NOTE — PROGRESS NOTES
Physical Therapy Daily Treatment Note     Name: Adriana Paula  Clinic Number: [id number]    Therapy Diagnosis:   Encounter Diagnoses   Name Primary?    Acute right-sided low back pain with right-sided sciatica     Decreased ROM of lumbar spine     Difficulty walking      Physician: Cece Dowell, *    Visit Date: 2019  Physician Orders: PT Eval and Treat   Medical Diagnosis from Referral:   M54.41,G89.29 (ICD-10-CM) - Chronic bilateral low back pain with right-sided sciatica   M51.36 (ICD-10-CM) - DDD (degenerative disc disease), lumbar   M47.26 (ICD-10-CM) - Other spondylosis with radiculopathy, lumbar region   M54.16 (ICD-10-CM) - Lumbar radiculopathy         Evaluation Date: 2019; reassess 2019  Authorization Period Expiration: 2019  Plan of Care Expiration: 2019  Visit # / Visits authorized:   Total visits: 12 land; 6 aquatic    Time In: 1300  Time Out: 1400  Total Billable Time: 30 minutes    Precautions: HTN, DM, peripheral neuropathy    PROCEDURES/IMAGIN2019 Left L5-S1 transforaminal KARLA  Subjective     Pt reports: I have a little relief from the injection on Monday  She was compliant with home exercise program.  Response to previous treatment: Muscle soreness x 1 day  Functional change: none, walks into clinic    Pain: 5/10  Location: right back  & leg    Objective     Adriana received aquatic therapeutic exercises to develop strength, endurance, ROM, flexibility, posture and core stabilization for 50  minutes including:    WARMUP x 2 laps each  Walk fwd/back/side    STRETCHES 2 x 20sec  Hamstring  Piriformis  Gastroc    LE EX x 20  Mini squat  Heel raise with GS  Hip abd/add  Hip flex/ext  Lunge forward x 12 each LE  Lunge lateral chest press blue disc x 12 each LE  Step up forward x 12 each LE  Step up lateral x 12 each LE    UE EX/CORE  x 20  Shoulder flex/ext TA activation APO modified tandem-np  Shoulder horizontal abd/add TA activation APO modified  tandem-np  Shoulder abd/add TA activation APO modified tandem-np  Lower truncal rotation // bars     ENDURANCE  Bicycle // bars x 6 min    COOL DOWN x 1 lap each  Walk fwd/back/side      Home Exercises Provided and Patient Education Provided     Education provided:   Role of aquatic therapy  Hydration post therapy      Written Home Exercises Provided: Patient instructed to cont prior HEP.  Exercises were reviewed and Adriana was able to demonstrate them prior to the end of the session.  Adriana demonstrated good  understanding of the education provided.     See EMR under Patient Instructions for exercises provided prior visit.; 11/1/2019(dead bug & bird dog)  Assessment     Patient required intermittent verbal cues  for proper technique, movement in pain free range & TA activation especially with lunges . Patient did well post instruction. Patient was able to tolerate increased reps of lunges & step ups with only minimal c/o increased back tightness.. Patient will benefit from aquatic environment to unload  spine for strengthening, core stabilization & postural awareness.      Adriana is progressing well towards her goals.   Pt prognosis is Fair.     Pt will continue to benefit from skilled aquatic outpatient physical therapy to address the deficits listed in the problem list box on initial evaluation, provide pt/family education and to maximize pt's level of independence in the home and community environment.     Pt's spiritual, cultural and educational needs considered and pt agreeable to plan of care and goals.    Anticipated barriers to physical therapy: none    Goals:   Short Term Goals: 6 weeks  1. Patient will be independent with HEP & progressions (progressing- not met)  2. Patient will achieve TUG score of 13 to demonstrate improved mobility (progressing- not met)     Long Term Goals: 12 weeks  1. Patient will achieve FOTO limitations score of 46%  To demonstrate improved function & decreased pain  (progressing- not met)  2. Patient will achieve FTSTS score of 18 or less to demonstrate improved transfers & endurance (progressing- not met)  3. Patient will increase R hip MMT to 4-/5 to demonstrate improved strength (progressing- not met)      Plan     Increase reps & exercises slowly to prevent pain    Outpatient PT 1-2x/wk for 12 weeks to include manual therapy, patient education, therapeutic exercise, aquatic therapy. Patient may be seen by PTA as part of the rehabilitation team.    Delaney Mace, PT

## 2019-11-19 ENCOUNTER — PATIENT MESSAGE (OUTPATIENT)
Dept: INTERNAL MEDICINE | Facility: CLINIC | Age: 64
End: 2019-11-19

## 2019-11-20 ENCOUNTER — CLINICAL SUPPORT (OUTPATIENT)
Dept: REHABILITATION | Facility: HOSPITAL | Age: 64
End: 2019-11-20
Payer: MEDICARE

## 2019-11-20 DIAGNOSIS — M53.86 DECREASED ROM OF LUMBAR SPINE: ICD-10-CM

## 2019-11-20 DIAGNOSIS — R26.2 DIFFICULTY WALKING: ICD-10-CM

## 2019-11-20 DIAGNOSIS — M54.41 ACUTE RIGHT-SIDED LOW BACK PAIN WITH RIGHT-SIDED SCIATICA: ICD-10-CM

## 2019-11-20 PROCEDURE — 97113 AQUATIC THERAPY/EXERCISES: CPT

## 2019-11-20 NOTE — PROGRESS NOTES
Physical Therapy Daily Treatment Note     Name: Adriana Paula  Clinic Number: [id number]    Therapy Diagnosis:   Encounter Diagnoses   Name Primary?    Acute right-sided low back pain with right-sided sciatica     Decreased ROM of lumbar spine     Difficulty walking      Physician: Cece Dowell, *    Visit Date: 2019  Physician Orders: PT Eval and Treat   Medical Diagnosis from Referral:   M54.41,G89.29 (ICD-10-CM) - Chronic bilateral low back pain with right-sided sciatica   M51.36 (ICD-10-CM) - DDD (degenerative disc disease), lumbar   M47.26 (ICD-10-CM) - Other spondylosis with radiculopathy, lumbar region   M54.16 (ICD-10-CM) - Lumbar radiculopathy         Evaluation Date: 2019; reassess 2019  Authorization Period Expiration: 2019  Plan of Care Expiration: 2019  Visit # / Visits authorized:   Total visits: 12 land; 7 aquatic    Time In: 1000  Time Out: 1100  Total Billable Time: 15 minutes    Precautions: HTN, DM, peripheral neuropathy    PROCEDURES/IMAGIN2019 Left L5-S1 transforaminal KARLA  Subjective     Pt reports: The injection is no longer working; I had a lot of pain yesterday after doing a lot of sitting while driving & I took my pain medication  She was compliant with home exercise program.  Response to previous treatment: Muscle soreness x 1 day  Functional change: none, walks into clinic    Pain: 4/10  Location: right back  & leg    Objective     Adriana received aquatic therapeutic exercises to develop strength, endurance, ROM, flexibility, posture and core stabilization for 50  minutes including:    WARMUP x 2 laps each  Walk fwd/back/side    STRETCHES 2 x 20sec  Hamstring  Piriformis  Gastroc    LE EX x 20  Mini squat  Heel raise with GS  Hip abd/add  Hip flex/ext  Lunge forward   Lunge lateral chest press blue disc  Step up forward x 12 each LE  Step up lateral x 12 each LE    UE EX/CORE  x 20  Shoulder flex/ext TA activation APO modified  tandem-np  Shoulder horizontal abd/add TA activation APO modified tandem-np  Shoulder abd/add TA activation APO modified tandem-np  Lower truncal rotation // bars     ENDURANCE  Bicycle // bars x 6 min    COOL DOWN x 1 lap each  Walk fwd/back/side      Home Exercises Provided and Patient Education Provided     Education provided:   Role of aquatic therapy  Hydration post therapy      Written Home Exercises Provided: Patient instructed to cont prior HEP.  Exercises were reviewed and Adriana was able to demonstrate them prior to the end of the session.  Adriana demonstrated good  understanding of the education provided.     See EMR under Patient Instructions for exercises provided prior visit.; 11/1/2019(dead bug & bird dog)  Assessment     Patient required intermittent verbal cues  for proper technique, slow mauri of exercises, movement in pain free range & TA activation especially with lunges . Patient did well post instruction. Patient was able to tolerate increased reps of lunges  with only minimal c/o increased back tightness. Does get relief of pain with lower truncal rotation rotation.. Patient will benefit from aquatic environment to unload  spine for strengthening, core stabilization & postural awareness.      Adriana is progressing well towards her goals.   Pt prognosis is Fair.     Pt will continue to benefit from skilled aquatic outpatient physical therapy to address the deficits listed in the problem list box on initial evaluation, provide pt/family education and to maximize pt's level of independence in the home and community environment.     Pt's spiritual, cultural and educational needs considered and pt agreeable to plan of care and goals.    Anticipated barriers to physical therapy: none    Goals:   Short Term Goals: 6 weeks  1. Patient will be independent with HEP & progressions (progressing- not met)  2. Patient will achieve TUG score of 13 to demonstrate improved mobility (progressing- not  met)     Long Term Goals: 12 weeks  1. Patient will achieve FOTO limitations score of 46%  To demonstrate improved function & decreased pain (progressing- not met)  2. Patient will achieve FTSTS score of 18 or less to demonstrate improved transfers & endurance (progressing- not met)  3. Patient will increase R hip MMT to 4-/5 to demonstrate improved strength (progressing- not met)      Plan     Increase reps & exercises slowly to prevent pain    Outpatient PT 1-2x/wk for 12 weeks to include manual therapy, patient education, therapeutic exercise, aquatic therapy. Patient may be seen by PTA as part of the rehabilitation team.    Delaney Mace, PT

## 2019-11-26 ENCOUNTER — CLINICAL SUPPORT (OUTPATIENT)
Dept: REHABILITATION | Facility: HOSPITAL | Age: 64
End: 2019-11-26
Payer: MEDICARE

## 2019-11-26 DIAGNOSIS — R26.2 DIFFICULTY WALKING: ICD-10-CM

## 2019-11-26 DIAGNOSIS — M53.86 DECREASED ROM OF LUMBAR SPINE: ICD-10-CM

## 2019-11-26 DIAGNOSIS — M54.41 ACUTE RIGHT-SIDED LOW BACK PAIN WITH RIGHT-SIDED SCIATICA: ICD-10-CM

## 2019-11-26 PROCEDURE — 97113 AQUATIC THERAPY/EXERCISES: CPT

## 2019-11-26 NOTE — PROGRESS NOTES
Physical Therapy Daily Treatment Note     Name: Adriana Paula  Clinic Number: [id number]    Therapy Diagnosis:   Encounter Diagnoses   Name Primary?    Acute right-sided low back pain with right-sided sciatica     Decreased ROM of lumbar spine     Difficulty walking      Physician: Cece Dowell, *    Visit Date: 2019  Physician Orders: PT Eval and Treat   Medical Diagnosis from Referral:   M54.41,G89.29 (ICD-10-CM) - Chronic bilateral low back pain with right-sided sciatica   M51.36 (ICD-10-CM) - DDD (degenerative disc disease), lumbar   M47.26 (ICD-10-CM) - Other spondylosis with radiculopathy, lumbar region   M54.16 (ICD-10-CM) - Lumbar radiculopathy         Evaluation Date: 2019; reassess 2019  Authorization Period Expiration: 2019  Plan of Care Expiration: 2019  Visit # / Visits authorized:   Total visits: 12 land; 8 aquatic    Time In: 1400  Time Out: 1500  Total Billable Time: 30 minutes    Precautions: HTN, DM, peripheral neuropathy    PROCEDURES/IMAGIN2019 Left L5-S1 transforaminal KARLA  Subjective     Pt reports: The back is doing better today  She was compliant with home exercise program.  Response to previous treatment: Muscle soreness x 1 day  Functional change: none, walks into clinic    Pain: 3/10  Location: right back  & leg    Objective     Adriana received aquatic therapeutic exercises to develop strength, endurance, ROM, flexibility, posture and core stabilization for 50  minutes including:    WARMUP x 2 laps each  Walk fwd/back/side    STRETCHES 2 x 20sec  Hamstring  Piriformis  Gastroc    LE EX x 20  Mini squat  Heel raise with GS  Hip abd/add  Hip flex/ext  Lunge forward      Lunge lateral chest press blue disc  Step up forward Box x 12 each LE  Step up lateral  Box x 12 each LE    UE EX/CORE  x 20  Shoulder flex/ext TA activation APO modified tandem-np  Shoulder horizontal abd/add TA activation APO modified tandem-np  Shoulder abd/add TA  activation APO modified tandem-np  Lower truncal rotation // bars   Mini squat hold chest press blue disc(posterior support pool wall)      ENDURANCE  Bicycle // bars x 6 min    COOL DOWN x 1 lap each  Walk fwd/back/side      Home Exercises Provided and Patient Education Provided     Education provided:   Role of aquatic therapy  Hydration post therapy      Written Home Exercises Provided: Patient instructed to cont prior HEP.  Exercises were reviewed and Adriana was able to demonstrate them prior to the end of the session.  Adriana demonstrated good  understanding of the education provided.     See EMR under Patient Instructions for exercises provided prior visit.; 11/1/2019(dead bug & bird dog)  Assessment     Patient progressing with aquatic therapy but still requires intermittent verbal cues for movement in pain free range & TA activation especially with lunges . Patient did well post instruction. Patient was able to tolerate addition of step ups on box  with only minimal c/o increased back tightness. Patient will benefit from aquatic environment to unload  spine for strengthening, core stabilization & postural awareness.      Adriana is progressing well towards her goals.   Pt prognosis is Fair.     Pt will continue to benefit from skilled aquatic outpatient physical therapy to address the deficits listed in the problem list box on initial evaluation, provide pt/family education and to maximize pt's level of independence in the home and community environment.     Pt's spiritual, cultural and educational needs considered and pt agreeable to plan of care and goals.    Anticipated barriers to physical therapy: none    Goals:   Short Term Goals: 6 weeks  1. Patient will be independent with HEP & progressions (progressing- not met)  2. Patient will achieve TUG score of 13 to demonstrate improved mobility (progressing- not met)     Long Term Goals: 12 weeks  1. Patient will achieve FOTO limitations score of 46%  To  demonstrate improved function & decreased pain (progressing- not met)  2. Patient will achieve FTSTS score of 18 or less to demonstrate improved transfers & endurance (progressing- not met)  3. Patient will increase R hip MMT to 4-/5 to demonstrate improved strength (progressing- not met)      Plan     Increase reps & exercises slowly to prevent pain    Outpatient PT 1-2x/wk for 12 weeks to include manual therapy, patient education, therapeutic exercise, aquatic therapy. Patient may be seen by PTA as part of the rehabilitation team.    Delaney Mace, PT

## 2019-12-02 ENCOUNTER — CLINICAL SUPPORT (OUTPATIENT)
Dept: REHABILITATION | Facility: HOSPITAL | Age: 64
End: 2019-12-02
Payer: MEDICARE

## 2019-12-02 DIAGNOSIS — M53.86 DECREASED ROM OF LUMBAR SPINE: ICD-10-CM

## 2019-12-02 DIAGNOSIS — R26.2 DIFFICULTY WALKING: ICD-10-CM

## 2019-12-02 DIAGNOSIS — M54.41 ACUTE RIGHT-SIDED LOW BACK PAIN WITH RIGHT-SIDED SCIATICA: ICD-10-CM

## 2019-12-02 PROCEDURE — 97113 AQUATIC THERAPY/EXERCISES: CPT

## 2019-12-02 NOTE — PROGRESS NOTES
Physical Therapy Daily Treatment Note     Name: Adriana Paula  Clinic Number: [id number]    Therapy Diagnosis:   Encounter Diagnoses   Name Primary?    Acute right-sided low back pain with right-sided sciatica     Decreased ROM of lumbar spine     Difficulty walking      Physician: Cece Dowell, *    Visit Date: 2019  Physician Orders: PT Eval and Treat   Medical Diagnosis from Referral:   M54.41,G89.29 (ICD-10-CM) - Chronic bilateral low back pain with right-sided sciatica   M51.36 (ICD-10-CM) - DDD (degenerative disc disease), lumbar   M47.26 (ICD-10-CM) - Other spondylosis with radiculopathy, lumbar region   M54.16 (ICD-10-CM) - Lumbar radiculopathy         Evaluation Date: 2019; reassess 2019  Authorization Period Expiration: 2019  Plan of Care Expiration: 2019  Visit # / Visits authorized:   Total visits: 12 land; 9 aquatic    Time In: 1500  Time Out: 1600  Total Billable Time: 30 minutes    Precautions: HTN, DM, peripheral neuropathy    PROCEDURES/[email or web address] Left L5-S1 transforaminal KARLA  Subjective     Pt reports: 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  Response to previous treatment: Muscle soreness x 1 day  Functional change: none, walks into clinic    Pain: 5/10; 4/10 during therapy  Location: right back & leg    Objective     Adriana received aquatic therapeutic exercises to develop strength, endurance, ROM, flexibility, posture and core stabilization for 50  minutes including:    WARMUP x 2 laps each  Walk fwd/back/side    STRETCHES 2 x 20sec  Hamstring  Piriformis  Gastroc    LE EX x 20  Mini squat  Heel raise with GS  Hip abd/add  Hip flex/ext  Lunge forward      Lunge lateral chest press blue disc  Step up forward Box x 12 each LE  Step up/over lateral  Box x 12   Step over step for 4 steps x 6 reps    UE EX/CORE  x 20  Shoulder flex/ext TA  activation APO modified tandem-np  Shoulder horizontal abd/add TA activation APO modified tandem-np  Shoulder abd/add TA activation APO modified tandem-np  Lower truncal rotation // bars   Mini squat hold chest press blue disc(posterior support pool wall)  Rows orange tubing  Shoulder extension orange tubing      ENDURANCE  Bicycle // bars x 7 min    COOL DOWN x 1 lap each  Walk fwd/back/side      Home Exercises Provided and Patient Education Provided     Education provided:   Role of aquatic therapy  Hydration post therapy      Written Home Exercises Provided: Patient instructed to cont prior HEP.  Exercises were reviewed and Adriana was able to demonstrate them prior to the end of the session.  Adriana demonstrated good  understanding of the education provided.     See EMR under Patient Instructions for exercises provided prior visit.; 11/1/2019(dead bug & bird dog)  Assessment     Patient progressing with aquatic therapy but still requires intermittent verbal cues for movement in pain free range & TA activation especially with lateral lunges . Patient did well post instruction. Patient was able to tolerate addition of gait going up/down multiple steps  with only minimal c/o increased back tightness. Patient does get relief of some back pain while in the pool 5/10 to 4/10.Patient will benefit from aquatic environment to unload spine for strengthening, core stabilization & postural awareness.      Adriana is progressing well towards her goals.   Pt prognosis is Fair.     Pt will continue to benefit from skilled aquatic outpatient physical therapy to address the deficits listed in the problem list box on initial evaluation, provide pt/family education and to maximize pt's level of independence in the home and community environment.     Pt's spiritual, cultural and educational needs considered and pt agreeable to plan of care and goals.    Anticipated barriers to physical therapy: none    Goals:   Short Term Goals: 6  weeks  1. Patient will be independent with HEP & progressions (progressing- not met)  2. Patient will achieve TUG score of 13 to demonstrate improved mobility (progressing- not met)     Long Term Goals: 12 weeks  1. Patient will achieve FOTO limitations score of 46%  To demonstrate improved function & decreased pain (progressing- not met)  2. Patient will achieve FTSTS score of 18 or less to demonstrate improved transfers & endurance (progressing- not met)  3. Patient will increase R hip MMT to 4-/5 to demonstrate improved strength (progressing- not met)      Plan     Increase reps & exercises slowly to prevent pain    Outpatient PT 1-2x/wk for 12 weeks to include manual therapy, patient education, therapeutic exercise, aquatic therapy. Patient may be seen by PTA as part of the rehabilitation team.    Delaney Mace, PT

## 2019-12-06 ENCOUNTER — CLINICAL SUPPORT (OUTPATIENT)
Dept: REHABILITATION | Facility: HOSPITAL | Age: 64
End: 2019-12-06
Payer: MEDICARE

## 2019-12-06 DIAGNOSIS — R26.2 DIFFICULTY WALKING: ICD-10-CM

## 2019-12-06 DIAGNOSIS — M53.86 DECREASED ROM OF LUMBAR SPINE: ICD-10-CM

## 2019-12-06 DIAGNOSIS — M54.41 ACUTE RIGHT-SIDED LOW BACK PAIN WITH RIGHT-SIDED SCIATICA: ICD-10-CM

## 2019-12-06 PROCEDURE — 97113 AQUATIC THERAPY/EXERCISES: CPT

## 2019-12-06 NOTE — PROGRESS NOTES
Physical Therapy Daily Treatment Note     Name: Adriana Paula  Clinic Number: [id number]    Therapy Diagnosis:   Encounter Diagnoses   Name Primary?    Acute right-sided low back pain with right-sided sciatica     Decreased ROM of lumbar spine     Difficulty walking      Physician: Cece Dowell, *    Visit Date: 2019  Physician Orders: PT Eval and Treat   Medical Diagnosis from Referral:   M54.41,G89.29 (ICD-10-CM) - Chronic bilateral low back pain with right-sided sciatica   M51.36 (ICD-10-CM) - DDD (degenerative disc disease), lumbar   M47.26 (ICD-10-CM) - Other spondylosis with radiculopathy, lumbar region   M54.16 (ICD-10-CM) - Lumbar radiculopathy         Evaluation Date: 2019; reassess 2019  Authorization Period Expiration: 2019  Plan of Care Expiration: 2019  Visit # / Visits authorized:   Total visits: 12 land; 9 aquatic    Time In: 1400  Time Out: 1500  Total Billable Time: 15 minutes    Precautions: HTN, DM, peripheral neuropathy    [email or web address] Left L5-S1 transforaminal KARLA  Subjective     Pt reports: I hurt a lot after last time but it went away  She was compliant with home exercise program.  Response to previous treatment: Muscle soreness x 1 day  Functional change: none, walks into clinic    Pain: 3/10  Location: right back & leg    Objective     Adriana received aquatic therapeutic exercises to develop strength, endurance, ROM, flexibility, posture and core stabilization for 50  minutes including:    WARMUP x 2 laps each  Walk fwd/back/side    STRETCHES 2 x 20sec  Hamstring  Piriformis  Gastroc    LE EX x 20  Mini squat  Heel raise with GS  Hip abd/add  Hip flex/ext  Lunge forward      Lunge lateral chest press blue disc  Step up/over forward Box x 12 each LE  Step up/over lateral  Box x 12   Step over step for 4 steps x 8 reps    UE EX/CORE  x 20  Shoulder flex/ext TA activation APO modified tandem-np  Shoulder horizontal abd/add TA  activation APO modified tandem-np  Shoulder abd/add TA activation APO modified tandem-np  Lower truncal rotation // bars   Mini squat hold chest press blue disc(posterior support pool wall)  Rows orange tubing  Shoulder extension orange tubing      ENDURANCE  Bicycle // bars x 7 min    COOL DOWN x 1 lap each  Walk fwd/back/side      Home Exercises Provided and Patient Education Provided     Education provided:   Role of aquatic therapy  Hydration post therapy      Written Home Exercises Provided: Patient instructed to cont prior HEP.  Exercises were reviewed and Adriana was able to demonstrate them prior to the end of the session.  Adriana demonstrated good  understanding of the education provided.     See EMR under Patient Instructions for exercises provided prior visit.; 11/1/2019(dead bug & bird dog)  Assessment   FOTO limitation score 60%(was 63%) indicative of improved function & decreased pain.  Patient progressing with aquatic therapy & no longer needs cues to TA activation. . Patient was able to tolerate increased reps of step over step  with only minimal c/o increased back tightness.Patient will benefit from aquatic environment to unload spine for strengthening, core stabilization & postural awareness.      Adriana is progressing well towards her goals.   Pt prognosis is Fair.     Pt will continue to benefit from skilled aquatic outpatient physical therapy to address the deficits listed in the problem list box on initial evaluation, provide pt/family education and to maximize pt's level of independence in the home and community environment.     Pt's spiritual, cultural and educational needs considered and pt agreeable to plan of care and goals.    Anticipated barriers to physical therapy: none    Goals:   Short Term Goals: 6 weeks  1. Patient will be independent with HEP & progressions (progressing- not met)  2. Patient will achieve TUG score of 13 to demonstrate improved mobility (progressing- not  met)     Long Term Goals: 12 weeks  1. Patient will achieve FOTO limitations score of 46%  To demonstrate improved function & decreased pain (progressing- not met)  2. Patient will achieve FTSTS score of 18 or less to demonstrate improved transfers & endurance (progressing- not met)  3. Patient will increase R hip MMT to 4-/5 to demonstrate improved strength (progressing- not met)      Plan     Increase reps & exercises slowly to prevent pain    Outpatient PT 1-2x/wk for 12 weeks to include manual therapy, patient education, therapeutic exercise, aquatic therapy. Patient may be seen by PTA as part of the rehabilitation team.    Delaney Mace, PT

## 2019-12-09 ENCOUNTER — CLINICAL SUPPORT (OUTPATIENT)
Dept: REHABILITATION | Facility: HOSPITAL | Age: 64
End: 2019-12-09
Payer: MEDICARE

## 2019-12-09 DIAGNOSIS — R26.2 DIFFICULTY WALKING: ICD-10-CM

## 2019-12-09 DIAGNOSIS — M54.41 ACUTE RIGHT-SIDED LOW BACK PAIN WITH RIGHT-SIDED SCIATICA: ICD-10-CM

## 2019-12-09 DIAGNOSIS — M53.86 DECREASED ROM OF LUMBAR SPINE: ICD-10-CM

## 2019-12-09 PROCEDURE — 97113 AQUATIC THERAPY/EXERCISES: CPT

## 2019-12-09 NOTE — PROGRESS NOTES
Physical Therapy Daily Treatment Note     Name: Adriana Paula  Clinic Number: [id number]    Therapy Diagnosis:   Encounter Diagnoses   Name Primary?    Acute right-sided low back pain with right-sided sciatica     Decreased ROM of lumbar spine     Difficulty walking      Physician: Cece Dowell, *    Visit Date: 2019  Physician Orders: PT Eval and Treat   Medical Diagnosis from Referral:   M54.41,G89.29 (ICD-10-CM) - Chronic bilateral low back pain with right-sided sciatica   M51.36 (ICD-10-CM) - DDD (degenerative disc disease), lumbar   M47.26 (ICD-10-CM) - Other spondylosis with radiculopathy, lumbar region   M54.16 (ICD-10-CM) - Lumbar radiculopathy         Evaluation Date: 2019; reassess 2019  Authorization Period Expiration: 2019  Plan of Care Expiration: 2019  Visit # / Visits authorized: 10/ 20  Total visits: 12 land; 10 aquatic    Time In: 1400  Time Out: 1500  Total Billable Time: 30 minutes    Precautions: HTN, DM, peripheral neuropathy    [email or web address] Left L5-S1 transforaminal KARLA  Subjective     Pt reports: My migrane headache is bothering me more than my back  She was compliant with home exercise program.  Response to previous treatment: Muscle soreness x 1 day  Functional change: none, walks into clinic    Pain: 3/10 R shila /leg; /10 headache  Location: right back & leg    Objective     Adriana received aquatic therapeutic exercises to develop strength, endurance, ROM, flexibility, posture and core stabilization for 50  minutes including:    WARMUP x 2 laps each  Walk fwd/back/side    STRETCHES 2 x 20sec  Hamstring  Piriformis  Gastroc    LE EX x 20  Mini squat  Heel raise with GS  Hip abd/add  Hip flex/ext  Lunge forward      Lunge lateral chest press blue disc  Step up/over forward Box x 12 each LE  Step up/over lateral  Box x 12   Step over step for 4 steps x 8 reps    UE EX/CORE  x 20  Shoulder flex/ext TA activation APO modified  tandem-np  Shoulder horizontal abd/add TA activation APO modified tandem-np  Shoulder abd/add TA activation APO modified tandem-np  Lower truncal rotation // bars   Mini squat hold chest press blue disc(posterior support pool wall)  Rows orange tubing  Shoulder extension orange tubing      ENDURANCE  Bicycle // bars x 7 min    COOL DOWN x 1 lap each  Walk fwd/back/side      Home Exercises Provided and Patient Education Provided     Education provided:   Role of aquatic therapy  Hydration post therapy      Written Home Exercises Provided: Patient instructed to cont prior HEP.  Exercises were reviewed and Adriana was able to demonstrate them prior to the end of the session.  Adriana demonstrated good  understanding of the education provided.     See EMR under Patient Instructions for exercises provided prior visit.; 11/1/2019(dead bug & bird dog)  Assessment     Patient progressing with aquatic therapy & but need intermittent cueing to move in pain free range . Patient was able to tolerate exercise program with only minimal c/o back tightness..Patient will benefit from aquatic environment to unload spine for strengthening, core stabilization & postural awareness.      Adriana is progressing well towards her goals.   Pt prognosis is Fair.     Pt will continue to benefit from skilled aquatic outpatient physical therapy to address the deficits listed in the problem list box on initial evaluation, provide pt/family education and to maximize pt's level of independence in the home and community environment.     Pt's spiritual, cultural and educational needs considered and pt agreeable to plan of care and goals.    Anticipated barriers to physical therapy: none    Goals:   Short Term Goals: 6 weeks  1. Patient will be independent with HEP & progressions (progressing- not met)  2. Patient will achieve TUG score of 13 to demonstrate improved mobility (progressing- not met)     Long Term Goals: 12 weeks  1. Patient will achieve  FOTO limitations score of 46%  To demonstrate improved function & decreased pain (progressing- not met)  2. Patient will achieve FTSTS score of 18 or less to demonstrate improved transfers & endurance (progressing- not met)  3. Patient will increase R hip MMT to 4-/5 to demonstrate improved strength (progressing- not met)      Plan     Increase reps & exercises slowly to prevent pain    Outpatient PT 1-2x/wk for 12 weeks to include manual therapy, patient education, therapeutic exercise, aquatic therapy. Patient may be seen by PTA as part of the rehabilitation team.    Delaney Mace, PT

## 2019-12-12 NOTE — PROGRESS NOTES
Physical Therapy Daily Treatment Note     Name: Adriana Paula  Clinic Number: [id number]    Therapy Diagnosis:   Encounter Diagnoses   Name Primary?    Acute right-sided low back pain with right-sided sciatica     Decreased ROM of lumbar spine     Difficulty walking      Physician: Cece Dowell, *    Visit Date: 2019  Physician Orders: PT Eval and Treat   Medical Diagnosis from Referral:   M54.41,G89.29 (ICD-10-CM) - Chronic bilateral low back pain with right-sided sciatica   M51.36 (ICD-10-CM) - DDD (degenerative disc disease), lumbar   M47.26 (ICD-10-CM) - Other spondylosis with radiculopathy, lumbar region   M54.16 (ICD-10-CM) - Lumbar radiculopathy         Evaluation Date: 2019; reassess 2019  Authorization Period Expiration: 2019  Plan of Care Expiration: 2019  Visit # / Visits authorized:   Total visits: 12 land; 11 aquatic    Time In: 1400  Time Out: 1500  Total Billable Time: 30 minutes    Precautions: HTN, DM, peripheral neuropathy    PROCEDURES/[email or web address] Left L5-S1 transforaminal KARLA  Subjective     Pt reports: my sinuses are better & not causing any more migrane headaches  She was compliant with home exercise program.  Response to previous treatment: Muscle soreness x 1 day  Functional change: none, walks into clinic    Pain: 3/10  Location: right back & leg    Objective     Adriana received aquatic therapeutic exercises to develop strength, endurance, ROM, flexibility, posture and core stabilization for 50  minutes including:    WARMUP x 2 laps each  Walk fwd/back/side    STRETCHES 2 x 20sec  Hamstring  Piriformis  Gastroc    LE EX x 20  Mini squat  Heel raise with GS  Hip abd/add  Hip flex/ext  Lunge forward      Lunge lateral chest press blue disc  Step up/over forward Box x 12  Step up/over lateral  Box x 12   Step over step for 4 steps x 8 reps    UE EX/CORE  x 20  Shoulder flex/ext TA activation APO modified tandem-np  Shoulder horizontal  abd/add TA activation APO modified tandem-np  Shoulder abd/add TA activation APO modified tandem-np  Lower truncal rotation // bars   Mini squat hold chest press blue disc(posterior support pool wall)  Rows orange tubing  Shoulder extension orange tubing  Truncal rotation double strand orange tubing x 10 each    ENDURANCE  Bicycle // bars x 7 min    COOL DOWN x 1 lap each  Walk fwd/back/side      Home Exercises Provided and Patient Education Provided     Education provided:   Role of aquatic therapy  Hydration post therapy      Written Home Exercises Provided: Patient instructed to cont prior HEP.  Exercises were reviewed and Adriana was able to demonstrate them prior to the end of the session.  Adriana demonstrated good  understanding of the education provided.     See EMR under Patient Instructions for exercises provided prior visit.; 11/1/2019(dead bug & bird dog)  Assessment     Patient progressing with aquatic therapy yet still needs intermittent verbal cues to slow mauri of exercise for maximal postural control. Patient was able to tolerate exercise program with no c/o back tightness or increase in pain..Patient will benefit from aquatic environment to unload spine for strengthening, core stabilization & postural awareness.      Adriana is progressing well towards her goals.   Pt prognosis is Fair.     Pt will continue to benefit from skilled aquatic outpatient physical therapy to address the deficits listed in the problem list box on initial evaluation, provide pt/family education and to maximize pt's level of independence in the home and community environment.     Pt's spiritual, cultural and educational needs considered and pt agreeable to plan of care and goals.    Anticipated barriers to physical therapy: none    Goals:   Short Term Goals: 6 weeks  1. Patient will be independent with HEP & progressions (progressing- not met)  2. Patient will achieve TUG score of 13 to demonstrate improved mobility  (progressing- not met)     Long Term Goals: 12 weeks  1. Patient will achieve FOTO limitations score of 46%  To demonstrate improved function & decreased pain (progressing- not met)  2. Patient will achieve FTSTS score of 18 or less to demonstrate improved transfers & endurance (progressing- not met)  3. Patient will increase R hip MMT to 4-/5 to demonstrate improved strength (progressing- not met)      Plan     Increase reps & exercises slowly to prevent pain    Outpatient PT 1-2x/wk for 12 weeks to include manual therapy, patient education, therapeutic exercise, aquatic therapy. Patient may be seen by PTA as part of the rehabilitation team.    Delaney Mace, PT

## 2019-12-13 ENCOUNTER — CLINICAL SUPPORT (OUTPATIENT)
Dept: REHABILITATION | Facility: HOSPITAL | Age: 64
End: 2019-12-13
Payer: MEDICARE

## 2019-12-13 DIAGNOSIS — M53.86 DECREASED ROM OF LUMBAR SPINE: ICD-10-CM

## 2019-12-13 DIAGNOSIS — M54.41 ACUTE RIGHT-SIDED LOW BACK PAIN WITH RIGHT-SIDED SCIATICA: ICD-10-CM

## 2019-12-13 DIAGNOSIS — R26.2 DIFFICULTY WALKING: ICD-10-CM

## 2019-12-13 PROCEDURE — 97113 AQUATIC THERAPY/EXERCISES: CPT

## 2019-12-16 ENCOUNTER — CLINICAL SUPPORT (OUTPATIENT)
Dept: REHABILITATION | Facility: HOSPITAL | Age: 64
End: 2019-12-16
Payer: MEDICARE

## 2019-12-16 DIAGNOSIS — M53.86 DECREASED ROM OF LUMBAR SPINE: ICD-10-CM

## 2019-12-16 DIAGNOSIS — M54.41 ACUTE RIGHT-SIDED LOW BACK PAIN WITH RIGHT-SIDED SCIATICA: ICD-10-CM

## 2019-12-16 DIAGNOSIS — R26.2 DIFFICULTY WALKING: ICD-10-CM

## 2019-12-16 PROCEDURE — 97113 AQUATIC THERAPY/EXERCISES: CPT

## 2019-12-16 NOTE — PROGRESS NOTES
Physical Therapy Daily Treatment Note     Name: Adriana Paula  Clinic Number: [id number]    Therapy Diagnosis:   Encounter Diagnoses   Name Primary?    Acute right-sided low back pain with right-sided sciatica     Decreased ROM of lumbar spine     Difficulty walking      Physician: Cece Dowell, *    Visit Date: 2019  Physician Orders: PT Eval and Treat   Medical Diagnosis from Referral:   M54.41,G89.29 (ICD-10-CM) - Chronic bilateral low back pain with right-sided sciatica   M51.36 (ICD-10-CM) - DDD (degenerative disc disease), lumbar   M47.26 (ICD-10-CM) - Other spondylosis with radiculopathy, lumbar region   M54.16 (ICD-10-CM) - Lumbar radiculopathy         Evaluation Date: 2019; reassess 2019  Authorization Period Expiration: 2019  Plan of Care Expiration: 2019  Visit # / Visits authorized:   Total visits: 12 land; 12 aquatic    Time In: 1400  Time Out: 1500  Total Billable Time: 15 minutes    Precautions: HTN, DM, peripheral neuropathy    PROCEDURES/IMAGIN2019 Left L5-S1 transforaminal KARLA  Subjective     Pt reports: I went dancing yesterday & the back did not hurt too much  She was compliant with home exercise program.  Response to previous treatment: Muscle soreness x 1 day  Functional change: none, walks into clinic    Pain: 3/10  Location: right back & leg    Objective     Adriana received aquatic therapeutic exercises to develop strength, endurance, ROM, flexibility, posture and core stabilization for 50  minutes including:    WARMUP x 2 laps each  Walk fwd/back/side    STRETCHES 2 x 20sec  Hamstring  Piriformis  Gastroc    LE EX x 20  Mini squat  Heel raise with GS  Hip abd/add  Hip flex/ext  Lunge forward      Lunge lateral chest press blue disc  Step up/over forward Box x 12  Step up/over lateral  Box x 12   Step over step for 4 steps x 8 reps    UE EX/CORE  x 20  Shoulder flex/ext TA activation APO modified tandem-np  Shoulder horizontal abd/add TA  activation APO modified tandem-np  Shoulder abd/add TA activation APO modified tandem-np  Lower truncal rotation // bars   Mini squat hold chest press blue disc(posterior support pool wall)  Rows orange tubing  Shoulder extension orange tubing  Truncal rotation double strand orange tubing x 10 each    ENDURANCE  Bicycle // bars x 7 min    COOL DOWN x 1 lap each  Walk fwd/back/side      Home Exercises Provided and Patient Education Provided     Education provided:   Role of aquatic therapy  Hydration post therapy      Written Home Exercises Provided: Patient instructed to cont prior HEP.  Exercises were reviewed and Adriana was able to demonstrate them prior to the end of the session.  Adriana demonstrated good  understanding of the education provided.     See EMR under Patient Instructions for exercises provided prior visit.; 11/1/2019(dead bug & bird dog)  Assessment     Patient progressing with aquatic therapy yet still needs intermittent verbal cues to slow mauri of exercise for maximal postural control. Patient was able to tolerate exercise program with no c/o back tightness or increase in pain..Patient will benefit from aquatic environment to unload spine for strengthening, core stabilization & postural awareness.      Adriana is progressing well towards her goals.   Pt prognosis is Fair.     Pt will continue to benefit from skilled aquatic outpatient physical therapy to address the deficits listed in the problem list box on initial evaluation, provide pt/family education and to maximize pt's level of independence in the home and community environment.     Pt's spiritual, cultural and educational needs considered and pt agreeable to plan of care and goals.    Anticipated barriers to physical therapy: none    Goals:   Short Term Goals: 6 weeks  1. Patient will be independent with HEP & progressions (progressing- not met)  2. Patient will achieve TUG score of 13 to demonstrate improved mobility (progressing-  not met)     Long Term Goals: 12 weeks  1. Patient will achieve FOTO limitations score of 46%  To demonstrate improved function & decreased pain (progressing- not met)  2. Patient will achieve FTSTS score of 18 or less to demonstrate improved transfers & endurance (progressing- not met)  3. Patient will increase R hip MMT to 4-/5 to demonstrate improved strength (progressing- not met)      Plan     Increase reps & exercises slowly to prevent pain    Outpatient PT 1-2x/wk for 12 weeks to include manual therapy, patient education, therapeutic exercise, aquatic therapy. Patient may be seen by PTA as part of the rehabilitation team.    Delaney Mace, PT

## 2019-12-20 ENCOUNTER — CLINICAL SUPPORT (OUTPATIENT)
Dept: REHABILITATION | Facility: HOSPITAL | Age: 64
End: 2019-12-20
Payer: MEDICARE

## 2019-12-20 DIAGNOSIS — J01.00 ACUTE NON-RECURRENT MAXILLARY SINUSITIS: ICD-10-CM

## 2019-12-20 DIAGNOSIS — M53.86 DECREASED ROM OF LUMBAR SPINE: ICD-10-CM

## 2019-12-20 DIAGNOSIS — R26.2 DIFFICULTY WALKING: ICD-10-CM

## 2019-12-20 DIAGNOSIS — M54.41 ACUTE RIGHT-SIDED LOW BACK PAIN WITH RIGHT-SIDED SCIATICA: ICD-10-CM

## 2019-12-20 PROCEDURE — 97113 AQUATIC THERAPY/EXERCISES: CPT

## 2019-12-20 RX ORDER — FLUTICASONE PROPIONATE 50 MCG
SPRAY, SUSPENSION (ML) NASAL
Qty: 48 G | Refills: 1 | Status: SHIPPED | OUTPATIENT
Start: 2019-12-20 | End: 2020-10-09 | Stop reason: SDUPTHER

## 2019-12-20 NOTE — PLAN OF CARE
"  Outpatient Therapy Updated Plan of Care     Visit Date: 12/20/2019  Name: Adriana Paula  Clinic Number: [id number]    Therapy Diagnosis:   Encounter Diagnoses   Name Primary?    Acute right-sided low back pain with right-sided sciatica     Decreased ROM of lumbar spine     Difficulty walking      Physician: Cece Dowell, *    Physician Orders: eval & treat  Medical Diagnosis: chronic LBP with R sciatica  Evaluation Date: 8/14/2019, reassess 9/30/3019    Total Visits Received: 25  Cancelled Visits: 3(mother's death)  No Show Visits: none    Current Certification Period:  9/30/30219 to 12/23/2019  Precautions:  none  Visits from Evaluation Date:  25  Functional Level Prior to Evaluation:  I with mobility but gait distance limited 2/2 pain    Subjective     Update: "I did a lot of standing today & my left side hurts     Objective     Update: 4/5 strength BLEs with exception L hip flex/ext 2/2 pain    Assessment     Update: Improvement with strength & ability to tolerate 45-60 minutes of therapy    Previous Short Term Goals Status:   Modified- progressing  New Short Term Goals Status:   NA  Long Term Goal Status:   modified:  progressing  Reasons for Recertification of Therapy:   Focus on core & LE strengthening    Plan     Updated Certification Period: 12/20/2019 to 31/13/2020  Recommended Treatment Plan: 1-2 times per week for 12 weeks: Aquatic Therapy, Patient Education and Therapeutic Exercise  Other Recommendations: none    Delaney Mace, PT  12/20/2019      "

## 2019-12-20 NOTE — PROGRESS NOTES
"Physical Therapy Daily Treatment Note     Name: Adriana Paula  Clinic Number: [id number]    Therapy Diagnosis:   Encounter Diagnoses   Name Primary?    Acute right-sided low back pain with right-sided sciatica     Decreased ROM of lumbar spine     Difficulty walking      Physician: Cece Dowell, *    Visit Date: 2019  Physician Orders: PT Eval and Treat   Medical Diagnosis from Referral:   M54.41,G89.29 (ICD-10-CM) - Chronic bilateral low back pain with right-sided sciatica   M51.36 (ICD-10-CM) - DDD (degenerative disc disease), lumbar   M47.26 (ICD-10-CM) - Other spondylosis with radiculopathy, lumbar region   M54.16 (ICD-10-CM) - Lumbar radiculopathy         Evaluation Date: 2019; reassess 2019; reassess 2019  Authorization Period Expiration: 2019  Plan of Care Expiration: 3/13/2019  Visit # / Visits authorized:   Total visits: 12 land; 13 aquatic    Time In: 1400  Time Out: 1500  Total Billable Time: 30 minutes    Precautions: HTN, DM, peripheral neuropathy    PROCEDURES/IMAGIN2019 Left L5-S1 transforaminal KARLA  Subjective     Pt reports: I did a lot of standing today & my left side hurts"   She was compliant with home exercise program.  Response to previous treatment: Muscle soreness x 1 day  Functional change: none, walks into clinic    Pain: 4/10  Location: right back & leg    Objective     Adriana received aquatic therapeutic exercises to develop strength, endurance, ROM, flexibility, posture and core stabilization for 50  minutes including:    WARMUP x 2 laps each  Walk fwd/back/side    STRETCHES 2 x 20sec  Hamstring  Piriformis  Gastroc    LE EX x 20  Mini squat  Heel raise with GS  Hip abd/add  Hip flex/ext  Lunge forward      Lunge lateral chest press blue disc  Step up/over forward Box x 12  Step up/over lateral  Box x 12   Step over step for 4 steps x 8 reps    UE EX/CORE  x 20  Shoulder flex/ext TA activation APO modified tandem-np  Shoulder " horizontal abd/add TA activation APO modified tandem-np  Shoulder abd/add TA activation APO modified tandem-np  Lower truncal rotation // bars   Mini squat hold chest press blue disc(posterior support pool wall)  Rows orange tubing  Shoulder extension orange tubing  Truncal rotation double strand orange tubing x 10 each  Bicep curls orange tubing    ENDURANCE  Bicycle // bars x 7 min    COOL DOWN x 1 lap each  Walk fwd/back/side      Home Exercises Provided and Patient Education Provided     Education provided:   Role of aquatic therapy  Hydration post therapy      Written Home Exercises Provided: Patient instructed to cont prior HEP.  Exercises were reviewed and Adriana was able to demonstrate them prior to the end of the session.  Adriana demonstrated good  understanding of the education provided.     See EMR under Patient Instructions for exercises provided prior visit.; 11/1/2019(dead bug & bird dog)  Assessment     Patient progressing with aquatic therapy yet still needs intermittent verbal cues to slow mauri of exercise for maximal postural control. Patient was able to tolerate exercise program with no c/o back tightness or increase in pain. 4/5 strength BLEs with exception L hip flex/ext 4-/5; TUG score is 13 sec & FTSTS score is 24 sec. Goals modified.Patient will benefit from aquatic environment to unload spine for strengthening, core stabilization & postural awareness.      Adriana is progressing well towards her goals.   Pt prognosis is Fair.     Pt will continue to benefit from skilled aquatic outpatient physical therapy to address the deficits listed in the problem list box on initial evaluation, provide pt/family education and to maximize pt's level of independence in the home and community environment.     Pt's spiritual, cultural and educational needs considered and pt agreeable to plan of care and goals.    Anticipated barriers to physical therapy: none    Goals:   Short Term Goals: 6 weeks  1.  Patient will be independent with HEP & progressions (progressing- not met)  2. Patient will achieve TUG score of 10 sec or less to demonstrate improved mobility (progressing- not met)     Long Term Goals: 12 weeks  1. Patient will achieve FOTO limitations score of 46%  To demonstrate improved function & decreased pain (progressing- not met)  2. Patient will achieve FTSTS score of 18 or less to demonstrate improved transfers & endurance (progressing- not met)  3. Patient will increase MMT BLEs to 4+/5 to demonstrate improved strength (progressing- not met)      Plan     Increase reps & exercises slowly to prevent pain    Outpatient PT 1-2x/wk for 12 weeks to include manual therapy, patient education, therapeutic exercise, aquatic therapy. Patient may be seen by PTA as part of the rehabilitation team.    Delaney Mace, PT

## 2019-12-23 ENCOUNTER — CLINICAL SUPPORT (OUTPATIENT)
Dept: REHABILITATION | Facility: HOSPITAL | Age: 64
End: 2019-12-23
Payer: MEDICARE

## 2019-12-23 DIAGNOSIS — R26.2 DIFFICULTY WALKING: ICD-10-CM

## 2019-12-23 DIAGNOSIS — M53.86 DECREASED ROM OF LUMBAR SPINE: ICD-10-CM

## 2019-12-23 DIAGNOSIS — M54.41 ACUTE RIGHT-SIDED LOW BACK PAIN WITH RIGHT-SIDED SCIATICA: ICD-10-CM

## 2019-12-23 PROCEDURE — 97113 AQUATIC THERAPY/EXERCISES: CPT

## 2019-12-23 NOTE — PROGRESS NOTES
Physical Therapy Daily Treatment Note     Name: Adriana Paula  Clinic Number: [id number]    Therapy Diagnosis:   Encounter Diagnoses   Name Primary?    Acute right-sided low back pain with right-sided sciatica     Decreased ROM of lumbar spine     Difficulty walking      Physician: Cece Dowell, *    Visit Date: 2019  Physician Orders: PT Eval and Treat   Medical Diagnosis from Referral:   M54.41,G89.29 (ICD-10-CM) - Chronic bilateral low back pain with right-sided sciatica   M51.36 (ICD-10-CM) - DDD (degenerative disc disease), lumbar   M47.26 (ICD-10-CM) - Other spondylosis with radiculopathy, lumbar region   M54.16 (ICD-10-CM) - Lumbar radiculopathy         Evaluation Date: 2019; reassess 2019; reassess 2019  Authorization Period Expiration: 2019  Plan of Care Expiration: 3/13/2019  Visit # / Visits authorized:   Total visits: 12 land; 14 aquatic    Time In: 1400  Time Out: 1500  Total Billable Time: 30 minutes    Precautions: HTN, DM, peripheral neuropathy    PROCEDURES/IMAGIN2019 Left L5-S1 transforaminal KARLA  Subjective     Pt reports: When I went to a party this weekend   She was compliant with home exercise program.  Response to previous treatment: Muscle soreness x 1 day  Functional change: none, walks into clinic    Pain: 4/10  Location: right back & leg    Objective     Adriana received aquatic therapeutic exercises to develop strength, endurance, ROM, flexibility, posture and core stabilization for 50  minutes including:    WARMUP x 2 laps each  Walk fwd/back/side    STRETCHES 2 x 20sec  Hamstring  Piriformis  Gastroc    LE EX x 20  Mini squat  Heel raise with GS  Hip abd/add  Hip flex/ext  Lunge forward      Lunge lateral chest press blue disc  Step up/over forward Box x 12  Step up/over lateral  Box x 12   Step over step for 4 steps x 8 reps    UE EX/CORE  x 20  Shoulder flex/ext TA activation APO modified tandem-np  Shoulder horizontal abd/add TA  activation APO modified tandem-np  Shoulder abd/add TA activation APO modified tandem-np  Lower truncal rotation // bars   Mini squat hold chest press blue disc(posterior support pool wall)  Rows orange tubing  Shoulder extension orange tubing  Truncal rotation double strand orange tubing x 10 each  Bicep curls orange tubing    ENDURANCE  Bicycle // bars x 7 min    COOL DOWN x 1 lap each  Walk fwd/back/side      Home Exercises Provided and Patient Education Provided     Education provided:   Role of aquatic therapy  Hydration post therapy      Written Home Exercises Provided: Patient instructed to cont prior HEP.  Exercises were reviewed and Adriana was able to demonstrate them prior to the end of the session.  Adriana demonstrated good  understanding of the education provided.     See EMR under Patient Instructions for exercises provided prior visit.; 11/1/2019(dead bug & bird dog)  Assessment     Patient progressing with aquatic therapy yet still requires intermittent verbal instruction for TA activation especially with lunges Good effort with all exercises Improvement with cardiovascular endurance.Patient will benefit from aquatic environment to unload spine for strengthening, core stabilization & postural awareness.      Adriana is progressing well towards her goals.   Pt prognosis is Fair.     Pt will continue to benefit from skilled aquatic outpatient physical therapy to address the deficits listed in the problem list box on initial evaluation, provide pt/family education and to maximize pt's level of independence in the home and community environment.     Pt's spiritual, cultural and educational needs considered and pt agreeable to plan of care and goals.    Anticipated barriers to physical therapy: none    Goals:   Short Term Goals: 6 weeks  1. Patient will be independent with HEP & progressions (progressing- not met)  2. Patient will achieve TUG score of 10 sec or less to demonstrate improved mobility  (progressing- not met)     Long Term Goals: 12 weeks  1. Patient will achieve FOTO limitations score of 46%  To demonstrate improved function & decreased pain (progressing- not met)  2. Patient will achieve FTSTS score of 18 or less to demonstrate improved transfers & endurance (progressing- not met)  3. Patient will increase MMT BLEs to 4+/5 to demonstrate improved strength (progressing- not met)      Plan     Increase reps & exercises slowly to prevent pain    Outpatient PT 1-2x/wk for 12 weeks to include manual therapy, patient education, therapeutic exercise, aquatic therapy. Patient may be seen by PTA as part of the rehabilitation team.    Delaney Mace, PT

## 2019-12-30 ENCOUNTER — TELEPHONE (OUTPATIENT)
Dept: INTERNAL MEDICINE | Facility: CLINIC | Age: 64
End: 2019-12-30

## 2019-12-30 ENCOUNTER — PATIENT MESSAGE (OUTPATIENT)
Dept: PODIATRY | Facility: CLINIC | Age: 64
End: 2019-12-30

## 2019-12-31 ENCOUNTER — TELEPHONE (OUTPATIENT)
Dept: PODIATRY | Facility: CLINIC | Age: 64
End: 2019-12-31

## 2020-01-06 ENCOUNTER — PATIENT OUTREACH (OUTPATIENT)
Dept: ADMINISTRATIVE | Facility: OTHER | Age: 65
End: 2020-01-06

## 2020-01-08 ENCOUNTER — OFFICE VISIT (OUTPATIENT)
Dept: SPINE | Facility: CLINIC | Age: 65
End: 2020-01-08
Payer: MEDICARE

## 2020-01-08 VITALS
HEART RATE: 80 BPM | BODY MASS INDEX: 35.81 KG/M2 | SYSTOLIC BLOOD PRESSURE: 159 MMHG | TEMPERATURE: 98 F | HEIGHT: 65 IN | WEIGHT: 214.94 LBS | DIASTOLIC BLOOD PRESSURE: 74 MMHG

## 2020-01-08 DIAGNOSIS — M47.26 OTHER SPONDYLOSIS WITH RADICULOPATHY, LUMBAR REGION: ICD-10-CM

## 2020-01-08 DIAGNOSIS — M54.41 CHRONIC BILATERAL LOW BACK PAIN WITH RIGHT-SIDED SCIATICA: Primary | ICD-10-CM

## 2020-01-08 DIAGNOSIS — M51.36 DDD (DEGENERATIVE DISC DISEASE), LUMBAR: ICD-10-CM

## 2020-01-08 DIAGNOSIS — M54.16 LUMBAR RADICULOPATHY: ICD-10-CM

## 2020-01-08 DIAGNOSIS — G89.29 CHRONIC BILATERAL LOW BACK PAIN WITH RIGHT-SIDED SCIATICA: Primary | ICD-10-CM

## 2020-01-08 PROCEDURE — 99999 PR PBB SHADOW E&M-EST. PATIENT-LVL V: ICD-10-PCS | Mod: PBBFAC,,, | Performed by: PHYSICIAN ASSISTANT

## 2020-01-08 PROCEDURE — 99214 OFFICE O/P EST MOD 30 MIN: CPT | Mod: S$GLB,,, | Performed by: PHYSICIAN ASSISTANT

## 2020-01-08 PROCEDURE — 99214 PR OFFICE/OUTPT VISIT, EST, LEVL IV, 30-39 MIN: ICD-10-PCS | Mod: S$GLB,,, | Performed by: PHYSICIAN ASSISTANT

## 2020-01-08 PROCEDURE — 99999 PR PBB SHADOW E&M-EST. PATIENT-LVL V: CPT | Mod: PBBFAC,,, | Performed by: PHYSICIAN ASSISTANT

## 2020-01-08 PROCEDURE — 99215 OFFICE O/P EST HI 40 MIN: CPT | Mod: PBBFAC | Performed by: PHYSICIAN ASSISTANT

## 2020-01-08 RX ORDER — PREGABALIN 75 MG/1
75 CAPSULE ORAL 2 TIMES DAILY
Qty: 60 CAPSULE | Refills: 5 | Status: SHIPPED | OUTPATIENT
Start: 2020-01-08 | End: 2020-07-02 | Stop reason: SDUPTHER

## 2020-01-08 RX ORDER — PREGABALIN 75 MG/1
75 CAPSULE ORAL 2 TIMES DAILY
Qty: 60 CAPSULE | Refills: 5 | Status: SHIPPED | OUTPATIENT
Start: 2020-01-08 | End: 2020-01-08 | Stop reason: SDUPTHER

## 2020-01-08 NOTE — PROGRESS NOTES
"Subjective:     Patient ID:  Adriana Paula is a 64 y.o. female.    [facility]    Chief Complaint:  Back and right leg pain     HPI    Adriana Paula is a 64 y.o. female who presents for follow up.  Overall she is doing about 80% better with the PT.  She does not feel like the right L5 TESI worked any better than the other [place] ESIs she had but she does say her right leg pain is not as bad.  Pain across the low back with radiation down the right lateral leg to the ankle.  Pain worse with standing and sitting.  No left leg pain.    She is taking Lyrica once a day.  She takes tramadol and tylenol PRN.    Patient denies any recent accidents or trauma, no saddle anesthesias, and no bowel or bladder incontinence.      Review of Systems:  Constitution: Negative for chills, fever, night sweats and weight loss.   Musculoskeletal: Negative for falls.   Gastrointestinal: Negative for bowel incontinence, nausea and vomiting.   Genitourinary: Negative for bladder incontinence.   Neurological: Negative for disturbances in coordination and loss of balance.      Objective:      Vitals:    01/08/20 1029   BP: (!) 159/74   Pulse: 80   Temp: 98.2 °F (36.8 °C)   TempSrc: Oral   Weight: 97.5 kg (214 lb 15.2 oz)   Height: 5' 5" (1.651 m)   PainSc:   2   PainLoc: Back         Physical Exam:    General:  Adriana Paula is well-developed, well-nourished, appears stated age, in no acute distress, alert and oriented to person, place, and time.    Patient sits comfortably in the exam room and answers questions appropriately. Grossly patient is able to move bilateral lower extremities without difficulty.     XRAY Interpretation:      Lumbar spine ap/lateral xrays were personally reviewed today.  No fractures.  Normal alignment.  Mild DDD at L4-5 and L5-S1.     MRI Interpretation:      Lumbar spine MRI was personally reviewed today.  L5-S1 DDD.  BBDD at L4-5 and L5-S1 with bilateral NFS.  Probable lumbarlized sacrum.  More severe right " L5-S1 NFS.    Assessment:          1. Chronic bilateral low back pain with right-sided sciatica    2. DDD (degenerative disc disease), lumbar    3. Other spondylosis with radiculopathy, lumbar region    4. Lumbar radiculopathy            Plan:          Orders Placed This Encounter    pregabalin (LYRICA) 75 MG capsule        L4-5, L5-S1 DDD/spondylosis with BBDD     -Continue home PT and aqua PT  -Refilled lyrica today  -Continue tramadol and tylenol prn  -Consider referring to Dr. García in clinic to discuss other interventional treatment options  -FU PRN for now    Follow-Up:  Follow up if symptoms worsen or fail to improve. If there are any questions prior to this, the patient was instructed to contact the office.       SABRINA Patel, PA-C  Neurosurgery  [facility]  [facility]

## 2020-01-17 ENCOUNTER — LAB VISIT (OUTPATIENT)
Dept: LAB | Facility: HOSPITAL | Age: 65
End: 2020-01-17
Attending: INTERNAL MEDICINE
Payer: MEDICARE

## 2020-01-17 DIAGNOSIS — E11.3293 TYPE 2 DIABETES MELLITUS WITH BOTH EYES AFFECTED BY MILD NONPROLIFERATIVE RETINOPATHY WITHOUT MACULAR EDEMA, WITH LONG-TERM CURRENT USE OF INSULIN: Chronic | ICD-10-CM

## 2020-01-17 DIAGNOSIS — Z79.4 TYPE 2 DIABETES MELLITUS WITH BOTH EYES AFFECTED BY MILD NONPROLIFERATIVE RETINOPATHY WITHOUT MACULAR EDEMA, WITH LONG-TERM CURRENT USE OF INSULIN: Chronic | ICD-10-CM

## 2020-01-21 ENCOUNTER — OFFICE VISIT (OUTPATIENT)
Dept: INTERNAL MEDICINE | Facility: CLINIC | Age: 65
End: 2020-01-21
Payer: MEDICARE

## 2020-01-21 VITALS
SYSTOLIC BLOOD PRESSURE: 136 MMHG | DIASTOLIC BLOOD PRESSURE: 70 MMHG | HEIGHT: 65 IN | TEMPERATURE: 99 F | RESPIRATION RATE: 16 BRPM | WEIGHT: 223.75 LBS | BODY MASS INDEX: 37.28 KG/M2 | HEART RATE: 80 BPM

## 2020-01-21 DIAGNOSIS — M17.0 PRIMARY OSTEOARTHRITIS OF BOTH KNEES: ICD-10-CM

## 2020-01-21 DIAGNOSIS — I15.2 HYPERTENSION ASSOCIATED WITH DIABETES: ICD-10-CM

## 2020-01-21 DIAGNOSIS — Z79.4 TYPE 2 DIABETES MELLITUS WITH DIABETIC POLYNEUROPATHY, WITH LONG-TERM CURRENT USE OF INSULIN: Primary | ICD-10-CM

## 2020-01-21 DIAGNOSIS — R35.0 FREQUENCY OF URINATION: ICD-10-CM

## 2020-01-21 DIAGNOSIS — E11.42 TYPE 2 DIABETES MELLITUS WITH DIABETIC POLYNEUROPATHY, WITH LONG-TERM CURRENT USE OF INSULIN: Primary | ICD-10-CM

## 2020-01-21 DIAGNOSIS — E11.59 HYPERTENSION ASSOCIATED WITH DIABETES: ICD-10-CM

## 2020-01-21 DIAGNOSIS — E66.01 SEVERE OBESITY (BMI 35.0-39.9) WITH COMORBIDITY: ICD-10-CM

## 2020-01-21 PROCEDURE — 99214 OFFICE O/P EST MOD 30 MIN: CPT | Mod: PBBFAC,PO | Performed by: INTERNAL MEDICINE

## 2020-01-21 PROCEDURE — 99499 RISK ADDL DX/OHS AUDIT: ICD-10-PCS | Mod: HCNC,S$GLB,, | Performed by: INTERNAL MEDICINE

## 2020-01-21 PROCEDURE — 99214 PR OFFICE/OUTPT VISIT, EST, LEVL IV, 30-39 MIN: ICD-10-PCS | Mod: S$GLB,,, | Performed by: INTERNAL MEDICINE

## 2020-01-21 PROCEDURE — 99999 PR PBB SHADOW E&M-EST. PATIENT-LVL IV: ICD-10-PCS | Mod: PBBFAC,,, | Performed by: INTERNAL MEDICINE

## 2020-01-21 PROCEDURE — 99214 OFFICE O/P EST MOD 30 MIN: CPT | Mod: S$GLB,,, | Performed by: INTERNAL MEDICINE

## 2020-01-21 PROCEDURE — 99499 UNLISTED E&M SERVICE: CPT | Mod: HCNC,S$GLB,, | Performed by: INTERNAL MEDICINE

## 2020-01-21 PROCEDURE — 99999 PR PBB SHADOW E&M-EST. PATIENT-LVL IV: CPT | Mod: PBBFAC,,, | Performed by: INTERNAL MEDICINE

## 2020-01-21 RX ORDER — TRAMADOL HYDROCHLORIDE 50 MG/1
50 TABLET ORAL EVERY 12 HOURS PRN
Qty: 60 TABLET | Refills: 0 | Status: SHIPPED | OUTPATIENT
Start: 2020-01-21 | End: 2020-07-27 | Stop reason: SDUPTHER

## 2020-01-21 RX ORDER — OXYBUTYNIN CHLORIDE 5 MG/1
5 TABLET ORAL 3 TIMES DAILY
Qty: 270 TABLET | Refills: 1 | Status: CANCELLED | OUTPATIENT
Start: 2020-01-21

## 2020-01-21 RX ORDER — OXYBUTYNIN CHLORIDE 5 MG/1
5 TABLET ORAL 3 TIMES DAILY
Qty: 270 TABLET | Refills: 1 | Status: SHIPPED | OUTPATIENT
Start: 2020-01-21 | End: 2020-10-01 | Stop reason: SDUPTHER

## 2020-01-21 RX ORDER — TRAMADOL HYDROCHLORIDE 50 MG/1
50 TABLET ORAL EVERY 12 HOURS PRN
Qty: 60 TABLET | Refills: 0 | Status: CANCELLED | OUTPATIENT
Start: 2020-01-21

## 2020-01-21 NOTE — PROGRESS NOTES
Subjective:       Patient ID: Adriana Paula is a 64 y.o. female who presents for Hypertension; Diabetes (Medications that insurance covers); and Knee Pain      Diabetes   She presents for her follow-up diabetic visit. She has type 2 diabetes mellitus. Her disease course has been improving. There are no hypoglycemic associated symptoms. Pertinent negatives for hypoglycemia include no confusion, dizziness, headaches, nervousness/anxiousness, pallor, seizures, speech difficulty or tremors. Associated symptoms include polyphagia and polyuria. Pertinent negatives for diabetes include no chest pain, no fatigue, no polydipsia and no weakness. There are no hypoglycemic complications. Symptoms are improving. Diabetic complications include peripheral neuropathy and retinopathy. Risk factors for coronary artery disease include diabetes mellitus, dyslipidemia, hypertension and sedentary lifestyle. Current diabetic treatment includes insulin injections. She is compliant with treatment all of the time. She is following a generally healthy diet. She rarely participates in exercise. Her home blood glucose trend is decreasing steadily. Her breakfast blood glucose range is generally 110-130 mg/dl. An ACE inhibitor/angiotensin II receptor blocker is being taken.   Hypertension   This is a chronic problem. The current episode started more than 1 year ago. The problem is unchanged. The problem is controlled. Pertinent negatives include no chest pain, headaches, malaise/fatigue, palpitations, peripheral edema or shortness of breath. There are no associated agents to hypertension. Risk factors for coronary artery disease include diabetes mellitus, dyslipidemia and sedentary lifestyle. Past treatments include calcium channel blockers and angiotensin blockers. The current treatment provides moderate improvement. Compliance problems include exercise.  Hypertensive end-organ damage includes retinopathy. There is no history of heart  failure. There is no history of a hypertension causing med or a thyroid problem.   Knee Pain    The incident occurred more than 1 week ago. There was no injury mechanism. The quality of the pain is described as aching. The pain is moderate. The pain has been intermittent since onset. Pertinent negatives include no inability to bear weight, muscle weakness, numbness or tingling. She has tried rest (has been using tramadol) for the symptoms. The treatment provided mild relief.      Body mass index is 37.24 kg/m².      Review of Systems   Constitutional: Negative for chills, fatigue, fever and malaise/fatigue.   HENT: Negative for congestion, ear pain, postnasal drip, sinus pressure and sore throat.    Respiratory: Negative for cough, chest tightness and shortness of breath.    Cardiovascular: Negative for chest pain, palpitations and leg swelling.   Gastrointestinal: Negative for abdominal pain, constipation (drinks prune juice to help with constipation), diarrhea, nausea and vomiting.   Endocrine: Positive for polyphagia and polyuria. Negative for polydipsia.   Genitourinary: Positive for frequency. Negative for decreased urine volume, hematuria and urgency.   Musculoskeletal: Positive for arthralgias and back pain (improving, she reports that it feels about 80% better). Negative for myalgias.   Skin: Negative for pallor and rash.   Neurological: Negative for dizziness, tingling, tremors, seizures, speech difficulty, weakness, numbness and headaches.   Psychiatric/Behavioral: Negative for confusion and dysphoric mood. The patient is not nervous/anxious.         Reports stresses related to family issues         Answers for HPI/ROS submitted by the patient on 1/20/2020   Diabetes problem  Diabetes type: type 2  MedicAlert ID: No  blurred vision: No  chest pain: No  fatigue: No  foot paresthesias: No  foot ulcerations: No  polydipsia: No  polyphagia: Yes  polyuria: Yes  visual change: No  weakness: No  weight loss:  No  Symptom course: stable  confusion: No  dizziness: No  headaches: No  hunger: Yes  mood changes: No  nervous/anxious: No  pallor: No  seizures: No  sleepiness: No  speech difficulty: No  sweats: No  tremors: No  blackouts: No  hospitalization: No  nocturnal hypoglycemia: No  required assistance: No  required glucagon: No  CVA: No  heart disease: No  impotence: No  nephropathy: No  peripheral neuropathy: Yes  PVD: No  retinopathy: No  autonomic neuropathy: No  CAD risks: family history, hypertension, obesity, post-menopausal  Current treatments: insulin injections, oral agent (dual therapy)  Treatment compliance: most of the time  Dose schedule: pre-breakfast  Given by: patient  Injection sites: thighs  Home blood tests: 1-2 x per day  Home urines: <1 x per month  Monitoring compliance: good  Blood glucose trend: fluctuating minimally  Weight trend: increasing steadily  Current diet: generally healthy  Meal planning: avoidance of concentrated sweets  Exercise: intermittently  Dietitian visit: No  Eye exam current: Yes  Sees podiatrist: Yes      Objective:      Physical Exam   Constitutional: She is oriented to person, place, and time. Vital signs are normal. She appears well-developed and well-nourished. No distress.   HENT:   Head: Normocephalic and atraumatic.   Right Ear: Hearing and external ear normal.   Left Ear: Hearing and external ear normal.   Nose: Nose normal.   Mouth/Throat: Uvula is midline and mucous membranes are normal.   Eyes: Lids are normal.   Cardiovascular: Normal rate, regular rhythm, normal heart sounds and intact distal pulses.   No murmur heard.  Pulmonary/Chest: Effort normal and breath sounds normal. She has no wheezes.   Abdominal: Soft. Bowel sounds are normal. She exhibits no distension. There is no tenderness.   Musculoskeletal: Normal range of motion. She exhibits no edema.   Neurological: She is alert and oriented to person, place, and time.   Skin: Skin is warm, dry and intact.  No rash noted. She is not diaphoretic.   Psychiatric: She has a normal mood and affect.   Vitals reviewed.      Assessment/Plan:       1. Type 2 diabetes mellitus with diabetic polyneuropathy, with long-term current use of insulin  - continue Tresiba, metformin   - Basic metabolic panel; Future  - Hemoglobin A1c; Future in 1 month  - Microalbumin/creatinine urine ratio; Future  - Urinalysis; Future    2. Hypertension associated with diabetes  - stable, well-controlled, continue amlodipine, losartan    3. Frequency of urination  - improved, continue ditropan  - oxybutynin (DITROPAN) 5 MG Tab; Take 1 tablet (5 mg total) by mouth 3 (three) times daily.  Dispense: 270 tablet; Refill: 1    4. Primary osteoarthritis of both knees  - stable, Ultram + tylenol works well  - traMADol (ULTRAM) 50 mg tablet; Take 1 tablet (50 mg total) by mouth every 12 (twelve) hours as needed. M54.40 Greater than 7 day supply is medically necessary  Dispense: 60 tablet; Refill: 0    5. Severe obesity (BMI 35.0-39.9) with comorbidity  - patient will complete therapy and then begin going to the gym    RTC in April for annual exam or sooner if needed    Nicole Márquez MD

## 2020-01-22 PROBLEM — E66.01 SEVERE OBESITY (BMI 35.0-39.9) WITH COMORBIDITY: Status: ACTIVE | Noted: 2020-01-22

## 2020-01-22 PROBLEM — M53.86 DECREASED ROM OF LUMBAR SPINE: Status: RESOLVED | Noted: 2019-08-14 | Resolved: 2020-01-22

## 2020-01-22 PROBLEM — M17.0 PRIMARY OSTEOARTHRITIS OF BOTH KNEES: Status: RESOLVED | Noted: 2019-08-22 | Resolved: 2020-01-22

## 2020-01-22 PROBLEM — M79.644 THUMB PAIN, RIGHT: Status: RESOLVED | Noted: 2018-11-02 | Resolved: 2020-01-22

## 2020-01-22 PROBLEM — R26.2 DIFFICULTY WALKING: Status: RESOLVED | Noted: 2019-08-14 | Resolved: 2020-01-22

## 2020-01-24 ENCOUNTER — PATIENT MESSAGE (OUTPATIENT)
Dept: OBSTETRICS AND GYNECOLOGY | Facility: CLINIC | Age: 65
End: 2020-01-24

## 2020-01-27 ENCOUNTER — TELEPHONE (OUTPATIENT)
Dept: OBSTETRICS AND GYNECOLOGY | Facility: CLINIC | Age: 65
End: 2020-01-27

## 2020-01-27 NOTE — TELEPHONE ENCOUNTER
Pt called for mmg orders advised pt she will need to be seen befpre we can give her orders since she has not been seen since 2017. Scheduled pt tomorrow with Flaquita RODRIGUEZ

## 2020-01-27 NOTE — TELEPHONE ENCOUNTER
----- Message from Ranulfo De La Cruz LPN sent at 1/27/2020  4:51 PM CST -----  Dr. Good only seen her for vagitis because she couldn't get in with Dr. Boston   ----- Message -----  From: Kiersten Andrade MA  Sent: 1/27/2020   4:44 PM CST  To: RAFA Farooq Dr has not seen this pt since 2017 she will need to be seen before she will put in the order. Does Dr Shelby want to out the order in since she seen her 4/2019? Sorry I tried to call pt to get her schedule but left her a message  Kiersten  ----- Message -----  From: Ranulfo De La Cruz LPN  Sent: 1/27/2020   3:18 PM CST  To: Ludy HUANG Staff        ----- Message -----  From: Ismael Watson  Sent: 1/27/2020   2:25 PM CST  To: Florentino Jovel Staff    Patient needs a mammogram order put in so she can schedule a mammogram for pain in her right breast. Patient phone number is [phone]. Thanks

## 2020-01-28 ENCOUNTER — OFFICE VISIT (OUTPATIENT)
Dept: OBSTETRICS AND GYNECOLOGY | Facility: CLINIC | Age: 65
End: 2020-01-28
Payer: MEDICARE

## 2020-01-28 VITALS
BODY MASS INDEX: 36.81 KG/M2 | SYSTOLIC BLOOD PRESSURE: 130 MMHG | WEIGHT: 221.25 LBS | DIASTOLIC BLOOD PRESSURE: 70 MMHG

## 2020-01-28 DIAGNOSIS — Z12.4 CERVICAL CANCER SCREENING: ICD-10-CM

## 2020-01-28 DIAGNOSIS — Z91.89 AT HIGH RISK FOR BREAST CANCER: ICD-10-CM

## 2020-01-28 DIAGNOSIS — N64.4 BREAST PAIN, RIGHT: Primary | ICD-10-CM

## 2020-01-28 PROCEDURE — 87624 HPV HI-RISK TYP POOLED RSLT: CPT | Mod: HCNC

## 2020-01-28 PROCEDURE — 88175 CYTOPATH C/V AUTO FLUID REDO: CPT | Mod: HCNC

## 2020-01-28 PROCEDURE — 99397 PER PM REEVAL EST PAT 65+ YR: CPT | Mod: S$GLB,,, | Performed by: PHYSICIAN ASSISTANT

## 2020-01-28 PROCEDURE — 99397 PR PREVENTIVE VISIT,EST,65 & OVER: ICD-10-PCS | Mod: S$GLB,,, | Performed by: PHYSICIAN ASSISTANT

## 2020-01-28 NOTE — PROGRESS NOTES
CC: Well woman exam    Adriana Paula is a 64 y.o. female  presents for well woman exam.  LMP: No LMP recorded. Patient is postmenopausal.. Has been having right medial breast pain x 1 month. No changes in meds or trauma to the area. Denies mass, changes in skin or nipple drainage. Maternal grandmother, mother, and sister had breast cancer.  Sister had genetic testing that was positive, but not sure which mutation she was positive for. Other sister also had positive genetic testing and had elected bilateral mastectomy. No changes in meds, night sweats or unexplained weight loss. Increased stress with mother passing away and daughter in a bad car accident last month. Has reduced coffee intake from 3 cups to 2 cups daily.     Past Medical History:   Diagnosis Date    Allergy     Anemia     Anxiety     Arthritis     Breast cyst     Cataract     DR (diabetic retinopathy)     Dyslipidemia     Essential hypertension 2012    Gastroesophageal reflux disease without esophagitis 2/3/2017    GERD (gastroesophageal reflux disease)     Glaucoma     Hypertension     Obesity (BMI 30-39.9) 10/24/2013    PN (peripheral neuropathy)     Sleep apnea     Type 2 diabetes mellitus with both eyes affected by mild nonproliferative retinopathy without macular edema, with long-term current use of insulin     Type 2 diabetes mellitus with diabetic polyneuropathy, with long-term current use of insulin     Type II or unspecified type diabetes mellitus with other specified manifestations, uncontrolled      Past Surgical History:   Procedure Laterality Date    BREAST CYST EXCISION Right     1980s    CARPAL TUNNEL RELEASE Right     CATARACT EXTRACTION      CATARACT EXTRACTION W/  INTRAOCULAR LENS IMPLANT Right 2017    Dr Brewster     SECTION      EPIDURAL STEROID INJECTION N/A 2019    Procedure: Injection, Steroid, Epidural LUMBAR/CAUDAL L5-S1 IL KARLA;  Surgeon: Jef García MD;   Location: [facility], [place], operated by [facility];  Service: Pain Management;  Laterality: N/A;  NEEDS CONSENT    EPIDURAL STEROID INJECTION N/A 9/16/2019    Procedure: Injection, Steroid, Epidural LUMBAR/CAUDAL L5-S1 IL KARLA;  Surgeon: Jef García MD;  Location: [facility], [place], operated by [facility];  Service: Pain Management;  Laterality: N/A;  NEEDS CONSENT    KNEE ARTHROSCOPY  1-30-14    right    MYOMECTOMY      TRANSFORAMINAL EPIDURAL INJECTION OF STEROID Right 11/4/2019    Procedure: LUMBAR TRANSFORAMINAL RIGHT L5-S1  TF KARLA;  Surgeon: Jef García MD;  Location: [facility], [place], operated by [facility];  Service: Pain Management;  Laterality: Right;  NEEDS CONSENT    TRIGGER FINGER RELEASE      TRIGGER FINGER RELEASE Left 4/15/2019    Procedure: RELEASE, TRIGGER FINGER left thumb;  Surgeon: Yuridia Gonzales MD;  Location: [facility], [place], operated by [facility];  Service: Orthopedics;  Laterality: Left;  stretcher, supine, hand pan 1 and pan 2     Social History     Socioeconomic History    Marital status: Single     Spouse name: Not on file    Number of children: Not on file    Years of education: Not on file    Highest education level: Not on file   Occupational History    Not on file   Social Needs    Financial resource strain: Somewhat hard    Food insecurity:     Worry: Never true     Inability: Never true    Transportation needs:     Medical: No     Non-medical: No   Tobacco Use    Smoking status: Former Smoker     Start date: 12/9/2002    Smokeless tobacco: Never Used   Substance and Sexual Activity    Alcohol use: Yes     Frequency: 2-4 times a month     Drinks per session: 1 or 2     Binge frequency: Never     Comment: socially    Drug use: No    Sexual activity: Never     Birth control/protection: None   Lifestyle    Physical activity:     Days per week: 1 day     Minutes per session: 10 min    Stress: To some extent   Relationships    Social connections:     Talks on phone: More than three times a week     Gets together: Twice a week     Attends Faith service:  "Not on file     Active member of club or organization: No     Attends meetings of clubs or organizations: Never     Relationship status:    Other Topics Concern    Not on file   Social History Narrative    . 1 daughter. Works as  at [employer].      Family History   Problem Relation Age of Onset    Arthritis Mother     Diabetes Mother     Heart disease Mother     Miscarriages / Stillbirths Mother     Vision loss Mother     Breast cancer Mother 75        70s    Cancer Mother     No Known Problems Father     Diabetes Unknown         "Half of my family"    Breast cancer Maternal Grandmother 50        50s    Breast cancer Sister 50        50s    Breast cancer Cousin 40        40s    Heart failure Brother     Pacemaker/defibrilator Brother     No Known Problems Maternal Aunt     No Known Problems Maternal Uncle     No Known Problems Paternal Aunt     No Known Problems Paternal Uncle     No Known Problems Maternal Grandfather     No Known Problems Paternal Grandmother     No Known Problems Paternal Grandfather     Breast cancer Cousin 40        40s    Breast cancer Sister 40    Breast cancer Sister 54    Thyroid disease Neg Hx     Ovarian cancer Neg Hx     Amblyopia Neg Hx     Blindness Neg Hx     Cataracts Neg Hx     Glaucoma Neg Hx     Hypertension Neg Hx     Macular degeneration Neg Hx     Retinal detachment Neg Hx     Strabismus Neg Hx     Stroke Neg Hx      OB History        3    Para   1    Term   1            AB   1    Living           SAB   1    TAB        Ectopic        Multiple        Live Births                     Current Outpatient Medications:     amLODIPine (NORVASC) 10 MG tablet, TAKE 1 TABLET(10 MG) BY MOUTH EVERY DAY, Disp: 90 tablet, Rfl: 1    atorvastatin (LIPITOR) 40 MG tablet, TAKE 1 TABLET(40 MG) BY MOUTH EVERY DAY, Disp: 90 tablet, Rfl: 1    blood sugar diagnostic Strp, 1 strip by Misc.(Non-Drug; Combo Route) " "route 2 (two) times daily. One touch verio, Disp: 100 strip, Rfl: 5    blood-glucose meter (ONETOUCH VERIO IQ METER) kit, Use as instructed, Disp: 200 each, Rfl: 11    fluticasone propionate (FLONASE) 50 mcg/actuation nasal spray, SHAKE LIQUID AND USE 1 SPRAY(50 MCG) IN EACH NOSTRIL EVERY DAY, Disp: 48 g, Rfl: 1    insulin degludec (TRESIBA FLEXTOUCH U-100) 100 unit/mL (3 mL) InPn, Inject 36 Units into the skin once daily., Disp: 4 Syringe, Rfl: 5    lancets 33 gauge Misc, 1 lancet by Misc.(Non-Drug; Combo Route) route 2 (two) times daily before meals., Disp: 200 each, Rfl: 11    losartan (COZAAR) 100 MG tablet, TAKE 1 TABLET(100 MG) BY MOUTH EVERY DAY, Disp: 90 tablet, Rfl: 1    MAGNESIUM ORAL, Take 500 mg by mouth., Disp: , Rfl:     metFORMIN (GLUCOPHAGE) 500 MG tablet, Take 1000 mg at lunch and 500 mg at dinner., Disp: 270 tablet, Rfl: 1    oxybutynin (DITROPAN) 5 MG Tab, Take 1 tablet (5 mg total) by mouth 3 (three) times daily., Disp: 270 tablet, Rfl: 1    pen needle, diabetic (BD ULTRA-FINE SHORT PEN NEEDLE) 31 gauge x 5/16" Ndle, Use as directed (two) times daily., Disp: 100 each, Rfl: 11    pregabalin (LYRICA) 75 MG capsule, Take 1 capsule (75 mg total) by mouth 2 (two) times daily., Disp: 60 capsule, Rfl: 5    senna-docusate 8.6-50 mg (SENNA WITH DOCUSATE SODIUM) 8.6-50 mg per tablet, Take 1 tablet by mouth once daily., Disp: 90 tablet, Rfl: 0    traMADol (ULTRAM) 50 mg tablet, Take 1 tablet (50 mg total) by mouth every 12 (twelve) hours as needed. M54.40 Greater than 7 day supply is medically necessary, Disp: 60 tablet, Rfl: 0    vitamin D (VITAMIN D3) 1000 units Tab, Take 2,000 Units by mouth once daily., Disp: , Rfl:     aspirin (ECOTRIN) 81 MG EC tablet, Take 81 mg by mouth once daily. Instructed to stop 1 week prior to surgery on 4/15/19 per Dr. Conklin, Disp: , Rfl:     /70   Wt 100.4 kg (221 lb 3.7 oz)   BMI 36.81 kg/m²       ROS:  GENERAL: Denies weight gain or weight loss. " Feeling well overall.   SKIN: Denies rash or lesions.   HEAD: Denies head injury or headache.   NODES: Denies enlarged lymph nodes.   CHEST: Denies chest pain or shortness of breath.   CARDIOVASCULAR: Denies palpitations or left sided chest pain.   ABDOMEN: No abdominal pain, constipation, diarrhea, nausea, vomiting or rectal bleeding.   URINARY: No frequency, dysuria, hematuria, or burning on urination.  REPRODUCTIVE: See HPI.   BREASTS: The patient performs breast self-examination. + Left breast pain.  HEMATOLOGIC: No easy bruisability or excessive bleeding.   MUSCULOSKELETAL: Denies joint pain or swelling.   NEUROLOGIC: Denies syncope or weakness.   PSYCHIATRIC: Denies depression, anxiety or mood swings.    PHYSICAL EXAM:  APPEARANCE: Well nourished, well developed, in no acute distress.  AFFECT: WNL, alert and oriented x 3  SKIN: No acne or hirsutism  NECK: Neck symmetric without masses or thyromegaly  NODES: No inguinal, cervical, axillary, or femoral lymph node enlargement  CHEST: Good respiratory effect  ABDOMEN: Soft.  No tenderness or masses.  No hepatosplenomegaly.  No hernias.  BREASTS: Symmetrical, no skin changes or visible lesions.  No palpable masses, nipple discharge bilaterally. +tenderness in Right breast @ 3:00.   PELVIC: Normal external genitalia without lesions.  Normal hair distribution.  Adequate perineal body, normal urethral meatus.  Vagina moist and well rugated without lesions or discharge.  Cervix pink, without lesions, discharge or tenderness.  No significant cystocele or rectocele.  Bimanual exam shows uterus to be normal size, regular, mobile and nontender.  Adnexa without masses or tenderness.    EXTREMITIES: No edema.    ASSESSMENT:   WWE/Right breast Pain: Significant family history of breast cancer with family members positive for genetic mutation. Has been referred to Breast Surgery for high risk in the past, but has declined appointments. Breast pain possibly due to new bra. Will  obtain imaging and refer back to breast surgery. Needs genetic testing.         -     Mammo Digital Diagnostic Bilat with Maged; Future; Expected date: 01/28/2020  -     US Breast Right Complete; Future; Expected date: 01/28/2020  -     Liquid-Based Pap Smear, Screening  -     HPV High Risk Genotypes, PCR        PLAN:   Send pap with HPV cotesting.  Order for diagnostic mammogram with right breast ultrasound.  Try reducing caffeine intake and change bras. Discussed evening primrose oil if breast pain is not improving.   Referral to Breast Surgery for high risk breast cancer due to elevated TC score.  Schedule for telegenetics.   Follow up yearly for WWE or sooner prn.     Patient was counseled today on A.C.S. Pap guidelines and recommendations for yearly pelvic exams, mammograms and monthly self breast exams; to see her PCP for other health maintenance.

## 2020-01-30 ENCOUNTER — TELEPHONE (OUTPATIENT)
Dept: SURGERY | Facility: CLINIC | Age: 65
End: 2020-01-30

## 2020-01-30 ENCOUNTER — HOSPITAL ENCOUNTER (OUTPATIENT)
Dept: RADIOLOGY | Facility: HOSPITAL | Age: 65
Discharge: HOME | End: 2020-01-30
Attending: PHYSICIAN ASSISTANT
Payer: MEDICARE

## 2020-01-30 ENCOUNTER — PATIENT MESSAGE (OUTPATIENT)
Dept: SURGERY | Facility: CLINIC | Age: 65
End: 2020-01-30

## 2020-01-30 DIAGNOSIS — N64.4 BREAST PAIN, RIGHT: ICD-10-CM

## 2020-01-30 PROCEDURE — 77062 BREAST TOMOSYNTHESIS BI: CPT | Mod: 26,HCNC,, | Performed by: RADIOLOGY

## 2020-01-30 PROCEDURE — 76642 US BREAST RIGHT LIMITED: ICD-10-PCS | Mod: 26,HCNC,RT, | Performed by: RADIOLOGY

## 2020-01-30 PROCEDURE — 76642 ULTRASOUND BREAST LIMITED: CPT | Mod: TC,HCNC,PO,RT

## 2020-01-30 PROCEDURE — 77062 BREAST TOMOSYNTHESIS BI: CPT | Mod: TC,HCNC,PO

## 2020-01-30 PROCEDURE — 76642 ULTRASOUND BREAST LIMITED: CPT | Mod: 26,HCNC,RT, | Performed by: RADIOLOGY

## 2020-01-30 PROCEDURE — 77066 DX MAMMO INCL CAD BI: CPT | Mod: 26,HCNC,, | Performed by: RADIOLOGY

## 2020-01-30 PROCEDURE — 77066 MAMMO DIGITAL DIAGNOSTIC BILAT WITH TOMOSYNTHESIS_CAD: ICD-10-PCS | Mod: 26,HCNC,, | Performed by: RADIOLOGY

## 2020-01-30 PROCEDURE — 77062 MAMMO DIGITAL DIAGNOSTIC BILAT WITH TOMOSYNTHESIS_CAD: ICD-10-PCS | Mod: 26,HCNC,, | Performed by: RADIOLOGY

## 2020-01-30 NOTE — TELEPHONE ENCOUNTER
Returned call to Ms.Chai regarding a referral placed in the system by Flaquita Galaviz PA-C for High Risk Family Hx Breast Cancer. Patient with a TC score of 36.69%. Please call Annette bailey @ [phone] as I would be happy to schedule this apt. Message also sent through the patient portal.

## 2020-02-04 LAB
HPV HR 12 DNA SPEC QL NAA+PROBE: NEGATIVE
HPV16 AG SPEC QL: NEGATIVE
HPV18 DNA SPEC QL NAA+PROBE: NEGATIVE

## 2020-02-19 LAB
FINAL PATHOLOGIC DIAGNOSIS: NORMAL
Lab: NORMAL

## 2020-02-20 ENCOUNTER — PATIENT MESSAGE (OUTPATIENT)
Dept: INTERNAL MEDICINE | Facility: CLINIC | Age: 65
End: 2020-02-20

## 2020-02-20 ENCOUNTER — PATIENT MESSAGE (OUTPATIENT)
Dept: OPTOMETRY | Facility: CLINIC | Age: 65
End: 2020-02-20

## 2020-02-21 NOTE — TELEPHONE ENCOUNTER
Pt states she's having sinus issues.    Sore throat  Ear ache  Pressure over eyes and bridge of nose  Occasional hard sneeze which hurts chest  Coughing at night when laying down    No fever.    Has been using sudafed with nasal and saline sprays for 2 weeks.

## 2020-02-24 RX ORDER — AZITHROMYCIN 250 MG/1
TABLET, FILM COATED ORAL
Qty: 6 TABLET | Refills: 0 | Status: SHIPPED | OUTPATIENT
Start: 2020-02-24 | End: 2020-07-01

## 2020-02-24 NOTE — TELEPHONE ENCOUNTER
Spoke to pt.  Notified and verbalized understanding of Dr Márquez' message.    She said the only improvement is her throat doesn't hurt anymore.  Still has HA, with facial pressure.  Sneezing and ears are itching.    Coughing up a little mucus, grayish/green.    Please advise on the abx

## 2020-02-24 NOTE — TELEPHONE ENCOUNTER
Ordered Z-pack. May also use an antihistamine for the sneezing and tylenol for sinus discomfort as needed

## 2020-02-24 NOTE — TELEPHONE ENCOUNTER
She should avoid sudafed since it can raise the blood pressure. May continue OTC flonase nasal spray daily. May use plain Mucinex if she feels lots of nasal congestion.    Has she noticed any nasal discharge or has she coughed up a lot of mucus?     Have the symptoms improved a little or does she feel like her condition is worsening? If she does not feel better, will order a Z-pack.

## 2020-02-26 ENCOUNTER — PATIENT MESSAGE (OUTPATIENT)
Dept: INTERNAL MEDICINE | Facility: CLINIC | Age: 65
End: 2020-02-26

## 2020-02-26 ENCOUNTER — LAB VISIT (OUTPATIENT)
Dept: LAB | Facility: HOSPITAL | Age: 65
End: 2020-02-26
Attending: INTERNAL MEDICINE
Payer: MEDICARE

## 2020-02-26 ENCOUNTER — OFFICE VISIT (OUTPATIENT)
Dept: OPTOMETRY | Facility: CLINIC | Age: 65
End: 2020-02-26
Payer: MEDICARE

## 2020-02-26 DIAGNOSIS — H04.123 DRY EYE SYNDROME, BILATERAL: Primary | ICD-10-CM

## 2020-02-26 DIAGNOSIS — H10.13 CONJUNCTIVITIS, ALLERGIC, BILATERAL: ICD-10-CM

## 2020-02-26 DIAGNOSIS — Z79.4 TYPE 2 DIABETES MELLITUS WITH DIABETIC POLYNEUROPATHY, WITH LONG-TERM CURRENT USE OF INSULIN: ICD-10-CM

## 2020-02-26 DIAGNOSIS — E11.42 TYPE 2 DIABETES MELLITUS WITH DIABETIC POLYNEUROPATHY, WITH LONG-TERM CURRENT USE OF INSULIN: ICD-10-CM

## 2020-02-26 LAB
ANION GAP SERPL CALC-SCNC: 9 MMOL/L (ref 8–16)
BUN SERPL-MCNC: 8 MG/DL (ref 8–23)
CALCIUM SERPL-MCNC: 9.1 MG/DL (ref 8.7–10.5)
CHLORIDE SERPL-SCNC: 104 MMOL/L (ref 95–110)
CO2 SERPL-SCNC: 27 MMOL/L (ref 23–29)
CREAT SERPL-MCNC: 0.8 MG/DL (ref 0.5–1.4)
EST. GFR  (AFRICAN AMERICAN): >60 ML/MIN/1.73 M^2
EST. GFR  (NON AFRICAN AMERICAN): >60 ML/MIN/1.73 M^2
ESTIMATED AVG GLUCOSE: 226 MG/DL (ref 68–131)
GLUCOSE SERPL-MCNC: 229 MG/DL (ref 70–110)
HBA1C MFR BLD HPLC: 9.5 % (ref 4–5.6)
POTASSIUM SERPL-SCNC: 3.8 MMOL/L (ref 3.5–5.1)
SODIUM SERPL-SCNC: 140 MMOL/L (ref 136–145)

## 2020-02-26 PROCEDURE — 83036 HEMOGLOBIN GLYCOSYLATED A1C: CPT | Mod: HCNC

## 2020-02-26 PROCEDURE — 36415 COLL VENOUS BLD VENIPUNCTURE: CPT | Mod: HCNC

## 2020-02-26 PROCEDURE — 99999 PR PBB SHADOW E&M-EST. PATIENT-LVL II: ICD-10-PCS | Mod: PBBFAC,HCNC,, | Performed by: OPTOMETRIST

## 2020-02-26 PROCEDURE — 92012 INTRM OPH EXAM EST PATIENT: CPT | Mod: HCNC,S$GLB,, | Performed by: OPTOMETRIST

## 2020-02-26 PROCEDURE — 92012 PR EYE EXAM, EST PATIENT,INTERMED: ICD-10-PCS | Mod: HCNC,S$GLB,, | Performed by: OPTOMETRIST

## 2020-02-26 PROCEDURE — 80048 BASIC METABOLIC PNL TOTAL CA: CPT | Mod: HCNC

## 2020-02-26 PROCEDURE — 99999 PR PBB SHADOW E&M-EST. PATIENT-LVL II: CPT | Mod: PBBFAC,HCNC,, | Performed by: OPTOMETRIST

## 2020-02-26 RX ORDER — CYCLOSPORINE 0.5 MG/ML
1 EMULSION OPHTHALMIC 2 TIMES DAILY
Qty: 60 EACH | Refills: 12 | Status: SHIPPED | OUTPATIENT
Start: 2020-02-26 | End: 2020-07-01

## 2020-02-26 RX ORDER — FLUOROMETHOLONE 1 MG/ML
1 SUSPENSION/ DROPS OPHTHALMIC 4 TIMES DAILY
Qty: 5 ML | Refills: 0 | Status: SHIPPED | OUTPATIENT
Start: 2020-02-26 | End: 2020-03-04

## 2020-02-27 ENCOUNTER — PATIENT MESSAGE (OUTPATIENT)
Dept: OPTOMETRY | Facility: CLINIC | Age: 65
End: 2020-02-27

## 2020-02-27 DIAGNOSIS — H04.123 DRY EYE SYNDROME, BILATERAL: Primary | ICD-10-CM

## 2020-02-27 RX ORDER — OLOPATADINE HYDROCHLORIDE 1 MG/ML
1 SOLUTION/ DROPS OPHTHALMIC 2 TIMES DAILY
Qty: 5 ML | Refills: 1 | Status: SHIPPED | OUTPATIENT
Start: 2020-02-27 | End: 2020-07-01

## 2020-02-28 ENCOUNTER — TELEPHONE (OUTPATIENT)
Dept: OPTOMETRY | Facility: CLINIC | Age: 65
End: 2020-02-28

## 2020-02-28 NOTE — TELEPHONE ENCOUNTER
Pt says dr julian called in restasis to [facility], since cequa not covered/ pt's co-pay for the restasis is $47.00 and she'll pick it up today

## 2020-02-28 NOTE — PROGRESS NOTES
HPI     Pt states around beginning of year she had an artificial plant scratch   OS.   Pt states x then she has noticed worsening blurred va and a scratch perfecto   or floater like on OS.  Pt has soreness and plenty itching daily.   Rates pain about 5-6   Has been using lubricant eye gtts daily   Wakes up during the night and ou very dry.   Sometimes experiences crustiness in corner     Last edited by Flaquita Saavedra on 2/26/2020  3:03 PM. (History)            Assessment /Plan     For exam results, see Encounter Report.    Dry eye syndrome, bilateral    Conjunctivitis, allergic, bilateral      -     cycloSPORINE (RESTASIS) 0.05 % ophthalmic emulsion; (pt did not get/ too expensive)  -     fluorometholone 0.1% (FML) 0.1 % DrpS; (pt did not get/ too expensive)    -     olopatadine (PATANOL) 0.1 % ophthalmic solution; Place 1 drop into both eyes 2 (two) times daily.  Dispense: 5 mL; Refill: 1   Cequa BID OU    RTC May 2020 for annual/ DFE/ dry eye follow up

## 2020-03-02 ENCOUNTER — PATIENT MESSAGE (OUTPATIENT)
Dept: INTERNAL MEDICINE | Facility: CLINIC | Age: 65
End: 2020-03-02

## 2020-03-02 DIAGNOSIS — E11.42 TYPE 2 DIABETES MELLITUS WITH DIABETIC POLYNEUROPATHY, WITH LONG-TERM CURRENT USE OF INSULIN: Chronic | ICD-10-CM

## 2020-03-02 DIAGNOSIS — Z79.4 TYPE 2 DIABETES MELLITUS WITH DIABETIC POLYNEUROPATHY, WITH LONG-TERM CURRENT USE OF INSULIN: Chronic | ICD-10-CM

## 2020-03-03 RX ORDER — INSULIN DEGLUDEC 100 U/ML
40 INJECTION, SOLUTION SUBCUTANEOUS DAILY
Qty: 4 SYRINGE | Refills: 5
Start: 2020-03-03 | End: 2020-04-23 | Stop reason: SDUPTHER

## 2020-03-03 NOTE — TELEPHONE ENCOUNTER
Since HbA1c increased from 7.7 to 9.5, would be ok to add tradjenta.     Continue tresiba and increase dose to 40 units daily.     Can she tolerate the current dose of metformin?    What have been her lowest BG in the last 2 weeks? Does the level ever drop to <60?

## 2020-03-03 NOTE — TELEPHONE ENCOUNTER
Spoke to pt.    Notified and verbalized new Rx Tradjenta and increase to Tresiba.    Pt states she doing well with Metformin.  Her lowest blood glucose level was a couple of mornings ago; 77.  Has never been <60

## 2020-03-04 NOTE — TELEPHONE ENCOUNTER
See portal message.  Pt wants to know if she can use test strips that  on 20.     She has 10 left  Just doesn't want to waste them.    Refill request for strips  LOV:  20  Recall set for Annual in April    [facility] set for escript

## 2020-03-05 ENCOUNTER — PATIENT MESSAGE (OUTPATIENT)
Dept: INTERNAL MEDICINE | Facility: CLINIC | Age: 65
End: 2020-03-05

## 2020-03-05 NOTE — TELEPHONE ENCOUNTER
Should be ok to use them.     But monitor the blood sugar levels and make sure that they are close to your typical blood sugars.

## 2020-03-19 ENCOUNTER — TELEPHONE (OUTPATIENT)
Dept: INTERNAL MEDICINE | Facility: CLINIC | Age: 65
End: 2020-03-19

## 2020-03-23 ENCOUNTER — PATIENT MESSAGE (OUTPATIENT)
Dept: ORTHOPEDICS | Facility: CLINIC | Age: 65
End: 2020-03-23

## 2020-03-24 ENCOUNTER — PATIENT MESSAGE (OUTPATIENT)
Dept: INTERNAL MEDICINE | Facility: CLINIC | Age: 65
End: 2020-03-24

## 2020-03-24 RX ORDER — ONDANSETRON 4 MG/1
4 TABLET, ORALLY DISINTEGRATING ORAL EVERY 12 HOURS PRN
Qty: 10 TABLET | Refills: 0 | Status: SHIPPED | OUTPATIENT
Start: 2020-03-24 | End: 2020-03-29

## 2020-03-24 NOTE — TELEPHONE ENCOUNTER
Any abdominal pain or diarrhea? She should increase water intake significantly. Has she checked her BP today?    Symptoms may be due to a GI illness. Will order zofran 4mg bid prn. Howes diet for now- soup, crackers, pudding.    But if she develops SOB, CP or muscle pain, would need further evaluation.    Did she stop taking tresiba, tradjenta and metformin when she first felt ill?     Her last hemoglobin A1c increased to 9.5 in February and we will need to discuss changing her regimen.     See if she can make a video appointment in 1 week.

## 2020-03-25 NOTE — TELEPHONE ENCOUNTER
Spoke to pt.    Says she feels a lot better today.  No temp.  Highest it had been was 99.9    She's eating, had chicken noodle soup yesterday and a turkey sandwich today.  She's sure it was the taco.    No diarrhea or nausea.    The right hand, she doesn't know what she did.  Might be from pushing the button too hard on the remote.  Has arthritis and had surgery 20 yrs ago on that hand for carpal tunnel and it sometimes swells.    Taking tylenol and using ice.  Feeling better today, swelling has gone down.    Doesn't want an appt at this time, will wait and see if she keeps getting better.

## 2020-03-25 NOTE — TELEPHONE ENCOUNTER
Please help her make a video appointment.    And please clarify what happened with her right hand. Did she injure it?    Did she check the blood sugar this morning on an empty stomach? May need to adjust the dose of Tresiba until she can get out to  the metformin and tradjenta.

## 2020-04-01 RX ORDER — PEN NEEDLE, DIABETIC 31 GX5/16"
NEEDLE, DISPOSABLE MISCELLANEOUS
Qty: 100 EACH | Refills: 5 | Status: SHIPPED | OUTPATIENT
Start: 2020-04-01

## 2020-04-22 ENCOUNTER — PATIENT MESSAGE (OUTPATIENT)
Dept: INTERNAL MEDICINE | Facility: CLINIC | Age: 65
End: 2020-04-22

## 2020-04-23 ENCOUNTER — PATIENT MESSAGE (OUTPATIENT)
Dept: INTERNAL MEDICINE | Facility: CLINIC | Age: 65
End: 2020-04-23

## 2020-04-23 DIAGNOSIS — Z79.4 TYPE 2 DIABETES MELLITUS WITH DIABETIC POLYNEUROPATHY, WITH LONG-TERM CURRENT USE OF INSULIN: Chronic | ICD-10-CM

## 2020-04-23 DIAGNOSIS — E11.42 TYPE 2 DIABETES MELLITUS WITH DIABETIC POLYNEUROPATHY, WITH LONG-TERM CURRENT USE OF INSULIN: Chronic | ICD-10-CM

## 2020-04-23 RX ORDER — INSULIN DEGLUDEC 100 U/ML
36 INJECTION, SOLUTION SUBCUTANEOUS DAILY
Qty: 4 SYRINGE | Refills: 5
Start: 2020-04-23 | End: 2020-05-20 | Stop reason: SDUPTHER

## 2020-05-11 ENCOUNTER — PATIENT MESSAGE (OUTPATIENT)
Dept: OPTOMETRY | Facility: CLINIC | Age: 65
End: 2020-05-11

## 2020-05-15 ENCOUNTER — PATIENT MESSAGE (OUTPATIENT)
Dept: INTERNAL MEDICINE | Facility: CLINIC | Age: 65
End: 2020-05-15

## 2020-05-15 ENCOUNTER — OFFICE VISIT (OUTPATIENT)
Dept: OPTOMETRY | Facility: CLINIC | Age: 65
End: 2020-05-15
Payer: MEDICARE

## 2020-05-15 DIAGNOSIS — E11.9 TYPE 2 DIABETES MELLITUS WITHOUT OPHTHALMIC MANIFESTATIONS: ICD-10-CM

## 2020-05-15 DIAGNOSIS — H43.812 PVD (POSTERIOR VITREOUS DETACHMENT), LEFT EYE: ICD-10-CM

## 2020-05-15 DIAGNOSIS — Z96.1 PSEUDOPHAKIA, BOTH EYES: ICD-10-CM

## 2020-05-15 DIAGNOSIS — H52.4 PRESBYOPIA: ICD-10-CM

## 2020-05-15 DIAGNOSIS — H26.493 PCO (POSTERIOR CAPSULAR OPACIFICATION), BILATERAL: ICD-10-CM

## 2020-05-15 DIAGNOSIS — I15.2 HYPERTENSION ASSOCIATED WITH DIABETES: ICD-10-CM

## 2020-05-15 DIAGNOSIS — Z79.4 TYPE 2 DIABETES MELLITUS WITH DIABETIC POLYNEUROPATHY, WITH LONG-TERM CURRENT USE OF INSULIN: Chronic | ICD-10-CM

## 2020-05-15 DIAGNOSIS — E11.59 HYPERTENSION ASSOCIATED WITH DIABETES: ICD-10-CM

## 2020-05-15 DIAGNOSIS — E11.65 TYPE 2 DIABETES MELLITUS WITH HYPERGLYCEMIA, WITH LONG-TERM CURRENT USE OF INSULIN: ICD-10-CM

## 2020-05-15 DIAGNOSIS — E11.42 TYPE 2 DIABETES MELLITUS WITH DIABETIC POLYNEUROPATHY, WITH LONG-TERM CURRENT USE OF INSULIN: Chronic | ICD-10-CM

## 2020-05-15 DIAGNOSIS — H10.13 CONJUNCTIVITIS, ALLERGIC, BILATERAL: ICD-10-CM

## 2020-05-15 DIAGNOSIS — Z79.4 TYPE 2 DIABETES MELLITUS WITH HYPERGLYCEMIA, WITH LONG-TERM CURRENT USE OF INSULIN: ICD-10-CM

## 2020-05-15 DIAGNOSIS — H04.123 DRY EYE SYNDROME, BILATERAL: Primary | ICD-10-CM

## 2020-05-15 PROCEDURE — 99499 RISK ADDL DX/OHS AUDIT: ICD-10-PCS | Mod: HCNC,S$GLB,, | Performed by: OPTOMETRIST

## 2020-05-15 PROCEDURE — 92014 PR EYE EXAM, EST PATIENT,COMPREHESV: ICD-10-PCS | Mod: HCNC,S$GLB,, | Performed by: OPTOMETRIST

## 2020-05-15 PROCEDURE — 92014 COMPRE OPH EXAM EST PT 1/>: CPT | Mod: HCNC,S$GLB,, | Performed by: OPTOMETRIST

## 2020-05-15 PROCEDURE — 92015 PR REFRACTION: ICD-10-PCS | Mod: HCNC,S$GLB,, | Performed by: OPTOMETRIST

## 2020-05-15 PROCEDURE — 99999 PR PBB SHADOW E&M-EST. PATIENT-LVL II: ICD-10-PCS | Mod: PBBFAC,HCNC,, | Performed by: OPTOMETRIST

## 2020-05-15 PROCEDURE — 92015 DETERMINE REFRACTIVE STATE: CPT | Mod: HCNC,S$GLB,, | Performed by: OPTOMETRIST

## 2020-05-15 PROCEDURE — 99499 UNLISTED E&M SERVICE: CPT | Mod: HCNC,S$GLB,, | Performed by: OPTOMETRIST

## 2020-05-15 PROCEDURE — 99999 PR PBB SHADOW E&M-EST. PATIENT-LVL II: CPT | Mod: PBBFAC,HCNC,, | Performed by: OPTOMETRIST

## 2020-05-15 NOTE — PROGRESS NOTES
HPI     Last eye exam was 2/26/20 with   Pt here for routine eye exam.    Pt states that she has been having some pressure over the eyebrow area in   OS. Pt states it does get red in the corner of OS as well, and has blurry   VA.  Patient denies diplopia, headaches, flashes/floaters, and pain.  Pt is using Pataday and OTC eye drops, and cataract sx OU    Hemoglobin A1C       Date                     Value               Ref Range             Status                02/26/2020               9.5 (H)             4.0 - 5.6 %           Final                     Last edited by Missy Johnson on 5/15/2020  9:38 AM. (History)            Assessment /Plan     For exam results, see Encounter Report.    Dry eye syndrome, bilateral  Continue with art tears QID    Conjunctivitis, allergic, bilateral   Pataday QAM    Type 2 diabetes mellitus with hyperglycemia, with long-term current use of insulin  Type 2 diabetes mellitus without ophthalmic manifestations  Hypertension associated with diabetes    PVD (posterior vitreous detachment), left eye  Pseudophakia, both eyes  PCO (posterior capsular opacification), bilateral   Mild, not visually significant, monitor yearly    Presbyopia   Rx specs    Sinus pressure on left side of face  Consult PCP for treatment    RTC 1 year, sooner PRN

## 2020-05-20 RX ORDER — INSULIN DEGLUDEC 100 U/ML
34 INJECTION, SOLUTION SUBCUTANEOUS DAILY
Qty: 4 SYRINGE | Refills: 5
Start: 2020-05-20 | End: 2020-06-25 | Stop reason: SDUPTHER

## 2020-05-20 NOTE — TELEPHONE ENCOUNTER
Ok to continue Tresiba 34 units daily. Will need to check HbA1c and BMP in the next 1-2 weeks. Please arrange.    Sent patient an email.

## 2020-05-22 DIAGNOSIS — E11.9 TYPE 2 DIABETES MELLITUS WITHOUT COMPLICATION: ICD-10-CM

## 2020-06-04 DIAGNOSIS — I15.2 HYPERTENSION ASSOCIATED WITH DIABETES: ICD-10-CM

## 2020-06-04 DIAGNOSIS — Z00.00 ANNUAL PHYSICAL EXAM: ICD-10-CM

## 2020-06-04 DIAGNOSIS — E55.9 VITAMIN D INSUFFICIENCY: ICD-10-CM

## 2020-06-04 DIAGNOSIS — E11.42 TYPE 2 DIABETES MELLITUS WITH DIABETIC POLYNEUROPATHY, WITH LONG-TERM CURRENT USE OF INSULIN: Primary | ICD-10-CM

## 2020-06-04 DIAGNOSIS — Z79.4 TYPE 2 DIABETES MELLITUS WITH DIABETIC POLYNEUROPATHY, WITH LONG-TERM CURRENT USE OF INSULIN: Primary | ICD-10-CM

## 2020-06-04 DIAGNOSIS — E11.59 HYPERTENSION ASSOCIATED WITH DIABETES: ICD-10-CM

## 2020-06-04 DIAGNOSIS — E78.5 DYSLIPIDEMIA: ICD-10-CM

## 2020-06-04 RX ORDER — LINAGLIPTIN 5 MG/1
TABLET, FILM COATED ORAL
Qty: 90 TABLET | Refills: 0 | Status: SHIPPED | OUTPATIENT
Start: 2020-06-04 | End: 2020-07-01

## 2020-06-11 ENCOUNTER — PATIENT MESSAGE (OUTPATIENT)
Dept: INTERNAL MEDICINE | Facility: CLINIC | Age: 65
End: 2020-06-11

## 2020-06-12 ENCOUNTER — LAB VISIT (OUTPATIENT)
Dept: LAB | Facility: HOSPITAL | Age: 65
End: 2020-06-12
Attending: INTERNAL MEDICINE
Payer: MEDICARE

## 2020-06-12 DIAGNOSIS — E11.42 TYPE 2 DIABETES MELLITUS WITH DIABETIC POLYNEUROPATHY, WITH LONG-TERM CURRENT USE OF INSULIN: ICD-10-CM

## 2020-06-12 DIAGNOSIS — Z00.00 ANNUAL PHYSICAL EXAM: ICD-10-CM

## 2020-06-12 DIAGNOSIS — Z79.4 TYPE 2 DIABETES MELLITUS WITH DIABETIC POLYNEUROPATHY, WITH LONG-TERM CURRENT USE OF INSULIN: ICD-10-CM

## 2020-06-12 LAB
ALBUMIN/CREAT UR: 6.5 UG/MG (ref 0–30)
BILIRUB UR QL STRIP: NEGATIVE
CLARITY UR REFRACT.AUTO: CLEAR
COLOR UR AUTO: YELLOW
CREAT UR-MCNC: 77 MG/DL (ref 15–325)
GLUCOSE UR QL STRIP: NEGATIVE
HGB UR QL STRIP: NEGATIVE
KETONES UR QL STRIP: NEGATIVE
LEUKOCYTE ESTERASE UR QL STRIP: NEGATIVE
MICROALBUMIN UR DL<=1MG/L-MCNC: 5 UG/ML
NITRITE UR QL STRIP: NEGATIVE
PH UR STRIP: 6 [PH] (ref 5–8)
PROT UR QL STRIP: NEGATIVE
SP GR UR STRIP: 1.01 (ref 1–1.03)
URN SPEC COLLECT METH UR: NORMAL

## 2020-06-12 PROCEDURE — 81003 URINALYSIS AUTO W/O SCOPE: CPT | Mod: HCNC

## 2020-06-12 PROCEDURE — 82043 UR ALBUMIN QUANTITATIVE: CPT | Mod: HCNC

## 2020-06-23 PROBLEM — E55.9 VITAMIN D INSUFFICIENCY: Status: RESOLVED | Noted: 2018-03-20 | Resolved: 2020-06-23

## 2020-06-25 DIAGNOSIS — E11.42 TYPE 2 DIABETES MELLITUS WITH DIABETIC POLYNEUROPATHY, WITH LONG-TERM CURRENT USE OF INSULIN: Chronic | ICD-10-CM

## 2020-06-25 DIAGNOSIS — E78.5 DYSLIPIDEMIA: ICD-10-CM

## 2020-06-25 DIAGNOSIS — Z79.4 TYPE 2 DIABETES MELLITUS WITH DIABETIC POLYNEUROPATHY, WITH LONG-TERM CURRENT USE OF INSULIN: Chronic | ICD-10-CM

## 2020-06-25 RX ORDER — INSULIN DEGLUDEC 100 U/ML
34 INJECTION, SOLUTION SUBCUTANEOUS DAILY
Qty: 4 SYRINGE | Refills: 5
Start: 2020-06-25 | End: 2020-06-29 | Stop reason: SDUPTHER

## 2020-06-25 RX ORDER — DEXTROSE 4 G
1 TABLET,CHEWABLE ORAL 2 TIMES DAILY
Qty: 1 EACH | Refills: 0 | Status: SHIPPED | OUTPATIENT
Start: 2020-06-25 | End: 2021-06-25

## 2020-06-25 RX ORDER — ATORVASTATIN CALCIUM 40 MG/1
TABLET, FILM COATED ORAL
Qty: 90 TABLET | Refills: 1 | Status: SHIPPED | OUTPATIENT
Start: 2020-06-25

## 2020-06-25 RX ORDER — LANCETS 33 GAUGE
1 EACH MISCELLANEOUS
Qty: 200 EACH | Refills: 11 | Status: SHIPPED | OUTPATIENT
Start: 2020-06-25

## 2020-06-25 NOTE — TELEPHONE ENCOUNTER
Davis requesting refill of atorvastatin.    Glucometer change to Accu-Check Eva Plus    LOV:  1/21/20  Next scheduled:  7/1/20  .  Davis

## 2020-06-29 DIAGNOSIS — Z79.4 TYPE 2 DIABETES MELLITUS WITH DIABETIC POLYNEUROPATHY, WITH LONG-TERM CURRENT USE OF INSULIN: Chronic | ICD-10-CM

## 2020-06-29 DIAGNOSIS — E11.42 TYPE 2 DIABETES MELLITUS WITH DIABETIC POLYNEUROPATHY, WITH LONG-TERM CURRENT USE OF INSULIN: Chronic | ICD-10-CM

## 2020-06-29 RX ORDER — INSULIN DEGLUDEC 100 U/ML
34 INJECTION, SOLUTION SUBCUTANEOUS DAILY
Qty: 4 SYRINGE | Refills: 5
Start: 2020-06-29 | End: 2020-07-14 | Stop reason: SDUPTHER

## 2020-06-29 NOTE — TELEPHONE ENCOUNTER
Request from [facility] for Tresiba    Originally sent to [email or web address].    Spoke to pt.  She picked up one month supply from [email or web address] and would like New Rx sent to [facility].

## 2020-06-29 NOTE — TELEPHONE ENCOUNTER
----- Message from Jade Alcaraz sent at 6/29/2020  2:42 PM CDT -----  Contact: [facility] [phone]  Prescription Request:     Name of medication: insulin degludec (TRESIBA FLEXTOUCH U-100) 100 unit/mL (3 mL) InPn    Reason for request: Refill    Pharmacy: [facility] - [facility] [address]    Please advise.    Thank You

## 2020-07-01 ENCOUNTER — PATIENT MESSAGE (OUTPATIENT)
Dept: INTERNAL MEDICINE | Facility: CLINIC | Age: 65
End: 2020-07-01

## 2020-07-01 ENCOUNTER — OFFICE VISIT (OUTPATIENT)
Dept: INTERNAL MEDICINE | Facility: CLINIC | Age: 65
End: 2020-07-01
Payer: MEDICARE

## 2020-07-01 ENCOUNTER — OFFICE VISIT (OUTPATIENT)
Dept: OPTOMETRY | Facility: CLINIC | Age: 65
End: 2020-07-01
Payer: MEDICARE

## 2020-07-01 ENCOUNTER — TELEPHONE (OUTPATIENT)
Dept: OPTOMETRY | Facility: CLINIC | Age: 65
End: 2020-07-01

## 2020-07-01 VITALS
WEIGHT: 226.88 LBS | TEMPERATURE: 99 F | HEIGHT: 65 IN | RESPIRATION RATE: 18 BRPM | SYSTOLIC BLOOD PRESSURE: 130 MMHG | OXYGEN SATURATION: 98 % | DIASTOLIC BLOOD PRESSURE: 64 MMHG | HEART RATE: 79 BPM | BODY MASS INDEX: 37.8 KG/M2

## 2020-07-01 DIAGNOSIS — E11.42 TYPE 2 DIABETES MELLITUS WITH DIABETIC POLYNEUROPATHY, WITH LONG-TERM CURRENT USE OF INSULIN: ICD-10-CM

## 2020-07-01 DIAGNOSIS — Z79.4 TYPE 2 DIABETES MELLITUS WITH DIABETIC POLYNEUROPATHY, WITH LONG-TERM CURRENT USE OF INSULIN: ICD-10-CM

## 2020-07-01 DIAGNOSIS — N95.9 MENOPAUSAL AND PERIMENOPAUSAL DISORDER: ICD-10-CM

## 2020-07-01 DIAGNOSIS — I15.2 HYPERTENSION ASSOCIATED WITH DIABETES: Primary | ICD-10-CM

## 2020-07-01 DIAGNOSIS — H57.12 LEFT EYE PAIN: ICD-10-CM

## 2020-07-01 DIAGNOSIS — M47.816 ARTHRITIS OF LUMBAR SPINE: ICD-10-CM

## 2020-07-01 DIAGNOSIS — Z00.00 ANNUAL PHYSICAL EXAM: ICD-10-CM

## 2020-07-01 DIAGNOSIS — H11.422 CHEMOSIS OF LEFT CONJUNCTIVA: ICD-10-CM

## 2020-07-01 DIAGNOSIS — H11.432 CONJUNCTIVAL HYPEREMIA OF LEFT EYE: Primary | ICD-10-CM

## 2020-07-01 DIAGNOSIS — E11.59 HYPERTENSION ASSOCIATED WITH DIABETES: Primary | ICD-10-CM

## 2020-07-01 DIAGNOSIS — M17.0 PRIMARY OSTEOARTHRITIS OF BOTH KNEES: ICD-10-CM

## 2020-07-01 DIAGNOSIS — E66.01 SEVERE OBESITY (BMI 35.0-39.9) WITH COMORBIDITY: ICD-10-CM

## 2020-07-01 DIAGNOSIS — H05.229 ORBITAL SWELLING: ICD-10-CM

## 2020-07-01 PROCEDURE — 1125F AMNT PAIN NOTED PAIN PRSNT: CPT | Mod: HCNC,S$GLB,, | Performed by: INTERNAL MEDICINE

## 2020-07-01 PROCEDURE — 3051F PR MOST RECENT HEMOGLOBIN A1C LEVEL 7.0 - < 8.0%: ICD-10-PCS | Mod: HCNC,CPTII,S$GLB, | Performed by: INTERNAL MEDICINE

## 2020-07-01 PROCEDURE — 3078F DIAST BP <80 MM HG: CPT | Mod: HCNC,CPTII,S$GLB, | Performed by: INTERNAL MEDICINE

## 2020-07-01 PROCEDURE — 1159F MED LIST DOCD IN RCRD: CPT | Mod: HCNC,S$GLB,, | Performed by: INTERNAL MEDICINE

## 2020-07-01 PROCEDURE — 99999 PR PBB SHADOW E&M-EST. PATIENT-LVL III: ICD-10-PCS | Mod: PBBFAC,HCNC,, | Performed by: OPTOMETRIST

## 2020-07-01 PROCEDURE — 99999 PR PBB SHADOW E&M-EST. PATIENT-LVL V: CPT | Mod: PBBFAC,HCNC,, | Performed by: INTERNAL MEDICINE

## 2020-07-01 PROCEDURE — 99999 PR PBB SHADOW E&M-EST. PATIENT-LVL III: CPT | Mod: PBBFAC,HCNC,, | Performed by: OPTOMETRIST

## 2020-07-01 PROCEDURE — 1101F PT FALLS ASSESS-DOCD LE1/YR: CPT | Mod: HCNC,CPTII,S$GLB, | Performed by: INTERNAL MEDICINE

## 2020-07-01 PROCEDURE — 92012 INTRM OPH EXAM EST PATIENT: CPT | Mod: HCNC,S$GLB,, | Performed by: OPTOMETRIST

## 2020-07-01 PROCEDURE — 99214 OFFICE O/P EST MOD 30 MIN: CPT | Mod: HCNC,S$GLB,, | Performed by: INTERNAL MEDICINE

## 2020-07-01 PROCEDURE — 92012 PR EYE EXAM, EST PATIENT,INTERMED: ICD-10-PCS | Mod: HCNC,S$GLB,, | Performed by: OPTOMETRIST

## 2020-07-01 PROCEDURE — 99214 PR OFFICE/OUTPT VISIT, EST, LEVL IV, 30-39 MIN: ICD-10-PCS | Mod: HCNC,S$GLB,, | Performed by: INTERNAL MEDICINE

## 2020-07-01 PROCEDURE — 1159F PR MEDICATION LIST DOCUMENTED IN MEDICAL RECORD: ICD-10-PCS | Mod: HCNC,S$GLB,, | Performed by: INTERNAL MEDICINE

## 2020-07-01 PROCEDURE — 3075F SYST BP GE 130 - 139MM HG: CPT | Mod: HCNC,CPTII,S$GLB, | Performed by: INTERNAL MEDICINE

## 2020-07-01 PROCEDURE — 3051F HG A1C>EQUAL 7.0%<8.0%: CPT | Mod: HCNC,CPTII,S$GLB, | Performed by: INTERNAL MEDICINE

## 2020-07-01 PROCEDURE — 3078F PR MOST RECENT DIASTOLIC BLOOD PRESSURE < 80 MM HG: ICD-10-PCS | Mod: HCNC,CPTII,S$GLB, | Performed by: INTERNAL MEDICINE

## 2020-07-01 PROCEDURE — 3008F PR BODY MASS INDEX (BMI) DOCUMENTED: ICD-10-PCS | Mod: HCNC,CPTII,S$GLB, | Performed by: INTERNAL MEDICINE

## 2020-07-01 PROCEDURE — 99999 PR PBB SHADOW E&M-EST. PATIENT-LVL V: ICD-10-PCS | Mod: PBBFAC,HCNC,, | Performed by: INTERNAL MEDICINE

## 2020-07-01 PROCEDURE — 1101F PR PT FALLS ASSESS DOC 0-1 FALLS W/OUT INJ PAST YR: ICD-10-PCS | Mod: HCNC,CPTII,S$GLB, | Performed by: INTERNAL MEDICINE

## 2020-07-01 PROCEDURE — 3075F PR MOST RECENT SYSTOLIC BLOOD PRESS GE 130-139MM HG: ICD-10-PCS | Mod: HCNC,CPTII,S$GLB, | Performed by: INTERNAL MEDICINE

## 2020-07-01 PROCEDURE — 3008F BODY MASS INDEX DOCD: CPT | Mod: HCNC,CPTII,S$GLB, | Performed by: INTERNAL MEDICINE

## 2020-07-01 PROCEDURE — 1125F PR PAIN SEVERITY QUANTIFIED, PAIN PRESENT: ICD-10-PCS | Mod: HCNC,S$GLB,, | Performed by: INTERNAL MEDICINE

## 2020-07-01 RX ORDER — ACETAMINOPHEN 500 MG
1000 TABLET ORAL 2 TIMES DAILY
COMMUNITY

## 2020-07-01 RX ORDER — METFORMIN HYDROCHLORIDE 500 MG/1
1000 TABLET ORAL 2 TIMES DAILY WITH MEALS
Qty: 360 TABLET | Refills: 1
Start: 2020-07-01 | End: 2020-07-15 | Stop reason: SDUPTHER

## 2020-07-01 RX ORDER — LANCETS
EACH MISCELLANEOUS
COMMUNITY
Start: 2020-06-26 | End: 2020-11-24 | Stop reason: SDUPTHER

## 2020-07-01 RX ORDER — FLUOROMETHOLONE 1 MG/ML
1 SUSPENSION/ DROPS OPHTHALMIC 4 TIMES DAILY
Qty: 5 ML | Refills: 0 | Status: SHIPPED | OUTPATIENT
Start: 2020-07-01 | End: 2020-07-01

## 2020-07-01 NOTE — PROGRESS NOTES
HPI     Pt is coming in for redness and sharp pain OS  Symptoms started February and have been worse within the last week   Patanol PRN OU , minimal relief burns when using       Last edited by Liza Cardoso on 7/1/2020 11:44 AM. (History)            Assessment /Plan     For exam results, see Encounter Report.    Conjunctival hyperemia of left eye  Chemosis of left conjunctiva    (+) sinus pressure behind eye   No improvement with increased use of artificial tears and restasis BID  Start-     fluorometholone 0.1% (FML) 0.1 % DrpS; Place 1 drop into the left eye 4 (four) times daily. for 7 days (Patient not taking: Reported on 7/1/2020)  Dispense: 5 mL; Refill: 0    Consult Dr. Wagner-appt on Monday

## 2020-07-01 NOTE — PROGRESS NOTES
Subjective:      Adriana Paula is a 65 y.o. female who presents for annual exam.    HTN- well controlled, compliant with medications. No chest pain, no palpitations, no shortness of breath, no headaches, no leg swelling.    HLD- compliant, does not eat fatty foods regularly. denies muscle cramping or weakness.     DM2- BG in AM has been well-controlled for the last few weeks. Follows a healthy diet. Recently stopped Tradjenta due to donut hole. Denies excessive urination or thirst. No episodes of symptomatic hypoglycemia.    Family History:  family history includes Arthritis in her mother; Breast cancer (age of onset: 40) in her cousin, cousin, and sister; Breast cancer (age of onset: 50) in her maternal grandmother and sister; Breast cancer (age of onset: 54) in her sister; Breast cancer (age of onset: 75) in her mother; Cancer in her mother; Diabetes in her mother and unknown relative; Heart disease in her mother; Heart failure in her brother; Miscarriages / Stillbirths in her mother; No Known Problems in her father, maternal aunt, maternal grandfather, maternal uncle, paternal aunt, paternal grandfather, paternal grandmother, and paternal uncle; Pacemaker/defibrilator in her brother; Vision loss in her mother.    Health Maintenance:  Health Maintenance       Date Due Completion Date    DEXA SCAN 05/22/1994 ---    Shingles Vaccine (2 of 3) 07/25/2016 5/30/2016    Foot Exam 05/21/2020 5/21/2019 (Done)    Override on 5/21/2019: Done    Influenza Vaccine (1) 09/01/2020 10/7/2019    Hemoglobin A1c 12/12/2020 6/12/2020    Colorectal Cancer Screening 01/17/2021 1/17/2020    Mammogram 01/30/2021 1/30/2020    Eye Exam 05/15/2021 5/15/2020    Override on 5/15/2020: Done    Override on 5/14/2019: Done    Lipid Panel 06/12/2021 6/12/2020    Low Dose Statin 07/01/2021 7/1/2020    Pneumococcal Vaccine (65+ Low/Medium Risk) (2 of 2 - PPSV23) 08/01/2023 8/1/2018    TETANUS VACCINE 10/23/2028 10/23/2018        Eye exam: had  "appointment 20  Dental Exam: every 6 months  OB/GYN: Flaquita Newelldaisha TalaveraKit done 2020  MM2020  Last Pap: 1/10/2017  DEXA scan: bone scan due  Tetanus: 2018  Pneumococcal: done  Hepatitis C testin, negative    Exercise: gets up and walks in the house, no routine exercise program  Diet: unhealthy at times  Body mass index is 37.75 kg/m².    ADL's: independent  Memory: normal  Mental health: normal  Advance Directives:Received  Falls: none  Nutrition: normal  Home Safety: no issues    Meds:   Current Outpatient Medications:     ACCU-CHEK SOFTCLIX LANCETS Misc, , Disp: , Rfl:     amLODIPine (NORVASC) 10 MG tablet, TAKE 1 TABLET BY MOUTH EVERY DAY, Disp: 90 tablet, Rfl: 1    aspirin (ECOTRIN) 81 MG EC tablet, Take 81 mg by mouth once daily. Instructed to stop 1 week prior to surgery on 4/15/19 per Dr. Conklin, Disp: , Rfl:     atorvastatin (LIPITOR) 40 MG tablet, TAKE 1 TABLET(40 MG) BY MOUTH EVERY DAY, Disp: 90 tablet, Rfl: 1    BD ULTRA-FINE SHORT PEN NEEDLE 31 gauge x 5/16" Ndle, USE AS DIRECTED TWICE DAILY, Disp: 100 each, Rfl: 5    blood sugar diagnostic (ACCU-CHEK KENDRA PLUS TEST STRP) Strp, 1 strip by Misc.(Non-Drug; Combo Route) route 2 (two) times daily. Pt tests blood sugar twice a day, Disp: 200 strip, Rfl: 3    blood-glucose meter (ACCU-CHEK KENDRA PLUS METER) Misc, 1 each by Misc.(Non-Drug; Combo Route) route 2 (two) times daily. Patient test blood sugar 2 times daily, Disp: 1 each, Rfl: 0    cholecalciferol, vitamin D3, 125 mcg (5,000 unit) Tab, Take 5,000 Units by mouth once daily., Disp: , Rfl:     fluticasone propionate (FLONASE) 50 mcg/actuation nasal spray, SHAKE LIQUID AND USE 1 SPRAY(50 MCG) IN EACH NOSTRIL EVERY DAY, Disp: 48 g, Rfl: 1    insulin degludec (TRESIBA FLEXTOUCH U-100) 100 unit/mL (3 mL) InPn, Inject 34 Units into the skin once daily., Disp: 4 Syringe, Rfl: 5    lancets 33 gauge Misc, 1 lancet by Misc.(Non-Drug; Combo Route) route 2 (two) times daily before " meals., Disp: 200 each, Rfl: 11    losartan (COZAAR) 100 MG tablet, TAKE 1 TABLET BY MOUTH EVERY DAY, Disp: 90 tablet, Rfl: 1    MAGNESIUM ORAL, Take 500 mg by mouth., Disp: , Rfl:     metFORMIN (GLUCOPHAGE) 500 MG tablet, Take 2 tablets (1,000 mg total) by mouth 2 (two) times daily with meals. Take 1000 mg at lunch and 500 mg at dinner., Disp: 360 tablet, Rfl: 1    oxybutynin (DITROPAN) 5 MG Tab, Take 1 tablet (5 mg total) by mouth 3 (three) times daily., Disp: 270 tablet, Rfl: 1    traMADol (ULTRAM) 50 mg tablet, Take 1 tablet (50 mg total) by mouth every 12 (twelve) hours as needed. M54.40 Greater than 7 day supply is medically necessary, Disp: 60 tablet, Rfl: 0    pregabalin (LYRICA) 75 MG capsule, Take 1 capsule (75 mg total) by mouth 2 (two) times daily., Disp: 60 capsule, Rfl: 5    PMHx:   Past Medical History:   Diagnosis Date    Allergy     Anemia     Anxiety     Arthritis     Breast cyst     Cataract     DR (diabetic retinopathy)     Dyslipidemia     Essential hypertension 2012    Gastroesophageal reflux disease without esophagitis 2/3/2017    GERD (gastroesophageal reflux disease)     Glaucoma     Hypertension     Obesity (BMI 30-39.9) 10/24/2013    PN (peripheral neuropathy)     Sleep apnea     Type 2 diabetes mellitus with both eyes affected by mild nonproliferative retinopathy without macular edema, with long-term current use of insulin     Type 2 diabetes mellitus with diabetic polyneuropathy, with long-term current use of insulin     Type II or unspecified type diabetes mellitus with other specified manifestations, uncontrolled        PSHx:  Past Surgical History:   Procedure Laterality Date    BREAST CYST EXCISION Right     1980s    CARPAL TUNNEL RELEASE Right     CATARACT EXTRACTION      CATARACT EXTRACTION W/  INTRAOCULAR LENS IMPLANT Right 2017    Dr Brewster     SECTION      EPIDURAL STEROID INJECTION N/A 2019    Procedure: Injection, Steroid,  Epidural LUMBAR/CAUDAL L5-S1 IL KARLA;  Surgeon: Jef García MD;  Location: [facility]IN [facility]GT;  Service: Pain Management;  Laterality: N/A;  NEEDS CONSENT    EPIDURAL STEROID INJECTION N/A 9/16/2019    Procedure: Injection, Steroid, Epidural LUMBAR/CAUDAL L5-S1 IL KARLA;  Surgeon: Jef García MD;  Location: [facility]IN [facility]GT;  Service: Pain Management;  Laterality: N/A;  NEEDS CONSENT    KNEE ARTHROSCOPY  1-30-14    right    MYOMECTOMY      TRANSFORAMINAL EPIDURAL INJECTION OF STEROID Right 11/4/2019    Procedure: LUMBAR TRANSFORAMINAL RIGHT L5-S1  TF KARLA;  Surgeon: Jef García MD;  Location: [facility]IN [facility]GT;  Service: Pain Management;  Laterality: Right;  NEEDS CONSENT    TRIGGER FINGER RELEASE      TRIGGER FINGER RELEASE Left 4/15/2019    Procedure: RELEASE, TRIGGER FINGER left thumb;  Surgeon: Yuridia Gonzales MD;  Location: [facility] OR;  Service: Orthopedics;  Laterality: Left;  stretcher, supine, hand pan 1 and pan 2       SocHx:   Social History     Socioeconomic History    Marital status: Single     Spouse name: Not on file    Number of children: Not on file    Years of education: Not on file    Highest education level: Not on file   Occupational History    Not on file   Social Needs    Financial resource strain: Not very hard    Food insecurity     Worry: Never true     Inability: Never true    Transportation needs     Medical: No     Non-medical: Yes   Tobacco Use    Smoking status: Former Smoker     Start date: 12/9/2002    Smokeless tobacco: Never Used   Substance and Sexual Activity    Alcohol use: Yes     Frequency: Monthly or less     Drinks per session: 1 or 2     Binge frequency: Never     Comment: socially    Drug use: No    Sexual activity: Never     Birth control/protection: None   Lifestyle    Physical activity     Days per week: 3 days     Minutes per session: 10 min    Stress: To some extent   Relationships    Social connections     Talks on phone: More than three  times a week     Gets together: Once a week     Attends [employer] service: Not on file     Active member of club or organization: No     Attends meetings of clubs or organizations: Never     Relationship status:    Other Topics Concern    Not on file   Social History Narrative    . 1 daughter. Works as  at [employer].        Review of Systems   Constitutional: Positive for activity change and unexpected weight change. Negative for chills, diaphoresis and fever.   HENT: Positive for sinus pressure (left forehead and behind left eye). Negative for congestion, dental problem, ear discharge, ear pain, hearing loss, postnasal drip, rhinorrhea, sore throat and trouble swallowing.    Eyes: Positive for pain (with left eye movement toward the nose), discharge (left eye) and visual disturbance. Negative for redness.   Respiratory: Positive for cough (clears her throat at times, cough with whitish mucus). Negative for chest tightness, shortness of breath and wheezing.    Cardiovascular: Negative for chest pain, palpitations and leg swelling.   Gastrointestinal: Negative for abdominal pain, blood in stool, constipation, diarrhea, nausea and vomiting.   Endocrine: Negative for cold intolerance, heat intolerance, polydipsia and polyuria.   Genitourinary: Negative for difficulty urinating, dysuria, frequency, hematuria, menstrual problem and urgency.   Musculoskeletal: Positive for back pain (chronic low back pain with sciatica) and neck pain. Negative for arthralgias, joint swelling and myalgias.   Skin: Negative for rash and wound.   Neurological: Positive for headaches. Negative for dizziness, weakness and numbness.   Hematological: Negative for adenopathy.   Psychiatric/Behavioral: Negative for confusion, dysphoric mood and sleep disturbance. The patient is not nervous/anxious.            Answers for HPI/ROS submitted by the patient on 6/29/2020   activity change: Yes  unexpected weight  change: Yes  neck pain: Yes  hearing loss: No  rhinorrhea: No  trouble swallowing: No  eye discharge: Yes  visual disturbance: Yes  chest tightness: No  wheezing: No  chest pain: No  palpitations: No  blood in stool: No  constipation: No  vomiting: No  diarrhea: No  polydipsia: No  polyuria: No  difficulty urinating: No  hematuria: No  menstrual problem: No  dysuria: No  joint swelling: No  arthralgias: No  headaches: Yes  weakness: No  confusion: No  dysphoric mood: No        Objective:       Physical Exam  Vitals signs reviewed.   Constitutional:       General: She is not in acute distress.     Appearance: She is well-developed. She is not diaphoretic.   HENT:      Head: Normocephalic and atraumatic.      Right Ear: Hearing, tympanic membrane, ear canal and external ear normal. Tympanic membrane is not erythematous or bulging.      Left Ear: Hearing, tympanic membrane, ear canal and external ear normal. Tympanic membrane is not erythematous or bulging.      Nose: Nose normal.      Mouth/Throat:      Pharynx: Uvula midline. No oropharyngeal exudate or posterior oropharyngeal erythema.   Eyes:      General: Lids are normal. No scleral icterus.     Conjunctiva/sclera: Conjunctivae normal.      Pupils: Pupils are equal, round, and reactive to light.   Neck:      Musculoskeletal: Normal range of motion and neck supple.      Thyroid: No thyroid mass or thyromegaly.   Cardiovascular:      Rate and Rhythm: Normal rate and regular rhythm.      Heart sounds: Normal heart sounds. No murmur.   Pulmonary:      Effort: Pulmonary effort is normal.      Breath sounds: Normal breath sounds. No wheezing.   Abdominal:      General: Bowel sounds are normal. There is no distension.      Palpations: Abdomen is soft. Abdomen is not rigid.      Tenderness: There is no abdominal tenderness. There is no guarding or rebound.   Musculoskeletal: Normal range of motion.   Lymphadenopathy:      Cervical: No cervical adenopathy.      Upper Body:       Right upper body: No supraclavicular adenopathy.      Left upper body: No supraclavicular adenopathy.   Skin:     General: Skin is warm and dry.      Findings: No rash.   Neurological:      Mental Status: She is alert and oriented to person, place, and time.      Coordination: Coordination normal.      Gait: Gait normal.      Deep Tendon Reflexes: Reflexes are normal and symmetric.           Assessment:       1. Hypertension associated with diabetes    2. Type 2 diabetes mellitus with diabetic polyneuropathy, with long-term current use of insulin    3. Left eye pain    4. Orbital swelling    5. Primary osteoarthritis of both knees    6. Arthritis of lumbar spine    7. Annual physical exam    8. Severe obesity (BMI 35.0-39.9) with comorbidity    9. Menopausal and perimenopausal disorder        Plan:       1. Hypertension associated with diabetes  - controlled, continue amlodipine, losartan    2. Type 2 diabetes mellitus with diabetic polyneuropathy, with long-term current use of insulin  - BG has been well-controlled with Tresiba 34 units daily, metformin and Tradjenta but stopped Tradjenta since in donut hole  - metFORMIN (GLUCOPHAGE) 500 MG tablet; Take 2 tablets (1,000 mg total) by mouth 2 (two) times daily with meals. Take 1000 mg at lunch and 500 mg at dinner.  Dispense: 360 tablet; Refill: 1  - continue monitoring BG and will discuss adjustments weekly until stable    3. Left eye pain  - CT Sinuses without Contrast; Future    4. Orbital swelling  - CT Sinuses without Contrast; Future    5. Primary osteoarthritis of both knees  - may use tramadol as needed for pain, steroid injections worked well     6. Arthritis of lumbar spine  - tried gabapentin and lyrica worked well, resume Lyrica    7. Annual physical exam  - reviewed recent labs with patient    8. Severe obesity (BMI 35.0-39.9) with comorbidity  - increase physical activity, continue healthy diet    9. Menopausal and perimenopausal disorder  - DXA  Bone Density Spine And Hip; Future    RTC in 3 months or sooner if needed    Nicole Márquez MD

## 2020-07-02 ENCOUNTER — TELEPHONE (OUTPATIENT)
Dept: INTERNAL MEDICINE | Facility: CLINIC | Age: 65
End: 2020-07-02

## 2020-07-02 ENCOUNTER — PATIENT MESSAGE (OUTPATIENT)
Dept: INTERNAL MEDICINE | Facility: CLINIC | Age: 65
End: 2020-07-02

## 2020-07-02 RX ORDER — PREGABALIN 75 MG/1
75 CAPSULE ORAL 2 TIMES DAILY
Qty: 60 CAPSULE | Refills: 5 | Status: SHIPPED | OUTPATIENT
Start: 2020-07-02 | End: 2020-07-06 | Stop reason: SDUPTHER

## 2020-07-06 ENCOUNTER — HOSPITAL ENCOUNTER (OUTPATIENT)
Dept: RADIOLOGY | Facility: HOSPITAL | Age: 65
Discharge: HOME | End: 2020-07-06
Attending: INTERNAL MEDICINE
Payer: MEDICARE

## 2020-07-06 ENCOUNTER — OFFICE VISIT (OUTPATIENT)
Dept: OPHTHALMOLOGY | Facility: CLINIC | Age: 65
End: 2020-07-06
Payer: MEDICARE

## 2020-07-06 DIAGNOSIS — H57.12 LEFT EYE PAIN: ICD-10-CM

## 2020-07-06 DIAGNOSIS — H15.002 SCLERITIS, LEFT: Primary | ICD-10-CM

## 2020-07-06 DIAGNOSIS — H05.229 ORBITAL SWELLING: ICD-10-CM

## 2020-07-06 PROCEDURE — 99999 PR PBB SHADOW E&M-EST. PATIENT-LVL III: CPT | Mod: PBBFAC,HCNC,, | Performed by: OPHTHALMOLOGY

## 2020-07-06 PROCEDURE — 99999 PR PBB SHADOW E&M-EST. PATIENT-LVL III: ICD-10-PCS | Mod: PBBFAC,HCNC,, | Performed by: OPHTHALMOLOGY

## 2020-07-06 PROCEDURE — 70486 CT SINUSES WITHOUT CONTRAST: ICD-10-PCS | Mod: 26,HCNC,, | Performed by: RADIOLOGY

## 2020-07-06 PROCEDURE — 70486 CT MAXILLOFACIAL W/O DYE: CPT | Mod: TC,HCNC

## 2020-07-06 PROCEDURE — 70486 CT MAXILLOFACIAL W/O DYE: CPT | Mod: 26,HCNC,, | Performed by: RADIOLOGY

## 2020-07-06 PROCEDURE — 92014 PR EYE EXAM, EST PATIENT,COMPREHESV: ICD-10-PCS | Mod: HCNC,S$GLB,, | Performed by: OPHTHALMOLOGY

## 2020-07-06 PROCEDURE — 92014 COMPRE OPH EXAM EST PT 1/>: CPT | Mod: HCNC,S$GLB,, | Performed by: OPHTHALMOLOGY

## 2020-07-06 RX ORDER — FLUOROMETHOLONE 1 MG/ML
1 SUSPENSION/ DROPS OPHTHALMIC 4 TIMES DAILY
COMMUNITY
Start: 2020-07-01 | End: 2020-08-11

## 2020-07-06 RX ORDER — PREDNISONE 20 MG/1
40 TABLET ORAL DAILY
Qty: 60 TABLET | Refills: 0 | Status: SHIPPED | OUTPATIENT
Start: 2020-07-06 | End: 2020-08-05

## 2020-07-06 RX ORDER — PREGABALIN 75 MG/1
75 CAPSULE ORAL 2 TIMES DAILY
Qty: 60 CAPSULE | Refills: 5 | Status: ON HOLD | OUTPATIENT
Start: 2020-07-06 | End: 2020-12-24 | Stop reason: HOSPADM

## 2020-07-06 NOTE — PROGRESS NOTES
HPI     Erica Batista, OD     OHTN / + fam h/o glc - mom/brother   S/p phaco w/IOL OS 3/17/16   S/p CE with IOL OD ([facility]) - 4/13/17     FML QID OS x 7 days     64 YO female here for Urgent f/u per Dr. Batista for conj injection OS. Pt   reports that OS is bloodshot. Been using eye drops since Feb 2019-   restasis x 14 days in conjunction with pataday for 3 months. Sharp pain   lateral side OS that progressively worsens. Photophobic and blurry. Pt   mentions that she is getting CAT scan later today regarding sinuses to   rule out issues related to sinus. HA daily- ranges in severity but feels   that there is constant IOP. Floaters- stable. Episodes of flashes a few   years back. Dry eyes. Mentions imbalance- when she gets up in the am out   of bed, disorientated. OD is staring to feel discomfort as well.     Last edited by Ingrid Wagner MD on 7/6/2020  1:25 PM. (History)            Assessment /Plan     For exam results, see Encounter Report.    Scleritis, left    Other orders  -     predniSONE (DELTASONE) 20 MG tablet; Take 2 tablets (40 mg total) by mouth once daily.  Dispense: 60 tablet; Refill: 0      Scleritis OS  - oral pred 40mg x 1 wk then 20mg x 1 wk then 1/2 pill (10mg) x 1 wk  - watch sugars  - okay to cont fml tid    Will call in 1 wk to f/up.    OHTN / + fam h/o glc - mom/brother   S/p phaco w/IOL OS 3/17/16   S/p CE with IOL OD ([facility]) - 4/13/17

## 2020-07-06 NOTE — PATIENT INSTRUCTIONS
2 ORAL PREDNISONE PILLS (20MG X 2 = 40MG) DAILY FOR 1 WK THEN DECREASE TO:    1 PILL (20MG) DAILY FOR 1 WK THEN    1/2 PILL (10MG) DAILY FOR 1 WK THEN STOP

## 2020-07-08 ENCOUNTER — PATIENT MESSAGE (OUTPATIENT)
Dept: INTERNAL MEDICINE | Facility: CLINIC | Age: 65
End: 2020-07-08

## 2020-07-08 DIAGNOSIS — J32.9 CHRONIC SINUSITIS, UNSPECIFIED LOCATION: Primary | ICD-10-CM

## 2020-07-08 DIAGNOSIS — E11.42 TYPE 2 DIABETES MELLITUS WITH DIABETIC POLYNEUROPATHY, WITH LONG-TERM CURRENT USE OF INSULIN: Chronic | ICD-10-CM

## 2020-07-08 DIAGNOSIS — Z79.4 TYPE 2 DIABETES MELLITUS WITH DIABETIC POLYNEUROPATHY, WITH LONG-TERM CURRENT USE OF INSULIN: Chronic | ICD-10-CM

## 2020-07-09 ENCOUNTER — TELEPHONE (OUTPATIENT)
Dept: INTERNAL MEDICINE | Facility: CLINIC | Age: 65
End: 2020-07-09

## 2020-07-10 ENCOUNTER — HOSPITAL ENCOUNTER (OUTPATIENT)
Dept: RADIOLOGY | Facility: CLINIC | Age: 65
Discharge: HOME | End: 2020-07-10
Attending: INTERNAL MEDICINE
Payer: MEDICARE

## 2020-07-10 DIAGNOSIS — N95.9 MENOPAUSAL AND PERIMENOPAUSAL DISORDER: ICD-10-CM

## 2020-07-10 PROCEDURE — 77080 DEXA BONE DENSITY SPINE HIP: ICD-10-PCS | Mod: 26,HCNC,, | Performed by: INTERNAL MEDICINE

## 2020-07-10 PROCEDURE — 77080 DXA BONE DENSITY AXIAL: CPT | Mod: TC,HCNC

## 2020-07-10 PROCEDURE — 77080 DXA BONE DENSITY AXIAL: CPT | Mod: 26,HCNC,, | Performed by: INTERNAL MEDICINE

## 2020-07-14 RX ORDER — INSULIN DEGLUDEC 100 U/ML
36 INJECTION, SOLUTION SUBCUTANEOUS DAILY
Qty: 4 SYRINGE | Refills: 5 | Status: ON HOLD | OUTPATIENT
Start: 2020-07-14 | End: 2020-12-25 | Stop reason: SDUPTHER

## 2020-07-14 NOTE — TELEPHONE ENCOUNTER
BP was elevated but she increased Tresiba to 36 units ad BG decreased to 120's.     Will refill Tresiba 36 units every evening.     Discussed CT findings and chronic sinusitis. Will refer to ENT. Please arrange.

## 2020-07-15 ENCOUNTER — PATIENT MESSAGE (OUTPATIENT)
Dept: INTERNAL MEDICINE | Facility: CLINIC | Age: 65
End: 2020-07-15

## 2020-07-15 DIAGNOSIS — E11.42 TYPE 2 DIABETES MELLITUS WITH DIABETIC POLYNEUROPATHY, WITH LONG-TERM CURRENT USE OF INSULIN: ICD-10-CM

## 2020-07-15 DIAGNOSIS — Z79.4 TYPE 2 DIABETES MELLITUS WITH DIABETIC POLYNEUROPATHY, WITH LONG-TERM CURRENT USE OF INSULIN: ICD-10-CM

## 2020-07-15 DIAGNOSIS — H57.89 INFLAMMATORY EYE DISEASE: Primary | ICD-10-CM

## 2020-07-15 DIAGNOSIS — R79.89 ABNORMAL CBC: ICD-10-CM

## 2020-07-15 RX ORDER — METFORMIN HYDROCHLORIDE 500 MG/1
TABLET ORAL
Qty: 270 TABLET | Refills: 1
Start: 2020-07-15 | End: 2020-08-26

## 2020-07-15 NOTE — TELEPHONE ENCOUNTER
Emailed patient re: loose stool and abdominal pain. May need to decrease the metformin dose if symptoms are related.    Will add a few other labs- ESR, CRP, ANGELICA. Please schedule them on the same day as one of her upcoming appointments.

## 2020-07-15 NOTE — TELEPHONE ENCOUNTER
Also added cbc and iron studies. Please link the labs.    Decrease metformin to 1000mg in morning and 500mg in evening. Sent patient an e-mail.

## 2020-07-17 ENCOUNTER — OFFICE VISIT (OUTPATIENT)
Dept: OTOLARYNGOLOGY | Facility: CLINIC | Age: 65
End: 2020-07-17
Payer: MEDICARE

## 2020-07-17 ENCOUNTER — LAB VISIT (OUTPATIENT)
Dept: LAB | Facility: HOSPITAL | Age: 65
End: 2020-07-17
Payer: MEDICARE

## 2020-07-17 VITALS — HEART RATE: 93 BPM | TEMPERATURE: 99 F | DIASTOLIC BLOOD PRESSURE: 68 MMHG | SYSTOLIC BLOOD PRESSURE: 135 MMHG

## 2020-07-17 DIAGNOSIS — R51.9 FRONTAL HEADACHE: ICD-10-CM

## 2020-07-17 DIAGNOSIS — H57.89 INFLAMMATORY EYE DISEASE: ICD-10-CM

## 2020-07-17 DIAGNOSIS — G47.33 OBSTRUCTIVE SLEEP APNEA SYNDROME: ICD-10-CM

## 2020-07-17 DIAGNOSIS — R79.89 ABNORMAL CBC: ICD-10-CM

## 2020-07-17 DIAGNOSIS — J34.2 NASAL SEPTAL DEVIATION: ICD-10-CM

## 2020-07-17 DIAGNOSIS — J32.9 CHRONIC SINUSITIS, UNSPECIFIED LOCATION: ICD-10-CM

## 2020-07-17 DIAGNOSIS — J32.9 RECURRENT SINUSITIS: Primary | ICD-10-CM

## 2020-07-17 DIAGNOSIS — J34.3 HYPERTROPHY OF BOTH INFERIOR NASAL TURBINATES: ICD-10-CM

## 2020-07-17 LAB
BASOPHILS # BLD AUTO: 0.03 K/UL (ref 0–0.2)
BASOPHILS NFR BLD: 0.2 % (ref 0–1.9)
CRP SERPL-MCNC: 3.3 MG/L (ref 0–8.2)
DIFFERENTIAL METHOD: ABNORMAL
EOSINOPHIL # BLD AUTO: 0.1 K/UL (ref 0–0.5)
EOSINOPHIL NFR BLD: 0.3 % (ref 0–8)
ERYTHROCYTE [DISTWIDTH] IN BLOOD BY AUTOMATED COUNT: 14.8 % (ref 11.5–14.5)
ERYTHROCYTE [SEDIMENTATION RATE] IN BLOOD BY WESTERGREN METHOD: 42 MM/HR (ref 0–36)
FERRITIN SERPL-MCNC: 86 NG/ML (ref 20–300)
HCT VFR BLD AUTO: 40.3 % (ref 37–48.5)
HGB BLD-MCNC: 12.7 G/DL (ref 12–16)
IMM GRANULOCYTES # BLD AUTO: 0.13 K/UL (ref 0–0.04)
IMM GRANULOCYTES NFR BLD AUTO: 0.7 % (ref 0–0.5)
IRON SERPL-MCNC: 65 UG/DL (ref 30–160)
LYMPHOCYTES # BLD AUTO: 4.1 K/UL (ref 1–4.8)
LYMPHOCYTES NFR BLD: 22.9 % (ref 18–48)
MCH RBC QN AUTO: 25 PG (ref 27–31)
MCHC RBC AUTO-ENTMCNC: 31.5 G/DL (ref 32–36)
MCV RBC AUTO: 80 FL (ref 82–98)
MONOCYTES # BLD AUTO: 1.1 K/UL (ref 0.3–1)
MONOCYTES NFR BLD: 6 % (ref 4–15)
NEUTROPHILS # BLD AUTO: 12.3 K/UL (ref 1.8–7.7)
NEUTROPHILS NFR BLD: 69.9 % (ref 38–73)
NRBC BLD-RTO: 0 /100 WBC
PLATELET # BLD AUTO: 259 K/UL (ref 150–350)
PMV BLD AUTO: 10.1 FL (ref 9.2–12.9)
RBC # BLD AUTO: 5.07 M/UL (ref 4–5.4)
SATURATED IRON: 20 % (ref 20–50)
TOTAL IRON BINDING CAPACITY: 318 UG/DL (ref 250–450)
TRANSFERRIN SERPL-MCNC: 215 MG/DL (ref 200–375)
WBC # BLD AUTO: 17.66 K/UL (ref 3.9–12.7)

## 2020-07-17 PROCEDURE — 82728 ASSAY OF FERRITIN: CPT | Mod: HCNC

## 2020-07-17 PROCEDURE — 85025 COMPLETE CBC W/AUTO DIFF WBC: CPT | Mod: HCNC

## 2020-07-17 PROCEDURE — 1159F MED LIST DOCD IN RCRD: CPT | Mod: HCNC,S$GLB,, | Performed by: OTOLARYNGOLOGY

## 2020-07-17 PROCEDURE — 99204 OFFICE O/P NEW MOD 45 MIN: CPT | Mod: HCNC,S$GLB,, | Performed by: OTOLARYNGOLOGY

## 2020-07-17 PROCEDURE — 99999 PR PBB SHADOW E&M-EST. PATIENT-LVL V: CPT | Mod: PBBFAC,HCNC,, | Performed by: OTOLARYNGOLOGY

## 2020-07-17 PROCEDURE — 36415 COLL VENOUS BLD VENIPUNCTURE: CPT | Mod: HCNC

## 2020-07-17 PROCEDURE — 1101F PT FALLS ASSESS-DOCD LE1/YR: CPT | Mod: HCNC,CPTII,S$GLB, | Performed by: OTOLARYNGOLOGY

## 2020-07-17 PROCEDURE — 3078F DIAST BP <80 MM HG: CPT | Mod: HCNC,CPTII,S$GLB, | Performed by: OTOLARYNGOLOGY

## 2020-07-17 PROCEDURE — 1101F PR PT FALLS ASSESS DOC 0-1 FALLS W/OUT INJ PAST YR: ICD-10-PCS | Mod: HCNC,CPTII,S$GLB, | Performed by: OTOLARYNGOLOGY

## 2020-07-17 PROCEDURE — 83540 ASSAY OF IRON: CPT | Mod: HCNC

## 2020-07-17 PROCEDURE — 3075F SYST BP GE 130 - 139MM HG: CPT | Mod: HCNC,CPTII,S$GLB, | Performed by: OTOLARYNGOLOGY

## 2020-07-17 PROCEDURE — 1159F PR MEDICATION LIST DOCUMENTED IN MEDICAL RECORD: ICD-10-PCS | Mod: HCNC,S$GLB,, | Performed by: OTOLARYNGOLOGY

## 2020-07-17 PROCEDURE — 86039 ANTINUCLEAR ANTIBODIES (ANA): CPT | Mod: HCNC

## 2020-07-17 PROCEDURE — 86140 C-REACTIVE PROTEIN: CPT | Mod: HCNC

## 2020-07-17 PROCEDURE — 85652 RBC SED RATE AUTOMATED: CPT | Mod: HCNC

## 2020-07-17 PROCEDURE — 3075F PR MOST RECENT SYSTOLIC BLOOD PRESS GE 130-139MM HG: ICD-10-PCS | Mod: HCNC,CPTII,S$GLB, | Performed by: OTOLARYNGOLOGY

## 2020-07-17 PROCEDURE — 99999 PR PBB SHADOW E&M-EST. PATIENT-LVL V: ICD-10-PCS | Mod: PBBFAC,HCNC,, | Performed by: OTOLARYNGOLOGY

## 2020-07-17 PROCEDURE — 86235 NUCLEAR ANTIGEN ANTIBODY: CPT | Mod: 59,HCNC

## 2020-07-17 PROCEDURE — 86038 ANTINUCLEAR ANTIBODIES: CPT | Mod: HCNC

## 2020-07-17 PROCEDURE — 1125F PR PAIN SEVERITY QUANTIFIED, PAIN PRESENT: ICD-10-PCS | Mod: HCNC,S$GLB,, | Performed by: OTOLARYNGOLOGY

## 2020-07-17 PROCEDURE — 3078F PR MOST RECENT DIASTOLIC BLOOD PRESSURE < 80 MM HG: ICD-10-PCS | Mod: HCNC,CPTII,S$GLB, | Performed by: OTOLARYNGOLOGY

## 2020-07-17 PROCEDURE — 1125F AMNT PAIN NOTED PAIN PRSNT: CPT | Mod: HCNC,S$GLB,, | Performed by: OTOLARYNGOLOGY

## 2020-07-17 PROCEDURE — 99204 PR OFFICE/OUTPT VISIT, NEW, LEVL IV, 45-59 MIN: ICD-10-PCS | Mod: HCNC,S$GLB,, | Performed by: OTOLARYNGOLOGY

## 2020-07-17 RX ORDER — FLUTICASONE PROPIONATE 50 MCG
2 SPRAY, SUSPENSION (ML) NASAL DAILY
Qty: 16 G | Refills: 2 | Status: SHIPPED | OUTPATIENT
Start: 2020-07-17 | End: 2020-10-15

## 2020-07-17 NOTE — PATIENT INSTRUCTIONS
"Information and instructions from your visit with me today:    · Start using fluticasone nasal spray:    This medication is the same as the Flonase brand nasal spray. Use this medication as instructed on the prescription, 2 sprays on each side of your nose once daily. You need to use this medication every day regardless of symptoms, as it takes time to work and get the benefits. It does not work on an "as needed" basis like taking a decongestant. Place the tip of the medication bottle in your nose and aim slightly up and out on each side to get medication high and deep into your nose and sinuses, and not have it all deposit in the very front of your nose. You can imagine aiming towards the back of your eyeball on each side for this, as opposed to straight back to the center of your nose and head.     · Start nasal irrigations with saline solution:    SALINE SINUS RINSE ([employer] brand): You should do a full bottle, half on one side of your nose and half on the other, 1-2 times per day (or more if able to, you cannot do this too much). Follow the instructions on the box: mix the salt packet with clean water (bottle, previously boiled, distilled, etc -- not tap water) to the line on the bottle to make the irrigation.      · Do not use nasal decongestant sprays such as Afrin or similar products.    It was nice meeting you today, and I look forward to helping you feel better soon. Please don't hesitate to call if you have any other questions or concerns, or if I can be of any assistance in the meantime.       Wally Washington    [facility]    Phone  [phone]    Fax      [phone]        Wally Washington MD  Rhinology, Sinus, and Skull Base Surgery  Department of Otorhinolaryngology        "

## 2020-07-17 NOTE — PROGRESS NOTES
Subjective:      Adriana Paula is a 65 y.o. female who was referred to me by Dr. Nicole Márquez in consultation for sinusitis. Frequent facial pressure and pain, recurrent green and yellow nasal drainage with acute sinus infections. Between acute episodes has headache in frontal distribution ranging from low grade annoyance to severe pain. She has some photophobia and visual changes associated with severe headache.    Current sinonasal medications include flonase and nasal saline spray daily.  The last course of antibiotics was about 2 weeks ago (amoxicillin).  She does not regularly use nasal decongestant sprays. Gets treated with antibiotics about 3 times per year for sinus infections which resolves issues for a month or two. Has been on prednisone for about 2 weeks now on a long taper for an eye issue. Notices some slight benefit to sinonasal symptoms. Nasal breathing is okay in general.    She recalls previously having allergy testing, which was reportedly positive for trees, dust mites, and many other things. She denies a history of asthma. She relates a diagnosis of obstructive sleep apnea without regular CPAP use. Has not used CPAP in a long time. She endorses snoring. She has not had sinonasal surgery. She does not recall a prior history of nasal trauma.    Patient reported quality of life assessments:    SNOT-22  SNOT-22 score: : (P) 50    NOSE Score  NOSE Scale Total Score : (P) 8  NOSE score:: (P) 40    ETDQ Questionnaire Scores  Total Score : (P) 28  ETDQ-7 score:: (P) 4      Past Medical History  She has a past medical history of Allergy, Anemia, Anxiety, Arthritis, Breast cyst, Cataract, DR (diabetic retinopathy), Dyslipidemia, Essential hypertension, Gastroesophageal reflux disease without esophagitis, GERD (gastroesophageal reflux disease), Glaucoma, Hypertension, Obesity (BMI 30-39.9), PN (peripheral neuropathy), Sleep apnea, Type 2 diabetes mellitus with both eyes affected by mild  nonproliferative retinopathy without macular edema, with long-term current use of insulin, Type 2 diabetes mellitus with diabetic polyneuropathy, with long-term current use of insulin, and Type II or unspecified type diabetes mellitus with other specified manifestations, uncontrolled.    Past Surgical History  She has a past surgical history that includes  section; Myomectomy; Carpal tunnel release (Right); Knee arthroscopy (14); Cataract extraction; Cataract extraction w/  intraocular lens implant (Right, 2017); Breast cyst excision (Right); Trigger finger release; Trigger finger release (Left, 4/15/2019); Epidural steroid injection (N/A, 2019); Epidural steroid injection (N/A, 2019); and Transforaminal epidural injection of steroid (Right, 2019).    Family History  Her family history includes Arthritis in her mother; Breast cancer (age of onset: 40) in her cousin, cousin, and sister; Breast cancer (age of onset: 50) in her maternal grandmother and sister; Breast cancer (age of onset: 54) in her sister; Breast cancer (age of onset: 75) in her mother; Cancer in her mother; Diabetes in her mother and unknown relative; Heart disease in her mother; Heart failure in her brother; Miscarriages / Stillbirths in her mother; No Known Problems in her father, maternal aunt, maternal grandfather, maternal uncle, paternal aunt, paternal grandfather, paternal grandmother, and paternal uncle; Pacemaker/defibrilator in her brother; Rheum arthritis in her sister and sister; Vision loss in her mother.    Social History  She reports that she has quit smoking. She started smoking about 17 years ago. She has never used smokeless tobacco. She reports current alcohol use. She reports that she does not use drugs.    Allergies  She is allergic to keflex [cephalexin]; penicillins; sulfamethoxazole-trimethoprim; vicodin [hydrocodone-acetaminophen]; and sulfa (sulfonamide antibiotics).    Medications   She has  a current medication list which includes the following prescription(s): accu-chek softclix lancets, amlodipine, atorvastatin, bd ultra-fine short pen needle, blood sugar diagnostic, blood-glucose meter, cholecalciferol (vitamin d3), fluorometholone 0.1%, fluticasone propionate, tresiba flextouch u-100, lancets, losartan, magnesium, metformin, oxybutynin, prednisone, pregabalin, aspirin, fluticasone propionate, and tramadol.    Review of Systems    Review of Systems   Constitutional: Negative for fatigue, fever and unexpected weight change.   HENT: Positive for congestion, ear pain, facial swelling, postnasal drip, sinus pressure, sore throat and tinnitus. Negative for dental problem, ear discharge, hearing loss, hoarse voice, nosebleeds, rhinorrhea, trouble swallowing and voice change.    Eyes: Positive for photophobia, discharge and itching. Negative for visual disturbance.   Respiratory: Positive for cough and shortness of breath. Negative for apnea and wheezing.    Cardiovascular: Positive for chest pain. Negative for palpitations.   Gastrointestinal: Negative for abdominal pain, nausea and vomiting.   Endocrine: Negative for cold intolerance and heat intolerance.   Genitourinary: Positive for frequency. Negative for difficulty urinating.   Musculoskeletal: Positive for arthralgias, back pain and neck pain. Negative for myalgias.   Skin: Negative for rash.   Allergic/Immunologic: Negative for environmental allergies and food allergies.   Neurological: Positive for dizziness and headaches. Negative for seizures, syncope and weakness.   Hematological: Negative for adenopathy. Does not bruise/bleed easily.   Psychiatric/Behavioral: Positive for decreased concentration and sleep disturbance. Negative for dysphoric mood. The patient is nervous/anxious.    All other systems reviewed and are negative.    Answers for HPI/ROS submitted by the patient on 7/15/2020   Fatigue (Tiredness)?: Yes  Sinus infection(s)?:  Yes  Tooth/Dental Problems?: Yes  Hoarseness?: Yes  Visual changes / Changes in eyesight?: Yes  Snoring?: Yes  Sleep disturbances due to breathing?: Yes  heartburn: Yes  Acid Reflux?: Yes  Muscle aches / pain?: Yes      Objective:     /68   Pulse 93   Temp 98.7 °F (37.1 °C) (Oral)        Constitutional:   Vital signs are normal. She appears well-developed and well-nourished. She does not appear ill. No distress. Normal speech.  No hoarse voice, breathy voice and strained voice.      Head:  Normocephalic and atraumatic. No skin lesions. Salivary glands normal.  Facial strength is normal.      Ears:    Right Ear: No drainage, swelling or tenderness. Tympanic membrane is not injected, not scarred, not perforated, not erythematous, not retracted and not bulging. No middle ear effusion. No decreased hearing is noted.   Left Ear: No drainage, swelling or tenderness. Tympanic membrane is not injected, not scarred, not perforated, not erythematous, not retracted and not bulging.  No middle ear effusion. No decreased hearing is noted.     Nose:  Mucosal edema and septal deviation present. No rhinorrhea, nose lacerations, sinus tenderness or polyps. No epistaxis. Turbinate hypertrophy.  Right sinus exhibits no maxillary sinus tenderness and no frontal sinus tenderness. Left sinus exhibits no maxillary sinus tenderness and no frontal sinus tenderness.     Mouth/Throat  Oropharynx clear and moist without lesions or asymmetry, normal uvula midline and lips, teeth, and gums normal. No oral lesions or trismus. No oropharyngeal exudate, posterior oropharyngeal edema or posterior oropharyngeal erythema.     Neck:  Trachea normal, phonation normal, full range of motion with neck supple and no adenopathy. No edema, no erythema, no stridor and no neck rigidity present.     Pulmonary/Chest:   Effort normal. No stridor.     Psychiatric:   She has a normal mood and affect. Her speech is normal and behavior is normal.      Neurological:   She has neurological normal, alert and oriented. No cranial nerve deficit or sensory deficit. Coordination and gait normal.     Skin:   No abrasions, lacerations, lesions, or rashes.       Procedure    None   No Covid testing performed for visit    Data Reviewed    WBC (K/uL)   Date Value   07/17/2020 17.66 (H)     Eosinophil% (%)   Date Value   07/17/2020 0.3     Eos # (K/uL)   Date Value   07/17/2020 0.1     Platelets (K/uL)   Date Value   07/17/2020 259     Glucose (mg/dL)   Date Value   06/12/2020 143 (H)     No results found for: IGE    Review of radiographic imaging:  I personally reviewed and interpreted imaging relative to the chief complaint consisting of CT Sinuses performed on 7/6/20.  Key findings from this imaging study include no significant paranasal sinus opacifications though some mucosal thickening is present of the ethmoid sinuses, maxillary infundibula and uncinate processes with narrowing of OMC bilaterally, and some mucosa thickening of frontal outflow tracts, lamellar vanessa bullosa, paradoxical direction of right middle turbinate, mild septal deviation to left, and turbinate hypertrophy.  Representative images selected from this study and demonstrating key findings are copied below:          Assessment:     1. Recurrent sinusitis    2. Hypertrophy of both inferior nasal turbinates    3. Nasal septal deviation    4. Frontal headache    5. Chronic sinusitis, unspecified location    6. Obstructive sleep apnea syndrome         Plan:     I had a long discussion with the patient regarding her condition and the further workup and management options. There is minimal radiographic evidence of sinusitis with mucosal thickening and no significant opacifications. Based on her CT there are multiple anatomic findings suggestive of predisposition for recurrent acute sinusitis as her osteomeatal complexes are narrowed bilaterally and although patent the maxillary infundibula are very  "narrow and mucosal thickening is present. Although she does have findings associated with congestion and partial nasal obstruction, she reports no significant difficulty with nasal breathing. Her frontal headache appears most consistent with migraine type headache and she has no evidence of significant frontal sinusitis to suggest and sinogenic origin of this pain which is also reported to be associated with visual changes when severe. I have advised medical management for her recurrent sinusitis with addition of nasal saline irrigations, and have explained my thoughts regarding her frontal pain which is most consistent with non-sinogenic headache and for which I advised evaluation and management for headache.     I have recommended medical management with a combination of nasal saline irrigations and inhaled nasal steroids (fluticasone). Regarding nasal saline irrigations, specific instructions were provided on acquiring and using this treatment. Explanation was given regarding performing these irrigations, and also provided as written instructions. Fluticasone nasal spray was prescribed and specific instructions also given on proper use to maximize nasal delivery of the medication. I emphasized the importance of daily use to achieve therapeutic effect, and that this medication is not intended as an occassional "as needed" treatment of symptoms. Written instructions regarding the use of fluticasone nasal spray were also provided.    The role of nasal surgery for obstructive sleep apnea (MARINA) was discussed based on published evidence and the official position statement of the American Academy of Otolaryngology-Head and Neck Surgery. Nasal surgery as the sole intervention may effectively treat MARINA in a subset of patients: nasal surgery to improve nasal patency has been demonstrated in multiple studies to reduce MARINA severity and result in significant decreases in AHI and Cromwell Sleepiness Scale (ESS) scores, and " improve quality of life in patients with sleep apnea. Nasal surgery can also facilitate the treatment of MARINA by improving compliance with CPAP (Continuous Positive Airway Pressure), as there is significant correlation between nasal resistance or obstruction and CPAP non-acceptance, and nasal surgery may lower CPAP pressures. I recommended that the patient use their CPAP in general as they have not been using it.    She voiced understanding of this discussion and instructions for management, and all of her questions were answered. I have recommended follow-up in 4-6 weeks with scope. I have encouraged her to call for any questions or concerns in the meantime.         Wally Washington MD    Rhinology, Allergy, and Sinus-Skull Base Surgery    Department of Otorhinolaryngology    [facility]    Phone  [phone]    Fax      [phone]

## 2020-07-17 NOTE — LETTER
August 2, 2020      Nicole Márquez MD  [address] [facility] [place] [address]           [facility] - Otorhinolaryngology  [address]  [facility] [address]  Phone: [phone]  Fax: [phone]          Patient: Adriana Paula   MR Number: [id number]   YOB: 1955   Date of Visit: 7/17/2020       Dear Dr. Nicole Márquez:    Thank you for referring Adriana Paula to me for evaluation. Attached you will find relevant portions of my assessment and plan of care.    If you have questions, please do not hesitate to call me. I look forward to following Adriana Paula along with you.    Sincerely,    Wally Washington MD    Enclosure  CC:  No Recipients    If you would like to receive this communication electronically, please contact [email or web address] or [phone] to request more information on [employer] Link access.    For providers and/or their staff who would like to refer a patient to [facility], please contact us through our one-stop-shop provider referral line, [facility] [place], at [phone].    If you feel you have received this communication in error or would no longer like to receive these types of communications, please e-mail [email or web address]

## 2020-07-21 LAB
ANA PATTERN 1: NORMAL
ANA SER QL IF: POSITIVE
ANA TITR SER IF: NORMAL {TITER}

## 2020-07-23 ENCOUNTER — TELEPHONE (OUTPATIENT)
Dept: INTERNAL MEDICINE | Facility: CLINIC | Age: 65
End: 2020-07-23

## 2020-07-23 PROBLEM — R76.8 ANA POSITIVE: Status: ACTIVE | Noted: 2020-07-23

## 2020-07-23 PROBLEM — R70.0 ESR RAISED: Status: ACTIVE | Noted: 2020-07-23

## 2020-07-23 LAB
ANTI SM ANTIBODY: 0.23 RATIO (ref 0–0.99)
ANTI SM/RNP ANTIBODY: 2.85 RATIO (ref 0–0.99)
ANTI-SM INTERPRETATION: NEGATIVE
ANTI-SM/RNP INTERPRETATION: POSITIVE
ANTI-SSA ANTIBODY: 0.14 RATIO (ref 0–0.99)
ANTI-SSA INTERPRETATION: NEGATIVE
ANTI-SSB ANTIBODY: 0.07 RATIO (ref 0–0.99)
ANTI-SSB INTERPRETATION: NEGATIVE
DSDNA AB SER-ACNC: ABNORMAL [IU]/ML

## 2020-07-23 NOTE — TELEPHONE ENCOUNTER
----- Message from Nicole Márquez MD sent at 7/23/2020 11:40 AM CDT -----  Message sent via patient portal.

## 2020-07-26 ENCOUNTER — PATIENT MESSAGE (OUTPATIENT)
Dept: OPTOMETRY | Facility: CLINIC | Age: 65
End: 2020-07-26

## 2020-07-26 ENCOUNTER — PATIENT MESSAGE (OUTPATIENT)
Dept: INTERNAL MEDICINE | Facility: CLINIC | Age: 65
End: 2020-07-26

## 2020-07-26 DIAGNOSIS — M17.0 PRIMARY OSTEOARTHRITIS OF BOTH KNEES: ICD-10-CM

## 2020-07-27 RX ORDER — TRAMADOL HYDROCHLORIDE 50 MG/1
50 TABLET ORAL EVERY 12 HOURS PRN
Qty: 60 TABLET | Refills: 0 | Status: SHIPPED | OUTPATIENT
Start: 2020-07-27 | End: 2020-11-06 | Stop reason: SDUPTHER

## 2020-07-28 NOTE — TELEPHONE ENCOUNTER
Spoke with pt  Her eye is improved and no longer feeling pain OS  She has finished FML drops and Instructed to continue with artificial tears BID\  Pt will call/ return if condition worsens

## 2020-08-07 ENCOUNTER — PATIENT MESSAGE (OUTPATIENT)
Dept: INTERNAL MEDICINE | Facility: CLINIC | Age: 65
End: 2020-08-07

## 2020-08-07 ENCOUNTER — TELEPHONE (OUTPATIENT)
Dept: INTERNAL MEDICINE | Facility: CLINIC | Age: 65
End: 2020-08-07

## 2020-08-07 NOTE — TELEPHONE ENCOUNTER
----- Message from Kacey Hughes sent at 8/7/2020  3:15 PM CDT -----  Regarding: Pts daughter Ms. Licona Mobile# [phone]  Ms. Licona would like a call back in regards to her saying that her mother is sleep deprived and she believe that her mother is going through a mental illness because she have been throwing things and acting out for three weeks now and she would like to speak with you about this please.

## 2020-08-07 NOTE — TELEPHONE ENCOUNTER
Spoke to patient's daughter.    Suspect changes in mood and aggression related to prednisone but it was stopped on 7/26/20.    Will arrange a f/u appointment with patient and assess her for any changes in behavior.

## 2020-08-11 ENCOUNTER — OFFICE VISIT (OUTPATIENT)
Dept: OPTOMETRY | Facility: CLINIC | Age: 65
End: 2020-08-11
Payer: MEDICARE

## 2020-08-11 DIAGNOSIS — H11.432 CONJUNCTIVAL HYPEREMIA OF LEFT EYE: Primary | ICD-10-CM

## 2020-08-11 PROCEDURE — 92012 INTRM OPH EXAM EST PATIENT: CPT | Mod: HCNC,S$GLB,, | Performed by: OPTOMETRIST

## 2020-08-11 PROCEDURE — 99999 PR PBB SHADOW E&M-EST. PATIENT-LVL II: ICD-10-PCS | Mod: PBBFAC,HCNC,, | Performed by: OPTOMETRIST

## 2020-08-11 PROCEDURE — 99999 PR PBB SHADOW E&M-EST. PATIENT-LVL II: CPT | Mod: PBBFAC,HCNC,, | Performed by: OPTOMETRIST

## 2020-08-11 PROCEDURE — 92012 PR EYE EXAM, EST PATIENT,INTERMED: ICD-10-PCS | Mod: HCNC,S$GLB,, | Performed by: OPTOMETRIST

## 2020-08-11 RX ORDER — DUREZOL 0.5 MG/ML
1 EMULSION OPHTHALMIC 4 TIMES DAILY
Qty: 2 ML | Refills: 0 | Status: SHIPPED | OUTPATIENT
Start: 2020-08-11 | End: 2020-08-17 | Stop reason: SDUPTHER

## 2020-08-11 NOTE — PROGRESS NOTES
HPI     Pt here for problem focused visit  OS>OD  Pt has been dealing with discharge  Pt uses otc ats 3-4 x a day  finished pred about 2 weeks ago  Pressure and discomfort behind OS   Pt states she has blurry va sometimes  photophobia    Last edited by Flaquita Saavedra on 8/11/2020  2:27 PM. (History)            Assessment /Plan     For exam results, see Encounter Report.    Conjunctival hyperemia of left eye  -     difluprednate (DUREZOL) 0.05 % Drop ophthalmic solution; Place 1 drop into both eyes 4 (four) times daily. for 7 days  Then BID x 1 week    RTC for follow up next week

## 2020-08-14 ENCOUNTER — PATIENT MESSAGE (OUTPATIENT)
Dept: OPTOMETRY | Facility: CLINIC | Age: 65
End: 2020-08-14

## 2020-08-17 RX ORDER — DUREZOL 0.5 MG/ML
1 EMULSION OPHTHALMIC 4 TIMES DAILY
Qty: 2 ML | Refills: 0 | Status: SHIPPED | OUTPATIENT
Start: 2020-08-17 | End: 2020-08-24

## 2020-08-24 ENCOUNTER — PATIENT MESSAGE (OUTPATIENT)
Dept: INTERNAL MEDICINE | Facility: CLINIC | Age: 65
End: 2020-08-24

## 2020-08-26 NOTE — TELEPHONE ENCOUNTER
Called patient but no answer.     She has persistent headaches. Does she also have any symptoms of sinus problems- post-nasal drip, cough, rhinorrhea? Is the pain still located behind her eyes?    The x-ray of the sinuses done last month showed mild thickening of the sinuses located both above and below her eyes. This is consistent with chronic sinusitis. If she continues to have symptoms, I will refer her to ENT to further evaluate the sinuses.    Has she tried anything over-the-counter for the headaches? Have her blood pressure and blood sugar levels been controlled?

## 2020-08-27 ENCOUNTER — PATIENT MESSAGE (OUTPATIENT)
Dept: OPTOMETRY | Facility: CLINIC | Age: 65
End: 2020-08-27

## 2020-08-27 ENCOUNTER — PATIENT MESSAGE (OUTPATIENT)
Dept: OTOLARYNGOLOGY | Facility: CLINIC | Age: 65
End: 2020-08-27

## 2020-08-28 RX ORDER — DUREZOL 0.5 MG/ML
1 EMULSION OPHTHALMIC 4 TIMES DAILY
Qty: 3 ML | Refills: 0 | Status: SHIPPED | OUTPATIENT
Start: 2020-08-28 | End: 2020-09-07

## 2020-08-31 ENCOUNTER — OFFICE VISIT (OUTPATIENT)
Dept: PODIATRY | Facility: CLINIC | Age: 65
End: 2020-08-31
Payer: MEDICARE

## 2020-08-31 VITALS
HEART RATE: 83 BPM | BODY MASS INDEX: 37.82 KG/M2 | DIASTOLIC BLOOD PRESSURE: 81 MMHG | SYSTOLIC BLOOD PRESSURE: 143 MMHG | WEIGHT: 227.31 LBS

## 2020-08-31 DIAGNOSIS — G89.4 CHRONIC PAIN SYNDROME: ICD-10-CM

## 2020-08-31 DIAGNOSIS — E11.42 DIABETIC POLYNEUROPATHY ASSOCIATED WITH TYPE 2 DIABETES MELLITUS: Primary | ICD-10-CM

## 2020-08-31 DIAGNOSIS — J32.9 RECURRENT SINUSITIS: Primary | ICD-10-CM

## 2020-08-31 PROCEDURE — 99999 PR PBB SHADOW E&M-EST. PATIENT-LVL IV: CPT | Mod: PBBFAC,HCNC,, | Performed by: PODIATRIST

## 2020-08-31 PROCEDURE — 99999 PR PBB SHADOW E&M-EST. PATIENT-LVL IV: ICD-10-PCS | Mod: PBBFAC,HCNC,, | Performed by: PODIATRIST

## 2020-08-31 PROCEDURE — 3051F PR MOST RECENT HEMOGLOBIN A1C LEVEL 7.0 - < 8.0%: ICD-10-PCS | Mod: HCNC,CPTII,S$GLB, | Performed by: PODIATRIST

## 2020-08-31 PROCEDURE — 3079F DIAST BP 80-89 MM HG: CPT | Mod: HCNC,CPTII,S$GLB, | Performed by: PODIATRIST

## 2020-08-31 PROCEDURE — 3079F PR MOST RECENT DIASTOLIC BLOOD PRESSURE 80-89 MM HG: ICD-10-PCS | Mod: HCNC,CPTII,S$GLB, | Performed by: PODIATRIST

## 2020-08-31 PROCEDURE — 3077F PR MOST RECENT SYSTOLIC BLOOD PRESSURE >= 140 MM HG: ICD-10-PCS | Mod: HCNC,CPTII,S$GLB, | Performed by: PODIATRIST

## 2020-08-31 PROCEDURE — 1101F PR PT FALLS ASSESS DOC 0-1 FALLS W/OUT INJ PAST YR: ICD-10-PCS | Mod: HCNC,CPTII,S$GLB, | Performed by: PODIATRIST

## 2020-08-31 PROCEDURE — 1159F MED LIST DOCD IN RCRD: CPT | Mod: HCNC,S$GLB,, | Performed by: PODIATRIST

## 2020-08-31 PROCEDURE — 3008F BODY MASS INDEX DOCD: CPT | Mod: HCNC,CPTII,S$GLB, | Performed by: PODIATRIST

## 2020-08-31 PROCEDURE — 3008F PR BODY MASS INDEX (BMI) DOCUMENTED: ICD-10-PCS | Mod: HCNC,CPTII,S$GLB, | Performed by: PODIATRIST

## 2020-08-31 PROCEDURE — 3077F SYST BP >= 140 MM HG: CPT | Mod: HCNC,CPTII,S$GLB, | Performed by: PODIATRIST

## 2020-08-31 PROCEDURE — 1159F PR MEDICATION LIST DOCUMENTED IN MEDICAL RECORD: ICD-10-PCS | Mod: HCNC,S$GLB,, | Performed by: PODIATRIST

## 2020-08-31 PROCEDURE — 3051F HG A1C>EQUAL 7.0%<8.0%: CPT | Mod: HCNC,CPTII,S$GLB, | Performed by: PODIATRIST

## 2020-08-31 PROCEDURE — 99214 OFFICE O/P EST MOD 30 MIN: CPT | Mod: HCNC,S$GLB,, | Performed by: PODIATRIST

## 2020-08-31 PROCEDURE — 1101F PT FALLS ASSESS-DOCD LE1/YR: CPT | Mod: HCNC,CPTII,S$GLB, | Performed by: PODIATRIST

## 2020-08-31 PROCEDURE — 99214 PR OFFICE/OUTPT VISIT, EST, LEVL IV, 30-39 MIN: ICD-10-PCS | Mod: HCNC,S$GLB,, | Performed by: PODIATRIST

## 2020-08-31 RX ORDER — LIDOCAINE HYDROCHLORIDE 20 MG/ML
JELLY TOPICAL
Qty: 30 ML | Refills: 2 | Status: SHIPPED | OUTPATIENT
Start: 2020-08-31 | End: 2020-11-11 | Stop reason: SDUPTHER

## 2020-08-31 NOTE — TELEPHONE ENCOUNTER
Spoke with patient and scheduled her for a covid test and follow up appt at [facility] as per Dr. Washington

## 2020-09-01 NOTE — PROGRESS NOTES
Subjective:      Patient ID: Adriana Paula is a 65 y.o. female.    Chief Complaint: Diabetes Mellitus (PCP 7/01/2020), Routine Foot Care, and Foot Swelling    Adriana is a 65 y.o. female who presents to the clinic upon referral from Dr. Noemi villagomez. provider found  for evaluation and treatment of diabetic feet. Adriana has a past medical history of Allergy, Anemia, Anxiety, Arthritis, Breast cyst, Cataract, DR (diabetic retinopathy), Dyslipidemia, Essential hypertension (8/1/2012), Gastroesophageal reflux disease without esophagitis (2/3/2017), GERD (gastroesophageal reflux disease), Glaucoma, Hypertension, Obesity (BMI 30-39.9) (10/24/2013), PN (peripheral neuropathy), Sleep apnea, Type 2 diabetes mellitus with both eyes affected by mild nonproliferative retinopathy without macular edema, with long-term current use of insulin, Type 2 diabetes mellitus with diabetic polyneuropathy, with long-term current use of insulin, and Type II or unspecified type diabetes mellitus with other specified manifestations, uncontrolled. Patient relates no major problem with feet. Only complaints today consist of routine diabetic foot evaluation as well as intermittent tingling and burning in both feet.    PCP: Nicole Márquez MD    Date Last Seen by PCP:   Chief Complaint   Patient presents with    Diabetes Mellitus     PCP 7/01/2020    Routine Foot Care    Foot Swelling         Current shoe gear: Casual shoes    Hemoglobin A1C   Date Value Ref Range Status   06/12/2020 7.9 (H) 4.0 - 5.6 % Final     Comment:     ADA Screening Guidelines:  5.7-6.4%  Consistent with prediabetes  >or=6.5%  Consistent with diabetes  High levels of fetal hemoglobin interfere with the HbA1C  assay. Heterozygous hemoglobin variants (HbS, HgC, etc)do  not significantly interfere with this assay.   However, presence of multiple variants may affect accuracy.     02/26/2020 9.5 (H) 4.0 - 5.6 % Final     Comment:     ADA Screening Guidelines:  5.7-6.4%   Consistent with prediabetes  >or=6.5%  Consistent with diabetes  High levels of fetal hemoglobin interfere with the HbA1C  assay. Heterozygous hemoglobin variants (HbS, HgC, etc)do  not significantly interfere with this assay.   However, presence of multiple variants may affect accuracy.     11/07/2019 7.7 (H) 4.0 - 5.6 % Final     Comment:     ADA Screening Guidelines:  5.7-6.4%  Consistent with prediabetes  >or=6.5%  Consistent with diabetes  High levels of fetal hemoglobin interfere with the HbA1C  assay. Heterozygous hemoglobin variants (HbS, HgC, etc)do  not significantly interfere with this assay.   However, presence of multiple variants may affect accuracy.             Review of Systems   Constitution: Negative for chills, decreased appetite, fever and malaise/fatigue.   HENT: Negative for congestion, hearing loss, nosebleeds and tinnitus.    Eyes: Negative for double vision, pain, photophobia and visual disturbance.   Cardiovascular: Negative for chest pain, claudication, cyanosis and leg swelling.   Respiratory: Negative for cough, hemoptysis, shortness of breath and wheezing.    Endocrine: Negative for cold intolerance and heat intolerance.   Hematologic/Lymphatic: Negative for adenopathy and bleeding problem.   Skin: Positive for color change and dry skin. Negative for itching, nail changes and suspicious lesions.   Musculoskeletal: Negative for arthritis, joint pain, myalgias and stiffness.   Gastrointestinal: Negative for abdominal pain, jaundice, nausea and vomiting.   Genitourinary: Negative for dysuria, frequency and hematuria.   Neurological: Negative for difficulty with concentration, loss of balance, numbness, paresthesias and sensory change.   Psychiatric/Behavioral: Negative for altered mental status, hallucinations and suicidal ideas. The patient is not nervous/anxious.    Allergic/Immunologic: Negative for environmental allergies and persistent infections.           Objective:      Physical  Exam  Vitals signs reviewed.   Constitutional:       Appearance: She is well-developed.   Cardiovascular:      Pulses:           Dorsalis pedis pulses are 2+ on the right side and 2+ on the left side.        Posterior tibial pulses are 2+ on the right side and 2+ on the left side.   Pulmonary:      Effort: Pulmonary effort is normal.   Musculoskeletal: Normal range of motion.      Comments: Inspection and palpation of the muscles joints and bones of both lower extremities reveal that muscle strength for the anterior lateral and posterior muscle groups and intrinsic muscle groups of the foot are all 5 over 5 symmetrical.  Ankle subtalar midtarsal and digital joint range of motion are within normal limits, nonpainful, without crepitus or effusion.  Patient exhibits a normal angle and base of gait.  Palpation of the tendons reveal no defects.   Skin:     General: Skin is warm and dry.      Capillary Refill: Capillary refill takes 2 to 3 seconds.      Comments: Skin turgor is normal bilaterally.  Skin texture is dry to both lower extremities.  No lesions or rashes or wounds appreciated bilaterally.  Nail plates 1 through 5 bilaterally are within normal limits for length and thickness with minimal discoloration.  No nail clubbing or incurvation noted.   Neurological:      Mental Status: She is alert and oriented to person, place, and time.      Comments: Sharp dull light touch vibratory proprioceptive sensation are intact bilaterally.  Deep tendon reflexes to patellar and Achilles tendon are symmetrical 2 over 4 bilaterally.  No ankle clonus or Babinski reflexes noted bilaterally.  Coordination is normal to both feet and lower extremities.               Assessment:       Encounter Diagnoses   Name Primary?    Diabetic polyneuropathy associated with type 2 diabetes mellitus Yes    Chronic pain syndrome          Plan:       Adriana was seen today for diabetes mellitus, routine foot care and foot swelling.    Diagnoses  and all orders for this visit:    Diabetic polyneuropathy associated with type 2 diabetes mellitus    Chronic pain syndrome    Other orders  -     lidocaine HCL 2% (XYLOCAINE) 2 % jelly; Apply topically as needed. Apply topically once nightly to affected part of foot/feet.      I counseled the patient on her conditions, their implications and medical management.      Discussed options for peripheral neuropathy/nerve entrapment syndrome including nerve block therapy, surgical nerve entrapment decompression procedures, and various vitamins and supplementation available shown to improve nerve function.    Shoe inspection. Diabetic Foot Education. Patient reminded of the importance of good nutrition and blood sugar control to help prevent podiatric complications of diabetes. Patient instructed on proper foot hygeine. We discussed wearing proper shoe gear, daily foot inspections and Diabetic foot education in detail.    Return to clinic in 12 months or sooner if problems arise   .

## 2020-09-02 ENCOUNTER — LAB VISIT (OUTPATIENT)
Dept: SURGERY | Facility: CLINIC | Age: 65
End: 2020-09-02
Payer: MEDICARE

## 2020-09-02 DIAGNOSIS — J32.9 RECURRENT SINUSITIS: ICD-10-CM

## 2020-09-02 PROCEDURE — U0003 INFECTIOUS AGENT DETECTION BY NUCLEIC ACID (DNA OR RNA); SEVERE ACUTE RESPIRATORY SYNDROME CORONAVIRUS 2 (SARS-COV-2) (CORONAVIRUS DISEASE [COVID-19]), AMPLIFIED PROBE TECHNIQUE, MAKING USE OF HIGH THROUGHPUT TECHNOLOGIES AS DESCRIBED BY CMS-2020-01-R: HCPCS | Mod: HCNC

## 2020-09-03 ENCOUNTER — PATIENT OUTREACH (OUTPATIENT)
Dept: ADMINISTRATIVE | Facility: OTHER | Age: 65
End: 2020-09-03

## 2020-09-03 LAB — SARS-COV-2 RNA RESP QL NAA+PROBE: NOT DETECTED

## 2020-09-04 ENCOUNTER — OFFICE VISIT (OUTPATIENT)
Dept: OTOLARYNGOLOGY | Facility: CLINIC | Age: 65
End: 2020-09-04
Payer: MEDICARE

## 2020-09-04 VITALS — HEART RATE: 75 BPM | SYSTOLIC BLOOD PRESSURE: 133 MMHG | DIASTOLIC BLOOD PRESSURE: 64 MMHG

## 2020-09-04 DIAGNOSIS — J34.3 HYPERTROPHY OF BOTH INFERIOR NASAL TURBINATES: ICD-10-CM

## 2020-09-04 DIAGNOSIS — G47.33 OBSTRUCTIVE SLEEP APNEA SYNDROME: ICD-10-CM

## 2020-09-04 DIAGNOSIS — R51.9 FRONTAL HEADACHE: Primary | ICD-10-CM

## 2020-09-04 DIAGNOSIS — J34.2 NASAL SEPTAL DEVIATION: ICD-10-CM

## 2020-09-04 PROCEDURE — 1101F PR PT FALLS ASSESS DOC 0-1 FALLS W/OUT INJ PAST YR: ICD-10-PCS | Mod: HCNC,CPTII,S$GLB, | Performed by: OTOLARYNGOLOGY

## 2020-09-04 PROCEDURE — 1159F MED LIST DOCD IN RCRD: CPT | Mod: HCNC,S$GLB,, | Performed by: OTOLARYNGOLOGY

## 2020-09-04 PROCEDURE — 31231 NASAL ENDOSCOPY DX: CPT | Mod: HCNC,S$GLB,, | Performed by: OTOLARYNGOLOGY

## 2020-09-04 PROCEDURE — 99213 OFFICE O/P EST LOW 20 MIN: CPT | Mod: 25,HCNC,S$GLB, | Performed by: OTOLARYNGOLOGY

## 2020-09-04 PROCEDURE — 99999 PR PBB SHADOW E&M-EST. PATIENT-LVL IV: CPT | Mod: PBBFAC,HCNC,, | Performed by: OTOLARYNGOLOGY

## 2020-09-04 PROCEDURE — 99213 PR OFFICE/OUTPT VISIT, EST, LEVL III, 20-29 MIN: ICD-10-PCS | Mod: 25,HCNC,S$GLB, | Performed by: OTOLARYNGOLOGY

## 2020-09-04 PROCEDURE — 3078F PR MOST RECENT DIASTOLIC BLOOD PRESSURE < 80 MM HG: ICD-10-PCS | Mod: HCNC,CPTII,S$GLB, | Performed by: OTOLARYNGOLOGY

## 2020-09-04 PROCEDURE — 31231 NASAL/SINUS ENDOSCOPY: ICD-10-PCS | Mod: HCNC,S$GLB,, | Performed by: OTOLARYNGOLOGY

## 2020-09-04 PROCEDURE — 3075F SYST BP GE 130 - 139MM HG: CPT | Mod: HCNC,CPTII,S$GLB, | Performed by: OTOLARYNGOLOGY

## 2020-09-04 PROCEDURE — 99999 PR PBB SHADOW E&M-EST. PATIENT-LVL IV: ICD-10-PCS | Mod: PBBFAC,HCNC,, | Performed by: OTOLARYNGOLOGY

## 2020-09-04 PROCEDURE — 1125F AMNT PAIN NOTED PAIN PRSNT: CPT | Mod: HCNC,S$GLB,, | Performed by: OTOLARYNGOLOGY

## 2020-09-04 PROCEDURE — 1159F PR MEDICATION LIST DOCUMENTED IN MEDICAL RECORD: ICD-10-PCS | Mod: HCNC,S$GLB,, | Performed by: OTOLARYNGOLOGY

## 2020-09-04 PROCEDURE — 1125F PR PAIN SEVERITY QUANTIFIED, PAIN PRESENT: ICD-10-PCS | Mod: HCNC,S$GLB,, | Performed by: OTOLARYNGOLOGY

## 2020-09-04 PROCEDURE — 3078F DIAST BP <80 MM HG: CPT | Mod: HCNC,CPTII,S$GLB, | Performed by: OTOLARYNGOLOGY

## 2020-09-04 PROCEDURE — 3075F PR MOST RECENT SYSTOLIC BLOOD PRESS GE 130-139MM HG: ICD-10-PCS | Mod: HCNC,CPTII,S$GLB, | Performed by: OTOLARYNGOLOGY

## 2020-09-04 PROCEDURE — 1101F PT FALLS ASSESS-DOCD LE1/YR: CPT | Mod: HCNC,CPTII,S$GLB, | Performed by: OTOLARYNGOLOGY

## 2020-09-04 NOTE — PROGRESS NOTES
Subjective:      Adriana is a 65 y.o. female who comes for follow-up of sinusitis.  Her last visit with me was on 7/17/2020. Since this last evaluation, significant changes in symptoms have included some overall improvement in nasal symptoms but with no significant change in headaches. She reports morning predominant headaches. Not using CPAP and hasn't for last few years, though has known MARINA and prior CPAP use. Headaches are associated with light sensitivity and nausea. No discolored nasal drainage. She feels that the headache is primarily over her frontal sinus/forehead mainly on the left. No significant nasal obstruction or concerns with nasal breathing. She does not feel nasal obstruction interferes significantly with sleep.    Patient reported quality of life assessments:    SNOT-22  SNOT-22 score: : (P) 42    NOSE Score  NOSE Scale Total Score : (P) 9  NOSE score:: (P) 45    ETDQ Questionnaire Scores  Total Score : (P) 24  ETDQ-7 score:: (P) 3.4    History from prior evaluation 7/17/2020 which this current visit is in follow-up to, is copied below for reference with all changes and updates as described above:  History and Subjective Data 7/17/2020:   Adriana Paula is a 65 y.o. female who was referred to me by Dr. Nicole Márquez in consultation for sinusitis. Frequent facial pressure and pain, recurrent green and yellow nasal drainage with acute sinus infections. Between acute episodes has headache in frontal distribution ranging from low grade annoyance to severe pain. She has some photophobia and visual changes associated with severe headache.     Current sinonasal medications include flonase and nasal saline spray daily.  The last course of antibiotics was about 2 weeks ago (amoxicillin).  She does not regularly use nasal decongestant sprays. Gets treated with antibiotics about 3 times per year for sinus infections which resolves issues for a month or two. Has been on prednisone for about 2 weeks  now on a long taper for an eye issue. Notices some slight benefit to sinonasal symptoms. Nasal breathing is okay in general.     She recalls previously having allergy testing, which was reportedly positive for trees, dust mites, and many other things. She denies a history of asthma. She relates a diagnosis of obstructive sleep apnea without regular CPAP use. Has not used CPAP in a long time. She endorses snoring. She has not had sinonasal surgery. She does not recall a prior history of nasal trauma.    Review of Systems:  A detailed review of systems was obtained with pertinent positives, and pertinent changes from last review of systems obtained on 7/17/2020, as per the above HPI.    New ROS performed today:  Review of Systems   Constitutional: Negative.  Negative for fatigue, fever and unexpected weight change.   HENT: Positive for ear pain, mouth sores, postnasal drip, sinus pressure, sore throat and trouble swallowing. Negative for congestion, dental problem, ear discharge, facial swelling, hearing loss, hoarse voice, nosebleeds, rhinorrhea, tinnitus and voice change.    Eyes: Positive for photophobia. Negative for discharge, itching and visual disturbance.   Respiratory: Negative for apnea, cough, shortness of breath and wheezing.    Cardiovascular: Negative for chest pain and palpitations.   Gastrointestinal: Positive for constipation. Negative for abdominal pain, nausea and vomiting.   Endocrine: Negative.  Negative for cold intolerance and heat intolerance.   Genitourinary: Negative for difficulty urinating.   Musculoskeletal: Positive for back pain and neck pain. Negative for arthralgias and myalgias.   Skin: Positive for rash.   Allergic/Immunologic: Negative for environmental allergies and food allergies.   Neurological: Positive for dizziness, light-headedness and headaches. Negative for seizures, syncope and weakness.   Hematological: Negative.  Negative for adenopathy. Does not bruise/bleed easily.    Psychiatric/Behavioral: Positive for sleep disturbance. Negative for decreased concentration and dysphoric mood. The patient is not nervous/anxious.    All other systems reviewed and are negative.    Answers for HPI/ROS submitted by the patient on 2020   Tooth/Dental Problems?: Yes  Eye Drainage?: Yes  Sleep Apnea?: Yes  Foot swelling?: Yes  Acid Reflux?: Yes  Urinating too frequently?: Yes  Muscle aches / pain?: Yes  Seasonal Allergies?: Yes    Past Medical History:  She has a past medical history of Allergy, Anemia, Anxiety, Arthritis, Breast cyst, Cataract, DR (diabetic retinopathy), Dyslipidemia, Essential hypertension, Gastroesophageal reflux disease without esophagitis, GERD (gastroesophageal reflux disease), Glaucoma, Hypertension, Obesity (BMI 30-39.9), PN (peripheral neuropathy), Sleep apnea, Type 2 diabetes mellitus with both eyes affected by mild nonproliferative retinopathy without macular edema, with long-term current use of insulin, Type 2 diabetes mellitus with diabetic polyneuropathy, with long-term current use of insulin, and Type II or unspecified type diabetes mellitus with other specified manifestations, uncontrolled.    Past Surgical History:  She has a past surgical history that includes  section; Myomectomy; Carpal tunnel release (Right); Knee arthroscopy (14); Cataract extraction; Cataract extraction w/  intraocular lens implant (Right, 2017); Breast cyst excision (Right); Trigger finger release; Trigger finger release (Left, 4/15/2019); Epidural steroid injection (N/A, 2019); Epidural steroid injection (N/A, 2019); and Transforaminal epidural injection of steroid (Right, 2019).    Allergies:  She is allergic to keflex [cephalexin]; penicillins; sulfamethoxazole-trimethoprim; vicodin [hydrocodone-acetaminophen]; and sulfa (sulfonamide antibiotics).    Medications:  She has a current medication list which includes the following prescription(s): accu-chek  softclix lancets, amlodipine, atorvastatin, bd ultra-fine short pen needle, blood-glucose meter, cholecalciferol (vitamin d3), fluticasone propionate, fluticasone propionate, tresiba flextouch u-100, lancets, lidocaine hcl 2%, losartan, magnesium, metformin, oxybutynin, pregabalin, tramadol, aspirin, fluad quad 2020-21(65y up)(pf), and prednisone.      Objective:     /64   Pulse 75        Constitutional:   Vital signs are normal. She appears well-developed and well-nourished. She does not appear ill. No distress. Normal speech.  No hoarse voice, breathy voice and strained voice.      Head:  Normocephalic and atraumatic. No skin lesions. Salivary glands normal.  Facial strength is normal.      Ears:    Right Ear: No drainage, swelling or tenderness. Tympanic membrane is not injected, not scarred, not perforated, not erythematous, not retracted and not bulging. No middle ear effusion. No decreased hearing is noted.   Left Ear: No drainage, swelling or tenderness. Tympanic membrane is not injected, not scarred, not perforated, not erythematous, not retracted and not bulging.  No middle ear effusion. No decreased hearing is noted.     Nose:  Mucosal edema and septal deviation present. No rhinorrhea, nose lacerations, sinus tenderness or polyps. No epistaxis. Turbinate hypertrophy.  Right sinus exhibits no maxillary sinus tenderness and no frontal sinus tenderness. Left sinus exhibits no maxillary sinus tenderness and no frontal sinus tenderness.     Mouth/Throat  Oropharynx clear and moist without lesions or asymmetry, normal uvula midline and lips, teeth, and gums normal. No oral lesions or trismus. No oropharyngeal exudate, posterior oropharyngeal edema or posterior oropharyngeal erythema.     Neck:  Trachea normal, phonation normal, full range of motion with neck supple and no adenopathy. No edema, no erythema, no stridor and no neck rigidity present.     Pulmonary/Chest:   Effort normal. No stridor.      Psychiatric:   She has a normal mood and affect. Her speech is normal and behavior is normal.     Neurological:   She has neurological normal, alert and oriented. No cranial nerve deficit or sensory deficit. Coordination and gait normal.     Skin:   No abrasions, lacerations, lesions, or rashes.       Procedure    Nasal endoscopy performed.  See procedure note.      Data Reviewed    WBC (K/uL)   Date Value   07/17/2020 17.66 (H)     Eosinophil% (%)   Date Value   07/17/2020 0.3     Eos # (K/uL)   Date Value   07/17/2020 0.1     Platelets (K/uL)   Date Value   07/17/2020 259     Glucose (mg/dL)   Date Value   06/12/2020 143 (H)     No results found for: IGE       Review of radiographic imaging:  I personally reviewed and interpreted imaging relative to the chief complaint consisting of CT Sinuses performed on 7/6/20.  Key findings from this imaging study include no significant paranasal sinus opacifications though some mucosal thickening is present of the ethmoid sinuses, maxillary infundibula and uncinate processes with narrowing of OMC bilaterally, and some mucosa thickening of frontal outflow tracts, lamellar vanessa bullosa, paradoxical direction of right middle turbinate, mild septal deviation to left, and turbinate hypertrophy.  Representative images selected from this study and demonstrating key findings are copied below:          Assessment:     1. Frontal headache    2. Nasal septal deviation    3. Hypertrophy of both inferior nasal turbinates    4. Obstructive sleep apnea syndrome         Plan:     We discussed all issues and options for management regarding these diagnoses today. The risks, benefits and alternatives for management options were discussed and questions answered. She has elected to proceed with continued medical management using current therapies, with additions of MARINA workup and consideration of neurology consult for additional evaluation and treatment of headache/facial pain. She  understands the plan we agreed upon at today's visit. I have encouraged her to call if she has any questions or concerns in the meantime, or for any new issues that may arise.     - Morning headaches concerning for relation to untreated MARINA.  - Headache quality with nausea and photophobia suggestive of migraine, and without sinonasal findings to suggest an alternative sinonasal etiology of the head/facial pain.  - Advised Neurology evaluation for headache treatment, and suspect untreated MARINA may be exacerbating/triggering some of these headaches.  -Recommend MARINA follow up with sleep clinic and resuming CPAP use.   - Continue current sinonasal treatment for nasal congestion/prevention of sinonasal inflammation which she has little room for given narrow sinonasal anatomy from multiple causes but without significant symptomatic obstruction and without enough frequency of acute sinusitis (2 times per year) to suggest significant benefit of surgical management or more aggressive treatment in that regard.  - Follow-up kevin Washington MD    Rhinology, Allergy, and Sinus-Skull Base Surgery    Department of Otorhinolaryngology    [facility]    Phone  [phone]    Fax      [phone]

## 2020-09-04 NOTE — PATIENT INSTRUCTIONS
"Information and instructions from your visit with me today:    · Start using fluticasone nasal spray:    This medication is the same as the Flonase brand nasal spray. Use this medication as instructed on the prescription, 2 sprays on each side of your nose once daily. You need to use this medication every day regardless of symptoms, as it takes time to work and get the benefits. It does not work on an "as needed" basis like taking a decongestant. Place the tip of the medication bottle in your nose and aim slightly up and out on each side to get medication high and deep into your nose and sinuses, and not have it all deposit in the very front of your nose. You can imagine aiming towards the back of your eyeball on each side for this, as opposed to straight back to the center of your nose and head.     · Start nasal irrigations with saline solution:    SALINE SINUS RINSE ([employer] brand): You should do a full bottle, half on one side of your nose and half on the other, 1-2 times per day (or more if able to, you cannot do this too much). Follow the instructions on the box: mix the salt packet with clean water (bottle, previously boiled, distilled, etc -- not tap water) to the line on the bottle to make the irrigation.      · Do not use nasal decongestant sprays such as Afrin or similar products.    It was nice meeting you today, and I look forward to helping you feel better soon. Please don't hesitate to call if you have any other questions or concerns, or if I can be of any assistance in the meantime.       Wally Washington    [facility]    Phone  [phone]    Fax      [phone]        Wally Washington MD  Rhinology, Sinus, and Skull Base Surgery  Department of Otorhinolaryngology        "

## 2020-09-04 NOTE — PROGRESS NOTES
LINKS immunization registry updated  Care Everywhere updated  Health Maintenance updated  Chart reviewed for overdue [facility]active [facility] ([facility]HELE) health maintenance testing (CRS, Breast Ca, Diabetic Eye Exam)   Orders entered:N/A

## 2020-09-23 ENCOUNTER — OFFICE VISIT (OUTPATIENT)
Dept: OPTOMETRY | Facility: CLINIC | Age: 65
End: 2020-09-23
Payer: MEDICARE

## 2020-09-23 DIAGNOSIS — H15.003 BILATERAL SCLERITIS: ICD-10-CM

## 2020-09-23 DIAGNOSIS — R70.0 ESR RAISED: ICD-10-CM

## 2020-09-23 DIAGNOSIS — R76.8 ANA POSITIVE: ICD-10-CM

## 2020-09-23 DIAGNOSIS — H11.432 CONJUNCTIVAL HYPEREMIA OF LEFT EYE: Primary | ICD-10-CM

## 2020-09-23 DIAGNOSIS — E11.3293 TYPE 2 DIABETES MELLITUS WITH BOTH EYES AFFECTED BY MILD NONPROLIFERATIVE RETINOPATHY WITHOUT MACULAR EDEMA, WITH LONG-TERM CURRENT USE OF INSULIN: Chronic | ICD-10-CM

## 2020-09-23 DIAGNOSIS — Z79.4 TYPE 2 DIABETES MELLITUS WITH BOTH EYES AFFECTED BY MILD NONPROLIFERATIVE RETINOPATHY WITHOUT MACULAR EDEMA, WITH LONG-TERM CURRENT USE OF INSULIN: Chronic | ICD-10-CM

## 2020-09-23 PROCEDURE — 99999 PR PBB SHADOW E&M-EST. PATIENT-LVL III: CPT | Mod: PBBFAC,HCNC,, | Performed by: OPTOMETRIST

## 2020-09-23 PROCEDURE — 99999 PR PBB SHADOW E&M-EST. PATIENT-LVL III: ICD-10-PCS | Mod: PBBFAC,HCNC,, | Performed by: OPTOMETRIST

## 2020-09-23 PROCEDURE — 92012 INTRM OPH EXAM EST PATIENT: CPT | Mod: HCNC,S$GLB,, | Performed by: OPTOMETRIST

## 2020-09-23 PROCEDURE — 92012 PR EYE EXAM, EST PATIENT,INTERMED: ICD-10-PCS | Mod: HCNC,S$GLB,, | Performed by: OPTOMETRIST

## 2020-09-23 NOTE — PROGRESS NOTES
HPI     Pt here for f/u of conjunctival hyperemia OU   Pt states she feels like symptoms are getting worse  Pt states OD is now worse than OS  Having bloodshot eyes, FBS OD, discharge OU, dryness sensation, &   photophobia and blurry va  Pt is concerned about her symptoms   Using systane gel gtts every night and lumify qid     Last edited by Flaquita Saavedra on 9/23/2020  8:11 AM. (History)            Assessment /Plan     For exam results, see Encounter Report.    Conjunctival hyperemia of left eye    Bilateral scleritis    ESR raised    ANGELICA positive    Type 2 diabetes mellitus with both eyes affected by mild nonproliferative retinopathy without macular edema, with long-term current use of insulin      Worsening since stopping oral and topical steroids    Return to Dr. Wagner for follow up

## 2020-09-24 ENCOUNTER — PATIENT MESSAGE (OUTPATIENT)
Dept: INTERNAL MEDICINE | Facility: CLINIC | Age: 65
End: 2020-09-24

## 2020-09-25 ENCOUNTER — OFFICE VISIT (OUTPATIENT)
Dept: OPHTHALMOLOGY | Facility: CLINIC | Age: 65
End: 2020-09-25
Payer: MEDICARE

## 2020-09-25 ENCOUNTER — LAB VISIT (OUTPATIENT)
Dept: LAB | Facility: HOSPITAL | Age: 65
End: 2020-09-25
Attending: OPHTHALMOLOGY
Payer: MEDICARE

## 2020-09-25 DIAGNOSIS — H15.003 SCLERITIS OF BOTH EYES: Primary | ICD-10-CM

## 2020-09-25 DIAGNOSIS — H15.003 SCLERITIS OF BOTH EYES: ICD-10-CM

## 2020-09-25 LAB
CRP SERPL-MCNC: 3.5 MG/L (ref 0–8.2)
ERYTHROCYTE [SEDIMENTATION RATE] IN BLOOD BY WESTERGREN METHOD: 46 MM/HR (ref 0–36)
RHEUMATOID FACT SERPL-ACNC: <10 IU/ML (ref 0–15)

## 2020-09-25 PROCEDURE — 86140 C-REACTIVE PROTEIN: CPT | Mod: HCNC

## 2020-09-25 PROCEDURE — 85652 RBC SED RATE AUTOMATED: CPT | Mod: HCNC

## 2020-09-25 PROCEDURE — 86431 RHEUMATOID FACTOR QUANT: CPT | Mod: HCNC

## 2020-09-25 PROCEDURE — 86780 TREPONEMA PALLIDUM: CPT | Mod: HCNC

## 2020-09-25 PROCEDURE — 99999 PR PBB SHADOW E&M-EST. PATIENT-LVL IV: CPT | Mod: PBBFAC,HCNC,, | Performed by: OPHTHALMOLOGY

## 2020-09-25 PROCEDURE — 92014 PR EYE EXAM, EST PATIENT,COMPREHESV: ICD-10-PCS | Mod: HCNC,S$GLB,, | Performed by: OPHTHALMOLOGY

## 2020-09-25 PROCEDURE — 86255 FLUORESCENT ANTIBODY SCREEN: CPT | Mod: HCNC

## 2020-09-25 PROCEDURE — 92014 COMPRE OPH EXAM EST PT 1/>: CPT | Mod: HCNC,S$GLB,, | Performed by: OPHTHALMOLOGY

## 2020-09-25 PROCEDURE — 82164 ANGIOTENSIN I ENZYME TEST: CPT | Mod: HCNC

## 2020-09-25 PROCEDURE — 36415 COLL VENOUS BLD VENIPUNCTURE: CPT | Mod: HCNC

## 2020-09-25 PROCEDURE — 86592 SYPHILIS TEST NON-TREP QUAL: CPT | Mod: HCNC

## 2020-09-25 PROCEDURE — 99999 PR PBB SHADOW E&M-EST. PATIENT-LVL IV: ICD-10-PCS | Mod: PBBFAC,HCNC,, | Performed by: OPHTHALMOLOGY

## 2020-09-25 RX ORDER — PREDNISONE 10 MG/1
20 TABLET ORAL 2 TIMES DAILY
Qty: 60 TABLET | Refills: 3 | Status: ON HOLD | OUTPATIENT
Start: 2020-09-25 | End: 2020-12-18 | Stop reason: HOSPADM

## 2020-09-25 RX ORDER — PREDNISONE 10 MG/1
20 TABLET ORAL 2 TIMES DAILY
Qty: 60 TABLET | Refills: 3 | Status: SHIPPED | OUTPATIENT
Start: 2020-09-25 | End: 2020-09-25 | Stop reason: SDUPTHER

## 2020-09-25 RX ORDER — A/SINGAPORE/GP1908/2015 IVR-180 (AN A/MICHIGAN/45/2015 (H1N1)PDM09-LIKE VIRUS, A/HONG KONG/4801/2014, NYMC X-263B (H3N2) (AN A/HONG KONG/4801/2014-LIKE VIRUS), AND B/BRISBANE/60/2008, WILD TYPE (A B/BRISBANE/60/2008-LIKE VIRUS) 15; 15; 15 UG/.5ML; UG/.5ML; UG/.5ML
INJECTION, SUSPENSION INTRAMUSCULAR
COMMUNITY
Start: 2020-09-10 | End: 2020-10-09 | Stop reason: ALTCHOICE

## 2020-09-25 NOTE — PROGRESS NOTES
HPI     Erica Batista, OD     OHTN / + fam h/o glc - mom/brother   S/p phaco w/IOL OS 3/17/16   S/p CE with IOL OD ([facility]) - 4/13/17   Scleritis OU    Patient here today for conjunctivitis and scleritis eval. Patient c/o   blurry VA OD redness, crusting, pain, mucus and light sensitivity.    Last edited by Ingrid Wagner MD on 9/25/2020 10:25 AM. (History)            Assessment /Plan     For exam results, see Encounter Report.    Scleritis of both eyes  -     C-Reactive Protein; Future; Expected date: 09/25/2020  -     FTA antibodies, IgG and IgM; Future; Expected date: 09/25/2020  -     Rheumatoid factor; Future; Expected date: 09/25/2020  -     Sedimentation rate; Future; Expected date: 09/25/2020  -     RPR; Future; Expected date: 09/25/2020  -     Angiotensin Converting Enzyme; Future; Expected date: 09/25/2020  -     Anti-neutrophilic cytoplasmic antibody; Future; Expected date: 09/25/2020      Scleritis OU  - flared up after being off of steroids for about a month  - oral pred 40mg x 1 wk then 20mg until you see me again  - watch sugars      Rheum referral placed --> further AI w/up and pt needs to be on DMARD as scleritis cannot be controlled with oral steroids (pt has DM as well)    OHTN / + fam h/o glc - mom/brother   S/p phaco w/IOL OS 3/17/16   S/p CE with IOL OD ([facility]) - 4/13/17     2 ORAL PREDNISONE PILLS (20MG X 2 = 40MG) DAILY FOR 1 WK THEN DECREASE TO:    1 PILL (20MG) DAILY UNTIL YOU SEE ME AGAIN

## 2020-09-25 NOTE — PATIENT INSTRUCTIONS
2 ORAL PREDNISONE PILLS (20MG X 2 = 40MG) DAILY FOR 1 WK THEN DECREASE TO:    1 PILL (20MG) DAILY UNTIL YOU SEE ME AGAIN

## 2020-09-26 LAB — RPR SER QL: NORMAL

## 2020-09-27 LAB — ACE SERPL-CCNC: 42 U/L (ref 16–85)

## 2020-09-28 ENCOUNTER — PATIENT MESSAGE (OUTPATIENT)
Dept: OPHTHALMOLOGY | Facility: CLINIC | Age: 65
End: 2020-09-28

## 2020-09-28 LAB
ANCA AB TITR SER IF: NORMAL TITER
P-ANCA TITR SER IF: NORMAL TITER
T PALLIDUM AB SER QL IF: NORMAL

## 2020-09-28 NOTE — PROCEDURES
Nasal/sinus endoscopy    Date/Time: 9/4/2020 9:00 AM  Performed by: Wally Washington MD  Authorized by: Wally Washington MD     Consent Done?:  Yes (Verbal)  Anesthesia:     Local anesthetic:  4% Xylocaine spray with Juni-Synephrine    Patient tolerance:  Patient tolerated the procedure well with no immediate complications  Nose:     Procedure Performed:  Nasal Endoscopy  External:      No external nasal deformity  Intranasal:      Mucosa no polyps     Mucosa ulcers not present     No mucosa lesions present     Enlarged turbinates     Septum gross deformity  Nasopharynx:      No mucosa lesions     Adenoids not present     Posterior choanae patent     Eustachian tube patent       Nasal endoscopy was performed as indicated to evaluate the patient's chief concerns and for further evaluation of findings not able to be fully assessed by physical exam with anterior rhinoscopy. The endoscope was utilized to evaluate the bilateral sinonasal cavities, mucosa, sinus ostia and turbinates. Verbal consent was obtained after describing the indication for this diagnostic procedure and potential risks. Topical anesthetic and decongestant was applied in the bilateral nares. The scope was placed in the patient's left anterior nares and advanced through the nasal cavity carefully examining structures including the nasal septum, nasal turbinates, osteomeatal complex, olfactory recess, sinus ostia, as well as the nasopharynx and eustachian tube orifice at the torus tubarius. The scope was then placed in the patient's right anterior nares and an identical diagnostic evaluation performed on this contralateral side. The patient tolerated the procedure well. The procedure was performed successfully, completely, and without complications.     Positive and negative findings are indicated for key anatomic structures and clinical questions addressed by this procedure. Findings were explained to the patient with all questions answered.  Descriptions and representative images of key findings from this diagnostic endoscopy procedure can be found in the associated clinic note of the same date of service. All uploaded images obtained during this exam may also be found in the media section of the patient's chart.      Summary of key findings:   + septal deviation present  + inferior turbinate hypertrophy present  - no purulent or abnormal sinonasal discharge  - no nasal polyps or masses present  - no septal perforation  - nasopharynx symmetric and torus without eustachian tube obstruction  - no obstructive adenoid tissue present  - no active epistaxis  No additional concerning findings on nasal endoscopy      Wally Washington MD    Rhinology, Allergy, and Sinus-Skull Base Surgery    Department of Otorhinolaryngology    [facility]    Phone  [phone]    Fax      [phone]

## 2020-09-29 ENCOUNTER — PATIENT MESSAGE (OUTPATIENT)
Dept: OTHER | Facility: OTHER | Age: 65
End: 2020-09-29

## 2020-09-29 NOTE — TELEPHONE ENCOUNTER
E-mailed patient.    She has an appointment scheduled for November but needs one within the next week to discuss blood sugar and inflammation. Please arrange an appointment.

## 2020-10-01 ENCOUNTER — PATIENT MESSAGE (OUTPATIENT)
Dept: INTERNAL MEDICINE | Facility: CLINIC | Age: 65
End: 2020-10-01

## 2020-10-01 DIAGNOSIS — R35.0 FREQUENCY OF URINATION: ICD-10-CM

## 2020-10-02 ENCOUNTER — TELEPHONE (OUTPATIENT)
Dept: RHEUMATOLOGY | Facility: CLINIC | Age: 65
End: 2020-10-02

## 2020-10-02 RX ORDER — OXYBUTYNIN CHLORIDE 5 MG/1
5 TABLET ORAL 3 TIMES DAILY
Qty: 270 TABLET | Refills: 0 | Status: ON HOLD | OUTPATIENT
Start: 2020-10-02 | End: 2020-12-18 | Stop reason: HOSPADM

## 2020-10-02 NOTE — TELEPHONE ENCOUNTER
----- Message from Ingrid Wagner MD sent at 10/1/2020 12:11 PM CDT -----  Dr. 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 Her initial blood work looks fine but I would love to have her seen, she requested to see you specifically (as you see her mother) but your next avail is far out.  Do you have any resident clinics she could get into that way? Or any other recommendations?  I realize y'all are quite busy...     Thank you,   Ingrid Wagenr

## 2020-10-02 NOTE — TELEPHONE ENCOUNTER
TETO  Present is prerenal. Gentle IVF, hold loop diuretic       Will see Next week.  Staff to contact and schedule.

## 2020-10-06 ENCOUNTER — OFFICE VISIT (OUTPATIENT)
Dept: RHEUMATOLOGY | Facility: CLINIC | Age: 65
End: 2020-10-06
Payer: MEDICARE

## 2020-10-06 ENCOUNTER — HOSPITAL ENCOUNTER (OUTPATIENT)
Dept: RADIOLOGY | Facility: HOSPITAL | Age: 65
Discharge: HOME | End: 2020-10-06
Attending: INTERNAL MEDICINE
Payer: MEDICARE

## 2020-10-06 VITALS
WEIGHT: 222.44 LBS | BODY MASS INDEX: 37.06 KG/M2 | HEIGHT: 65 IN | TEMPERATURE: 99 F | DIASTOLIC BLOOD PRESSURE: 80 MMHG | SYSTOLIC BLOOD PRESSURE: 134 MMHG | HEART RATE: 84 BPM

## 2020-10-06 DIAGNOSIS — M79.641 PAIN IN RIGHT HAND: ICD-10-CM

## 2020-10-06 DIAGNOSIS — R76.8 ANA POSITIVE: ICD-10-CM

## 2020-10-06 DIAGNOSIS — H15.003 BILATERAL SCLERITIS: ICD-10-CM

## 2020-10-06 DIAGNOSIS — R70.0 ESR RAISED: ICD-10-CM

## 2020-10-06 DIAGNOSIS — R76.8 ANTI-RNP ANTIBODIES PRESENT: ICD-10-CM

## 2020-10-06 DIAGNOSIS — Z23 ENCOUNTER FOR VACCINATION: ICD-10-CM

## 2020-10-06 DIAGNOSIS — R76.8 ANA POSITIVE: Primary | ICD-10-CM

## 2020-10-06 DIAGNOSIS — R53.83 FATIGUE, UNSPECIFIED TYPE: ICD-10-CM

## 2020-10-06 DIAGNOSIS — M79.89 SWELLING OF RIGHT HAND: ICD-10-CM

## 2020-10-06 PROCEDURE — 71046 X-RAY EXAM CHEST 2 VIEWS: CPT | Mod: 26,HCNC,, | Performed by: RADIOLOGY

## 2020-10-06 PROCEDURE — 3008F PR BODY MASS INDEX (BMI) DOCUMENTED: ICD-10-PCS | Mod: HCNC,CPTII,S$GLB, | Performed by: INTERNAL MEDICINE

## 2020-10-06 PROCEDURE — 71046 XR CHEST PA AND LATERAL: ICD-10-PCS | Mod: 26,HCNC,, | Performed by: RADIOLOGY

## 2020-10-06 PROCEDURE — 1101F PR PT FALLS ASSESS DOC 0-1 FALLS W/OUT INJ PAST YR: ICD-10-PCS | Mod: HCNC,CPTII,S$GLB, | Performed by: INTERNAL MEDICINE

## 2020-10-06 PROCEDURE — 71046 X-RAY EXAM CHEST 2 VIEWS: CPT | Mod: TC,HCNC,FY

## 2020-10-06 PROCEDURE — 3079F DIAST BP 80-89 MM HG: CPT | Mod: HCNC,CPTII,S$GLB, | Performed by: INTERNAL MEDICINE

## 2020-10-06 PROCEDURE — 3075F PR MOST RECENT SYSTOLIC BLOOD PRESS GE 130-139MM HG: ICD-10-PCS | Mod: HCNC,CPTII,S$GLB, | Performed by: INTERNAL MEDICINE

## 2020-10-06 PROCEDURE — 1125F AMNT PAIN NOTED PAIN PRSNT: CPT | Mod: HCNC,S$GLB,, | Performed by: INTERNAL MEDICINE

## 2020-10-06 PROCEDURE — 1159F PR MEDICATION LIST DOCUMENTED IN MEDICAL RECORD: ICD-10-PCS | Mod: HCNC,S$GLB,, | Performed by: INTERNAL MEDICINE

## 2020-10-06 PROCEDURE — 3075F SYST BP GE 130 - 139MM HG: CPT | Mod: HCNC,CPTII,S$GLB, | Performed by: INTERNAL MEDICINE

## 2020-10-06 PROCEDURE — 1125F PR PAIN SEVERITY QUANTIFIED, PAIN PRESENT: ICD-10-PCS | Mod: HCNC,S$GLB,, | Performed by: INTERNAL MEDICINE

## 2020-10-06 PROCEDURE — 99999 PR PBB SHADOW E&M-EST. PATIENT-LVL V: CPT | Mod: PBBFAC,HCNC,, | Performed by: INTERNAL MEDICINE

## 2020-10-06 PROCEDURE — 99205 PR OFFICE/OUTPT VISIT, NEW, LEVL V, 60-74 MIN: ICD-10-PCS | Mod: HCNC,S$GLB,, | Performed by: INTERNAL MEDICINE

## 2020-10-06 PROCEDURE — 99205 OFFICE O/P NEW HI 60 MIN: CPT | Mod: HCNC,S$GLB,, | Performed by: INTERNAL MEDICINE

## 2020-10-06 PROCEDURE — 1101F PT FALLS ASSESS-DOCD LE1/YR: CPT | Mod: HCNC,CPTII,S$GLB, | Performed by: INTERNAL MEDICINE

## 2020-10-06 PROCEDURE — 1159F MED LIST DOCD IN RCRD: CPT | Mod: HCNC,S$GLB,, | Performed by: INTERNAL MEDICINE

## 2020-10-06 PROCEDURE — 99999 PR PBB SHADOW E&M-EST. PATIENT-LVL V: ICD-10-PCS | Mod: PBBFAC,HCNC,, | Performed by: INTERNAL MEDICINE

## 2020-10-06 PROCEDURE — 3079F PR MOST RECENT DIASTOLIC BLOOD PRESSURE 80-89 MM HG: ICD-10-PCS | Mod: HCNC,CPTII,S$GLB, | Performed by: INTERNAL MEDICINE

## 2020-10-06 PROCEDURE — 3008F BODY MASS INDEX DOCD: CPT | Mod: HCNC,CPTII,S$GLB, | Performed by: INTERNAL MEDICINE

## 2020-10-06 ASSESSMENT — ROUTINE ASSESSMENT OF PATIENT INDEX DATA (RAPID3)
PATIENT GLOBAL ASSESSMENT SCORE: 6
AM STIFFNESS SCORE: 1, YES
TOTAL RAPID3 SCORE: 4.67
MDHAQ FUNCTION SCORE: 0.9
PSYCHOLOGICAL DISTRESS SCORE: 3.3
PAIN SCORE: 5
WHEN YOU AWAKENED IN THE MORNING OVER THE LAST WEEK, PLEASE INDICATE THE AMOUNT OF TIME IT TAKES UNTIL YOU ARE AS LIMBER AS YOU WILL BE FOR THE DAY: 1 HOUR
FATIGUE SCORE: 5

## 2020-10-06 NOTE — Clinical Note
Thank you for the consult.  Will evaluate for a rheumatologic condition.  If work up negative how would like to proceed?  Literature say rituximab and cyclophosphamide are first line.  Do you want this or are you more interested in oral DMARDs such as Methotrexate, azathioprine and Cellcept.?

## 2020-10-06 NOTE — PROGRESS NOTES
"Subjective:       Patient ID: Adriana Paula is a 65 y.o. female.    Chief Complaint: Scleritis    HPI:  Adriana Paula is a 65 y.o. female conjunctivitis diagnosed in left eye due redness and pain behind eye in February 2020.  Took 4 different drops without improvement.   Dr. Batista optometrist sent to Dr. 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  Right eye became involved end of August.   She was given 40 mg prednisone with weekly taper for 3 weeks.  This helped but eye never cleared.  One month after finishing prednisone pain returned.  She was given more eye drops.   Dr. Wagner did labs and told her it was an autoimmune issue.    Right hand swelled around May/June 2020.    Morning stiffness for 1 hour.      Lupus Review of Systems  Alopecia: no  Photosensitivity: no   Raynaud's: no  Oral or nasal ulcers: gets bumps that pop in the mouth  Rashes: no  No pleurisy or pericarditis.  No seizures, psychosis, or stroke.  No venous or arterial clots.  Pregnancy hx (if applicable): 1 miscarriage at 6 week; full term bed rest due because low amniotic fluid      Review of Systems   Constitutional: Positive for fatigue. Negative for fever and unexpected weight change.   HENT: Positive for mouth sores. Negative for trouble swallowing.    Eyes: Positive for redness.   Respiratory: Negative for cough and shortness of breath.    Cardiovascular: Negative for chest pain.   Gastrointestinal: Negative for constipation and diarrhea.   Genitourinary: Negative for dysuria and genital sores.   Musculoskeletal: Positive for arthralgias and joint swelling.   Skin: Negative for rash.   Neurological: Negative for headaches.   Hematological: Does not bruise/bleed easily.   Psychiatric/Behavioral: Negative.          Objective:   /80 (BP Location: Left arm, Patient Position: Sitting, BP Method: Medium (Automatic))   Pulse 84   Temp 98.7 °F (37.1 °C) (Oral)   Ht 5' 5" (1.651 m)   Wt 100.9 kg (222 lb 7.1 " oz)   BMI 37.02 kg/m²      Physical Exam   Constitutional: She is oriented to person, place, and time and well-developed, well-nourished, and in no distress.   HENT:   Head: Normocephalic and atraumatic.   Eyes: EOM are normal.   Red conjunctiva   Neck: Neck supple.   Cardiovascular: Normal rate, regular rhythm and normal heart sounds.    Pulmonary/Chest: Effort normal and breath sounds normal.   Abdominal: Soft. Bowel sounds are normal.   Neurological: She is alert and oriented to person, place, and time. Gait normal.   Skin: Skin is warm and dry.     Psychiatric: Mood and affect normal.   Musculoskeletal: Normal range of motion.      Comments: 28 joint count: 0 swollen and 0 tender              LABS    Component      Latest Ref Rng & Units 9/25/2020 7/17/2020 6/12/2020   WBC      3.90 - 12.70 K/uL   7.70   RBC      4.00 - 5.40 M/uL   4.86   Hemoglobin      12.0 - 16.0 g/dL   12.1   Hematocrit      37.0 - 48.5 %   39.9   MCV      82 - 98 fL   82   MCH      27.0 - 31.0 pg   24.9 (L)   MCHC      32.0 - 36.0 g/dL   30.3 (L)   RDW      11.5 - 14.5 %   14.8 (H)   Platelets      150 - 350 K/uL   261   MPV      9.2 - 12.9 fL   11.2   Immature Granulocytes      0.0 - 0.5 %   0.6 (H)   Gran # (ANC)      1.8 - 7.7 K/uL   5.1   Immature Grans (Abs)      0.00 - 0.04 K/uL   0.05 (H)   Lymph #      1.0 - 4.8 K/uL   1.8   Mono #      0.3 - 1.0 K/uL   0.6   Eos #      0.0 - 0.5 K/uL   0.1   Baso #      0.00 - 0.20 K/uL   0.04   nRBC      0 /100 WBC   0   Gran%      38.0 - 73.0 %   65.7   Lymph%      18.0 - 48.0 %   23.8   Mono%      4.0 - 15.0 %   7.8   Eosinophil%      0.0 - 8.0 %   1.6   Basophil%      0.0 - 1.9 %   0.5   Differential Method         Automated   Sodium      136 - 145 mmol/L   139   Potassium      3.5 - 5.1 mmol/L   4.3   Chloride      95 - 110 mmol/L   105   CO2      23 - 29 mmol/L   24   Glucose      70 - 110 mg/dL   143 (H)   BUN, Bld      8 - 23 mg/dL   10   Creatinine      0.5 - 1.4 mg/dL   0.8   Calcium       8.7 - 10.5 mg/dL   9.3   PROTEIN TOTAL      6.0 - 8.4 g/dL   7.2   Albumin      3.5 - 5.2 g/dL   3.5   BILIRUBIN TOTAL      0.1 - 1.0 mg/dL   0.7   Alkaline Phosphatase      55 - 135 U/L   89   AST      10 - 40 U/L   21   ALT      10 - 44 U/L   14   Anion Gap      8 - 16 mmol/L   10   eGFR if African American      >60 mL/min/1.73 m:2   >60.0   eGFR if non African American      >60 mL/min/1.73 m:2   >60.0   Anti Sm Antibody      0.00 - 0.99 Ratio  0.23    Anti-Sm Interpretation      Negative  Negative    Anti-SSA Antibody      0.00 - 0.99 Ratio  0.14    Anti-SSA Interpretation      Negative  Negative    Anti-SSB Antibody      0.00 - 0.99 Ratio  0.07    Anti-SSB Interpretation      Negative  Negative    ds DNA Ab      Negative 1:10  Negative 1:10    Anti Sm/RNP Antibody      0.00 - 0.99 Ratio  2.85 (H)    Anti-Sm/RNP Interpretation      Negative  Positive (A)    Cytoplasmic Neutrophilic Ab      <1:20 Titer <1:20     Perinuclear (P-ANCA)      <1:20 Titer <1:20     Sed Rate      0 - 36 mm/Hr 46 (H) 42 (H)    CRP      0.0 - 8.2 mg/L 3.5 3.3    ANGELICA Screen      Negative <1:80  Positive (A)    Ferritin      20.0 - 300.0 ng/mL  86    ANGLEICA PATTERN 1        Speckled    ANGELICA Titer 1        1:320    FTA-ABS      Non-reactive Non-reactive     Rheumatoid Factor      0.0 - 15.0 IU/mL <10.0     RPR      Non-reactive Non-reactive     Angio Convert Enzyme      16 - 85 U/L 42         Assessment:       1.  Bilateral scleritis  2.  Positive ANGELICA/RNP  3.  Elevated sed rate  4.  Hand swelling and pain.  Right only  5.  Fatigue  6.  Multiple medical problems  7.  Obesity    Plan:       1.  Labs blood and urine  2.  CXR  3.  Consider DMARD therapy    RTO 3 months/prn

## 2020-10-06 NOTE — PROGRESS NOTES
Rapid3 Question Responses and Scores 10/2/2020   MDHAQ Score 0.9   Psychologic Score 3.3   Pain Score 5   When you awakened in the morning OVER THE LAST WEEK, did you feel stiff? Yes   If Yes, please indicate the number of hours until you are as limber as you will be for the day 1   Fatigue Score 5   Global Health Score 6   RAPID3 Score 4.66       Answers for HPI/ROS submitted by the patient on 10/2/2020   fever: No  eye redness: Yes  mouth sores: Yes  headaches: No  shortness of breath: No  chest pain: No  trouble swallowing: No  diarrhea: No  constipation: No  unexpected weight change: No  genital sore: No  dysuria: No  During the last 3 days, have you had a skin rash?: No  Bruises or bleeds easily: No  cough: No

## 2020-10-06 NOTE — LETTER
October 6, 2020      Ingrid Wagner MD  [address]  [facility] [address]           [email or web address]  [address]  [facility] [address]  Phone: [phone]  Fax: [phone]          Patient: Adriana Paula   MR Number: [id number]   YOB: 1955   Date of Visit: 10/6/2020       Dear Dr. Ingrid Wagner:    Thank you for referring Adriana Paula to me for evaluation. Attached you will find relevant portions of my assessment and plan of care.    If you have questions, please do not hesitate to call me. I look forward to following Adriana Paula along with you.    Sincerely,    Darling Quintero MD    Enclosure  CC:  No Recipients    If you would like to receive this communication electronically, please contact [email or web address] or [phone] to request more information on [employer] Link access.    For providers and/or their staff who would like to refer a patient to [facility], please contact us through our one-stop-shop provider referral line, [facility], at [phone].    If you feel you have received this communication in error or would no longer like to receive these types of communications, please e-mail [email or web address]

## 2020-10-08 ENCOUNTER — PATIENT MESSAGE (OUTPATIENT)
Dept: RHEUMATOLOGY | Facility: CLINIC | Age: 65
End: 2020-10-08

## 2020-10-08 ENCOUNTER — PATIENT MESSAGE (OUTPATIENT)
Dept: PHARMACY | Facility: CLINIC | Age: 65
End: 2020-10-08

## 2020-10-08 ENCOUNTER — OFFICE VISIT (OUTPATIENT)
Dept: INFECTIOUS DISEASES | Facility: CLINIC | Age: 65
End: 2020-10-08
Payer: MEDICARE

## 2020-10-08 VITALS
WEIGHT: 219.56 LBS | DIASTOLIC BLOOD PRESSURE: 69 MMHG | HEIGHT: 65 IN | TEMPERATURE: 99 F | HEART RATE: 82 BPM | SYSTOLIC BLOOD PRESSURE: 122 MMHG | BODY MASS INDEX: 36.58 KG/M2

## 2020-10-08 DIAGNOSIS — I15.2 HYPERTENSION ASSOCIATED WITH DIABETES: ICD-10-CM

## 2020-10-08 DIAGNOSIS — E11.3293 TYPE 2 DIABETES MELLITUS WITH BOTH EYES AFFECTED BY MILD NONPROLIFERATIVE RETINOPATHY WITHOUT MACULAR EDEMA, WITH LONG-TERM CURRENT USE OF INSULIN: Chronic | ICD-10-CM

## 2020-10-08 DIAGNOSIS — H15.003 SCLERITIS AND EPISCLERITIS OF BOTH EYES: Primary | ICD-10-CM

## 2020-10-08 DIAGNOSIS — R76.8 ANA POSITIVE: ICD-10-CM

## 2020-10-08 DIAGNOSIS — Z79.4 TYPE 2 DIABETES MELLITUS WITH BOTH EYES AFFECTED BY MILD NONPROLIFERATIVE RETINOPATHY WITHOUT MACULAR EDEMA, WITH LONG-TERM CURRENT USE OF INSULIN: Chronic | ICD-10-CM

## 2020-10-08 DIAGNOSIS — H15.103 SCLERITIS AND EPISCLERITIS OF BOTH EYES: Primary | ICD-10-CM

## 2020-10-08 DIAGNOSIS — E11.59 HYPERTENSION ASSOCIATED WITH DIABETES: ICD-10-CM

## 2020-10-08 PROCEDURE — 1125F PR PAIN SEVERITY QUANTIFIED, PAIN PRESENT: ICD-10-PCS | Mod: HCNC,S$GLB,, | Performed by: INTERNAL MEDICINE

## 2020-10-08 PROCEDURE — 1101F PR PT FALLS ASSESS DOC 0-1 FALLS W/OUT INJ PAST YR: ICD-10-PCS | Mod: HCNC,CPTII,S$GLB, | Performed by: INTERNAL MEDICINE

## 2020-10-08 PROCEDURE — 99205 OFFICE O/P NEW HI 60 MIN: CPT | Mod: HCNC,S$GLB,, | Performed by: INTERNAL MEDICINE

## 2020-10-08 PROCEDURE — 3051F PR MOST RECENT HEMOGLOBIN A1C LEVEL 7.0 - < 8.0%: ICD-10-PCS | Mod: HCNC,CPTII,S$GLB, | Performed by: INTERNAL MEDICINE

## 2020-10-08 PROCEDURE — 1159F PR MEDICATION LIST DOCUMENTED IN MEDICAL RECORD: ICD-10-PCS | Mod: HCNC,S$GLB,, | Performed by: INTERNAL MEDICINE

## 2020-10-08 PROCEDURE — 3074F PR MOST RECENT SYSTOLIC BLOOD PRESSURE < 130 MM HG: ICD-10-PCS | Mod: HCNC,CPTII,S$GLB, | Performed by: INTERNAL MEDICINE

## 2020-10-08 PROCEDURE — 3051F HG A1C>EQUAL 7.0%<8.0%: CPT | Mod: HCNC,CPTII,S$GLB, | Performed by: INTERNAL MEDICINE

## 2020-10-08 PROCEDURE — 99205 PR OFFICE/OUTPT VISIT, NEW, LEVL V, 60-74 MIN: ICD-10-PCS | Mod: HCNC,S$GLB,, | Performed by: INTERNAL MEDICINE

## 2020-10-08 PROCEDURE — 3078F DIAST BP <80 MM HG: CPT | Mod: HCNC,CPTII,S$GLB, | Performed by: INTERNAL MEDICINE

## 2020-10-08 PROCEDURE — 3078F PR MOST RECENT DIASTOLIC BLOOD PRESSURE < 80 MM HG: ICD-10-PCS | Mod: HCNC,CPTII,S$GLB, | Performed by: INTERNAL MEDICINE

## 2020-10-08 PROCEDURE — 99999 PR PBB SHADOW E&M-EST. PATIENT-LVL IV: CPT | Mod: PBBFAC,HCNC,, | Performed by: INTERNAL MEDICINE

## 2020-10-08 PROCEDURE — 1125F AMNT PAIN NOTED PAIN PRSNT: CPT | Mod: HCNC,S$GLB,, | Performed by: INTERNAL MEDICINE

## 2020-10-08 PROCEDURE — 3008F PR BODY MASS INDEX (BMI) DOCUMENTED: ICD-10-PCS | Mod: HCNC,CPTII,S$GLB, | Performed by: INTERNAL MEDICINE

## 2020-10-08 PROCEDURE — 99999 PR PBB SHADOW E&M-EST. PATIENT-LVL IV: ICD-10-PCS | Mod: PBBFAC,HCNC,, | Performed by: INTERNAL MEDICINE

## 2020-10-08 PROCEDURE — 1159F MED LIST DOCD IN RCRD: CPT | Mod: HCNC,S$GLB,, | Performed by: INTERNAL MEDICINE

## 2020-10-08 PROCEDURE — 3008F BODY MASS INDEX DOCD: CPT | Mod: HCNC,CPTII,S$GLB, | Performed by: INTERNAL MEDICINE

## 2020-10-08 PROCEDURE — 1101F PT FALLS ASSESS-DOCD LE1/YR: CPT | Mod: HCNC,CPTII,S$GLB, | Performed by: INTERNAL MEDICINE

## 2020-10-08 PROCEDURE — 3074F SYST BP LT 130 MM HG: CPT | Mod: HCNC,CPTII,S$GLB, | Performed by: INTERNAL MEDICINE

## 2020-10-08 NOTE — LETTER
October 9, 2020      Darling Quintero MD  [address]  [facility] [address]           [facility] 1st Fl  [address]  [facility] [address]  Phone: [phone]  Fax: [phone]          Patient: Adriana Paula   MR Number: [id number]   YOB: 1955   Date of Visit: 10/8/2020       Dear Dr. Darling Quintero:    Thank you for referring Adriana Paula to me for evaluation. Attached you will find relevant portions of my assessment and plan of care.    If you have questions, please do not hesitate to call me. I look forward to following Adriana Paula along with you.    Sincerely,    Suzy Cobb, DO    Enclosure  CC:  No Recipients    If you would like to receive this communication electronically, please contact [email or web address] or [phone] to request more information on [employer] Link access.    For providers and/or their staff who would like to refer a patient to [facility], please contact us through our one-stop-shop provider referral line, [facility], at [phone].    If you feel you have received this communication in error or would no longer like to receive these types of communications, please e-mail [email or web address]

## 2020-10-09 ENCOUNTER — OFFICE VISIT (OUTPATIENT)
Dept: INTERNAL MEDICINE | Facility: CLINIC | Age: 65
End: 2020-10-09
Payer: MEDICARE

## 2020-10-09 ENCOUNTER — PATIENT MESSAGE (OUTPATIENT)
Dept: RHEUMATOLOGY | Facility: CLINIC | Age: 65
End: 2020-10-09

## 2020-10-09 ENCOUNTER — LAB VISIT (OUTPATIENT)
Dept: LAB | Facility: HOSPITAL | Age: 65
End: 2020-10-09
Attending: INTERNAL MEDICINE
Payer: MEDICARE

## 2020-10-09 ENCOUNTER — TELEPHONE (OUTPATIENT)
Dept: INFECTIOUS DISEASES | Facility: CLINIC | Age: 65
End: 2020-10-09

## 2020-10-09 ENCOUNTER — IMMUNIZATION (OUTPATIENT)
Dept: PHARMACY | Facility: CLINIC | Age: 65
End: 2020-10-09
Payer: MEDICARE

## 2020-10-09 VITALS
HEIGHT: 65 IN | WEIGHT: 219.81 LBS | SYSTOLIC BLOOD PRESSURE: 130 MMHG | OXYGEN SATURATION: 98 % | DIASTOLIC BLOOD PRESSURE: 64 MMHG | RESPIRATION RATE: 22 BRPM | TEMPERATURE: 97 F | HEART RATE: 66 BPM | BODY MASS INDEX: 36.62 KG/M2

## 2020-10-09 DIAGNOSIS — E11.59 HYPERTENSION ASSOCIATED WITH DIABETES: Primary | ICD-10-CM

## 2020-10-09 DIAGNOSIS — Z79.4 TYPE 2 DIABETES MELLITUS WITH HYPERGLYCEMIA, WITH LONG-TERM CURRENT USE OF INSULIN: ICD-10-CM

## 2020-10-09 DIAGNOSIS — H57.89 INFLAMMATORY EYE DISEASE: ICD-10-CM

## 2020-10-09 DIAGNOSIS — R06.89 DECREASED BREATH SOUNDS AT LEFT LUNG BASE: ICD-10-CM

## 2020-10-09 DIAGNOSIS — E78.5 HYPERLIPIDEMIA DUE TO TYPE 2 DIABETES MELLITUS: ICD-10-CM

## 2020-10-09 DIAGNOSIS — E11.65 TYPE 2 DIABETES MELLITUS WITH HYPERGLYCEMIA, WITH LONG-TERM CURRENT USE OF INSULIN: ICD-10-CM

## 2020-10-09 DIAGNOSIS — I15.2 HYPERTENSION ASSOCIATED WITH DIABETES: ICD-10-CM

## 2020-10-09 DIAGNOSIS — I15.2 HYPERTENSION ASSOCIATED WITH DIABETES: Primary | ICD-10-CM

## 2020-10-09 DIAGNOSIS — E11.59 HYPERTENSION ASSOCIATED WITH DIABETES: ICD-10-CM

## 2020-10-09 DIAGNOSIS — E11.69 HYPERLIPIDEMIA DUE TO TYPE 2 DIABETES MELLITUS: ICD-10-CM

## 2020-10-09 LAB
ANION GAP SERPL CALC-SCNC: 9 MMOL/L (ref 8–16)
BUN SERPL-MCNC: 9 MG/DL (ref 8–23)
CALCIUM SERPL-MCNC: 9.2 MG/DL (ref 8.7–10.5)
CHLORIDE SERPL-SCNC: 103 MMOL/L (ref 95–110)
CO2 SERPL-SCNC: 25 MMOL/L (ref 23–29)
CREAT SERPL-MCNC: 0.8 MG/DL (ref 0.5–1.4)
EST. GFR  (AFRICAN AMERICAN): >60 ML/MIN/1.73 M^2
EST. GFR  (NON AFRICAN AMERICAN): >60 ML/MIN/1.73 M^2
ESTIMATED AVG GLUCOSE: 220 MG/DL (ref 68–131)
GLUCOSE SERPL-MCNC: 206 MG/DL (ref 70–110)
HBA1C MFR BLD HPLC: 9.3 % (ref 4–5.6)
POTASSIUM SERPL-SCNC: 4.4 MMOL/L (ref 3.5–5.1)
SODIUM SERPL-SCNC: 137 MMOL/L (ref 136–145)

## 2020-10-09 PROCEDURE — 80048 BASIC METABOLIC PNL TOTAL CA: CPT | Mod: HCNC

## 2020-10-09 PROCEDURE — 99214 OFFICE O/P EST MOD 30 MIN: CPT | Mod: HCNC,S$GLB,, | Performed by: INTERNAL MEDICINE

## 2020-10-09 PROCEDURE — 3078F PR MOST RECENT DIASTOLIC BLOOD PRESSURE < 80 MM HG: ICD-10-PCS | Mod: HCNC,CPTII,S$GLB, | Performed by: INTERNAL MEDICINE

## 2020-10-09 PROCEDURE — 3008F BODY MASS INDEX DOCD: CPT | Mod: HCNC,CPTII,S$GLB, | Performed by: INTERNAL MEDICINE

## 2020-10-09 PROCEDURE — 1101F PT FALLS ASSESS-DOCD LE1/YR: CPT | Mod: HCNC,CPTII,S$GLB, | Performed by: INTERNAL MEDICINE

## 2020-10-09 PROCEDURE — 1125F PR PAIN SEVERITY QUANTIFIED, PAIN PRESENT: ICD-10-PCS | Mod: HCNC,S$GLB,, | Performed by: INTERNAL MEDICINE

## 2020-10-09 PROCEDURE — 3046F PR MOST RECENT HEMOGLOBIN A1C LEVEL > 9.0%: ICD-10-PCS | Mod: HCNC,CPTII,S$GLB, | Performed by: INTERNAL MEDICINE

## 2020-10-09 PROCEDURE — 83036 HEMOGLOBIN GLYCOSYLATED A1C: CPT | Mod: HCNC

## 2020-10-09 PROCEDURE — 1125F AMNT PAIN NOTED PAIN PRSNT: CPT | Mod: HCNC,S$GLB,, | Performed by: INTERNAL MEDICINE

## 2020-10-09 PROCEDURE — 3075F PR MOST RECENT SYSTOLIC BLOOD PRESS GE 130-139MM HG: ICD-10-PCS | Mod: HCNC,CPTII,S$GLB, | Performed by: INTERNAL MEDICINE

## 2020-10-09 PROCEDURE — 1159F PR MEDICATION LIST DOCUMENTED IN MEDICAL RECORD: ICD-10-PCS | Mod: HCNC,S$GLB,, | Performed by: INTERNAL MEDICINE

## 2020-10-09 PROCEDURE — 99999 PR PBB SHADOW E&M-EST. PATIENT-LVL V: CPT | Mod: PBBFAC,HCNC,, | Performed by: INTERNAL MEDICINE

## 2020-10-09 PROCEDURE — 3078F DIAST BP <80 MM HG: CPT | Mod: HCNC,CPTII,S$GLB, | Performed by: INTERNAL MEDICINE

## 2020-10-09 PROCEDURE — 1159F MED LIST DOCD IN RCRD: CPT | Mod: HCNC,S$GLB,, | Performed by: INTERNAL MEDICINE

## 2020-10-09 PROCEDURE — 3075F SYST BP GE 130 - 139MM HG: CPT | Mod: HCNC,CPTII,S$GLB, | Performed by: INTERNAL MEDICINE

## 2020-10-09 PROCEDURE — 3008F PR BODY MASS INDEX (BMI) DOCUMENTED: ICD-10-PCS | Mod: HCNC,CPTII,S$GLB, | Performed by: INTERNAL MEDICINE

## 2020-10-09 PROCEDURE — 3046F HEMOGLOBIN A1C LEVEL >9.0%: CPT | Mod: HCNC,CPTII,S$GLB, | Performed by: INTERNAL MEDICINE

## 2020-10-09 PROCEDURE — 99999 PR PBB SHADOW E&M-EST. PATIENT-LVL V: ICD-10-PCS | Mod: PBBFAC,HCNC,, | Performed by: INTERNAL MEDICINE

## 2020-10-09 PROCEDURE — 36415 COLL VENOUS BLD VENIPUNCTURE: CPT | Mod: HCNC,PO

## 2020-10-09 PROCEDURE — 1101F PR PT FALLS ASSESS DOC 0-1 FALLS W/OUT INJ PAST YR: ICD-10-PCS | Mod: HCNC,CPTII,S$GLB, | Performed by: INTERNAL MEDICINE

## 2020-10-09 PROCEDURE — 99214 PR OFFICE/OUTPT VISIT, EST, LEVL IV, 30-39 MIN: ICD-10-PCS | Mod: HCNC,S$GLB,, | Performed by: INTERNAL MEDICINE

## 2020-10-09 NOTE — PROGRESS NOTES
Subjective:       Patient ID: Adriana Paula is a 65 y.o. female who presents for Diabetes (follow up), Hypertension, and Hyperlipidemia      Diabetes  She presents for her follow-up diabetic visit. She has type 2 diabetes mellitus. Her disease course has been improving. There are no hypoglycemic associated symptoms. Pertinent negatives for hypoglycemia include no confusion, dizziness, nervousness/anxiousness, sweats or tremors. Associated symptoms include blurred vision, chest pain (reports an uncomfortable feeling in epigastric area and to the left under her breast, it comes and goes, not associated with activity or breathing) and fatigue. Pertinent negatives for diabetes include no polydipsia, no polyuria and no weakness. There are no hypoglycemic complications. Symptoms are stable. There are no diabetic complications. Risk factors for coronary artery disease include diabetes mellitus, dyslipidemia and hypertension. Current diabetic treatment includes insulin injections and oral agent (monotherapy). She is compliant with treatment all of the time. Her weight is stable. She is following a generally healthy diet. She rarely participates in exercise. There is no change in her home blood glucose trend. Her breakfast blood glucose range is generally  mg/dl. An ACE inhibitor/angiotensin II receptor blocker is being taken.   Hypertension  This is a chronic problem. The current episode started more than 1 year ago. The problem is unchanged. The problem is controlled. Associated symptoms include blurred vision and chest pain (reports an uncomfortable feeling in epigastric area and to the left under her breast, it comes and goes, not associated with activity or breathing). Pertinent negatives include no palpitations, peripheral edema, shortness of breath or sweats. Agents associated with hypertension include steroids. Risk factors for coronary artery disease include diabetes mellitus, dyslipidemia, sedentary  lifestyle and post-menopausal state. Past treatments include angiotensin blockers. The current treatment provides moderate improvement. Compliance problems include exercise.  There is no history of heart failure. There is no history of a hypertension causing med.   Hyperlipidemia  This is a chronic problem. The current episode started more than 1 year ago. Recent lipid tests were reviewed and are high. Exacerbating diseases include diabetes. She has no history of liver disease. There are no known factors aggravating her hyperlipidemia. Associated symptoms include chest pain (reports an uncomfortable feeling in epigastric area and to the left under her breast, it comes and goes, not associated with activity or breathing). Pertinent negatives include no focal weakness, leg pain, myalgias or shortness of breath. Current antihyperlipidemic treatment includes statins. The current treatment provides moderate improvement of lipids. Compliance problems include adherence to exercise.  Risk factors for coronary artery disease include diabetes mellitus, dyslipidemia, hypertension and post-menopausal.      After steroid injection, BG increased to 200's but has decreased since then. AM fasting BG yesterday was 98.      Review of Systems   Constitutional: Positive for diaphoresis (reports occasional sweats) and fatigue. Negative for chills and fever.   HENT: Positive for hearing loss (she began using a hearing aid) and sinus pressure. Negative for congestion, ear discharge, ear pain, postnasal drip, sinus pain and sore throat.    Eyes: Positive for blurred vision, discharge, redness and itching.   Respiratory: Positive for cough. Negative for chest tightness and shortness of breath.    Cardiovascular: Positive for chest pain (reports an uncomfortable feeling in epigastric area and to the left under her breast, it comes and goes, not associated with activity or breathing). Negative for palpitations and leg swelling.    Gastrointestinal: Positive for anal bleeding (reports history of hemorrhoids). Negative for abdominal pain, constipation, diarrhea and nausea.   Endocrine: Negative for polydipsia and polyuria.   Genitourinary: Negative for frequency and urgency.   Musculoskeletal: Positive for arthralgias. Negative for myalgias.   Skin: Positive for wound (small red wound on right eyelid).   Neurological: Negative for dizziness, tremors, focal weakness, weakness and numbness.   Psychiatric/Behavioral: Positive for dysphoric mood (reports feeling down at times) and sleep disturbance (gets to sleep but has difficulty remaining asleep). Negative for agitation, behavioral problems, confusion and hallucinations (denies hallucinations but recalls hearing the reporters on TV say a few things that were strange, would not give an example). The patient is not nervous/anxious.        Objective:      Physical Exam  Vitals signs reviewed.   Constitutional:       General: She is not in acute distress.     Appearance: She is well-developed. She is not diaphoretic.   HENT:      Head: Normocephalic and atraumatic.      Right Ear: Hearing and external ear normal.      Left Ear: Hearing and external ear normal.      Nose: Nose normal.   Eyes:      General: Lids are normal.   Neck:      Musculoskeletal: Normal range of motion and neck supple.   Cardiovascular:      Rate and Rhythm: Normal rate and regular rhythm.      Pulses:           Dorsalis pedis pulses are 2+ on the right side and 2+ on the left side.      Heart sounds: Normal heart sounds. No murmur.   Pulmonary:      Effort: Pulmonary effort is normal. No tachypnea, bradypnea or respiratory distress.      Breath sounds: Examination of the left-lower field reveals decreased breath sounds. Decreased breath sounds present. No wheezing.   Abdominal:      General: Bowel sounds are normal. There is no distension.      Palpations: Abdomen is soft.      Tenderness: There is no abdominal tenderness.    Musculoskeletal: Normal range of motion.      Right foot: Normal range of motion.      Left foot: Normal range of motion.   Feet:      Right foot:      Protective Sensation: 5 sites tested. 5 sites sensed.      Skin integrity: Skin integrity normal. No ulcer, skin breakdown, erythema, callus or dry skin.      Toenail Condition: Right toenails are normal.      Left foot:      Protective Sensation: 5 sites tested. 5 sites sensed.      Skin integrity: Erythema (over 1st TMT joint) present. No ulcer, skin breakdown, callus or dry skin.      Toenail Condition: Left toenails are normal.   Skin:     General: Skin is warm and dry.      Findings: No rash.   Neurological:      Mental Status: She is alert and oriented to person, place, and time.         Assessment/Plan:         1. Hypertension associated with diabetes  - HTN is controlled, continue losartan  - Basic Metabolic Panel; Future    2. Type 2 diabetes mellitus with hyperglycemia, with long-term current use of insulin  - check hemoglobin A1c, continued Tresiba 37 units daily and metformin  - Hemoglobin A1C; Future    3. Hyperlipidemia due to type 2 diabetes mellitus  - controlled, continue Lipitor    4. Inflammatory eye disease  - managed by ophthalmology, on a lower dose of steroids  - ANGELICA positive, already evaluated by rheumatology, considering treatment    5. Decreased breath sounds at left lung base  - area of scarring seen on chest x-ray, check chest CT  - CT Chest Without Contrast; Future      RTC in 3 months or sooner if needed    Nicole Márquez MD

## 2020-10-09 NOTE — TELEPHONE ENCOUNTER
Called patient with no answer. Left voicemail of needing hep a and b vaccine to please call back to schedule.

## 2020-10-09 NOTE — PROGRESS NOTES
Subjective:     Patient ID: Ms. Adriana Paula is a 65 y.o. female    Chief Complaint   Patient presents with    Immunotherapy    Immunizations        HPI:    65F PMH HTN, GERD who presents to ID clinic for pre-biologic evaluation. She states she has developed worsening scleritis of unclear etiology over the last few months. She was seen for evaluation by ophtho and rheumatology, evaluation is pending. She has used topical drops with only temporary relief. Endorses feeling of dry, vanessa eyes. Endorses redness of b/l eyes. Some issues w/ vision. Per ophtho, pt cannot be controlled on PO steroids due to hx of DM. Rec DMARD. Per rheumatology note, considering DMARD therapy pending lab results.       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   HENT: Negative for congestion and sore throat.    Eyes: Positive for blurred vision, pain, redness and visual disturbance. Negative for double vision, vision loss in left eye and vision loss in right eye.   Cardiovascular: Negative for chest pain, dyspnea on exertion, irregular heartbeat and leg swelling.   Respiratory: Negative for cough, hemoptysis, shortness of breath and sputum production.    Hematologic/Lymphatic: Negative for adenopathy. Does not bruise/bleed easily.   Skin: Negative for rash and suspicious lesions.   Musculoskeletal: Positive for joint pain. Negative for arthritis, muscle weakness and  myalgias.   Gastrointestinal: Negative for abdominal pain, constipation, diarrhea, heartburn, nausea and vomiting.   Genitourinary: Negative for dysuria, flank pain, frequency and genital sores.   Neurological: Negative for dizziness, focal weakness, numbness, paresthesias, sensory change, tremors and weakness.   Psychiatric/Behavioral: Negative for altered mental status and depression. The patient is not nervous/anxious.    Allergic/Immunologic: Negative for environmental allergies and persistent infections.        Past Medical History:   Diagnosis Date    Allergy     Anemia     Anxiety     Arthritis     Breast cyst     Cataract     DR (diabetic retinopathy)     Dyslipidemia     Essential hypertension 2012    Gastroesophageal reflux disease without esophagitis 2/3/2017    GERD (gastroesophageal reflux disease)     Glaucoma     Hypertension     Obesity (BMI 30-39.9) 10/24/2013    PN (peripheral neuropathy)     Sleep apnea     Type 2 diabetes mellitus with both eyes affected by mild nonproliferative retinopathy without macular edema, with long-term current use of insulin     Type 2 diabetes mellitus with diabetic polyneuropathy, with long-term current use of insulin     Type II or unspecified type diabetes mellitus with other specified manifestations, uncontrolled      Past Surgical History:   Procedure Laterality Date    BREAST CYST EXCISION Right     1980s    CARPAL TUNNEL RELEASE Right     CATARACT EXTRACTION      CATARACT EXTRACTION W/  INTRAOCULAR LENS IMPLANT Right 2017    Dr Brewster     SECTION      EPIDURAL STEROID INJECTION N/A 2019    Procedure: Injection, Steroid, Epidural LUMBAR/CAUDAL L5-S1 IL KARLA;  Surgeon: Jef García MD;  Location: [facility];  Service: Pain Management;  Laterality: N/A;  NEEDS CONSENT    EPIDURAL STEROID INJECTION N/A 2019    Procedure: Injection, Steroid, Epidural LUMBAR/CAUDAL L5-S1 IL KARLA;  Surgeon: Jef García,  "MD;  Location: [facility] PAIN [facility];  Service: Pain Management;  Laterality: N/A;  NEEDS CONSENT    KNEE ARTHROSCOPY  1-30-14    right    MYOMECTOMY      TRANSFORAMINAL EPIDURAL INJECTION OF STEROID Right 11/4/2019    Procedure: LUMBAR TRANSFORAMINAL RIGHT L5-S1  TF KARLA;  Surgeon: Jef García MD;  Location: [facility] PAIN [facility];  Service: Pain Management;  Laterality: Right;  NEEDS CONSENT    TRIGGER FINGER RELEASE      TRIGGER FINGER RELEASE Left 4/15/2019    Procedure: RELEASE, TRIGGER FINGER left thumb;  Surgeon: Yuridia Gonzales MD;  Location: [facility] OR;  Service: Orthopedics;  Laterality: Left;  stretcher, supine, hand pan 1 and pan 2     Family History   Problem Relation Age of Onset    Arthritis Mother     Diabetes Mother     Heart disease Mother     Miscarriages / Stillbirths Mother     Vision loss Mother     Breast cancer Mother 75        70s    Cancer Mother 70        Breast    No Known Problems Father     Diabetes Unknown         "Half of my family"    Breast cancer Maternal Grandmother 50        50s    Breast cancer Sister 50        50s    Osteoarthritis Sister     Breast cancer Cousin 40        40s    Heart failure Brother     Pacemaker/defibrilator Brother     No Known Problems Maternal Aunt     No Known Problems Maternal Uncle     No Known Problems Paternal Aunt     No Known Problems Paternal Uncle     No Known Problems Maternal Grandfather     No Known Problems Paternal Grandmother     No Known Problems Paternal Grandfather     Breast cancer Cousin 40        40s    Breast cancer Sister 40    Osteoarthritis Sister     Breast cancer Sister 54    Heart disease Sister     Thyroid disease Neg Hx     Ovarian cancer Neg Hx     Amblyopia Neg Hx     Blindness Neg Hx     Cataracts Neg Hx     Glaucoma Neg Hx     Hypertension Neg Hx     Macular degeneration Neg Hx     Retinal detachment Neg Hx     Strabismus Neg Hx     Stroke Neg Hx     Lupus Neg Hx      Social History "     Tobacco Use    Smoking status: Former Smoker     Start date: 12/9/2002    Smokeless tobacco: Never Used   Substance Use Topics    Alcohol use: Yes     Frequency: Monthly or less     Drinks per session: 1 or 2     Binge frequency: Never     Comment: socially; couple glasses    Drug use: No       Objective:     Physical Exam  Vitals signs reviewed.   Constitutional:       General: She is not in acute distress.     Appearance: She is well-developed. She is not diaphoretic.   HENT:      Head: Atraumatic.      Right Ear: External ear normal.      Left Ear: External ear normal.      Nose: Nose normal.      Mouth/Throat:      Mouth: Mucous membranes are moist.      Pharynx: Oropharynx is clear. No oropharyngeal exudate or posterior oropharyngeal erythema.   Eyes:      General: No scleral icterus.        Right eye: No discharge.         Left eye: No discharge.      Extraocular Movements: Extraocular movements intact.      Comments: B/l injection and increased tearing   Neck:      Musculoskeletal: Normal range of motion and neck supple.   Cardiovascular:      Rate and Rhythm: Normal rate and regular rhythm.      Pulses: Normal pulses.      Heart sounds: Normal heart sounds. No murmur.   Pulmonary:      Effort: Pulmonary effort is normal. No respiratory distress.      Breath sounds: Normal breath sounds. No wheezing.   Abdominal:      General: Bowel sounds are normal. There is no distension.      Palpations: Abdomen is soft.      Tenderness: There is no abdominal tenderness.   Musculoskeletal: Normal range of motion.         General: No swelling or deformity.   Skin:     General: Skin is warm and dry.      Findings: No erythema or rash.   Neurological:      Mental Status: She is alert and oriented to person, place, and time.      Cranial Nerves: No cranial nerve deficit.      Coordination: Coordination normal.   Psychiatric:         Mood and Affect: Mood normal.         Behavior: Behavior normal.         Thought  Content: Thought content normal.         Judgment: Judgment normal.         Data:    All data, including recent labs, radiology, and pathology, has been independently reviewed.    Labs:    Recent Labs   Lab Result Units 07/17/20  1214  10/06/20  1311   WBC K/uL 17.66*  --  15.98*   Hemoglobin g/dL 12.7  --  13.5   Hematocrit % 40.3  --  42.6   Sodium mmol/L  --   --  136   Potassium mmol/L  --   --  4.0   Chloride mmol/L  --   --  98   BUN, Bld mg/dL  --   --  14   Creatinine mg/dL  --   --  0.8   AST U/L  --   --  13   ALT U/L  --   --  21   Alkaline Phosphatase U/L  --   --  93   Total Bilirubin mg/dL  --   --  0.9   CRP mg/L 3.3   < > 1.7   HIV 1/2 Ag/Ab   --   --  Negative    < > = values in this interval not displayed.        Radiology:    No results found in the last 24 hours.     Assessment:    1. Scleritis and episcleritis of both eyes  Zoster Recombinant Vaccine    Hepatitis A Vaccine (Adult) (IM)    Hepatitis A Vaccine (Adult) (IM)    Hepatitis B (Recombinant) Adjuvanted, 2 dose    Vaccines updated. Pt counseled as below   2. Type 2 diabetes mellitus with both eyes affected by mild nonproliferative retinopathy without macular edema, with long-term current use of insulin  A1c 7.9, improved from prior, continue management per PCP   3. Hypertension associated with diabetes  Stable, continue current management per PCP   4. ANGELICA positive  Rheumatology evaluation is pending      Counseling: I discussed the risks of infection in the setting of immunosuppressive therapies with patient. She was counseled on avoiding acutely ill contacts, avoidance of raw or undercooked food (including sushi, ceviche, and raw oysters), contact with birds and other animals that may put her at a higher risk of infection. I discussed the importance of all close contacts to patient receiving age appropriate vaccinations, including MMR for all grandchildren, and annual flu vaccine for everyone in the household. We discussed the risks of  travelling, including exposure to sick individuals in small spaces such as air travel. We discussed importance of frequent hand hygiene for both the patient and her close contacts. I explained that if she develops any symptoms of illness or fever, she should contact her PCP right away. She expressed understanding, all questions were answered.     These recommendations have been sent to and/or discussed with the following providers:   - Dr. Darling Zarate*   - Nicole Márquez MD    Follow up PRN    Sowmya Cobb DO  Infectious Disease

## 2020-10-13 ENCOUNTER — PATIENT MESSAGE (OUTPATIENT)
Dept: RHEUMATOLOGY | Facility: CLINIC | Age: 65
End: 2020-10-13

## 2020-10-13 NOTE — TELEPHONE ENCOUNTER
Will give a trial of methotrexate as a steroid sparing agent for the patient to manage the scleritis.  If no improvement consider CellCept.  If no improvement after that would have to consider Rituxan or cyclophosphamide.

## 2020-10-15 ENCOUNTER — CLINICAL SUPPORT (OUTPATIENT)
Dept: INFECTIOUS DISEASES | Facility: CLINIC | Age: 65
End: 2020-10-15
Payer: MEDICARE

## 2020-10-15 ENCOUNTER — HOSPITAL ENCOUNTER (OUTPATIENT)
Dept: RADIOLOGY | Facility: HOSPITAL | Age: 65
Discharge: HOME | End: 2020-10-15
Attending: INTERNAL MEDICINE
Payer: MEDICARE

## 2020-10-15 DIAGNOSIS — R06.89 DECREASED BREATH SOUNDS AT LEFT LUNG BASE: ICD-10-CM

## 2020-10-15 DIAGNOSIS — H15.103 SCLERITIS AND EPISCLERITIS OF BOTH EYES: ICD-10-CM

## 2020-10-15 DIAGNOSIS — H15.003 SCLERITIS AND EPISCLERITIS OF BOTH EYES: ICD-10-CM

## 2020-10-15 PROCEDURE — 90632 HEPATITIS A VACCINE ADULT IM: ICD-10-PCS | Mod: HCNC,S$GLB,, | Performed by: INTERNAL MEDICINE

## 2020-10-15 PROCEDURE — 71250 CT CHEST WITHOUT CONTRAST: ICD-10-PCS | Mod: 26,HCNC,, | Performed by: RADIOLOGY

## 2020-10-15 PROCEDURE — G0010 HEPATITIS B (RECOMBINANT) ADJUVANTED, 2 DOSE: ICD-10-PCS | Mod: 59,HCNC,S$GLB, | Performed by: INTERNAL MEDICINE

## 2020-10-15 PROCEDURE — 90632 HEPA VACCINE ADULT IM: CPT | Mod: HCNC,S$GLB,, | Performed by: INTERNAL MEDICINE

## 2020-10-15 PROCEDURE — 90739 HEPB VACC 2/4 DOSE ADULT IM: CPT | Mod: HCNC,S$GLB,, | Performed by: INTERNAL MEDICINE

## 2020-10-15 PROCEDURE — 71250 CT THORAX DX C-: CPT | Mod: 26,HCNC,, | Performed by: RADIOLOGY

## 2020-10-15 PROCEDURE — G0010 ADMIN HEPATITIS B VACCINE: HCPCS | Mod: 59,HCNC,S$GLB, | Performed by: INTERNAL MEDICINE

## 2020-10-15 PROCEDURE — 90471 HEPATITIS A VACCINE ADULT IM: ICD-10-PCS | Mod: HCNC,S$GLB,, | Performed by: INTERNAL MEDICINE

## 2020-10-15 PROCEDURE — 90471 IMMUNIZATION ADMIN: CPT | Mod: HCNC,S$GLB,, | Performed by: INTERNAL MEDICINE

## 2020-10-15 PROCEDURE — 90739 HEPATITIS B (RECOMBINANT) ADJUVANTED, 2 DOSE: ICD-10-PCS | Mod: HCNC,S$GLB,, | Performed by: INTERNAL MEDICINE

## 2020-10-15 PROCEDURE — 71250 CT THORAX DX C-: CPT | Mod: TC,HCNC

## 2020-10-18 ENCOUNTER — PATIENT MESSAGE (OUTPATIENT)
Dept: RHEUMATOLOGY | Facility: CLINIC | Age: 65
End: 2020-10-18

## 2020-10-18 ENCOUNTER — PATIENT MESSAGE (OUTPATIENT)
Dept: INTERNAL MEDICINE | Facility: CLINIC | Age: 65
End: 2020-10-18

## 2020-10-18 DIAGNOSIS — R91.1 PULMONARY NODULE: ICD-10-CM

## 2020-10-18 DIAGNOSIS — Z79.4 TYPE 2 DIABETES MELLITUS WITH HYPERGLYCEMIA, WITH LONG-TERM CURRENT USE OF INSULIN: Primary | ICD-10-CM

## 2020-10-18 DIAGNOSIS — E11.65 TYPE 2 DIABETES MELLITUS WITH HYPERGLYCEMIA, WITH LONG-TERM CURRENT USE OF INSULIN: Primary | ICD-10-CM

## 2020-10-18 NOTE — TELEPHONE ENCOUNTER
Emailed patient re: results.    Start Jardiance 10mg daily.    Info for PA:     DM Dx age 39  metformin 2013-present  Tresiba 2018-present  Humulin 70/30 2012, [id number]  Levemir [id number] change in insurance coverage  Tradjenta 4/2017 to 6/2020, coverage change, unable to pay  Victoza 2017 nausea  Bydureon 4/2019 to 10/2019 intolerance

## 2020-10-20 ENCOUNTER — PATIENT MESSAGE (OUTPATIENT)
Dept: RHEUMATOLOGY | Facility: CLINIC | Age: 65
End: 2020-10-20

## 2020-10-20 DIAGNOSIS — R70.0 ESR RAISED: ICD-10-CM

## 2020-10-20 DIAGNOSIS — H15.003 BILATERAL SCLERITIS: Primary | ICD-10-CM

## 2020-10-21 ENCOUNTER — PATIENT MESSAGE (OUTPATIENT)
Dept: RHEUMATOLOGY | Facility: CLINIC | Age: 65
End: 2020-10-21

## 2020-10-21 ENCOUNTER — PATIENT MESSAGE (OUTPATIENT)
Dept: INTERNAL MEDICINE | Facility: CLINIC | Age: 65
End: 2020-10-21

## 2020-10-21 RX ORDER — FOLIC ACID 1 MG/1
1 TABLET ORAL DAILY
Qty: 90 TABLET | Refills: 0 | Status: SHIPPED | OUTPATIENT
Start: 2020-10-21 | End: 2023-12-27

## 2020-10-21 RX ORDER — METHOTREXATE 2.5 MG/1
10 TABLET ORAL
Qty: 48 TABLET | Refills: 0 | Status: ON HOLD | OUTPATIENT
Start: 2020-10-21 | End: 2020-12-18 | Stop reason: HOSPADM

## 2020-10-23 ENCOUNTER — OFFICE VISIT (OUTPATIENT)
Dept: OPHTHALMOLOGY | Facility: CLINIC | Age: 65
End: 2020-10-23
Payer: MEDICARE

## 2020-10-23 DIAGNOSIS — H15.003 SCLERITIS OF BOTH EYES: Primary | ICD-10-CM

## 2020-10-23 PROCEDURE — 99999 PR PBB SHADOW E&M-EST. PATIENT-LVL III: CPT | Mod: PBBFAC,HCNC,, | Performed by: OPHTHALMOLOGY

## 2020-10-23 PROCEDURE — 92014 COMPRE OPH EXAM EST PT 1/>: CPT | Mod: HCNC,S$GLB,, | Performed by: OPHTHALMOLOGY

## 2020-10-23 PROCEDURE — 92014 PR EYE EXAM, EST PATIENT,COMPREHESV: ICD-10-PCS | Mod: HCNC,S$GLB,, | Performed by: OPHTHALMOLOGY

## 2020-10-23 PROCEDURE — 99999 PR PBB SHADOW E&M-EST. PATIENT-LVL III: ICD-10-PCS | Mod: PBBFAC,HCNC,, | Performed by: OPHTHALMOLOGY

## 2020-10-23 RX ORDER — FLUTICASONE PROPIONATE 50 MCG
1 SPRAY, SUSPENSION (ML) NASAL
COMMUNITY
Start: 2020-10-15 | End: 2021-01-27 | Stop reason: SDUPTHER

## 2020-10-23 NOTE — PROGRESS NOTES
HPI     Erica Batista, OD     OHTN / + fam h/o glc - mom/brother   S/p phaco w/IOL OS 3/17/16   S/p CE with IOL OD ([facility]) - 4/13/17   Scleritis OU     Oral prednisone 20mg 1 tab daily   MTX - just started today    64 YO female here for f/u scleritis ou. Pt states vision is still slightly   blurry and eye pain. A bit photophobic today as well. 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     Last edited by Ingrid Wagner MD on 10/23/2020 10:27 AM. (History)            Assessment /Plan     For exam results, see Encounter Report.    Scleritis of both eyes      Eyes more comfortable today, still with nasal injection - mildly improved    Cont oral pred 20mg daily (pt titrated insulin up to account for incr in blood sugars) will start MTX today    Will discuss how long pt should stay on oral steroids with Dr. Darling Xiong    F/up me 3 mo.

## 2020-10-26 ENCOUNTER — PATIENT MESSAGE (OUTPATIENT)
Dept: RHEUMATOLOGY | Facility: CLINIC | Age: 65
End: 2020-10-26

## 2020-10-26 DIAGNOSIS — R76.8 ANA POSITIVE: ICD-10-CM

## 2020-10-26 DIAGNOSIS — R53.83 FATIGUE, UNSPECIFIED TYPE: ICD-10-CM

## 2020-10-26 DIAGNOSIS — M79.89 SWELLING OF RIGHT HAND: ICD-10-CM

## 2020-10-26 DIAGNOSIS — R76.8 ANTI-RNP ANTIBODIES PRESENT: ICD-10-CM

## 2020-10-26 DIAGNOSIS — R70.0 ESR RAISED: ICD-10-CM

## 2020-10-26 DIAGNOSIS — H15.003 BILATERAL SCLERITIS: Primary | ICD-10-CM

## 2020-10-26 DIAGNOSIS — M79.641 PAIN IN RIGHT HAND: ICD-10-CM

## 2020-10-26 NOTE — TELEPHONE ENCOUNTER
Will change Jardiance to Invokana.     Not sure if it will be covered. Please find out the cost.    Will also see if [facility] has a list of guidelines for coverage of antidiabetic agents.

## 2020-11-01 ENCOUNTER — PATIENT MESSAGE (OUTPATIENT)
Dept: INTERNAL MEDICINE | Facility: CLINIC | Age: 65
End: 2020-11-01

## 2020-11-02 ENCOUNTER — PATIENT MESSAGE (OUTPATIENT)
Dept: INTERNAL MEDICINE | Facility: CLINIC | Age: 65
End: 2020-11-02

## 2020-11-02 NOTE — TELEPHONE ENCOUNTER
Emailed patient.     Will need to adjust the dose of Tresiba and add on one of the low-cost medications for now. Will arrange referral to diabetes nurse after all of the questions are answered.

## 2020-11-03 ENCOUNTER — PATIENT MESSAGE (OUTPATIENT)
Dept: INTERNAL MEDICINE | Facility: CLINIC | Age: 65
End: 2020-11-03

## 2020-11-03 NOTE — TELEPHONE ENCOUNTER
Not sure if she read the other questions. Does she have any symptoms of hypoglycemia? How much Tresiba has she been using? Has she used glipizide or metformin in the past?

## 2020-11-04 ENCOUNTER — TELEPHONE (OUTPATIENT)
Dept: INTERNAL MEDICINE | Facility: CLINIC | Age: 65
End: 2020-11-04

## 2020-11-04 ENCOUNTER — PATIENT MESSAGE (OUTPATIENT)
Dept: INTERNAL MEDICINE | Facility: CLINIC | Age: 65
End: 2020-11-04

## 2020-11-04 NOTE — TELEPHONE ENCOUNTER
Does she have fever or chills? Does she have shortness of breath? Has she been in contact with anyone with Covid-19 infection?    Please arrange appointment on 11/5/20 @ 3pm.

## 2020-11-04 NOTE — TELEPHONE ENCOUNTER
Spoke to pt.    She states she has no fever, no chills/sweats, no SOB and has not been in contact with anyone with covid-19.    Pt didn't want today's appt.  Scheduled tomorrow 3pm

## 2020-11-04 NOTE — TELEPHONE ENCOUNTER
----- Message from Breanna Taylor NP sent at 11/4/2020  9:28 AM CST -----  Can you get his patient scheduled for an office visit with me?   Thanks,  Breanna     ----- Message -----  From: Nicole Márquez MD  Sent: 11/4/2020   9:04 AM CST  To: Breanna Taylor NP      This patient has a newly-evaluated autoimmune disease and is being treated with prednisone for episcleritis. She has [employer] Medicare and medication coverage and copays have been a big problem. I hope that you can see her soon. Thanks!    LP

## 2020-11-05 ENCOUNTER — TELEPHONE (OUTPATIENT)
Dept: INTERNAL MEDICINE | Facility: CLINIC | Age: 65
End: 2020-11-05

## 2020-11-05 ENCOUNTER — OFFICE VISIT (OUTPATIENT)
Dept: INTERNAL MEDICINE | Facility: CLINIC | Age: 65
End: 2020-11-05
Payer: MEDICARE

## 2020-11-05 VITALS
BODY MASS INDEX: 36.99 KG/M2 | TEMPERATURE: 98 F | RESPIRATION RATE: 20 BRPM | WEIGHT: 222 LBS | OXYGEN SATURATION: 97 % | HEART RATE: 108 BPM | HEIGHT: 65 IN | SYSTOLIC BLOOD PRESSURE: 142 MMHG | DIASTOLIC BLOOD PRESSURE: 68 MMHG

## 2020-11-05 DIAGNOSIS — B37.0 CANDIDAL STOMATITIS: ICD-10-CM

## 2020-11-05 DIAGNOSIS — J02.9 SORE THROAT: ICD-10-CM

## 2020-11-05 DIAGNOSIS — E11.65 TYPE 2 DIABETES MELLITUS WITH HYPERGLYCEMIA, WITH LONG-TERM CURRENT USE OF INSULIN: ICD-10-CM

## 2020-11-05 DIAGNOSIS — Z79.4 TYPE 2 DIABETES MELLITUS WITH HYPERGLYCEMIA, WITH LONG-TERM CURRENT USE OF INSULIN: ICD-10-CM

## 2020-11-05 DIAGNOSIS — B37.0 OROPHARYNGEAL CANDIDIASIS: Primary | ICD-10-CM

## 2020-11-05 DIAGNOSIS — Z79.52 ON PREDNISONE THERAPY: ICD-10-CM

## 2020-11-05 LAB — GROUP A STREP, MOLECULAR: NEGATIVE

## 2020-11-05 PROCEDURE — 1159F MED LIST DOCD IN RCRD: CPT | Mod: HCNC,S$GLB,, | Performed by: INTERNAL MEDICINE

## 2020-11-05 PROCEDURE — 87651 STREP A DNA AMP PROBE: CPT | Mod: HCNC,PO

## 2020-11-05 PROCEDURE — 1159F PR MEDICATION LIST DOCUMENTED IN MEDICAL RECORD: ICD-10-PCS | Mod: HCNC,S$GLB,, | Performed by: INTERNAL MEDICINE

## 2020-11-05 PROCEDURE — 3046F HEMOGLOBIN A1C LEVEL >9.0%: CPT | Mod: HCNC,CPTII,S$GLB, | Performed by: INTERNAL MEDICINE

## 2020-11-05 PROCEDURE — 3008F BODY MASS INDEX DOCD: CPT | Mod: HCNC,CPTII,S$GLB, | Performed by: INTERNAL MEDICINE

## 2020-11-05 PROCEDURE — 1101F PR PT FALLS ASSESS DOC 0-1 FALLS W/OUT INJ PAST YR: ICD-10-PCS | Mod: HCNC,CPTII,S$GLB, | Performed by: INTERNAL MEDICINE

## 2020-11-05 PROCEDURE — 3077F SYST BP >= 140 MM HG: CPT | Mod: HCNC,CPTII,S$GLB, | Performed by: INTERNAL MEDICINE

## 2020-11-05 PROCEDURE — 99999 PR PBB SHADOW E&M-EST. PATIENT-LVL V: CPT | Mod: PBBFAC,HCNC,, | Performed by: INTERNAL MEDICINE

## 2020-11-05 PROCEDURE — 3077F PR MOST RECENT SYSTOLIC BLOOD PRESSURE >= 140 MM HG: ICD-10-PCS | Mod: HCNC,CPTII,S$GLB, | Performed by: INTERNAL MEDICINE

## 2020-11-05 PROCEDURE — 1126F AMNT PAIN NOTED NONE PRSNT: CPT | Mod: HCNC,S$GLB,, | Performed by: INTERNAL MEDICINE

## 2020-11-05 PROCEDURE — 3078F PR MOST RECENT DIASTOLIC BLOOD PRESSURE < 80 MM HG: ICD-10-PCS | Mod: HCNC,CPTII,S$GLB, | Performed by: INTERNAL MEDICINE

## 2020-11-05 PROCEDURE — 99214 OFFICE O/P EST MOD 30 MIN: CPT | Mod: HCNC,S$GLB,, | Performed by: INTERNAL MEDICINE

## 2020-11-05 PROCEDURE — 99999 PR PBB SHADOW E&M-EST. PATIENT-LVL V: ICD-10-PCS | Mod: PBBFAC,HCNC,, | Performed by: INTERNAL MEDICINE

## 2020-11-05 PROCEDURE — 3078F DIAST BP <80 MM HG: CPT | Mod: HCNC,CPTII,S$GLB, | Performed by: INTERNAL MEDICINE

## 2020-11-05 PROCEDURE — 3008F PR BODY MASS INDEX (BMI) DOCUMENTED: ICD-10-PCS | Mod: HCNC,CPTII,S$GLB, | Performed by: INTERNAL MEDICINE

## 2020-11-05 PROCEDURE — 1101F PT FALLS ASSESS-DOCD LE1/YR: CPT | Mod: HCNC,CPTII,S$GLB, | Performed by: INTERNAL MEDICINE

## 2020-11-05 PROCEDURE — 1126F PR PAIN SEVERITY QUANTIFIED, NO PAIN PRESENT: ICD-10-PCS | Mod: HCNC,S$GLB,, | Performed by: INTERNAL MEDICINE

## 2020-11-05 PROCEDURE — 3046F PR MOST RECENT HEMOGLOBIN A1C LEVEL > 9.0%: ICD-10-PCS | Mod: HCNC,CPTII,S$GLB, | Performed by: INTERNAL MEDICINE

## 2020-11-05 PROCEDURE — 99214 PR OFFICE/OUTPT VISIT, EST, LEVL IV, 30-39 MIN: ICD-10-PCS | Mod: HCNC,S$GLB,, | Performed by: INTERNAL MEDICINE

## 2020-11-05 RX ORDER — FLUCONAZOLE 200 MG/1
200 TABLET ORAL DAILY
Qty: 14 TABLET | Refills: 0 | Status: SHIPPED | OUTPATIENT
Start: 2020-11-05 | End: 2020-11-19

## 2020-11-05 RX ORDER — CHLORHEXIDINE GLUCONATE ORAL RINSE 1.2 MG/ML
SOLUTION DENTAL
COMMUNITY
Start: 2020-08-05

## 2020-11-05 RX ORDER — NYSTATIN 100000 [USP'U]/ML
4 SUSPENSION ORAL 4 TIMES DAILY
Qty: 224 ML | Refills: 0 | Status: SHIPPED | OUTPATIENT
Start: 2020-11-05 | End: 2020-11-19

## 2020-11-05 NOTE — TELEPHONE ENCOUNTER
----- Message from Dilma Aburto MA sent at 11/4/2020  4:57 PM CST -----  Regarding: FW: Return call  Contact: [phone] @ Patient    ----- Message -----  From: Tanvi Lares  Sent: 11/4/2020  12:42 PM CST  To: Willi UREÑA Staff  Subject: Return call                                      Patient is returning a phone call.  Who left a message for the patient: Dr Márquez office  Does patient know what this is regarding:    Comments:

## 2020-11-05 NOTE — PROGRESS NOTES
Subjective:       Patient ID: Adriana Paula is a 65 y.o. female who presents for Sore Throat, Cough (dry), and Shortness of Breath      Sore Throat   This is a new problem. The current episode started in the past 7 days (she reports that symptoms started a few days ago after starting methotrexate). The problem has been unchanged. Neither side of throat is experiencing more pain than the other. There has been no fever. The pain is moderate. Associated symptoms include congestion, coughing (dry), ear pain (bilateral), headaches (persistent mild frontal HA), shortness of breath (reports very mild episodes of shortness of breath, does not interrupt her activity, no associated symptoms) and trouble swallowing (she reports that it feels like she is swallowing glass when she eats). Pertinent negatives include no abdominal pain, diarrhea, ear discharge or vomiting. She has tried nothing for the symptoms.   Cough  This is a new problem. The current episode started in the past 7 days. The problem has been unchanged. The problem occurs every few hours. The cough is non-productive. Associated symptoms include ear pain (bilateral), eye redness, headaches (persistent mild frontal HA), postnasal drip, a sore throat and shortness of breath (reports very mild episodes of shortness of breath, does not interrupt her activity, no associated symptoms). Pertinent negatives include no chest pain, chills, fever, myalgias, rash or wheezing. Nothing aggravates the symptoms. She has tried nothing for the symptoms. There is no history of asthma.      She has taken prednisone for scleritis for the last few months. MTX was recently added to regimen.      Review of Systems   Constitutional: Positive for fatigue. Negative for chills, diaphoresis and fever.   HENT: Positive for congestion, ear pain (bilateral), mouth sores, postnasal drip, sore throat and trouble swallowing (she reports that it feels like she is swallowing glass when she eats).  Negative for ear discharge and sinus pain.    Eyes: Positive for redness. Negative for itching.   Respiratory: Positive for cough (dry) and shortness of breath (reports very mild episodes of shortness of breath, does not interrupt her activity, no associated symptoms). Negative for chest tightness and wheezing.    Cardiovascular: Negative for chest pain and palpitations.   Gastrointestinal: Negative for abdominal pain, diarrhea, nausea and vomiting.   Musculoskeletal: Negative for arthralgias and myalgias.   Skin: Positive for color change (small erythematous lesion near right upper eyelid). Negative for rash.   Neurological: Positive for headaches (persistent mild frontal HA). Negative for dizziness, weakness and numbness.   Hematological: Negative for adenopathy.       Objective:      Physical Exam  Vitals signs reviewed.   Constitutional:       General: She is not in acute distress.     Appearance: She is well-developed. She is not diaphoretic.   HENT:      Head: Normocephalic and atraumatic.      Right Ear: Hearing, tympanic membrane, ear canal and external ear normal. Tympanic membrane is not erythematous or bulging.      Left Ear: Hearing, tympanic membrane, ear canal and external ear normal. Tympanic membrane is not erythematous or bulging.      Nose:      Right Sinus: No maxillary sinus tenderness or frontal sinus tenderness.      Left Sinus: No maxillary sinus tenderness or frontal sinus tenderness.      Mouth/Throat:      Mouth: Mucous membranes are moist.      Tongue: Lesions (white plaques on tongue) present.      Pharynx: Uvula midline. Posterior oropharyngeal erythema present.      Comments: Small white plaques on buccal mucosa, inner lower lip and near the laryngopharyngeal folds.  Eyes:      General: Lids are normal.   Neck:      Musculoskeletal: Normal range of motion.   Cardiovascular:      Rate and Rhythm: Normal rate and regular rhythm.      Heart sounds: Normal heart sounds. No murmur.    Pulmonary:      Effort: Pulmonary effort is normal. No tachypnea or bradypnea.      Breath sounds: Normal breath sounds. No decreased breath sounds or wheezing.   Abdominal:      General: Bowel sounds are normal. There is no distension.      Palpations: Abdomen is soft.      Tenderness: There is no abdominal tenderness.   Musculoskeletal: Normal range of motion.   Lymphadenopathy:      Head:      Right side of head: No submandibular, preauricular or posterior auricular adenopathy.      Left side of head: No submandibular, preauricular or posterior auricular adenopathy.      Cervical: No cervical adenopathy.      Upper Body:      Right upper body: No supraclavicular adenopathy.      Left upper body: No supraclavicular adenopathy.   Skin:     General: Skin is warm and dry.      Findings: No rash.   Neurological:      Mental Status: She is alert and oriented to person, place, and time.   Psychiatric:         Attention and Perception: Attention normal.         Mood and Affect: Mood normal.         Assessment/Plan:       1. Oropharyngeal candidiasis  - fluconazole (DIFLUCAN) 200 MG Tab; Take 1 tablet (200 mg total) by mouth once daily. for 14 days  Dispense: 14 tablet; Refill: 0    2. Candidal stomatitis  - nystatin (MYCOSTATIN) 100,000 unit/mL suspension; Take 4 mLs (400,000 Units total) by mouth 4 (four) times daily. for 14 days  Dispense: 224 mL; Refill: 0    3. Sore throat  - Group A Strep, Molecular  - less likely strep but will treat if positive  - could could be related to postnasal drip    4. Type 2 diabetes mellitus with hyperglycemia, with long-term current use of insulin  - currently on Tresiba 40 units daily, will increase to 44 units daily    5. On prednisone therapy    Call if no improvement in symptoms    Nicole Márquez MD

## 2020-11-06 ENCOUNTER — PATIENT MESSAGE (OUTPATIENT)
Dept: INTERNAL MEDICINE | Facility: CLINIC | Age: 65
End: 2020-11-06

## 2020-11-06 DIAGNOSIS — M17.0 PRIMARY OSTEOARTHRITIS OF BOTH KNEES: ICD-10-CM

## 2020-11-06 RX ORDER — TRAMADOL HYDROCHLORIDE 50 MG/1
50 TABLET ORAL EVERY 12 HOURS PRN
Qty: 60 TABLET | Refills: 1 | Status: ON HOLD | OUTPATIENT
Start: 2020-11-06 | End: 2020-12-24 | Stop reason: HOSPADM

## 2020-11-09 ENCOUNTER — PATIENT MESSAGE (OUTPATIENT)
Dept: PODIATRY | Facility: CLINIC | Age: 65
End: 2020-11-09

## 2020-11-09 ENCOUNTER — PATIENT MESSAGE (OUTPATIENT)
Dept: INTERNAL MEDICINE | Facility: CLINIC | Age: 65
End: 2020-11-09

## 2020-11-10 ENCOUNTER — PATIENT MESSAGE (OUTPATIENT)
Dept: INTERNAL MEDICINE | Facility: CLINIC | Age: 65
End: 2020-11-10

## 2020-11-10 ENCOUNTER — OFFICE VISIT (OUTPATIENT)
Dept: INTERNAL MEDICINE | Facility: CLINIC | Age: 65
End: 2020-11-10
Payer: MEDICARE

## 2020-11-10 ENCOUNTER — CLINICAL SUPPORT (OUTPATIENT)
Dept: INFECTIOUS DISEASES | Facility: CLINIC | Age: 65
End: 2020-11-10
Payer: MEDICARE

## 2020-11-10 VITALS
DIASTOLIC BLOOD PRESSURE: 80 MMHG | RESPIRATION RATE: 16 BRPM | WEIGHT: 219.38 LBS | TEMPERATURE: 98 F | BODY MASS INDEX: 36.55 KG/M2 | HEART RATE: 68 BPM | SYSTOLIC BLOOD PRESSURE: 140 MMHG | OXYGEN SATURATION: 99 % | HEIGHT: 65 IN

## 2020-11-10 DIAGNOSIS — E11.42 TYPE 2 DIABETES MELLITUS WITH DIABETIC POLYNEUROPATHY, WITH LONG-TERM CURRENT USE OF INSULIN: Chronic | ICD-10-CM

## 2020-11-10 DIAGNOSIS — E11.59 HYPERTENSION ASSOCIATED WITH DIABETES: ICD-10-CM

## 2020-11-10 DIAGNOSIS — Z79.4 TYPE 2 DIABETES MELLITUS WITH DIABETIC POLYNEUROPATHY, WITH LONG-TERM CURRENT USE OF INSULIN: Chronic | ICD-10-CM

## 2020-11-10 DIAGNOSIS — H15.103 SCLERITIS AND EPISCLERITIS OF BOTH EYES: ICD-10-CM

## 2020-11-10 DIAGNOSIS — Z79.4 TYPE 2 DIABETES MELLITUS WITH BOTH EYES AFFECTED BY MILD NONPROLIFERATIVE RETINOPATHY WITHOUT MACULAR EDEMA, WITH LONG-TERM CURRENT USE OF INSULIN: Primary | Chronic | ICD-10-CM

## 2020-11-10 DIAGNOSIS — H15.003 SCLERITIS AND EPISCLERITIS OF BOTH EYES: ICD-10-CM

## 2020-11-10 DIAGNOSIS — R05.9 COUGH: Primary | ICD-10-CM

## 2020-11-10 DIAGNOSIS — I15.2 HYPERTENSION ASSOCIATED WITH DIABETES: ICD-10-CM

## 2020-11-10 DIAGNOSIS — E11.69 HYPERLIPIDEMIA ASSOCIATED WITH TYPE 2 DIABETES MELLITUS: ICD-10-CM

## 2020-11-10 DIAGNOSIS — E11.3293 TYPE 2 DIABETES MELLITUS WITH BOTH EYES AFFECTED BY MILD NONPROLIFERATIVE RETINOPATHY WITHOUT MACULAR EDEMA, WITH LONG-TERM CURRENT USE OF INSULIN: Primary | Chronic | ICD-10-CM

## 2020-11-10 DIAGNOSIS — G47.33 OBSTRUCTIVE SLEEP APNEA SYNDROME: ICD-10-CM

## 2020-11-10 DIAGNOSIS — E66.01 SEVERE OBESITY (BMI 35.0-39.9) WITH COMORBIDITY: ICD-10-CM

## 2020-11-10 DIAGNOSIS — K21.9 GASTROESOPHAGEAL REFLUX DISEASE WITHOUT ESOPHAGITIS: Chronic | ICD-10-CM

## 2020-11-10 DIAGNOSIS — E78.5 HYPERLIPIDEMIA ASSOCIATED WITH TYPE 2 DIABETES MELLITUS: ICD-10-CM

## 2020-11-10 DIAGNOSIS — R06.02 SOB (SHORTNESS OF BREATH): ICD-10-CM

## 2020-11-10 PROCEDURE — 1126F AMNT PAIN NOTED NONE PRSNT: CPT | Mod: S$GLB,,, | Performed by: NURSE PRACTITIONER

## 2020-11-10 PROCEDURE — 3079F PR MOST RECENT DIASTOLIC BLOOD PRESSURE 80-89 MM HG: ICD-10-PCS | Mod: CPTII,S$GLB,, | Performed by: NURSE PRACTITIONER

## 2020-11-10 PROCEDURE — 1101F PR PT FALLS ASSESS DOC 0-1 FALLS W/OUT INJ PAST YR: ICD-10-PCS | Mod: CPTII,S$GLB,, | Performed by: NURSE PRACTITIONER

## 2020-11-10 PROCEDURE — 99214 OFFICE O/P EST MOD 30 MIN: CPT | Mod: S$GLB,,, | Performed by: NURSE PRACTITIONER

## 2020-11-10 PROCEDURE — 99499 UNLISTED E&M SERVICE: CPT | Mod: HCNC,S$GLB,, | Performed by: NURSE PRACTITIONER

## 2020-11-10 PROCEDURE — 99999 PR PBB SHADOW E&M-EST. PATIENT-LVL V: ICD-10-PCS | Mod: PBBFAC,HCNC,, | Performed by: NURSE PRACTITIONER

## 2020-11-10 PROCEDURE — 3077F PR MOST RECENT SYSTOLIC BLOOD PRESSURE >= 140 MM HG: ICD-10-PCS | Mod: CPTII,S$GLB,, | Performed by: NURSE PRACTITIONER

## 2020-11-10 PROCEDURE — 3079F DIAST BP 80-89 MM HG: CPT | Mod: CPTII,S$GLB,, | Performed by: NURSE PRACTITIONER

## 2020-11-10 PROCEDURE — 90739 HEPB VACC 2/4 DOSE ADULT IM: CPT | Mod: S$GLB,,, | Performed by: INTERNAL MEDICINE

## 2020-11-10 PROCEDURE — 99214 PR OFFICE/OUTPT VISIT, EST, LEVL IV, 30-39 MIN: ICD-10-PCS | Mod: S$GLB,,, | Performed by: NURSE PRACTITIONER

## 2020-11-10 PROCEDURE — 90739 HEPATITIS B (RECOMBINANT) ADJUVANTED, 2 DOSE: ICD-10-PCS | Mod: S$GLB,,, | Performed by: INTERNAL MEDICINE

## 2020-11-10 PROCEDURE — 3046F HEMOGLOBIN A1C LEVEL >9.0%: CPT | Mod: CPTII,S$GLB,, | Performed by: NURSE PRACTITIONER

## 2020-11-10 PROCEDURE — 3008F BODY MASS INDEX DOCD: CPT | Mod: CPTII,S$GLB,, | Performed by: NURSE PRACTITIONER

## 2020-11-10 PROCEDURE — G0010 ADMIN HEPATITIS B VACCINE: HCPCS | Mod: S$GLB,,, | Performed by: INTERNAL MEDICINE

## 2020-11-10 PROCEDURE — 3046F PR MOST RECENT HEMOGLOBIN A1C LEVEL > 9.0%: ICD-10-PCS | Mod: CPTII,S$GLB,, | Performed by: NURSE PRACTITIONER

## 2020-11-10 PROCEDURE — 3008F PR BODY MASS INDEX (BMI) DOCUMENTED: ICD-10-PCS | Mod: CPTII,S$GLB,, | Performed by: NURSE PRACTITIONER

## 2020-11-10 PROCEDURE — 1126F PR PAIN SEVERITY QUANTIFIED, NO PAIN PRESENT: ICD-10-PCS | Mod: S$GLB,,, | Performed by: NURSE PRACTITIONER

## 2020-11-10 PROCEDURE — 99999 PR PBB SHADOW E&M-EST. PATIENT-LVL V: CPT | Mod: PBBFAC,HCNC,, | Performed by: NURSE PRACTITIONER

## 2020-11-10 PROCEDURE — 3077F SYST BP >= 140 MM HG: CPT | Mod: CPTII,S$GLB,, | Performed by: NURSE PRACTITIONER

## 2020-11-10 PROCEDURE — 99499 RISK ADDL DX/OHS AUDIT: ICD-10-PCS | Mod: HCNC,S$GLB,, | Performed by: NURSE PRACTITIONER

## 2020-11-10 PROCEDURE — 1159F PR MEDICATION LIST DOCUMENTED IN MEDICAL RECORD: ICD-10-PCS | Mod: S$GLB,,, | Performed by: NURSE PRACTITIONER

## 2020-11-10 PROCEDURE — G0010 HEPATITIS B (RECOMBINANT) ADJUVANTED, 2 DOSE: ICD-10-PCS | Mod: S$GLB,,, | Performed by: INTERNAL MEDICINE

## 2020-11-10 PROCEDURE — 1159F MED LIST DOCD IN RCRD: CPT | Mod: S$GLB,,, | Performed by: NURSE PRACTITIONER

## 2020-11-10 PROCEDURE — 1101F PT FALLS ASSESS-DOCD LE1/YR: CPT | Mod: CPTII,S$GLB,, | Performed by: NURSE PRACTITIONER

## 2020-11-10 RX ORDER — SUB-Q INSULIN DEVICE, 40 UNIT
1 EACH MISCELLANEOUS DAILY
Qty: 30 EACH | Refills: 6 | Status: ON HOLD | OUTPATIENT
Start: 2020-11-10 | End: 2020-12-18 | Stop reason: HOSPADM

## 2020-11-10 RX ORDER — INSULIN ASPART 100 [IU]/ML
70 INJECTION, SOLUTION INTRAVENOUS; SUBCUTANEOUS CONTINUOUS
Qty: 40 ML | Refills: 3 | Status: ON HOLD | OUTPATIENT
Start: 2020-11-10 | End: 2020-12-18 | Stop reason: HOSPADM

## 2020-11-10 RX ORDER — HYDROCHLOROTHIAZIDE 12.5 MG/1
12.5 TABLET ORAL DAILY
Qty: 30 TABLET | Refills: 6 | Status: ON HOLD | OUTPATIENT
Start: 2020-11-10 | End: 2020-12-24 | Stop reason: HOSPADM

## 2020-11-10 NOTE — TELEPHONE ENCOUNTER
Has the throat pain improved?    Does she have fever or chills? Does she have muscle pain? Any recent contact with anyone who is Covid-19 positive? May need Covid-19 test before additional testing if she has any new suspicious symptoms.    Ordered CXR, cbc, cmp.

## 2020-11-10 NOTE — PATIENT INSTRUCTIONS
Continue metformin 1000 mg twice per day.  Continue Tresiba 40 units every morning.  Will organized to switch you to once daily V Go 30.  This will be in place of the Tresiba.  This will give you the capability to give mealtime insulin.  Humalog short-acting insulin will go inside of years ago.  You will meet with my diabetic educator to train and show you how to use it.   Until then, continue your tresiba.     Check your sugars twice per day. Write it down in log.     Start using Dexcom G6 cgm -   Will send orders to medicare to show you how to start using.     Start hydrochlorothiazide 12.5mg tablet daily. This is to help your blood pressure and will help with swelling.

## 2020-11-10 NOTE — TELEPHONE ENCOUNTER
Has the shortness of breath continued? Has she continued to cough up mucus? Is her throat still sore?

## 2020-11-11 ENCOUNTER — TELEPHONE (OUTPATIENT)
Dept: INTERNAL MEDICINE | Facility: CLINIC | Age: 65
End: 2020-11-11

## 2020-11-11 RX ORDER — LIDOCAINE HYDROCHLORIDE 20 MG/ML
JELLY TOPICAL
Qty: 30 ML | Refills: 2 | Status: SHIPPED | OUTPATIENT
Start: 2020-11-11 | End: 2023-12-27

## 2020-11-11 NOTE — TELEPHONE ENCOUNTER
----- Message from Lucita Milian sent at 11/11/2020  4:05 PM CST -----  Contact: self [phone]  Patient is returning a phone call.  Who left a message for the patient: boyd  Does patient know what this is regarding:    Comments:

## 2020-11-11 NOTE — TELEPHONE ENCOUNTER
Spoke to pt.    She has been using mucinex since yesterday and hasn't coughed up anything else.    She said yesterday she had to really cough to get up mucus that one time, but it was greenish and thick.    No muscle ache  No fever, no chills

## 2020-11-12 ENCOUNTER — PATIENT MESSAGE (OUTPATIENT)
Dept: INTERNAL MEDICINE | Facility: CLINIC | Age: 65
End: 2020-11-12

## 2020-11-14 ENCOUNTER — PATIENT MESSAGE (OUTPATIENT)
Dept: RHEUMATOLOGY | Facility: CLINIC | Age: 65
End: 2020-11-14

## 2020-11-14 DIAGNOSIS — D84.9 IMMUNOSUPPRESSION: ICD-10-CM

## 2020-11-14 DIAGNOSIS — R76.8 ANA POSITIVE: ICD-10-CM

## 2020-11-14 DIAGNOSIS — H15.003 BILATERAL SCLERITIS: Primary | ICD-10-CM

## 2020-11-14 DIAGNOSIS — R76.8 ANTI-RNP ANTIBODIES PRESENT: ICD-10-CM

## 2020-11-14 DIAGNOSIS — R70.0 ESR RAISED: ICD-10-CM

## 2020-11-17 ENCOUNTER — PATIENT MESSAGE (OUTPATIENT)
Dept: RHEUMATOLOGY | Facility: CLINIC | Age: 65
End: 2020-11-17

## 2020-11-18 ENCOUNTER — PATIENT MESSAGE (OUTPATIENT)
Dept: INTERNAL MEDICINE | Facility: CLINIC | Age: 65
End: 2020-11-18

## 2020-11-20 ENCOUNTER — TELEPHONE (OUTPATIENT)
Dept: RHEUMATOLOGY | Facility: CLINIC | Age: 65
End: 2020-11-20

## 2020-11-20 ENCOUNTER — CLINICAL SUPPORT (OUTPATIENT)
Dept: INTERNAL MEDICINE | Facility: CLINIC | Age: 65
End: 2020-11-20
Payer: MEDICARE

## 2020-11-20 ENCOUNTER — DOCUMENTATION ONLY (OUTPATIENT)
Dept: INTERNAL MEDICINE | Facility: CLINIC | Age: 65
End: 2020-11-20

## 2020-11-20 ENCOUNTER — LAB VISIT (OUTPATIENT)
Dept: LAB | Facility: HOSPITAL | Age: 65
End: 2020-11-20
Attending: INTERNAL MEDICINE
Payer: MEDICARE

## 2020-11-20 DIAGNOSIS — M79.89 SWELLING OF RIGHT HAND: ICD-10-CM

## 2020-11-20 DIAGNOSIS — M79.641 PAIN IN RIGHT HAND: ICD-10-CM

## 2020-11-20 DIAGNOSIS — R76.8 ANTI-RNP ANTIBODIES PRESENT: ICD-10-CM

## 2020-11-20 DIAGNOSIS — R70.0 ESR RAISED: ICD-10-CM

## 2020-11-20 DIAGNOSIS — R76.8 ANA POSITIVE: ICD-10-CM

## 2020-11-20 DIAGNOSIS — R53.83 FATIGUE, UNSPECIFIED TYPE: ICD-10-CM

## 2020-11-20 DIAGNOSIS — H15.003 BILATERAL SCLERITIS: ICD-10-CM

## 2020-11-20 LAB
ALBUMIN SERPL BCP-MCNC: 3.2 G/DL (ref 3.5–5.2)
ALP SERPL-CCNC: 76 U/L (ref 55–135)
ALT SERPL W/O P-5'-P-CCNC: 40 U/L (ref 10–44)
ANION GAP SERPL CALC-SCNC: 11 MMOL/L (ref 8–16)
AST SERPL-CCNC: 26 U/L (ref 10–40)
BASOPHILS # BLD AUTO: 0.07 K/UL (ref 0–0.2)
BASOPHILS NFR BLD: 0.6 % (ref 0–1.9)
BILIRUB SERPL-MCNC: 0.7 MG/DL (ref 0.1–1)
BUN SERPL-MCNC: 21 MG/DL (ref 8–23)
CALCIUM SERPL-MCNC: 9.4 MG/DL (ref 8.7–10.5)
CHLORIDE SERPL-SCNC: 95 MMOL/L (ref 95–110)
CO2 SERPL-SCNC: 27 MMOL/L (ref 23–29)
CREAT SERPL-MCNC: 1 MG/DL (ref 0.5–1.4)
CRP SERPL-MCNC: 3.3 MG/L (ref 0–8.2)
DIFFERENTIAL METHOD: ABNORMAL
EOSINOPHIL # BLD AUTO: 0 K/UL (ref 0–0.5)
EOSINOPHIL NFR BLD: 0.3 % (ref 0–8)
ERYTHROCYTE [DISTWIDTH] IN BLOOD BY AUTOMATED COUNT: 15.2 % (ref 11.5–14.5)
ERYTHROCYTE [SEDIMENTATION RATE] IN BLOOD BY WESTERGREN METHOD: 27 MM/HR (ref 0–36)
EST. GFR  (AFRICAN AMERICAN): >60 ML/MIN/1.73 M^2
EST. GFR  (NON AFRICAN AMERICAN): 58.8 ML/MIN/1.73 M^2
GLUCOSE SERPL-MCNC: 314 MG/DL (ref 70–110)
HCT VFR BLD AUTO: 39.5 % (ref 37–48.5)
HGB BLD-MCNC: 12.2 G/DL (ref 12–16)
IMM GRANULOCYTES # BLD AUTO: 0.25 K/UL (ref 0–0.04)
IMM GRANULOCYTES NFR BLD AUTO: 2 % (ref 0–0.5)
LYMPHOCYTES # BLD AUTO: 1.8 K/UL (ref 1–4.8)
LYMPHOCYTES NFR BLD: 14.5 % (ref 18–48)
MCH RBC QN AUTO: 25.7 PG (ref 27–31)
MCHC RBC AUTO-ENTMCNC: 30.9 G/DL (ref 32–36)
MCV RBC AUTO: 83 FL (ref 82–98)
MONOCYTES # BLD AUTO: 0.7 K/UL (ref 0.3–1)
MONOCYTES NFR BLD: 5.4 % (ref 4–15)
NEUTROPHILS # BLD AUTO: 9.7 K/UL (ref 1.8–7.7)
NEUTROPHILS NFR BLD: 77.2 % (ref 38–73)
NRBC BLD-RTO: 0 /100 WBC
PLATELET # BLD AUTO: 240 K/UL (ref 150–350)
PMV BLD AUTO: 11 FL (ref 9.2–12.9)
POTASSIUM SERPL-SCNC: 4.3 MMOL/L (ref 3.5–5.1)
PROT SERPL-MCNC: 6.5 G/DL (ref 6–8.4)
RBC # BLD AUTO: 4.75 M/UL (ref 4–5.4)
SODIUM SERPL-SCNC: 133 MMOL/L (ref 136–145)
WBC # BLD AUTO: 12.58 K/UL (ref 3.9–12.7)

## 2020-11-20 PROCEDURE — 85025 COMPLETE CBC W/AUTO DIFF WBC: CPT | Mod: HCNC

## 2020-11-20 PROCEDURE — 36415 COLL VENOUS BLD VENIPUNCTURE: CPT | Mod: HCNC,PO

## 2020-11-20 PROCEDURE — 80053 COMPREHEN METABOLIC PANEL: CPT | Mod: HCNC

## 2020-11-20 PROCEDURE — 86140 C-REACTIVE PROTEIN: CPT | Mod: HCNC

## 2020-11-20 PROCEDURE — 85652 RBC SED RATE AUTOMATED: CPT | Mod: HCNC

## 2020-11-20 NOTE — PROGRESS NOTES
Pt came in today with the Dexcom device and stated that the device was bothering her, and the alarms were going off constantly. I explained that the alarms on the device are doing there job, but I also adjusted her highs and lows setting on her phone so it wouldn't bother her repeately. I also let her know when the alert goes off to acknowledge them on your phone so they can silence. Pt has two more days on sensor and will will check her report for her next follow up visit. Pt verbalized understanding.

## 2020-11-20 NOTE — PROGRESS NOTES
Spoke with patient briefly in the hallway after her Diabetes Management appointment.    Mouth is clear and white patches have resolved. Posterior oropharynx appears normal.

## 2020-11-20 NOTE — TELEPHONE ENCOUNTER
Received message from primary that patient was acting odd.  Called patient.  Patient notes anxiety with prednisone as does her daughter.  Patient instructed to decreased from 20 mg to 10 mg.  Message sent to Dr. Wagner informing of decrease and she agrees with this plan.  Patient instructed if eyes flare go to 15 mg and if that does not work go back to 20 mg and contact myself and Dr. Wagner.  Patient to contact the office after Thanksgiving with an update.

## 2020-11-20 NOTE — TELEPHONE ENCOUNTER
----- Message from Nicole Márquez MD sent at 11/20/2020  1:25 PM CST -----  Dr. Xiong,    MsPrem Paula was here seeing the diabetes NP and I caught her in the hallway.     Her mouth looks great and thrush has resolved. All of the symptoms are gone.     But I was concerned and I think she has had a few altered thoughts related to the prednisone. She told me at the last appointment that the tv said a few strange things to her. She would not go in detail. But her blood glucometer alarmed so she became aggravated and threw the meter outside.     She may have already told you about how her thinking and behavior is a little different. Just fyi. Thanks!    MD Judi

## 2020-11-21 ENCOUNTER — PATIENT MESSAGE (OUTPATIENT)
Dept: RHEUMATOLOGY | Facility: CLINIC | Age: 65
End: 2020-11-21

## 2020-11-23 ENCOUNTER — PATIENT MESSAGE (OUTPATIENT)
Dept: INTERNAL MEDICINE | Facility: CLINIC | Age: 65
End: 2020-11-23

## 2020-11-24 RX ORDER — DEXTROSE 4 G
TABLET,CHEWABLE ORAL
Qty: 1 EACH | Refills: 12 | Status: SHIPPED | OUTPATIENT
Start: 2020-11-24

## 2020-11-24 RX ORDER — LANCETS
1 EACH MISCELLANEOUS
Qty: 100 EACH | Refills: 6 | Status: SHIPPED | OUTPATIENT
Start: 2020-11-24

## 2020-11-24 NOTE — TELEPHONE ENCOUNTER
Called and s/w patient for about 10 minutes.   Discussed 2 things.   1) she has to be on insulin every day. Not to skip.   Either tresiba once daily or vgo patch. For now, advised to restart tresiba daily - states her prednisone was cut down slightly, and was nervous about low blood sugars.   I advised for her to take half of her normal dose of tresiba at least - was on 40 units daily.   Advised to restart 20 units daily.   She states that she is not sure what her sugar is - denies lows. But she threw her glucometer away because it was giving her error messages so just threw away.     2) she ran out of dexcom -   She will have sensors sent to [facility] for her.   The chart notes have been edited to describe that she Is on insulin 4 times per day (via VGO patch) - medicare denied it at first (per idania with dexcom yetserday) - can you please resend chart notes for her dexcom?     3) - Discussed with her that she always always has to check her blood sugar - this can be done via glucometer or via Dexcom.   She states she threw her glucometer away. I will send a new glucometer with test strips and lancets for this today - [place] pharmacy today to .   Advised to check sugars before meals and before bed -   Continue to check sugars 4 times per day.     Patient verbalized understanding.   Thanks,   Breanna

## 2020-11-25 ENCOUNTER — PATIENT MESSAGE (OUTPATIENT)
Dept: INTERNAL MEDICINE | Facility: CLINIC | Age: 65
End: 2020-11-25

## 2020-11-25 NOTE — TELEPHONE ENCOUNTER
Called and s/w patient.   Advised to take the tresiba once everymorning - then will switch to vgo every morning instead starting 12/4/2020 -     dexcom - if you can verify her orders got sent to dexcom as she is medicare plan?   Sharda said it initially got denied.  I fixed the chart notes to explain that vgo is 4 injections are day, and she is on 4 injections per day.   If you can make sure it gets resubmitted with proper chart notes.     Advised patient yesterday and again today - when out of dexcom, to fingerstick, and check your blood sugars before meals.     Thanks,  maximo

## 2020-11-25 NOTE — TELEPHONE ENCOUNTER
Called and s/w patient.   She picked up vgo patches and insulin vials - advised that she does not need syringes to use with this.   She will bring this with her to her appt. With dilan on 12/4/2020. Advised not to take the tresiba that morning.     Patient verbalized understanding.   Thanks,  maximo

## 2020-11-29 ENCOUNTER — NURSE TRIAGE (OUTPATIENT)
Dept: ADMINISTRATIVE | Facility: CLINIC | Age: 65
End: 2020-11-29

## 2020-11-30 ENCOUNTER — TELEPHONE (OUTPATIENT)
Dept: INTERNAL MEDICINE | Facility: CLINIC | Age: 65
End: 2020-11-30

## 2020-11-30 ENCOUNTER — TELEPHONE (OUTPATIENT)
Dept: OPHTHALMOLOGY | Facility: CLINIC | Age: 65
End: 2020-11-30

## 2020-11-30 NOTE — TELEPHONE ENCOUNTER
Spoke to pt.    She states she had a bad reaction when changed to 10mg prednisone.    Woke Thursday morning and could barely move, had to hold onto the wall.    States it felt like her body shut down.  Wasn't thirsty or hungry.  Couldn't stand the smell of things and felt nauseas.      Because of this, pt hasn't taken any of her meds since the day before Thanksgiving.  She said she would send us BP and BG numbers later.    Said she still feels disoriented, but better.  Still doesn't want to eat.    Has been sleeping 12hrs a day.    She told me she locked her daughter out and changed the locks.  Pt appeared to know exactly what she was doing.

## 2020-11-30 NOTE — TELEPHONE ENCOUNTER
Prednisone dose was decreased recently but it may take more time for the effects to clear. Is she currently taking prednisone 10mg daily?    Please find out how she feels right now. Has she noticed any changes in her thinking?     Please make an appointment with me within the next 2 weeks.

## 2020-11-30 NOTE — TELEPHONE ENCOUNTER
Tried to call pt to let her know, per Dr Wagner, to stop taking the steroids.  No answer, LM for pt

## 2020-11-30 NOTE — TELEPHONE ENCOUNTER
I think the Rheumatologist gave her instructions for what to do if she cannot tolerate the lower dose of prednisone.    Has she stopped taking the blood pressure and diabetes medications? That would be a serious problem. Is anyone else there who can help?    If she is nauseated, confused and has not taken her medications since 11/25/20, then she needs to be evaluated right away in the ER.

## 2020-11-30 NOTE — TELEPHONE ENCOUNTER
----- Message from Ingrid Wagner MD sent at 11/30/2020  4:59 PM CST -----  Pls call pt and tell her to stop the steroids.  Let daughter know if you get a hold of her. [phone]

## 2020-11-30 NOTE — TELEPHONE ENCOUNTER
Spoke to pt.    She said she did get her BP and BG, but she's taking to someone now and will call me back.

## 2020-12-01 ENCOUNTER — TELEPHONE (OUTPATIENT)
Dept: OPHTHALMOLOGY | Facility: CLINIC | Age: 65
End: 2020-12-01

## 2020-12-01 NOTE — TELEPHONE ENCOUNTER
Spoke to pt.    She has no one there with her.    She states she is still a little nauseas, but feeling better. Weird smell in her body is gone.  She has lost 10 lbs since Thanksgiving.    Only sucking on popsicles, still.    The morning she said her BG was 100, before eating and before medication.    She said she has been taking 30units Tresiba daily.  However, before, she told me she wasn't taking any medication.    She said her BP was 98/65.  Now she wants to start taking her medications again.    Please advise.

## 2020-12-01 NOTE — TELEPHONE ENCOUNTER
----- Message from Kiersten Washington sent at 12/1/2020  3:40 PM CST -----  Regarding: Please contact Pt  Pt daughter calling to confirm if we spoke to pt.  I gave the information that is listed in the chart but she says her mother put her out so she is not speaking with her. She does ask however if we could please contact the pt again [phone] to confirm she received the message to stop use of the steroids.

## 2020-12-01 NOTE — TELEPHONE ENCOUNTER
Tried to call pt again regarding stopping steroid medication pt is taking by mouth.  No answer, left message for to stop taking the oral steroids, per Dr Wagner, and advised pt to call us back to let us know she received the message.

## 2020-12-02 ENCOUNTER — TELEPHONE (OUTPATIENT)
Dept: RHEUMATOLOGY | Facility: CLINIC | Age: 65
End: 2020-12-02

## 2020-12-02 ENCOUNTER — LAB VISIT (OUTPATIENT)
Dept: LAB | Facility: HOSPITAL | Age: 65
End: 2020-12-02
Attending: INTERNAL MEDICINE
Payer: MEDICARE

## 2020-12-02 DIAGNOSIS — Z79.4 TYPE 2 DIABETES MELLITUS WITH BOTH EYES AFFECTED BY MILD NONPROLIFERATIVE RETINOPATHY WITHOUT MACULAR EDEMA, WITH LONG-TERM CURRENT USE OF INSULIN: ICD-10-CM

## 2020-12-02 DIAGNOSIS — R06.02 SOB (SHORTNESS OF BREATH): ICD-10-CM

## 2020-12-02 DIAGNOSIS — K92.1 BLOOD IN STOOL: ICD-10-CM

## 2020-12-02 DIAGNOSIS — E11.3293 TYPE 2 DIABETES MELLITUS WITH BOTH EYES AFFECTED BY MILD NONPROLIFERATIVE RETINOPATHY WITHOUT MACULAR EDEMA, WITH LONG-TERM CURRENT USE OF INSULIN: ICD-10-CM

## 2020-12-02 DIAGNOSIS — R41.0 DELIRIUM: ICD-10-CM

## 2020-12-02 DIAGNOSIS — R41.0 DELIRIUM: Primary | ICD-10-CM

## 2020-12-02 DIAGNOSIS — R05.9 COUGH: ICD-10-CM

## 2020-12-02 LAB
ALBUMIN SERPL BCP-MCNC: 3.5 G/DL (ref 3.5–5.2)
ALBUMIN SERPL BCP-MCNC: 3.5 G/DL (ref 3.5–5.2)
ALP SERPL-CCNC: 74 U/L (ref 55–135)
ALP SERPL-CCNC: 74 U/L (ref 55–135)
ALT SERPL W/O P-5'-P-CCNC: 49 U/L (ref 10–44)
ALT SERPL W/O P-5'-P-CCNC: 49 U/L (ref 10–44)
ANION GAP SERPL CALC-SCNC: 15 MMOL/L (ref 8–16)
ANION GAP SERPL CALC-SCNC: 15 MMOL/L (ref 8–16)
AST SERPL-CCNC: 58 U/L (ref 10–40)
AST SERPL-CCNC: 58 U/L (ref 10–40)
BASOPHILS # BLD AUTO: 0.04 K/UL (ref 0–0.2)
BASOPHILS # BLD AUTO: 0.04 K/UL (ref 0–0.2)
BASOPHILS NFR BLD: 0.4 % (ref 0–1.9)
BASOPHILS NFR BLD: 0.4 % (ref 0–1.9)
BILIRUB SERPL-MCNC: 0.8 MG/DL (ref 0.1–1)
BILIRUB SERPL-MCNC: 0.8 MG/DL (ref 0.1–1)
BUN SERPL-MCNC: 27 MG/DL (ref 8–23)
BUN SERPL-MCNC: 27 MG/DL (ref 8–23)
CALCIUM SERPL-MCNC: 9.8 MG/DL (ref 8.7–10.5)
CALCIUM SERPL-MCNC: 9.8 MG/DL (ref 8.7–10.5)
CHLORIDE SERPL-SCNC: 104 MMOL/L (ref 95–110)
CHLORIDE SERPL-SCNC: 104 MMOL/L (ref 95–110)
CO2 SERPL-SCNC: 24 MMOL/L (ref 23–29)
CO2 SERPL-SCNC: 24 MMOL/L (ref 23–29)
CREAT SERPL-MCNC: 1.1 MG/DL (ref 0.5–1.4)
CREAT SERPL-MCNC: 1.1 MG/DL (ref 0.5–1.4)
DIFFERENTIAL METHOD: ABNORMAL
DIFFERENTIAL METHOD: ABNORMAL
EOSINOPHIL # BLD AUTO: 0 K/UL (ref 0–0.5)
EOSINOPHIL # BLD AUTO: 0 K/UL (ref 0–0.5)
EOSINOPHIL NFR BLD: 0.1 % (ref 0–8)
EOSINOPHIL NFR BLD: 0.1 % (ref 0–8)
ERYTHROCYTE [DISTWIDTH] IN BLOOD BY AUTOMATED COUNT: 15.5 % (ref 11.5–14.5)
ERYTHROCYTE [DISTWIDTH] IN BLOOD BY AUTOMATED COUNT: 15.5 % (ref 11.5–14.5)
EST. GFR  (AFRICAN AMERICAN): >60 ML/MIN/1.73 M^2
EST. GFR  (AFRICAN AMERICAN): >60 ML/MIN/1.73 M^2
EST. GFR  (NON AFRICAN AMERICAN): 52.4 ML/MIN/1.73 M^2
EST. GFR  (NON AFRICAN AMERICAN): 52.4 ML/MIN/1.73 M^2
GLUCOSE SERPL-MCNC: 82 MG/DL (ref 70–110)
GLUCOSE SERPL-MCNC: 82 MG/DL (ref 70–110)
HCT VFR BLD AUTO: 41.2 % (ref 37–48.5)
HCT VFR BLD AUTO: 41.2 % (ref 37–48.5)
HGB BLD-MCNC: 12.9 G/DL (ref 12–16)
HGB BLD-MCNC: 12.9 G/DL (ref 12–16)
IMM GRANULOCYTES # BLD AUTO: 0.07 K/UL (ref 0–0.04)
IMM GRANULOCYTES # BLD AUTO: 0.07 K/UL (ref 0–0.04)
IMM GRANULOCYTES NFR BLD AUTO: 0.7 % (ref 0–0.5)
IMM GRANULOCYTES NFR BLD AUTO: 0.7 % (ref 0–0.5)
IRON SERPL-MCNC: 32 UG/DL (ref 30–160)
LYMPHOCYTES # BLD AUTO: 1.6 K/UL (ref 1–4.8)
LYMPHOCYTES # BLD AUTO: 1.6 K/UL (ref 1–4.8)
LYMPHOCYTES NFR BLD: 15.1 % (ref 18–48)
LYMPHOCYTES NFR BLD: 15.1 % (ref 18–48)
MCH RBC QN AUTO: 26.3 PG (ref 27–31)
MCH RBC QN AUTO: 26.3 PG (ref 27–31)
MCHC RBC AUTO-ENTMCNC: 31.3 G/DL (ref 32–36)
MCHC RBC AUTO-ENTMCNC: 31.3 G/DL (ref 32–36)
MCV RBC AUTO: 84 FL (ref 82–98)
MCV RBC AUTO: 84 FL (ref 82–98)
MONOCYTES # BLD AUTO: 0.6 K/UL (ref 0.3–1)
MONOCYTES # BLD AUTO: 0.6 K/UL (ref 0.3–1)
MONOCYTES NFR BLD: 6.2 % (ref 4–15)
MONOCYTES NFR BLD: 6.2 % (ref 4–15)
NEUTROPHILS # BLD AUTO: 8 K/UL (ref 1.8–7.7)
NEUTROPHILS # BLD AUTO: 8 K/UL (ref 1.8–7.7)
NEUTROPHILS NFR BLD: 77.5 % (ref 38–73)
NEUTROPHILS NFR BLD: 77.5 % (ref 38–73)
NRBC BLD-RTO: 0 /100 WBC
NRBC BLD-RTO: 0 /100 WBC
PLATELET # BLD AUTO: 270 K/UL (ref 150–350)
PLATELET # BLD AUTO: 270 K/UL (ref 150–350)
PMV BLD AUTO: 10.7 FL (ref 9.2–12.9)
PMV BLD AUTO: 10.7 FL (ref 9.2–12.9)
POTASSIUM SERPL-SCNC: 4 MMOL/L (ref 3.5–5.1)
POTASSIUM SERPL-SCNC: 4 MMOL/L (ref 3.5–5.1)
PROT SERPL-MCNC: 7.2 G/DL (ref 6–8.4)
PROT SERPL-MCNC: 7.2 G/DL (ref 6–8.4)
RBC # BLD AUTO: 4.9 M/UL (ref 4–5.4)
RBC # BLD AUTO: 4.9 M/UL (ref 4–5.4)
SATURATED IRON: 9 % (ref 20–50)
SODIUM SERPL-SCNC: 143 MMOL/L (ref 136–145)
SODIUM SERPL-SCNC: 143 MMOL/L (ref 136–145)
TOTAL IRON BINDING CAPACITY: 343 UG/DL (ref 250–450)
TRANSFERRIN SERPL-MCNC: 232 MG/DL (ref 200–375)
WBC # BLD AUTO: 10.3 K/UL (ref 3.9–12.7)
WBC # BLD AUTO: 10.3 K/UL (ref 3.9–12.7)

## 2020-12-02 PROCEDURE — 36415 COLL VENOUS BLD VENIPUNCTURE: CPT | Mod: HCNC,PO

## 2020-12-02 PROCEDURE — 85025 COMPLETE CBC W/AUTO DIFF WBC: CPT | Mod: HCNC

## 2020-12-02 PROCEDURE — 82728 ASSAY OF FERRITIN: CPT | Mod: HCNC

## 2020-12-02 PROCEDURE — 83540 ASSAY OF IRON: CPT | Mod: HCNC

## 2020-12-02 PROCEDURE — 80053 COMPREHEN METABOLIC PANEL: CPT | Mod: HCNC

## 2020-12-02 NOTE — TELEPHONE ENCOUNTER
Called the patient 2 times but no answer.  Spoke to patient's daughter Kaylen Galdamez who reports her mother continues to act odd.  She talks to herself, is not taking her medication for diabetes and hypertension and kicked the Labrena out of the house a few days ago. Kaylen says the patient will not answer her phone.     Cousin Dafne reports same as Kaylen and that Ms. Paula also changed the locks.  She is also calling family members talking rapidly.    Felt that the patient began to change as early as March with pandemic.  She believes symptoms began even before steroids with stress.  She took steroids for sciatica.  She became worse in August.  She was speaking very rapid and hyper and easily upset.  Best friend of Ms. Paula told Dafne that Ms. Paula said she was unable to afford her medications.   Ms. Paula informed her that her biologic sister has bipolar.   Dafne feels that Ms. Paula may have been anxious prior to starting steroid for sciatica and eye inflammation.    Spoke with Dr. Márquez about situation and she will reach out to the patient's family to see if they can get her to the office tomorrow.

## 2020-12-02 NOTE — TELEPHONE ENCOUNTER
No answer when I called patient.    Called her daughter Kaylen. She has not been able to contact the patient. Someone else in the family spoke with patient yesterday but there is not much information about how she is doing. She may not have been taking the medications.    Advised daughter to try to reach Ms. Paula carri tonchayito and see if she will discuss the medications. Patient should call an ambulance if patient is not alert or appears ill.

## 2020-12-03 ENCOUNTER — TELEPHONE (OUTPATIENT)
Dept: INTERNAL MEDICINE | Facility: CLINIC | Age: 65
End: 2020-12-03

## 2020-12-03 LAB — FERRITIN SERPL-MCNC: 328 NG/ML (ref 20–300)

## 2020-12-03 NOTE — TELEPHONE ENCOUNTER
Called patient but there was no answer.     Spoke to her daughter Kaylen. She has not spoken to MsPrem Chai today. She will communicate with a family member who speaks to patient regularly and will let her know that I need to see or speak to Ms. Chai.

## 2020-12-04 ENCOUNTER — TELEPHONE (OUTPATIENT)
Dept: INTERNAL MEDICINE | Facility: CLINIC | Age: 65
End: 2020-12-04

## 2020-12-04 ENCOUNTER — HOSPITAL ENCOUNTER (EMERGENCY)
Facility: HOSPITAL | Age: 65
Discharge: HOME | End: 2020-12-04
Attending: EMERGENCY MEDICINE
Payer: MEDICARE

## 2020-12-04 ENCOUNTER — TELEPHONE (OUTPATIENT)
Dept: RHEUMATOLOGY | Facility: CLINIC | Age: 65
End: 2020-12-04

## 2020-12-04 VITALS
WEIGHT: 180 LBS | DIASTOLIC BLOOD PRESSURE: 56 MMHG | HEART RATE: 59 BPM | TEMPERATURE: 98 F | SYSTOLIC BLOOD PRESSURE: 111 MMHG | BODY MASS INDEX: 29.99 KG/M2 | RESPIRATION RATE: 16 BRPM | OXYGEN SATURATION: 99 % | HEIGHT: 65 IN

## 2020-12-04 DIAGNOSIS — R42 DIZZINESS: ICD-10-CM

## 2020-12-04 LAB
ALBUMIN SERPL BCP-MCNC: 3.4 G/DL (ref 3.5–5.2)
ALP SERPL-CCNC: 75 U/L (ref 55–135)
ALT SERPL W/O P-5'-P-CCNC: 52 U/L (ref 10–44)
AMORPH CRY UR QL COMP ASSIST: ABNORMAL
ANION GAP SERPL CALC-SCNC: 16 MMOL/L (ref 8–16)
AST SERPL-CCNC: 69 U/L (ref 10–40)
BACTERIA #/AREA URNS AUTO: ABNORMAL /HPF
BASOPHILS # BLD AUTO: 0.03 K/UL (ref 0–0.2)
BASOPHILS NFR BLD: 0.3 % (ref 0–1.9)
BILIRUB SERPL-MCNC: 1 MG/DL (ref 0.1–1)
BILIRUB UR QL STRIP: ABNORMAL
BUN SERPL-MCNC: 37 MG/DL (ref 6–30)
BUN SERPL-MCNC: 43 MG/DL (ref 8–23)
CALCIUM SERPL-MCNC: 9.6 MG/DL (ref 8.7–10.5)
CHLORIDE SERPL-SCNC: 101 MMOL/L (ref 95–110)
CHLORIDE SERPL-SCNC: 106 MMOL/L (ref 95–110)
CLARITY UR REFRACT.AUTO: ABNORMAL
CO2 SERPL-SCNC: 21 MMOL/L (ref 23–29)
COLOR UR AUTO: ABNORMAL
CREAT SERPL-MCNC: 2.1 MG/DL (ref 0.5–1.4)
CREAT SERPL-MCNC: 2.1 MG/DL (ref 0.5–1.4)
CTP QC/QA: YES
DIFFERENTIAL METHOD: ABNORMAL
EOSINOPHIL # BLD AUTO: 0 K/UL (ref 0–0.5)
EOSINOPHIL NFR BLD: 0.1 % (ref 0–8)
ERYTHROCYTE [DISTWIDTH] IN BLOOD BY AUTOMATED COUNT: 15.4 % (ref 11.5–14.5)
EST. GFR  (AFRICAN AMERICAN): 27.7 ML/MIN/1.73 M^2
EST. GFR  (NON AFRICAN AMERICAN): 24 ML/MIN/1.73 M^2
GLUCOSE SERPL-MCNC: 120 MG/DL (ref 70–110)
GLUCOSE SERPL-MCNC: 176 MG/DL (ref 70–110)
GLUCOSE UR QL STRIP: NEGATIVE
HCT VFR BLD AUTO: 38.3 % (ref 37–48.5)
HCT VFR BLD CALC: 31 %PCV (ref 36–54)
HGB BLD-MCNC: 11.9 G/DL (ref 12–16)
HGB UR QL STRIP: ABNORMAL
IMM GRANULOCYTES # BLD AUTO: 0.09 K/UL (ref 0–0.04)
IMM GRANULOCYTES NFR BLD AUTO: 0.8 % (ref 0–0.5)
KETONES UR QL STRIP: ABNORMAL
LEUKOCYTE ESTERASE UR QL STRIP: ABNORMAL
LYMPHOCYTES # BLD AUTO: 1.3 K/UL (ref 1–4.8)
LYMPHOCYTES NFR BLD: 12.4 % (ref 18–48)
MAGNESIUM SERPL-MCNC: 2.1 MG/DL (ref 1.6–2.6)
MCH RBC QN AUTO: 26 PG (ref 27–31)
MCHC RBC AUTO-ENTMCNC: 31.1 G/DL (ref 32–36)
MCV RBC AUTO: 84 FL (ref 82–98)
MICROSCOPIC COMMENT: ABNORMAL
MONOCYTES # BLD AUTO: 0.8 K/UL (ref 0.3–1)
MONOCYTES NFR BLD: 7.2 % (ref 4–15)
NEUTROPHILS # BLD AUTO: 8.5 K/UL (ref 1.8–7.7)
NEUTROPHILS NFR BLD: 79.2 % (ref 38–73)
NITRITE UR QL STRIP: NEGATIVE
NON-SQ EPI CELLS #/AREA URNS AUTO: 1 /HPF
NRBC BLD-RTO: 0 /100 WBC
PH UR STRIP: 5 [PH] (ref 5–8)
PLATELET # BLD AUTO: 264 K/UL (ref 150–350)
PMV BLD AUTO: 11.1 FL (ref 9.2–12.9)
POC IONIZED CALCIUM: 1.2 MMOL/L (ref 1.06–1.42)
POC TCO2 (MEASURED): 24 MMOL/L (ref 23–29)
POCT GLUCOSE: 180 MG/DL (ref 70–110)
POTASSIUM BLD-SCNC: 3.6 MMOL/L (ref 3.5–5.1)
POTASSIUM SERPL-SCNC: 4.6 MMOL/L (ref 3.5–5.1)
PROT SERPL-MCNC: 7 G/DL (ref 6–8.4)
PROT UR QL STRIP: NEGATIVE
RBC # BLD AUTO: 4.58 M/UL (ref 4–5.4)
RBC #/AREA URNS AUTO: 12 /HPF (ref 0–4)
SAMPLE: ABNORMAL
SARS-COV-2 RDRP RESP QL NAA+PROBE: NEGATIVE
SODIUM BLD-SCNC: 140 MMOL/L (ref 136–145)
SODIUM SERPL-SCNC: 138 MMOL/L (ref 136–145)
SP GR UR STRIP: 1.01 (ref 1–1.03)
SQUAMOUS #/AREA URNS AUTO: 3 /HPF
TSH SERPL DL<=0.005 MIU/L-ACNC: 1.05 UIU/ML (ref 0.4–4)
URN SPEC COLLECT METH UR: ABNORMAL
WBC # BLD AUTO: 10.76 K/UL (ref 3.9–12.7)
WBC #/AREA URNS AUTO: 41 /HPF (ref 0–5)

## 2020-12-04 PROCEDURE — 84443 ASSAY THYROID STIM HORMONE: CPT | Mod: HCNC

## 2020-12-04 PROCEDURE — 80047 BASIC METABLC PNL IONIZED CA: CPT | Mod: HCNC

## 2020-12-04 PROCEDURE — 93010 ELECTROCARDIOGRAM REPORT: CPT | Mod: HCNC,,, | Performed by: INTERNAL MEDICINE

## 2020-12-04 PROCEDURE — 83735 ASSAY OF MAGNESIUM: CPT | Mod: HCNC

## 2020-12-04 PROCEDURE — 82330 ASSAY OF CALCIUM: CPT | Mod: HCNC

## 2020-12-04 PROCEDURE — 93005 ELECTROCARDIOGRAM TRACING: CPT | Mod: HCNC

## 2020-12-04 PROCEDURE — U0002 COVID-19 LAB TEST NON-CDC: HCPCS | Mod: HCNC | Performed by: EMERGENCY MEDICINE

## 2020-12-04 PROCEDURE — 87086 URINE CULTURE/COLONY COUNT: CPT | Mod: HCNC

## 2020-12-04 PROCEDURE — 25000003 PHARM REV CODE 250: Mod: HCNC | Performed by: EMERGENCY MEDICINE

## 2020-12-04 PROCEDURE — 80053 COMPREHEN METABOLIC PANEL: CPT | Mod: HCNC

## 2020-12-04 PROCEDURE — 82962 GLUCOSE BLOOD TEST: CPT | Mod: HCNC

## 2020-12-04 PROCEDURE — 81001 URINALYSIS AUTO W/SCOPE: CPT | Mod: HCNC

## 2020-12-04 PROCEDURE — 99285 EMERGENCY DEPT VISIT HI MDM: CPT | Mod: HCNC,,, | Performed by: EMERGENCY MEDICINE

## 2020-12-04 PROCEDURE — 99284 EMERGENCY DEPT VISIT MOD MDM: CPT | Mod: 25,HCNC

## 2020-12-04 PROCEDURE — 99285 PR EMERGENCY DEPT VISIT,LEVEL V: ICD-10-PCS | Mod: HCNC,,, | Performed by: EMERGENCY MEDICINE

## 2020-12-04 PROCEDURE — 85025 COMPLETE CBC W/AUTO DIFF WBC: CPT | Mod: HCNC

## 2020-12-04 PROCEDURE — 93010 EKG 12-LEAD: ICD-10-PCS | Mod: HCNC,,, | Performed by: INTERNAL MEDICINE

## 2020-12-04 RX ADMIN — SODIUM CHLORIDE 1000 ML: 0.9 INJECTION, SOLUTION INTRAVENOUS at 03:12

## 2020-12-04 NOTE — PROVIDER PROGRESS NOTES - EMERGENCY DEPT.
Encounter Date: 12/4/2020    ED Physician Progress Notes         EKG - STEMI Decision  Initial Reading: No STEMI present.

## 2020-12-04 NOTE — TELEPHONE ENCOUNTER
----- Message from Mitch Lloyd sent at 12/3/2020  5:26 PM CST -----  Regarding: wants to speak to the nurse  Contact: [phone]  Pts daughter kaylen called states that she needs to speak to Dr. Márquez regarding her mothers condition.  Kaylen can be reached @[phone]. Thanks

## 2020-12-04 NOTE — ED NOTES
Pt to the restroom via wheelchair per nurse; urine specimen obtained.  Pt returned to CHALL 2 without incident. Bed locked in lowest position; side rails up and locked x 2; call light, bedside table, and personal belongings within reach. Pt instructed to alert nurse for assistance and before attempting to get out of bed; verbalizes understanding.family member remains at bedside; will continue to monitor pt.

## 2020-12-04 NOTE — TELEPHONE ENCOUNTER
Spoke with patient's cousin Ms. Maynard.  Patient continues to be herself.  She is not eating except for sugary popsicles and has dropped about 20 pounds since Thanksgiving.  Patient's home was very messy and this is out of character since she is a neat freak.  Direct chat sent to Dr. Márquez and Dr. Wagner informing them of patient's status.

## 2020-12-04 NOTE — ED NOTES
I-STAT Chem-8+ Results:   Value Reference Range   Sodium 140 136-145 mmol/L   Potassium  3.6 3.5-5.1 mmol/L   Chloride 106  mmol/L   Ionized Calcium 1.20 1.06-1.42 mmol/L   CO2 (measured) 24 23-29 mmol/L   Glucose 120  mg/dL   BUN 37 6-30 mg/dL   Creatinine 2.1 0.5-1.4 mg/dL   Hematocrit 31 36-54%

## 2020-12-04 NOTE — TELEPHONE ENCOUNTER
----- Message from Maile Zarate MA sent at 12/4/2020 12:00 PM CST -----  Contact:  - emergency contact  I called to see which ER, only to get answering machine. I left a message. #Maile  ----- Message -----  From: Lupe Headley  Sent: 12/4/2020  11:54 AM CST  To: Maggie SALDAÑA Staff    Pt is now in the ER, having some complications. Not eating, drinking too little, and blood in the urine. Please call back.    Contact Info

## 2020-12-04 NOTE — TELEPHONE ENCOUNTER
Pt's daughter Aren returned my call.    States she called EMS for pt last night.    Kaylen said she's dehydrated, could barely stand and has blood in her urine.    EMS checked her blood sugar; 58    Pt refused to go with EMS.  Kaylen said she was answering all their questions correctly, so they didn't take her.    Kaylen's cousin has been at the pt's house, states she threw all the food out of the house.    Cousin is to bring her to [facility] education today.    Please advise.

## 2020-12-04 NOTE — ED PROVIDER NOTES
Source of History:  Patient and cousin    Chief complaint:  Abnormal Lab (pt arrives with multiple complaints states was told to come by PCP for abnormal kidney fx blood in urine, states has had decreased appetite losty 20 lbs in the last month ihx of DM BG 88 this AM  )      HPI:  Adriana Paula is a 65 y.o. female presenting with generalized fatigue for about 10 days.  Patient notes that since Thanksgiving she has had no energy.  She has been sleeping more throughout the day, feels very weak, and has no appetite.  She reports a 20 lb weight loss in these 10 days.  She states she feels dehydrated but has no desire to drink.  She also notes hematuria.  Denies fevers or chills, no sick contacts.  She notes that she was recently diagnosed with an autoimmune disease and is taking 20 mg prednisone since July which she has stopped taking suddenly several days ago.  Her cousin states that she has been behaving erratically, and where as she is normally very organized she has been very disorganized and pressured.  She will call family members with pressured speech and complain about her health before hanging up suddenly.  Her daughter called EMS last night to bring her to the hospital, but she refused.  She has noted a glucose of 58, but did not want to come to the hospital.    ROS: As per HPI and below:    General: No fever.  No chills.  Eyes: No visual changes.  Head: No headache.    Integument: No rashes or lesions.  Chest: No shortness of breath.  Cardiovascular: No chest pain.  Abdomen: No abdominal pain.  No nausea or vomiting.  Urinary: No abnormal urination.  Neurologic: No focal weakness.  No numbness.  Hematologic: No easy bruising.  Endocrine: No excessive thirst or urination.      Review of patient's allergies indicates:   Allergen Reactions    Keflex [cephalexin] Rash      blisters, fever    Penicillins Itching    Sulfamethoxazole-trimethoprim      Other reaction(s): blisters    Vicodin  "[hydrocodone-acetaminophen] Swelling    Sulfa (sulfonamide antibiotics) Rash      blisters,fever           No current facility-administered medications on file prior to encounter.      Current Outpatient Medications on File Prior to Encounter   Medication Sig Dispense Refill    amLODIPine (NORVASC) 10 MG tablet TAKE 1 TABLET BY MOUTH EVERY DAY 90 tablet 1    atorvastatin (LIPITOR) 40 MG tablet TAKE 1 TABLET(40 MG) BY MOUTH EVERY DAY 90 tablet 1    BD ULTRA-FINE SHORT PEN NEEDLE 31 gauge x 5/16" Ndle USE AS DIRECTED TWICE DAILY 100 each 5    blood sugar diagnostic Strp 1 strip by Misc.(Non-Drug; Combo Route) route 2 (two) times a day. 100 strip 6    blood-glucose meter (ACCU-CHEK KENDRA PLUS METER) Misc 1 each by Misc.(Non-Drug; Combo Route) route 2 (two) times daily. Patient test blood sugar 2 times daily 1 each 0    blood-glucose meter Misc use twice a day 1 each 12    lancets (ACCU-CHEK FASTCLIX LANCET DRUM) Misc Inject 1 each into the skin 2 (two) times daily before meals. 100 each 6    aspirin (ECOTRIN) 81 MG EC tablet Take 81 mg by mouth once daily. Instructed to stop 1 week prior to surgery on 4/15/19 per Dr. Conklin      chlorhexidine (PERIDEX) 0.12 % solution SWISH FOR 30 SECONDS AND SPIT 15ML PO  BID . DO NOT SWALLOW      cholecalciferol, vitamin D3, 125 mcg (5,000 unit) Tab Take 5,000 Units by mouth once daily.      fluticasone propionate (FLONASE) 50 mcg/actuation nasal spray 1 spray by Each Nostril route once daily.      folic acid (FOLVITE) 1 MG tablet Take 1 tablet (1 mg total) by mouth once daily. 90 tablet 0    hydroCHLOROthiazide (HYDRODIURIL) 12.5 MG Tab Take 1 tablet (12.5 mg total) by mouth once daily. 30 tablet 6    insulin degludec (TRESIBA FLEXTOUCH U-100) 100 unit/mL (3 mL) InPn Inject 36 Units into the skin once daily. 4 Syringe 5    insulin aspart U-100 (NOVOLOG U-100 INSULIN ASPART) 100 unit/mL injection Inject 70 Units into the skin continuous. use with vgo 30 40 mL 3 "    lancets 33 gauge Misc 1 lancet by Misc.(Non-Drug; Combo Route) route 2 (two) times daily before meals. 200 each 11    lidocaine HCL 2% (XYLOCAINE) 2 % jelly Apply topically as needed. Apply topically once nightly to affected part of foot/feet. 30 mL 2    losartan (COZAAR) 100 MG tablet TAKE 1 TABLET BY MOUTH EVERY DAY 90 tablet 1    MAGNESIUM ORAL Take 500 mg by mouth.      metFORMIN (GLUCOPHAGE) 500 MG tablet TAKE 2 TABLETS BY MOUTH AT LUNCH AND 1 TABLET AT DINNER 270 tablet 1    methotrexate 2.5 MG Tab Take 4 tablets (10 mg total) by mouth every 7 days. 48 tablet 0    oxybutynin (DITROPAN) 5 MG Tab Take 1 tablet (5 mg total) by mouth 3 (three) times daily. 270 tablet 0    predniSONE (DELTASONE) 10 MG tablet Take 2 tablets (20 mg total) by mouth 2 (two) times a day. 60 tablet 3    pregabalin (LYRICA) 75 MG capsule Take 1 capsule (75 mg total) by mouth 2 (two) times daily. 60 capsule 5    sub-q insulin device, 30 unit (V-GO 30) Fariba 1 Device by Misc.(Non-Drug; Combo Route) route once daily. 30 each 6    traMADoL (ULTRAM) 50 mg tablet Take 1 tablet (50 mg total) by mouth every 12 (twelve) hours as needed. M54.40 Greater than 7 day supply is medically necessary 60 tablet 1       PMH:  As per HPI and below:  Past Medical History:   Diagnosis Date    Allergy     Anemia     Anxiety     Arthritis     Breast cyst     Cataract     DR (diabetic retinopathy)     Dyslipidemia     Essential hypertension 8/1/2012    Gastroesophageal reflux disease without esophagitis 2/3/2017    GERD (gastroesophageal reflux disease)     Glaucoma     Hypertension     Obesity (BMI 30-39.9) 10/24/2013    PN (peripheral neuropathy)     Sleep apnea     Type 2 diabetes mellitus with both eyes affected by mild nonproliferative retinopathy without macular edema, with long-term current use of insulin     Type 2 diabetes mellitus with diabetic polyneuropathy, with long-term current use of insulin     Type II or  unspecified type diabetes mellitus with other specified manifestations, uncontrolled      Past Surgical History:   Procedure Laterality Date    BREAST CYST EXCISION Right     1980s    CARPAL TUNNEL RELEASE Right     CATARACT EXTRACTION      CATARACT EXTRACTION W/  INTRAOCULAR LENS IMPLANT Right 2017    Dr Brewster     SECTION      EPIDURAL STEROID INJECTION N/A 2019    Procedure: Injection, Steroid, Epidural LUMBAR/CAUDAL L5-S1 IL KARLA;  Surgeon: Jef García MD;  Location: [facility] [place] [facility];  Service: Pain Management;  Laterality: N/A;  NEEDS CONSENT    EPIDURAL STEROID INJECTION N/A 2019    Procedure: Injection, Steroid, Epidural LUMBAR/CAUDAL L5-S1 IL KARLA;  Surgeon: Jef García MD;  Location: [facility] [place] [facility];  Service: Pain Management;  Laterality: N/A;  NEEDS CONSENT    KNEE ARTHROSCOPY  14    right    MYOMECTOMY      TRANSFORAMINAL EPIDURAL INJECTION OF STEROID Right 2019    Procedure: LUMBAR TRANSFORAMINAL RIGHT L5-S1  TF KARLA;  Surgeon: Jef García MD;  Location: [facility] [place] [facility];  Service: Pain Management;  Laterality: Right;  NEEDS CONSENT    TRIGGER FINGER RELEASE      TRIGGER FINGER RELEASE Left 4/15/2019    Procedure: RELEASE, TRIGGER FINGER left thumb;  Surgeon: Yuridia Gonzales MD;  Location: [facility] [place] [facility];  Service: Orthopedics;  Laterality: Left;  stretcher, supine, hand pan 1 and pan 2       Social History     Socioeconomic History    Marital status: Single     Spouse name: Not on file    Number of children: Not on file    Years of education: Not on file    Highest education level: Not on file   Occupational History    Not on file   Social Needs    Financial resource strain: Not very hard    Food insecurity     Worry: Never true     Inability: Never true    Transportation needs     Medical: No     Non-medical: Yes   Tobacco Use    Smoking status: Former Smoker     Start date: 2002    Smokeless tobacco: Never Used   Substance and  "Sexual Activity    Alcohol use: Yes     Frequency: Monthly or less     Drinks per session: 1 or 2     Binge frequency: Never     Comment: socially; couple glasses    Drug use: No    Sexual activity: Not Currently     Partners: Male     Birth control/protection: None   Lifestyle    Physical activity     Days per week: 3 days     Minutes per session: 10 min    Stress: To some extent   Relationships    Social connections     Talks on phone: More than three times a week     Gets together: Once a week     Attends Hoahaoism service: Not on file     Active member of club or organization: No     Attends meetings of clubs or organizations: Never     Relationship status:    Other Topics Concern    Not on file   Social History Narrative    . 1 daughter. Works as  at [employer].        Family History   Problem Relation Age of Onset    Arthritis Mother     Diabetes Mother     Heart disease Mother     Miscarriages / Stillbirths Mother     Vision loss Mother     Breast cancer Mother 75        70s    Cancer Mother 70        Breast    No Known Problems Father     Diabetes Unknown         "Half of my family"    Breast cancer Maternal Grandmother 50        50s    Breast cancer Sister 50        50s    Osteoarthritis Sister     Breast cancer Cousin 40        40s    Heart failure Brother     Pacemaker/defibrilator Brother     No Known Problems Maternal Aunt     No Known Problems Maternal Uncle     No Known Problems Paternal Aunt     No Known Problems Paternal Uncle     No Known Problems Maternal Grandfather     No Known Problems Paternal Grandmother     No Known Problems Paternal Grandfather     Breast cancer Cousin 40        40s    Breast cancer Sister 40    Osteoarthritis Sister     Breast cancer Sister 54    Heart disease Sister     Thyroid disease Neg Hx     Ovarian cancer Neg Hx     Amblyopia Neg Hx     Blindness Neg Hx     Cataracts Neg Hx     Glaucoma Neg " Hx     Hypertension Neg Hx     Macular degeneration Neg Hx     Retinal detachment Neg Hx     Strabismus Neg Hx     Stroke Neg Hx     Lupus Neg Hx        Physical Exam:    Vitals:    12/04/20 1136   BP: 130/60   Pulse: 76   Resp: 16   Temp: 98.1 °F (36.7 °C)     Appearance: No acute distress.  Skin: No rashes seen.  Good turgor.  No abrasions.  No ecchymoses.  Eyes: No conjunctival injection. EOMI, PERRL.  ENT: Oropharynx clear.    Chest:  No increased work of breathing, bilateral chest rise.  Cardiovascular: Regular rate and rhythm.  Normal equal bilateral radial pulses.  Abdomen: Soft.  Not distended.  Nontender.  No guarding.  No rebound. No Masses  Musculoskeletal: Good range of motion all joints.  No deformities.  Neck supple, full range of motion, no obvious deformity.  Neurologic: Moves all extremities.  Normal sensation.  No facial droop.  Normal speech.    Mental Status:  Alert and oriented x 3.  Appropriate, conversant.          Laboratory Studies:  Labs Reviewed   POCT GLUCOSE - Abnormal; Notable for the following components:       Result Value    POCT Glucose 180 (*)     All other components within normal limits   CBC W/ AUTO DIFFERENTIAL   COMPREHENSIVE METABOLIC PANEL   TSH   MAGNESIUM   URINALYSIS, REFLEX TO URINE CULTURE   SARS-COV-2 RDRP GENE   POCT GLUCOSE MONITORING CONTINUOUS       I independently interpreted the EKG:  Normal sinus rhythm, no ST changes, normal intervals, no significant change from previous.     Imaging Results    None         Medications Given:  Medications - No data to display    Discussed with:  Patient and cousin    MDM:    65 y.o. female with recent weight loss, fatigue, feeling cold all the time, loss of appetite.  Initial differential includes steroid withdrawal, hyponatremia or hypokalemia, COVID-19.  Labs show normal electrolytes, COVID negative, EKG reassuring.  Other labs show no obvious cause for her symptoms.  Her creatinine was slightly elevated from previous,  as was her BUN.  She was given 1 L fluids with mild improvement in her BUN.  The patient does not look severely dehydrated, but given her history lack of intake, I think this is likely related to dehydration.  After the fluids, patient reports she does feel more energetic, and does feel hungry for the 1st time in several days.  Encouraged her to continue to drink water aggressively, 4-6 glasses per day.  Advised her to start on the 10 mg prednisone that she had been prescribed, and to follow-up with her primary doctor in Rheumatology.  The patient forms me she has follow-up with her primary doctor in a few days.  Although there had been concerns about erratic behavior from family, she exhibited none of this while in the ER today.  Advised pt to follow up with PCP or return if concerning symptoms arise. Pt understands and agrees with plan. Will d/c home.          Diagnostic Impression:    1. Dizziness             Bhanu Washington MD  12/04/20 [id number]

## 2020-12-04 NOTE — ED TRIAGE NOTES
Pt sent to the ER by her PCP after labs she had drawn two days ago revealed decreased kidney function. Pt reporting increased weakness with nausea, loss of appetite and hematuria since for the past week. Pt states she has lost approximately 25 lbs since symptoms started.

## 2020-12-04 NOTE — DISCHARGE INSTRUCTIONS
You continued to have bloody your urine.  The urine has been sent for culture, if there is an infection he will receive phone call with instructions.  If not, please follow-up with your gynecologist for further evaluation.    Please start taking prednisone 10 mg again as directed by your rheumatologist.

## 2020-12-04 NOTE — TELEPHONE ENCOUNTER
Aren returned my call.    Notified and verbalized understanding that her mother needs to go to ER.

## 2020-12-06 LAB
BACTERIA UR CULT: NORMAL
BACTERIA UR CULT: NORMAL

## 2020-12-07 ENCOUNTER — TELEPHONE (OUTPATIENT)
Dept: INTERNAL MEDICINE | Facility: CLINIC | Age: 65
End: 2020-12-07

## 2020-12-07 PROBLEM — R74.8 ABNORMAL AST AND ALT: Status: ACTIVE | Noted: 2020-12-07

## 2020-12-11 ENCOUNTER — LAB VISIT (OUTPATIENT)
Dept: LAB | Facility: HOSPITAL | Age: 65
End: 2020-12-11
Attending: INTERNAL MEDICINE
Payer: MEDICARE

## 2020-12-11 ENCOUNTER — TELEPHONE (OUTPATIENT)
Dept: INTERNAL MEDICINE | Facility: CLINIC | Age: 65
End: 2020-12-11

## 2020-12-11 ENCOUNTER — CLINICAL SUPPORT (OUTPATIENT)
Dept: DIABETES | Facility: CLINIC | Age: 65
End: 2020-12-11
Payer: MEDICARE

## 2020-12-11 ENCOUNTER — PATIENT MESSAGE (OUTPATIENT)
Dept: PHARMACY | Facility: CLINIC | Age: 65
End: 2020-12-11

## 2020-12-11 ENCOUNTER — OFFICE VISIT (OUTPATIENT)
Dept: INTERNAL MEDICINE | Facility: CLINIC | Age: 65
End: 2020-12-11
Payer: MEDICARE

## 2020-12-11 ENCOUNTER — PATIENT MESSAGE (OUTPATIENT)
Dept: OTHER | Facility: OTHER | Age: 65
End: 2020-12-11

## 2020-12-11 VITALS
DIASTOLIC BLOOD PRESSURE: 58 MMHG | OXYGEN SATURATION: 99 % | TEMPERATURE: 97 F | WEIGHT: 206.13 LBS | HEIGHT: 65 IN | SYSTOLIC BLOOD PRESSURE: 122 MMHG | HEART RATE: 80 BPM | BODY MASS INDEX: 34.34 KG/M2 | RESPIRATION RATE: 18 BRPM

## 2020-12-11 DIAGNOSIS — E11.59 HYPERTENSION ASSOCIATED WITH DIABETES: ICD-10-CM

## 2020-12-11 DIAGNOSIS — Z79.4 TYPE 2 DIABETES MELLITUS WITH BOTH EYES AFFECTED BY MILD NONPROLIFERATIVE RETINOPATHY WITHOUT MACULAR EDEMA, WITH LONG-TERM CURRENT USE OF INSULIN: Chronic | ICD-10-CM

## 2020-12-11 DIAGNOSIS — E11.3293 TYPE 2 DIABETES MELLITUS WITH BOTH EYES AFFECTED BY MILD NONPROLIFERATIVE RETINOPATHY WITHOUT MACULAR EDEMA, WITH LONG-TERM CURRENT USE OF INSULIN: Chronic | ICD-10-CM

## 2020-12-11 DIAGNOSIS — Z79.4 TYPE 2 DIABETES MELLITUS WITH HYPERGLYCEMIA, WITH LONG-TERM CURRENT USE OF INSULIN: ICD-10-CM

## 2020-12-11 DIAGNOSIS — E11.65 TYPE 2 DIABETES MELLITUS WITH HYPERGLYCEMIA, WITH LONG-TERM CURRENT USE OF INSULIN: ICD-10-CM

## 2020-12-11 DIAGNOSIS — F29 PSYCHOSIS, UNSPECIFIED PSYCHOSIS TYPE: Primary | ICD-10-CM

## 2020-12-11 DIAGNOSIS — I15.2 HYPERTENSION ASSOCIATED WITH DIABETES: ICD-10-CM

## 2020-12-11 DIAGNOSIS — Z79.52 ON PREDNISONE THERAPY: ICD-10-CM

## 2020-12-11 LAB
ANION GAP SERPL CALC-SCNC: 11 MMOL/L (ref 8–16)
BASOPHILS # BLD AUTO: 0.05 K/UL (ref 0–0.2)
BASOPHILS NFR BLD: 0.7 % (ref 0–1.9)
BUN SERPL-MCNC: 32 MG/DL (ref 8–23)
CALCIUM SERPL-MCNC: 9.3 MG/DL (ref 8.7–10.5)
CHLORIDE SERPL-SCNC: 102 MMOL/L (ref 95–110)
CO2 SERPL-SCNC: 24 MMOL/L (ref 23–29)
CREAT SERPL-MCNC: 1.5 MG/DL (ref 0.5–1.4)
DIFFERENTIAL METHOD: ABNORMAL
EOSINOPHIL # BLD AUTO: 0.1 K/UL (ref 0–0.5)
EOSINOPHIL NFR BLD: 1.1 % (ref 0–8)
ERYTHROCYTE [DISTWIDTH] IN BLOOD BY AUTOMATED COUNT: 15.8 % (ref 11.5–14.5)
EST. GFR  (AFRICAN AMERICAN): 41.6 ML/MIN/1.73 M^2
EST. GFR  (NON AFRICAN AMERICAN): 36 ML/MIN/1.73 M^2
GLUCOSE SERPL-MCNC: 76 MG/DL (ref 70–110)
HCT VFR BLD AUTO: 38.3 % (ref 37–48.5)
HGB BLD-MCNC: 11.8 G/DL (ref 12–16)
IMM GRANULOCYTES # BLD AUTO: 0.18 K/UL (ref 0–0.04)
IMM GRANULOCYTES NFR BLD AUTO: 2.4 % (ref 0–0.5)
LYMPHOCYTES # BLD AUTO: 2.3 K/UL (ref 1–4.8)
LYMPHOCYTES NFR BLD: 30 % (ref 18–48)
MCH RBC QN AUTO: 25.3 PG (ref 27–31)
MCHC RBC AUTO-ENTMCNC: 30.8 G/DL (ref 32–36)
MCV RBC AUTO: 82 FL (ref 82–98)
MONOCYTES # BLD AUTO: 0.8 K/UL (ref 0.3–1)
MONOCYTES NFR BLD: 10.5 % (ref 4–15)
NEUTROPHILS # BLD AUTO: 4.2 K/UL (ref 1.8–7.7)
NEUTROPHILS NFR BLD: 55.3 % (ref 38–73)
NRBC BLD-RTO: 0 /100 WBC
PLATELET # BLD AUTO: 251 K/UL (ref 150–350)
PMV BLD AUTO: 11.4 FL (ref 9.2–12.9)
POTASSIUM SERPL-SCNC: 4.5 MMOL/L (ref 3.5–5.1)
RBC # BLD AUTO: 4.66 M/UL (ref 4–5.4)
SODIUM SERPL-SCNC: 137 MMOL/L (ref 136–145)
WBC # BLD AUTO: 7.51 K/UL (ref 3.9–12.7)

## 2020-12-11 PROCEDURE — 99499 RISK ADDL DX/OHS AUDIT: ICD-10-PCS | Mod: HCNC,S$GLB,, | Performed by: INTERNAL MEDICINE

## 2020-12-11 PROCEDURE — 3008F PR BODY MASS INDEX (BMI) DOCUMENTED: ICD-10-PCS | Mod: HCNC,CPTII,S$GLB, | Performed by: INTERNAL MEDICINE

## 2020-12-11 PROCEDURE — 36415 COLL VENOUS BLD VENIPUNCTURE: CPT | Mod: HCNC,PO

## 2020-12-11 PROCEDURE — 3078F DIAST BP <80 MM HG: CPT | Mod: HCNC,CPTII,S$GLB, | Performed by: INTERNAL MEDICINE

## 2020-12-11 PROCEDURE — 99999 PR PBB SHADOW E&M-EST. PATIENT-LVL I: ICD-10-PCS | Mod: PBBFAC,HCNC,, | Performed by: DIETITIAN, REGISTERED

## 2020-12-11 PROCEDURE — 1157F ADVNC CARE PLAN IN RCRD: CPT | Mod: HCNC,S$GLB,, | Performed by: INTERNAL MEDICINE

## 2020-12-11 PROCEDURE — 99499 UNLISTED E&M SERVICE: CPT | Mod: HCNC,S$GLB,, | Performed by: INTERNAL MEDICINE

## 2020-12-11 PROCEDURE — 3046F HEMOGLOBIN A1C LEVEL >9.0%: CPT | Mod: HCNC,CPTII,S$GLB, | Performed by: INTERNAL MEDICINE

## 2020-12-11 PROCEDURE — 1126F AMNT PAIN NOTED NONE PRSNT: CPT | Mod: HCNC,S$GLB,, | Performed by: INTERNAL MEDICINE

## 2020-12-11 PROCEDURE — 3078F PR MOST RECENT DIASTOLIC BLOOD PRESSURE < 80 MM HG: ICD-10-PCS | Mod: HCNC,CPTII,S$GLB, | Performed by: INTERNAL MEDICINE

## 2020-12-11 PROCEDURE — 3008F BODY MASS INDEX DOCD: CPT | Mod: HCNC,CPTII,S$GLB, | Performed by: INTERNAL MEDICINE

## 2020-12-11 PROCEDURE — 82533 TOTAL CORTISOL: CPT | Mod: HCNC

## 2020-12-11 PROCEDURE — G0108 PR DIAB MANAGE TRN  PER INDIV: ICD-10-PCS | Mod: HCNC,S$GLB,, | Performed by: DIETITIAN, REGISTERED

## 2020-12-11 PROCEDURE — 1159F PR MEDICATION LIST DOCUMENTED IN MEDICAL RECORD: ICD-10-PCS | Mod: HCNC,S$GLB,, | Performed by: INTERNAL MEDICINE

## 2020-12-11 PROCEDURE — 3074F PR MOST RECENT SYSTOLIC BLOOD PRESSURE < 130 MM HG: ICD-10-PCS | Mod: HCNC,CPTII,S$GLB, | Performed by: INTERNAL MEDICINE

## 2020-12-11 PROCEDURE — 99214 OFFICE O/P EST MOD 30 MIN: CPT | Mod: HCNC,S$GLB,, | Performed by: INTERNAL MEDICINE

## 2020-12-11 PROCEDURE — 99999 PR PBB SHADOW E&M-EST. PATIENT-LVL I: CPT | Mod: PBBFAC,HCNC,, | Performed by: DIETITIAN, REGISTERED

## 2020-12-11 PROCEDURE — 1157F PR ADVANCE CARE PLAN OR EQUIV PRESENT IN MEDICAL RECORD: ICD-10-PCS | Mod: HCNC,S$GLB,, | Performed by: INTERNAL MEDICINE

## 2020-12-11 PROCEDURE — 99214 PR OFFICE/OUTPT VISIT, EST, LEVL IV, 30-39 MIN: ICD-10-PCS | Mod: HCNC,S$GLB,, | Performed by: INTERNAL MEDICINE

## 2020-12-11 PROCEDURE — 99999 PR PBB SHADOW E&M-EST. PATIENT-LVL V: CPT | Mod: PBBFAC,HCNC,, | Performed by: INTERNAL MEDICINE

## 2020-12-11 PROCEDURE — 80048 BASIC METABOLIC PNL TOTAL CA: CPT | Mod: HCNC

## 2020-12-11 PROCEDURE — 99999 PR PBB SHADOW E&M-EST. PATIENT-LVL V: ICD-10-PCS | Mod: PBBFAC,HCNC,, | Performed by: INTERNAL MEDICINE

## 2020-12-11 PROCEDURE — 1159F MED LIST DOCD IN RCRD: CPT | Mod: HCNC,S$GLB,, | Performed by: INTERNAL MEDICINE

## 2020-12-11 PROCEDURE — 85025 COMPLETE CBC W/AUTO DIFF WBC: CPT | Mod: HCNC

## 2020-12-11 PROCEDURE — 1126F PR PAIN SEVERITY QUANTIFIED, NO PAIN PRESENT: ICD-10-PCS | Mod: HCNC,S$GLB,, | Performed by: INTERNAL MEDICINE

## 2020-12-11 PROCEDURE — 3074F SYST BP LT 130 MM HG: CPT | Mod: HCNC,CPTII,S$GLB, | Performed by: INTERNAL MEDICINE

## 2020-12-11 PROCEDURE — 3046F PR MOST RECENT HEMOGLOBIN A1C LEVEL > 9.0%: ICD-10-PCS | Mod: HCNC,CPTII,S$GLB, | Performed by: INTERNAL MEDICINE

## 2020-12-11 PROCEDURE — G0108 DIAB MANAGE TRN  PER INDIV: HCPCS | Mod: HCNC,S$GLB,, | Performed by: DIETITIAN, REGISTERED

## 2020-12-11 NOTE — PROGRESS NOTES
Diabetes Care Specialist Progress Note  Author: Diana Lechuga RD, CDE  Date: 12/11/2020    Program Intake  Reason for Diabetes Program Visit:: Initial Diabetes Assessment(VGO Start)  Current diabetes risk level:: high  In the last 12 months, have you:: used emergency room services  Was the ER or hospital admission related to diabetes?: No    Clinical    Patient Health Rating  Compared to other people your age, how would you rate your health?: Good    Problem Review  Reviewed Problem List with Patient: yes  Active comorbidities affecting diabetes self-care.: yes  Comorbidities: Retinopathy, Hypertension, Gastrointestinal Disorder  Reviewed health maintenance: yes    Clinical Assessment  Current Diabetes Treatment: Oral Medication, Insulin  Have you ever experienced hypoglycemia (low blood sugar)?: yes  In the last month, how often have you experienced low blood sugar?: (Occasionally - not very often)  Are you able to tell when your blood sugar is low?: Yes  What symptoms do you experience?: Shaky  Have you ever been hospitalized because your blood sugar was too low?: no  How do you treat hypoglycemia (low blood sugar)?: 4 glucose tablets(Packets of jelly)  Have you ever experienced hyperglycemia (high blood sugar)?: yes  Are you able to tell when your blood sugar is high?: more than once a day  What are your symptoms?: dizziness  Have you ever been hospitalized because your blood sugar was high?: no    Medication Information  How do you obtain your medications?: Family picks up  How many days a week do you miss your medications?: Never  Do you use a pill box or medication chart to help you manage your medications?: No  Do you sometimes have difficulty refilling your medications?: No  Medication adherence impacting ability to self-manage diabetes?: No    Labs  Do you have regular lab work to monitor your medications?: Yes    Nutritional Status  Diet: Regular(2 meals plus snacks; water, OJ w/ club soda, coke  occasionally (twice a week); Lactaid milk w/ cerea; uses equal/sw and low)  Change in appetite?: Yes(Had decrease in appetite (appears to have been r/t stopping steroids abruptly); appetite starting to improve)  Dentation:: (Has partials)  Recent Changes in Weight: WT LOSS > 10 lbs. in the past 2 months(Had weight loss r/t decrease in appetite)  Was weight loss intentional or unintentional?: Unintentional  Current nutritional status an area of need that is impacting patient's ability to self-manage diabetes?: Yes    Additional Social History    Support  Does anyone support you with your diabetes care?: yes  Primary Caregiver: son/daughter  Who takes you to your medical appointments?: family member  Does the current primary caregiver meet the patient's needs?: Yes  Is Caregiver an area of support impacting ability to self-manage diabetes?: No    Access to Mass Media & Technology  Does the patient have access to any of the following devices or technologies?: Smart phone, Home computer, Internet Access  Media or technology needs impacting ability to self-manage diabetes?: No    Cognitive/Behavioral Health  Alert and Oriented: Yes  Difficulty Thinking: No  Requires Prompting: No  Cognitive or behavioral barriers impacting ability to self-manage diabetes?: No    Culture/Yazidi  Culture or Hindu beliefs that may impact ability to access healthcare: No    Communication  Language preference: English  Hearing Problems: No  Vision Problems: No  Communication needs impacting ability to self-manage diabetes?: No    Health Literacy  Preferred Learning Method: Face to Face  How often do you need to have someone help you read instructions, pamphlets, or written material from your doctor or pharmacy?: Never  Health literacy needs impacting ability to self-manage diabetes?: No      Diabetes Self-Management Skills Assessment    Diabetes Disease Process/Treatment Options  Diabetes Disease Process/Treatment Options: Skills  Assessment Completed: No  Deferred due to:: Time    Nutrition/Healthy Eating  Nutrition/Healthy Eating Skills Assessment Completed:: No  Deffered due to:: Time    Physical Activity/Exercise  Physical Activity/Exercise Skills Assessment Completed: : No  Deffered due to:: Time    Medications  Patient is able to describe current diabetes management routine.: yes  Diabetes management routine:: insulin  Patient understands the purpose of the medications taken for diabetes.: yes  Patient reports problems or concerns with current medication regimen.: no  Medication Skills Assessment Completed:: Yes  Assessment indicates:: Adequate understanding  Area of need?: No    Patient is here for training on the VGo 30 which will provide 30 units of basal insulin given over 24 hours (1.25 units/hr) and up to 36 units of on-demand bolus dosing in 2-unit increments.     Patient will only be using Novolog insulin in the VGo system. She will be giving 3 clicks at each meal, 4 clicks if BG over 180, 2 clicks if BG less than 100, no clicks if BG less than 70 and 1 click at each snack.    Patient was instructed on the following:    Insert vial into the EZ Fill and leave in place until vial is empty   Remove plug and insert VGo    Draw insulin into EZ Fill and fill VGo with insulin (check that VGo is full of insulin)    Remove VGo and clean and replace plug (advised to wipe the circular opening with an alcohol swab before replacing)    Select site for VGo (site selection reviewed) and prepare infusion site with aseptic technique    Remove button cover and adhesive liner    Attach VGo to site selected and insert needle by pushing needle button in to activate basal rate    Instructed patient on how to activate the bolus ready button and to push the bolus delivery button into the VGo to deliver 2 units of insulin (1 click = 2 units). Patient advised that once all 36 units of the on-demand bolus have been given, the bolus buttons will  lock, but the basal insulin will continue for the remainder of the 24 hours    To remove, release needle by sliding and pressing the needle release button    Remove the VGo and discard (the VGo is 100% disposable)   Patient instructed that the VGo needs to be changed every 24 hours    Patient was able to perform successful return demonstration of all.     General precautions reviewed with the patient:    VGo needs to be removed before having an MRI. Replace with a new V-Go after the test or procedure is completed. Patient also advised to check with her doctor before any type of surgical procedure to see if the V-Go can be worn.    Patient advised to monitor his/her BG at least 4 times a day (pre-meals and at bedtime)    Patient advised to keep back up long-acting insulin pens (non-) should a problem develop with the VGo. Patient also advised that he/she should have directions from her MD regarding insulin doses should he/she need to switch back to multiple daily injections temporarily.    The VGo should not be exposed to direct sunlight, hot tub, whirlpool or sauna use (replace with a new filled VGo afterward)    Check that the VGo is securely in place during and after periods of increased physical activity, exposure to water, or gone under water to the depth of 3 feet, 3 inches (the VGo can go under water and continue to work safely)    Reviewed s/s and tx of hypoglycemia    Home Blood Glucose Monitoring  Home Blood Glucose Monitoring Skills Assessment Completed: : No  Deferred due to:: Time    Acute Complications  Acute Complications Skills Assessment Completed: : No  Deffered due to:: Time    Chronic Complications  Chronic Complications Skills Assessment Completed: : No  Deferred due to:: Time    Psychosocial/Coping  Psychosocial/Coping Skills Assessment Completed: : No  Deffered due to:: Time    Assessment Summary and Plan    Based on today's diabetes care assessment, the following areas of need  were identified:      Social 12/11/2020   Support No   Access to [employer]/Tech No   Cognitive/Behavioral Health No   Culture/Scientologist No   Communication No   Health Literacy No        Clinical 12/11/2020   Medication Adherence No   Nutritional Status Yes        Diabetes Self-Management Skills 12/11/2020   Medication No      Today's interventions were provided through individual discussion, instruction, and written materials were provided.      Patient verbalized understanding of instruction and written materials.  Pt was able to return back demonstration of instructions today. Patient understood key points, needs reinforcement and further instruction.     Diabetes Self-Management Care Plan:    Today's Diabetes Self-Management Care Plan was developed with Adriana's input. Adriana has agreed to work toward the following goal(s) to improve his/her overall diabetes control.      Care Plan: Diabetes Management   Updates made since 12/11/2020 12:00 AM      Problem: Medications       Goal: Use VGO as prescribed    Start Date: 12/11/2020   Expected End Date: 3/11/2021            Task: Instructed on VGO Completed 12/11/2020                 Follow Up Plan  Patient to follow with ENDO provider on 12/22/20. Will conduct phone follow up next week.    Today's care plan and follow up schedule was discussed with patient.  Adriana verbalized understanding of the care plan, goals, and agrees to follow up plan.        The patient was encouraged to communicate with his/her health care provider/physician and care team regarding his/her condition(s) and treatment.  I provided the patient with my contact information today and encouraged to contact me via phone or [facility] Patient Portal as needed.     Length of Visit 90 minutes

## 2020-12-11 NOTE — TELEPHONE ENCOUNTER
Can you resend orders for dexcom?   I believe patient was on the cancelled order list from this month, but she spoke with Diana, educator and is interested/wants the dexcom now?     Thanks,  maximo
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
## 2020-12-12 LAB — CORTIS SERPL-MCNC: 9.7 UG/DL

## 2020-12-14 ENCOUNTER — TELEPHONE (OUTPATIENT)
Dept: INTERNAL MEDICINE | Facility: CLINIC | Age: 65
End: 2020-12-14

## 2020-12-14 NOTE — TELEPHONE ENCOUNTER
----- Message from Kacey Hughes sent at 12/14/2020 12:21 PM CST -----  Regarding: Ms. Maynard Pts cousin Mobile# [phone]  Ms. Maynard would like a call back in regards to her saying that the patient was given a paper to go and see a Psychiatrist and a Neurologist but she misplaced the paper. Ms. Maynard would like for you to e-mail her the paperwork at [email or web address] please.

## 2020-12-15 ENCOUNTER — TELEPHONE (OUTPATIENT)
Dept: INTERNAL MEDICINE | Facility: CLINIC | Age: 65
End: 2020-12-15
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
## 2020-12-16 ENCOUNTER — HOSPITAL ENCOUNTER (INPATIENT)
Facility: HOSPITAL | Age: 65
LOS: 2 days | Discharge: HOME | DRG: 885 | End: 2020-12-18
Attending: EMERGENCY MEDICINE | Admitting: HOSPITALIST
Payer: MEDICARE

## 2020-12-16 ENCOUNTER — PATIENT OUTREACH (OUTPATIENT)
Dept: DIABETES | Facility: CLINIC | Age: 65
End: 2020-12-16

## 2020-12-16 ENCOUNTER — TELEPHONE (OUTPATIENT)
Dept: INTERNAL MEDICINE | Facility: CLINIC | Age: 65
End: 2020-12-16

## 2020-12-16 DIAGNOSIS — R07.9 CHEST PAIN: ICD-10-CM

## 2020-12-16 DIAGNOSIS — I95.9 HYPOTENSION: ICD-10-CM

## 2020-12-16 DIAGNOSIS — Z91.89 AT RISK FOR LONG QT SYNDROME: ICD-10-CM

## 2020-12-16 DIAGNOSIS — F29 PSYCHOSIS: ICD-10-CM

## 2020-12-16 PROBLEM — F19.950 STEROID-INDUCED PSYCHOSIS, WITH DELUSIONS: Status: ACTIVE | Noted: 2020-12-16

## 2020-12-16 PROBLEM — E11.69 HYPERLIPIDEMIA ASSOCIATED WITH TYPE 2 DIABETES MELLITUS: Chronic | Status: ACTIVE | Noted: 2020-11-10

## 2020-12-16 PROBLEM — H15.103 EPISCLERITIS, BILATERAL: Chronic | Status: ACTIVE | Noted: 2020-12-16

## 2020-12-16 PROBLEM — E78.5 HYPERLIPIDEMIA ASSOCIATED WITH TYPE 2 DIABETES MELLITUS: Chronic | Status: ACTIVE | Noted: 2020-11-10

## 2020-12-16 PROBLEM — M51.36 DDD (DEGENERATIVE DISC DISEASE), LUMBAR: Chronic | Status: ACTIVE | Noted: 2019-07-31

## 2020-12-16 PROBLEM — G89.29 CHRONIC PAIN: Chronic | Status: ACTIVE | Noted: 2019-09-16

## 2020-12-16 PROBLEM — R70.0 ESR RAISED: Status: RESOLVED | Noted: 2020-07-23 | Resolved: 2020-12-16

## 2020-12-16 PROBLEM — R76.8 ANA POSITIVE: Chronic | Status: ACTIVE | Noted: 2020-07-23

## 2020-12-16 PROBLEM — H15.103 EPISCLERITIS, BILATERAL: Status: ACTIVE | Noted: 2020-12-16

## 2020-12-16 PROBLEM — E66.9 OBESITY (BMI 30-39.9): Chronic | Status: ACTIVE | Noted: 2020-01-22

## 2020-12-16 LAB
ALBUMIN SERPL BCP-MCNC: 3 G/DL (ref 3.5–5.2)
ALP SERPL-CCNC: 64 U/L (ref 55–135)
ALT SERPL W/O P-5'-P-CCNC: 51 U/L (ref 10–44)
AMPHET+METHAMPHET UR QL: NEGATIVE
ANION GAP SERPL CALC-SCNC: 13 MMOL/L (ref 8–16)
APAP SERPL-MCNC: <3 UG/ML (ref 10–20)
AST SERPL-CCNC: 67 U/L (ref 10–40)
BARBITURATES UR QL SCN>200 NG/ML: NEGATIVE
BASOPHILS # BLD AUTO: 0.03 K/UL (ref 0–0.2)
BASOPHILS NFR BLD: 0.4 % (ref 0–1.9)
BENZODIAZ UR QL SCN>200 NG/ML: NEGATIVE
BILIRUB SERPL-MCNC: 1 MG/DL (ref 0.1–1)
BILIRUB UR QL STRIP: NEGATIVE
BUN SERPL-MCNC: 21 MG/DL (ref 8–23)
BZE UR QL SCN: NEGATIVE
C3 SERPL-MCNC: 100 MG/DL (ref 50–180)
C4 SERPL-MCNC: 36 MG/DL (ref 11–44)
CALCIUM SERPL-MCNC: 8.9 MG/DL (ref 8.7–10.5)
CANNABINOIDS UR QL SCN: NEGATIVE
CHLORIDE SERPL-SCNC: 99 MMOL/L (ref 95–110)
CLARITY UR REFRACT.AUTO: CLEAR
CO2 SERPL-SCNC: 23 MMOL/L (ref 23–29)
COLOR UR AUTO: YELLOW
CREAT SERPL-MCNC: 1.1 MG/DL (ref 0.5–1.4)
CREAT UR-MCNC: 122 MG/DL (ref 15–325)
CREAT UR-MCNC: 22 MG/DL (ref 15–325)
CRP SERPL-MCNC: 4.9 MG/L (ref 0–8.2)
CTP QC/QA: YES
DAT IGG-SP REAG RBC-IMP: NORMAL
DIFFERENTIAL METHOD: ABNORMAL
EOSINOPHIL # BLD AUTO: 0 K/UL (ref 0–0.5)
EOSINOPHIL NFR BLD: 0.4 % (ref 0–8)
ERYTHROCYTE [DISTWIDTH] IN BLOOD BY AUTOMATED COUNT: 15.2 % (ref 11.5–14.5)
ERYTHROCYTE [SEDIMENTATION RATE] IN BLOOD BY WESTERGREN METHOD: 22 MM/HR (ref 0–36)
EST. GFR  (AFRICAN AMERICAN): >60 ML/MIN/1.73 M^2
EST. GFR  (NON AFRICAN AMERICAN): 52.4 ML/MIN/1.73 M^2
ETHANOL SERPL-MCNC: <10 MG/DL
FIBRINOGEN PPP-MCNC: 311 MG/DL (ref 182–366)
GLUCOSE SERPL-MCNC: 320 MG/DL (ref 70–110)
GLUCOSE UR QL STRIP: ABNORMAL
HAPTOGLOB SERPL-MCNC: 168 MG/DL (ref 30–250)
HCT VFR BLD AUTO: 32.5 % (ref 37–48.5)
HGB BLD-MCNC: 10.3 G/DL (ref 12–16)
HGB UR QL STRIP: NEGATIVE
IMM GRANULOCYTES # BLD AUTO: 0.07 K/UL (ref 0–0.04)
IMM GRANULOCYTES NFR BLD AUTO: 1 % (ref 0–0.5)
KETONES UR QL STRIP: ABNORMAL
LDH SERPL L TO P-CCNC: 410 U/L (ref 110–260)
LEUKOCYTE ESTERASE UR QL STRIP: NEGATIVE
LYMPHOCYTES # BLD AUTO: 1.2 K/UL (ref 1–4.8)
LYMPHOCYTES NFR BLD: 17.3 % (ref 18–48)
MCH RBC QN AUTO: 26.1 PG (ref 27–31)
MCHC RBC AUTO-ENTMCNC: 31.7 G/DL (ref 32–36)
MCV RBC AUTO: 82 FL (ref 82–98)
METHADONE UR QL SCN>300 NG/ML: NEGATIVE
MONOCYTES # BLD AUTO: 0.6 K/UL (ref 0.3–1)
MONOCYTES NFR BLD: 8.3 % (ref 4–15)
NEUTROPHILS # BLD AUTO: 4.9 K/UL (ref 1.8–7.7)
NEUTROPHILS NFR BLD: 72.6 % (ref 38–73)
NITRITE UR QL STRIP: NEGATIVE
NRBC BLD-RTO: 0 /100 WBC
OPIATES UR QL SCN: NEGATIVE
PCP UR QL SCN>25 NG/ML: NEGATIVE
PH UR STRIP: 5 [PH] (ref 5–8)
PLATELET # BLD AUTO: 210 K/UL (ref 150–350)
PMV BLD AUTO: 10.7 FL (ref 9.2–12.9)
POCT GLUCOSE: 119 MG/DL (ref 70–110)
POCT GLUCOSE: 148 MG/DL (ref 70–110)
POCT GLUCOSE: 249 MG/DL (ref 70–110)
POCT GLUCOSE: 254 MG/DL (ref 70–110)
POCT GLUCOSE: 271 MG/DL (ref 70–110)
POCT GLUCOSE: 42 MG/DL (ref 70–110)
POCT GLUCOSE: 47 MG/DL (ref 70–110)
POTASSIUM SERPL-SCNC: 4.3 MMOL/L (ref 3.5–5.1)
PROT SERPL-MCNC: 6.1 G/DL (ref 6–8.4)
PROT UR QL STRIP: NEGATIVE
PROT UR-MCNC: <7 MG/DL (ref 0–15)
PROT/CREAT UR: NORMAL MG/G{CREAT} (ref 0–0.2)
RBC # BLD AUTO: 3.95 M/UL (ref 4–5.4)
RETICS/RBC NFR AUTO: 0.9 % (ref 0.5–2.5)
SARS-COV-2 RDRP RESP QL NAA+PROBE: NEGATIVE
SODIUM SERPL-SCNC: 135 MMOL/L (ref 136–145)
SP GR UR STRIP: 1.01 (ref 1–1.03)
TOXICOLOGY INFORMATION: NORMAL
TSH SERPL DL<=0.005 MIU/L-ACNC: 1.07 UIU/ML (ref 0.4–4)
URN SPEC COLLECT METH UR: ABNORMAL
WBC # BLD AUTO: 6.76 K/UL (ref 3.9–12.7)

## 2020-12-16 PROCEDURE — 63600175 PHARM REV CODE 636 W HCPCS: Mod: HCNC | Performed by: HOSPITALIST

## 2020-12-16 PROCEDURE — 96372 THER/PROPH/DIAG INJ SC/IM: CPT | Mod: 59

## 2020-12-16 PROCEDURE — 85025 COMPLETE CBC W/AUTO DIFF WBC: CPT | Mod: HCNC

## 2020-12-16 PROCEDURE — 84443 ASSAY THYROID STIM HORMONE: CPT | Mod: HCNC

## 2020-12-16 PROCEDURE — 86162 COMPLEMENT TOTAL (CH50): CPT | Mod: HCNC

## 2020-12-16 PROCEDURE — G0378 HOSPITAL OBSERVATION PER HR: HCPCS | Mod: HCNC

## 2020-12-16 PROCEDURE — 25000003 PHARM REV CODE 250: Mod: HCNC | Performed by: PHYSICIAN ASSISTANT

## 2020-12-16 PROCEDURE — 99220 PR INITIAL OBSERVATION CARE,LEVL III: CPT | Mod: HCNC,,, | Performed by: PHYSICIAN ASSISTANT

## 2020-12-16 PROCEDURE — 83010 ASSAY OF HAPTOGLOBIN QUANT: CPT | Mod: HCNC

## 2020-12-16 PROCEDURE — 80053 COMPREHEN METABOLIC PANEL: CPT | Mod: HCNC

## 2020-12-16 PROCEDURE — 80320 DRUG SCREEN QUANTALCOHOLS: CPT | Mod: HCNC

## 2020-12-16 PROCEDURE — 99285 PR EMERGENCY DEPT VISIT,LEVEL V: ICD-10-PCS | Mod: HCNC,CS,, | Performed by: PHYSICIAN ASSISTANT

## 2020-12-16 PROCEDURE — 99203 PR OFFICE/OUTPT VISIT, NEW, LEVL III, 30-44 MIN: ICD-10-PCS | Mod: HCNC,,, | Performed by: PSYCHIATRY & NEUROLOGY

## 2020-12-16 PROCEDURE — 99203 OFFICE O/P NEW LOW 30 MIN: CPT | Mod: HCNC,,, | Performed by: PSYCHIATRY & NEUROLOGY

## 2020-12-16 PROCEDURE — 80307 DRUG TEST PRSMV CHEM ANLYZR: CPT | Mod: HCNC

## 2020-12-16 PROCEDURE — 99223 1ST HOSP IP/OBS HIGH 75: CPT | Mod: HCNC,GC,, | Performed by: INTERNAL MEDICINE

## 2020-12-16 PROCEDURE — 96374 THER/PROPH/DIAG INJ IV PUSH: CPT

## 2020-12-16 PROCEDURE — 63600175 PHARM REV CODE 636 W HCPCS: Mod: HCNC | Performed by: PHYSICIAN ASSISTANT

## 2020-12-16 PROCEDURE — 81003 URINALYSIS AUTO W/O SCOPE: CPT | Mod: HCNC,59

## 2020-12-16 PROCEDURE — 80329 ANALGESICS NON-OPIOID 1 OR 2: CPT | Mod: HCNC

## 2020-12-16 PROCEDURE — 84156 ASSAY OF PROTEIN URINE: CPT | Mod: HCNC

## 2020-12-16 PROCEDURE — 86225 DNA ANTIBODY NATIVE: CPT | Mod: HCNC

## 2020-12-16 PROCEDURE — 83516 IMMUNOASSAY NONANTIBODY: CPT | Mod: HCNC

## 2020-12-16 PROCEDURE — 86140 C-REACTIVE PROTEIN: CPT | Mod: HCNC

## 2020-12-16 PROCEDURE — U0002 COVID-19 LAB TEST NON-CDC: HCPCS | Mod: HCNC | Performed by: EMERGENCY MEDICINE

## 2020-12-16 PROCEDURE — 36415 COLL VENOUS BLD VENIPUNCTURE: CPT | Mod: HCNC

## 2020-12-16 PROCEDURE — 99223 PR INITIAL HOSPITAL CARE,LEVL III: ICD-10-PCS | Mod: HCNC,GC,, | Performed by: INTERNAL MEDICINE

## 2020-12-16 PROCEDURE — 86880 COOMBS TEST DIRECT: CPT | Mod: HCNC

## 2020-12-16 PROCEDURE — 83520 IMMUNOASSAY QUANT NOS NONAB: CPT | Mod: HCNC

## 2020-12-16 PROCEDURE — 83615 LACTATE (LD) (LDH) ENZYME: CPT | Mod: HCNC

## 2020-12-16 PROCEDURE — 83516 IMMUNOASSAY NONANTIBODY: CPT | Mod: 59,HCNC

## 2020-12-16 PROCEDURE — 85045 AUTOMATED RETICULOCYTE COUNT: CPT | Mod: HCNC

## 2020-12-16 PROCEDURE — 86160 COMPLEMENT ANTIGEN: CPT | Mod: HCNC

## 2020-12-16 PROCEDURE — C9399 UNCLASSIFIED DRUGS OR BIOLOG: HCPCS | Mod: HCNC | Performed by: PHYSICIAN ASSISTANT

## 2020-12-16 PROCEDURE — 99285 EMERGENCY DEPT VISIT HI MDM: CPT | Mod: HCNC,CS,, | Performed by: PHYSICIAN ASSISTANT

## 2020-12-16 PROCEDURE — 85384 FIBRINOGEN ACTIVITY: CPT | Mod: HCNC

## 2020-12-16 PROCEDURE — 86160 COMPLEMENT ANTIGEN: CPT | Mod: 59,HCNC

## 2020-12-16 PROCEDURE — 99285 EMERGENCY DEPT VISIT HI MDM: CPT | Mod: 25,HCNC

## 2020-12-16 PROCEDURE — 11000001 HC ACUTE MED/SURG PRIVATE ROOM: Mod: HCNC

## 2020-12-16 PROCEDURE — 85652 RBC SED RATE AUTOMATED: CPT | Mod: HCNC

## 2020-12-16 PROCEDURE — 99220 PR INITIAL OBSERVATION CARE,LEVL III: ICD-10-PCS | Mod: HCNC,,, | Performed by: PHYSICIAN ASSISTANT

## 2020-12-16 PROCEDURE — 25000003 PHARM REV CODE 250: Mod: HCNC | Performed by: PSYCHIATRY & NEUROLOGY

## 2020-12-16 PROCEDURE — 84238 ASSAY NONENDOCRINE RECEPTOR: CPT | Mod: HCNC

## 2020-12-16 RX ORDER — HALOPERIDOL 5 MG/ML
2 INJECTION INTRAMUSCULAR EVERY 6 HOURS PRN
Status: DISCONTINUED | OUTPATIENT
Start: 2020-12-16 | End: 2020-12-16

## 2020-12-16 RX ORDER — PREGABALIN 75 MG/1
75 CAPSULE ORAL 2 TIMES DAILY
Status: DISCONTINUED | OUTPATIENT
Start: 2020-12-16 | End: 2020-12-18 | Stop reason: HOSPADM

## 2020-12-16 RX ORDER — ACETAMINOPHEN 325 MG/1
650 TABLET ORAL EVERY 4 HOURS PRN
Status: DISCONTINUED | OUTPATIENT
Start: 2020-12-16 | End: 2020-12-18 | Stop reason: HOSPADM

## 2020-12-16 RX ORDER — ASPIRIN 81 MG/1
81 TABLET ORAL DAILY
Status: DISCONTINUED | OUTPATIENT
Start: 2020-12-16 | End: 2020-12-18 | Stop reason: HOSPADM

## 2020-12-16 RX ORDER — IBUPROFEN 200 MG
16 TABLET ORAL
Status: DISCONTINUED | OUTPATIENT
Start: 2020-12-16 | End: 2020-12-18 | Stop reason: HOSPADM

## 2020-12-16 RX ORDER — ONDANSETRON 8 MG/1
8 TABLET, ORALLY DISINTEGRATING ORAL EVERY 8 HOURS PRN
Status: DISCONTINUED | OUTPATIENT
Start: 2020-12-16 | End: 2020-12-18 | Stop reason: HOSPADM

## 2020-12-16 RX ORDER — TALC
6 POWDER (GRAM) TOPICAL NIGHTLY PRN
Status: DISCONTINUED | OUTPATIENT
Start: 2020-12-16 | End: 2020-12-18 | Stop reason: HOSPADM

## 2020-12-16 RX ORDER — HYDROCHLOROTHIAZIDE 12.5 MG/1
12.5 TABLET ORAL DAILY
Status: DISCONTINUED | OUTPATIENT
Start: 2020-12-16 | End: 2020-12-16

## 2020-12-16 RX ORDER — IPRATROPIUM BROMIDE AND ALBUTEROL SULFATE 2.5; .5 MG/3ML; MG/3ML
3 SOLUTION RESPIRATORY (INHALATION) EVERY 4 HOURS PRN
Status: DISCONTINUED | OUTPATIENT
Start: 2020-12-16 | End: 2020-12-18 | Stop reason: HOSPADM

## 2020-12-16 RX ORDER — FOLIC ACID 1 MG/1
1 TABLET ORAL DAILY
Status: DISCONTINUED | OUTPATIENT
Start: 2020-12-16 | End: 2020-12-18 | Stop reason: HOSPADM

## 2020-12-16 RX ORDER — GLUCAGON 1 MG
1 KIT INJECTION
Status: DISCONTINUED | OUTPATIENT
Start: 2020-12-16 | End: 2020-12-18 | Stop reason: HOSPADM

## 2020-12-16 RX ORDER — DIVALPROEX SODIUM 250 MG/1
250 TABLET, DELAYED RELEASE ORAL 2 TIMES DAILY
Status: DISCONTINUED | OUTPATIENT
Start: 2020-12-16 | End: 2020-12-16

## 2020-12-16 RX ORDER — OLANZAPINE 2.5 MG/1
2.5 TABLET ORAL EVERY 4 HOURS PRN
Status: DISCONTINUED | OUTPATIENT
Start: 2020-12-16 | End: 2020-12-18 | Stop reason: HOSPADM

## 2020-12-16 RX ORDER — LOSARTAN POTASSIUM 50 MG/1
100 TABLET ORAL DAILY
Status: DISCONTINUED | OUTPATIENT
Start: 2020-12-16 | End: 2020-12-16

## 2020-12-16 RX ORDER — FAMOTIDINE 20 MG/1
20 TABLET, FILM COATED ORAL 2 TIMES DAILY
Status: DISCONTINUED | OUTPATIENT
Start: 2020-12-16 | End: 2020-12-18 | Stop reason: HOSPADM

## 2020-12-16 RX ORDER — TRAMADOL HYDROCHLORIDE 50 MG/1
50 TABLET ORAL EVERY 12 HOURS PRN
Status: DISCONTINUED | OUTPATIENT
Start: 2020-12-16 | End: 2020-12-18 | Stop reason: HOSPADM

## 2020-12-16 RX ORDER — DIVALPROEX SODIUM 250 MG/1
250 TABLET, DELAYED RELEASE ORAL NIGHTLY
Status: DISCONTINUED | OUTPATIENT
Start: 2020-12-16 | End: 2020-12-18 | Stop reason: HOSPADM

## 2020-12-16 RX ORDER — ENOXAPARIN SODIUM 100 MG/ML
40 INJECTION SUBCUTANEOUS EVERY 24 HOURS
Status: DISCONTINUED | OUTPATIENT
Start: 2020-12-16 | End: 2020-12-18 | Stop reason: HOSPADM

## 2020-12-16 RX ORDER — PREDNISONE 10 MG/1
10 TABLET ORAL DAILY
Status: DISCONTINUED | OUTPATIENT
Start: 2020-12-16 | End: 2020-12-16

## 2020-12-16 RX ORDER — IBUPROFEN 200 MG
24 TABLET ORAL
Status: DISCONTINUED | OUTPATIENT
Start: 2020-12-16 | End: 2020-12-18 | Stop reason: HOSPADM

## 2020-12-16 RX ORDER — BISACODYL 10 MG
10 SUPPOSITORY, RECTAL RECTAL DAILY PRN
Status: DISCONTINUED | OUTPATIENT
Start: 2020-12-16 | End: 2020-12-18 | Stop reason: HOSPADM

## 2020-12-16 RX ORDER — AMLODIPINE BESYLATE 10 MG/1
10 TABLET ORAL DAILY
Status: DISCONTINUED | OUTPATIENT
Start: 2020-12-16 | End: 2020-12-16

## 2020-12-16 RX ORDER — ATORVASTATIN CALCIUM 20 MG/1
40 TABLET, FILM COATED ORAL DAILY
Status: DISCONTINUED | OUTPATIENT
Start: 2020-12-16 | End: 2020-12-18 | Stop reason: HOSPADM

## 2020-12-16 RX ORDER — INSULIN ASPART 100 [IU]/ML
9 INJECTION, SOLUTION INTRAVENOUS; SUBCUTANEOUS
Status: DISCONTINUED | OUTPATIENT
Start: 2020-12-16 | End: 2020-12-17

## 2020-12-16 RX ORDER — INSULIN ASPART 100 [IU]/ML
1-10 INJECTION, SOLUTION INTRAVENOUS; SUBCUTANEOUS
Status: DISCONTINUED | OUTPATIENT
Start: 2020-12-16 | End: 2020-12-17

## 2020-12-16 RX ORDER — POLYETHYLENE GLYCOL 3350 17 G/17G
17 POWDER, FOR SOLUTION ORAL DAILY
Status: DISCONTINUED | OUTPATIENT
Start: 2020-12-16 | End: 2020-12-18 | Stop reason: HOSPADM

## 2020-12-16 RX ORDER — SODIUM CHLORIDE 0.9 % (FLUSH) 0.9 %
5 SYRINGE (ML) INJECTION
Status: DISCONTINUED | OUTPATIENT
Start: 2020-12-16 | End: 2020-12-18 | Stop reason: HOSPADM

## 2020-12-16 RX ADMIN — HYDROCHLOROTHIAZIDE 12.5 MG: 12.5 TABLET ORAL at 09:12

## 2020-12-16 RX ADMIN — POLYETHYLENE GLYCOL 3350 17 G: 17 POWDER, FOR SOLUTION ORAL at 09:12

## 2020-12-16 RX ADMIN — SODIUM CHLORIDE, SODIUM LACTATE, POTASSIUM CHLORIDE, AND CALCIUM CHLORIDE 500 ML: .6; .31; .03; .02 INJECTION, SOLUTION INTRAVENOUS at 11:12

## 2020-12-16 RX ADMIN — SODIUM CHLORIDE, SODIUM LACTATE, POTASSIUM CHLORIDE, AND CALCIUM CHLORIDE 500 ML: .6; .31; .03; .02 INJECTION, SOLUTION INTRAVENOUS at 01:12

## 2020-12-16 RX ADMIN — INSULIN DETEMIR 37 UNITS: 100 INJECTION, SOLUTION SUBCUTANEOUS at 09:12

## 2020-12-16 RX ADMIN — ATORVASTATIN CALCIUM 40 MG: 20 TABLET, FILM COATED ORAL at 09:12

## 2020-12-16 RX ADMIN — PREGABALIN 75 MG: 75 CAPSULE ORAL at 09:12

## 2020-12-16 RX ADMIN — DEXTROSE MONOHYDRATE 25 G: 25 INJECTION, SOLUTION INTRAVENOUS at 09:12

## 2020-12-16 RX ADMIN — AMLODIPINE BESYLATE 10 MG: 10 TABLET ORAL at 09:12

## 2020-12-16 RX ADMIN — INSULIN ASPART 4 UNITS: 100 INJECTION, SOLUTION INTRAVENOUS; SUBCUTANEOUS at 09:12

## 2020-12-16 RX ADMIN — SODIUM CHLORIDE, SODIUM LACTATE, POTASSIUM CHLORIDE, AND CALCIUM CHLORIDE 1000 ML: .6; .31; .03; .02 INJECTION, SOLUTION INTRAVENOUS at 04:12

## 2020-12-16 RX ADMIN — SODIUM CHLORIDE, SODIUM LACTATE, POTASSIUM CHLORIDE, AND CALCIUM CHLORIDE 1000 ML: .6; .31; .03; .02 INJECTION, SOLUTION INTRAVENOUS at 06:12

## 2020-12-16 RX ADMIN — ASPIRIN 81 MG: 81 TABLET, COATED ORAL at 09:12

## 2020-12-16 RX ADMIN — FAMOTIDINE 20 MG: 20 TABLET, FILM COATED ORAL at 09:12

## 2020-12-16 RX ADMIN — ENOXAPARIN SODIUM 40 MG: 40 INJECTION SUBCUTANEOUS at 05:12

## 2020-12-16 RX ADMIN — FOLIC ACID 1 MG: 1 TABLET ORAL at 09:12

## 2020-12-16 RX ADMIN — INSULIN ASPART 9 UNITS: 100 INJECTION, SOLUTION INTRAVENOUS; SUBCUTANEOUS at 05:12

## 2020-12-16 RX ADMIN — LOSARTAN POTASSIUM 100 MG: 50 TABLET, FILM COATED ORAL at 09:12

## 2020-12-16 RX ADMIN — INSULIN ASPART 9 UNITS: 100 INJECTION, SOLUTION INTRAVENOUS; SUBCUTANEOUS at 01:12

## 2020-12-16 RX ADMIN — INSULIN ASPART 9 UNITS: 100 INJECTION, SOLUTION INTRAVENOUS; SUBCUTANEOUS at 09:12

## 2020-12-16 RX ADMIN — INSULIN ASPART 6 UNITS: 100 INJECTION, SOLUTION INTRAVENOUS; SUBCUTANEOUS at 01:12

## 2020-12-16 RX ADMIN — DIVALPROEX SODIUM 250 MG: 250 TABLET, DELAYED RELEASE ORAL at 09:12

## 2020-12-16 NOTE — ASSESSMENT & PLAN NOTE
- hold home V-GO, metformin, tresiba  - start weight based insulin regimen: detemir 37U, aspart 9U TIDWM, moderate dose SSI  -  on admission, uncontrolled  - diabetic diet  Lab Results   Component Value Date    HGBA1C 9.3 (H) 10/09/2020

## 2020-12-16 NOTE — SUBJECTIVE & OBJECTIVE
Past Medical History:   Diagnosis Date    Allergy     Anemia     Anxiety     Arthritis     Breast cyst     Cataract     DR (diabetic retinopathy)     Dyslipidemia     Essential hypertension 2012    Gastroesophageal reflux disease without esophagitis 2/3/2017    GERD (gastroesophageal reflux disease)     Glaucoma     Hypertension     Obesity (BMI 30-39.9) 10/24/2013    PN (peripheral neuropathy)     Sleep apnea     Type 2 diabetes mellitus with both eyes affected by mild nonproliferative retinopathy without macular edema, with long-term current use of insulin     Type 2 diabetes mellitus with diabetic polyneuropathy, with long-term current use of insulin     Type II or unspecified type diabetes mellitus with other specified manifestations, uncontrolled        Past Surgical History:   Procedure Laterality Date    BREAST CYST EXCISION Right     1980s    CARPAL TUNNEL RELEASE Right     CATARACT EXTRACTION      CATARACT EXTRACTION W/  INTRAOCULAR LENS IMPLANT Right 2017    Dr Brewster     SECTION      EPIDURAL STEROID INJECTION N/A 2019    Procedure: Injection, Steroid, Epidural LUMBAR/CAUDAL L5-S1 IL KARLA;  Surgeon: Jef García MD;  Location: [facility];  Service: Pain Management;  Laterality: N/A;  NEEDS CONSENT    EPIDURAL STEROID INJECTION N/A 2019    Procedure: Injection, Steroid, Epidural LUMBAR/CAUDAL L5-S1 IL KARLA;  Surgeon: Jef García MD;  Location: [facility];  Service: Pain Management;  Laterality: N/A;  NEEDS CONSENT    KNEE ARTHROSCOPY  14    right    MYOMECTOMY      TRANSFORAMINAL EPIDURAL INJECTION OF STEROID Right 2019    Procedure: LUMBAR TRANSFORAMINAL RIGHT L5-S1  TF KARLA;  Surgeon: Jef García MD;  Location: [facility];  Service: Pain Management;  Laterality: Right;  NEEDS CONSENT    TRIGGER FINGER RELEASE      TRIGGER FINGER RELEASE Left 4/15/2019    Procedure: RELEASE, TRIGGER FINGER left thumb;  Surgeon:  "Yuridia Gonzales MD;  Location: [facility];  Service: Orthopedics;  Laterality: Left;  stretcher, supine, hand pan 1 and pan 2       Review of patient's allergies indicates:   Allergen Reactions    Keflex [cephalexin] Rash      blisters, fever    Penicillins Itching    Sulfamethoxazole-trimethoprim      Other reaction(s): blisters    Vicodin [hydrocodone-acetaminophen] Swelling    Sulfa (sulfonamide antibiotics) Rash      blisters,fever           No current facility-administered medications on file prior to encounter.      Current Outpatient Medications on File Prior to Encounter   Medication Sig    amLODIPine (NORVASC) 10 MG tablet TAKE 1 TABLET BY MOUTH EVERY DAY (Patient not taking: Reported on 12/11/2020)    aspirin (ECOTRIN) 81 MG EC tablet Take 81 mg by mouth once daily. Instructed to stop 1 week prior to surgery on 4/15/19 per Dr. Gonzales    atorvastatin (LIPITOR) 40 MG tablet TAKE 1 TABLET(40 MG) BY MOUTH EVERY DAY    BD ULTRA-FINE SHORT PEN NEEDLE 31 gauge x 5/16" Ndle USE AS DIRECTED TWICE DAILY    blood sugar diagnostic Strp 1 strip by Misc.(Non-Drug; Combo Route) route 2 (two) times a day.    blood-glucose meter (ACCU-CHEK KENDRA PLUS METER) Misc 1 each by Misc.(Non-Drug; Combo Route) route 2 (two) times daily. Patient test blood sugar 2 times daily    blood-glucose meter Misc use twice a day    chlorhexidine (PERIDEX) 0.12 % solution SWISH FOR 30 SECONDS AND SPIT 15ML PO  BID . DO NOT SWALLOW    cholecalciferol, vitamin D3, 125 mcg (5,000 unit) Tab Take 5,000 Units by mouth once daily.    fluticasone propionate (FLONASE) 50 mcg/actuation nasal spray 1 spray by Each Nostril route once daily.    folic acid (FOLVITE) 1 MG tablet Take 1 tablet (1 mg total) by mouth once daily.    hydroCHLOROthiazide (HYDRODIURIL) 12.5 MG Tab Take 1 tablet (12.5 mg total) by mouth once daily.    insulin degludec (TRESIBA FLEXTOUCH U-100) 100 unit/mL (3 mL) InPn Inject 36 Units into the skin once daily. "    insulin aspart U-100 (NOVOLOG U-100 INSULIN ASPART) 100 unit/mL injection Inject 70 Units into the skin continuous. use with vgo 30 (Patient not taking: Reported on 12/11/2020)    lancets (ACCU-CHEK FASTCLIX LANCET DRUM) Misc Inject 1 each into the skin 2 (two) times daily before meals.    lancets 33 gauge Misc 1 lancet by Misc.(Non-Drug; Combo Route) route 2 (two) times daily before meals.    lidocaine HCL 2% (XYLOCAINE) 2 % jelly Apply topically as needed. Apply topically once nightly to affected part of foot/feet.    losartan (COZAAR) 100 MG tablet TAKE 1 TABLET BY MOUTH EVERY DAY    MAGNESIUM ORAL Take 500 mg by mouth.    metFORMIN (GLUCOPHAGE) 500 MG tablet TAKE 2 TABLETS BY MOUTH AT LUNCH AND 1 TABLET AT DINNER    methotrexate 2.5 MG Tab Take 4 tablets (10 mg total) by mouth every 7 days.    oxybutynin (DITROPAN) 5 MG Tab Take 1 tablet (5 mg total) by mouth 3 (three) times daily. (Patient not taking: Reported on 12/11/2020)    predniSONE (DELTASONE) 10 MG tablet Take 2 tablets (20 mg total) by mouth 2 (two) times a day.    pregabalin (LYRICA) 75 MG capsule Take 1 capsule (75 mg total) by mouth 2 (two) times daily.    sub-q insulin device, 30 unit (V-GO 30) Fariba 1 Device by Misc.(Non-Drug; Combo Route) route once daily. (Patient not taking: Reported on 12/11/2020)    traMADoL (ULTRAM) 50 mg tablet Take 1 tablet (50 mg total) by mouth every 12 (twelve) hours as needed. M54.40 Greater than 7 day supply is medically necessary     Family History     Problem Relation (Age of Onset)    Arthritis Mother    Breast cancer Mother (75), Maternal Grandmother (50), Sister (50), Cousin (40), Cousin (40), Sister (40), Sister (54)    Cancer Mother (70)    Diabetes Mother,     Heart disease Mother, Sister    Heart failure Brother    Miscarriages / Stillbirths Mother    No Known Problems Father, Maternal Aunt, Maternal Uncle, Paternal Aunt, Paternal Uncle, Maternal Grandfather, Paternal Grandmother, Paternal  Grandfather    Osteoarthritis Sister, Sister    Pacemaker/defibrilator Brother    Vision loss Mother        Tobacco Use    Smoking status: Former Smoker     Start date: 12/9/2002    Smokeless tobacco: Never Used   Substance and Sexual Activity    Alcohol use: Yes     Frequency: Monthly or less     Drinks per session: 1 or 2     Binge frequency: Never     Comment: socially; couple glasses    Drug use: No    Sexual activity: Not Currently     Partners: Male     Birth control/protection: None     Review of Systems   Unable to perform ROS: Psychiatric disorder     Objective:     Vital Signs (Most Recent):  Temp: 98.5 °F (36.9 °C) (12/16/20 0517)  Pulse: 89 (12/16/20 0517)  Resp: 18 (12/16/20 0241)  BP: (!) 153/67 (12/16/20 0517)  SpO2: 99 % (12/16/20 0517) Vital Signs (24h Range):  Temp:  [98.5 °F (36.9 °C)-98.9 °F (37.2 °C)] 98.5 °F (36.9 °C)  Pulse:  [89-93] 89  Resp:  [18] 18  SpO2:  [98 %-99 %] 99 %  BP: (101-153)/(60-67) 153/67     Weight: 93.5 kg (206 lb 2.1 oz)  Body mass index is 34.3 kg/m².    Physical Exam  Vitals signs and nursing note reviewed.   Constitutional:       Appearance: Normal appearance.   HENT:      Head: Normocephalic and atraumatic.      Mouth/Throat:      Mouth: Mucous membranes are dry.   Eyes:      General: No scleral icterus.     Extraocular Movements: Extraocular movements intact.      Conjunctiva/sclera:      Right eye: Right conjunctiva is injected.      Left eye: Left conjunctiva is injected.   Neck:      Musculoskeletal: Normal range of motion and neck supple.   Cardiovascular:      Rate and Rhythm: Normal rate and regular rhythm.      Pulses: Normal pulses.      Heart sounds: Normal heart sounds.   Pulmonary:      Effort: Pulmonary effort is normal. No respiratory distress.      Breath sounds: Normal breath sounds. No rales.   Abdominal:      General: Abdomen is flat. There is no distension.      Palpations: Abdomen is soft.      Tenderness: There is no abdominal tenderness.    Musculoskeletal: Normal range of motion.      Right lower leg: No edema.      Left lower leg: No edema.   Skin:     General: Skin is warm and dry.      Coloration: Skin is not jaundiced.   Neurological:      General: No focal deficit present.      Mental Status: She is alert.   Psychiatric:         Attention and Perception: She is inattentive.         Mood and Affect: Mood is anxious. Affect is labile and inappropriate.         Speech: Speech is rapid and pressured and tangential.         Behavior: Behavior is agitated.         Thought Content: Thought content does not include homicidal or suicidal ideation. Thought content does not include homicidal or suicidal plan.         Cognition and Memory: Cognition is not impaired. Memory is not impaired.         Judgment: Judgment is impulsive and inappropriate.             Significant Labs:   CBC:   Recent Labs   Lab 12/16/20 0329   WBC 6.76   HGB 10.3*   HCT 32.5*        CMP:   Recent Labs   Lab 12/16/20 0329   *   K 4.3   CL 99   CO2 23   *   BUN 21   CREATININE 1.1   CALCIUM 8.9   PROT 6.1   ALBUMIN 3.0*   BILITOT 1.0   ALKPHOS 64   AST 67*   ALT 51*   ANIONGAP 13   EGFRNONAA 52.4*     Cardiac Markers: No results for input(s): CKMB, MYOGLOBIN, BNP, TROPISTAT in the last 48 hours.  Lactic Acid: No results for input(s): LACTATE in the last 48 hours.  Troponin: No results for input(s): TROPONINI in the last 48 hours.  TSH:   Recent Labs   Lab 12/16/20 0329   TSH 1.072     Urine Culture: No results for input(s): LABURIN in the last 48 hours.  Urine Studies: No results for input(s): COLORU, APPEARANCEUA, PHUR, SPECGRAV, PROTEINUA, GLUCUA, KETONESU, BILIRUBINUA, OCCULTUA, NITRITE, UROBILINOGEN, LEUKOCYTESUR, RBCUA, WBCUA, BACTERIA, SQUAMEPITHEL, HYALINECASTS in the last 48 hours.    Invalid input(s): AIDA    Significant Imaging: I have reviewed all pertinent imaging results/findings within the past 24 hours.

## 2020-12-16 NOTE — ED NOTES
Patient's belongings:     2 pairs of shoes  1 Laptop   1 Coat  1 Dress   1 purse  Paperwork in laptop case

## 2020-12-16 NOTE — HPI
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 The patient states she misunderstood taper instructions for her prednisone and stopped cold turkey the Friday before Thanksgiving. She then showed up the ED shortly after Thanksgiving "completely shut down". She was given fluids and told to resume prednisone 10 mg daily. She still didn't and wanted to continue to "flush her system". She hasn't been taking prednisone since. She says her daughter and her haven't been getting along since she retired last year, and for some reason she changed the locks in the house. She attributes it to their 40 year age difference. 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. She is easily flustered and tangential, but redirectable. She otherwise has been in her usual state of health - no fevers, chills, CP, NVD, abdominal pain, dysuria. She says she feels improved with ED interventions.      Per ED:  "65-year-old female to the ER via EMS.  EMS reports that the patient call 911.  When they arrived on the scene the patient was acting erratically.  The patient states that she called EMS because her blood pressure was elevated, her blood glucose was elevated, patient then began 10 aggressively towards EMS.  The patient became erratic and compulsive.  EMS states that the patient was not making any sense.  Upon arrival to our facility the patient is yelling obscenities.  The patient's speech appears pressured and she appears delusional.     Per chart review the patient was recently diagnosed with autoimmune scleritis and was started on prednisone a couple of weeks ago.  Since that time she has lost significant weight.  The daughter has contacted PCPs office multiple time due to patient agitation.  PCP concerned about prednisone induced psychosis and the patient has an appointment to see psychiatry but not " "until next month.  The daughter states that the past couple of weeks have been very difficult with her mother secondary to a mother becoming more agitated and aggressive.  The mother has been accusing her of different things.  The mother has not been making any sense.  The mother has kicked her out the house.  She has not been eating or drinking and has been noncompliant with her medications."      On my evaluation today, patient is very drowsy and hard to obtain history from.  She will wake and answer questions although sometimes answers are non-sensible.  Drifts back to sleep very easily.  "

## 2020-12-16 NOTE — TELEPHONE ENCOUNTER
----- Message from Mila Stacy sent at 12/16/2020 11:25 AM CST -----  Contact: Daughter   Patients daughter would like Dr. Márquez to know that the patient was admitted to [facility] last night.

## 2020-12-16 NOTE — HPI
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 The patient states she misunderstood taper instructions for her prednisone and stopped cold turkey the Friday before Thanksgiving. She then showed up the ED shortly after Thanksgiving "completely shut down". She was given fluids and told to resume prednisone 10 mg daily. She still didn't and wanted to continue to "flush her system". She hasn't been taking prednisone since. She says her daughter and her haven't been getting along since she retired last year, and for some reason she changed the locks in the house. She attributes it to their 40 year age difference. 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 She is easily flustered and tangential, but redirectable. She otherwise has been in her usual state of health - no fevers, chills, CP, NVD, abdominal pain, dysuria. She says she feels improved with ED interventions.     Per ED:  "65-year-old female to the ER via EMS.  EMS reports that the patient call 911.  When they arrived on the scene the patient was acting erratically.  The patient states that she called EMS because her blood pressure was elevated, her blood glucose was elevated, patient then began 10 aggressively towards EMS.  The patient became erratic and compulsive.  EMS states that the patient was not making any sense.  Upon arrival to our facility the patient is yelling obscenities.  The patient's speech appears pressured and she appears delusional.     Per chart review the patient was recently diagnosed with autoimmune scleritis and was started on prednisone a couple of weeks ago.  Since that time she has lost significant weight.  The daughter has contacted PCPs office multiple time due to patient agitation.  PCP concerned about prednisone induced psychosis and the patient has an appointment to see psychiatry but not until next month.  " "The daughter states that the past couple of weeks have been very difficult with her mother secondary to a mother becoming more agitated and aggressive.  The mother has been accusing her of different things.  The mother has not been making any sense.  The mother has kicked her out the house.  She has not been eating or drinking and has been noncompliant with her medications."  "

## 2020-12-16 NOTE — ASSESSMENT & PLAN NOTE
Patient with initial development of left eye redness and pain behind eye in February 2020. Took 4 different drops without improvement.   Dr. Batista optometrist sent to Dr. 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  Right eye became involved end of August.   She was given 40 mg prednisone with weekly taper for 3 weeks.  This helped but eye never cleared.  One month after finishing prednisone pain returned.  She was given more eye drops.   Dr. Wagner did labs and told her it was an autoimmune issue.  - Seen by Dr. Xiong on 10/6/2020  - Has been continued on steroid taper  - Now presenting after being off steroids cold turkey for at least a couple weeks and with psychotic behavior  - Patient is currently PEC'd  Psych consulted  - Rheumatology consulted for further recs regarding steroids  - Will discuss with staff plan for steroids going forward

## 2020-12-16 NOTE — ASSESSMENT & PLAN NOTE
"- per chart review (Dr. Márquez 12/12/20), some concern of underlying psychiatric condition prior to starting steroids, worsened since starting prednisone  - per telephone encounter 11/20/20 with Dr. Quintero, "Patient instructed to decreased from 20 mg to 10 mg." But patient stopped cold turkey  - hold on resuming prednisone for now  - patient PEC'd  - psych consulted with appreciation  - start haldol PRN  - rheumatology consulted for recommendations on steroids  - prior trial of MTX, but developed thrush and subsequently stopped  "
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
## 2020-12-16 NOTE — PROGRESS NOTES
Notified Dr. Mace, pt having hypotensive episode.  BP manually reading 74/40.  Administering 1L LR bolus.  Will recheck BP and document.

## 2020-12-16 NOTE — ASSESSMENT & PLAN NOTE
ASSESSMENT     Adriana Paula is a 65 y.o. female with a past psychiatric history of no known diagnosis, currently presenting with Psychosis.  Psychiatry was originally consulted to address the patient's symptoms of steroid induced gabe vs psychosis. Given recent use of prednisone which has a high correlation with psychosis/gabe LAURI combined with all of sudden psych s/sx in a pt with no past hx there is a high suspicion for steroid induce hypomania with psychotic features. Further pt could possibly have underlying bipolar that is usually well managed on her own that has now surfaced 2/2 to recent stress of steroids. Will need time and further collateral to clarify if true organic illness exists.    IMPRESSION  Steroid induced hypomania with psychotic features    RECOMMENDATION(S)      1. Scheduled Medication(s):  Depakote 250mg BID; 1st dose tonight    2. PRN Medication(s):  Zyprexa 2.5 mg PO or IM Q as needed without exceeding 30mg in 24 hrs; cannot be given within 1hr of a benzo    3.  Monitor:  Please obtain daily EKG to monitor QTc    4. Legal Status/Precaution(s):  Continue PEC as pt is acutely gravely disabled.

## 2020-12-16 NOTE — H&P
"[facility] Medicine  History & Physical    Patient Name: Adriana Paula  MRN: [id number]  Admission Date: 12/16/2020  Attending Physician: Shedlon Mace MD   Primary Care Provider: Nicole Márquez MD    [facility] Medicine Team: [facility] N Papa Armstrong PA-C     Patient information was obtained from patient, past medical records and ER records.     Subjective:     Principal Problem:Psychosis    Chief Complaint:   Chief Complaint   Patient presents with    Multiple Complaints     Patient states that she has AKF. States that she has been on prednisone, states that the prednisone has made her psychotic. Per EMS, patient was rolling around in the rain in her front yard.         HPI: Adriana Paula is a 65F with bilateral episcleritis on prednisone, HTN, IDDM, HLD, MARINA, chronic pain, GERD, who presents for evaluation of psychosis. The patient states she misunderstood taper instructions for her prednisone and stopped cold turkey the Friday before Thanksgiving. She then showed up the ED shortly after Thanksgiving "completely shut down". She was given fluids and told to resume prednisone 10 mg daily. She still didn't and wanted to continue to "flush her system". She hasn't been taking prednisone since. She says her daughter and her haven't been getting along since she retired last year, and for some reason she changed the locks in the house. She attributes it to their 40 year age difference. 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 She is easily flustered and tangential, but redirectable. She otherwise has been in her usual state of health - no fevers, chills, CP, NVD, abdominal pain, dysuria. She says she feels improved with ED interventions.     Per ED:  "65-year-old female to the ER via EMS.  EMS reports that the patient call 911.  When they arrived on the scene the patient was acting erratically.  The patient states that she called EMS because her blood " "pressure was elevated, her blood glucose was elevated, patient then began 10 aggressively towards EMS.  The patient became erratic and compulsive.  EMS states that the patient was not making any sense.  Upon arrival to our facility the patient is yelling obscenities.  The patient's speech appears pressured and she appears delusional.     Per chart review the patient was recently diagnosed with autoimmune scleritis and was started on prednisone a couple of weeks ago.  Since that time she has lost significant weight.  The daughter has contacted PCPs office multiple time due to patient agitation.  PCP concerned about prednisone induced psychosis and the patient has an appointment to see psychiatry but not until next month.  The daughter states that the past couple of weeks have been very difficult with her mother secondary to a mother becoming more agitated and aggressive.  The mother has been accusing her of different things.  The mother has not been making any sense.  The mother has kicked her out the house.  She has not been eating or drinking and has been noncompliant with her medications."    Past Medical History:   Diagnosis Date    Allergy     Anemia     Anxiety     Arthritis     Breast cyst     Cataract     DR (diabetic retinopathy)     Dyslipidemia     Essential hypertension 8/1/2012    Gastroesophageal reflux disease without esophagitis 2/3/2017    GERD (gastroesophageal reflux disease)     Glaucoma     Hypertension     Obesity (BMI 30-39.9) 10/24/2013    PN (peripheral neuropathy)     Sleep apnea     Type 2 diabetes mellitus with both eyes affected by mild nonproliferative retinopathy without macular edema, with long-term current use of insulin     Type 2 diabetes mellitus with diabetic polyneuropathy, with long-term current use of insulin     Type II or unspecified type diabetes mellitus with other specified manifestations, uncontrolled        Past Surgical History:   Procedure Laterality " Date    BREAST CYST EXCISION Right     1980s    CARPAL TUNNEL RELEASE Right     CATARACT EXTRACTION      CATARACT EXTRACTION W/  INTRAOCULAR LENS IMPLANT Right 2017    Dr Brewster     SECTION      EPIDURAL STEROID INJECTION N/A 2019    Procedure: Injection, Steroid, Epidural LUMBAR/CAUDAL L5-S1 IL KARLA;  Surgeon: Jef García MD;  Location: [facility] PAIN MGT;  Service: Pain Management;  Laterality: N/A;  NEEDS CONSENT    EPIDURAL STEROID INJECTION N/A 2019    Procedure: Injection, Steroid, Epidural LUMBAR/CAUDAL L5-S1 IL KARLA;  Surgeon: Jef García MD;  Location: [facility] PAIN MGT;  Service: Pain Management;  Laterality: N/A;  NEEDS CONSENT    KNEE ARTHROSCOPY  14    right    MYOMECTOMY      TRANSFORAMINAL EPIDURAL INJECTION OF STEROID Right 2019    Procedure: LUMBAR TRANSFORAMINAL RIGHT L5-S1  TF KARLA;  Surgeon: Jef García MD;  Location: [facility] PAIN MGT;  Service: Pain Management;  Laterality: Right;  NEEDS CONSENT    TRIGGER FINGER RELEASE      TRIGGER FINGER RELEASE Left 4/15/2019    Procedure: RELEASE, TRIGGER FINGER left thumb;  Surgeon: Yuridia Gonzales MD;  Location: [facility] OR;  Service: Orthopedics;  Laterality: Left;  stretcher, supine, hand pan 1 and pan 2       Review of patient's allergies indicates:   Allergen Reactions    Keflex [cephalexin] Rash      blisters, fever    Penicillins Itching    Sulfamethoxazole-trimethoprim      Other reaction(s): blisters    Vicodin [hydrocodone-acetaminophen] Swelling    Sulfa (sulfonamide antibiotics) Rash      blisters,fever           No current facility-administered medications on file prior to encounter.      Current Outpatient Medications on File Prior to Encounter   Medication Sig    amLODIPine (NORVASC) 10 MG tablet TAKE 1 TABLET BY MOUTH EVERY DAY (Patient not taking: Reported on 2020)    aspirin (ECOTRIN) 81 MG EC tablet Take 81 mg by mouth once daily. Instructed to stop 1 week prior to  "surgery on 4/15/19 per Dr. Conklin    atorvastatin (LIPITOR) 40 MG tablet TAKE 1 TABLET(40 MG) BY MOUTH EVERY DAY    BD ULTRA-FINE SHORT PEN NEEDLE 31 gauge x 5/16" Ndle USE AS DIRECTED TWICE DAILY    blood sugar diagnostic Strp 1 strip by Misc.(Non-Drug; Combo Route) route 2 (two) times a day.    blood-glucose meter (ACCU-CHEK KENDRA PLUS METER) Misc 1 each by Misc.(Non-Drug; Combo Route) route 2 (two) times daily. Patient test blood sugar 2 times daily    blood-glucose meter Misc use twice a day    chlorhexidine (PERIDEX) 0.12 % solution SWISH FOR 30 SECONDS AND SPIT 15ML PO  BID . DO NOT SWALLOW    cholecalciferol, vitamin D3, 125 mcg (5,000 unit) Tab Take 5,000 Units by mouth once daily.    fluticasone propionate (FLONASE) 50 mcg/actuation nasal spray 1 spray by Each Nostril route once daily.    folic acid (FOLVITE) 1 MG tablet Take 1 tablet (1 mg total) by mouth once daily.    hydroCHLOROthiazide (HYDRODIURIL) 12.5 MG Tab Take 1 tablet (12.5 mg total) by mouth once daily.    insulin degludec (TRESIBA FLEXTOUCH U-100) 100 unit/mL (3 mL) InPn Inject 36 Units into the skin once daily.    insulin aspart U-100 (NOVOLOG U-100 INSULIN ASPART) 100 unit/mL injection Inject 70 Units into the skin continuous. use with vgo 30 (Patient not taking: Reported on 12/11/2020)    lancets (ACCU-CHEK FASTCLIX LANCET DRUM) Misc Inject 1 each into the skin 2 (two) times daily before meals.    lancets 33 gauge Misc 1 lancet by Misc.(Non-Drug; Combo Route) route 2 (two) times daily before meals.    lidocaine HCL 2% (XYLOCAINE) 2 % jelly Apply topically as needed. Apply topically once nightly to affected part of foot/feet.    losartan (COZAAR) 100 MG tablet TAKE 1 TABLET BY MOUTH EVERY DAY    MAGNESIUM ORAL Take 500 mg by mouth.    metFORMIN (GLUCOPHAGE) 500 MG tablet TAKE 2 TABLETS BY MOUTH AT LUNCH AND 1 TABLET AT DINNER    methotrexate 2.5 MG Tab Take 4 tablets (10 mg total) by mouth every 7 days.    oxybutynin " (DITROPAN) 5 MG Tab Take 1 tablet (5 mg total) by mouth 3 (three) times daily. (Patient not taking: Reported on 12/11/2020)    predniSONE (DELTASONE) 10 MG tablet Take 2 tablets (20 mg total) by mouth 2 (two) times a day.    pregabalin (LYRICA) 75 MG capsule Take 1 capsule (75 mg total) by mouth 2 (two) times daily.    sub-q insulin device, 30 unit (V-GO 30) Fariba 1 Device by Misc.(Non-Drug; Combo Route) route once daily. (Patient not taking: Reported on 12/11/2020)    traMADoL (ULTRAM) 50 mg tablet Take 1 tablet (50 mg total) by mouth every 12 (twelve) hours as needed. M54.40 Greater than 7 day supply is medically necessary     Family History     Problem Relation (Age of Onset)    Arthritis Mother    Breast cancer Mother (75), Maternal Grandmother (50), Sister (50), Cousin (40), Cousin (40), Sister (40), Sister (54)    Cancer Mother (70)    Diabetes Mother,     Heart disease Mother, Sister    Heart failure Brother    Miscarriages / Stillbirths Mother    No Known Problems Father, Maternal Aunt, Maternal Uncle, Paternal Aunt, Paternal Uncle, Maternal Grandfather, Paternal Grandmother, Paternal Grandfather    Osteoarthritis Sister, Sister    Pacemaker/defibrilator Brother    Vision loss Mother        Tobacco Use    Smoking status: Former Smoker     Start date: 12/9/2002    Smokeless tobacco: Never Used   Substance and Sexual Activity    Alcohol use: Yes     Frequency: Monthly or less     Drinks per session: 1 or 2     Binge frequency: Never     Comment: socially; couple glasses    Drug use: No    Sexual activity: Not Currently     Partners: Male     Birth control/protection: None     Review of Systems   Unable to perform ROS: Psychiatric disorder     Objective:     Vital Signs (Most Recent):  Temp: 98.5 °F (36.9 °C) (12/16/20 0517)  Pulse: 89 (12/16/20 0517)  Resp: 18 (12/16/20 0241)  BP: (!) 153/67 (12/16/20 0517)  SpO2: 99 % (12/16/20 0517) Vital Signs (24h Range):  Temp:  [98.5 °F (36.9 °C)-98.9 °F (37.2  °C)] 98.5 °F (36.9 °C)  Pulse:  [89-93] 89  Resp:  [18] 18  SpO2:  [98 %-99 %] 99 %  BP: (101-153)/(60-67) 153/67     Weight: 93.5 kg (206 lb 2.1 oz)  Body mass index is 34.3 kg/m².    Physical Exam  Vitals signs and nursing note reviewed.   Constitutional:       Appearance: Normal appearance.   HENT:      Head: Normocephalic and atraumatic.      Mouth/Throat:      Mouth: Mucous membranes are dry.   Eyes:      General: No scleral icterus.     Extraocular Movements: Extraocular movements intact.      Conjunctiva/sclera:      Right eye: Right conjunctiva is injected.      Left eye: Left conjunctiva is injected.   Neck:      Musculoskeletal: Normal range of motion and neck supple.   Cardiovascular:      Rate and Rhythm: Normal rate and regular rhythm.      Pulses: Normal pulses.      Heart sounds: Normal heart sounds.   Pulmonary:      Effort: Pulmonary effort is normal. No respiratory distress.      Breath sounds: Normal breath sounds. No rales.   Abdominal:      General: Abdomen is flat. There is no distension.      Palpations: Abdomen is soft.      Tenderness: There is no abdominal tenderness.   Musculoskeletal: Normal range of motion.      Right lower leg: No edema.      Left lower leg: No edema.   Skin:     General: Skin is warm and dry.      Coloration: Skin is not jaundiced.   Neurological:      General: No focal deficit present.      Mental Status: She is alert.   Psychiatric:         Attention and Perception: She is inattentive.         Mood and Affect: Mood is anxious. Affect is labile and inappropriate.         Speech: Speech is rapid and pressured and tangential.         Behavior: Behavior is agitated.         Thought Content: Thought content does not include homicidal or suicidal ideation. Thought content does not include homicidal or suicidal plan.         Cognition and Memory: Cognition is not impaired. Memory is not impaired.         Judgment: Judgment is impulsive and inappropriate.            "  Significant Labs:   CBC:   Recent Labs   Lab 12/16/20 0329   WBC 6.76   HGB 10.3*   HCT 32.5*        CMP:   Recent Labs   Lab 12/16/20 0329   *   K 4.3   CL 99   CO2 23   *   BUN 21   CREATININE 1.1   CALCIUM 8.9   PROT 6.1   ALBUMIN 3.0*   BILITOT 1.0   ALKPHOS 64   AST 67*   ALT 51*   ANIONGAP 13   EGFRNONAA 52.4*     Cardiac Markers: No results for input(s): CKMB, MYOGLOBIN, BNP, TROPISTAT in the last 48 hours.  Lactic Acid: No results for input(s): LACTATE in the last 48 hours.  Troponin: No results for input(s): TROPONINI in the last 48 hours.  TSH:   Recent Labs   Lab 12/16/20 0329   TSH 1.072     Urine Culture: No results for input(s): LABURIN in the last 48 hours.  Urine Studies: No results for input(s): COLORU, APPEARANCEUA, PHUR, SPECGRAV, PROTEINUA, GLUCUA, KETONESU, BILIRUBINUA, OCCULTUA, NITRITE, UROBILINOGEN, LEUKOCYTESUR, RBCUA, WBCUA, BACTERIA, SQUAMEPITHEL, HYALINECASTS in the last 48 hours.    Invalid input(s): WRIGHTSUR    Significant Imaging: I have reviewed all pertinent imaging results/findings within the past 24 hours.    Assessment/Plan:     * Psychosis  - per chart review (Dr. Márquez 12/12/20), some concern of underlying psychiatric condition prior to starting steroids, worsened since starting prednisone  - per telephone encounter 11/20/20 with Dr. Xiong-John, "Patient instructed to decreased from 20 mg to 10 mg." But patient stopped cold turkey  - hold on resuming prednisone for now  - patient PEC'd  - psych consulted with appreciation  - start haldol PRN  - rheumatology consulted for recommendations on steroids  - prior trial of MTX, but developed thrush and subsequently stopped    Episcleritis, bilateral  - rheum consulted for steroid reccs    Hyperlipidemia associated with type 2 diabetes mellitus  - continue statin, ASA    Chronic pain  - continue elavil and PRN tramadol    Gastroesophageal reflux disease without esophagitis  - famotidine    Obstructive sleep " apnea syndrome  - unclear if on CPAP    Type 2 diabetes mellitus with both eyes affected by mild nonproliferative retinopathy without macular edema, with long-term current use of insulin  - hold home V-GO, metformin, tresiba  - start weight based insulin regimen: detemir 37U, aspart 9U TIDWM, moderate dose SSI  -  on admission, uncontrolled  - diabetic diet  Lab Results   Component Value Date    HGBA1C 9.3 (H) 10/09/2020         Hypertension associated with diabetes  - continue losartan, norvasc, HCTZ    VTE Risk Mitigation (From admission, onward)         Ordered     enoxaparin injection 40 mg  Every 24 hours      12/16/20 0503     IP VTE HIGH RISK PATIENT  Once      12/16/20 0502                   Papa Armstrong PA-C  Department of Hospital Medicine   [facility]
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
## 2020-12-16 NOTE — HOSPITAL COURSE
"Stopped taking prednisone "cold turkey" around , didn't taper. Then Thanksgiving morning "everything shut down"    Has not been taking prednisone since Friday before Thanksgiving. Presented to the ED 12/4, given fluids and told to resume prednisone 10 mg daily. She never did that.       "

## 2020-12-16 NOTE — PLAN OF CARE
Adriana Paula has had episodes of hypotension. BP was 80/49 this morning and she was given 500 mL of LR. BP is now 74/51. Will give 1 liter of LR. Will order echocardiogram and cortisol level for tomorrow morning.    Seen by Rheumatology. Recommendation for Ophthalmology consult and MRI brain with contrast to evaluate for CNS lupus. Orders placed.    Seen by Psychiatry. Recommendation for Depakote, olanzapine as needed, daily EKG. Orders placed.
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
## 2020-12-16 NOTE — NURSING
Belongings placed in [facility] closet and documented in [facility] belongings binder near Charge Office.     (1) Laptop  (1) Jacket  (1) Dress  (2) Shoes  (1) Purse

## 2020-12-16 NOTE — CONSULTS
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 The patient states she misunderstood taper instructions for her prednisone and stopped cold turkey the Friday before Thanksgiving. She then showed up the ED shortly after Thanksgiving "completely shut down". She was given fluids and told to resume prednisone 10 mg daily. She still didn't and wanted to continue to "flush her system". She hasn't been taking prednisone since. She says her daughter and her haven't been getting along since she retired last year, and for some reason she changed the locks in the house. She attributes it to their 40 year age difference. 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 She is easily flustered and tangential, but redirectable. She otherwise has been in her usual state of health - no fevers, chills, CP, NVD, abdominal pain, dysuria. She says she feels improved with ED interventions.      Per ED:  "65-year-old female to the ER via EMS.  EMS reports that the patient call 911.  When they arrived on the scene the patient was acting erratically.  The patient states that she called EMS because her blood pressure was elevated, her blood glucose was elevated, patient then began 10 aggressively towards EMS.  The patient became erratic and compulsive.  EMS states that the patient was not making any sense.  Upon arrival to " "our facility the patient is yelling obscenities.  The patient's speech appears pressured and she appears delusional.     Per chart review the patient was recently diagnosed with autoimmune scleritis and was started on prednisone a couple of weeks ago.  Since that time she has lost significant weight.  The daughter has contacted PCPs office multiple time due to patient agitation.  PCP concerned about prednisone induced psychosis and the patient has an appointment to see psychiatry but not until next month.  The daughter states that the past couple of weeks have been very difficult with her mother secondary to a mother becoming more agitated and aggressive.  The mother has been accusing her of different things.  The mother has not been making any sense.  The mother has kicked her out the house.  She has not been eating or drinking and has been noncompliant with her medications."      On my evaluation today, patient is very drowsy and hard to obtain history from.  She will wake and answer questions although sometimes answers are non-sensible.  Drifts back to sleep very easily.    Past Medical History:   Diagnosis Date    Allergy     Anemia     Anxiety     Arthritis     Breast cyst     Cataract     DR (diabetic retinopathy)     Dyslipidemia     Essential hypertension 8/1/2012    Gastroesophageal reflux disease without esophagitis 2/3/2017    GERD (gastroesophageal reflux disease)     Glaucoma     Hypertension     Obesity (BMI 30-39.9) 10/24/2013    PN (peripheral neuropathy)     Sleep apnea     Type 2 diabetes mellitus with both eyes affected by mild nonproliferative retinopathy without macular edema, with long-term current use of insulin     Type 2 diabetes mellitus with diabetic polyneuropathy, with long-term current use of insulin     Type II or unspecified type diabetes mellitus with other specified manifestations, uncontrolled        Past Surgical History:   Procedure Laterality Date    BREAST " CYST EXCISION Right     1980s    CARPAL TUNNEL RELEASE Right     CATARACT EXTRACTION      CATARACT EXTRACTION W/  INTRAOCULAR LENS IMPLANT Right 2017    Dr Brewster     SECTION      EPIDURAL STEROID INJECTION N/A 2019    Procedure: Injection, Steroid, Epidural LUMBAR/CAUDAL L5-S1 IL KARLA;  Surgeon: Jef García MD;  Location: [facility] PAIN MGT;  Service: Pain Management;  Laterality: N/A;  NEEDS CONSENT    EPIDURAL STEROID INJECTION N/A 2019    Procedure: Injection, Steroid, Epidural LUMBAR/CAUDAL L5-S1 IL KARLA;  Surgeon: Jef García MD;  Location: [facility] PAIN MGT;  Service: Pain Management;  Laterality: N/A;  NEEDS CONSENT    KNEE ARTHROSCOPY  14    right    MYOMECTOMY      TRANSFORAMINAL EPIDURAL INJECTION OF STEROID Right 2019    Procedure: LUMBAR TRANSFORAMINAL RIGHT L5-S1  TF KARLA;  Surgeon: Jef García MD;  Location: [facility] PAIN MGT;  Service: Pain Management;  Laterality: Right;  NEEDS CONSENT    TRIGGER FINGER RELEASE      TRIGGER FINGER RELEASE Left 4/15/2019    Procedure: RELEASE, TRIGGER FINGER left thumb;  Surgeon: Yuridia Gonzales MD;  Location: [facility] OR;  Service: Orthopedics;  Laterality: Left;  stretcher, supine, hand pan 1 and pan 2       Immunization History   Administered Date(s) Administered    Hepatitis A, Adult 10/15/2020    Hepatitis B (recombinant) Adjuvanted, 2 dose 10/15/2020, 11/10/2020    Influenza 10/05/2010, 2014, 2014    Influenza - Quadrivalent - PF *Preferred* (6 months and older) 10/19/2018, 10/07/2019, 09/10/2020    Influenza - Trivalent (ADULT) 2014    Influenza Split 10/05/2010, 2014    Pneumococcal Conjugate - 13 Valent 2016    Pneumococcal Polysaccharide - 23 Valent 2018    Tdap 10/23/2018    Zoster 2016    Zoster Recombinant 10/08/2020       Review of patient's allergies indicates:   Allergen Reactions    Keflex [cephalexin] Rash      blisters, fever    Penicillins  Itching    Sulfamethoxazole-trimethoprim      Other reaction(s): blisters    Vicodin [hydrocodone-acetaminophen] Swelling    Sulfa (sulfonamide antibiotics) Rash      blisters,fever         Current Facility-Administered Medications   Medication Frequency    acetaminophen tablet 650 mg Q4H PRN    albuterol-ipratropium 2.5 mg-0.5 mg/3 mL nebulizer solution 3 mL Q4H PRN    aspirin EC tablet 81 mg Daily    atorvastatin tablet 40 mg Daily    bisacodyL suppository 10 mg Daily PRN    dextrose 50% injection 12.5 g PRN    dextrose 50% injection 25 g PRN    enoxaparin injection 40 mg Q24H    famotidine tablet 20 mg BID    folic acid tablet 1 mg Daily    glucagon (human recombinant) injection 1 mg PRN    glucose chewable tablet 16 g PRN    glucose chewable tablet 24 g PRN    haloperidol lactate injection 2 mg Q6H PRN    insulin aspart U-100 pen 1-10 Units QID (AC + HS) PRN    insulin aspart U-100 pen 9 Units TIDWM    insulin detemir U-100 pen 37 Units Daily    lactated ringers bolus 500 mL Once    melatonin tablet 6 mg Nightly PRN    ondansetron disintegrating tablet 8 mg Q8H PRN    polyethylene glycol packet 17 g Daily    pregabalin capsule 75 mg BID    promethazine (PHENERGAN) 6.25 mg in dextrose 5 % 50 mL IVPB Q6H PRN    sodium chloride 0.9% flush 5 mL PRN    traMADoL tablet 50 mg Q12H PRN     Family History     Problem Relation (Age of Onset)    Arthritis Mother    Breast cancer Mother (75), Maternal Grandmother (50), Sister (50), Cousin (40), Cousin (40), Sister (40), Sister (54)    Cancer Mother (70)    Diabetes Mother,     Heart disease Mother, Sister    Heart failure Brother    Miscarriages / Stillbirths Mother    No Known Problems Father, Maternal Aunt, Maternal Uncle, Paternal Aunt, Paternal Uncle, Maternal Grandfather, Paternal Grandmother, Paternal Grandfather    Osteoarthritis Sister, Sister    Pacemaker/defibrilator Brother    Vision loss Mother        Tobacco Use    Smoking status:  Former Smoker     Start date: 12/9/2002    Smokeless tobacco: Never Used   Substance and Sexual Activity    Alcohol use: Yes     Frequency: Monthly or less     Drinks per session: 1 or 2     Binge frequency: Never     Comment: socially; couple glasses    Drug use: No    Sexual activity: Not Currently     Partners: Male     Birth control/protection: None     Review of Systems   Unable to perform ROS: Mental status change     Objective:     Vital Signs (Most Recent):  Temp: 97.5 °F (36.4 °C) (12/16/20 1117)  Pulse: 83 (12/16/20 1117)  Resp: 18 (12/16/20 1117)  BP: (!) 90/49 (12/16/20 1117)  SpO2: 97 % (12/16/20 1117)  O2 Device (Oxygen Therapy): room air (12/16/20 0745) Vital Signs (24h Range):  Temp:  [97.5 °F (36.4 °C)-98.9 °F (37.2 °C)] 97.5 °F (36.4 °C)  Pulse:  [68-93] 83  Resp:  [18] 18  SpO2:  [97 %-99 %] 97 %  BP: ()/(49-67) 90/49     Weight: 93.5 kg (206 lb 2.1 oz) (12/16/20 0241)  Body mass index is 34.3 kg/m².  Body surface area is 2.07 meters squared.      Intake/Output Summary (Last 24 hours) at 12/16/2020 1231  Last data filed at 12/16/2020 1000  Gross per 24 hour   Intake 120 ml   Output --   Net 120 ml       Physical Exam   Nursing note and vitals reviewed.  Constitutional: She is oriented to person, place, and time and well-developed, well-nourished, and in no distress. No distress.   HENT:   Head: Normocephalic and atraumatic.   Eyes: EOM are normal. Pupils are equal, round, and reactive to light. Right eye exhibits no discharge. Left eye exhibits no discharge. Right conjunctiva is not injected. Left conjunctiva is not injected. No scleral icterus.   Neck: Normal range of motion. Neck supple.   Cardiovascular: Normal rate and intact distal pulses.    Pulmonary/Chest: Effort normal. No respiratory distress.   Abdominal: Soft. She exhibits no distension. There is no abdominal tenderness. There is no rebound and no guarding.   Neurological: She is alert and oriented to person, place, and time.    Skin: Skin is warm and dry. No rash noted. She is not diaphoretic. No erythema.     Psychiatric:   Patient very drowsy.  Not fully oriented.   Musculoskeletal: No edema.         Significant Labs:  All pertinent lab results from the last 24 hours have been reviewed.   ANGELICA Positive 1:320, Sm/RNP positive  CCP, RF Negative  C3, C4 WNL      Significant Imaging:  Imaging results within the past 24 hours have been reviewed.    Assessment/Plan:     Episcleritis, bilateral  Patient with initial development of left eye redness and pain behind eye in February 2020. Took 4 different drops without improvement.   Dr. Batista optometrist sent to Dr. 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  Right eye became involved end of August.   She was given 40 mg prednisone with weekly taper for 3 weeks.  This helped but eye never cleared.  One month after finishing prednisone pain returned.  She was given more eye drops.   Dr. Wagner did labs and told her it was an autoimmune issue.  - Seen by Dr. Xiong on 10/6/2020  - Has been continued on steroid taper  - Now presenting after being off steroids cold turkey for at least a couple weeks and with psychotic behavior  - Patient is currently PEC'd  Psych consulted  - Rheumatology consulted for further recs regarding steroids  - Will discuss with staff plan for steroids going forward      Plan to be discussed with Dr. Hernandez. Please await attestation to follow for today's final recommendations.      Thank you for your consult. I will follow-up with patient. Please contact us if you have any additional questions.    Pete Echeverria MD  Rheumatology  [facility]      I have personally reviewed the history, confirmed exam findings, and discussed assessment and plan with fellow.      Recurrent nodular Scleritis/episcleritis OU last ophthalmology exam Dr. Wagner 1023/20 at that time on prednisone 20mg and to start methotrexate after seeing Dr. Xiong in  Rheumatology 10/6/20 Methotrexate discontinued  11/5/20  b/o oral thrush(though most likely related to prednisone, hyperglycemia)  Prednisone started 7/6/20 40mg daily tapered off over 3 wks. Improved, then recurrence after no prednisone x 1 month retreated 9/25/20 prednisone 40mg x wks, then 20mg  Which was continued with plans to add methotrexate. Prednisone stopped Thanksgiving, ED 12/4 told to resume prednisone 10mg daily ???  ANGELICA+ 1:320 speckled +RNP, normal complements. APS panel negative  ANCA negative  B27 negative  Mild CKD 2, s/p LYNDSAY  HB decreased from 12.9 to 10.3 in 2 wks          PR3, MPO, C3, C4, CH50, dsDNA, anti ribosomal P, ESR, CRP, AUSTIN  STR, hemolysis labs  UPCR  MRI brain   If negative, consider LP with CSF survey  Psychiatry/ Neurology consults for psychosis. AMS  *Ophthalmology to assess status of episcleritis/scleritis which would determine need for/dose of glucocorticoids  Check prednisone dosing history with daughter

## 2020-12-16 NOTE — PROGRESS NOTES
Food & Nutrition Education     Diet Education: Consistent Carbohydrate duet  Time spent: 15 mins  Learners: Patient    Nutrition Education provided with handouts: Consistent Carbohydrate for Diabetic patients     Comments: Pt was alert and attentive to the education. Says she knows it is important to eat regularly scheduled meals and mentioned using her hand to help her portion meals. Discussed the importance of eating 3-4 servings of carbs for each meal, and reviewed example portions of carb-containing foods.     All questions and concerns answered. Dietitian's contact information provided.    Follow-up: 1x/week  Please re-consult if needed.     Thanks!

## 2020-12-16 NOTE — SUBJECTIVE & OBJECTIVE
Past Medical History:   Diagnosis Date    Allergy     Anemia     Anxiety     Arthritis     Breast cyst     Cataract     DR (diabetic retinopathy)     Dyslipidemia     Essential hypertension 2012    Gastroesophageal reflux disease without esophagitis 2/3/2017    GERD (gastroesophageal reflux disease)     Glaucoma     Hypertension     Obesity (BMI 30-39.9) 10/24/2013    PN (peripheral neuropathy)     Sleep apnea     Type 2 diabetes mellitus with both eyes affected by mild nonproliferative retinopathy without macular edema, with long-term current use of insulin     Type 2 diabetes mellitus with diabetic polyneuropathy, with long-term current use of insulin     Type II or unspecified type diabetes mellitus with other specified manifestations, uncontrolled        Past Surgical History:   Procedure Laterality Date    BREAST CYST EXCISION Right     1980s    CARPAL TUNNEL RELEASE Right     CATARACT EXTRACTION      CATARACT EXTRACTION W/  INTRAOCULAR LENS IMPLANT Right 2017    Dr Brewster     SECTION      EPIDURAL STEROID INJECTION N/A 2019    Procedure: Injection, Steroid, Epidural LUMBAR/CAUDAL L5-S1 IL KARLA;  Surgeon: Jef García MD;  Location: [facility]IN [facility]GT;  Service: Pain Management;  Laterality: N/A;  NEEDS CONSENT    EPIDURAL STEROID INJECTION N/A 2019    Procedure: Injection, Steroid, Epidural LUMBAR/CAUDAL L5-S1 IL KARLA;  Surgeon: Jef García MD;  Location: [facility] PAIN MGT;  Service: Pain Management;  Laterality: N/A;  NEEDS CONSENT    KNEE ARTHROSCOPY  14    right    MYOMECTOMY      TRANSFORAMINAL EPIDURAL INJECTION OF STEROID Right 2019    Procedure: LUMBAR TRANSFORAMINAL RIGHT L5-S1  TF KARLA;  Surgeon: Jef García MD;  Location: [facility] PAIN MGT;  Service: Pain Management;  Laterality: Right;  NEEDS CONSENT    TRIGGER FINGER RELEASE      TRIGGER FINGER RELEASE Left 4/15/2019    Procedure: RELEASE, TRIGGER FINGER left thumb;  Surgeon:  Yuridia Gonzales MD;  Location: [facility];  Service: Orthopedics;  Laterality: Left;  stretcher, supine, hand pan 1 and pan 2       Immunization History   Administered Date(s) Administered    Hepatitis A, Adult 10/15/2020    Hepatitis B (recombinant) Adjuvanted, 2 dose 10/15/2020, 11/10/2020    Influenza 10/05/2010, 01/24/2014, 12/14/2014    Influenza - Quadrivalent - PF *Preferred* (6 months and older) 10/19/2018, 10/07/2019, 09/10/2020    Influenza - Trivalent (ADULT) 12/14/2014    Influenza Split 10/05/2010, 12/14/2014    Pneumococcal Conjugate - 13 Valent 05/30/2016    Pneumococcal Polysaccharide - 23 Valent 08/01/2018    Tdap 10/23/2018    Zoster 05/30/2016    Zoster Recombinant 10/08/2020       Review of patient's allergies indicates:   Allergen Reactions    Keflex [cephalexin] Rash      blisters, fever    Penicillins Itching    Sulfamethoxazole-trimethoprim      Other reaction(s): blisters    Vicodin [hydrocodone-acetaminophen] Swelling    Sulfa (sulfonamide antibiotics) Rash      blisters,fever         Current Facility-Administered Medications   Medication Frequency    acetaminophen tablet 650 mg Q4H PRN    albuterol-ipratropium 2.5 mg-0.5 mg/3 mL nebulizer solution 3 mL Q4H PRN    aspirin EC tablet 81 mg Daily    atorvastatin tablet 40 mg Daily    bisacodyL suppository 10 mg Daily PRN    dextrose 50% injection 12.5 g PRN    dextrose 50% injection 25 g PRN    enoxaparin injection 40 mg Q24H    famotidine tablet 20 mg BID    folic acid tablet 1 mg Daily    glucagon (human recombinant) injection 1 mg PRN    glucose chewable tablet 16 g PRN    glucose chewable tablet 24 g PRN    haloperidol lactate injection 2 mg Q6H PRN    insulin aspart U-100 pen 1-10 Units QID (AC + HS) PRN    insulin aspart U-100 pen 9 Units TIDWM    insulin detemir U-100 pen 37 Units Daily    lactated ringers bolus 500 mL Once    melatonin tablet 6 mg Nightly PRN    ondansetron disintegrating tablet 8  mg Q8H PRN    polyethylene glycol packet 17 g Daily    pregabalin capsule 75 mg BID    promethazine (PHENERGAN) 6.25 mg in dextrose 5 % 50 mL IVPB Q6H PRN    sodium chloride 0.9% flush 5 mL PRN    traMADoL tablet 50 mg Q12H PRN     Family History     Problem Relation (Age of Onset)    Arthritis Mother    Breast cancer Mother (75), Maternal Grandmother (50), Sister (50), Cousin (40), Cousin (40), Sister (40), Sister (54)    Cancer Mother (70)    Diabetes Mother,     Heart disease Mother, Sister    Heart failure Brother    Miscarriages / Stillbirths Mother    No Known Problems Father, Maternal Aunt, Maternal Uncle, Paternal Aunt, Paternal Uncle, Maternal Grandfather, Paternal Grandmother, Paternal Grandfather    Osteoarthritis Sister, Sister    Pacemaker/defibrilator Brother    Vision loss Mother        Tobacco Use    Smoking status: Former Smoker     Start date: 12/9/2002    Smokeless tobacco: Never Used   Substance and Sexual Activity    Alcohol use: Yes     Frequency: Monthly or less     Drinks per session: 1 or 2     Binge frequency: Never     Comment: socially; couple glasses    Drug use: No    Sexual activity: Not Currently     Partners: Male     Birth control/protection: None     Review of Systems   Unable to perform ROS: Mental status change     Objective:     Vital Signs (Most Recent):  Temp: 97.5 °F (36.4 °C) (12/16/20 1117)  Pulse: 83 (12/16/20 1117)  Resp: 18 (12/16/20 1117)  BP: (!) 90/49 (12/16/20 1117)  SpO2: 97 % (12/16/20 1117)  O2 Device (Oxygen Therapy): room air (12/16/20 0745) Vital Signs (24h Range):  Temp:  [97.5 °F (36.4 °C)-98.9 °F (37.2 °C)] 97.5 °F (36.4 °C)  Pulse:  [68-93] 83  Resp:  [18] 18  SpO2:  [97 %-99 %] 97 %  BP: ()/(49-67) 90/49     Weight: 93.5 kg (206 lb 2.1 oz) (12/16/20 0241)  Body mass index is 34.3 kg/m².  Body surface area is 2.07 meters squared.      Intake/Output Summary (Last 24 hours) at 12/16/2020 1231  Last data filed at 12/16/2020 1000  Gross per 24  hour   Intake 120 ml   Output --   Net 120 ml       Physical Exam   Nursing note and vitals reviewed.  Constitutional: She is oriented to person, place, and time and well-developed, well-nourished, and in no distress. No distress.   HENT:   Head: Normocephalic and atraumatic.   Eyes: EOM are normal. Pupils are equal, round, and reactive to light. Right eye exhibits no discharge. Left eye exhibits no discharge. Right conjunctiva is not injected. Left conjunctiva is not injected. No scleral icterus.   Neck: Normal range of motion. Neck supple.   Cardiovascular: Normal rate and intact distal pulses.    Pulmonary/Chest: Effort normal. No respiratory distress.   Abdominal: Soft. She exhibits no distension. There is no abdominal tenderness. There is no rebound and no guarding.   Neurological: She is alert and oriented to person, place, and time.   Skin: Skin is warm and dry. No rash noted. She is not diaphoretic. No erythema.     Psychiatric:   Patient very drowsy.  Not fully oriented.   Musculoskeletal: No edema.         Significant Labs:  All pertinent lab results from the last 24 hours have been reviewed.   ANGELICA Positive 1:320, Sm/RNP positive  CCP, RF Negative  C3, C4 WNL      Significant Imaging:  Imaging results within the past 24 hours have been reviewed.

## 2020-12-16 NOTE — PLAN OF CARE
Hospital Medicine Team N  Chart Review Note    Patient admitted this morning by the nocturnist team, and will be seen again tomorrow by Hospital Medicine, at which time consults will likely have seen her and made recommendations. I called her daughter and the call went to voicemail. I let her know that there is not much of an update since she was just admitted and nothing has happened since.    Adriana Paula is a 65 year old Black woman with obesity, obstructive sleep apnea, hypertension, diabetes mellitus type 2 with retinopathy and polyneuropathy (on insulin therapy), hyperlipidemia, gastroesophageal reflux disease, lumbar degenerative disc disease, ANGELICA positive bilateral episcleritis. She lives in [facility]. Her primary care physician is Dr. Nicole Márquez. Her rheumatologist is Dr. Darling Quintero.    She was taking prednisone 20 mg daily since July 2020 for her episcleritis. She misunderstood the taper instructions and stopped without a taper on 11/20/2020, the Friday before Thanksgiving. She was seen at [facility] on 12/4/2020 with 10 days of generalized fatigue, lethargy, poor appetite, and 20 lb weight loss. Her cousin reported that she had been behaving erratically, calling family members with pressured speech and complaining about her health before hanging up suddenly. She was given a liter of fluids and advised to start taking prednisone 10 mg daily. Her behavior in the emergency department was not erratic so she was discharged home. She did not resume the prednisone. She was scheduled for a Psychiatry appointment for January 2021.    She called 911 on 12/16/2020 and was taken by emergency medical services to [facility]. EMS found her blood pressure and blood glucose elevated, and she acted aggressively toward them. She was erratic and compulsive, and was not making any sense. Upon arrival to the  emergency department, she was yelling obscenities. 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 She was becoming agitated and aggressive the past couple of weeks, was accusing her of different things, kicked her out of the house, changed the locks, had not been eating or drinking, and was not taking her medications. Her daughter attributed it to their 40 year age difference. In the emergency department, she was easily flustered, tangential, and appeared delusional, but was redirectable. Labs were unremarkable. She was placed under physician's emergency certificate. She was admitted to Hospital Medicine Team N. Psychiatry and Rheumatology were consulted.

## 2020-12-17 ENCOUNTER — TELEPHONE (OUTPATIENT)
Dept: INTERNAL MEDICINE | Facility: CLINIC | Age: 65
End: 2020-12-17

## 2020-12-17 PROBLEM — F29 PSYCHOSIS: Status: ACTIVE | Noted: 2020-12-17

## 2020-12-17 PROBLEM — H15.003 SCLERITIS, BILATERAL: Status: ACTIVE | Noted: 2020-12-16

## 2020-12-17 LAB
ANION GAP SERPL CALC-SCNC: 10 MMOL/L (ref 8–16)
ASCENDING AORTA: 3.34 CM
AV INDEX (PROSTH): 0.9
AV MEAN GRADIENT: 6 MMHG
AV PEAK GRADIENT: 10 MMHG
AV VALVE AREA: 3.13 CM2
AV VELOCITY RATIO: 0.85
BASOPHILS # BLD AUTO: 0.04 K/UL (ref 0–0.2)
BASOPHILS NFR BLD: 0.5 % (ref 0–1.9)
BSA FOR ECHO PROCEDURE: 2.07 M2
BUN SERPL-MCNC: 12 MG/DL (ref 8–23)
CALCIUM SERPL-MCNC: 8.2 MG/DL (ref 8.7–10.5)
CHLORIDE SERPL-SCNC: 106 MMOL/L (ref 95–110)
CO2 SERPL-SCNC: 23 MMOL/L (ref 23–29)
CORTIS SERPL-MCNC: 6.1 UG/DL
CREAT SERPL-MCNC: 0.8 MG/DL (ref 0.5–1.4)
CV ECHO LV RWT: 0.33 CM
DIFFERENTIAL METHOD: ABNORMAL
DOP CALC AO PEAK VEL: 1.59 M/S
DOP CALC AO VTI: 29.96 CM
DOP CALC LVOT AREA: 3.5 CM2
DOP CALC LVOT DIAMETER: 2.11 CM
DOP CALC LVOT PEAK VEL: 1.35 M/S
DOP CALC LVOT STROKE VOLUME: 93.87 CM3
DOP CALCLVOT PEAK VEL VTI: 26.86 CM
DSDNA AB SER-ACNC: NORMAL [IU]/ML
E WAVE DECELERATION TIME: 303.13 MSEC
E/A RATIO: 0.78
E/E' RATIO: 9.41 M/S
ECHO LV POSTERIOR WALL: 0.71 CM (ref 0.6–1.1)
EOSINOPHIL # BLD AUTO: 0.1 K/UL (ref 0–0.5)
EOSINOPHIL NFR BLD: 1.7 % (ref 0–8)
ERYTHROCYTE [DISTWIDTH] IN BLOOD BY AUTOMATED COUNT: 15.6 % (ref 11.5–14.5)
EST. GFR  (AFRICAN AMERICAN): >60 ML/MIN/1.73 M^2
EST. GFR  (NON AFRICAN AMERICAN): >60 ML/MIN/1.73 M^2
ESTIMATED AVG GLUCOSE: 237 MG/DL (ref 68–131)
FRACTIONAL SHORTENING: 33 % (ref 28–44)
GLUCOSE SERPL-MCNC: 31 MG/DL (ref 70–110)
HBA1C MFR BLD HPLC: 9.9 % (ref 4–5.6)
HCT VFR BLD AUTO: 29.6 % (ref 37–48.5)
HGB BLD-MCNC: 9.2 G/DL (ref 12–16)
IMM GRANULOCYTES # BLD AUTO: 0.07 K/UL (ref 0–0.04)
IMM GRANULOCYTES NFR BLD AUTO: 0.9 % (ref 0–0.5)
INTERVENTRICULAR SEPTUM: 0.66 CM (ref 0.6–1.1)
IVRT: 82.78 MSEC
LA MAJOR: 4.91 CM
LA MINOR: 4.89 CM
LA WIDTH: 3.78 CM
LEFT ATRIUM SIZE: 2.94 CM
LEFT ATRIUM VOLUME INDEX MOD: 21.5 ML/M2
LEFT ATRIUM VOLUME INDEX: 23.1 ML/M2
LEFT ATRIUM VOLUME MOD: 43.24 CM3
LEFT ATRIUM VOLUME: 46.29 CM3
LEFT INTERNAL DIMENSION IN SYSTOLE: 2.85 CM (ref 2.1–4)
LEFT VENTRICLE DIASTOLIC VOLUME INDEX: 40.38 ML/M2
LEFT VENTRICLE DIASTOLIC VOLUME: 81.06 ML
LEFT VENTRICLE MASS INDEX: 42 G/M2
LEFT VENTRICLE SYSTOLIC VOLUME INDEX: 15.3 ML/M2
LEFT VENTRICLE SYSTOLIC VOLUME: 30.77 ML
LEFT VENTRICULAR INTERNAL DIMENSION IN DIASTOLE: 4.26 CM (ref 3.5–6)
LEFT VENTRICULAR MASS: 84.75 G
LV LATERAL E/E' RATIO: 10 M/S
LV SEPTAL E/E' RATIO: 8.89 M/S
LYMPHOCYTES # BLD AUTO: 2.7 K/UL (ref 1–4.8)
LYMPHOCYTES NFR BLD: 35.6 % (ref 18–48)
MAGNESIUM SERPL-MCNC: 1.2 MG/DL (ref 1.6–2.6)
MCH RBC QN AUTO: 25.3 PG (ref 27–31)
MCHC RBC AUTO-ENTMCNC: 31.1 G/DL (ref 32–36)
MCV RBC AUTO: 82 FL (ref 82–98)
MONOCYTES # BLD AUTO: 0.6 K/UL (ref 0.3–1)
MONOCYTES NFR BLD: 8.3 % (ref 4–15)
MV A" WAVE DURATION": 9.42 MSEC
MV PEAK A VEL: 1.03 M/S
MV PEAK E VEL: 0.8 M/S
NEUTROPHILS # BLD AUTO: 4 K/UL (ref 1.8–7.7)
NEUTROPHILS NFR BLD: 53 % (ref 38–73)
NRBC BLD-RTO: 0 /100 WBC
PHOSPHATE SERPL-MCNC: 2.7 MG/DL (ref 2.7–4.5)
PISA TR MAX VEL: 2.69 M/S
PLATELET # BLD AUTO: 202 K/UL (ref 150–350)
PMV BLD AUTO: 11.1 FL (ref 9.2–12.9)
POCT GLUCOSE: 151 MG/DL (ref 70–110)
POCT GLUCOSE: 180 MG/DL (ref 70–110)
POCT GLUCOSE: 207 MG/DL (ref 70–110)
POCT GLUCOSE: 232 MG/DL (ref 70–110)
POCT GLUCOSE: 59 MG/DL (ref 70–110)
POCT GLUCOSE: 83 MG/DL (ref 70–110)
POTASSIUM SERPL-SCNC: 3.4 MMOL/L (ref 3.5–5.1)
PULM VEIN S/D RATIO: 1.46
PV PEAK D VEL: 0.35 M/S
PV PEAK S VEL: 0.51 M/S
RA MAJOR: 4.78 CM
RA PRESSURE: 8 MMHG
RA WIDTH: 3.62 CM
RBC # BLD AUTO: 3.63 M/UL (ref 4–5.4)
RIGHT VENTRICULAR END-DIASTOLIC DIMENSION: 3.3 CM
RV TISSUE DOPPLER FREE WALL SYSTOLIC VELOCITY 1 (APICAL 4 CHAMBER VIEW): 13.55 CM/S
SINUS: 3 CM
SODIUM SERPL-SCNC: 139 MMOL/L (ref 136–145)
STJ: 2.79 CM
TDI LATERAL: 0.08 M/S
TDI SEPTAL: 0.09 M/S
TDI: 0.09 M/S
TR MAX PG: 29 MMHG
TRICUSPID ANNULAR PLANE SYSTOLIC EXCURSION: 1.8 CM
TV REST PULMONARY ARTERY PRESSURE: 37 MMHG
WBC # BLD AUTO: 7.56 K/UL (ref 3.9–12.7)

## 2020-12-17 PROCEDURE — 25000003 PHARM REV CODE 250: Mod: HCNC | Performed by: PSYCHIATRY & NEUROLOGY

## 2020-12-17 PROCEDURE — 80048 BASIC METABOLIC PNL TOTAL CA: CPT | Mod: HCNC

## 2020-12-17 PROCEDURE — 36415 COLL VENOUS BLD VENIPUNCTURE: CPT | Mod: HCNC

## 2020-12-17 PROCEDURE — 83735 ASSAY OF MAGNESIUM: CPT | Mod: HCNC

## 2020-12-17 PROCEDURE — 93010 ELECTROCARDIOGRAM REPORT: CPT | Mod: HCNC,,, | Performed by: INTERNAL MEDICINE

## 2020-12-17 PROCEDURE — 82533 TOTAL CORTISOL: CPT | Mod: HCNC

## 2020-12-17 PROCEDURE — 99231 SBSQ HOSP IP/OBS SF/LOW 25: CPT | Mod: HCNC,GC,, | Performed by: INTERNAL MEDICINE

## 2020-12-17 PROCEDURE — 99231 PR SUBSEQUENT HOSPITAL CARE,LEVL I: ICD-10-PCS | Mod: HCNC,GC,, | Performed by: INTERNAL MEDICINE

## 2020-12-17 PROCEDURE — 63600175 PHARM REV CODE 636 W HCPCS: Mod: HCNC | Performed by: INTERNAL MEDICINE

## 2020-12-17 PROCEDURE — 93005 ELECTROCARDIOGRAM TRACING: CPT | Mod: HCNC

## 2020-12-17 PROCEDURE — 99232 SBSQ HOSP IP/OBS MODERATE 35: CPT | Mod: HCNC,,, | Performed by: INTERNAL MEDICINE

## 2020-12-17 PROCEDURE — 25000003 PHARM REV CODE 250: Mod: HCNC | Performed by: PHYSICIAN ASSISTANT

## 2020-12-17 PROCEDURE — 99233 PR SUBSEQUENT HOSPITAL CARE,LEVL III: ICD-10-PCS | Mod: HCNC,,, | Performed by: PSYCHIATRY & NEUROLOGY

## 2020-12-17 PROCEDURE — 85025 COMPLETE CBC W/AUTO DIFF WBC: CPT | Mod: HCNC

## 2020-12-17 PROCEDURE — 99233 SBSQ HOSP IP/OBS HIGH 50: CPT | Mod: HCNC,,, | Performed by: PSYCHIATRY & NEUROLOGY

## 2020-12-17 PROCEDURE — 84100 ASSAY OF PHOSPHORUS: CPT | Mod: HCNC

## 2020-12-17 PROCEDURE — 25000003 PHARM REV CODE 250: Mod: HCNC | Performed by: INTERNAL MEDICINE

## 2020-12-17 PROCEDURE — 99232 PR SUBSEQUENT HOSPITAL CARE,LEVL II: ICD-10-PCS | Mod: HCNC,,, | Performed by: INTERNAL MEDICINE

## 2020-12-17 PROCEDURE — 93010 EKG 12-LEAD: ICD-10-PCS | Mod: HCNC,,, | Performed by: INTERNAL MEDICINE

## 2020-12-17 PROCEDURE — 83036 HEMOGLOBIN GLYCOSYLATED A1C: CPT | Mod: HCNC

## 2020-12-17 PROCEDURE — 11000001 HC ACUTE MED/SURG PRIVATE ROOM: Mod: HCNC

## 2020-12-17 PROCEDURE — 63600175 PHARM REV CODE 636 W HCPCS: Mod: HCNC | Performed by: PHYSICIAN ASSISTANT

## 2020-12-17 PROCEDURE — 94761 N-INVAS EAR/PLS OXIMETRY MLT: CPT | Mod: HCNC

## 2020-12-17 RX ORDER — POTASSIUM CHLORIDE 20 MEQ/1
40 TABLET, EXTENDED RELEASE ORAL ONCE
Status: COMPLETED | OUTPATIENT
Start: 2020-12-17 | End: 2020-12-17

## 2020-12-17 RX ORDER — INSULIN ASPART 100 [IU]/ML
0-5 INJECTION, SOLUTION INTRAVENOUS; SUBCUTANEOUS
Status: DISCONTINUED | OUTPATIENT
Start: 2020-12-17 | End: 2020-12-18 | Stop reason: HOSPADM

## 2020-12-17 RX ORDER — INSULIN ASPART 100 [IU]/ML
3 INJECTION, SOLUTION INTRAVENOUS; SUBCUTANEOUS
Status: DISCONTINUED | OUTPATIENT
Start: 2020-12-17 | End: 2020-12-18 | Stop reason: HOSPADM

## 2020-12-17 RX ADMIN — POLYETHYLENE GLYCOL 3350 17 G: 17 POWDER, FOR SOLUTION ORAL at 10:12

## 2020-12-17 RX ADMIN — FAMOTIDINE 20 MG: 20 TABLET, FILM COATED ORAL at 09:12

## 2020-12-17 RX ADMIN — INSULIN ASPART 3 UNITS: 100 INJECTION, SOLUTION INTRAVENOUS; SUBCUTANEOUS at 05:12

## 2020-12-17 RX ADMIN — FOLIC ACID 1 MG: 1 TABLET ORAL at 10:12

## 2020-12-17 RX ADMIN — PREGABALIN 75 MG: 75 CAPSULE ORAL at 09:12

## 2020-12-17 RX ADMIN — ASPIRIN 81 MG: 81 TABLET, COATED ORAL at 10:12

## 2020-12-17 RX ADMIN — PREGABALIN 75 MG: 75 CAPSULE ORAL at 10:12

## 2020-12-17 RX ADMIN — FAMOTIDINE 20 MG: 20 TABLET, FILM COATED ORAL at 10:12

## 2020-12-17 RX ADMIN — DIVALPROEX SODIUM 250 MG: 250 TABLET, DELAYED RELEASE ORAL at 09:12

## 2020-12-17 RX ADMIN — INSULIN ASPART 3 UNITS: 100 INJECTION, SOLUTION INTRAVENOUS; SUBCUTANEOUS at 12:12

## 2020-12-17 RX ADMIN — ENOXAPARIN SODIUM 40 MG: 40 INJECTION SUBCUTANEOUS at 05:12

## 2020-12-17 RX ADMIN — ATORVASTATIN CALCIUM 40 MG: 20 TABLET, FILM COATED ORAL at 10:12

## 2020-12-17 RX ADMIN — POTASSIUM CHLORIDE 40 MEQ: 1500 TABLET, EXTENDED RELEASE ORAL at 11:12

## 2020-12-17 RX ADMIN — INSULIN ASPART 2 UNITS: 100 INJECTION, SOLUTION INTRAVENOUS; SUBCUTANEOUS at 05:12
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
## 2020-12-17 NOTE — CONSULTS
Consultation Report  Ophthalmology Service    Date: 2020    Chief complaint/Reason for Consult: Management of scleritis     History of Present Illness: Adriana Paula is a 65 y.o. female who presents with altered mental status. Per chart review, patient has possible history of undiagnosed psychiatric illness that apparently became worse once she started taking steroids. The patient had been treated for scleritis with oral prednisone (20 mg daily) and methotrexate. She stopped methotrexate 1 week after starting (approx 10/30/20) supposedly due to side effects (patient reports thrush, though she does note that she was told that was most likely due to steroids). She stopped taking prednisone all at once without tapering on 20, as she was concerned about adverse effects. In terms of her eye, the patient has no complaints of pain, only some redness in both eyes.    POcularHx: No history of ocular problems or past ocular surgeries.  Does not use glasses or contacts.    Current eye gtts: none      PMHx:  has a past medical history of Allergy, Anemia, Anxiety, Arthritis, Breast cyst, Cataract, DR (diabetic retinopathy), Dyslipidemia, Essential hypertension (2012), Gastroesophageal reflux disease without esophagitis (2/3/2017), GERD (gastroesophageal reflux disease), Glaucoma, Hypertension, Obesity (BMI 30-39.9) (10/24/2013), PN (peripheral neuropathy), Sleep apnea, Type 2 diabetes mellitus with both eyes affected by mild nonproliferative retinopathy without macular edema, with long-term current use of insulin, Type 2 diabetes mellitus with diabetic polyneuropathy, with long-term current use of insulin, and Type II or unspecified type diabetes mellitus with other specified manifestations, uncontrolled.     PSurgHx:  has a past surgical history that includes  section; Myomectomy; Carpal tunnel release (Right); Knee arthroscopy (14); Cataract extraction; Cataract extraction w/  intraocular lens  "implant (Right, 04/13/2017); Trigger finger release; Trigger finger release (Left, 4/15/2019); Epidural steroid injection (N/A, 7/31/2019); Epidural steroid injection (N/A, 9/16/2019); Transforaminal epidural injection of steroid (Right, 11/4/2019); and Breast cyst excision (Right).     Home Medications:   Prior to Admission medications    Medication Sig Start Date End Date Taking? Authorizing Provider   amLODIPine (NORVASC) 10 MG tablet TAKE 1 TABLET BY MOUTH EVERY DAY  Patient not taking: Reported on 12/11/2020 12/3/20   Nicole Márquez MD   aspirin (ECOTRIN) 81 MG EC tablet Take 81 mg by mouth once daily. Instructed to stop 1 week prior to surgery on 4/15/19 per Dr. Conklin 7/12/14   Alyssa Islas MD   atorvastatin (LIPITOR) 40 MG tablet TAKE 1 TABLET(40 MG) BY MOUTH EVERY DAY 6/25/20   Nicole Márquez MD   BD ULTRA-FINE SHORT PEN NEEDLE 31 gauge x 5/16" Ndle USE AS DIRECTED TWICE DAILY 4/1/20   Nicole Márquez MD   blood sugar diagnostic Strp 1 strip by Misc.(Non-Drug; Combo Route) route 2 (two) times a day. 11/24/20 1/13/21  Breanna Taylor NP   blood-glucose meter (ACCU-CHEK KENDRA PLUS METER) Misc 1 each by Misc.(Non-Drug; Combo Route) route 2 (two) times daily. Patient test blood sugar 2 times daily 6/25/20 6/25/21  Nicole Márquez MD   blood-glucose meter Misc use twice a day 11/24/20   Breanna Taylor NP   chlorhexidine (PERIDEX) 0.12 % solution SWISH FOR 30 SECONDS AND SPIT 15ML PO  BID . DO NOT SWALLOW 8/5/20   Historical Provider   cholecalciferol, vitamin D3, 125 mcg (5,000 unit) Tab Take 5,000 Units by mouth once daily.    Historical Provider   fluticasone propionate (FLONASE) 50 mcg/actuation nasal spray 1 spray by Each Nostril route once daily. 10/15/20   Historical Provider   folic acid (FOLVITE) 1 MG tablet Take 1 tablet (1 mg total) by mouth once daily. 10/21/20 11/20/20  Darling Quintero MD   hydroCHLOROthiazide (HYDRODIURIL) 12.5 MG Tab Take 1 tablet (12.5 mg total) " by mouth once daily. 11/10/20 11/10/21  Breanna Taylor NP   insulin degludec (TRESIBA FLEXTOUCH U-100) 100 unit/mL (3 mL) InPn Inject 36 Units into the skin once daily. 7/14/20   Nicole Márquez MD   insulin aspart U-100 (NOVOLOG U-100 INSULIN ASPART) 100 unit/mL injection Inject 70 Units into the skin continuous. use with vgo 30  Patient not taking: Reported on 12/11/2020 11/10/20 11/10/21  Breanna Taylor NP   lancets (ACCU-CHEK FASTCLIX LANCET DRUM) Misc Inject 1 each into the skin 2 (two) times daily before meals. 11/24/20   Breanna Taylor NP   lancets 33 gauge Misc 1 lancet by Misc.(Non-Drug; Combo Route) route 2 (two) times daily before meals. 6/25/20   Nicole Márquez MD   lidocaine HCL 2% (XYLOCAINE) 2 % jelly Apply topically as needed. Apply topically once nightly to affected part of foot/feet. 11/11/20   Abdon Woods DPM   losartan (COZAAR) 100 MG tablet TAKE 1 TABLET BY MOUTH EVERY DAY 12/3/20   Nicole Márquez MD   MAGNESIUM ORAL Take 500 mg by mouth.    Historical Provider   metFORMIN (GLUCOPHAGE) 500 MG tablet TAKE 2 TABLETS BY MOUTH AT LUNCH AND 1 TABLET AT DINNER 8/26/20   Nicole Márquez MD   methotrexate 2.5 MG Tab Take 4 tablets (10 mg total) by mouth every 7 days. 10/21/20 10/21/21  Darling Quintero MD   oxybutynin (DITROPAN) 5 MG Tab Take 1 tablet (5 mg total) by mouth 3 (three) times daily.  Patient not taking: Reported on 12/11/2020 10/2/20   Nicole Márquez MD   predniSONE (DELTASONE) 10 MG tablet Take 2 tablets (20 mg total) by mouth 2 (two) times a day. 9/25/20   Ingrid Wagner MD   pregabalin (LYRICA) 75 MG capsule Take 1 capsule (75 mg total) by mouth 2 (two) times daily. 7/6/20   Nicole Márquez MD   sub-q insulin device, 30 unit (V-GO 30) Fariba 1 Device by Misc.(Non-Drug; Combo Route) route once daily.  Patient not taking: Reported on 12/11/2020 11/10/20 12/24/20  Breanna Taylor NP   traMADoL (ULTRAM) 50 mg tablet Take 1 tablet (50 mg total) by  mouth every 12 (twelve) hours as needed. M54.40 Greater than 7 day supply is medically necessary 11/6/20   Nicole Márquez MD        Medications this encounter:    aspirin  81 mg Oral Daily    atorvastatin  40 mg Oral Daily    divalproex  250 mg Oral QHS    enoxaparin  40 mg Subcutaneous Q24H    famotidine  20 mg Oral BID    folic acid  1 mg Oral Daily    insulin aspart U-100  3 Units Subcutaneous TIDWM    insulin detemir U-100  12 Units Subcutaneous Daily    polyethylene glycol  17 g Oral Daily    pregabalin  75 mg Oral BID       Allergies: is allergic to keflex [cephalexin]; penicillins; sulfamethoxazole-trimethoprim; vicodin [hydrocodone-acetaminophen]; and sulfa (sulfonamide antibiotics).     Social:  reports that she has quit smoking. She started smoking about 18 years ago. She has never used smokeless tobacco. She reports current alcohol use. She reports that she does not use drugs.     Family Hx: No family history of glaucoma, macular degeneration, or blindness. family history includes Arthritis in her mother; Breast cancer (age of onset: 40) in her cousin, cousin, and sister; Breast cancer (age of onset: 50) in her maternal grandmother and sister; Breast cancer (age of onset: 54) in her sister; Breast cancer (age of onset: 75) in her mother; Cancer (age of onset: 70) in her mother; Diabetes in her mother and unknown relative; Heart disease in her mother and sister; Heart failure in her brother; Miscarriages / Stillbirths in her mother; No Known Problems in her father, maternal aunt, maternal grandfather, maternal uncle, paternal aunt, paternal grandfather, paternal grandmother, and paternal uncle; Osteoarthritis in her sister and sister; Pacemaker/defibrilator in her brother; Vision loss in her mother.     ROS: Negative x 10 except for complaints as described in HPI; negative for fever, chills, weight loss, nausea, vomiting, diarrhea, shortness of breath, nasal discharge, cough, abdominal pain,  dyspnea, difficulty moving arms and legs, confusion, dysuria, palpitations, or chest pain     Ocular examination/Dilated fundus examination:  Base Eye Exam     Visual Acuity       Right Left    Near cc 20/25 20/25          Tonometry (Tonopen, 3:24 PM)       Right Left    Pressure 14 15          Pupils       Pupils    Right PERRL    Left PERRL          Visual Fields       Right Left     Full Full          Extraocular Movement       Right Left     Full, Ortho Full, Ortho          Neuro/Psych     Oriented x3: Yes    Mood/Affect: Normal            Slit Lamp and Fundus Exam     External Exam       Right Left    External Normal Normal          Pen Light Exam       Right Left    Lids/Lashes Normal Normal    Conjunctiva/Sclera  2+ Injection Nasal 2+ Injection Nasal    Cornea Clear Clear    Anterior Chamber Deep and formed Deep and formed    Iris Round and reactive Round and reactive    Lens Posterior chamber intraocular lens, wrinkles in posterior capsule, Posterior capsular opacification Posterior chamber intraocular lens, mild PCO    Vitreous Grossly normal Grossly normal          Fundus Exam       Right Left    Disc Normal Normal    C/D Ratio 0.2 0.25    Macula DBH/MAs, CWS adjacent to arcades DBH/MAs    Vessels Arteriolar narrowing Arteriolar narrowing    Periphery Scattered DBH/MAs, CWS Scattered DBH/MAs                Assessment/Plan:   1. Scleritis, both eyes  - Followed outpatient by Dr. Wagner  - Patient feels that her vision is stable, no pain, only complains of eye redness nasally in both eyes  - From ophthalmology standpoint, scleritis is quiet; no need for continued steroid medications at this time    Discussed patient's history, physical, assessment and plan with Dr. Wagner.  If there are further questions, please page the on call ophthalmology resident.    Breezy Vicente MD  PGY2, Ophthalmology Resident  12/17/2020  3:19 PM

## 2020-12-17 NOTE — SUBJECTIVE & OBJECTIVE
"Interval History: as above    Family History     Problem Relation (Age of Onset)    Arthritis Mother    Breast cancer Mother (75), Maternal Grandmother (50), Sister (50), Cousin (40), Cousin (40), Sister (40), Sister (54)    Cancer Mother (70)    Diabetes Mother,     Heart disease Mother, Sister    Heart failure Brother    Miscarriages / Stillbirths Mother    No Known Problems Father, Maternal Aunt, Maternal Uncle, Paternal Aunt, Paternal Uncle, Maternal Grandfather, Paternal Grandmother, Paternal Grandfather    Osteoarthritis Sister, Sister    Pacemaker/defibrilator Brother    Vision loss Mother        Tobacco Use    Smoking status: Former Smoker     Start date: 12/9/2002    Smokeless tobacco: Never Used   Substance and Sexual Activity    Alcohol use: Yes     Frequency: Monthly or less     Drinks per session: 1 or 2     Binge frequency: Never     Comment: socially; couple glasses    Drug use: No    Sexual activity: Not Currently     Partners: Male     Birth control/protection: None     Psychotherapeutics (From admission, onward)    Start     Stop Route Frequency Ordered    12/16/20 1704  OLANZapine tablet 2.5 mg      -- Oral Every 4 hours PRN 12/16/20 1604           Review of Systems   Psychiatric/Behavioral: Negative for agitation, behavioral problems, confusion, decreased concentration, dysphoric mood, hallucinations, self-injury, sleep disturbance and suicidal ideas. The patient is not nervous/anxious and is not hyperactive.      Objective:     Vital Signs (Most Recent):  Temp: 98.3 °F (36.8 °C) (12/17/20 0742)  Pulse: 65 (12/17/20 0742)  Resp: 18 (12/17/20 0742)  BP: (!) 129/59 (12/17/20 0742)  SpO2: 96 % (12/17/20 0742) Vital Signs (24h Range):  Temp:  [97.5 °F (36.4 °C)-98.6 °F (37 °C)] 98.3 °F (36.8 °C)  Pulse:  [65-83] 65  Resp:  [16-18] 18  SpO2:  [95 %-99 %] 96 %  BP: ()/(40-79) 129/59     Height: 5' 5" (165.1 cm)  Weight: 94 kg (207 lb 3.7 oz)  Body mass index is 34.49 " "kg/m².      Intake/Output Summary (Last 24 hours) at 12/17/2020 0853  Last data filed at 12/17/2020 0543  Gross per 24 hour   Intake 1380 ml   Output --   Net 1380 ml     Mental Status Exam:  Appearance: unremarkable, age appropriate  Level of Consciousness: alert and awake  Behavior/Cooperation: normal, cooperative  Psychomotor: unremarkable   Speech: rapid  Language: english, fluid  Orientation: grossly intact  Attention Span/Concentration: spelled "WORLD" forwards and backwards  Memory: Grossly intact  Mood: "fine"  Affect: normal  Thought Process: linear, logical, tangential  Associations: normal and logical  Thought Content: paranoid, ruminations  Fund of Knowledge: Aware of current events  Abstraction: proverbs were abstract, similarities were abstract  Insight: fair  Judgment: fair    Significant Labs: All pertinent labs within the past 24 hours have been reviewed.    Significant Imaging: I have reviewed all pertinent imaging results/findings within the past 24 hours.  "

## 2020-12-17 NOTE — UM SECONDARY REVIEW
Physician Advisor External  Level of Care Issue  Observation    Patient Name: Adriana Paula  Account Number: [id number]  Admit Date: 12/16/2020  Attending Physician: Channing Hathaway  Date Received: 12/17/2020 9:44AM  [facility] Physician Advisor: Dustin Villeda MD, MS  Number of contact: [phone]    [facility] Recommendation under review: Observation    This recommendation is based upon review of the clinical information provided to [facility] [employer] [facility] as of the date of this recommendation.    Recommendation Summary    Glmudgemyuleky58/16/2020: Inpatient    Supporting Clinical Factors:  ? Acute hypotension with ongoing management    The Attending Physician Concern:  The concern of the attending physician is for psychosis.    Rationale:  Inpatient services are appropriate for this patient due to their increased risk for clinical decompensation evidenced by their recent onset of altered mental status following the abrupt discontinuation of high-dose prednisone for episcleritis, her abnormal psychiatric evaluation findings, the need for placement of a medical hold, and the development of hypotension (BP 80/49 and 74/51) following their arrival and despite their initial management.    Plan of Care Includes:  The plan of care included Depakote, Haldol as needed, IV fluid hydration, placing a psychiatric hold on the patient, an echocardiogram, and psychiatry, rheumatology, and neurology consults.

## 2020-12-17 NOTE — TELEPHONE ENCOUNTER
----- Message from Mila Stacy sent at 12/17/2020 11:29 AM CST -----  Contact: Dafne(cousin) [phone]  Patients cousin ( Dafne Maynard) states that patient has been admitted and will be released tomorrow. Family is concerned about patient living alone when she is discharged and wants to make sure she will be okay to live alone. Cousin states that the patient has been violent to her daughter that lives with her in the past and is concerned for daughters safety.   Mrs Maynard states that she is on the emergency contact list  Please call and advise

## 2020-12-17 NOTE — ASSESSMENT & PLAN NOTE
ASSESSMENT     Adriana Paula is a 65 y.o. female with a past psychiatric history of no known diagnosis, currently presenting with Psychosis.  Psychiatry was originally consulted to address the patient's symptoms of steroid induced gabe vs psychosis. Given recent use of prednisone which has a high correlation with psychosis/gabe LAURI combined with all of sudden psych s/sx in a pt with no past hx there is a high suspicion for steroid induce hypomania with psychotic features. Further pt could possibly have underlying bipolar that is usually well managed on her own that has now surfaced 2/2 to recent stress of steroids. Will need time and further collateral to clarify if true organic illness exists.    IMPRESSION  Steroid induced hypomania with psychotic features  -- note no ICD 10 code exists for steroid induced gabe thus had to place problem under psychosis. PT does infact have gabe.    RECOMMENDATION(S)      1. Scheduled Medication(s):  Depakote 250mg nightly; will need QAM lab draw on 12/19 for VPA level and LFT (inpatient or outpatient)  -- will be discharged on med if tolerating it well    2. PRN Medication(s):  Zyprexa 2.5 mg PO or IM Q as needed without exceeding 30mg in 24 hrs; cannot be given within 1hr of a benzo    3.  Monitor:  Please obtain daily EKG to monitor QTc    4. Legal Status/Precaution(s):  Continue PEC as pt is acutely gravely disabled; need more time for medication titration and having daughter speak with pt to determine if at baseline    Case discussed with Dr Stock

## 2020-12-17 NOTE — SUBJECTIVE & OBJECTIVE
Interval History: Patient much improved today.  Her mental status seems to be at baseline.  She is alert and oriented.  States the last prednisone she took was the Friday before Thanksgiving.  Seen by ophthalmology today.    Current Facility-Administered Medications   Medication Frequency    acetaminophen tablet 650 mg Q4H PRN    albuterol-ipratropium 2.5 mg-0.5 mg/3 mL nebulizer solution 3 mL Q4H PRN    aspirin EC tablet 81 mg Daily    atorvastatin tablet 40 mg Daily    bisacodyL suppository 10 mg Daily PRN    dextrose 50% injection 12.5 g PRN    dextrose 50% injection 25 g PRN    divalproex EC tablet 250 mg QHS    enoxaparin injection 40 mg Q24H    famotidine tablet 20 mg BID    folic acid tablet 1 mg Daily    glucagon (human recombinant) injection 1 mg PRN    glucose chewable tablet 16 g PRN    glucose chewable tablet 24 g PRN    insulin aspart U-100 pen 0-5 Units QID (AC + HS) PRN    insulin aspart U-100 pen 3 Units TIDWM    insulin detemir U-100 pen 12 Units Daily    melatonin tablet 6 mg Nightly PRN    OLANZapine tablet 2.5 mg Q4H PRN    ondansetron disintegrating tablet 8 mg Q8H PRN    polyethylene glycol packet 17 g Daily    pregabalin capsule 75 mg BID    promethazine (PHENERGAN) 6.25 mg in dextrose 5 % 50 mL IVPB Q6H PRN    sodium chloride 0.9% flush 5 mL PRN    traMADoL tablet 50 mg Q12H PRN     Objective:     Vital Signs (Most Recent):  Temp: 98.5 °F (36.9 °C) (12/17/20 1113)  Pulse: 74 (12/17/20 1113)  Resp: 17 (12/17/20 1113)  BP: 132/62 (12/17/20 1113)  SpO2: 99 % (12/17/20 1113)  O2 Device (Oxygen Therapy): room air (12/17/20 0742) Vital Signs (24h Range):  Temp:  [97.8 °F (36.6 °C)-98.6 °F (37 °C)] 98.5 °F (36.9 °C)  Pulse:  [65-78] 74  Resp:  [16-18] 17  SpO2:  [95 %-99 %] 99 %  BP: ()/(40-79) 132/62     Weight: 94 kg (207 lb 3.7 oz) (12/16/20 2310)  Body mass index is 34.49 kg/m².  Body surface area is 2.08 meters squared.      Intake/Output Summary (Last 24 hours)  at 12/17/2020 1154  Last data filed at 12/17/2020 0543  Gross per 24 hour   Intake 1260 ml   Output --   Net 1260 ml       Physical Exam   Nursing note and vitals reviewed.  Constitutional: She is oriented to person, place, and time and well-developed, well-nourished, and in no distress. No distress.   HENT:   Head: Normocephalic and atraumatic.   Mouth/Throat: Oropharynx is clear and moist. No oropharyngeal exudate.   Eyes: Conjunctivae and EOM are normal. Pupils are equal, round, and reactive to light. Right eye exhibits no discharge. Left eye exhibits no discharge. Right conjunctiva is not injected. Left conjunctiva is not injected. No scleral icterus.   Neck: Normal range of motion. Neck supple.   Cardiovascular: Normal rate and intact distal pulses.    Pulmonary/Chest: Effort normal. No respiratory distress.   Abdominal: Soft. She exhibits no distension. There is no abdominal tenderness. There is no rebound and no guarding.   Neurological: She is alert and oriented to person, place, and time.   Skin: Skin is warm and dry. No rash noted. She is not diaphoretic. No erythema.     Psychiatric: Mood and affect normal.   Musculoskeletal: No edema.         Significant Labs:  All pertinent lab results from the last 24 hours have been reviewed.    Significant Imaging:  Imaging results within the past 24 hours have been reviewed.

## 2020-12-17 NOTE — PROGRESS NOTES
Hospital Medicine  Progress Note      Patient Name: Adriana Paula  MRN: [id number]  Date of Admission: 12/16/2020     Principal Problem: Psychosis     Subjective     Ms. Paula was feeling much better today. Though it's my first time evaluating her, she was calm, alert, oriented, and generally much improved relative to prior based on documentation and discussion with psychiatry. She feels well and has no new complaints.     She was hypoglycemic this AM for which her insulin was titrated    Discussed with psych who recommend continuing the PEC for today to ensure stability and provide time for collateral from family    Review of Systems    Constitutional: Negative for chills, fatigue, fever.   Respiratory: Negative for cough, shortness of breath.    Cardiovascular: Negative for chest pain, palpitations, leg swelling.   Gastrointestinal: Negative for abdominal pain, constipation, diarrhea, nausea, vomiting.   Neurological: +confusion (improvnig) Negative for dizziness, syncope, weakness, light-headedness.     Medications  Scheduled Meds:   aspirin  81 mg Oral Daily    atorvastatin  40 mg Oral Daily    divalproex  250 mg Oral QHS    enoxaparin  40 mg Subcutaneous Q24H    famotidine  20 mg Oral BID    folic acid  1 mg Oral Daily    insulin aspart U-100  3 Units Subcutaneous TIDWM    insulin detemir U-100  12 Units Subcutaneous Daily    polyethylene glycol  17 g Oral Daily    pregabalin  75 mg Oral BID     Continuous Infusions:  PRN Meds:.acetaminophen, albuterol-ipratropium, bisacodyL, dextrose 50%, dextrose 50%, glucagon (human recombinant), glucose, glucose, insulin aspart U-100, melatonin, OLANZapine, ondansetron, promethazine (PHENERGAN) IVPB, sodium chloride 0.9%, traMADoL    Objective    Physical Examination    Temp:  [97.8 °F (36.6 °C)-98.6 °F (37 °C)]   Pulse:  [65-78]   Resp:  [16-18]   BP: ()/(40-79)   SpO2:  [95 %-99 %]     Gen: NAD, conversant  CV: RRR, no peripheral edema,  Resp: CTAB, no  "increased work of breathing on room air  GI: Soft, NT, ND, +BS  Neuro: AAOx3, CN grossly intact, no focal neurologic deficits  Psychiatry: Calm. Normal mood, normal affect    CBC  Recent Labs   Lab 12/11/20  1205 12/16/20  0329 12/17/20  0405   WBC 7.51 6.76 7.56   HGB 11.8* 10.3* 9.2*   HCT 38.3 32.5* 29.6*    210 202     CMP  Recent Labs   Lab 12/11/20  1205 12/16/20  0329 12/17/20  0405    135* 139   K 4.5 4.3 3.4*    99 106   CO2 24 23 23   BUN 32* 21 12   CREATININE 1.5* 1.1 0.8   GLU 76 320* 31*   CALCIUM 9.3 8.9 8.2*   MG  --   --  1.2*   PHOS  --   --  2.7   ALKPHOS  --  64  --    ALT  --  51*  --    AST  --  67*  --    ALBUMIN  --  3.0*  --    PROT  --  6.1  --    BILITOT  --  1.0  --        Hospital Course:    Ms. Paula presented on 12/16 with AMS and was admitted with acute encephalopathy thought to be from steroid-induced gabe. She was PEC'd upon arrival. Her home medications were continued with the exception of her prednisone, for which rheumatology was consulted. Psychiatry evaluated and recommended valproic acid 250 mg QHS which she first received on 12/16 and tolerated well. By the following morning her mentation was much improved and for the time appears to have returned to baseline. Ophthalmology consulted. Her admission has been complicated by hypoglycemia for which her insulin was adjusted.    Assessment and Plan:    Acute encephalopathy  Steroid-induced gabe    - Improving  - per chart review (Dr. Márquez 12/12/20), some concern of underlying psychiatric condition prior to starting steroids, worsened since starting prednisone  - per telephone encounter 11/20/20 with Dr. Xiong-John, "Patient instructed to decreased from 20 mg to 10 mg." But patient stopped cold turkey  - Rheumatology consulted for recommendations on steroids, recommended serologic w/u and ophthalmology consultation  - Continue valoproic acid 250 mg QHS. Psychiatry following. Discussed with staff on 12/17, " though her mentation is much improved will continue PEC for today to ensure stability and provide time for her family to evaluate and provide collateral as to her baselin     Episcleritis, bilateral    - Ophthalmology consulted, paged on 12/17 to ensure they are aware of the consult. Will base prednisone therapy on their recommendations      Hyperlipidemia associated with type 2 diabetes mellitus    - continue statin, ASA     Chronic pain    - continue lyrica, and PRN tramadol which she is tolerating well from a mentation perspective     Gastroesophageal reflux disease without esophagitis    - Stable  - Continue famotidine     Obstructive sleep apnea syndrome    - unclear if on CPAP at home, doing fine off it here     Type 2 diabetes mellitus with both eyes affected by mild nonproliferative retinopathy without macular edema, with long-term current use of insulin with hypoglycemia    - hold home V-GO, metformin, tresiba  - Though hyperglycemic on arrival, she became hypoglycemic on weight-based insulin, likely from being off steroids and poor PO intake. Decreased 37 units daily -> 12 units, aspart 9 units TIDWM -> 3 units TIDWM, and decreased intensity of SSI. Anticipate that she will be hyperglycemic on this regimen and will adjust accordingly  - diabetic diet    Hypotension    - Improving with PO intake, IVF  - Likely hypovolemic. At risk for adrenal insufficiency given recent prednisone discontinuation but cortisol reassuring    Hypokalemia    - K 3.4 on 12/17, replaced  - BMP + Mg daily to follow    Diet: ADA 2000 choco  VTE PPX: LMWh    Goals of care: Curative, full code      Channing Hathaway M.D.  Department of Hospital Medicine  [facility]  [phone] (pager)     Total time spent 30 minutes with >50% in direct patient care and care coordination.

## 2020-12-17 NOTE — PROGRESS NOTES
"[facility]  Rheumatology  Progress Note    Patient Name: Adriana Paula  MRN: [id number]  Admission Date: 12/16/2020  Hospital Length of Stay: 0 days  Code Status: Full Code   Attending Provider: Channing Hathaway MD  Primary Care Physician: Nicole Márquez MD  Principal Problem: Psychosis    Subjective:     HPI: Per initial HPI:  Adriana Paula is a 65F with bilateral episcleritis on prednisone, HTN, IDDM, HLD, MARINA, chronic pain, GERD, who presents for evaluation of psychosis. The patient states she misunderstood taper instructions for her prednisone and stopped cold turkey the Friday before Thanksgiving. She then showed up the ED shortly after Thanksgiving "completely shut down". She was given fluids and told to resume prednisone 10 mg daily. She still didn't and wanted to continue to "flush her system". She hasn't been taking prednisone since. She says her daughter and her haven't been getting along since she retired last year, and for some reason she changed the locks in the house. She attributes it to their 40 year age difference. 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 She is easily flustered and tangential, but redirectable. She otherwise has been in her usual state of health - no fevers, chills, CP, NVD, abdominal pain, dysuria. She says she feels improved with ED interventions.      Per ED:  "65-year-old female to the ER via EMS.  EMS reports that the patient call 911.  When they arrived on the scene the patient was acting erratically.  The patient states that she called EMS because her blood pressure was elevated, her blood glucose was elevated, patient then began 10 aggressively towards EMS.  The patient became erratic and compulsive.  EMS states that the patient was not making any sense.  Upon arrival to our facility the patient is yelling obscenities.  The patient's speech appears pressured and she appears delusional.     Per chart review the patient was " "recently diagnosed with autoimmune scleritis and was started on prednisone a couple of weeks ago.  Since that time she has lost significant weight.  The daughter has contacted PCPs office multiple time due to patient agitation.  PCP concerned about prednisone induced psychosis and the patient has an appointment to see psychiatry but not until next month.  The daughter states that the past couple of weeks have been very difficult with her mother secondary to a mother becoming more agitated and aggressive.  The mother has been accusing her of different things.  The mother has not been making any sense.  The mother has kicked her out the house.  She has not been eating or drinking and has been noncompliant with her medications."      On my evaluation today, patient is very drowsy and hard to obtain history from.  She will wake and answer questions although sometimes answers are non-sensible.  Drifts back to sleep very easily.    Interval History: Patient much improved today.  Her mental status seems to be at baseline.  She is alert and oriented.  States the last prednisone she took was the Friday before Thanksgiving.  Seen by ophthalmology today.    Current Facility-Administered Medications   Medication Frequency    acetaminophen tablet 650 mg Q4H PRN    albuterol-ipratropium 2.5 mg-0.5 mg/3 mL nebulizer solution 3 mL Q4H PRN    aspirin EC tablet 81 mg Daily    atorvastatin tablet 40 mg Daily    bisacodyL suppository 10 mg Daily PRN    dextrose 50% injection 12.5 g PRN    dextrose 50% injection 25 g PRN    divalproex EC tablet 250 mg QHS    enoxaparin injection 40 mg Q24H    famotidine tablet 20 mg BID    folic acid tablet 1 mg Daily    glucagon (human recombinant) injection 1 mg PRN    glucose chewable tablet 16 g PRN    glucose chewable tablet 24 g PRN    insulin aspart U-100 pen 0-5 Units QID (AC + HS) PRN    insulin aspart U-100 pen 3 Units TIDWM    insulin detemir U-100 pen 12 Units Daily    " melatonin tablet 6 mg Nightly PRN    OLANZapine tablet 2.5 mg Q4H PRN    ondansetron disintegrating tablet 8 mg Q8H PRN    polyethylene glycol packet 17 g Daily    pregabalin capsule 75 mg BID    promethazine (PHENERGAN) 6.25 mg in dextrose 5 % 50 mL IVPB Q6H PRN    sodium chloride 0.9% flush 5 mL PRN    traMADoL tablet 50 mg Q12H PRN     Objective:     Vital Signs (Most Recent):  Temp: 98.5 °F (36.9 °C) (12/17/20 1113)  Pulse: 74 (12/17/20 1113)  Resp: 17 (12/17/20 1113)  BP: 132/62 (12/17/20 1113)  SpO2: 99 % (12/17/20 1113)  O2 Device (Oxygen Therapy): room air (12/17/20 0742) Vital Signs (24h Range):  Temp:  [97.8 °F (36.6 °C)-98.6 °F (37 °C)] 98.5 °F (36.9 °C)  Pulse:  [65-78] 74  Resp:  [16-18] 17  SpO2:  [95 %-99 %] 99 %  BP: ()/(40-79) 132/62     Weight: 94 kg (207 lb 3.7 oz) (12/16/20 2310)  Body mass index is 34.49 kg/m².  Body surface area is 2.08 meters squared.      Intake/Output Summary (Last 24 hours) at 12/17/2020 1154  Last data filed at 12/17/2020 0543  Gross per 24 hour   Intake 1260 ml   Output --   Net 1260 ml       Physical Exam   Nursing note and vitals reviewed.  Constitutional: She is oriented to person, place, and time and well-developed, well-nourished, and in no distress. No distress.   HENT:   Head: Normocephalic and atraumatic.   Mouth/Throat: Oropharynx is clear and moist. No oropharyngeal exudate.   Eyes: Conjunctivae and EOM are normal. Pupils are equal, round, and reactive to light. Right eye exhibits no discharge. Left eye exhibits no discharge. Right conjunctiva is not injected. Left conjunctiva is not injected. No scleral icterus.   Neck: Normal range of motion. Neck supple.   Cardiovascular: Normal rate and intact distal pulses.    Pulmonary/Chest: Effort normal. No respiratory distress.   Abdominal: Soft. She exhibits no distension. There is no abdominal tenderness. There is no rebound and no guarding.   Neurological: She is alert and oriented to person, place, and  time.   Skin: Skin is warm and dry. No rash noted. She is not diaphoretic. No erythema.     Psychiatric: Mood and affect normal.   Musculoskeletal: No edema.         Significant Labs:  All pertinent lab results from the last 24 hours have been reviewed.    Significant Imaging:  Imaging results within the past 24 hours have been reviewed.    Assessment/Plan:     Scleritis, bilateral  Patient with initial development of left eye redness and pain behind eye in February 2020. Took 4 different drops without improvement.   Dr. Batista optometrist sent to Dr. 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  Right eye became involved end of August.   She was given 40 mg prednisone with weekly taper for 3 weeks.  This helped but eye never cleared.  One month after finishing prednisone pain returned.  She was given more eye drops.   Dr. Wagner did labs and told her it was an autoimmune issue.  - Seen by Dr. Xiong on 10/6/2020  - Has been continued on steroid taper  - Now presenting after being off steroids cold turkey for at least a couple weeks and with psychotic behavior  - Patient is currently PEC'd  Psych consulted  - Rheumatology consulted for further recs regarding steroids  - Will need to follow-up ophthalmology recs regarding further scleritis treatment if active      Plan to be discussed with Dr. Hernandez. Please await attestation to follow for today's final recommendations.      Pete Echeverria MD  Rheumatology  [facility]      I have personally reviewed the history, confirmed exam findings, and discussed assessment and plan with fellow.;    AMS:Pts mental status markedly improved with Depakote 250mg nightly  Appreciate Psychiatry evaluation and Rx: steroid induced hypomania with psychotic features  : Scleritis: Ophthalmology evaluation appreciated, stable vision, mild redness. No need for continued steroid medications at this time  If recurrent eye symptoms, will need to resume  methotrexate as steroid sparing agent.   F/u outpatient with Dr. Wagner in Ophthalmology, Dr. Xiong in Rheumatology  Will sign off. dsDNA  Ribosomal P, ANCA, CH50 pending      Brandon Hernandez MD      [phone]

## 2020-12-17 NOTE — ASSESSMENT & PLAN NOTE
Patient with initial development of left eye redness and pain behind eye in February 2020. Took 4 different drops without improvement.   Dr. Batista optometrist sent to Dr. 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  Right eye became involved end of August.   She was given 40 mg prednisone with weekly taper for 3 weeks.  This helped but eye never cleared.  One month after finishing prednisone pain returned.  She was given more eye drops.   Dr. Wagner did labs and told her it was an autoimmune issue.  - Seen by Dr. Xiong on 10/6/2020  - Has been continued on steroid taper  - Now presenting after being off steroids cold turkey for at least a couple weeks and with psychotic behavior  - Patient is currently PEC'd  Psych consulted  - Rheumatology consulted for further recs regarding steroids  - Will need to follow-up ophthalmology recs regarding further scleritis treatment if active

## 2020-12-17 NOTE — PLAN OF CARE
Problem: Adult Inpatient Plan of Care  Goal: Plan of Care Review  Outcome: Ongoing, Progressing     Problem: Diabetes Comorbidity  Goal: Blood Glucose Level Within Desired Range  Outcome: Ongoing, Progressing     Problem: Skin Injury Risk Increased  Goal: Skin Health and Integrity  Outcome: Ongoing, Progressing   No acute events throughout shift.  Vitals and assessment completed as ordered. Pt calm and cooperative. No complains of pain. Pt free from injury or falls

## 2020-12-17 NOTE — HOSPITAL COURSE
12/17  Pt states that she is doing better today. Pt was able to sleep some last night and feels a little groggy this AM. Pt has been able to eat without issue. Pt states that she looks forward to talking to her daughter today. Spoke with pt's daughter on phone and she states that pt was completely fine until starting new medication. Pt had no psych issues or concenring symptomology prior to starting prednisone. Pt and daughter get along well until new meds started. As for pt she understands that she needs to work on her emotions and feels like she is at turning point. Pt is hopeful for the future and denies SI HI AVH.    12/18  Pt doing well this AM. Pt states that their mood is good. Pt denies AVH/SI/HI. Pt has been eating well, sleeping well, and maintaining proper hygiene. Pt has been compliant with all medication and did not require any PRN medication for agitation. Pt denies any side effects to medication. Pt has no positive medical or psychiatric symptomology on ROS. Pt denies any further physical or other complaints. Pt looks forward to going home and hopes to repair things with her daughter. Spoke with pt's daughter who states that she is back to baseline and ready to be discharged.

## 2020-12-17 NOTE — PROGRESS NOTES
Pt's CBG this AM was 59. Pt asking to drink some apple juices. 2 apple juices provided at bedside and patient is drinking now. BP wnl this AM.  Paged team to notify.

## 2020-12-18 VITALS
SYSTOLIC BLOOD PRESSURE: 132 MMHG | OXYGEN SATURATION: 96 % | WEIGHT: 207 LBS | HEART RATE: 81 BPM | TEMPERATURE: 98 F | DIASTOLIC BLOOD PRESSURE: 60 MMHG | HEIGHT: 65 IN | BODY MASS INDEX: 34.49 KG/M2 | RESPIRATION RATE: 18 BRPM

## 2020-12-18 LAB
ANION GAP SERPL CALC-SCNC: 10 MMOL/L (ref 8–16)
BASOPHILS # BLD AUTO: 0.04 K/UL (ref 0–0.2)
BASOPHILS NFR BLD: 0.6 % (ref 0–1.9)
BUN SERPL-MCNC: 8 MG/DL (ref 8–23)
CALCIUM SERPL-MCNC: 8.5 MG/DL (ref 8.7–10.5)
CHLORIDE SERPL-SCNC: 106 MMOL/L (ref 95–110)
CO2 SERPL-SCNC: 25 MMOL/L (ref 23–29)
CREAT SERPL-MCNC: 0.8 MG/DL (ref 0.5–1.4)
DIFFERENTIAL METHOD: ABNORMAL
EOSINOPHIL # BLD AUTO: 0.1 K/UL (ref 0–0.5)
EOSINOPHIL NFR BLD: 2 % (ref 0–8)
ERYTHROCYTE [DISTWIDTH] IN BLOOD BY AUTOMATED COUNT: 15.7 % (ref 11.5–14.5)
EST. GFR  (AFRICAN AMERICAN): >60 ML/MIN/1.73 M^2
EST. GFR  (NON AFRICAN AMERICAN): >60 ML/MIN/1.73 M^2
GLUCOSE SERPL-MCNC: 75 MG/DL (ref 70–110)
HCT VFR BLD AUTO: 35.3 % (ref 37–48.5)
HGB BLD-MCNC: 11.1 G/DL (ref 12–16)
IMM GRANULOCYTES # BLD AUTO: 0.09 K/UL (ref 0–0.04)
IMM GRANULOCYTES NFR BLD AUTO: 1.3 % (ref 0–0.5)
LYMPHOCYTES # BLD AUTO: 2.4 K/UL (ref 1–4.8)
LYMPHOCYTES NFR BLD: 34.1 % (ref 18–48)
MAGNESIUM SERPL-MCNC: 1.3 MG/DL (ref 1.6–2.6)
MCH RBC QN AUTO: 26 PG (ref 27–31)
MCHC RBC AUTO-ENTMCNC: 31.4 G/DL (ref 32–36)
MCV RBC AUTO: 83 FL (ref 82–98)
MONOCYTES # BLD AUTO: 0.6 K/UL (ref 0.3–1)
MONOCYTES NFR BLD: 8.8 % (ref 4–15)
NEUTROPHILS # BLD AUTO: 3.8 K/UL (ref 1.8–7.7)
NEUTROPHILS NFR BLD: 53.2 % (ref 38–73)
NRBC BLD-RTO: 0 /100 WBC
PHOSPHATE SERPL-MCNC: 2.7 MG/DL (ref 2.7–4.5)
PLATELET # BLD AUTO: 200 K/UL (ref 150–350)
PMV BLD AUTO: 11.1 FL (ref 9.2–12.9)
POCT GLUCOSE: 188 MG/DL (ref 70–110)
POCT GLUCOSE: 82 MG/DL (ref 70–110)
POTASSIUM SERPL-SCNC: 4.1 MMOL/L (ref 3.5–5.1)
RBC # BLD AUTO: 4.27 M/UL (ref 4–5.4)
SODIUM SERPL-SCNC: 141 MMOL/L (ref 136–145)
WBC # BLD AUTO: 7.13 K/UL (ref 3.9–12.7)

## 2020-12-18 PROCEDURE — 83735 ASSAY OF MAGNESIUM: CPT | Mod: HCNC

## 2020-12-18 PROCEDURE — 99232 PR SUBSEQUENT HOSPITAL CARE,LEVL II: ICD-10-PCS | Mod: HCNC,,, | Performed by: PSYCHIATRY & NEUROLOGY

## 2020-12-18 PROCEDURE — 99239 HOSP IP/OBS DSCHRG MGMT >30: CPT | Mod: HCNC,,, | Performed by: INTERNAL MEDICINE

## 2020-12-18 PROCEDURE — 80048 BASIC METABOLIC PNL TOTAL CA: CPT | Mod: HCNC

## 2020-12-18 PROCEDURE — 93005 ELECTROCARDIOGRAM TRACING: CPT | Mod: HCNC

## 2020-12-18 PROCEDURE — 93010 EKG 12-LEAD: ICD-10-PCS | Mod: HCNC,,, | Performed by: INTERNAL MEDICINE

## 2020-12-18 PROCEDURE — 25000003 PHARM REV CODE 250: Mod: HCNC | Performed by: PHYSICIAN ASSISTANT

## 2020-12-18 PROCEDURE — 85025 COMPLETE CBC W/AUTO DIFF WBC: CPT | Mod: HCNC

## 2020-12-18 PROCEDURE — 93010 ELECTROCARDIOGRAM REPORT: CPT | Mod: HCNC,,, | Performed by: INTERNAL MEDICINE

## 2020-12-18 PROCEDURE — 36415 COLL VENOUS BLD VENIPUNCTURE: CPT | Mod: HCNC

## 2020-12-18 PROCEDURE — 99232 SBSQ HOSP IP/OBS MODERATE 35: CPT | Mod: HCNC,,, | Performed by: PSYCHIATRY & NEUROLOGY

## 2020-12-18 PROCEDURE — 99239 PR HOSPITAL DISCHARGE DAY,>30 MIN: ICD-10-PCS | Mod: HCNC,,, | Performed by: INTERNAL MEDICINE

## 2020-12-18 PROCEDURE — 84100 ASSAY OF PHOSPHORUS: CPT | Mod: HCNC

## 2020-12-18 RX ORDER — DIVALPROEX SODIUM 250 MG/1
250 TABLET, DELAYED RELEASE ORAL NIGHTLY
Qty: 30 TABLET | Refills: 0 | Status: ON HOLD | OUTPATIENT
Start: 2020-12-18 | End: 2021-01-04 | Stop reason: SDUPTHER

## 2020-12-18 RX ADMIN — POLYETHYLENE GLYCOL 3350 17 G: 17 POWDER, FOR SOLUTION ORAL at 09:12

## 2020-12-18 RX ADMIN — PREGABALIN 75 MG: 75 CAPSULE ORAL at 09:12

## 2020-12-18 RX ADMIN — FAMOTIDINE 20 MG: 20 TABLET, FILM COATED ORAL at 09:12

## 2020-12-18 RX ADMIN — FOLIC ACID 1 MG: 1 TABLET ORAL at 09:12

## 2020-12-18 RX ADMIN — ATORVASTATIN CALCIUM 40 MG: 20 TABLET, FILM COATED ORAL at 09:12

## 2020-12-18 RX ADMIN — INSULIN ASPART 3 UNITS: 100 INJECTION, SOLUTION INTRAVENOUS; SUBCUTANEOUS at 12:12

## 2020-12-18 RX ADMIN — ASPIRIN 81 MG: 81 TABLET, COATED ORAL at 09:12

## 2020-12-18 RX ADMIN — ACETAMINOPHEN 650 MG: 325 TABLET ORAL at 09:12

## 2020-12-18 NOTE — PLAN OF CARE
12/18/20 1309   Post-Acute Status   Post-Acute Authorization Other   Other Status No Post-Acute Service Needs     Leandra Monterroso, RIVAS  [facility]
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
## 2020-12-18 NOTE — SUBJECTIVE & OBJECTIVE
"Interval History: as above    Family History     Problem Relation (Age of Onset)    Arthritis Mother    Breast cancer Mother (75), Maternal Grandmother (50), Sister (50), Cousin (40), Cousin (40), Sister (40), Sister (54)    Cancer Mother (70)    Diabetes Mother,     Heart disease Mother, Sister    Heart failure Brother    Miscarriages / Stillbirths Mother    No Known Problems Father, Maternal Aunt, Maternal Uncle, Paternal Aunt, Paternal Uncle, Maternal Grandfather, Paternal Grandmother, Paternal Grandfather    Osteoarthritis Sister, Sister    Pacemaker/defibrilator Brother    Vision loss Mother        Tobacco Use    Smoking status: Former Smoker     Start date: 12/9/2002    Smokeless tobacco: Never Used   Substance and Sexual Activity    Alcohol use: Yes     Frequency: Monthly or less     Drinks per session: 1 or 2     Binge frequency: Never     Comment: socially; couple glasses    Drug use: No    Sexual activity: Not Currently     Partners: Male     Birth control/protection: None     Psychotherapeutics (From admission, onward)    Start     Stop Route Frequency Ordered    12/16/20 1704  OLANZapine tablet 2.5 mg      -- Oral Every 4 hours PRN 12/16/20 1604           Review of Systems   Psychiatric/Behavioral: Negative for agitation, behavioral problems, confusion, decreased concentration, dysphoric mood, hallucinations, self-injury, sleep disturbance and suicidal ideas. The patient is not nervous/anxious and is not hyperactive.      Objective:     Vital Signs (Most Recent):  Temp: 97.9 °F (36.6 °C) (12/18/20 0752)  Pulse: 67 (12/18/20 0752)  Resp: 18 (12/18/20 0752)  BP: (!) 160/64 (12/18/20 0752)  SpO2: 97 % (12/18/20 0752) Vital Signs (24h Range):  Temp:  [97.8 °F (36.6 °C)-98.7 °F (37.1 °C)] 97.9 °F (36.6 °C)  Pulse:  [67-85] 67  Resp:  [17-18] 18  SpO2:  [96 %-100 %] 97 %  BP: (122-160)/(59-67) 160/64     Height: 5' 5" (165.1 cm)  Weight: 93.9 kg (207 lb)  Body mass index is 34.45 kg/m².      Intake/Output " "Summary (Last 24 hours) at 12/18/2020 1026  Last data filed at 12/18/2020 0543  Gross per 24 hour   Intake 1440 ml   Output --   Net 1440 ml     Mental Status Exam:  Appearance: unremarkable, age appropriate  Level of Consciousness: alert and awake  Behavior/Cooperation: normal, cooperative  Psychomotor: unremarkable   Speech: rapid  Language: english, fluid  Orientation: grossly intact  Attention Span/Concentration: spelled "WORLD" forwards and backwards  Memory: Grossly intact  Mood: "fine"  Affect: normal  Thought Process: linear, logical, tangential  Associations: normal and logical  Thought Content: paranoid, ruminations  Fund of Knowledge: Aware of current events  Abstraction: proverbs were abstract, similarities were abstract  Insight: fair  Judgment: fair    Significant Labs: All pertinent labs within the past 24 hours have been reviewed.    Significant Imaging: I have reviewed all pertinent imaging results/findings within the past 24 hours.  "

## 2020-12-18 NOTE — PLAN OF CARE
Patient discharged to home.    Future Appointments   Date Time Provider Department Center   12/30/2020 11:30 AM Nicole Márquez MD [facility] IM [place]   1/19/2021  9:30 AM Ingrid Wagner MD [facility] OPHTHAL [facility]   1/28/2021 11:00 AM Ousmane Marie III, SARAH [facility] PSYCH [facility]   4/12/2021 10:20 AM INJECTION, INFECTIOUS DISEASES [facility] ID INJ [facility]        12/18/20 1436   Final Note   Assessment Type Final Discharge Note   Anticipated Discharge Disposition Home   Right Care Referral Info   Post Acute Recommendation No Care

## 2020-12-18 NOTE — ASSESSMENT & PLAN NOTE
ASSESSMENT     Adriana Paula is a 65 y.o. female with a past psychiatric history of no known diagnosis, currently presenting with Psychosis.  Psychiatry was originally consulted to address the patient's symptoms of steroid induced gabe vs psychosis. Given recent use of prednisone which has a high correlation with psychosis/gabe LAURI combined with all of sudden psych s/sx in a pt with no past hx there is a high suspicion for steroid induce hypomania with psychotic features. Further pt could possibly have underlying bipolar that is usually well managed on her own that has now surfaced 2/2 to recent stress of steroids. Will need time and further collateral to clarify if true organic illness exists.    IMPRESSION  Steroid induced hypomania with psychotic features  -- note no ICD 10 code exists for steroid induced gabe thus had to place problem under psychosis. PT does infact have gabe.    RECOMMENDATION(S)      1. Scheduled Medication(s):  Depakote 250mg nightly; will need QAM lab draw on  for VPA level and LFT (inpatient or outpatient)  -- will be discharged on med if tolerating it well    2. PRN Medication(s):  Zyprexa 2.5 mg PO or IM Q as needed without exceeding 30mg in 24 hrs; cannot be given within 1hr of a benzo    3.  Monitor:  Please obtain daily EKG to monitor QTc    4. Legal Status/Precaution(s):  Can rescind or let PEC  as pt is no longer gravely disabled    5. Discharge Recs  - 30 days of depakote 250mg QHS  - lab draw AM on  for CMP and VPA  - follow up with regular PCP for depakote management    Case discussed with Dr Dougherty
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
## 2020-12-18 NOTE — PLAN OF CARE
CM met with patient at bedside for discharge planning.  Patient states she lives with her daughter in a one story house with one step to enter.  Patient states she will have the help of her daughter at home if needed.  Patient is independent with ADL's and ambulating.  Patient's daughter will transport patient upon discharge.  Plan is to discharge to home with family.     Pharmacy:    [id number] - [place], [place] - [address] AT [facility] [address]  [address]  [facility] [address]  Phone: [phone] Fax: [phone]    PCP:  Nicole Márquez MD    Payor: [employer] MANAGED MEDICARE / Plan: [employer] TOTAL CARE ADVANTAGE / Product Type: Medicare Advantage /      12/17/20 1305   Discharge Assessment   Assessment Type Discharge Planning Assessment   Confirmed/corrected address and phone number on facesheet? Yes   Assessment information obtained from? Patient   Expected Length of Stay (days) 3   Prior to hospitilization cognitive status: Alert/Oriented   Prior to hospitalization functional status: Independent   Current cognitive status: Alert/Oriented   Current Functional Status: Independent   Facility Arrived From: Emergency room   Lives With child(karen), adult   Able to Return to Prior Arrangements yes   Is patient able to care for self after discharge? Unable to determine at this time (comments)   Patient's perception of discharge disposition home or selfcare   Readmission Within the Last 30 Days no previous admission in last 30 days   Patient currently being followed by outpatient case management? No   Patient currently receives any other outside agency services? No   Equipment Currently Used at Home none   Do you have any problems affording any of your prescribed medications? No   Is the patient taking medications as prescribed? yes   Does the patient have transportation home? Yes   Transportation Anticipated family or friend will provide   Does the patient receive services at the  [facility]? No   Discharge Plan A Home with family   Discharge Plan B Home Health   DME Needed Upon Discharge  none   Patient/Family in Agreement with Plan yes

## 2020-12-18 NOTE — TELEPHONE ENCOUNTER
Following events during her admission and she seems to be improving with medications. Have the family members been speaking with her by phone? Does she sound better?    It appears that the treatment team needs input about her return to baseline. Please make sure that family members are available to help determine when she can be released.     Will make arrangements to see her in clinic on Monday if she is discharged today or over the weekend.    Let Ms. Leos know that I will call her either around 12pm or at 5pm.
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
## 2020-12-18 NOTE — PLAN OF CARE
Problem: Adult Inpatient Plan of Care  Goal: Plan of Care Review  Outcome: Ongoing, Progressing     Problem: Fall Injury Risk  Goal: Absence of Fall and Fall-Related Injury  Outcome: Ongoing, Progressing     Problem: Skin Injury Risk Increased  Goal: Skin Health and Integrity  Outcome: Ongoing, Progressing   No acute events throughout shift.  Pt more independent with self care,very expressive and less with drawn.Encourage to express needs and concern daily. Vitals and assessment completed as ordered. Pt calm and cooperative. No complains of pain. Pt free from injury or falls

## 2020-12-19 LAB
PROTEINASE3 IGG SER-ACNC: <0.2 U
RIBOSOMAL P IGG SER-ACNC: 0.2 U
STFR SERPL-MCNC: 2.9 MG/L (ref 1.8–4.6)

## 2020-12-20 ENCOUNTER — HOSPITAL ENCOUNTER (INPATIENT)
Facility: HOSPITAL | Age: 65
LOS: 5 days | Discharge: HOME HEALTH | DRG: 378 | End: 2020-12-25
Attending: EMERGENCY MEDICINE | Admitting: INTERNAL MEDICINE
Payer: MEDICARE

## 2020-12-20 DIAGNOSIS — R60.0 EDEMA OF BOTH LEGS: ICD-10-CM

## 2020-12-20 DIAGNOSIS — D64.9 ANEMIA, UNSPECIFIED TYPE: ICD-10-CM

## 2020-12-20 DIAGNOSIS — D62 ANEMIA DUE TO ACUTE BLOOD LOSS: ICD-10-CM

## 2020-12-20 DIAGNOSIS — R41.0 CONFUSION: ICD-10-CM

## 2020-12-20 DIAGNOSIS — R94.31 QT PROLONGATION: ICD-10-CM

## 2020-12-20 DIAGNOSIS — R73.9 HYPERGLYCEMIA: ICD-10-CM

## 2020-12-20 DIAGNOSIS — Z79.4 TYPE 2 DIABETES MELLITUS WITH DIABETIC POLYNEUROPATHY, WITH LONG-TERM CURRENT USE OF INSULIN: Chronic | ICD-10-CM

## 2020-12-20 DIAGNOSIS — R31.9 HEMATURIA, UNSPECIFIED TYPE: ICD-10-CM

## 2020-12-20 DIAGNOSIS — R07.9 CHEST PAIN: ICD-10-CM

## 2020-12-20 DIAGNOSIS — K92.2 GASTROINTESTINAL HEMORRHAGE, UNSPECIFIED GASTROINTESTINAL HEMORRHAGE TYPE: ICD-10-CM

## 2020-12-20 DIAGNOSIS — E11.42 TYPE 2 DIABETES MELLITUS WITH DIABETIC POLYNEUROPATHY, WITH LONG-TERM CURRENT USE OF INSULIN: Chronic | ICD-10-CM

## 2020-12-20 LAB
ALBUMIN SERPL BCP-MCNC: 2.4 G/DL (ref 3.5–5.2)
ALLENS TEST: ABNORMAL
ALP SERPL-CCNC: 44 U/L (ref 55–135)
ALT SERPL W/O P-5'-P-CCNC: 32 U/L (ref 10–44)
AMPHET+METHAMPHET UR QL: NEGATIVE
ANION GAP SERPL CALC-SCNC: 12 MMOL/L (ref 8–16)
APAP SERPL-MCNC: <3 UG/ML (ref 10–20)
AST SERPL-CCNC: 26 U/L (ref 10–40)
B-OH-BUTYR BLD STRIP-SCNC: 0.6 MMOL/L (ref 0–0.5)
BACTERIA #/AREA URNS AUTO: ABNORMAL /HPF
BARBITURATES UR QL SCN>200 NG/ML: NEGATIVE
BASOPHILS # BLD AUTO: 0.02 K/UL (ref 0–0.2)
BASOPHILS NFR BLD: 0.2 % (ref 0–1.9)
BENZODIAZ UR QL SCN>200 NG/ML: NEGATIVE
BILIRUB SERPL-MCNC: 0.6 MG/DL (ref 0.1–1)
BILIRUB UR QL STRIP: NEGATIVE
BUN SERPL-MCNC: 29 MG/DL (ref 8–23)
BZE UR QL SCN: NEGATIVE
CALCIUM SERPL-MCNC: 8 MG/DL (ref 8.7–10.5)
CANNABINOIDS UR QL SCN: NEGATIVE
CHLORIDE SERPL-SCNC: 107 MMOL/L (ref 95–110)
CLARITY UR REFRACT.AUTO: CLEAR
CO2 SERPL-SCNC: 22 MMOL/L (ref 23–29)
COLOR UR AUTO: YELLOW
CREAT SERPL-MCNC: 0.8 MG/DL (ref 0.5–1.4)
CREAT UR-MCNC: 41 MG/DL (ref 15–325)
CTP QC/QA: YES
DIFFERENTIAL METHOD: ABNORMAL
EOSINOPHIL # BLD AUTO: 0 K/UL (ref 0–0.5)
EOSINOPHIL NFR BLD: 0 % (ref 0–8)
ERYTHROCYTE [DISTWIDTH] IN BLOOD BY AUTOMATED COUNT: 15.9 % (ref 11.5–14.5)
EST. GFR  (AFRICAN AMERICAN): >60 ML/MIN/1.73 M^2
EST. GFR  (NON AFRICAN AMERICAN): >60 ML/MIN/1.73 M^2
ETHANOL SERPL-MCNC: <10 MG/DL
GLUCOSE SERPL-MCNC: 494 MG/DL (ref 70–110)
GLUCOSE UR QL STRIP: ABNORMAL
HCO3 UR-SCNC: 23.6 MMOL/L (ref 24–28)
HCT VFR BLD AUTO: 17.8 % (ref 37–48.5)
HGB BLD-MCNC: 5.5 G/DL (ref 12–16)
HGB UR QL STRIP: ABNORMAL
IMM GRANULOCYTES # BLD AUTO: 0.12 K/UL (ref 0–0.04)
IMM GRANULOCYTES NFR BLD AUTO: 1.2 % (ref 0–0.5)
INR PPP: 1 (ref 0.8–1.2)
KETONES UR QL STRIP: ABNORMAL
LEUKOCYTE ESTERASE UR QL STRIP: ABNORMAL
LYMPHOCYTES # BLD AUTO: 0.7 K/UL (ref 1–4.8)
LYMPHOCYTES NFR BLD: 7.5 % (ref 18–48)
MCH RBC QN AUTO: 25.7 PG (ref 27–31)
MCHC RBC AUTO-ENTMCNC: 30.9 G/DL (ref 32–36)
MCV RBC AUTO: 83 FL (ref 82–98)
METHADONE UR QL SCN>300 NG/ML: NEGATIVE
MICROSCOPIC COMMENT: ABNORMAL
MONOCYTES # BLD AUTO: 0.2 K/UL (ref 0.3–1)
MONOCYTES NFR BLD: 2.3 % (ref 4–15)
NEUTROPHILS # BLD AUTO: 8.8 K/UL (ref 1.8–7.7)
NEUTROPHILS NFR BLD: 88.8 % (ref 38–73)
NITRITE UR QL STRIP: NEGATIVE
NRBC BLD-RTO: 0 /100 WBC
OPIATES UR QL SCN: NEGATIVE
PCO2 BLDA: 43.4 MMHG (ref 35–45)
PCP UR QL SCN>25 NG/ML: NEGATIVE
PH SMN: 7.34 [PH] (ref 7.35–7.45)
PH UR STRIP: 5 [PH] (ref 5–8)
PLATELET # BLD AUTO: 157 K/UL (ref 150–350)
PMV BLD AUTO: 11.1 FL (ref 9.2–12.9)
PO2 BLDA: 22 MMHG (ref 40–60)
POC BE: -2 MMOL/L
POC SATURATED O2: 34 % (ref 95–100)
POC TCO2: 25 MMOL/L (ref 24–29)
POCT GLUCOSE: >500 MG/DL (ref 70–110)
POTASSIUM SERPL-SCNC: 4.2 MMOL/L (ref 3.5–5.1)
PROT SERPL-MCNC: 4.8 G/DL (ref 6–8.4)
PROT UR QL STRIP: NEGATIVE
PROTHROMBIN TIME: 11.3 SEC (ref 9–12.5)
RBC # BLD AUTO: 2.14 M/UL (ref 4–5.4)
RBC #/AREA URNS AUTO: 5 /HPF (ref 0–4)
SAMPLE: ABNORMAL
SARS-COV-2 RDRP RESP QL NAA+PROBE: NEGATIVE
SITE: ABNORMAL
SODIUM SERPL-SCNC: 141 MMOL/L (ref 136–145)
SP GR UR STRIP: 1.02 (ref 1–1.03)
TOXICOLOGY INFORMATION: NORMAL
URN SPEC COLLECT METH UR: ABNORMAL
WBC # BLD AUTO: 9.9 K/UL (ref 3.9–12.7)
WBC #/AREA URNS AUTO: 1 /HPF (ref 0–5)
YEAST UR QL AUTO: ABNORMAL

## 2020-12-20 PROCEDURE — 93005 ELECTROCARDIOGRAM TRACING: CPT | Mod: HCNC

## 2020-12-20 PROCEDURE — 12000002 HC ACUTE/MED SURGE SEMI-PRIVATE ROOM: Mod: HCNC

## 2020-12-20 PROCEDURE — 80320 DRUG SCREEN QUANTALCOHOLS: CPT | Mod: HCNC

## 2020-12-20 PROCEDURE — C9113 INJ PANTOPRAZOLE SODIUM, VIA: HCPCS | Mod: HCNC | Performed by: STUDENT IN AN ORGANIZED HEALTH CARE EDUCATION/TRAINING PROGRAM

## 2020-12-20 PROCEDURE — 63600175 PHARM REV CODE 636 W HCPCS: Mod: HCNC | Performed by: STUDENT IN AN ORGANIZED HEALTH CARE EDUCATION/TRAINING PROGRAM

## 2020-12-20 PROCEDURE — 93010 ELECTROCARDIOGRAM REPORT: CPT | Mod: HCNC,,, | Performed by: INTERNAL MEDICINE

## 2020-12-20 PROCEDURE — 85610 PROTHROMBIN TIME: CPT | Mod: HCNC

## 2020-12-20 PROCEDURE — 82803 BLOOD GASES ANY COMBINATION: CPT | Mod: HCNC

## 2020-12-20 PROCEDURE — 99285 EMERGENCY DEPT VISIT HI MDM: CPT | Mod: HCNC,CS,, | Performed by: EMERGENCY MEDICINE

## 2020-12-20 PROCEDURE — 85025 COMPLETE CBC W/AUTO DIFF WBC: CPT | Mod: HCNC

## 2020-12-20 PROCEDURE — 82962 GLUCOSE BLOOD TEST: CPT | Mod: HCNC

## 2020-12-20 PROCEDURE — 83735 ASSAY OF MAGNESIUM: CPT | Mod: HCNC

## 2020-12-20 PROCEDURE — 99285 PR EMERGENCY DEPT VISIT,LEVEL V: ICD-10-PCS | Mod: HCNC,CS,, | Performed by: EMERGENCY MEDICINE

## 2020-12-20 PROCEDURE — 80307 DRUG TEST PRSMV CHEM ANLYZR: CPT | Mod: HCNC

## 2020-12-20 PROCEDURE — 93010 EKG 12-LEAD: ICD-10-PCS | Mod: HCNC,,, | Performed by: INTERNAL MEDICINE

## 2020-12-20 PROCEDURE — 80053 COMPREHEN METABOLIC PANEL: CPT | Mod: HCNC

## 2020-12-20 PROCEDURE — 81001 URINALYSIS AUTO W/SCOPE: CPT | Mod: HCNC

## 2020-12-20 PROCEDURE — 99900035 HC TECH TIME PER 15 MIN (STAT): Mod: HCNC

## 2020-12-20 PROCEDURE — 82800 BLOOD PH: CPT | Mod: HCNC

## 2020-12-20 PROCEDURE — U0002 COVID-19 LAB TEST NON-CDC: HCPCS | Mod: HCNC | Performed by: EMERGENCY MEDICINE

## 2020-12-20 PROCEDURE — 82010 KETONE BODYS QUAN: CPT | Mod: HCNC

## 2020-12-20 PROCEDURE — 86900 BLOOD TYPING SEROLOGIC ABO: CPT | Mod: HCNC

## 2020-12-20 PROCEDURE — 99285 EMERGENCY DEPT VISIT HI MDM: CPT | Mod: 25,HCNC

## 2020-12-20 PROCEDURE — 80047 BASIC METABLC PNL IONIZED CA: CPT | Mod: HCNC

## 2020-12-20 PROCEDURE — 80329 ANALGESICS NON-OPIOID 1 OR 2: CPT | Mod: HCNC

## 2020-12-20 PROCEDURE — 86920 COMPATIBILITY TEST SPIN: CPT | Mod: HCNC

## 2020-12-20 RX ORDER — PANTOPRAZOLE SODIUM 40 MG/10ML
80 INJECTION, POWDER, LYOPHILIZED, FOR SOLUTION INTRAVENOUS
Status: COMPLETED | OUTPATIENT
Start: 2020-12-20 | End: 2020-12-20

## 2020-12-20 RX ORDER — HYDROCODONE BITARTRATE AND ACETAMINOPHEN 500; 5 MG/1; MG/1
TABLET ORAL
Status: DISCONTINUED | OUTPATIENT
Start: 2020-12-20 | End: 2020-12-25 | Stop reason: HOSPADM

## 2020-12-20 RX ADMIN — SODIUM CHLORIDE, SODIUM LACTATE, POTASSIUM CHLORIDE, AND CALCIUM CHLORIDE 1000 ML: .6; .31; .03; .02 INJECTION, SOLUTION INTRAVENOUS at 09:12

## 2020-12-20 RX ADMIN — PANTOPRAZOLE SODIUM 80 MG: 40 INJECTION, POWDER, FOR SOLUTION INTRAVENOUS at 10:12

## 2020-12-21 ENCOUNTER — ANESTHESIA EVENT (OUTPATIENT)
Dept: ENDOSCOPY | Facility: HOSPITAL | Age: 65
DRG: 378 | End: 2020-12-21
Payer: MEDICARE

## 2020-12-21 PROBLEM — D62 ANEMIA DUE TO ACUTE BLOOD LOSS: Status: ACTIVE | Noted: 2020-12-21

## 2020-12-21 PROBLEM — D64.9 ANEMIA: Status: ACTIVE | Noted: 2020-12-21

## 2020-12-21 PROBLEM — E87.20 METABOLIC ACIDOSIS WITH NORMAL ANION GAP AND BICARBONATE LOSSES: Status: ACTIVE | Noted: 2020-12-21

## 2020-12-21 PROBLEM — R73.9 ACUTE HYPERGLYCEMIA: Status: ACTIVE | Noted: 2020-12-21

## 2020-12-21 LAB
25(OH)D3+25(OH)D2 SERPL-MCNC: 26 NG/ML (ref 30–96)
ABO + RH BLD: NORMAL
ALBUMIN SERPL BCP-MCNC: 2.5 G/DL (ref 3.5–5.2)
ALP SERPL-CCNC: 45 U/L (ref 55–135)
ALT SERPL W/O P-5'-P-CCNC: 31 U/L (ref 10–44)
ANION GAP SERPL CALC-SCNC: 12 MMOL/L (ref 8–16)
ASCENDING AORTA: 3.09 CM
AST SERPL-CCNC: 24 U/L (ref 10–40)
AV HCM GRAD VALS: 60 MMHG
AV INDEX (PROSTH): 0.97
AV LVOT PEAK GRADIENT: 60 MMHG
AV MEAN GRADIENT: 11 MMHG
AV PEAK GRADIENT: 17 MMHG
AV VALVE AREA: 4.33 CM2
AV VELOCITY RATIO: 0.77
BASOPHILS # BLD AUTO: 0.02 K/UL (ref 0–0.2)
BASOPHILS # BLD AUTO: 0.02 K/UL (ref 0–0.2)
BASOPHILS NFR BLD: 0.2 % (ref 0–1.9)
BASOPHILS NFR BLD: 0.2 % (ref 0–1.9)
BILIRUB SERPL-MCNC: 1.5 MG/DL (ref 0.1–1)
BLD GP AB SCN CELLS X3 SERPL QL: NORMAL
BLD PROD TYP BPU: NORMAL
BLD PROD TYP BPU: NORMAL
BLOOD UNIT EXPIRATION DATE: NORMAL
BLOOD UNIT EXPIRATION DATE: NORMAL
BLOOD UNIT TYPE CODE: 5100
BLOOD UNIT TYPE CODE: 5100
BLOOD UNIT TYPE: NORMAL
BLOOD UNIT TYPE: NORMAL
BSA FOR ECHO PROCEDURE: 2.07 M2
BUN SERPL-MCNC: 27 MG/DL (ref 8–23)
BUN SERPL-MCNC: 32 MG/DL (ref 6–30)
CALCIUM SERPL-MCNC: 8.2 MG/DL (ref 8.7–10.5)
CH50 SERPL-ACNC: 44 U/ML (ref 42–95)
CHLORIDE SERPL-SCNC: 105 MMOL/L (ref 95–110)
CHLORIDE SERPL-SCNC: 110 MMOL/L (ref 95–110)
CO2 SERPL-SCNC: 23 MMOL/L (ref 23–29)
CODING SYSTEM: NORMAL
CODING SYSTEM: NORMAL
CREAT SERPL-MCNC: 0.6 MG/DL (ref 0.5–1.4)
CREAT SERPL-MCNC: 0.8 MG/DL (ref 0.5–1.4)
CV ECHO LV RWT: 0.4 CM
DIFFERENTIAL METHOD: ABNORMAL
DIFFERENTIAL METHOD: ABNORMAL
DISPENSE STATUS: NORMAL
DISPENSE STATUS: NORMAL
DOP CALC AO PEAK VEL: 2.08 M/S
DOP CALC AO VTI: 33.88 CM
DOP CALC LVOT AREA: 4.5 CM2
DOP CALC LVOT DIAMETER: 2.39 CM
DOP CALC LVOT PEAK VEL: 1.61 M/S
DOP CALC LVOT STROKE VOLUME: 146.72 CM3
DOP CALCLVOT PEAK VEL VTI: 32.72 CM
E WAVE DECELERATION TIME: 275.95 MSEC
E/A RATIO: 0.62
E/E' RATIO: 8.59 M/S
ECHO LV POSTERIOR WALL: 0.94 CM (ref 0.6–1.1)
EOSINOPHIL # BLD AUTO: 0 K/UL (ref 0–0.5)
EOSINOPHIL # BLD AUTO: 0 K/UL (ref 0–0.5)
EOSINOPHIL NFR BLD: 0.1 % (ref 0–8)
EOSINOPHIL NFR BLD: 0.2 % (ref 0–8)
ERYTHROCYTE [DISTWIDTH] IN BLOOD BY AUTOMATED COUNT: 14.7 % (ref 11.5–14.5)
ERYTHROCYTE [DISTWIDTH] IN BLOOD BY AUTOMATED COUNT: 15.2 % (ref 11.5–14.5)
EST. GFR  (AFRICAN AMERICAN): >60 ML/MIN/1.73 M^2
EST. GFR  (NON AFRICAN AMERICAN): >60 ML/MIN/1.73 M^2
ESTIMATED AVG GLUCOSE: 183 MG/DL (ref 68–131)
FOLATE SERPL-MCNC: 9.6 NG/ML (ref 4–24)
FRACTIONAL SHORTENING: 34 % (ref 28–44)
GLUCOSE SERPL-MCNC: 263 MG/DL (ref 70–110)
GLUCOSE SERPL-MCNC: 441 MG/DL (ref 70–110)
HBA1C MFR BLD HPLC: 8 % (ref 4–5.6)
HCT VFR BLD AUTO: 19.2 % (ref 37–48.5)
HCT VFR BLD AUTO: 21.5 % (ref 37–48.5)
HCT VFR BLD CALC: <15 %PCV (ref 36–54)
HGB BLD-MCNC: 6.2 G/DL (ref 12–16)
HGB BLD-MCNC: 6.9 G/DL (ref 12–16)
IMM GRANULOCYTES # BLD AUTO: 0.13 K/UL (ref 0–0.04)
IMM GRANULOCYTES # BLD AUTO: 0.13 K/UL (ref 0–0.04)
IMM GRANULOCYTES NFR BLD AUTO: 1.3 % (ref 0–0.5)
IMM GRANULOCYTES NFR BLD AUTO: 1.4 % (ref 0–0.5)
INTERVENTRICULAR SEPTUM: 0.75 CM (ref 0.6–1.1)
LA MAJOR: 4.58 CM
LA MINOR: 4.88 CM
LA WIDTH: 3.56 CM
LEFT ATRIUM SIZE: 2.6 CM
LEFT ATRIUM VOLUME INDEX MOD: 20.8 ML/M2
LEFT ATRIUM VOLUME INDEX: 18.6 ML/M2
LEFT ATRIUM VOLUME MOD: 41.59 CM3
LEFT ATRIUM VOLUME: 37.18 CM3
LEFT INTERNAL DIMENSION IN SYSTOLE: 3.09 CM (ref 2.1–4)
LEFT VENTRICLE DIASTOLIC VOLUME INDEX: 50.93 ML/M2
LEFT VENTRICLE DIASTOLIC VOLUME: 101.96 ML
LEFT VENTRICLE MASS INDEX: 65 G/M2
LEFT VENTRICLE SYSTOLIC VOLUME INDEX: 18.8 ML/M2
LEFT VENTRICLE SYSTOLIC VOLUME: 37.73 ML
LEFT VENTRICULAR INTERNAL DIMENSION IN DIASTOLE: 4.69 CM (ref 3.5–6)
LEFT VENTRICULAR MASS: 130.83 G
LV LATERAL E/E' RATIO: 8.11 M/S
LV SEPTAL E/E' RATIO: 9.13 M/S
LYMPHOCYTES # BLD AUTO: 1.8 K/UL (ref 1–4.8)
LYMPHOCYTES # BLD AUTO: 2.2 K/UL (ref 1–4.8)
LYMPHOCYTES NFR BLD: 19.6 % (ref 18–48)
LYMPHOCYTES NFR BLD: 21.4 % (ref 18–48)
MAGNESIUM SERPL-MCNC: 1.4 MG/DL (ref 1.6–2.6)
MAGNESIUM SERPL-MCNC: 1.4 MG/DL (ref 1.6–2.6)
MCH RBC QN AUTO: 26.8 PG (ref 27–31)
MCH RBC QN AUTO: 27.4 PG (ref 27–31)
MCHC RBC AUTO-ENTMCNC: 32.1 G/DL (ref 32–36)
MCHC RBC AUTO-ENTMCNC: 32.3 G/DL (ref 32–36)
MCV RBC AUTO: 83 FL (ref 82–98)
MCV RBC AUTO: 85 FL (ref 82–98)
MONOCYTES # BLD AUTO: 0.5 K/UL (ref 0.3–1)
MONOCYTES # BLD AUTO: 0.8 K/UL (ref 0.3–1)
MONOCYTES NFR BLD: 5.2 % (ref 4–15)
MONOCYTES NFR BLD: 7.7 % (ref 4–15)
MV PEAK A VEL: 1.17 M/S
MV PEAK E VEL: 0.73 M/S
MYELOPEROXIDASE AB SER-ACNC: 5 UNITS
NEUTROPHILS # BLD AUTO: 6.6 K/UL (ref 1.8–7.7)
NEUTROPHILS # BLD AUTO: 7.1 K/UL (ref 1.8–7.7)
NEUTROPHILS NFR BLD: 69.3 % (ref 38–73)
NEUTROPHILS NFR BLD: 73.4 % (ref 38–73)
NRBC BLD-RTO: 0 /100 WBC
NRBC BLD-RTO: 0 /100 WBC
NUM UNITS TRANS PACKED RBC: NORMAL
PHOSPHATE SERPL-MCNC: 2.4 MG/DL (ref 2.7–4.5)
PISA TR MAX VEL: 2.64 M/S
PLATELET # BLD AUTO: 159 K/UL (ref 150–350)
PLATELET # BLD AUTO: 163 K/UL (ref 150–350)
PMV BLD AUTO: 10.9 FL (ref 9.2–12.9)
PMV BLD AUTO: 11.5 FL (ref 9.2–12.9)
POC IONIZED CALCIUM: 1.09 MMOL/L (ref 1.06–1.42)
POC TCO2 (MEASURED): 24 MMOL/L (ref 23–29)
POCT GLUCOSE: 289 MG/DL (ref 70–110)
POCT GLUCOSE: 291 MG/DL (ref 70–110)
POCT GLUCOSE: 467 MG/DL (ref 70–110)
POCT GLUCOSE: 495 MG/DL (ref 70–110)
POTASSIUM BLD-SCNC: 4.5 MMOL/L (ref 3.5–5.1)
POTASSIUM SERPL-SCNC: 3.8 MMOL/L (ref 3.5–5.1)
PROT SERPL-MCNC: 4.9 G/DL (ref 6–8.4)
RA MAJOR: 4.22 CM
RA PRESSURE: 3 MMHG
RA WIDTH: 3.26 CM
RBC # BLD AUTO: 2.31 M/UL (ref 4–5.4)
RBC # BLD AUTO: 2.52 M/UL (ref 4–5.4)
RIGHT VENTRICULAR END-DIASTOLIC DIMENSION: 2.7 CM
SAMPLE: ABNORMAL
SINUS: 3.14 CM
SODIUM BLD-SCNC: 138 MMOL/L (ref 136–145)
SODIUM SERPL-SCNC: 145 MMOL/L (ref 136–145)
STJ: 2.73 CM
TDI LATERAL: 0.09 M/S
TDI SEPTAL: 0.08 M/S
TDI: 0.09 M/S
TR MAX PG: 28 MMHG
TRANS ERYTHROCYTES VOL PATIENT: NORMAL ML
TRICUSPID ANNULAR PLANE SYSTOLIC EXCURSION: 1.81 CM
TV REST PULMONARY ARTERY PRESSURE: 31 MMHG
VALPROATE SERPL-MCNC: <12.5 UG/ML (ref 50–100)
WBC # BLD AUTO: 10.25 K/UL (ref 3.9–12.7)
WBC # BLD AUTO: 9.05 K/UL (ref 3.9–12.7)

## 2020-12-21 PROCEDURE — 83036 HEMOGLOBIN GLYCOSYLATED A1C: CPT | Mod: HCNC

## 2020-12-21 PROCEDURE — C9113 INJ PANTOPRAZOLE SODIUM, VIA: HCPCS | Mod: HCNC | Performed by: NURSE PRACTITIONER

## 2020-12-21 PROCEDURE — 99223 1ST HOSP IP/OBS HIGH 75: CPT | Mod: HCNC,AI,, | Performed by: NURSE PRACTITIONER

## 2020-12-21 PROCEDURE — 85025 COMPLETE CBC W/AUTO DIFF WBC: CPT | Mod: 91,HCNC

## 2020-12-21 PROCEDURE — 82746 ASSAY OF FOLIC ACID SERUM: CPT | Mod: HCNC

## 2020-12-21 PROCEDURE — C9399 UNCLASSIFIED DRUGS OR BIOLOG: HCPCS | Mod: HCNC | Performed by: NURSE PRACTITIONER

## 2020-12-21 PROCEDURE — 11000001 HC ACUTE MED/SURG PRIVATE ROOM: Mod: HCNC

## 2020-12-21 PROCEDURE — 84100 ASSAY OF PHOSPHORUS: CPT | Mod: HCNC

## 2020-12-21 PROCEDURE — P9021 RED BLOOD CELLS UNIT: HCPCS | Mod: HCNC

## 2020-12-21 PROCEDURE — 99223 1ST HOSP IP/OBS HIGH 75: CPT | Mod: HCNC,,, | Performed by: INTERNAL MEDICINE

## 2020-12-21 PROCEDURE — 99223 PR INITIAL HOSPITAL CARE,LEVL III: ICD-10-PCS | Mod: HCNC,AI,, | Performed by: NURSE PRACTITIONER

## 2020-12-21 PROCEDURE — 25000003 PHARM REV CODE 250: Mod: HCNC | Performed by: INTERNAL MEDICINE

## 2020-12-21 PROCEDURE — 25000003 PHARM REV CODE 250: Mod: HCNC | Performed by: NURSE PRACTITIONER

## 2020-12-21 PROCEDURE — 36430 TRANSFUSION BLD/BLD COMPNT: CPT | Mod: HCNC

## 2020-12-21 PROCEDURE — P9016 RBC LEUKOCYTES REDUCED: HCPCS | Mod: HCNC

## 2020-12-21 PROCEDURE — 63600175 PHARM REV CODE 636 W HCPCS: Mod: HCNC | Performed by: NURSE PRACTITIONER

## 2020-12-21 PROCEDURE — 99222 PR INITIAL HOSPITAL CARE,LEVL II: ICD-10-PCS | Mod: HCNC,,, | Performed by: NURSE PRACTITIONER

## 2020-12-21 PROCEDURE — 80053 COMPREHEN METABOLIC PANEL: CPT | Mod: HCNC

## 2020-12-21 PROCEDURE — 99222 1ST HOSP IP/OBS MODERATE 55: CPT | Mod: HCNC,,, | Performed by: NURSE PRACTITIONER

## 2020-12-21 PROCEDURE — 80164 ASSAY DIPROPYLACETIC ACD TOT: CPT | Mod: HCNC

## 2020-12-21 PROCEDURE — 63600175 PHARM REV CODE 636 W HCPCS: Mod: HCNC | Performed by: STUDENT IN AN ORGANIZED HEALTH CARE EDUCATION/TRAINING PROGRAM

## 2020-12-21 PROCEDURE — 83735 ASSAY OF MAGNESIUM: CPT | Mod: HCNC

## 2020-12-21 PROCEDURE — 82306 VITAMIN D 25 HYDROXY: CPT | Mod: HCNC

## 2020-12-21 PROCEDURE — 36415 COLL VENOUS BLD VENIPUNCTURE: CPT | Mod: HCNC

## 2020-12-21 PROCEDURE — 99223 PR INITIAL HOSPITAL CARE,LEVL III: ICD-10-PCS | Mod: HCNC,,, | Performed by: INTERNAL MEDICINE

## 2020-12-21 RX ORDER — MAGNESIUM SULFATE HEPTAHYDRATE 40 MG/ML
2 INJECTION, SOLUTION INTRAVENOUS
Status: DISPENSED | OUTPATIENT
Start: 2020-12-21 | End: 2020-12-21

## 2020-12-21 RX ORDER — GLUCAGON 1 MG
1 KIT INJECTION
Status: DISCONTINUED | OUTPATIENT
Start: 2020-12-21 | End: 2020-12-25 | Stop reason: HOSPADM

## 2020-12-21 RX ORDER — INSULIN ASPART 100 [IU]/ML
0-5 INJECTION, SOLUTION INTRAVENOUS; SUBCUTANEOUS EVERY 6 HOURS PRN
Status: DISCONTINUED | OUTPATIENT
Start: 2020-12-21 | End: 2020-12-22

## 2020-12-21 RX ORDER — FUROSEMIDE 10 MG/ML
40 INJECTION INTRAMUSCULAR; INTRAVENOUS ONCE
Status: COMPLETED | OUTPATIENT
Start: 2020-12-21 | End: 2020-12-21

## 2020-12-21 RX ORDER — LOSARTAN POTASSIUM 50 MG/1
100 TABLET ORAL DAILY
Status: DISCONTINUED | OUTPATIENT
Start: 2020-12-21 | End: 2020-12-21

## 2020-12-21 RX ORDER — IBUPROFEN 200 MG
16 TABLET ORAL
Status: DISCONTINUED | OUTPATIENT
Start: 2020-12-21 | End: 2020-12-21

## 2020-12-21 RX ORDER — TALC
6 POWDER (GRAM) TOPICAL NIGHTLY PRN
Status: DISCONTINUED | OUTPATIENT
Start: 2020-12-21 | End: 2020-12-25 | Stop reason: HOSPADM

## 2020-12-21 RX ORDER — INSULIN ASPART 100 [IU]/ML
1-10 INJECTION, SOLUTION INTRAVENOUS; SUBCUTANEOUS
Status: DISCONTINUED | OUTPATIENT
Start: 2020-12-21 | End: 2020-12-21

## 2020-12-21 RX ORDER — DIVALPROEX SODIUM 250 MG/1
250 TABLET, DELAYED RELEASE ORAL NIGHTLY
Status: DISCONTINUED | OUTPATIENT
Start: 2020-12-21 | End: 2020-12-25 | Stop reason: HOSPADM

## 2020-12-21 RX ORDER — ATORVASTATIN CALCIUM 20 MG/1
40 TABLET, FILM COATED ORAL NIGHTLY
Status: DISCONTINUED | OUTPATIENT
Start: 2020-12-21 | End: 2020-12-25 | Stop reason: HOSPADM

## 2020-12-21 RX ORDER — PREGABALIN 75 MG/1
75 CAPSULE ORAL 2 TIMES DAILY
Status: DISCONTINUED | OUTPATIENT
Start: 2020-12-21 | End: 2020-12-21

## 2020-12-21 RX ORDER — CHLORHEXIDINE GLUCONATE ORAL RINSE 1.2 MG/ML
15 SOLUTION DENTAL 2 TIMES DAILY
Status: DISCONTINUED | OUTPATIENT
Start: 2020-12-21 | End: 2020-12-25 | Stop reason: HOSPADM

## 2020-12-21 RX ORDER — PANTOPRAZOLE SODIUM 40 MG/10ML
80 INJECTION, POWDER, LYOPHILIZED, FOR SOLUTION INTRAVENOUS 2 TIMES DAILY
Status: DISCONTINUED | OUTPATIENT
Start: 2020-12-21 | End: 2020-12-21

## 2020-12-21 RX ORDER — PANTOPRAZOLE SODIUM 40 MG/10ML
40 INJECTION, POWDER, LYOPHILIZED, FOR SOLUTION INTRAVENOUS 2 TIMES DAILY
Status: DISCONTINUED | OUTPATIENT
Start: 2020-12-21 | End: 2020-12-24

## 2020-12-21 RX ORDER — PROMETHAZINE HYDROCHLORIDE 25 MG/1
25 TABLET ORAL EVERY 6 HOURS PRN
Status: DISCONTINUED | OUTPATIENT
Start: 2020-12-21 | End: 2020-12-25 | Stop reason: HOSPADM

## 2020-12-21 RX ORDER — ONDANSETRON 2 MG/ML
4 INJECTION INTRAMUSCULAR; INTRAVENOUS EVERY 8 HOURS PRN
Status: DISCONTINUED | OUTPATIENT
Start: 2020-12-21 | End: 2020-12-25 | Stop reason: HOSPADM

## 2020-12-21 RX ORDER — ASPIRIN 81 MG/1
81 TABLET ORAL DAILY
Status: DISCONTINUED | OUTPATIENT
Start: 2020-12-21 | End: 2020-12-21

## 2020-12-21 RX ORDER — IBUPROFEN 200 MG
24 TABLET ORAL
Status: DISCONTINUED | OUTPATIENT
Start: 2020-12-21 | End: 2020-12-21

## 2020-12-21 RX ORDER — INSULIN ASPART 100 [IU]/ML
7 INJECTION, SOLUTION INTRAVENOUS; SUBCUTANEOUS
Status: DISCONTINUED | OUTPATIENT
Start: 2020-12-21 | End: 2020-12-21

## 2020-12-21 RX ORDER — MORPHINE SULFATE 2 MG/ML
4 INJECTION, SOLUTION INTRAMUSCULAR; INTRAVENOUS EVERY 4 HOURS PRN
Status: DISCONTINUED | OUTPATIENT
Start: 2020-12-21 | End: 2020-12-21

## 2020-12-21 RX ORDER — IPRATROPIUM BROMIDE AND ALBUTEROL SULFATE 2.5; .5 MG/3ML; MG/3ML
3 SOLUTION RESPIRATORY (INHALATION) EVERY 6 HOURS PRN
Status: DISCONTINUED | OUTPATIENT
Start: 2020-12-21 | End: 2020-12-25 | Stop reason: HOSPADM

## 2020-12-21 RX ORDER — POLYETHYLENE GLYCOL 3350 17 G/17G
17 POWDER, FOR SOLUTION ORAL 2 TIMES DAILY
Status: DISCONTINUED | OUTPATIENT
Start: 2020-12-21 | End: 2020-12-24

## 2020-12-21 RX ORDER — CHOLECALCIFEROL (VITAMIN D3) 25 MCG
2000 TABLET ORAL DAILY
Status: DISCONTINUED | OUTPATIENT
Start: 2020-12-21 | End: 2020-12-25 | Stop reason: HOSPADM

## 2020-12-21 RX ORDER — LIDOCAINE HYDROCHLORIDE 20 MG/ML
JELLY TOPICAL NIGHTLY PRN
Status: DISCONTINUED | OUTPATIENT
Start: 2020-12-21 | End: 2020-12-25 | Stop reason: HOSPADM

## 2020-12-21 RX ORDER — POLYETHYLENE GLYCOL 3350, SODIUM SULFATE ANHYDROUS, SODIUM BICARBONATE, SODIUM CHLORIDE, POTASSIUM CHLORIDE 236; 22.74; 6.74; 5.86; 2.97 G/4L; G/4L; G/4L; G/4L; G/4L
4000 POWDER, FOR SOLUTION ORAL ONCE
Status: COMPLETED | OUTPATIENT
Start: 2020-12-21 | End: 2020-12-21

## 2020-12-21 RX ORDER — SODIUM CHLORIDE 0.9 % (FLUSH) 0.9 %
10 SYRINGE (ML) INJECTION
Status: DISCONTINUED | OUTPATIENT
Start: 2020-12-21 | End: 2020-12-25 | Stop reason: HOSPADM

## 2020-12-21 RX ORDER — TRAMADOL HYDROCHLORIDE 50 MG/1
50 TABLET ORAL EVERY 12 HOURS PRN
Status: DISCONTINUED | OUTPATIENT
Start: 2020-12-21 | End: 2020-12-21

## 2020-12-21 RX ORDER — GLUCAGON 1 MG
1 KIT INJECTION
Status: DISCONTINUED | OUTPATIENT
Start: 2020-12-21 | End: 2020-12-21

## 2020-12-21 RX ORDER — ACETAMINOPHEN 325 MG/1
650 TABLET ORAL EVERY 4 HOURS PRN
Status: DISCONTINUED | OUTPATIENT
Start: 2020-12-21 | End: 2020-12-25 | Stop reason: HOSPADM

## 2020-12-21 RX ORDER — FOLIC ACID 1 MG/1
1 TABLET ORAL DAILY
Status: DISCONTINUED | OUTPATIENT
Start: 2020-12-21 | End: 2020-12-25 | Stop reason: HOSPADM

## 2020-12-21 RX ADMIN — PANTOPRAZOLE SODIUM 40 MG: 40 INJECTION, POWDER, FOR SOLUTION INTRAVENOUS at 11:12

## 2020-12-21 RX ADMIN — CHLORHEXIDINE GLUCONATE 15 ML: 1.2 RINSE ORAL at 08:12

## 2020-12-21 RX ADMIN — MAGNESIUM SULFATE 2 G: 2 INJECTION INTRAVENOUS at 05:12

## 2020-12-21 RX ADMIN — ATORVASTATIN CALCIUM 40 MG: 20 TABLET, FILM COATED ORAL at 08:12

## 2020-12-21 RX ADMIN — DIVALPROEX SODIUM 250 MG: 250 TABLET, DELAYED RELEASE ORAL at 08:12

## 2020-12-21 RX ADMIN — POLYETHYLENE GLYCOL 3350, SODIUM SULFATE ANHYDROUS, SODIUM BICARBONATE, SODIUM CHLORIDE, POTASSIUM CHLORIDE 4000 ML: 236; 22.74; 6.74; 5.86; 2.97 POWDER, FOR SOLUTION ORAL at 06:12

## 2020-12-21 RX ADMIN — INSULIN HUMAN 5 UNITS: 100 INJECTION, SOLUTION PARENTERAL at 12:12

## 2020-12-21 RX ADMIN — FUROSEMIDE 40 MG: 10 INJECTION, SOLUTION INTRAMUSCULAR; INTRAVENOUS at 02:12

## 2020-12-21 RX ADMIN — ATORVASTATIN CALCIUM 40 MG: 20 TABLET, FILM COATED ORAL at 02:12

## 2020-12-21 RX ADMIN — ONDANSETRON 4 MG: 2 INJECTION INTRAMUSCULAR; INTRAVENOUS at 11:12

## 2020-12-21 RX ADMIN — PANTOPRAZOLE SODIUM 40 MG: 40 INJECTION, POWDER, FOR SOLUTION INTRAVENOUS at 08:12

## 2020-12-21 RX ADMIN — INSULIN DETEMIR 20 UNITS: 100 INJECTION, SOLUTION SUBCUTANEOUS at 12:12

## 2020-12-21 NOTE — ANESTHESIA PREPROCEDURE EVALUATION
[facility]  Anesthesia Pre-Operative Evaluation         Patient Name: Adriana Paula  YOB: 1955  MRN: [id number]    SUBJECTIVE:     Pre-operative evaluation for Procedure(s) (LRB):  EGD (ESOPHAGOGASTRODUODENOSCOPY) (N/A)  COLONOSCOPY (N/A)     12/21/2020    Adriana Paula is a 65 y.o. 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 Reported FOBT positive in the ED. Since admission, patient has been hemodynamically stable but with positive orthostatics. With DM2 and fingerstick glucoses remain greater than 400.    Patient now presents for the above procedure(s).      LDA:        Peripheral IV - Single Lumen 12/16/20 20 G Right Antecubital (Active)   Site Assessment Clean;Dry;Intact 12/18/20 0800   Line Status Flushed 12/18/20 0800   Dressing Status Clean;Dry;Intact 12/18/20 0800   Number of days: 5            Peripheral IV - Single Lumen 12/20/20 2320 18 G Right Upper Arm (Active)   Site Assessment Clean;Dry;Intact;No redness;No swelling 12/20/20 2320   Line Status Blood return noted;Saline locked;Flushed 12/20/20 2320   Dressing Status Clean;Dry;Intact 12/20/20 2320   Dressing Intervention First dressing 12/20/20 2320   Number of days: 0       Female External Urinary Catheter 12/20/20 2203 (Active)   Number of days: 0       Prev airway: Placement Date: 01/30/14; Placement Time: 0713; Method of Intubation: Direct laryngoscopy; Inserted by: Anesthesia Resident; Airway Device: Endotracheal Tube; Blade: Morejon #2; Airway Device Size: 7.0; Style: Cuffed; Cuff Inflation: Minimal occlusive pressure; Placement Verified By: Auscultation; Grade: Grade I; Complicating Factors: None; Intubation Findings: Positive EtCO2, Bilateral breath sounds, Atraumatic/Condition of teeth unchanged;  Depth of Insertion: 24; Securment: Lips; Complications: None; Breath Sounds: Equal Bilateral; Insertion Attempts: 1; Removal Date: 01/30/14;  Removal Time:  0757    Drips: None documented.      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, with long-term current use of insulin    Type 2 diabetes mellitus with diabetic polyneuropathy, with long-term current use of insulin    Chondral defect of femoral condyle    S/P arthroscopy of knee    Headache(784.0)    Obstructive sleep apnea syndrome    Gastroesophageal reflux disease without esophagitis    Urge incontinence    Primary osteoarthritis of first carpometacarpal joint of right hand    Decreased  strength of right hand    Trigger finger of left thumb    Trigger thumb of left hand    Arthritis of lumbar spine    DDD (degenerative disc disease), lumbar    Acute right-sided low back pain with sciatica    Chronic pain    Obesity (BMI 30-39.9)    At high risk for breast cancer    ANGELICA positive    Pulmonary nodule    Hyperlipidemia associated with type 2 diabetes mellitus    Abnormal AST and ALT    Scleritis, bilateral    Steroid-induced gabe    Hypotension    Psychosis    Anemia    Anemia due to acute blood loss    Acute hyperglycemia    Metabolic acidosis with normal anion gap and bicarbonate losses       Review of patient's allergies indicates:   Allergen Reactions    Keflex [cephalexin] Rash      blisters, fever    Penicillins Itching    Sulfamethoxazole-trimethoprim      Other reaction(s): blisters    Vicodin [hydrocodone-acetaminophen] Swelling    Sulfa (sulfonamide antibiotics) Rash      blisters,fever           Current Inpatient Medications:   atorvastatin  40 mg Oral QHS    chlorhexidine  15 mL Mouth/Throat BID    divalproex  250 mg Oral QHS    folic acid  1 mg Oral Daily    insulin aspart U-100  7 Units Subcutaneous TIDWM    insulin detemir U-100  15 Units Subcutaneous Daily    pantoprazole  40 mg  "Intravenous BID    polyethylene glycol  4,000 mL Oral Once    polyethylene glycol  17 g Oral BID    vitamin D  2,000 Units Oral Daily       No current facility-administered medications on file prior to encounter.      Current Outpatient Medications on File Prior to Encounter   Medication Sig Dispense Refill    aspirin (ECOTRIN) 81 MG EC tablet Take 81 mg by mouth once daily. Instructed to stop 1 week prior to surgery on 4/15/19 per Dr. Conklin      atorvastatin (LIPITOR) 40 MG tablet TAKE 1 TABLET(40 MG) BY MOUTH EVERY DAY 90 tablet 1    divalproex (DEPAKOTE) 250 MG EC tablet Take 1 tablet (250 mg total) by mouth every evening. 30 tablet 0    hydroCHLOROthiazide (HYDRODIURIL) 12.5 MG Tab Take 1 tablet (12.5 mg total) by mouth once daily. 30 tablet 6    insulin degludec (TRESIBA FLEXTOUCH U-100) 100 unit/mL (3 mL) InPn Inject 36 Units into the skin once daily. 4 Syringe 5    lidocaine HCL 2% (XYLOCAINE) 2 % jelly Apply topically as needed. Apply topically once nightly to affected part of foot/feet. 30 mL 2    losartan (COZAAR) 100 MG tablet TAKE 1 TABLET BY MOUTH EVERY DAY 90 tablet 1    metFORMIN (GLUCOPHAGE) 500 MG tablet TAKE 2 TABLETS BY MOUTH AT LUNCH AND 1 TABLET AT DINNER 270 tablet 1    pregabalin (LYRICA) 75 MG capsule Take 1 capsule (75 mg total) by mouth 2 (two) times daily. 60 capsule 5    traMADoL (ULTRAM) 50 mg tablet Take 1 tablet (50 mg total) by mouth every 12 (twelve) hours as needed. M54.40 Greater than 7 day supply is medically necessary 60 tablet 1    BD ULTRA-FINE SHORT PEN NEEDLE 31 gauge x 5/16" Ndle USE AS DIRECTED TWICE DAILY 100 each 5    blood sugar diagnostic Strp 1 strip by Misc.(Non-Drug; Combo Route) route 2 (two) times a day. 100 strip 6    blood-glucose meter (ACCU-CHEK KENDRA PLUS METER) Misc 1 each by Misc.(Non-Drug; Combo Route) route 2 (two) times daily. Patient test blood sugar 2 times daily 1 each 0    blood-glucose meter Misc use twice a day 1 each 12    " chlorhexidine (PERIDEX) 0.12 % solution SWISH FOR 30 SECONDS AND SPIT 15ML PO  BID . DO NOT SWALLOW      cholecalciferol, vitamin D3, 125 mcg (5,000 unit) Tab Take 2,500 Units by mouth 2 (two) times a day.       fluticasone propionate (FLONASE) 50 mcg/actuation nasal spray 1 spray by Each Nostril route as needed for Rhinitis or Allergies.       folic acid (FOLVITE) 1 MG tablet Take 1 tablet (1 mg total) by mouth once daily. 90 tablet 0    lancets (ACCU-CHEK FASTCLIX LANCET DRUM) Misc Inject 1 each into the skin 2 (two) times daily before meals. 100 each 6    lancets 33 gauge Misc 1 lancet by Misc.(Non-Drug; Combo Route) route 2 (two) times daily before meals. 200 each 11    MAGNESIUM ORAL Take 250 mg by mouth 2 (two) times a day.          Past Surgical History:   Procedure Laterality Date    BREAST CYST EXCISION Right     1980s    CARPAL TUNNEL RELEASE Right     CATARACT EXTRACTION      CATARACT EXTRACTION W/  INTRAOCULAR LENS IMPLANT Right 2017    Dr Brewster     SECTION      EPIDURAL STEROID INJECTION N/A 2019    Procedure: Injection, Steroid, Epidural LUMBAR/CAUDAL L5-S1 IL KARLA;  Surgeon: Jef García MD;  Location: [facility] PAIN [facility]GT;  Service: Pain Management;  Laterality: N/A;  NEEDS CONSENT    EPIDURAL STEROID INJECTION N/A 2019    Procedure: Injection, Steroid, Epidural LUMBAR/CAUDAL L5-S1 IL KARLA;  Surgeon: Jef García MD;  Location: [facility] PAIN [facility]GT;  Service: Pain Management;  Laterality: N/A;  NEEDS CONSENT    KNEE ARTHROSCOPY  14    right    MYOMECTOMY      TRANSFORAMINAL EPIDURAL INJECTION OF STEROID Right 2019    Procedure: LUMBAR TRANSFORAMINAL RIGHT L5-S1  TF KARLA;  Surgeon: Jef García MD;  Location: [facility]IN [facility]GT;  Service: Pain Management;  Laterality: Right;  NEEDS CONSENT    TRIGGER FINGER RELEASE      TRIGGER FINGER RELEASE Left 4/15/2019    Procedure: RELEASE, TRIGGER FINGER left thumb;  Surgeon: Yuridia Gonzales MD;  Location:  [facility];  Service: Orthopedics;  Laterality: Left;  stretcher, supine, hand pan 1 and pan 2       Social History     Socioeconomic History    Marital status: Single     Spouse name: Not on file    Number of children: Not on file    Years of education: Not on file    Highest education level: Not on file   Occupational History    Not on file   Social Needs    Financial resource strain: Not very hard    Food insecurity     Worry: Never true     Inability: Never true    Transportation needs     Medical: No     Non-medical: Yes   Tobacco Use    Smoking status: Former Smoker     Start date: 12/9/2002    Smokeless tobacco: Never Used   Substance and Sexual Activity    Alcohol use: Yes     Frequency: Monthly or less     Drinks per session: 1 or 2     Binge frequency: Never     Comment: socially; couple glasses    Drug use: No    Sexual activity: Not Currently     Partners: Male     Birth control/protection: None   Lifestyle    Physical activity     Days per week: 3 days     Minutes per session: 10 min    Stress: To some extent   Relationships    Social connections     Talks on phone: More than three times a week     Gets together: Once a week     Attends [employer] service: Not on file     Active member of club or organization: No     Attends meetings of clubs or organizations: Never     Relationship status:    Other Topics Concern    Not on file   Social History Narrative    . 1 daughter. Works as  at [employer].        OBJECTIVE:     Vital Signs Range (Last 24H):  Temp:  [37.2 °C (98.9 °F)-37.3 °C (99.1 °F)]   Pulse:  []   Resp:  [18-22]   BP: (103-148)/(49-66)   SpO2:  [100 %]       Significant Labs:  Lab Results   Component Value Date    WBC 10.25 12/21/2020    HGB 6.9 (L) 12/21/2020    HCT 21.5 (L) 12/21/2020     12/21/2020    CHOL 141 06/12/2020    TRIG 40 06/12/2020    HDL 73 06/12/2020    ALT 31 12/21/2020    AST 24 12/21/2020     12/21/2020     "K 3.8 12/21/2020     12/21/2020    CREATININE 0.8 12/21/2020    BUN 27 (H) 12/21/2020    CO2 23 12/21/2020    TSH 1.072 12/16/2020    INR 1.0 12/20/2020    HGBA1C 9.9 (H) 12/17/2020       Diagnostic Studies: No relevant studies.    EKG:   Results for orders placed or performed during the hospital encounter of 12/16/20   EKG 12-lead    Collection Time: 12/18/20  8:37 AM    Narrative    Test Reason : Z91.89,    Vent. Rate : 071 BPM     Atrial Rate : 071 BPM     P-R Int : 134 ms          QRS Dur : 070 ms      QT Int : 402 ms       P-R-T Axes : 048 -03 030 degrees     QTc Int : 436 ms    Normal sinus rhythm  Normal ECG  When compared with ECG of 17-DEC-2020 08:41,  No significant change was found  Confirmed by Jacob BARTH MD ([id number]) on 12/18/2020 11:28:08 AM    Referred By: [email or web address]   SELF           Confirmed By:Jacob BARTH MD       2D ECHO:  TTE:  Results for orders placed or performed during the hospital encounter of 12/16/20   Echo Color Flow Doppler? Yes   Result Value Ref Range    Ascending aorta 3.34 cm    STJ 2.79 cm    AV mean gradient 6 mmHg    Ao peak wilfred 1.59 m/s    Ao VTI 29.96 cm    IVRT 82.78 msec    IVS 0.66 0.6 - 1.1 cm    LA size 2.94 cm    Left Atrium Major Axis 4.91 cm    Left Atrium Minor Axis 4.89 cm    LVIDd 4.26 3.5 - 6.0 cm    LVIDs 2.85 2.1 - 4.0 cm    LVOT diameter 2.11 cm    LVOT peak VTI 26.86 cm    Posterior Wall 0.71 0.6 - 1.1 cm    MV Peak A Wilfred 1.03 m/s    E wave decelartion time 303.13 msec    MV Peak E Wilfred 0.80 m/s    PV Peak D Wilfred 0.35 m/s    PV Peak S Wilfred 0.51 m/s    RA Major Axis 4.78 cm    RA Width 3.62 cm    RVDD 3.30 cm    Sinus 3.00 cm    TAPSE 1.80 cm    TR Max Wilfred 2.69 m/s    TDI LATERAL 0.08 m/s    TDI SEPTAL 0.09 m/s    LA WIDTH 3.78 cm    LV Diastolic Volume 81.06 mL    LV Systolic Volume 30.77 mL    RV S' 13.55 cm/s    LVOT peak wilfred 1.35 m/s    LA volume (mod) 43.24 cm3    MV "A" wave duration 9.42 msec    LV LATERAL E/E' RATIO 10.00 m/s    LV SEPTAL E/E' RATIO 8.89 m/s "    FS 33 %    LA volume 46.29 cm3    LV mass 84.75 g    Left Ventricle Relative Wall Thickness 0.33 cm    AV valve area 3.13 cm2    AV Velocity Ratio 0.85     AV index (prosthetic) 0.90     E/A ratio 0.78     Mean e' 0.09 m/s    Pulm vein S/D ratio 1.46     LVOT area 3.5 cm2    LVOT stroke volume 93.87 cm3    AV peak gradient 10 mmHg    E/E' ratio 9.41 m/s    LV Systolic Volume Index 15.3 mL/m2    LV Diastolic Volume Index 40.38 mL/m2    LA Volume Index 23.1 mL/m2    LV Mass Index 42 g/m2    Triscuspid Valve Regurgitation Peak Gradient 29 mmHg    LA Volume Index (Mod) 21.5 mL/m2    BSA 2.07 m2    Right Atrial Pressure (from IVC) 8 mmHg    TV rest pulmonary artery pressure 37 mmHg    Narrative    · The left ventricle is normal in size with left ventricular concentric   remodeling and normal systolic function. The estimated ejection fraction   is 60%.  · Normal right ventricular size with normal right ventricular systolic   function.  · Grade I left ventricular diastolic dysfunction.  · Mild to moderate tricuspid regurgitation.  · Poor endocardial visualization  · The estimated PA systolic pressure is 37 mmHg.  · Intermediate central venous pressure (8 mmHg).          RON:  No results found. However, due to the size of the patient record, not all encounters were searched. Please check Results Review for a complete set of results.    ASSESSMENT/PLAN:         Anesthesia Evaluation    I have reviewed the Patient Summary Reports.    I have reviewed the Nursing Notes.    I have reviewed the Medications.     Review of Systems  Anesthesia Hx:  No problems with previous Anesthesia Denies Hx of Anesthetic complications  History of prior surgery of interest to airway management or planning: Denies Family Hx of Anesthesia complications.   Denies Personal Hx of Anesthesia complications.   Social:  Former Smoker, Social Alcohol Use    Hematology/Oncology:  Hematology Normal   Oncology Normal     EENT/Dental:EENT/Dental Normal    Cardiovascular:   Exercise tolerance: good Hypertension Denies MI.   Angina hyperlipidemia    Pulmonary:   Denies COPD. Sleep Apnea    Renal/:  Renal/ Normal     Hepatic/GI:   GERD    Musculoskeletal:   Arthritis     Neurological:   Headaches    Endocrine:   Diabetes, type 1    Psych:   Psychiatric History          Physical Exam  General:  Obesity    Airway/Jaw/Neck:  Airway Findings: Mouth Opening: Normal Tongue: Normal  General Airway Assessment: Adult, Good  Mallampati: I  TM Distance: Normal, at least 6 cm  Jaw/Neck Findings:  Neck ROM: Normal ROM     Eyes/Ears/Nose:  EYES/EARS/NOSE FINDINGS: Normal   Dental:  Dental Findings: Lower partial dentures   Chest/Lungs:  Chest/Lungs Findings: Normal Respiratory Rate     Heart/Vascular:  Heart Findings: Normal Heart murmur: negative       Mental Status:  Mental Status Findings:  Alert and Oriented, Cooperative         Anesthesia Plan  Type of Anesthesia, risks & benefits discussed:  Anesthesia Type:  general, MAC  Patient's Preference:   Intra-op Monitoring Plan: standard ASA monitors  Intra-op Monitoring Plan Comments:   Post Op Pain Control Plan: per primary service following discharge from PACU, multimodal analgesia and IV/PO Opioids PRN  Post Op Pain Control Plan Comments:   Induction:   IV  Beta Blocker:  Patient is not currently on a Beta-Blocker (No further documentation required).       Informed Consent: Patient understands risks and agrees with Anesthesia plan.  Questions answered. Anesthesia consent signed with patient.  ASA Score: 3     Day of Surgery Review of History & Physical: I have interviewed and examined the patient. I have reviewed the patient's H&P dated:    H&P update referred to the provider.     Anesthesia Plan Notes:           Ready For Surgery From Anesthesia Perspective.     Conclusion    · The left ventricle is normal in size with normal systolic function. The estimated ejection fraction is 70%  · Normal left ventricular diastolic  function.  · Dynamic LV outflow tract obstruction w a peak resting gradient of 60 mm Hg and no change w Valsalva.  · There is systolic anterior motion of the mitral valve, but normal septal wall thickness.  · Normal right ventricular size with normal right ventricular systolic function.  · The estimated PA systolic pressure is 31 mmHg.  · Normal central venous pressure (3 mmHg).         Date/Time  Temp  Pulse  Resp  BP  Patient Position  MAP (mmHg)  SpO2  Weight  Flow (L/min)  Oxygen Concentration (%)  O2 Device (Oxygen Therapy)  Arterial Line BP  Arterial Line BP 2 [facility]    12/22/20 1208  36.9 °C (98.5 °F)  80  19  148/62Abnormal   Lying  89  99 %  --  --  --  --  --  -- SB    12/22/20 1000  36.7 °C (98.1 °F)  80  16  130/59Abnormal   --  --  94 %Abnormal   --  --  --  room air  --  -- CR    12/22/20 0945  36.7 °C (98 °F)  81  16  127/85  --  --  99 %  --  --  --  room air  --  -- CR    12/22/20 0400  --  --  --  --  --  --  --  92.7 kg (204 lb 5.9 oz)  --  --  --  --  -- TL    12/22/20 0306  36.7 °C (98 °F)  96  18  112/63  Lying  82  99 %  --  --  --  --  --  -- TL    12/22/20 0245  36.8 °C (98.2 °F)  85  18  117/60  --  --  99 %  --  --  --  --  --  -- TL    12/22/20 0231  36.8 °C (98.2 °F)  83  18  122/56Abnormal   Lying  80  100 %  --  --  --  --  --  -- TL    12/22/20 0054  37.3 °C (99.2 °F)  78  14  113/55Abnormal   Lying  77  98 %  --  --  --  --  --  -- DT

## 2020-12-22 ENCOUNTER — ANESTHESIA (OUTPATIENT)
Dept: ENDOSCOPY | Facility: HOSPITAL | Age: 65
DRG: 378 | End: 2020-12-22
Payer: MEDICARE

## 2020-12-22 LAB
ALBUMIN SERPL BCP-MCNC: 2.2 G/DL (ref 3.5–5.2)
ALP SERPL-CCNC: 35 U/L (ref 55–135)
ALT SERPL W/O P-5'-P-CCNC: 22 U/L (ref 10–44)
ANION GAP SERPL CALC-SCNC: 5 MMOL/L (ref 8–16)
AST SERPL-CCNC: 18 U/L (ref 10–40)
BASOPHILS # BLD AUTO: 0.03 K/UL (ref 0–0.2)
BASOPHILS # BLD AUTO: 0.03 K/UL (ref 0–0.2)
BASOPHILS # BLD AUTO: 0.05 K/UL (ref 0–0.2)
BASOPHILS NFR BLD: 0.2 % (ref 0–1.9)
BASOPHILS NFR BLD: 0.3 % (ref 0–1.9)
BASOPHILS NFR BLD: 0.5 % (ref 0–1.9)
BILIRUB SERPL-MCNC: 1.1 MG/DL (ref 0.1–1)
BLD PROD TYP BPU: NORMAL
BLD PROD TYP BPU: NORMAL
BLOOD UNIT EXPIRATION DATE: NORMAL
BLOOD UNIT EXPIRATION DATE: NORMAL
BLOOD UNIT TYPE CODE: 5100
BLOOD UNIT TYPE CODE: 5100
BLOOD UNIT TYPE: NORMAL
BLOOD UNIT TYPE: NORMAL
BUN SERPL-MCNC: 23 MG/DL (ref 8–23)
CALCIUM SERPL-MCNC: 7.8 MG/DL (ref 8.7–10.5)
CHLORIDE SERPL-SCNC: 113 MMOL/L (ref 95–110)
CO2 SERPL-SCNC: 28 MMOL/L (ref 23–29)
CODING SYSTEM: NORMAL
CODING SYSTEM: NORMAL
CREAT SERPL-MCNC: 0.7 MG/DL (ref 0.5–1.4)
DIFFERENTIAL METHOD: ABNORMAL
DISPENSE STATUS: NORMAL
DISPENSE STATUS: NORMAL
EOSINOPHIL # BLD AUTO: 0 K/UL (ref 0–0.5)
EOSINOPHIL # BLD AUTO: 0.1 K/UL (ref 0–0.5)
EOSINOPHIL # BLD AUTO: 0.1 K/UL (ref 0–0.5)
EOSINOPHIL NFR BLD: 0.4 % (ref 0–8)
EOSINOPHIL NFR BLD: 0.7 % (ref 0–8)
EOSINOPHIL NFR BLD: 0.7 % (ref 0–8)
ERYTHROCYTE [DISTWIDTH] IN BLOOD BY AUTOMATED COUNT: 14.8 % (ref 11.5–14.5)
ERYTHROCYTE [DISTWIDTH] IN BLOOD BY AUTOMATED COUNT: 15.1 % (ref 11.5–14.5)
ERYTHROCYTE [DISTWIDTH] IN BLOOD BY AUTOMATED COUNT: 15.5 % (ref 11.5–14.5)
EST. GFR  (AFRICAN AMERICAN): >60 ML/MIN/1.73 M^2
EST. GFR  (NON AFRICAN AMERICAN): >60 ML/MIN/1.73 M^2
GLUCOSE SERPL-MCNC: 174 MG/DL (ref 70–110)
HCT VFR BLD AUTO: 15.7 % (ref 37–48.5)
HCT VFR BLD AUTO: 23.9 % (ref 37–48.5)
HCT VFR BLD AUTO: 31.6 % (ref 37–48.5)
HGB BLD-MCNC: 10.3 G/DL (ref 12–16)
HGB BLD-MCNC: 5.4 G/DL (ref 12–16)
HGB BLD-MCNC: 7.8 G/DL (ref 12–16)
IMM GRANULOCYTES # BLD AUTO: 0.12 K/UL (ref 0–0.04)
IMM GRANULOCYTES # BLD AUTO: 0.15 K/UL (ref 0–0.04)
IMM GRANULOCYTES # BLD AUTO: 0.19 K/UL (ref 0–0.04)
IMM GRANULOCYTES NFR BLD AUTO: 1.1 % (ref 0–0.5)
IMM GRANULOCYTES NFR BLD AUTO: 1.2 % (ref 0–0.5)
IMM GRANULOCYTES NFR BLD AUTO: 1.9 % (ref 0–0.5)
LYMPHOCYTES # BLD AUTO: 1.9 K/UL (ref 1–4.8)
LYMPHOCYTES # BLD AUTO: 2 K/UL (ref 1–4.8)
LYMPHOCYTES # BLD AUTO: 2.3 K/UL (ref 1–4.8)
LYMPHOCYTES NFR BLD: 16.7 % (ref 18–48)
LYMPHOCYTES NFR BLD: 18.5 % (ref 18–48)
LYMPHOCYTES NFR BLD: 21.4 % (ref 18–48)
MAGNESIUM SERPL-MCNC: 1.8 MG/DL (ref 1.6–2.6)
MCH RBC QN AUTO: 28 PG (ref 27–31)
MCH RBC QN AUTO: 28.4 PG (ref 27–31)
MCH RBC QN AUTO: 28.5 PG (ref 27–31)
MCHC RBC AUTO-ENTMCNC: 32.6 G/DL (ref 32–36)
MCHC RBC AUTO-ENTMCNC: 32.6 G/DL (ref 32–36)
MCHC RBC AUTO-ENTMCNC: 34.4 G/DL (ref 32–36)
MCV RBC AUTO: 81 FL (ref 82–98)
MCV RBC AUTO: 87 FL (ref 82–98)
MCV RBC AUTO: 87 FL (ref 82–98)
MONOCYTES # BLD AUTO: 0.6 K/UL (ref 0.3–1)
MONOCYTES NFR BLD: 4.8 % (ref 4–15)
MONOCYTES NFR BLD: 5.4 % (ref 4–15)
MONOCYTES NFR BLD: 5.4 % (ref 4–15)
NEUTROPHILS # BLD AUTO: 7.4 K/UL (ref 1.8–7.7)
NEUTROPHILS # BLD AUTO: 7.8 K/UL (ref 1.8–7.7)
NEUTROPHILS # BLD AUTO: 9.3 K/UL (ref 1.8–7.7)
NEUTROPHILS NFR BLD: 71.4 % (ref 38–73)
NEUTROPHILS NFR BLD: 73 % (ref 38–73)
NEUTROPHILS NFR BLD: 76.4 % (ref 38–73)
NRBC BLD-RTO: 0 /100 WBC
NRBC BLD-RTO: 1 /100 WBC
NRBC BLD-RTO: 1 /100 WBC
PHOSPHATE SERPL-MCNC: 2.7 MG/DL (ref 2.7–4.5)
PLATELET # BLD AUTO: 120 K/UL (ref 150–350)
PLATELET # BLD AUTO: 130 K/UL (ref 150–350)
PLATELET # BLD AUTO: 163 K/UL (ref 150–350)
PLATELET BLD QL SMEAR: ABNORMAL
PMV BLD AUTO: 10.8 FL (ref 9.2–12.9)
PMV BLD AUTO: 11.3 FL (ref 9.2–12.9)
PMV BLD AUTO: 12.1 FL (ref 9.2–12.9)
POCT GLUCOSE: 108 MG/DL (ref 70–110)
POCT GLUCOSE: 108 MG/DL (ref 70–110)
POCT GLUCOSE: 132 MG/DL (ref 70–110)
POCT GLUCOSE: 304 MG/DL (ref 70–110)
POCT GLUCOSE: 54 MG/DL (ref 70–110)
POTASSIUM SERPL-SCNC: 3.4 MMOL/L (ref 3.5–5.1)
PROT SERPL-MCNC: 4.2 G/DL (ref 6–8.4)
RBC # BLD AUTO: 1.93 M/UL (ref 4–5.4)
RBC # BLD AUTO: 2.75 M/UL (ref 4–5.4)
RBC # BLD AUTO: 3.62 M/UL (ref 4–5.4)
SODIUM SERPL-SCNC: 146 MMOL/L (ref 136–145)
TRANS ERYTHROCYTES VOL PATIENT: NORMAL ML
TRANS ERYTHROCYTES VOL PATIENT: NORMAL ML
WBC # BLD AUTO: 10.1 K/UL (ref 3.9–12.7)
WBC # BLD AUTO: 10.86 K/UL (ref 3.9–12.7)
WBC # BLD AUTO: 12.19 K/UL (ref 3.9–12.7)

## 2020-12-22 PROCEDURE — 80053 COMPREHEN METABOLIC PANEL: CPT | Mod: HCNC

## 2020-12-22 PROCEDURE — P9021 RED BLOOD CELLS UNIT: HCPCS | Mod: HCNC

## 2020-12-22 PROCEDURE — 85025 COMPLETE CBC W/AUTO DIFF WBC: CPT | Mod: HCNC

## 2020-12-22 PROCEDURE — 83735 ASSAY OF MAGNESIUM: CPT | Mod: HCNC

## 2020-12-22 PROCEDURE — 88305 TISSUE EXAM BY PATHOLOGIST: CPT | Mod: 26,HCNC,, | Performed by: PATHOLOGY

## 2020-12-22 PROCEDURE — 37000008 HC ANESTHESIA 1ST 15 MINUTES: Mod: HCNC | Performed by: INTERNAL MEDICINE

## 2020-12-22 PROCEDURE — 99232 SBSQ HOSP IP/OBS MODERATE 35: CPT | Mod: HCNC,,, | Performed by: INTERNAL MEDICINE

## 2020-12-22 PROCEDURE — 36430 TRANSFUSION BLD/BLD COMPNT: CPT | Mod: HCNC

## 2020-12-22 PROCEDURE — 25000003 PHARM REV CODE 250: Mod: HCNC | Performed by: ANESTHESIOLOGY

## 2020-12-22 PROCEDURE — 63600175 PHARM REV CODE 636 W HCPCS: Mod: HCNC | Performed by: NURSE ANESTHETIST, CERTIFIED REGISTERED

## 2020-12-22 PROCEDURE — 63600175 PHARM REV CODE 636 W HCPCS: Mod: HCNC | Performed by: NURSE PRACTITIONER

## 2020-12-22 PROCEDURE — 25000003 PHARM REV CODE 250: Mod: HCNC | Performed by: NURSE PRACTITIONER

## 2020-12-22 PROCEDURE — 88342 IMHCHEM/IMCYTCHM 1ST ANTB: CPT | Mod: HCNC | Performed by: PATHOLOGY

## 2020-12-22 PROCEDURE — 36415 COLL VENOUS BLD VENIPUNCTURE: CPT | Mod: HCNC

## 2020-12-22 PROCEDURE — 45380 COLONOSCOPY AND BIOPSY: CPT | Mod: HCNC | Performed by: INTERNAL MEDICINE

## 2020-12-22 PROCEDURE — D9220A PRA ANESTHESIA: Mod: HCNC,ANES,, | Performed by: ANESTHESIOLOGY

## 2020-12-22 PROCEDURE — 88305 TISSUE EXAM BY PATHOLOGIST: ICD-10-PCS | Mod: 26,HCNC,, | Performed by: PATHOLOGY

## 2020-12-22 PROCEDURE — 84100 ASSAY OF PHOSPHORUS: CPT | Mod: HCNC

## 2020-12-22 PROCEDURE — 45380 PR COLONOSCOPY,BIOPSY: ICD-10-PCS | Mod: HCNC,,, | Performed by: INTERNAL MEDICINE

## 2020-12-22 PROCEDURE — 27201012 HC FORCEPS, HOT/COLD, DISP: Mod: HCNC | Performed by: INTERNAL MEDICINE

## 2020-12-22 PROCEDURE — 82962 GLUCOSE BLOOD TEST: CPT | Mod: HCNC | Performed by: INTERNAL MEDICINE

## 2020-12-22 PROCEDURE — 43239 PR EGD, FLEX, W/BIOPSY, SGL/MULTI: ICD-10-PCS | Mod: HCNC,51,, | Performed by: INTERNAL MEDICINE

## 2020-12-22 PROCEDURE — 11000001 HC ACUTE MED/SURG PRIVATE ROOM: Mod: HCNC

## 2020-12-22 PROCEDURE — 99232 PR SUBSEQUENT HOSPITAL CARE,LEVL II: ICD-10-PCS | Mod: HCNC,,, | Performed by: INTERNAL MEDICINE

## 2020-12-22 PROCEDURE — 25000003 PHARM REV CODE 250: Mod: HCNC | Performed by: NURSE ANESTHETIST, CERTIFIED REGISTERED

## 2020-12-22 PROCEDURE — 43239 EGD BIOPSY SINGLE/MULTIPLE: CPT | Mod: HCNC,51,, | Performed by: INTERNAL MEDICINE

## 2020-12-22 PROCEDURE — 37000009 HC ANESTHESIA EA ADD 15 MINS: Mod: HCNC | Performed by: INTERNAL MEDICINE

## 2020-12-22 PROCEDURE — D9220A PRA ANESTHESIA: Mod: HCNC,CRNA,, | Performed by: NURSE ANESTHETIST, CERTIFIED REGISTERED

## 2020-12-22 PROCEDURE — D9220A PRA ANESTHESIA: ICD-10-PCS | Mod: HCNC,CRNA,, | Performed by: NURSE ANESTHETIST, CERTIFIED REGISTERED

## 2020-12-22 PROCEDURE — 45380 COLONOSCOPY AND BIOPSY: CPT | Mod: HCNC,,, | Performed by: INTERNAL MEDICINE

## 2020-12-22 PROCEDURE — D9220A PRA ANESTHESIA: ICD-10-PCS | Mod: HCNC,ANES,, | Performed by: ANESTHESIOLOGY

## 2020-12-22 PROCEDURE — 88305 TISSUE EXAM BY PATHOLOGIST: CPT | Mod: HCNC | Performed by: PATHOLOGY

## 2020-12-22 PROCEDURE — C9113 INJ PANTOPRAZOLE SODIUM, VIA: HCPCS | Mod: HCNC | Performed by: NURSE PRACTITIONER

## 2020-12-22 PROCEDURE — 43239 EGD BIOPSY SINGLE/MULTIPLE: CPT | Mod: HCNC | Performed by: INTERNAL MEDICINE

## 2020-12-22 RX ORDER — ONDANSETRON 2 MG/ML
INJECTION INTRAMUSCULAR; INTRAVENOUS
Status: DISCONTINUED | OUTPATIENT
Start: 2020-12-22 | End: 2020-12-22

## 2020-12-22 RX ORDER — INSULIN ASPART 100 [IU]/ML
0-5 INJECTION, SOLUTION INTRAVENOUS; SUBCUTANEOUS EVERY 4 HOURS PRN
Status: DISCONTINUED | OUTPATIENT
Start: 2020-12-22 | End: 2020-12-22

## 2020-12-22 RX ORDER — ONDANSETRON 2 MG/ML
4 INJECTION INTRAMUSCULAR; INTRAVENOUS ONCE AS NEEDED
Status: CANCELLED | OUTPATIENT
Start: 2020-12-22 | End: 2032-05-20

## 2020-12-22 RX ORDER — ROCURONIUM BROMIDE 10 MG/ML
INJECTION, SOLUTION INTRAVENOUS
Status: DISCONTINUED | OUTPATIENT
Start: 2020-12-22 | End: 2020-12-22

## 2020-12-22 RX ORDER — SUCCINYLCHOLINE CHLORIDE 20 MG/ML
INJECTION INTRAMUSCULAR; INTRAVENOUS
Status: DISCONTINUED | OUTPATIENT
Start: 2020-12-22 | End: 2020-12-22

## 2020-12-22 RX ORDER — FENTANYL CITRATE 50 UG/ML
25 INJECTION, SOLUTION INTRAMUSCULAR; INTRAVENOUS EVERY 5 MIN PRN
Status: CANCELLED | OUTPATIENT
Start: 2020-12-22

## 2020-12-22 RX ORDER — LIDOCAINE HYDROCHLORIDE 20 MG/ML
INJECTION, SOLUTION EPIDURAL; INFILTRATION; INTRACAUDAL; PERINEURAL
Status: DISCONTINUED | OUTPATIENT
Start: 2020-12-22 | End: 2020-12-22

## 2020-12-22 RX ORDER — FENTANYL CITRATE 50 UG/ML
INJECTION, SOLUTION INTRAMUSCULAR; INTRAVENOUS
Status: DISCONTINUED | OUTPATIENT
Start: 2020-12-22 | End: 2020-12-22

## 2020-12-22 RX ORDER — HYDROCODONE BITARTRATE AND ACETAMINOPHEN 500; 5 MG/1; MG/1
TABLET ORAL
Status: DISCONTINUED | OUTPATIENT
Start: 2020-12-22 | End: 2020-12-25 | Stop reason: HOSPADM

## 2020-12-22 RX ORDER — INSULIN ASPART 100 [IU]/ML
6 INJECTION, SOLUTION INTRAVENOUS; SUBCUTANEOUS
Status: DISCONTINUED | OUTPATIENT
Start: 2020-12-22 | End: 2020-12-23

## 2020-12-22 RX ORDER — PHENYLEPHRINE HCL IN 0.9% NACL 1 MG/10 ML
SYRINGE (ML) INTRAVENOUS
Status: DISCONTINUED | OUTPATIENT
Start: 2020-12-22 | End: 2020-12-22

## 2020-12-22 RX ORDER — PROPOFOL 10 MG/ML
VIAL (ML) INTRAVENOUS
Status: DISCONTINUED | OUTPATIENT
Start: 2020-12-22 | End: 2020-12-22

## 2020-12-22 RX ORDER — PROCHLORPERAZINE EDISYLATE 5 MG/ML
5 INJECTION INTRAMUSCULAR; INTRAVENOUS EVERY 30 MIN PRN
Status: CANCELLED | OUTPATIENT
Start: 2020-12-22

## 2020-12-22 RX ORDER — MIDAZOLAM HYDROCHLORIDE 1 MG/ML
INJECTION, SOLUTION INTRAMUSCULAR; INTRAVENOUS
Status: DISCONTINUED | OUTPATIENT
Start: 2020-12-22 | End: 2020-12-22

## 2020-12-22 RX ORDER — INSULIN ASPART 100 [IU]/ML
0-5 INJECTION, SOLUTION INTRAVENOUS; SUBCUTANEOUS EVERY 4 HOURS
Status: DISCONTINUED | OUTPATIENT
Start: 2020-12-22 | End: 2020-12-23

## 2020-12-22 RX ORDER — PHENYLEPHRINE HYDROCHLORIDE 10 MG/ML
INJECTION INTRAVENOUS
Status: DISCONTINUED | OUTPATIENT
Start: 2020-12-22 | End: 2020-12-22

## 2020-12-22 RX ADMIN — ONDANSETRON 4 MG: 2 INJECTION INTRAMUSCULAR; INTRAVENOUS at 04:12

## 2020-12-22 RX ADMIN — SUCCINYLCHOLINE CHLORIDE 120 MG: 20 INJECTION, SOLUTION INTRAMUSCULAR; INTRAVENOUS; PARENTERAL at 03:12

## 2020-12-22 RX ADMIN — DEXTROSE MONOHYDRATE 25 G: 25 INJECTION, SOLUTION INTRAVENOUS at 03:12

## 2020-12-22 RX ADMIN — PHENYLEPHRINE HYDROCHLORIDE 200 MCG: 10 INJECTION INTRAVENOUS at 04:12

## 2020-12-22 RX ADMIN — FENTANYL CITRATE 50 MCG: 50 INJECTION INTRAMUSCULAR; INTRAVENOUS at 03:12

## 2020-12-22 RX ADMIN — ATORVASTATIN CALCIUM 40 MG: 20 TABLET, FILM COATED ORAL at 09:12

## 2020-12-22 RX ADMIN — SODIUM CHLORIDE 500 ML/HR: 0.9 INJECTION, SOLUTION INTRAVENOUS at 03:12

## 2020-12-22 RX ADMIN — DIVALPROEX SODIUM 250 MG: 250 TABLET, DELAYED RELEASE ORAL at 09:12

## 2020-12-22 RX ADMIN — PANTOPRAZOLE SODIUM 40 MG: 40 INJECTION, POWDER, FOR SOLUTION INTRAVENOUS at 09:12

## 2020-12-22 RX ADMIN — MIDAZOLAM 2 MG: 1 INJECTION INTRAMUSCULAR; INTRAVENOUS at 03:12

## 2020-12-22 RX ADMIN — LIDOCAINE HYDROCHLORIDE 100 MG: 20 INJECTION, SOLUTION EPIDURAL; INFILTRATION; INTRACAUDAL at 03:12

## 2020-12-22 RX ADMIN — INSULIN DETEMIR 20 UNITS: 100 INJECTION, SOLUTION SUBCUTANEOUS at 09:12

## 2020-12-22 RX ADMIN — PROPOFOL 100 MG: 10 INJECTION, EMULSION INTRAVENOUS at 03:12

## 2020-12-22 RX ADMIN — ROCURONIUM BROMIDE 5 MG: 10 INJECTION, SOLUTION INTRAVENOUS at 03:12

## 2020-12-22 RX ADMIN — CHLORHEXIDINE GLUCONATE 15 ML: 1.2 RINSE ORAL at 09:12

## 2020-12-22 RX ADMIN — POLYETHYLENE GLYCOL 3350 17 G: 17 POWDER, FOR SOLUTION ORAL at 09:12

## 2020-12-22 RX ADMIN — Medication 200 MCG: at 04:12

## 2020-12-22 NOTE — ANESTHESIA POSTPROCEDURE EVALUATION
Anesthesia Post Evaluation    Patient: Adriana Paula    Procedure(s) Performed: Procedure(s) (LRB):  EGD (ESOPHAGOGASTRODUODENOSCOPY) (N/A)  COLONOSCOPY (N/A)    Final Anesthesia Type: general      Patient location during evaluation: PACU  Patient participation: Yes- Able to Participate  Level of consciousness: awake and alert and oriented  Post-procedure vital signs: reviewed and stable  Pain management: adequate  Airway patency: patent    PONV status at discharge: No PONV  Anesthetic complications: no      Cardiovascular status: hemodynamically stable  Respiratory status: unassisted, spontaneous ventilation and room air  Hydration status: euvolemic  Follow-up not needed.          Vitals Value Taken Time   /53 12/22/20 1647   Temp 36.7 °C (98 °F) 12/22/20 1646   Pulse 80 12/22/20 1700   Resp 16 12/22/20 1700   SpO2 100 % 12/22/20 1700         Event Time   Out of Recovery 16:41:00         Pain/Eric Score: Eric Score: 10 (12/22/2020  5:01 PM)

## 2020-12-22 NOTE — TRANSFER OF CARE
"Anesthesia Transfer of Care Note    Patient: Adriana Paula    Procedure(s) Performed: Procedure(s) (LRB):  EGD (ESOPHAGOGASTRODUODENOSCOPY) (N/A)  COLONOSCOPY (N/A)    Patient location: PACU    Anesthesia Type: general    Transport from OR: Transported from OR on room air with adequate spontaneous ventilation    Post pain: adequate analgesia    Post assessment: no apparent anesthetic complications and tolerated procedure well    Post vital signs: stable    Level of consciousness: awake, alert and oriented    Nausea/Vomiting: no nausea/vomiting    Complications: none    Transfer of care protocol was followed      Last vitals:   Visit Vitals  BP (!) 124/53   Pulse 92   Temp 36.7 °C (98 °F) (Temporal)   Resp 20   Ht 5' 5" (1.651 m)   Wt 92.7 kg (204 lb 5.9 oz)   SpO2 98%   Breastfeeding No   BMI 34.01 kg/m²     "

## 2020-12-22 NOTE — ANESTHESIA PROCEDURE NOTES
Intubation  Performed by: Yaneli Locke CRNA  Authorized by: Rey Galicia MD     Intubation:     Induction:  Rapid sequence induction    Intubated:  Postinduction    Mask Ventilation:  N/a    Attempts:  1    Attempted By:  CRNA    Method of Intubation:  Direct    Blade:  Morejon 2    Laryngeal View Grade: Grade I - full view of chords      Difficult Airway Encountered?: No      Complications:  None    Airway Device:  Oral endotracheal tube    Airway Device Size:  7.0    Style/Cuff Inflation:  Cuffed (inflated to minimal occlusive pressure)    Tube secured:  21    Secured at:  The lips    Placement Verified By:  Capnometry    Complicating Factors:  None    Findings Post-Intubation:  BS equal bilateral and atraumatic/condition of teeth unchanged

## 2020-12-23 ENCOUNTER — TELEPHONE (OUTPATIENT)
Dept: GASTROENTEROLOGY | Facility: CLINIC | Age: 65
End: 2020-12-23

## 2020-12-23 PROBLEM — E11.59 OBESITY, DIABETES, AND HYPERTENSION SYNDROME: Status: ACTIVE | Noted: 2020-12-23

## 2020-12-23 PROBLEM — E66.9 OBESITY, DIABETES, AND HYPERTENSION SYNDROME: Status: ACTIVE | Noted: 2020-12-23

## 2020-12-23 PROBLEM — E11.69 OBESITY, DIABETES, AND HYPERTENSION SYNDROME: Status: ACTIVE | Noted: 2020-12-23

## 2020-12-23 PROBLEM — I15.2 OBESITY, DIABETES, AND HYPERTENSION SYNDROME: Status: ACTIVE | Noted: 2020-12-23

## 2020-12-23 LAB
ALBUMIN SERPL BCP-MCNC: 2.2 G/DL (ref 3.5–5.2)
ALP SERPL-CCNC: 44 U/L (ref 55–135)
ALT SERPL W/O P-5'-P-CCNC: 21 U/L (ref 10–44)
ANION GAP SERPL CALC-SCNC: 7 MMOL/L (ref 8–16)
AST SERPL-CCNC: 21 U/L (ref 10–40)
BASOPHILS # BLD AUTO: 0.04 K/UL (ref 0–0.2)
BASOPHILS NFR BLD: 0.4 % (ref 0–1.9)
BASOPHILS NFR BLD: 0.5 % (ref 0–1.9)
BASOPHILS NFR BLD: 0.6 % (ref 0–1.9)
BILIRUB SERPL-MCNC: 0.7 MG/DL (ref 0.1–1)
BUN SERPL-MCNC: 12 MG/DL (ref 8–23)
CALCIUM SERPL-MCNC: 7.8 MG/DL (ref 8.7–10.5)
CHLORIDE SERPL-SCNC: 111 MMOL/L (ref 95–110)
CO2 SERPL-SCNC: 28 MMOL/L (ref 23–29)
CREAT SERPL-MCNC: 0.7 MG/DL (ref 0.5–1.4)
DIFFERENTIAL METHOD: ABNORMAL
EOSINOPHIL # BLD AUTO: 0.1 K/UL (ref 0–0.5)
EOSINOPHIL NFR BLD: 1.2 % (ref 0–8)
EOSINOPHIL NFR BLD: 1.4 % (ref 0–8)
EOSINOPHIL NFR BLD: 1.8 % (ref 0–8)
ERYTHROCYTE [DISTWIDTH] IN BLOOD BY AUTOMATED COUNT: 15.4 % (ref 11.5–14.5)
ERYTHROCYTE [DISTWIDTH] IN BLOOD BY AUTOMATED COUNT: 15.5 % (ref 11.5–14.5)
ERYTHROCYTE [DISTWIDTH] IN BLOOD BY AUTOMATED COUNT: 15.8 % (ref 11.5–14.5)
EST. GFR  (AFRICAN AMERICAN): >60 ML/MIN/1.73 M^2
EST. GFR  (NON AFRICAN AMERICAN): >60 ML/MIN/1.73 M^2
GLUCOSE SERPL-MCNC: 61 MG/DL (ref 70–110)
HCT VFR BLD AUTO: 26.7 % (ref 37–48.5)
HCT VFR BLD AUTO: 29.1 % (ref 37–48.5)
HCT VFR BLD AUTO: 29.8 % (ref 37–48.5)
HGB BLD-MCNC: 8.7 G/DL (ref 12–16)
HGB BLD-MCNC: 9.3 G/DL (ref 12–16)
HGB BLD-MCNC: 9.9 G/DL (ref 12–16)
IMM GRANULOCYTES # BLD AUTO: 0.05 K/UL (ref 0–0.04)
IMM GRANULOCYTES # BLD AUTO: 0.09 K/UL (ref 0–0.04)
IMM GRANULOCYTES # BLD AUTO: 0.13 K/UL (ref 0–0.04)
IMM GRANULOCYTES NFR BLD AUTO: 0.7 % (ref 0–0.5)
IMM GRANULOCYTES NFR BLD AUTO: 1 % (ref 0–0.5)
IMM GRANULOCYTES NFR BLD AUTO: 1.5 % (ref 0–0.5)
LYMPHOCYTES # BLD AUTO: 2 K/UL (ref 1–4.8)
LYMPHOCYTES # BLD AUTO: 2 K/UL (ref 1–4.8)
LYMPHOCYTES # BLD AUTO: 2.3 K/UL (ref 1–4.8)
LYMPHOCYTES NFR BLD: 22.9 % (ref 18–48)
LYMPHOCYTES NFR BLD: 24.5 % (ref 18–48)
LYMPHOCYTES NFR BLD: 27.6 % (ref 18–48)
MAGNESIUM SERPL-MCNC: 1.6 MG/DL (ref 1.6–2.6)
MCH RBC QN AUTO: 28.6 PG (ref 27–31)
MCH RBC QN AUTO: 28.8 PG (ref 27–31)
MCH RBC QN AUTO: 29.2 PG (ref 27–31)
MCHC RBC AUTO-ENTMCNC: 32 G/DL (ref 32–36)
MCHC RBC AUTO-ENTMCNC: 32.6 G/DL (ref 32–36)
MCHC RBC AUTO-ENTMCNC: 33.2 G/DL (ref 32–36)
MCV RBC AUTO: 88 FL (ref 82–98)
MCV RBC AUTO: 88 FL (ref 82–98)
MCV RBC AUTO: 90 FL (ref 82–98)
MONOCYTES # BLD AUTO: 0.5 K/UL (ref 0.3–1)
MONOCYTES # BLD AUTO: 0.6 K/UL (ref 0.3–1)
MONOCYTES # BLD AUTO: 0.6 K/UL (ref 0.3–1)
MONOCYTES NFR BLD: 6.4 % (ref 4–15)
MONOCYTES NFR BLD: 6.6 % (ref 4–15)
MONOCYTES NFR BLD: 7.2 % (ref 4–15)
NEUTROPHILS # BLD AUTO: 4.5 K/UL (ref 1.8–7.7)
NEUTROPHILS # BLD AUTO: 5.7 K/UL (ref 1.8–7.7)
NEUTROPHILS # BLD AUTO: 6.2 K/UL (ref 1.8–7.7)
NEUTROPHILS NFR BLD: 62.9 % (ref 38–73)
NEUTROPHILS NFR BLD: 66.3 % (ref 38–73)
NEUTROPHILS NFR BLD: 66.5 % (ref 38–73)
NRBC BLD-RTO: 0 /100 WBC
NRBC BLD-RTO: 1 /100 WBC
NRBC BLD-RTO: 1 /100 WBC
PHOSPHATE SERPL-MCNC: 3.2 MG/DL (ref 2.7–4.5)
PLATELET # BLD AUTO: 110 K/UL (ref 150–350)
PLATELET # BLD AUTO: 117 K/UL (ref 150–350)
PLATELET # BLD AUTO: 96 K/UL (ref 150–350)
PLATELET BLD QL SMEAR: ABNORMAL
PMV BLD AUTO: 11 FL (ref 9.2–12.9)
PMV BLD AUTO: 11.4 FL (ref 9.2–12.9)
PMV BLD AUTO: 11.7 FL (ref 9.2–12.9)
POCT GLUCOSE: 123 MG/DL (ref 70–110)
POCT GLUCOSE: 140 MG/DL (ref 70–110)
POCT GLUCOSE: 79 MG/DL (ref 70–110)
POCT GLUCOSE: 83 MG/DL (ref 70–110)
POCT GLUCOSE: 85 MG/DL (ref 70–110)
POCT GLUCOSE: 95 MG/DL (ref 70–110)
POCT GLUCOSE: 98 MG/DL (ref 70–110)
POTASSIUM SERPL-SCNC: 3 MMOL/L (ref 3.5–5.1)
PROT SERPL-MCNC: 4.3 G/DL (ref 6–8.4)
RBC # BLD AUTO: 3.02 M/UL (ref 4–5.4)
RBC # BLD AUTO: 3.25 M/UL (ref 4–5.4)
RBC # BLD AUTO: 3.39 M/UL (ref 4–5.4)
SODIUM SERPL-SCNC: 146 MMOL/L (ref 136–145)
WBC # BLD AUTO: 7.2 K/UL (ref 3.9–12.7)
WBC # BLD AUTO: 8.56 K/UL (ref 3.9–12.7)
WBC # BLD AUTO: 9.36 K/UL (ref 3.9–12.7)

## 2020-12-23 PROCEDURE — 99233 SBSQ HOSP IP/OBS HIGH 50: CPT | Mod: HCNC,,, | Performed by: INTERNAL MEDICINE

## 2020-12-23 PROCEDURE — 99232 PR SUBSEQUENT HOSPITAL CARE,LEVL II: ICD-10-PCS | Mod: HCNC,,, | Performed by: INTERNAL MEDICINE

## 2020-12-23 PROCEDURE — 80053 COMPREHEN METABOLIC PANEL: CPT | Mod: HCNC

## 2020-12-23 PROCEDURE — C9113 INJ PANTOPRAZOLE SODIUM, VIA: HCPCS | Mod: HCNC | Performed by: NURSE PRACTITIONER

## 2020-12-23 PROCEDURE — 25000003 PHARM REV CODE 250: Mod: HCNC | Performed by: HOSPITALIST

## 2020-12-23 PROCEDURE — 63600175 PHARM REV CODE 636 W HCPCS: Mod: HCNC | Performed by: NURSE PRACTITIONER

## 2020-12-23 PROCEDURE — 99233 PR SUBSEQUENT HOSPITAL CARE,LEVL III: ICD-10-PCS | Mod: HCNC,,, | Performed by: INTERNAL MEDICINE

## 2020-12-23 PROCEDURE — 25000003 PHARM REV CODE 250: Mod: HCNC | Performed by: NURSE PRACTITIONER

## 2020-12-23 PROCEDURE — 99232 SBSQ HOSP IP/OBS MODERATE 35: CPT | Mod: HCNC,,, | Performed by: INTERNAL MEDICINE

## 2020-12-23 PROCEDURE — 85025 COMPLETE CBC W/AUTO DIFF WBC: CPT | Mod: HCNC

## 2020-12-23 PROCEDURE — 11000001 HC ACUTE MED/SURG PRIVATE ROOM: Mod: HCNC

## 2020-12-23 PROCEDURE — 84100 ASSAY OF PHOSPHORUS: CPT | Mod: HCNC

## 2020-12-23 PROCEDURE — 97162 PT EVAL MOD COMPLEX 30 MIN: CPT | Mod: HCNC

## 2020-12-23 PROCEDURE — 36415 COLL VENOUS BLD VENIPUNCTURE: CPT | Mod: HCNC

## 2020-12-23 PROCEDURE — 83735 ASSAY OF MAGNESIUM: CPT | Mod: HCNC

## 2020-12-23 PROCEDURE — 97165 OT EVAL LOW COMPLEX 30 MIN: CPT | Mod: HCNC

## 2020-12-23 PROCEDURE — 97116 GAIT TRAINING THERAPY: CPT | Mod: HCNC

## 2020-12-23 RX ORDER — POTASSIUM CHLORIDE 20 MEQ/1
40 TABLET, EXTENDED RELEASE ORAL 2 TIMES DAILY
Status: COMPLETED | OUTPATIENT
Start: 2020-12-23 | End: 2020-12-24

## 2020-12-23 RX ORDER — INSULIN ASPART 100 [IU]/ML
0-5 INJECTION, SOLUTION INTRAVENOUS; SUBCUTANEOUS EVERY 4 HOURS PRN
Status: DISCONTINUED | OUTPATIENT
Start: 2020-12-23 | End: 2020-12-25

## 2020-12-23 RX ADMIN — PANTOPRAZOLE SODIUM 40 MG: 40 INJECTION, POWDER, FOR SOLUTION INTRAVENOUS at 08:12

## 2020-12-23 RX ADMIN — POLYETHYLENE GLYCOL 3350 17 G: 17 POWDER, FOR SOLUTION ORAL at 08:12

## 2020-12-23 RX ADMIN — CHLORHEXIDINE GLUCONATE 15 ML: 1.2 RINSE ORAL at 08:12

## 2020-12-23 RX ADMIN — DIVALPROEX SODIUM 250 MG: 250 TABLET, DELAYED RELEASE ORAL at 08:12

## 2020-12-23 RX ADMIN — ATORVASTATIN CALCIUM 40 MG: 20 TABLET, FILM COATED ORAL at 08:12

## 2020-12-23 RX ADMIN — POTASSIUM CHLORIDE 40 MEQ: 1500 TABLET, EXTENDED RELEASE ORAL at 04:12

## 2020-12-23 NOTE — TELEPHONE ENCOUNTER
TIME OUT done at bedside.  Instructions for the day reviewed with patient and nurse.   All questions answered to their satisfaction.                                                               Patient swallowed capsule without incident.   Mary Ann

## 2020-12-24 ENCOUNTER — TELEPHONE (OUTPATIENT)
Dept: ENDOSCOPY | Facility: HOSPITAL | Age: 65
End: 2020-12-24

## 2020-12-24 LAB
ALBUMIN SERPL BCP-MCNC: 2.3 G/DL (ref 3.5–5.2)
ALP SERPL-CCNC: 49 U/L (ref 55–135)
ALT SERPL W/O P-5'-P-CCNC: 22 U/L (ref 10–44)
ANION GAP SERPL CALC-SCNC: 13 MMOL/L (ref 8–16)
AST SERPL-CCNC: 29 U/L (ref 10–40)
BASOPHILS # BLD AUTO: 0.01 K/UL (ref 0–0.2)
BASOPHILS NFR BLD: 0.2 % (ref 0–1.9)
BILIRUB SERPL-MCNC: 1.1 MG/DL (ref 0.1–1)
BUN SERPL-MCNC: 8 MG/DL (ref 8–23)
CALCIUM SERPL-MCNC: 7.8 MG/DL (ref 8.7–10.5)
CHLORIDE SERPL-SCNC: 110 MMOL/L (ref 95–110)
CO2 SERPL-SCNC: 23 MMOL/L (ref 23–29)
CREAT SERPL-MCNC: 0.7 MG/DL (ref 0.5–1.4)
DIFFERENTIAL METHOD: ABNORMAL
EOSINOPHIL # BLD AUTO: 0.1 K/UL (ref 0–0.5)
EOSINOPHIL NFR BLD: 1.6 % (ref 0–8)
ERYTHROCYTE [DISTWIDTH] IN BLOOD BY AUTOMATED COUNT: 15.9 % (ref 11.5–14.5)
EST. GFR  (AFRICAN AMERICAN): >60 ML/MIN/1.73 M^2
EST. GFR  (NON AFRICAN AMERICAN): >60 ML/MIN/1.73 M^2
GLUCOSE SERPL-MCNC: 104 MG/DL (ref 70–110)
HCT VFR BLD AUTO: 31.9 % (ref 37–48.5)
HGB BLD-MCNC: 10.1 G/DL (ref 12–16)
IMM GRANULOCYTES # BLD AUTO: 0.07 K/UL (ref 0–0.04)
IMM GRANULOCYTES NFR BLD AUTO: 1.1 % (ref 0–0.5)
LYMPHOCYTES # BLD AUTO: 1.2 K/UL (ref 1–4.8)
LYMPHOCYTES NFR BLD: 19.8 % (ref 18–48)
MAGNESIUM SERPL-MCNC: 1.6 MG/DL (ref 1.6–2.6)
MCH RBC QN AUTO: 28.3 PG (ref 27–31)
MCHC RBC AUTO-ENTMCNC: 31.7 G/DL (ref 32–36)
MCV RBC AUTO: 89 FL (ref 82–98)
MONOCYTES # BLD AUTO: 0.4 K/UL (ref 0.3–1)
MONOCYTES NFR BLD: 5.8 % (ref 4–15)
NEUTROPHILS # BLD AUTO: 4.5 K/UL (ref 1.8–7.7)
NEUTROPHILS NFR BLD: 71.5 % (ref 38–73)
NRBC BLD-RTO: 0 /100 WBC
PLATELET # BLD AUTO: 143 K/UL (ref 150–350)
PMV BLD AUTO: 11.1 FL (ref 9.2–12.9)
POCT GLUCOSE: 104 MG/DL (ref 70–110)
POCT GLUCOSE: 106 MG/DL (ref 70–110)
POCT GLUCOSE: 126 MG/DL (ref 70–110)
POCT GLUCOSE: 130 MG/DL (ref 70–110)
POCT GLUCOSE: 162 MG/DL (ref 70–110)
POCT GLUCOSE: 238 MG/DL (ref 70–110)
POTASSIUM SERPL-SCNC: 4 MMOL/L (ref 3.5–5.1)
PROT SERPL-MCNC: 4.8 G/DL (ref 6–8.4)
RBC # BLD AUTO: 3.57 M/UL (ref 4–5.4)
SODIUM SERPL-SCNC: 146 MMOL/L (ref 136–145)
WBC # BLD AUTO: 6.26 K/UL (ref 3.9–12.7)

## 2020-12-24 PROCEDURE — C9113 INJ PANTOPRAZOLE SODIUM, VIA: HCPCS | Mod: HCNC | Performed by: NURSE PRACTITIONER

## 2020-12-24 PROCEDURE — 36415 COLL VENOUS BLD VENIPUNCTURE: CPT | Mod: HCNC

## 2020-12-24 PROCEDURE — 80053 COMPREHEN METABOLIC PANEL: CPT | Mod: HCNC

## 2020-12-24 PROCEDURE — 99232 PR SUBSEQUENT HOSPITAL CARE,LEVL II: ICD-10-PCS | Mod: HCNC,,, | Performed by: HOSPITALIST

## 2020-12-24 PROCEDURE — 63600175 PHARM REV CODE 636 W HCPCS: Mod: HCNC | Performed by: STUDENT IN AN ORGANIZED HEALTH CARE EDUCATION/TRAINING PROGRAM

## 2020-12-24 PROCEDURE — 93005 ELECTROCARDIOGRAM TRACING: CPT | Mod: HCNC

## 2020-12-24 PROCEDURE — 93010 ELECTROCARDIOGRAM REPORT: CPT | Mod: HCNC,,, | Performed by: INTERNAL MEDICINE

## 2020-12-24 PROCEDURE — 63600175 PHARM REV CODE 636 W HCPCS: Mod: HCNC | Performed by: NURSE PRACTITIONER

## 2020-12-24 PROCEDURE — 25000003 PHARM REV CODE 250: Mod: HCNC | Performed by: NURSE PRACTITIONER

## 2020-12-24 PROCEDURE — 83735 ASSAY OF MAGNESIUM: CPT | Mod: HCNC

## 2020-12-24 PROCEDURE — 85025 COMPLETE CBC W/AUTO DIFF WBC: CPT | Mod: HCNC

## 2020-12-24 PROCEDURE — 11000001 HC ACUTE MED/SURG PRIVATE ROOM: Mod: HCNC

## 2020-12-24 PROCEDURE — 91110 GI TRC IMG INTRAL ESOPH-ILE: CPT | Mod: 26,HCNC,, | Performed by: INTERNAL MEDICINE

## 2020-12-24 PROCEDURE — 25000003 PHARM REV CODE 250: Mod: HCNC | Performed by: HOSPITALIST

## 2020-12-24 PROCEDURE — 93010 EKG 12-LEAD: ICD-10-PCS | Mod: HCNC,,, | Performed by: INTERNAL MEDICINE

## 2020-12-24 PROCEDURE — 99232 SBSQ HOSP IP/OBS MODERATE 35: CPT | Mod: HCNC,,, | Performed by: HOSPITALIST

## 2020-12-24 PROCEDURE — 91110 PR GI TRACT CAPSULE ENDOSCOPY: ICD-10-PCS | Mod: 26,HCNC,, | Performed by: INTERNAL MEDICINE

## 2020-12-24 RX ORDER — TRAMADOL HYDROCHLORIDE 50 MG/1
50 TABLET ORAL EVERY 6 HOURS PRN
Status: DISCONTINUED | OUTPATIENT
Start: 2020-12-24 | End: 2020-12-25 | Stop reason: HOSPADM

## 2020-12-24 RX ORDER — PANTOPRAZOLE SODIUM 40 MG/1
40 TABLET, DELAYED RELEASE ORAL DAILY
Status: DISCONTINUED | OUTPATIENT
Start: 2020-12-25 | End: 2020-12-25 | Stop reason: HOSPADM

## 2020-12-24 RX ORDER — MORPHINE SULFATE 4 MG/ML
4 INJECTION, SOLUTION INTRAMUSCULAR; INTRAVENOUS ONCE
Status: DISCONTINUED | OUTPATIENT
Start: 2020-12-24 | End: 2020-12-25 | Stop reason: HOSPADM

## 2020-12-24 RX ORDER — ONDANSETRON 4 MG/1
4 TABLET, FILM COATED ORAL EVERY 8 HOURS PRN
Qty: 30 TABLET | Refills: 0 | Status: SHIPPED | OUTPATIENT
Start: 2020-12-24 | End: 2023-12-27

## 2020-12-24 RX ORDER — PANTOPRAZOLE SODIUM 40 MG/1
40 TABLET, DELAYED RELEASE ORAL DAILY
Qty: 90 TABLET | Refills: 0 | Status: SHIPPED | OUTPATIENT
Start: 2020-12-25 | End: 2023-11-10

## 2020-12-24 RX ADMIN — CHLORHEXIDINE GLUCONATE 15 ML: 1.2 RINSE ORAL at 08:12

## 2020-12-24 RX ADMIN — ATORVASTATIN CALCIUM 40 MG: 20 TABLET, FILM COATED ORAL at 08:12

## 2020-12-24 RX ADMIN — POTASSIUM CHLORIDE 40 MEQ: 1500 TABLET, EXTENDED RELEASE ORAL at 08:12

## 2020-12-24 RX ADMIN — Medication 2000 UNITS: at 08:12

## 2020-12-24 RX ADMIN — TRAMADOL HYDROCHLORIDE 50 MG: 50 TABLET ORAL at 08:12

## 2020-12-24 RX ADMIN — INSULIN ASPART 1 UNITS: 100 INJECTION, SOLUTION INTRAVENOUS; SUBCUTANEOUS at 10:12

## 2020-12-24 RX ADMIN — DIVALPROEX SODIUM 250 MG: 250 TABLET, DELAYED RELEASE ORAL at 08:12

## 2020-12-24 RX ADMIN — FOLIC ACID 1 MG: 1 TABLET ORAL at 08:12

## 2020-12-24 NOTE — TELEPHONE ENCOUNTER
----- Message from Phong Rodriguez MD sent at 12/24/2020  8:14 AM CST -----  Regarding: Clinic follow-up  Can get this patient follow-up in clinic, follow-up of occult GI bleeding.    Thanks,  Phong

## 2020-12-25 VITALS
DIASTOLIC BLOOD PRESSURE: 62 MMHG | RESPIRATION RATE: 16 BRPM | OXYGEN SATURATION: 8 % | TEMPERATURE: 99 F | BODY MASS INDEX: 34.41 KG/M2 | HEART RATE: 83 BPM | HEIGHT: 65 IN | SYSTOLIC BLOOD PRESSURE: 135 MMHG | WEIGHT: 206.56 LBS

## 2020-12-25 LAB
ALBUMIN SERPL BCP-MCNC: 2.2 G/DL (ref 3.5–5.2)
ALP SERPL-CCNC: 50 U/L (ref 55–135)
ALT SERPL W/O P-5'-P-CCNC: 22 U/L (ref 10–44)
ANION GAP SERPL CALC-SCNC: 8 MMOL/L (ref 8–16)
AST SERPL-CCNC: 23 U/L (ref 10–40)
BASOPHILS # BLD AUTO: 0.02 K/UL (ref 0–0.2)
BASOPHILS NFR BLD: 0.4 % (ref 0–1.9)
BILIRUB SERPL-MCNC: 1 MG/DL (ref 0.1–1)
BUN SERPL-MCNC: 8 MG/DL (ref 8–23)
CALCIUM SERPL-MCNC: 7.6 MG/DL (ref 8.7–10.5)
CHLORIDE SERPL-SCNC: 108 MMOL/L (ref 95–110)
CO2 SERPL-SCNC: 24 MMOL/L (ref 23–29)
CREAT SERPL-MCNC: 0.7 MG/DL (ref 0.5–1.4)
DIFFERENTIAL METHOD: ABNORMAL
EOSINOPHIL # BLD AUTO: 0.1 K/UL (ref 0–0.5)
EOSINOPHIL NFR BLD: 1.5 % (ref 0–8)
ERYTHROCYTE [DISTWIDTH] IN BLOOD BY AUTOMATED COUNT: 16.1 % (ref 11.5–14.5)
EST. GFR  (AFRICAN AMERICAN): >60 ML/MIN/1.73 M^2
EST. GFR  (NON AFRICAN AMERICAN): >60 ML/MIN/1.73 M^2
GLUCOSE SERPL-MCNC: 179 MG/DL (ref 70–110)
HCT VFR BLD AUTO: 27.9 % (ref 37–48.5)
HGB BLD-MCNC: 8.7 G/DL (ref 12–16)
IMM GRANULOCYTES # BLD AUTO: 0.07 K/UL (ref 0–0.04)
IMM GRANULOCYTES NFR BLD AUTO: 1.3 % (ref 0–0.5)
LYMPHOCYTES # BLD AUTO: 1.2 K/UL (ref 1–4.8)
LYMPHOCYTES NFR BLD: 23 % (ref 18–48)
MAGNESIUM SERPL-MCNC: 1.6 MG/DL (ref 1.6–2.6)
MCH RBC QN AUTO: 28.6 PG (ref 27–31)
MCHC RBC AUTO-ENTMCNC: 31.2 G/DL (ref 32–36)
MCV RBC AUTO: 92 FL (ref 82–98)
MONOCYTES # BLD AUTO: 0.5 K/UL (ref 0.3–1)
MONOCYTES NFR BLD: 9.7 % (ref 4–15)
NEUTROPHILS # BLD AUTO: 3.4 K/UL (ref 1.8–7.7)
NEUTROPHILS NFR BLD: 64.1 % (ref 38–73)
NRBC BLD-RTO: 0 /100 WBC
PLATELET # BLD AUTO: 156 K/UL (ref 150–350)
PMV BLD AUTO: 10.6 FL (ref 9.2–12.9)
POCT GLUCOSE: 176 MG/DL (ref 70–110)
POCT GLUCOSE: 178 MG/DL (ref 70–110)
POTASSIUM SERPL-SCNC: 4 MMOL/L (ref 3.5–5.1)
PROT SERPL-MCNC: 4.5 G/DL (ref 6–8.4)
RBC # BLD AUTO: 3.04 M/UL (ref 4–5.4)
SODIUM SERPL-SCNC: 140 MMOL/L (ref 136–145)
WBC # BLD AUTO: 5.34 K/UL (ref 3.9–12.7)

## 2020-12-25 PROCEDURE — 25000003 PHARM REV CODE 250: Mod: HCNC | Performed by: NURSE PRACTITIONER

## 2020-12-25 PROCEDURE — 83735 ASSAY OF MAGNESIUM: CPT | Mod: HCNC

## 2020-12-25 PROCEDURE — 99231 PR SUBSEQUENT HOSPITAL CARE,LEVL I: ICD-10-PCS | Mod: HCNC,,, | Performed by: INTERNAL MEDICINE

## 2020-12-25 PROCEDURE — 25000003 PHARM REV CODE 250: Mod: HCNC | Performed by: HOSPITALIST

## 2020-12-25 PROCEDURE — 85025 COMPLETE CBC W/AUTO DIFF WBC: CPT | Mod: HCNC

## 2020-12-25 PROCEDURE — 99231 SBSQ HOSP IP/OBS SF/LOW 25: CPT | Mod: HCNC,,, | Performed by: INTERNAL MEDICINE

## 2020-12-25 PROCEDURE — 80053 COMPREHEN METABOLIC PANEL: CPT | Mod: HCNC

## 2020-12-25 PROCEDURE — 99239 HOSP IP/OBS DSCHRG MGMT >30: CPT | Mod: HCNC,,, | Performed by: HOSPITALIST

## 2020-12-25 PROCEDURE — 36415 COLL VENOUS BLD VENIPUNCTURE: CPT | Mod: HCNC

## 2020-12-25 PROCEDURE — 99239 PR HOSPITAL DISCHARGE DAY,>30 MIN: ICD-10-PCS | Mod: HCNC,,, | Performed by: HOSPITALIST

## 2020-12-25 PROCEDURE — 63600175 PHARM REV CODE 636 W HCPCS: Mod: HCNC | Performed by: STUDENT IN AN ORGANIZED HEALTH CARE EDUCATION/TRAINING PROGRAM

## 2020-12-25 RX ORDER — QUETIAPINE FUMARATE 25 MG/1
TABLET, FILM COATED ORAL
Qty: 30 TABLET | Refills: 0 | Status: SHIPPED | OUTPATIENT
Start: 2020-12-25 | End: 2023-12-27

## 2020-12-25 RX ORDER — INSULIN ASPART 100 [IU]/ML
0-5 INJECTION, SOLUTION INTRAVENOUS; SUBCUTANEOUS
Status: DISCONTINUED | OUTPATIENT
Start: 2020-12-25 | End: 2020-12-25 | Stop reason: HOSPADM

## 2020-12-25 RX ORDER — INSULIN DEGLUDEC 100 U/ML
18 INJECTION, SOLUTION SUBCUTANEOUS DAILY
Qty: 4 SYRINGE | Refills: 5 | Status: SHIPPED | OUTPATIENT
Start: 2020-12-25 | End: 2021-01-29 | Stop reason: SDUPTHER

## 2020-12-25 RX ADMIN — INSULIN ASPART 1 UNITS: 100 INJECTION, SOLUTION INTRAVENOUS; SUBCUTANEOUS at 08:12

## 2020-12-25 RX ADMIN — CHLORHEXIDINE GLUCONATE 15 ML: 1.2 RINSE ORAL at 08:12

## 2020-12-25 RX ADMIN — PANTOPRAZOLE SODIUM 40 MG: 40 TABLET, DELAYED RELEASE ORAL at 08:12

## 2020-12-25 RX ADMIN — FOLIC ACID 1 MG: 1 TABLET ORAL at 08:12

## 2020-12-25 RX ADMIN — Medication 2000 UNITS: at 08:12

## 2020-12-28 ENCOUNTER — TELEPHONE (OUTPATIENT)
Dept: INTERNAL MEDICINE | Facility: CLINIC | Age: 65
End: 2020-12-28

## 2020-12-28 ENCOUNTER — PATIENT OUTREACH (OUTPATIENT)
Dept: ADMINISTRATIVE | Facility: CLINIC | Age: 65
End: 2020-12-28

## 2020-12-28 ENCOUNTER — TELEPHONE (OUTPATIENT)
Dept: NEUROLOGY | Facility: CLINIC | Age: 65
End: 2020-12-28

## 2020-12-28 NOTE — TELEPHONE ENCOUNTER
Spoke to pt's daughter regarding message that pt is taking tramadol and lyrica.    Claudecindy said she doesn't know what pills her mother is or isn't taking.  She keeps switching the pill box around.

## 2020-12-28 NOTE — TELEPHONE ENCOUNTER
Let patient know that she should not take Lyrica and Tramadol. They were discontinued on discharge from the hospital.

## 2020-12-28 NOTE — PATIENT INSTRUCTIONS
Managing Type 1 Diabetes    Diabetes is a long-term chronic condition. Managing your diabetes means making some changes that may be hard. Your healthcare provider, nurse, diabetes educator, and others can help you.  Managing type 1 diabetes means balancing your insulin with diet and activity. You will have to check your blood sugar and at times, ketones. You will also have to work with your healthcare provider to prevent complications.  Inject your insulin  You will need to inject insulin. Or, you may have an insulin pump. The insulin moves the sugar in your blood into your cells.  Insulin comes in several different types, depending on how quickly it begins working and how long the effect lasts. There is also insulin that is a combination of more than one type of insulin. Your healthcare provider, nurse, or a diabetes educator can help you with injections.  Make sure you use insulin as instructed by your healthcare provider. He or she may change the type, timing, or dose, if your blood sugar is not well controlled.  And, make sure your insulin is stored correctly and is not past the expiration date.  Eat healthy  A healthy, well-planned diet helps to control the amount of sugar in your blood. It also helps you stay at a healthy weight.  Your healthcare provider, nurse, a dietitian, or diabetes educator will help you create a plan that works for you. You don't have to give up all the foods you like. Having meals and snacks with vegetables, fruits, lean meats, or other healthy proteins, whole grains, and low or no-fat dairy products will help control your blood sugar.   Be physically active  Being active helps your body use insulin to turn food into energy.  Ask your healthcare provider to work with you to create an activity program that's right for you. Your activity program is based on your age, general health, and types of activity that you enjoy. Start slowly, but aim for at least 30 minutes of exercise or  activity on most days.  Monitor your blood sugar  Your healthcare provider will give you instructions about checking your blood sugar at home. Checking it tells you if your blood sugar is in your target range. Having blood sugar levels in your target range means that you are managing your diabetes well.  Your healthcare provider will tell you what is too high and too low for you. Call your healthcare provider if your blood sugar is out of that range. Know how to recognize and respond quickly to symptoms of low blood sugar (such as sweating, trembling, or confusion).  Your healthcare provider may also tell you to check your blood sugar more often when you are sick. At certain times, for example, when you have a cold or the flu, you may need to check it more often.  If your blood sugar levels are often too high or too low, your healthcare provider may suggest changes to your diet or activity level. He or she may also adjust your medicine.  Check for ketones  You may sometimes need to check your urine for ketones. Ketones are chemicals that are produced when fat, instead of glucose, is burned for energy (ketosis). To check for ketones, follow instructions that come with the strips and from your healthcare provider, nurse, or diabetes educator. If ketones are present, always call your healthcare provider right away. Some people also use home glucose monitors to check the blood for ketosis. Ask your healthcare provider, nurse, or diabetes educator for more information.  Take care of yourself  When you have diabetes, you may be more likely to develop other health problems. They include foot, eye, heart, and kidney problems. By controlling your blood sugar, and taking good care of yourself, you can help to prevent these problems. Your healthcare provider, nurse, diabetes educator, and others can help you.  · Checkups. You should have regular checkups with your healthcare provider. At those visits, you will have a physical  exam that includes checking your feet. Your healthcare provider will also check your blood pressure and weight.  · Other exams. You should also have complete eye, foot, and dental exams at least once every year.  · Lab tests. You will have blood and urine tests.   ¨ At least two times a year, your healthcare provider will check your hemoglobin A1C. This blood test shows how well you have been controlling your blood sugar over 2 to 3 months. The results help your healthcare provider manage your diabetes.  ¨ You will also have other lab tests. For example, to check for kidney problems and abnormal cholesterol levels.  · Smoking. If you smoke, you must quit. Smoking increases the chance that you will develop complications from diabetes. Ask your healthcare provider about ways to quit.  · Vaccines. Get a yearly flu shot. And, ask your healthcare provider about vaccines to prevent pneumonia and hepatitis B.  Stress and depression  Most people have challenges throughout their lives. Living with diabetes, or any serious condition, can increase your stress and make you feel a lot of different emotions. In diabetes, feeling stressed or depressed can actually affect your blood sugar levels.  If you are having trouble dealing with diabetes, tell your healthcare provider. He or she can help or refer you to other healthcare providers or programs.  Support and resources  Know where you can get help. You can try the following:  · Support. Ask family and friends to support your efforts to take care of yourself. Or look for a diabetes support group locally or on the Internet. (Check the Connect with Others on www.diabetes.org.)  · Counseling. Talk with a , psychologist, psychiatrist, or other counselor.  · Information. Contact the American Diabetes Association at 145-306-5792 or www.diabetes.org.  Date Last Reviewed: 6/1/2016  © 7527-4949 [employer] [address] [place] [address], [place] [address]. All rights  reserved. This information is not intended as a substitute for professional medical care. Always follow your healthcare professional's instructions.

## 2020-12-29 ENCOUNTER — TELEPHONE (OUTPATIENT)
Dept: INTERNAL MEDICINE | Facility: CLINIC | Age: 65
End: 2020-12-29

## 2020-12-29 NOTE — TELEPHONE ENCOUNTER
Please contact Ms. Paula to remind her of appointment on 12/30/20. Ask her to jed all of her medications to the clinic.

## 2020-12-29 NOTE — TELEPHONE ENCOUNTER
Pt's cousin Dafne Maynard called.    She sent an email to my [facility] email.  Printed and placed in your inbox.    She said the email explains all.    Ms Leos said you can call her at any time.  Labrena will be available after 7pm when she gets off work.    Ms Leos said they can't find pt's hospital discharge summary.  She would like me to send her a copy.  Please advise.

## 2020-12-29 NOTE — TELEPHONE ENCOUNTER
Spoke to pt.  Notified and verbalized understanding.    She states she needs to get a ride, since her daughter is working and she'll bring all her meds.

## 2020-12-30 ENCOUNTER — HOSPITAL ENCOUNTER (OUTPATIENT)
Facility: HOSPITAL | Age: 65
Discharge: HOME | End: 2021-01-04
Attending: EMERGENCY MEDICINE | Admitting: HOSPITALIST
Payer: MEDICARE

## 2020-12-30 ENCOUNTER — TELEPHONE (OUTPATIENT)
Dept: INTERNAL MEDICINE | Facility: CLINIC | Age: 65
End: 2020-12-30

## 2020-12-30 ENCOUNTER — HOSPITAL ENCOUNTER (OUTPATIENT)
Dept: RADIOLOGY | Facility: HOSPITAL | Age: 65
Discharge: HOME | End: 2020-12-30
Attending: INTERNAL MEDICINE
Payer: MEDICARE

## 2020-12-30 ENCOUNTER — OFFICE VISIT (OUTPATIENT)
Dept: INTERNAL MEDICINE | Facility: CLINIC | Age: 65
End: 2020-12-30
Payer: MEDICARE

## 2020-12-30 VITALS
DIASTOLIC BLOOD PRESSURE: 60 MMHG | WEIGHT: 209 LBS | HEART RATE: 100 BPM | SYSTOLIC BLOOD PRESSURE: 132 MMHG | HEIGHT: 65 IN | TEMPERATURE: 98 F | BODY MASS INDEX: 34.82 KG/M2 | OXYGEN SATURATION: 95 %

## 2020-12-30 DIAGNOSIS — Z09 HOSPITAL DISCHARGE FOLLOW-UP: Primary | ICD-10-CM

## 2020-12-30 DIAGNOSIS — R41.9 MEDICATION NONCOMPLIANCE DUE TO COGNITIVE IMPAIRMENT: ICD-10-CM

## 2020-12-30 DIAGNOSIS — Z78.9 ALTERATION IN PERFORMANCE OF ACTIVITIES OF DAILY LIVING: ICD-10-CM

## 2020-12-30 DIAGNOSIS — Z91.148 MEDICATION NONCOMPLIANCE DUE TO COGNITIVE IMPAIRMENT: ICD-10-CM

## 2020-12-30 DIAGNOSIS — Z79.4 TYPE 2 DIABETES MELLITUS WITH HYPERGLYCEMIA, WITH LONG-TERM CURRENT USE OF INSULIN: ICD-10-CM

## 2020-12-30 DIAGNOSIS — R41.82 ALTERED MENTAL STATUS: Primary | ICD-10-CM

## 2020-12-30 DIAGNOSIS — F19.950 STEROID-INDUCED PSYCHOSIS, WITH DELUSIONS: ICD-10-CM

## 2020-12-30 DIAGNOSIS — R41.82 ALTERED MENTAL STATUS, UNSPECIFIED ALTERED MENTAL STATUS TYPE: ICD-10-CM

## 2020-12-30 DIAGNOSIS — R41.82 AMS (ALTERED MENTAL STATUS): ICD-10-CM

## 2020-12-30 DIAGNOSIS — N30.00 ACUTE CYSTITIS WITHOUT HEMATURIA: ICD-10-CM

## 2020-12-30 DIAGNOSIS — D62 ANEMIA DUE TO ACUTE BLOOD LOSS: ICD-10-CM

## 2020-12-30 DIAGNOSIS — R60.0 EDEMA, PERIPHERAL: ICD-10-CM

## 2020-12-30 DIAGNOSIS — R60.0 BILATERAL LEG EDEMA: ICD-10-CM

## 2020-12-30 DIAGNOSIS — E11.65 TYPE 2 DIABETES MELLITUS WITH HYPERGLYCEMIA, WITH LONG-TERM CURRENT USE OF INSULIN: ICD-10-CM

## 2020-12-30 LAB
ALBUMIN SERPL BCP-MCNC: 2.6 G/DL (ref 3.5–5.2)
ALP SERPL-CCNC: 55 U/L (ref 55–135)
ALT SERPL W/O P-5'-P-CCNC: 22 U/L (ref 10–44)
ANION GAP SERPL CALC-SCNC: 11 MMOL/L (ref 8–16)
AST SERPL-CCNC: 25 U/L (ref 10–40)
BACTERIA #/AREA URNS AUTO: ABNORMAL /HPF
BASOPHILS # BLD AUTO: 0.04 K/UL (ref 0–0.2)
BASOPHILS NFR BLD: 0.6 % (ref 0–1.9)
BILIRUB SERPL-MCNC: 0.9 MG/DL (ref 0.1–1)
BILIRUB UR QL STRIP: NEGATIVE
BNP SERPL-MCNC: 28 PG/ML (ref 0–99)
BUN SERPL-MCNC: 9 MG/DL (ref 8–23)
CALCIUM SERPL-MCNC: 8.2 MG/DL (ref 8.7–10.5)
CHLORIDE SERPL-SCNC: 107 MMOL/L (ref 95–110)
CLARITY UR REFRACT.AUTO: CLEAR
CO2 SERPL-SCNC: 20 MMOL/L (ref 23–29)
COLOR UR AUTO: YELLOW
CREAT SERPL-MCNC: 0.8 MG/DL (ref 0.5–1.4)
CTP QC/QA: YES
DIFFERENTIAL METHOD: ABNORMAL
EOSINOPHIL # BLD AUTO: 0.1 K/UL (ref 0–0.5)
EOSINOPHIL NFR BLD: 0.9 % (ref 0–8)
ERYTHROCYTE [DISTWIDTH] IN BLOOD BY AUTOMATED COUNT: 15.7 % (ref 11.5–14.5)
EST. GFR  (AFRICAN AMERICAN): >60 ML/MIN/1.73 M^2
EST. GFR  (NON AFRICAN AMERICAN): >60 ML/MIN/1.73 M^2
FINAL PATHOLOGIC DIAGNOSIS: NORMAL
GLUCOSE SERPL-MCNC: 288 MG/DL (ref 70–110)
GLUCOSE SERPL-MCNC: 394 MG/DL (ref 70–110)
GLUCOSE UR QL STRIP: ABNORMAL
GROSS: NORMAL
HCT VFR BLD AUTO: 29.1 % (ref 37–48.5)
HGB BLD-MCNC: 9 G/DL (ref 12–16)
HGB UR QL STRIP: NEGATIVE
HYALINE CASTS UR QL AUTO: 5 /LPF
IMM GRANULOCYTES # BLD AUTO: 0.05 K/UL (ref 0–0.04)
IMM GRANULOCYTES NFR BLD AUTO: 0.8 % (ref 0–0.5)
KETONES UR QL STRIP: NEGATIVE
LEUKOCYTE ESTERASE UR QL STRIP: ABNORMAL
LYMPHOCYTES # BLD AUTO: 1.8 K/UL (ref 1–4.8)
LYMPHOCYTES NFR BLD: 28.5 % (ref 18–48)
Lab: NORMAL
MCH RBC QN AUTO: 28 PG (ref 27–31)
MCHC RBC AUTO-ENTMCNC: 30.9 G/DL (ref 32–36)
MCV RBC AUTO: 91 FL (ref 82–98)
MICROSCOPIC COMMENT: ABNORMAL
MONOCYTES # BLD AUTO: 0.9 K/UL (ref 0.3–1)
MONOCYTES NFR BLD: 14.4 % (ref 4–15)
NEUTROPHILS # BLD AUTO: 3.5 K/UL (ref 1.8–7.7)
NEUTROPHILS NFR BLD: 54.8 % (ref 38–73)
NITRITE UR QL STRIP: NEGATIVE
NRBC BLD-RTO: 0 /100 WBC
PH UR STRIP: 6 [PH] (ref 5–8)
PLATELET # BLD AUTO: 247 K/UL (ref 150–350)
PMV BLD AUTO: 10.9 FL (ref 9.2–12.9)
POTASSIUM SERPL-SCNC: 4.2 MMOL/L (ref 3.5–5.1)
PROT SERPL-MCNC: 5.4 G/DL (ref 6–8.4)
PROT UR QL STRIP: NEGATIVE
RBC # BLD AUTO: 3.21 M/UL (ref 4–5.4)
RBC #/AREA URNS AUTO: 1 /HPF (ref 0–4)
SARS-COV-2 RDRP RESP QL NAA+PROBE: NEGATIVE
SODIUM SERPL-SCNC: 138 MMOL/L (ref 136–145)
SP GR UR STRIP: 1.01 (ref 1–1.03)
SQUAMOUS #/AREA URNS AUTO: 0 /HPF
TROPONIN I SERPL DL<=0.01 NG/ML-MCNC: 0.01 NG/ML (ref 0–0.03)
URN SPEC COLLECT METH UR: ABNORMAL
WBC # BLD AUTO: 6.41 K/UL (ref 3.9–12.7)
WBC #/AREA URNS AUTO: 32 /HPF (ref 0–5)

## 2020-12-30 PROCEDURE — 93005 EKG 12-LEAD: ICD-10-PCS | Mod: HCNC,S$GLB,, | Performed by: INTERNAL MEDICINE

## 2020-12-30 PROCEDURE — 71046 X-RAY EXAM CHEST 2 VIEWS: CPT | Mod: TC,HCNC,PO

## 2020-12-30 PROCEDURE — 3288F FALL RISK ASSESSMENT DOCD: CPT | Mod: HCNC,CPTII,S$GLB, | Performed by: INTERNAL MEDICINE

## 2020-12-30 PROCEDURE — 99999 PR PBB SHADOW E&M-EST. PATIENT-LVL V: ICD-10-PCS | Mod: PBBFAC,HCNC,, | Performed by: INTERNAL MEDICINE

## 2020-12-30 PROCEDURE — 84484 ASSAY OF TROPONIN QUANT: CPT | Mod: HCNC

## 2020-12-30 PROCEDURE — 99499 UNLISTED E&M SERVICE: CPT | Mod: HCNC,S$GLB,, | Performed by: INTERNAL MEDICINE

## 2020-12-30 PROCEDURE — 1159F PR MEDICATION LIST DOCUMENTED IN MEDICAL RECORD: ICD-10-PCS | Mod: HCNC,S$GLB,, | Performed by: INTERNAL MEDICINE

## 2020-12-30 PROCEDURE — 99285 PR EMERGENCY DEPT VISIT,LEVEL V: ICD-10-PCS | Mod: HCNC,,, | Performed by: EMERGENCY MEDICINE

## 2020-12-30 PROCEDURE — 3008F PR BODY MASS INDEX (BMI) DOCUMENTED: ICD-10-PCS | Mod: HCNC,CPTII,S$GLB, | Performed by: INTERNAL MEDICINE

## 2020-12-30 PROCEDURE — 3075F SYST BP GE 130 - 139MM HG: CPT | Mod: HCNC,CPTII,S$GLB, | Performed by: INTERNAL MEDICINE

## 2020-12-30 PROCEDURE — 1101F PT FALLS ASSESS-DOCD LE1/YR: CPT | Mod: HCNC,CPTII,S$GLB, | Performed by: INTERNAL MEDICINE

## 2020-12-30 PROCEDURE — 3078F DIAST BP <80 MM HG: CPT | Mod: HCNC,CPTII,S$GLB, | Performed by: INTERNAL MEDICINE

## 2020-12-30 PROCEDURE — 93005 ELECTROCARDIOGRAM TRACING: CPT | Mod: HCNC,S$GLB,, | Performed by: INTERNAL MEDICINE

## 2020-12-30 PROCEDURE — 93010 ELECTROCARDIOGRAM REPORT: CPT | Mod: HCNC,S$GLB,, | Performed by: INTERNAL MEDICINE

## 2020-12-30 PROCEDURE — 71046 X-RAY EXAM CHEST 2 VIEWS: CPT | Mod: 26,HCNC,, | Performed by: RADIOLOGY

## 2020-12-30 PROCEDURE — 3078F PR MOST RECENT DIASTOLIC BLOOD PRESSURE < 80 MM HG: ICD-10-PCS | Mod: HCNC,CPTII,S$GLB, | Performed by: INTERNAL MEDICINE

## 2020-12-30 PROCEDURE — 81001 URINALYSIS AUTO W/SCOPE: CPT | Mod: HCNC

## 2020-12-30 PROCEDURE — 93010 EKG 12-LEAD: ICD-10-PCS | Mod: HCNC,,, | Performed by: INTERNAL MEDICINE

## 2020-12-30 PROCEDURE — 82962 GLUCOSE BLOOD TEST: CPT | Mod: HCNC,S$GLB,, | Performed by: INTERNAL MEDICINE

## 2020-12-30 PROCEDURE — 3052F PR MOST RECENT HEMOGLOBIN A1C LEVEL 8.0 - < 9.0%: ICD-10-PCS | Mod: HCNC,CPTII,S$GLB, | Performed by: INTERNAL MEDICINE

## 2020-12-30 PROCEDURE — 3288F PR FALLS RISK ASSESSMENT DOCUMENTED: ICD-10-PCS | Mod: HCNC,CPTII,S$GLB, | Performed by: INTERNAL MEDICINE

## 2020-12-30 PROCEDURE — U0002 COVID-19 LAB TEST NON-CDC: HCPCS | Mod: HCNC | Performed by: EMERGENCY MEDICINE

## 2020-12-30 PROCEDURE — 99215 PR OFFICE/OUTPT VISIT, EST, LEVL V, 40-54 MIN: ICD-10-PCS | Mod: HCNC,S$GLB,, | Performed by: INTERNAL MEDICINE

## 2020-12-30 PROCEDURE — 93010 ELECTROCARDIOGRAM REPORT: CPT | Mod: HCNC,,, | Performed by: INTERNAL MEDICINE

## 2020-12-30 PROCEDURE — 99499 RISK ADDL DX/OHS AUDIT: ICD-10-PCS | Mod: HCNC,S$GLB,, | Performed by: INTERNAL MEDICINE

## 2020-12-30 PROCEDURE — 85025 COMPLETE CBC W/AUTO DIFF WBC: CPT | Mod: HCNC

## 2020-12-30 PROCEDURE — 80053 COMPREHEN METABOLIC PANEL: CPT | Mod: HCNC

## 2020-12-30 PROCEDURE — 83880 ASSAY OF NATRIURETIC PEPTIDE: CPT | Mod: HCNC

## 2020-12-30 PROCEDURE — 3008F BODY MASS INDEX DOCD: CPT | Mod: HCNC,CPTII,S$GLB, | Performed by: INTERNAL MEDICINE

## 2020-12-30 PROCEDURE — 1157F ADVNC CARE PLAN IN RCRD: CPT | Mod: HCNC,S$GLB,, | Performed by: INTERNAL MEDICINE

## 2020-12-30 PROCEDURE — 99999 PR PBB SHADOW E&M-EST. PATIENT-LVL V: CPT | Mod: PBBFAC,HCNC,, | Performed by: INTERNAL MEDICINE

## 2020-12-30 PROCEDURE — 3052F HG A1C>EQUAL 8.0%<EQUAL 9.0%: CPT | Mod: HCNC,CPTII,S$GLB, | Performed by: INTERNAL MEDICINE

## 2020-12-30 PROCEDURE — 71046 XR CHEST PA AND LATERAL: ICD-10-PCS | Mod: 26,HCNC,, | Performed by: RADIOLOGY

## 2020-12-30 PROCEDURE — 87086 URINE CULTURE/COLONY COUNT: CPT | Mod: HCNC

## 2020-12-30 PROCEDURE — 99285 EMERGENCY DEPT VISIT HI MDM: CPT | Mod: HCNC,,, | Performed by: EMERGENCY MEDICINE

## 2020-12-30 PROCEDURE — 1126F AMNT PAIN NOTED NONE PRSNT: CPT | Mod: HCNC,S$GLB,, | Performed by: INTERNAL MEDICINE

## 2020-12-30 PROCEDURE — 99285 EMERGENCY DEPT VISIT HI MDM: CPT | Mod: 25,HCNC

## 2020-12-30 PROCEDURE — 1126F PR PAIN SEVERITY QUANTIFIED, NO PAIN PRESENT: ICD-10-PCS | Mod: HCNC,S$GLB,, | Performed by: INTERNAL MEDICINE

## 2020-12-30 PROCEDURE — 93005 ELECTROCARDIOGRAM TRACING: CPT | Mod: HCNC

## 2020-12-30 PROCEDURE — 82962 POCT GLUCOSE, HAND-HELD DEVICE: ICD-10-PCS | Mod: HCNC,S$GLB,, | Performed by: INTERNAL MEDICINE

## 2020-12-30 PROCEDURE — 1159F MED LIST DOCD IN RCRD: CPT | Mod: HCNC,S$GLB,, | Performed by: INTERNAL MEDICINE

## 2020-12-30 PROCEDURE — 1101F PR PT FALLS ASSESS DOC 0-1 FALLS W/OUT INJ PAST YR: ICD-10-PCS | Mod: HCNC,CPTII,S$GLB, | Performed by: INTERNAL MEDICINE

## 2020-12-30 PROCEDURE — 1157F PR ADVANCE CARE PLAN OR EQUIV PRESENT IN MEDICAL RECORD: ICD-10-PCS | Mod: HCNC,S$GLB,, | Performed by: INTERNAL MEDICINE

## 2020-12-30 PROCEDURE — 3075F PR MOST RECENT SYSTOLIC BLOOD PRESS GE 130-139MM HG: ICD-10-PCS | Mod: HCNC,CPTII,S$GLB, | Performed by: INTERNAL MEDICINE

## 2020-12-30 PROCEDURE — 93010 EKG 12-LEAD: ICD-10-PCS | Mod: HCNC,S$GLB,, | Performed by: INTERNAL MEDICINE

## 2020-12-30 PROCEDURE — 99215 OFFICE O/P EST HI 40 MIN: CPT | Mod: HCNC,S$GLB,, | Performed by: INTERNAL MEDICINE

## 2020-12-30 RX ORDER — LANOLIN ALCOHOL/MO/W.PET/CERES
400 CREAM (GRAM) TOPICAL DAILY
COMMUNITY

## 2020-12-30 NOTE — LETTER
December 30, 2020      Channing Hathaway MD  [address]  [facility] [address]           [facility]  [address] [facility].  [place] [address]  Phone: [phone]  Fax: [phone]          Patient: Adriana Paula   MR Number: [id number]   YOB: 1955   Date of Visit: 12/30/2020       Dear Dr. Channing Hathaway:    Thank you for referring Adriana Paula to me for evaluation. Attached you will find relevant portions of my assessment and plan of care.    If you have questions, please do not hesitate to call me. I look forward to following Adriana Paula along with you.    Sincerely,    Nicole Márquez MD    Enclosure  CC:  No Recipients    If you would like to receive this communication electronically, please contact [email or web address] or [phone] to request more information on [employer] access.    For providers and/or their staff who would like to refer a patient to [facility], please contact us through our one-stop-shop provider referral line, [facility] [place], at [phone].    If you feel you have received this communication in error or would no longer like to receive these types of communications, please e-mail [email or web address]

## 2020-12-31 PROBLEM — G93.40 ENCEPHALOPATHY: Status: ACTIVE | Noted: 2020-12-30

## 2020-12-31 PROBLEM — I95.9 HYPOTENSION: Status: RESOLVED | Noted: 2020-12-16 | Resolved: 2020-12-31

## 2020-12-31 PROBLEM — E87.70 HYPERVOLEMIA: Status: ACTIVE | Noted: 2020-12-31

## 2020-12-31 PROBLEM — N30.00 ACUTE CYSTITIS WITHOUT HEMATURIA: Status: ACTIVE | Noted: 2020-12-31

## 2020-12-31 LAB
ALBUMIN SERPL BCP-MCNC: 2.9 G/DL (ref 3.5–5.2)
ALP SERPL-CCNC: 58 U/L (ref 55–135)
ALT SERPL W/O P-5'-P-CCNC: 23 U/L (ref 10–44)
ANION GAP SERPL CALC-SCNC: 14 MMOL/L (ref 8–16)
AST SERPL-CCNC: 28 U/L (ref 10–40)
BASOPHILS # BLD AUTO: 0.02 K/UL (ref 0–0.2)
BASOPHILS NFR BLD: 0.4 % (ref 0–1.9)
BILIRUB SERPL-MCNC: 1.4 MG/DL (ref 0.1–1)
BUN SERPL-MCNC: 7 MG/DL (ref 8–23)
CALCIUM SERPL-MCNC: 9.1 MG/DL (ref 8.7–10.5)
CHLORIDE SERPL-SCNC: 106 MMOL/L (ref 95–110)
CO2 SERPL-SCNC: 20 MMOL/L (ref 23–29)
CREAT SERPL-MCNC: 0.8 MG/DL (ref 0.5–1.4)
DIFFERENTIAL METHOD: ABNORMAL
EOSINOPHIL # BLD AUTO: 0.1 K/UL (ref 0–0.5)
EOSINOPHIL NFR BLD: 1.9 % (ref 0–8)
ERYTHROCYTE [DISTWIDTH] IN BLOOD BY AUTOMATED COUNT: 15.9 % (ref 11.5–14.5)
EST. GFR  (AFRICAN AMERICAN): >60 ML/MIN/1.73 M^2
EST. GFR  (NON AFRICAN AMERICAN): >60 ML/MIN/1.73 M^2
ESTIMATED AVG GLUCOSE: 140 MG/DL (ref 68–131)
GLUCOSE SERPL-MCNC: 182 MG/DL (ref 70–110)
HBA1C MFR BLD HPLC: 6.5 % (ref 4–5.6)
HCT VFR BLD AUTO: 35.9 % (ref 37–48.5)
HGB BLD-MCNC: 10.9 G/DL (ref 12–16)
IMM GRANULOCYTES # BLD AUTO: 0.04 K/UL (ref 0–0.04)
IMM GRANULOCYTES NFR BLD AUTO: 0.7 % (ref 0–0.5)
LYMPHOCYTES # BLD AUTO: 1.7 K/UL (ref 1–4.8)
LYMPHOCYTES NFR BLD: 31.3 % (ref 18–48)
MAGNESIUM SERPL-MCNC: 1.6 MG/DL (ref 1.6–2.6)
MCH RBC QN AUTO: 28.2 PG (ref 27–31)
MCHC RBC AUTO-ENTMCNC: 30.4 G/DL (ref 32–36)
MCV RBC AUTO: 93 FL (ref 82–98)
MONOCYTES # BLD AUTO: 0.5 K/UL (ref 0.3–1)
MONOCYTES NFR BLD: 8.6 % (ref 4–15)
NEUTROPHILS # BLD AUTO: 3.1 K/UL (ref 1.8–7.7)
NEUTROPHILS NFR BLD: 57.1 % (ref 38–73)
NRBC BLD-RTO: 0 /100 WBC
PHOSPHATE SERPL-MCNC: 2.8 MG/DL (ref 2.7–4.5)
PLATELET # BLD AUTO: 347 K/UL (ref 150–350)
PMV BLD AUTO: 10.9 FL (ref 9.2–12.9)
POCT GLUCOSE: 188 MG/DL (ref 70–110)
POCT GLUCOSE: 205 MG/DL (ref 70–110)
POCT GLUCOSE: 235 MG/DL (ref 70–110)
POCT GLUCOSE: 235 MG/DL (ref 70–110)
POCT GLUCOSE: 264 MG/DL (ref 70–110)
POTASSIUM SERPL-SCNC: 3.9 MMOL/L (ref 3.5–5.1)
PROT SERPL-MCNC: 6.1 G/DL (ref 6–8.4)
RBC # BLD AUTO: 3.87 M/UL (ref 4–5.4)
SODIUM SERPL-SCNC: 140 MMOL/L (ref 136–145)
WBC # BLD AUTO: 5.37 K/UL (ref 3.9–12.7)

## 2020-12-31 PROCEDURE — G0378 HOSPITAL OBSERVATION PER HR: HCPCS | Mod: HCNC

## 2020-12-31 PROCEDURE — 83735 ASSAY OF MAGNESIUM: CPT | Mod: HCNC

## 2020-12-31 PROCEDURE — 25000003 PHARM REV CODE 250: Mod: HCNC | Performed by: PHYSICIAN ASSISTANT

## 2020-12-31 PROCEDURE — 99204 OFFICE O/P NEW MOD 45 MIN: CPT | Mod: HCNC,GC,, | Performed by: PSYCHIATRY & NEUROLOGY

## 2020-12-31 PROCEDURE — 85025 COMPLETE CBC W/AUTO DIFF WBC: CPT | Mod: HCNC

## 2020-12-31 PROCEDURE — 80053 COMPREHEN METABOLIC PANEL: CPT | Mod: HCNC

## 2020-12-31 PROCEDURE — 63600175 PHARM REV CODE 636 W HCPCS: Mod: HCNC | Performed by: PHYSICIAN ASSISTANT

## 2020-12-31 PROCEDURE — 99220 PR INITIAL OBSERVATION CARE,LEVL III: ICD-10-PCS | Mod: HCNC,,, | Performed by: PHYSICIAN ASSISTANT

## 2020-12-31 PROCEDURE — 99204 PR OFFICE/OUTPT VISIT, NEW, LEVL IV, 45-59 MIN: ICD-10-PCS | Mod: HCNC,GC,, | Performed by: PSYCHIATRY & NEUROLOGY

## 2020-12-31 PROCEDURE — 96372 THER/PROPH/DIAG INJ SC/IM: CPT | Mod: 59

## 2020-12-31 PROCEDURE — 99220 PR INITIAL OBSERVATION CARE,LEVL III: CPT | Mod: HCNC,,, | Performed by: PHYSICIAN ASSISTANT

## 2020-12-31 PROCEDURE — 96376 TX/PRO/DX INJ SAME DRUG ADON: CPT

## 2020-12-31 PROCEDURE — 36415 COLL VENOUS BLD VENIPUNCTURE: CPT | Mod: HCNC

## 2020-12-31 PROCEDURE — 83036 HEMOGLOBIN GLYCOSYLATED A1C: CPT | Mod: HCNC

## 2020-12-31 PROCEDURE — 96375 TX/PRO/DX INJ NEW DRUG ADDON: CPT

## 2020-12-31 PROCEDURE — 84100 ASSAY OF PHOSPHORUS: CPT | Mod: HCNC

## 2020-12-31 PROCEDURE — 96374 THER/PROPH/DIAG INJ IV PUSH: CPT

## 2020-12-31 RX ORDER — FOLIC ACID 1 MG/1
1 TABLET ORAL DAILY
Status: DISCONTINUED | OUTPATIENT
Start: 2020-12-31 | End: 2021-01-04 | Stop reason: HOSPADM

## 2020-12-31 RX ORDER — LOSARTAN POTASSIUM 50 MG/1
100 TABLET ORAL DAILY
Status: DISCONTINUED | OUTPATIENT
Start: 2020-12-31 | End: 2021-01-04 | Stop reason: HOSPADM

## 2020-12-31 RX ORDER — AMOXICILLIN 250 MG
1 CAPSULE ORAL 2 TIMES DAILY
Status: DISCONTINUED | OUTPATIENT
Start: 2020-12-31 | End: 2021-01-04 | Stop reason: HOSPADM

## 2020-12-31 RX ORDER — CIPROFLOXACIN 2 MG/ML
200 INJECTION, SOLUTION INTRAVENOUS
Status: DISCONTINUED | OUTPATIENT
Start: 2020-12-31 | End: 2020-12-31

## 2020-12-31 RX ORDER — FUROSEMIDE 10 MG/ML
20 INJECTION INTRAMUSCULAR; INTRAVENOUS ONCE
Status: COMPLETED | OUTPATIENT
Start: 2020-12-31 | End: 2020-12-31

## 2020-12-31 RX ORDER — IBUPROFEN 200 MG
16 TABLET ORAL
Status: DISCONTINUED | OUTPATIENT
Start: 2020-12-31 | End: 2021-01-04 | Stop reason: HOSPADM

## 2020-12-31 RX ORDER — SODIUM CHLORIDE 0.9 % (FLUSH) 0.9 %
5 SYRINGE (ML) INJECTION
Status: DISCONTINUED | OUTPATIENT
Start: 2020-12-31 | End: 2021-01-04 | Stop reason: HOSPADM

## 2020-12-31 RX ORDER — IPRATROPIUM BROMIDE AND ALBUTEROL SULFATE 2.5; .5 MG/3ML; MG/3ML
3 SOLUTION RESPIRATORY (INHALATION) EVERY 4 HOURS PRN
Status: DISCONTINUED | OUTPATIENT
Start: 2020-12-31 | End: 2021-01-04 | Stop reason: HOSPADM

## 2020-12-31 RX ORDER — ATORVASTATIN CALCIUM 20 MG/1
40 TABLET, FILM COATED ORAL DAILY
Status: DISCONTINUED | OUTPATIENT
Start: 2020-12-31 | End: 2021-01-04 | Stop reason: HOSPADM

## 2020-12-31 RX ORDER — CIPROFLOXACIN 500 MG/1
500 TABLET ORAL EVERY 12 HOURS
Status: DISCONTINUED | OUTPATIENT
Start: 2020-12-31 | End: 2021-01-04 | Stop reason: HOSPADM

## 2020-12-31 RX ORDER — DIVALPROEX SODIUM 250 MG/1
250 TABLET, DELAYED RELEASE ORAL NIGHTLY
Status: DISCONTINUED | OUTPATIENT
Start: 2020-12-31 | End: 2021-01-04 | Stop reason: HOSPADM

## 2020-12-31 RX ORDER — ONDANSETRON 8 MG/1
8 TABLET, ORALLY DISINTEGRATING ORAL EVERY 8 HOURS PRN
Status: DISCONTINUED | OUTPATIENT
Start: 2020-12-31 | End: 2021-01-04 | Stop reason: HOSPADM

## 2020-12-31 RX ORDER — PROMETHAZINE HYDROCHLORIDE 25 MG/1
25 TABLET ORAL EVERY 6 HOURS PRN
Status: DISCONTINUED | OUTPATIENT
Start: 2020-12-31 | End: 2021-01-04 | Stop reason: HOSPADM

## 2020-12-31 RX ORDER — PANTOPRAZOLE SODIUM 40 MG/1
40 TABLET, DELAYED RELEASE ORAL DAILY
Status: DISCONTINUED | OUTPATIENT
Start: 2020-12-31 | End: 2021-01-04 | Stop reason: HOSPADM

## 2020-12-31 RX ORDER — TALC
6 POWDER (GRAM) TOPICAL NIGHTLY PRN
Status: DISCONTINUED | OUTPATIENT
Start: 2020-12-31 | End: 2021-01-04 | Stop reason: HOSPADM

## 2020-12-31 RX ORDER — ACETAMINOPHEN 325 MG/1
650 TABLET ORAL EVERY 4 HOURS PRN
Status: DISCONTINUED | OUTPATIENT
Start: 2020-12-31 | End: 2021-01-04 | Stop reason: HOSPADM

## 2020-12-31 RX ORDER — INSULIN ASPART 100 [IU]/ML
1-10 INJECTION, SOLUTION INTRAVENOUS; SUBCUTANEOUS
Status: DISCONTINUED | OUTPATIENT
Start: 2020-12-31 | End: 2021-01-04 | Stop reason: HOSPADM

## 2020-12-31 RX ORDER — LANOLIN ALCOHOL/MO/W.PET/CERES
400 CREAM (GRAM) TOPICAL DAILY
Status: DISCONTINUED | OUTPATIENT
Start: 2020-12-31 | End: 2021-01-04 | Stop reason: HOSPADM

## 2020-12-31 RX ORDER — GLUCAGON 1 MG
1 KIT INJECTION
Status: DISCONTINUED | OUTPATIENT
Start: 2020-12-31 | End: 2021-01-04 | Stop reason: HOSPADM

## 2020-12-31 RX ORDER — ASPIRIN 81 MG/1
81 TABLET ORAL DAILY
Status: DISCONTINUED | OUTPATIENT
Start: 2020-12-31 | End: 2021-01-04 | Stop reason: HOSPADM

## 2020-12-31 RX ORDER — CHOLECALCIFEROL (VITAMIN D3) 25 MCG
1000 TABLET ORAL 2 TIMES DAILY
Status: DISCONTINUED | OUTPATIENT
Start: 2020-12-31 | End: 2021-01-04 | Stop reason: HOSPADM

## 2020-12-31 RX ORDER — IBUPROFEN 200 MG
24 TABLET ORAL
Status: DISCONTINUED | OUTPATIENT
Start: 2020-12-31 | End: 2021-01-04 | Stop reason: HOSPADM

## 2020-12-31 RX ADMIN — CIPROFLOXACIN 200 MG: 2 INJECTION, SOLUTION INTRAVENOUS at 05:12

## 2020-12-31 RX ADMIN — INSULIN ASPART 6 UNITS: 100 INJECTION, SOLUTION INTRAVENOUS; SUBCUTANEOUS at 12:12

## 2020-12-31 RX ADMIN — DOCUSATE SODIUM 50MG AND SENNOSIDES 8.6MG 1 TABLET: 8.6; 5 TABLET, FILM COATED ORAL at 08:12

## 2020-12-31 RX ADMIN — DIVALPROEX SODIUM 250 MG: 250 TABLET, DELAYED RELEASE ORAL at 08:12

## 2020-12-31 RX ADMIN — FUROSEMIDE 20 MG: 10 INJECTION, SOLUTION INTRAMUSCULAR; INTRAVENOUS at 03:12

## 2020-12-31 RX ADMIN — LOSARTAN POTASSIUM 100 MG: 50 TABLET, FILM COATED ORAL at 08:12

## 2020-12-31 RX ADMIN — INSULIN ASPART 4 UNITS: 100 INJECTION, SOLUTION INTRAVENOUS; SUBCUTANEOUS at 08:12

## 2020-12-31 RX ADMIN — ATORVASTATIN CALCIUM 40 MG: 20 TABLET, FILM COATED ORAL at 08:12

## 2020-12-31 RX ADMIN — INSULIN ASPART 4 UNITS: 100 INJECTION, SOLUTION INTRAVENOUS; SUBCUTANEOUS at 06:12

## 2020-12-31 RX ADMIN — FOLIC ACID 1 MG: 1 TABLET ORAL at 08:12

## 2020-12-31 RX ADMIN — FUROSEMIDE 20 MG: 10 INJECTION, SOLUTION INTRAMUSCULAR; INTRAVENOUS at 04:12

## 2020-12-31 RX ADMIN — CHOLECALCIFEROL (VITAMIN D3) 25 MCG (1,000 UNIT) TABLET 1000 UNITS: TABLET at 08:12

## 2020-12-31 RX ADMIN — PANTOPRAZOLE SODIUM 40 MG: 40 TABLET, DELAYED RELEASE ORAL at 08:12

## 2020-12-31 RX ADMIN — CIPROFLOXACIN HYDROCHLORIDE 500 MG: 500 TABLET, FILM COATED ORAL at 04:12

## 2020-12-31 RX ADMIN — ASPIRIN 81 MG: 81 TABLET, COATED ORAL at 08:12

## 2020-12-31 RX ADMIN — Medication 400 MG: at 08:12

## 2021-01-01 LAB
ALBUMIN SERPL BCP-MCNC: 2.7 G/DL (ref 3.5–5.2)
ALP SERPL-CCNC: 55 U/L (ref 55–135)
ALT SERPL W/O P-5'-P-CCNC: 21 U/L (ref 10–44)
ANION GAP SERPL CALC-SCNC: 13 MMOL/L (ref 8–16)
AST SERPL-CCNC: 22 U/L (ref 10–40)
BACTERIA UR CULT: NORMAL
BASOPHILS # BLD AUTO: 0.03 K/UL (ref 0–0.2)
BASOPHILS NFR BLD: 0.5 % (ref 0–1.9)
BILIRUB SERPL-MCNC: 1 MG/DL (ref 0.1–1)
BUN SERPL-MCNC: 7 MG/DL (ref 8–23)
CALCIUM SERPL-MCNC: 8.5 MG/DL (ref 8.7–10.5)
CHLORIDE SERPL-SCNC: 104 MMOL/L (ref 95–110)
CO2 SERPL-SCNC: 26 MMOL/L (ref 23–29)
CREAT SERPL-MCNC: 0.8 MG/DL (ref 0.5–1.4)
DIFFERENTIAL METHOD: ABNORMAL
EOSINOPHIL # BLD AUTO: 0.1 K/UL (ref 0–0.5)
EOSINOPHIL NFR BLD: 1.4 % (ref 0–8)
ERYTHROCYTE [DISTWIDTH] IN BLOOD BY AUTOMATED COUNT: 15.5 % (ref 11.5–14.5)
EST. GFR  (AFRICAN AMERICAN): >60 ML/MIN/1.73 M^2
EST. GFR  (NON AFRICAN AMERICAN): >60 ML/MIN/1.73 M^2
GLUCOSE SERPL-MCNC: 152 MG/DL (ref 70–110)
HCT VFR BLD AUTO: 33.2 % (ref 37–48.5)
HGB BLD-MCNC: 10.3 G/DL (ref 12–16)
IMM GRANULOCYTES # BLD AUTO: 0.06 K/UL (ref 0–0.04)
IMM GRANULOCYTES NFR BLD AUTO: 0.9 % (ref 0–0.5)
LYMPHOCYTES # BLD AUTO: 2.3 K/UL (ref 1–4.8)
LYMPHOCYTES NFR BLD: 35.4 % (ref 18–48)
MAGNESIUM SERPL-MCNC: 1.4 MG/DL (ref 1.6–2.6)
MCH RBC QN AUTO: 28.4 PG (ref 27–31)
MCHC RBC AUTO-ENTMCNC: 31 G/DL (ref 32–36)
MCV RBC AUTO: 92 FL (ref 82–98)
MONOCYTES # BLD AUTO: 0.7 K/UL (ref 0.3–1)
MONOCYTES NFR BLD: 10 % (ref 4–15)
NEUTROPHILS # BLD AUTO: 3.4 K/UL (ref 1.8–7.7)
NEUTROPHILS NFR BLD: 51.8 % (ref 38–73)
NRBC BLD-RTO: 0 /100 WBC
PLATELET # BLD AUTO: 307 K/UL (ref 150–350)
PMV BLD AUTO: 10.8 FL (ref 9.2–12.9)
POCT GLUCOSE: 317 MG/DL (ref 70–110)
POCT GLUCOSE: 322 MG/DL (ref 70–110)
POCT GLUCOSE: 348 MG/DL (ref 70–110)
POCT GLUCOSE: 80 MG/DL (ref 70–110)
POCT GLUCOSE: 99 MG/DL (ref 70–110)
POTASSIUM SERPL-SCNC: 3.4 MMOL/L (ref 3.5–5.1)
PROT SERPL-MCNC: 5.5 G/DL (ref 6–8.4)
RBC # BLD AUTO: 3.63 M/UL (ref 4–5.4)
SODIUM SERPL-SCNC: 143 MMOL/L (ref 136–145)
WBC # BLD AUTO: 6.58 K/UL (ref 3.9–12.7)

## 2021-01-01 PROCEDURE — 25000003 PHARM REV CODE 250: Mod: HCNC | Performed by: PHYSICIAN ASSISTANT

## 2021-01-01 PROCEDURE — 99226 PR SUBSEQUENT OBSERVATION CARE,LEVEL III: ICD-10-PCS | Mod: HCNC,,, | Performed by: PHYSICIAN ASSISTANT

## 2021-01-01 PROCEDURE — 96372 THER/PROPH/DIAG INJ SC/IM: CPT

## 2021-01-01 PROCEDURE — 80053 COMPREHEN METABOLIC PANEL: CPT | Mod: HCNC

## 2021-01-01 PROCEDURE — 83735 ASSAY OF MAGNESIUM: CPT | Mod: HCNC

## 2021-01-01 PROCEDURE — C9399 UNCLASSIFIED DRUGS OR BIOLOG: HCPCS | Mod: HCNC | Performed by: HOSPITALIST

## 2021-01-01 PROCEDURE — 25000003 PHARM REV CODE 250: Mod: HCNC | Performed by: HOSPITALIST

## 2021-01-01 PROCEDURE — G0378 HOSPITAL OBSERVATION PER HR: HCPCS | Mod: HCNC

## 2021-01-01 PROCEDURE — 36415 COLL VENOUS BLD VENIPUNCTURE: CPT | Mod: HCNC

## 2021-01-01 PROCEDURE — 99226 PR SUBSEQUENT OBSERVATION CARE,LEVEL III: CPT | Mod: HCNC,,, | Performed by: PHYSICIAN ASSISTANT

## 2021-01-01 PROCEDURE — C9399 UNCLASSIFIED DRUGS OR BIOLOG: HCPCS | Mod: HCNC | Performed by: PHYSICIAN ASSISTANT

## 2021-01-01 PROCEDURE — 63600175 PHARM REV CODE 636 W HCPCS: Mod: HCNC | Performed by: PHYSICIAN ASSISTANT

## 2021-01-01 PROCEDURE — 85025 COMPLETE CBC W/AUTO DIFF WBC: CPT | Mod: HCNC

## 2021-01-01 RX ORDER — LANOLIN ALCOHOL/MO/W.PET/CERES
400 CREAM (GRAM) TOPICAL ONCE
Status: COMPLETED | OUTPATIENT
Start: 2021-01-01 | End: 2021-01-01

## 2021-01-01 RX ORDER — POTASSIUM CHLORIDE 20 MEQ/1
40 TABLET, EXTENDED RELEASE ORAL ONCE
Status: DISCONTINUED | OUTPATIENT
Start: 2021-01-01 | End: 2021-01-02

## 2021-01-01 RX ORDER — INSULIN ASPART 100 [IU]/ML
2 INJECTION, SOLUTION INTRAVENOUS; SUBCUTANEOUS
Status: DISCONTINUED | OUTPATIENT
Start: 2021-01-01 | End: 2021-01-02

## 2021-01-01 RX ADMIN — CHOLECALCIFEROL (VITAMIN D3) 25 MCG (1,000 UNIT) TABLET 1000 UNITS: TABLET at 08:01

## 2021-01-01 RX ADMIN — DOCUSATE SODIUM 50MG AND SENNOSIDES 8.6MG 1 TABLET: 8.6; 5 TABLET, FILM COATED ORAL at 08:01

## 2021-01-01 RX ADMIN — CIPROFLOXACIN HYDROCHLORIDE 500 MG: 500 TABLET, FILM COATED ORAL at 06:01

## 2021-01-01 RX ADMIN — CIPROFLOXACIN HYDROCHLORIDE 500 MG: 500 TABLET, FILM COATED ORAL at 05:01

## 2021-01-01 RX ADMIN — INSULIN ASPART 2 UNITS: 100 INJECTION, SOLUTION INTRAVENOUS; SUBCUTANEOUS at 11:01

## 2021-01-01 RX ADMIN — ACETAMINOPHEN 650 MG: 325 TABLET ORAL at 10:01

## 2021-01-01 RX ADMIN — Medication 400 MG: at 02:01

## 2021-01-01 RX ADMIN — MELATONIN TAB 3 MG 6 MG: 3 TAB at 10:01

## 2021-01-01 RX ADMIN — DIVALPROEX SODIUM 250 MG: 250 TABLET, DELAYED RELEASE ORAL at 08:01

## 2021-01-01 RX ADMIN — INSULIN ASPART 2 UNITS: 100 INJECTION, SOLUTION INTRAVENOUS; SUBCUTANEOUS at 05:01

## 2021-01-01 RX ADMIN — INSULIN ASPART 6 UNITS: 100 INJECTION, SOLUTION INTRAVENOUS; SUBCUTANEOUS at 02:01

## 2021-01-01 RX ADMIN — INSULIN DETEMIR 5 UNITS: 100 INJECTION, SOLUTION SUBCUTANEOUS at 08:01

## 2021-01-01 RX ADMIN — INSULIN ASPART 4 UNITS: 100 INJECTION, SOLUTION INTRAVENOUS; SUBCUTANEOUS at 12:01

## 2021-01-01 RX ADMIN — INSULIN ASPART 8 UNITS: 100 INJECTION, SOLUTION INTRAVENOUS; SUBCUTANEOUS at 05:01

## 2021-01-02 PROBLEM — E87.6 HYPOKALEMIA: Status: ACTIVE | Noted: 2021-01-02

## 2021-01-02 PROBLEM — R46.2 BIZARRE BEHAVIOR: Status: ACTIVE | Noted: 2021-01-02

## 2021-01-02 LAB
ALBUMIN SERPL BCP-MCNC: 2.1 G/DL (ref 3.5–5.2)
ALP SERPL-CCNC: 41 U/L (ref 55–135)
ALT SERPL W/O P-5'-P-CCNC: 16 U/L (ref 10–44)
ANION GAP SERPL CALC-SCNC: 11 MMOL/L (ref 8–16)
ANION GAP SERPL CALC-SCNC: 13 MMOL/L (ref 8–16)
AST SERPL-CCNC: 19 U/L (ref 10–40)
BASOPHILS # BLD AUTO: 0.03 K/UL (ref 0–0.2)
BASOPHILS NFR BLD: 0.6 % (ref 0–1.9)
BILIRUB SERPL-MCNC: 0.7 MG/DL (ref 0.1–1)
BUN SERPL-MCNC: 10 MG/DL (ref 8–23)
BUN SERPL-MCNC: 9 MG/DL (ref 8–23)
CALCIUM SERPL-MCNC: 7.6 MG/DL (ref 8.7–10.5)
CALCIUM SERPL-MCNC: 8 MG/DL (ref 8.7–10.5)
CHLORIDE SERPL-SCNC: 104 MMOL/L (ref 95–110)
CHLORIDE SERPL-SCNC: 105 MMOL/L (ref 95–110)
CO2 SERPL-SCNC: 21 MMOL/L (ref 23–29)
CO2 SERPL-SCNC: 25 MMOL/L (ref 23–29)
CREAT SERPL-MCNC: 0.9 MG/DL (ref 0.5–1.4)
CREAT SERPL-MCNC: 1.2 MG/DL (ref 0.5–1.4)
DIFFERENTIAL METHOD: ABNORMAL
EOSINOPHIL # BLD AUTO: 0.1 K/UL (ref 0–0.5)
EOSINOPHIL NFR BLD: 2.3 % (ref 0–8)
ERYTHROCYTE [DISTWIDTH] IN BLOOD BY AUTOMATED COUNT: 15.5 % (ref 11.5–14.5)
EST. GFR  (AFRICAN AMERICAN): 54.4 ML/MIN/1.73 M^2
EST. GFR  (AFRICAN AMERICAN): >60 ML/MIN/1.73 M^2
EST. GFR  (NON AFRICAN AMERICAN): 47.2 ML/MIN/1.73 M^2
EST. GFR  (NON AFRICAN AMERICAN): >60 ML/MIN/1.73 M^2
GLUCOSE SERPL-MCNC: 240 MG/DL (ref 70–110)
GLUCOSE SERPL-MCNC: 54 MG/DL (ref 70–110)
HCT VFR BLD AUTO: 26.7 % (ref 37–48.5)
HGB BLD-MCNC: 8.2 G/DL (ref 12–16)
IMM GRANULOCYTES # BLD AUTO: 0.05 K/UL (ref 0–0.04)
IMM GRANULOCYTES NFR BLD AUTO: 0.9 % (ref 0–0.5)
LYMPHOCYTES # BLD AUTO: 1.9 K/UL (ref 1–4.8)
LYMPHOCYTES NFR BLD: 35.1 % (ref 18–48)
MAGNESIUM SERPL-MCNC: 1.4 MG/DL (ref 1.6–2.6)
MCH RBC QN AUTO: 28.5 PG (ref 27–31)
MCHC RBC AUTO-ENTMCNC: 30.7 G/DL (ref 32–36)
MCV RBC AUTO: 93 FL (ref 82–98)
MONOCYTES # BLD AUTO: 0.7 K/UL (ref 0.3–1)
MONOCYTES NFR BLD: 13.2 % (ref 4–15)
NEUTROPHILS # BLD AUTO: 2.5 K/UL (ref 1.8–7.7)
NEUTROPHILS NFR BLD: 47.9 % (ref 38–73)
NRBC BLD-RTO: 0 /100 WBC
PLATELET # BLD AUTO: 265 K/UL (ref 150–350)
PMV BLD AUTO: 10.9 FL (ref 9.2–12.9)
POCT GLUCOSE: 237 MG/DL (ref 70–110)
POCT GLUCOSE: 311 MG/DL (ref 70–110)
POCT GLUCOSE: 70 MG/DL (ref 70–110)
POTASSIUM SERPL-SCNC: 2.9 MMOL/L (ref 3.5–5.1)
POTASSIUM SERPL-SCNC: 4.1 MMOL/L (ref 3.5–5.1)
PROT SERPL-MCNC: 4.3 G/DL (ref 6–8.4)
RBC # BLD AUTO: 2.88 M/UL (ref 4–5.4)
SODIUM SERPL-SCNC: 138 MMOL/L (ref 136–145)
SODIUM SERPL-SCNC: 141 MMOL/L (ref 136–145)
WBC # BLD AUTO: 5.3 K/UL (ref 3.9–12.7)

## 2021-01-02 PROCEDURE — G0378 HOSPITAL OBSERVATION PER HR: HCPCS | Mod: HCNC

## 2021-01-02 PROCEDURE — 80053 COMPREHEN METABOLIC PANEL: CPT | Mod: HCNC

## 2021-01-02 PROCEDURE — 99215 PR OFFICE/OUTPT VISIT, EST, LEVL V, 40-54 MIN: ICD-10-PCS | Mod: HCNC,GC,, | Performed by: PSYCHIATRY & NEUROLOGY

## 2021-01-02 PROCEDURE — 99226 PR SUBSEQUENT OBSERVATION CARE,LEVEL III: CPT | Mod: HCNC,,, | Performed by: PHYSICIAN ASSISTANT

## 2021-01-02 PROCEDURE — 99215 OFFICE O/P EST HI 40 MIN: CPT | Mod: HCNC,GC,, | Performed by: PSYCHIATRY & NEUROLOGY

## 2021-01-02 PROCEDURE — 36415 COLL VENOUS BLD VENIPUNCTURE: CPT | Mod: HCNC

## 2021-01-02 PROCEDURE — 99226 PR SUBSEQUENT OBSERVATION CARE,LEVEL III: ICD-10-PCS | Mod: HCNC,,, | Performed by: PHYSICIAN ASSISTANT

## 2021-01-02 PROCEDURE — C9399 UNCLASSIFIED DRUGS OR BIOLOG: HCPCS | Mod: HCNC | Performed by: PHYSICIAN ASSISTANT

## 2021-01-02 PROCEDURE — 85025 COMPLETE CBC W/AUTO DIFF WBC: CPT | Mod: HCNC

## 2021-01-02 PROCEDURE — 63600175 PHARM REV CODE 636 W HCPCS: Mod: HCNC | Performed by: PHYSICIAN ASSISTANT

## 2021-01-02 PROCEDURE — 25000003 PHARM REV CODE 250: Mod: HCNC | Performed by: PHYSICIAN ASSISTANT

## 2021-01-02 PROCEDURE — 80048 BASIC METABOLIC PNL TOTAL CA: CPT | Mod: HCNC

## 2021-01-02 PROCEDURE — 96372 THER/PROPH/DIAG INJ SC/IM: CPT

## 2021-01-02 PROCEDURE — 83735 ASSAY OF MAGNESIUM: CPT | Mod: HCNC

## 2021-01-02 RX ORDER — POTASSIUM CHLORIDE 750 MG/1
30 CAPSULE, EXTENDED RELEASE ORAL ONCE
Status: COMPLETED | OUTPATIENT
Start: 2021-01-02 | End: 2021-01-02

## 2021-01-02 RX ORDER — INSULIN ASPART 100 [IU]/ML
2 INJECTION, SOLUTION INTRAVENOUS; SUBCUTANEOUS
Status: DISCONTINUED | OUTPATIENT
Start: 2021-01-02 | End: 2021-01-04 | Stop reason: HOSPADM

## 2021-01-02 RX ORDER — LANOLIN ALCOHOL/MO/W.PET/CERES
400 CREAM (GRAM) TOPICAL ONCE
Status: DISCONTINUED | OUTPATIENT
Start: 2021-01-02 | End: 2021-01-04 | Stop reason: HOSPADM

## 2021-01-02 RX ORDER — POTASSIUM CHLORIDE 20 MEQ/1
40 TABLET, EXTENDED RELEASE ORAL ONCE
Status: COMPLETED | OUTPATIENT
Start: 2021-01-02 | End: 2021-01-02

## 2021-01-02 RX ADMIN — Medication 400 MG: at 05:01

## 2021-01-02 RX ADMIN — ATORVASTATIN CALCIUM 40 MG: 20 TABLET, FILM COATED ORAL at 08:01

## 2021-01-02 RX ADMIN — CHOLECALCIFEROL (VITAMIN D3) 25 MCG (1,000 UNIT) TABLET 1000 UNITS: TABLET at 08:01

## 2021-01-02 RX ADMIN — Medication 400 MG: at 08:01

## 2021-01-02 RX ADMIN — POTASSIUM CHLORIDE 30 MEQ: 750 CAPSULE, EXTENDED RELEASE ORAL at 05:01

## 2021-01-02 RX ADMIN — POTASSIUM CHLORIDE 40 MEQ: 1500 TABLET, EXTENDED RELEASE ORAL at 08:01

## 2021-01-02 RX ADMIN — DOCUSATE SODIUM 50MG AND SENNOSIDES 8.6MG 1 TABLET: 8.6; 5 TABLET, FILM COATED ORAL at 09:01

## 2021-01-02 RX ADMIN — INSULIN ASPART 2 UNITS: 100 INJECTION, SOLUTION INTRAVENOUS; SUBCUTANEOUS at 05:01

## 2021-01-02 RX ADMIN — INSULIN ASPART 4 UNITS: 100 INJECTION, SOLUTION INTRAVENOUS; SUBCUTANEOUS at 05:01

## 2021-01-02 RX ADMIN — CIPROFLOXACIN HYDROCHLORIDE 500 MG: 500 TABLET, FILM COATED ORAL at 05:01

## 2021-01-02 RX ADMIN — LOSARTAN POTASSIUM 100 MG: 50 TABLET, FILM COATED ORAL at 08:01

## 2021-01-02 RX ADMIN — MELATONIN TAB 3 MG 6 MG: 3 TAB at 11:01

## 2021-01-02 RX ADMIN — FOLIC ACID 1 MG: 1 TABLET ORAL at 08:01

## 2021-01-02 RX ADMIN — INSULIN ASPART 4 UNITS: 100 INJECTION, SOLUTION INTRAVENOUS; SUBCUTANEOUS at 09:01

## 2021-01-02 RX ADMIN — CHOLECALCIFEROL (VITAMIN D3) 25 MCG (1,000 UNIT) TABLET 1000 UNITS: TABLET at 09:01

## 2021-01-02 RX ADMIN — INSULIN DETEMIR 10 UNITS: 100 INJECTION, SOLUTION SUBCUTANEOUS at 09:01

## 2021-01-02 RX ADMIN — PANTOPRAZOLE SODIUM 40 MG: 40 TABLET, DELAYED RELEASE ORAL at 08:01

## 2021-01-02 RX ADMIN — DOCUSATE SODIUM 50MG AND SENNOSIDES 8.6MG 1 TABLET: 8.6; 5 TABLET, FILM COATED ORAL at 08:01

## 2021-01-02 RX ADMIN — ASPIRIN 81 MG: 81 TABLET, COATED ORAL at 08:01

## 2021-01-03 LAB
ALBUMIN SERPL BCP-MCNC: 2.5 G/DL (ref 3.5–5.2)
ALP SERPL-CCNC: 49 U/L (ref 55–135)
ALT SERPL W/O P-5'-P-CCNC: 17 U/L (ref 10–44)
ANION GAP SERPL CALC-SCNC: 10 MMOL/L (ref 8–16)
AST SERPL-CCNC: 19 U/L (ref 10–40)
BASOPHILS # BLD AUTO: 0.04 K/UL (ref 0–0.2)
BASOPHILS NFR BLD: 0.6 % (ref 0–1.9)
BILIRUB SERPL-MCNC: 0.9 MG/DL (ref 0.1–1)
BUN SERPL-MCNC: 11 MG/DL (ref 8–23)
CALCIUM SERPL-MCNC: 7.8 MG/DL (ref 8.7–10.5)
CHLORIDE SERPL-SCNC: 103 MMOL/L (ref 95–110)
CO2 SERPL-SCNC: 24 MMOL/L (ref 23–29)
CREAT SERPL-MCNC: 1.2 MG/DL (ref 0.5–1.4)
DIFFERENTIAL METHOD: ABNORMAL
EOSINOPHIL # BLD AUTO: 0.1 K/UL (ref 0–0.5)
EOSINOPHIL NFR BLD: 1.2 % (ref 0–8)
ERYTHROCYTE [DISTWIDTH] IN BLOOD BY AUTOMATED COUNT: 15.5 % (ref 11.5–14.5)
EST. GFR  (AFRICAN AMERICAN): 54.4 ML/MIN/1.73 M^2
EST. GFR  (NON AFRICAN AMERICAN): 47.2 ML/MIN/1.73 M^2
GLUCOSE SERPL-MCNC: 282 MG/DL (ref 70–110)
HCT VFR BLD AUTO: 30.3 % (ref 37–48.5)
HGB BLD-MCNC: 9.2 G/DL (ref 12–16)
IMM GRANULOCYTES # BLD AUTO: 0.06 K/UL (ref 0–0.04)
IMM GRANULOCYTES NFR BLD AUTO: 0.9 % (ref 0–0.5)
LYMPHOCYTES # BLD AUTO: 1.9 K/UL (ref 1–4.8)
LYMPHOCYTES NFR BLD: 28.8 % (ref 18–48)
MAGNESIUM SERPL-MCNC: 1.5 MG/DL (ref 1.6–2.6)
MCH RBC QN AUTO: 27.5 PG (ref 27–31)
MCHC RBC AUTO-ENTMCNC: 30.4 G/DL (ref 32–36)
MCV RBC AUTO: 91 FL (ref 82–98)
MONOCYTES # BLD AUTO: 0.7 K/UL (ref 0.3–1)
MONOCYTES NFR BLD: 11.2 % (ref 4–15)
NEUTROPHILS # BLD AUTO: 3.8 K/UL (ref 1.8–7.7)
NEUTROPHILS NFR BLD: 57.3 % (ref 38–73)
NRBC BLD-RTO: 0 /100 WBC
PLATELET # BLD AUTO: 320 K/UL (ref 150–350)
PMV BLD AUTO: 11.2 FL (ref 9.2–12.9)
POCT GLUCOSE: 171 MG/DL (ref 70–110)
POCT GLUCOSE: 197 MG/DL (ref 70–110)
POCT GLUCOSE: 212 MG/DL (ref 70–110)
POCT GLUCOSE: 257 MG/DL (ref 70–110)
POTASSIUM SERPL-SCNC: 3.6 MMOL/L (ref 3.5–5.1)
PROT SERPL-MCNC: 5.3 G/DL (ref 6–8.4)
RBC # BLD AUTO: 3.34 M/UL (ref 4–5.4)
SODIUM SERPL-SCNC: 137 MMOL/L (ref 136–145)
WBC # BLD AUTO: 6.6 K/UL (ref 3.9–12.7)

## 2021-01-03 PROCEDURE — 99213 OFFICE O/P EST LOW 20 MIN: CPT | Mod: HCNC,GC,, | Performed by: PSYCHIATRY & NEUROLOGY

## 2021-01-03 PROCEDURE — 80053 COMPREHEN METABOLIC PANEL: CPT | Mod: HCNC

## 2021-01-03 PROCEDURE — G0378 HOSPITAL OBSERVATION PER HR: HCPCS | Mod: HCNC

## 2021-01-03 PROCEDURE — 99226 PR SUBSEQUENT OBSERVATION CARE,LEVEL III: CPT | Mod: HCNC,,, | Performed by: PHYSICIAN ASSISTANT

## 2021-01-03 PROCEDURE — 99226 PR SUBSEQUENT OBSERVATION CARE,LEVEL III: ICD-10-PCS | Mod: HCNC,,, | Performed by: PHYSICIAN ASSISTANT

## 2021-01-03 PROCEDURE — 25000003 PHARM REV CODE 250: Mod: HCNC | Performed by: PHYSICIAN ASSISTANT

## 2021-01-03 PROCEDURE — 85025 COMPLETE CBC W/AUTO DIFF WBC: CPT | Mod: HCNC

## 2021-01-03 PROCEDURE — 83735 ASSAY OF MAGNESIUM: CPT | Mod: HCNC

## 2021-01-03 PROCEDURE — 99213 PR OFFICE/OUTPT VISIT, EST, LEVL III, 20-29 MIN: ICD-10-PCS | Mod: HCNC,GC,, | Performed by: PSYCHIATRY & NEUROLOGY

## 2021-01-03 PROCEDURE — 36415 COLL VENOUS BLD VENIPUNCTURE: CPT | Mod: HCNC

## 2021-01-03 PROCEDURE — 96372 THER/PROPH/DIAG INJ SC/IM: CPT

## 2021-01-03 RX ORDER — CIPROFLOXACIN 500 MG/1
500 TABLET ORAL EVERY 12 HOURS
Qty: 10 TABLET | Refills: 0 | Status: SHIPPED | OUTPATIENT
Start: 2021-01-03 | End: 2021-01-08

## 2021-01-03 RX ADMIN — INSULIN ASPART 4 UNITS: 100 INJECTION, SOLUTION INTRAVENOUS; SUBCUTANEOUS at 09:01

## 2021-01-03 RX ADMIN — CIPROFLOXACIN HYDROCHLORIDE 500 MG: 500 TABLET, FILM COATED ORAL at 05:01

## 2021-01-03 RX ADMIN — PANTOPRAZOLE SODIUM 40 MG: 40 TABLET, DELAYED RELEASE ORAL at 09:01

## 2021-01-03 RX ADMIN — FOLIC ACID 1 MG: 1 TABLET ORAL at 09:01

## 2021-01-03 RX ADMIN — ATORVASTATIN CALCIUM 40 MG: 20 TABLET, FILM COATED ORAL at 09:01

## 2021-01-03 RX ADMIN — CHOLECALCIFEROL (VITAMIN D3) 25 MCG (1,000 UNIT) TABLET 1000 UNITS: TABLET at 09:01

## 2021-01-03 RX ADMIN — Medication 400 MG: at 09:01

## 2021-01-03 RX ADMIN — LOSARTAN POTASSIUM 100 MG: 50 TABLET, FILM COATED ORAL at 09:01

## 2021-01-03 RX ADMIN — INSULIN ASPART 2 UNITS: 100 INJECTION, SOLUTION INTRAVENOUS; SUBCUTANEOUS at 09:01

## 2021-01-03 RX ADMIN — ASPIRIN 81 MG: 81 TABLET, COATED ORAL at 09:01

## 2021-01-03 RX ADMIN — DOCUSATE SODIUM 50MG AND SENNOSIDES 8.6MG 1 TABLET: 8.6; 5 TABLET, FILM COATED ORAL at 09:01

## 2021-01-03 RX ADMIN — INSULIN DETEMIR 10 UNITS: 100 INJECTION, SOLUTION SUBCUTANEOUS at 09:01

## 2021-01-03 RX ADMIN — INSULIN ASPART 2 UNITS: 100 INJECTION, SOLUTION INTRAVENOUS; SUBCUTANEOUS at 07:01

## 2021-01-03 RX ADMIN — INSULIN ASPART 6 UNITS: 100 INJECTION, SOLUTION INTRAVENOUS; SUBCUTANEOUS at 11:01

## 2021-01-03 RX ADMIN — INSULIN ASPART 2 UNITS: 100 INJECTION, SOLUTION INTRAVENOUS; SUBCUTANEOUS at 11:01

## 2021-01-03 RX ADMIN — DIVALPROEX SODIUM 250 MG: 250 TABLET, DELAYED RELEASE ORAL at 09:01

## 2021-01-04 VITALS
TEMPERATURE: 98 F | RESPIRATION RATE: 16 BRPM | WEIGHT: 203.69 LBS | SYSTOLIC BLOOD PRESSURE: 123 MMHG | HEART RATE: 72 BPM | HEIGHT: 65 IN | DIASTOLIC BLOOD PRESSURE: 57 MMHG | BODY MASS INDEX: 33.94 KG/M2 | OXYGEN SATURATION: 96 %

## 2021-01-04 LAB
ALBUMIN SERPL BCP-MCNC: 2.2 G/DL (ref 3.5–5.2)
ALP SERPL-CCNC: 41 U/L (ref 55–135)
ALT SERPL W/O P-5'-P-CCNC: 15 U/L (ref 10–44)
ANION GAP SERPL CALC-SCNC: 11 MMOL/L (ref 8–16)
AST SERPL-CCNC: 18 U/L (ref 10–40)
BASOPHILS # BLD AUTO: 0.04 K/UL (ref 0–0.2)
BASOPHILS NFR BLD: 0.7 % (ref 0–1.9)
BILIRUB SERPL-MCNC: 0.6 MG/DL (ref 0.1–1)
BUN SERPL-MCNC: 7 MG/DL (ref 8–23)
CALCIUM SERPL-MCNC: 7.6 MG/DL (ref 8.7–10.5)
CHLORIDE SERPL-SCNC: 107 MMOL/L (ref 95–110)
CO2 SERPL-SCNC: 22 MMOL/L (ref 23–29)
CREAT SERPL-MCNC: 1 MG/DL (ref 0.5–1.4)
DIFFERENTIAL METHOD: ABNORMAL
EOSINOPHIL # BLD AUTO: 0.1 K/UL (ref 0–0.5)
EOSINOPHIL NFR BLD: 1.6 % (ref 0–8)
ERYTHROCYTE [DISTWIDTH] IN BLOOD BY AUTOMATED COUNT: 15.4 % (ref 11.5–14.5)
EST. GFR  (AFRICAN AMERICAN): >60 ML/MIN/1.73 M^2
EST. GFR  (NON AFRICAN AMERICAN): 58.8 ML/MIN/1.73 M^2
GLUCOSE SERPL-MCNC: 151 MG/DL (ref 70–110)
HCT VFR BLD AUTO: 26.8 % (ref 37–48.5)
HGB BLD-MCNC: 8.3 G/DL (ref 12–16)
IMM GRANULOCYTES # BLD AUTO: 0.06 K/UL (ref 0–0.04)
IMM GRANULOCYTES NFR BLD AUTO: 1.1 % (ref 0–0.5)
LYMPHOCYTES # BLD AUTO: 2 K/UL (ref 1–4.8)
LYMPHOCYTES NFR BLD: 35.6 % (ref 18–48)
MAGNESIUM SERPL-MCNC: 1.4 MG/DL (ref 1.6–2.6)
MCH RBC QN AUTO: 28.2 PG (ref 27–31)
MCHC RBC AUTO-ENTMCNC: 31 G/DL (ref 32–36)
MCV RBC AUTO: 91 FL (ref 82–98)
MONOCYTES # BLD AUTO: 0.7 K/UL (ref 0.3–1)
MONOCYTES NFR BLD: 13.2 % (ref 4–15)
NEUTROPHILS # BLD AUTO: 2.7 K/UL (ref 1.8–7.7)
NEUTROPHILS NFR BLD: 47.8 % (ref 38–73)
NRBC BLD-RTO: 0 /100 WBC
PLATELET # BLD AUTO: 268 K/UL (ref 150–350)
PMV BLD AUTO: 11 FL (ref 9.2–12.9)
POCT GLUCOSE: 168 MG/DL (ref 70–110)
POCT GLUCOSE: 252 MG/DL (ref 70–110)
POCT GLUCOSE: 259 MG/DL (ref 70–110)
POTASSIUM SERPL-SCNC: 3.2 MMOL/L (ref 3.5–5.1)
PROT SERPL-MCNC: 4.4 G/DL (ref 6–8.4)
RBC # BLD AUTO: 2.94 M/UL (ref 4–5.4)
SODIUM SERPL-SCNC: 140 MMOL/L (ref 136–145)
WBC # BLD AUTO: 5.54 K/UL (ref 3.9–12.7)

## 2021-01-04 PROCEDURE — 99213 OFFICE O/P EST LOW 20 MIN: CPT | Mod: HCNC,,, | Performed by: PSYCHIATRY & NEUROLOGY

## 2021-01-04 PROCEDURE — 99213 PR OFFICE/OUTPT VISIT, EST, LEVL III, 20-29 MIN: ICD-10-PCS | Mod: HCNC,,, | Performed by: PSYCHIATRY & NEUROLOGY

## 2021-01-04 PROCEDURE — 83735 ASSAY OF MAGNESIUM: CPT | Mod: HCNC

## 2021-01-04 PROCEDURE — 36415 COLL VENOUS BLD VENIPUNCTURE: CPT | Mod: HCNC

## 2021-01-04 PROCEDURE — 85025 COMPLETE CBC W/AUTO DIFF WBC: CPT | Mod: HCNC

## 2021-01-04 PROCEDURE — 25000003 PHARM REV CODE 250: Mod: HCNC | Performed by: PHYSICIAN ASSISTANT

## 2021-01-04 PROCEDURE — 99217 PR OBSERVATION CARE DISCHARGE: ICD-10-PCS | Mod: HCNC,,, | Performed by: PHYSICIAN ASSISTANT

## 2021-01-04 PROCEDURE — 99217 PR OBSERVATION CARE DISCHARGE: CPT | Mod: HCNC,,, | Performed by: PHYSICIAN ASSISTANT

## 2021-01-04 PROCEDURE — 80053 COMPREHEN METABOLIC PANEL: CPT | Mod: HCNC

## 2021-01-04 PROCEDURE — G0378 HOSPITAL OBSERVATION PER HR: HCPCS | Mod: HCNC

## 2021-01-04 PROCEDURE — 96372 THER/PROPH/DIAG INJ SC/IM: CPT

## 2021-01-04 RX ORDER — LANOLIN ALCOHOL/MO/W.PET/CERES
400 CREAM (GRAM) TOPICAL ONCE
Status: COMPLETED | OUTPATIENT
Start: 2021-01-04 | End: 2021-01-04

## 2021-01-04 RX ORDER — POTASSIUM CHLORIDE 20 MEQ/1
40 TABLET, EXTENDED RELEASE ORAL ONCE
Status: COMPLETED | OUTPATIENT
Start: 2021-01-04 | End: 2021-01-04

## 2021-01-04 RX ORDER — DIVALPROEX SODIUM 250 MG/1
250 TABLET, DELAYED RELEASE ORAL NIGHTLY
Qty: 30 TABLET | Refills: 3 | Status: SHIPPED | OUTPATIENT
Start: 2021-01-04 | End: 2021-02-03

## 2021-01-04 RX ADMIN — INSULIN ASPART 2 UNITS: 100 INJECTION, SOLUTION INTRAVENOUS; SUBCUTANEOUS at 09:01

## 2021-01-04 RX ADMIN — INSULIN ASPART 6 UNITS: 100 INJECTION, SOLUTION INTRAVENOUS; SUBCUTANEOUS at 12:01

## 2021-01-04 RX ADMIN — ASPIRIN 81 MG: 81 TABLET, COATED ORAL at 09:01

## 2021-01-04 RX ADMIN — PANTOPRAZOLE SODIUM 40 MG: 40 TABLET, DELAYED RELEASE ORAL at 09:01

## 2021-01-04 RX ADMIN — POTASSIUM CHLORIDE 40 MEQ: 1500 TABLET, EXTENDED RELEASE ORAL at 09:01

## 2021-01-04 RX ADMIN — Medication 400 MG: at 12:01

## 2021-01-04 RX ADMIN — FOLIC ACID 1 MG: 1 TABLET ORAL at 09:01

## 2021-01-04 RX ADMIN — Medication 400 MG: at 09:01

## 2021-01-04 RX ADMIN — CIPROFLOXACIN HYDROCHLORIDE 500 MG: 500 TABLET, FILM COATED ORAL at 04:01

## 2021-01-04 RX ADMIN — ACETAMINOPHEN 650 MG: 325 TABLET ORAL at 09:01

## 2021-01-04 RX ADMIN — CHOLECALCIFEROL (VITAMIN D3) 25 MCG (1,000 UNIT) TABLET 1000 UNITS: TABLET at 09:01

## 2021-01-04 RX ADMIN — CIPROFLOXACIN HYDROCHLORIDE 500 MG: 500 TABLET, FILM COATED ORAL at 05:01

## 2021-01-04 RX ADMIN — ATORVASTATIN CALCIUM 40 MG: 20 TABLET, FILM COATED ORAL at 09:01

## 2021-01-04 RX ADMIN — INSULIN ASPART 2 UNITS: 100 INJECTION, SOLUTION INTRAVENOUS; SUBCUTANEOUS at 12:01

## 2021-01-04 RX ADMIN — DOCUSATE SODIUM 50MG AND SENNOSIDES 8.6MG 1 TABLET: 8.6; 5 TABLET, FILM COATED ORAL at 09:01

## 2021-01-04 RX ADMIN — LOSARTAN POTASSIUM 100 MG: 50 TABLET, FILM COATED ORAL at 09:01

## 2021-01-11 ENCOUNTER — TELEPHONE (OUTPATIENT)
Dept: ENDOSCOPY | Facility: HOSPITAL | Age: 66
End: 2021-01-11

## 2021-01-19 DIAGNOSIS — H15.003 BILATERAL SCLERITIS: ICD-10-CM

## 2021-01-19 RX ORDER — FOLIC ACID 1 MG/1
1 TABLET ORAL DAILY
Qty: 90 TABLET | Refills: 0 | OUTPATIENT
Start: 2021-01-19 | End: 2021-02-18

## 2021-01-22 ENCOUNTER — PATIENT MESSAGE (OUTPATIENT)
Dept: OPHTHALMOLOGY | Facility: CLINIC | Age: 66
End: 2021-01-22

## 2021-01-22 ENCOUNTER — PATIENT MESSAGE (OUTPATIENT)
Dept: RHEUMATOLOGY | Facility: CLINIC | Age: 66
End: 2021-01-22

## 2021-01-22 DIAGNOSIS — H15.003 BILATERAL SCLERITIS: ICD-10-CM

## 2021-01-22 RX ORDER — FOLIC ACID 1 MG/1
1 TABLET ORAL DAILY
Qty: 90 TABLET | Refills: 0 | OUTPATIENT
Start: 2021-01-22 | End: 2021-02-21

## 2021-01-25 DIAGNOSIS — H15.003 BILATERAL SCLERITIS: ICD-10-CM

## 2021-01-25 RX ORDER — FOLIC ACID 1 MG/1
1 TABLET ORAL DAILY
Qty: 90 TABLET | Refills: 0 | OUTPATIENT
Start: 2021-01-25 | End: 2021-02-24

## 2021-01-26 ENCOUNTER — PATIENT MESSAGE (OUTPATIENT)
Dept: INTERNAL MEDICINE | Facility: CLINIC | Age: 66
End: 2021-01-26

## 2021-01-27 ENCOUNTER — PATIENT OUTREACH (OUTPATIENT)
Dept: ADMINISTRATIVE | Facility: OTHER | Age: 66
End: 2021-01-27

## 2021-01-27 ENCOUNTER — PATIENT MESSAGE (OUTPATIENT)
Dept: INTERNAL MEDICINE | Facility: CLINIC | Age: 66
End: 2021-01-27

## 2021-01-27 DIAGNOSIS — R60.0 BILATERAL LEG EDEMA: Primary | ICD-10-CM

## 2021-01-27 DIAGNOSIS — R79.89 ABNORMAL CBC: ICD-10-CM

## 2021-01-27 DIAGNOSIS — Z12.31 ENCOUNTER FOR SCREENING MAMMOGRAM FOR MALIGNANT NEOPLASM OF BREAST: Primary | ICD-10-CM

## 2021-01-27 DIAGNOSIS — E83.42 HYPOMAGNESEMIA: ICD-10-CM

## 2021-01-27 RX ORDER — FLUTICASONE PROPIONATE 50 MCG
1 SPRAY, SUSPENSION (ML) NASAL
Qty: 16 G | Refills: 3 | Status: SHIPPED | OUTPATIENT
Start: 2021-01-27

## 2021-01-28 ENCOUNTER — OFFICE VISIT (OUTPATIENT)
Dept: OPHTHALMOLOGY | Facility: CLINIC | Age: 66
End: 2021-01-28
Payer: MEDICARE

## 2021-01-28 ENCOUNTER — TELEPHONE (OUTPATIENT)
Dept: OTOLARYNGOLOGY | Facility: CLINIC | Age: 66
End: 2021-01-28

## 2021-01-28 ENCOUNTER — PATIENT MESSAGE (OUTPATIENT)
Dept: INTERNAL MEDICINE | Facility: CLINIC | Age: 66
End: 2021-01-28

## 2021-01-28 ENCOUNTER — LAB VISIT (OUTPATIENT)
Dept: LAB | Facility: HOSPITAL | Age: 66
End: 2021-01-28
Payer: MEDICARE

## 2021-01-28 DIAGNOSIS — H15.001 SCLERITIS, RIGHT: Primary | ICD-10-CM

## 2021-01-28 DIAGNOSIS — E83.42 HYPOMAGNESEMIA: ICD-10-CM

## 2021-01-28 DIAGNOSIS — R60.0 BILATERAL LEG EDEMA: ICD-10-CM

## 2021-01-28 DIAGNOSIS — R79.89 ABNORMAL CBC: ICD-10-CM

## 2021-01-28 LAB
ALBUMIN SERPL BCP-MCNC: 3.1 G/DL (ref 3.5–5.2)
ALP SERPL-CCNC: 57 U/L (ref 55–135)
ALT SERPL W/O P-5'-P-CCNC: 15 U/L (ref 10–44)
ANION GAP SERPL CALC-SCNC: 5 MMOL/L (ref 8–16)
AST SERPL-CCNC: 23 U/L (ref 10–40)
BASOPHILS # BLD AUTO: 0.02 K/UL (ref 0–0.2)
BASOPHILS NFR BLD: 0.4 % (ref 0–1.9)
BILIRUB SERPL-MCNC: 0.8 MG/DL (ref 0.1–1)
BNP SERPL-MCNC: 139 PG/ML (ref 0–99)
BUN SERPL-MCNC: 5 MG/DL (ref 8–23)
CALCIUM SERPL-MCNC: 8.4 MG/DL (ref 8.7–10.5)
CHLORIDE SERPL-SCNC: 107 MMOL/L (ref 95–110)
CO2 SERPL-SCNC: 30 MMOL/L (ref 23–29)
CREAT SERPL-MCNC: 0.7 MG/DL (ref 0.5–1.4)
DIFFERENTIAL METHOD: ABNORMAL
EOSINOPHIL # BLD AUTO: 0.1 K/UL (ref 0–0.5)
EOSINOPHIL NFR BLD: 1 % (ref 0–8)
ERYTHROCYTE [DISTWIDTH] IN BLOOD BY AUTOMATED COUNT: 15.4 % (ref 11.5–14.5)
EST. GFR  (AFRICAN AMERICAN): >60 ML/MIN/1.73 M^2
EST. GFR  (NON AFRICAN AMERICAN): >60 ML/MIN/1.73 M^2
GLUCOSE SERPL-MCNC: 113 MG/DL (ref 70–110)
HCT VFR BLD AUTO: 31.4 % (ref 37–48.5)
HGB BLD-MCNC: 9.6 G/DL (ref 12–16)
IMM GRANULOCYTES # BLD AUTO: 0.03 K/UL (ref 0–0.04)
IMM GRANULOCYTES NFR BLD AUTO: 0.6 % (ref 0–0.5)
LYMPHOCYTES # BLD AUTO: 1.3 K/UL (ref 1–4.8)
LYMPHOCYTES NFR BLD: 25 % (ref 18–48)
MAGNESIUM SERPL-MCNC: 1.6 MG/DL (ref 1.6–2.6)
MCH RBC QN AUTO: 28 PG (ref 27–31)
MCHC RBC AUTO-ENTMCNC: 30.6 G/DL (ref 32–36)
MCV RBC AUTO: 92 FL (ref 82–98)
MONOCYTES # BLD AUTO: 0.4 K/UL (ref 0.3–1)
MONOCYTES NFR BLD: 6.8 % (ref 4–15)
NEUTROPHILS # BLD AUTO: 3.4 K/UL (ref 1.8–7.7)
NEUTROPHILS NFR BLD: 66.2 % (ref 38–73)
NRBC BLD-RTO: 0 /100 WBC
PLATELET # BLD AUTO: 218 K/UL (ref 150–350)
PMV BLD AUTO: 10.2 FL (ref 9.2–12.9)
POTASSIUM SERPL-SCNC: 3.7 MMOL/L (ref 3.5–5.1)
PROT SERPL-MCNC: 6.1 G/DL (ref 6–8.4)
RBC # BLD AUTO: 3.43 M/UL (ref 4–5.4)
SODIUM SERPL-SCNC: 142 MMOL/L (ref 136–145)
WBC # BLD AUTO: 5.15 K/UL (ref 3.9–12.7)

## 2021-01-28 PROCEDURE — 83880 ASSAY OF NATRIURETIC PEPTIDE: CPT | Mod: HCNC

## 2021-01-28 PROCEDURE — 83735 ASSAY OF MAGNESIUM: CPT | Mod: HCNC

## 2021-01-28 PROCEDURE — 1157F PR ADVANCE CARE PLAN OR EQUIV PRESENT IN MEDICAL RECORD: ICD-10-PCS | Mod: HCNC,S$GLB,, | Performed by: OPHTHALMOLOGY

## 2021-01-28 PROCEDURE — 1157F ADVNC CARE PLAN IN RCRD: CPT | Mod: HCNC,S$GLB,, | Performed by: OPHTHALMOLOGY

## 2021-01-28 PROCEDURE — 99214 PR OFFICE/OUTPT VISIT, EST, LEVL IV, 30-39 MIN: ICD-10-PCS | Mod: HCNC,S$GLB,, | Performed by: OPHTHALMOLOGY

## 2021-01-28 PROCEDURE — 1159F MED LIST DOCD IN RCRD: CPT | Mod: HCNC,S$GLB,, | Performed by: OPHTHALMOLOGY

## 2021-01-28 PROCEDURE — 99214 OFFICE O/P EST MOD 30 MIN: CPT | Mod: HCNC,S$GLB,, | Performed by: OPHTHALMOLOGY

## 2021-01-28 PROCEDURE — 85025 COMPLETE CBC W/AUTO DIFF WBC: CPT | Mod: HCNC

## 2021-01-28 PROCEDURE — 1159F PR MEDICATION LIST DOCUMENTED IN MEDICAL RECORD: ICD-10-PCS | Mod: HCNC,S$GLB,, | Performed by: OPHTHALMOLOGY

## 2021-01-28 PROCEDURE — 80053 COMPREHEN METABOLIC PANEL: CPT | Mod: HCNC

## 2021-01-28 PROCEDURE — 36415 COLL VENOUS BLD VENIPUNCTURE: CPT | Mod: HCNC

## 2021-01-28 RX ORDER — FUROSEMIDE 20 MG/1
20 TABLET ORAL DAILY PRN
Qty: 7 TABLET | Refills: 0 | Status: SHIPPED | OUTPATIENT
Start: 2021-01-28 | End: 2021-02-04

## 2021-01-29 ENCOUNTER — PATIENT MESSAGE (OUTPATIENT)
Dept: OPHTHALMOLOGY | Facility: CLINIC | Age: 66
End: 2021-01-29

## 2021-01-29 ENCOUNTER — PATIENT MESSAGE (OUTPATIENT)
Dept: RHEUMATOLOGY | Facility: CLINIC | Age: 66
End: 2021-01-29

## 2021-01-29 ENCOUNTER — PATIENT MESSAGE (OUTPATIENT)
Dept: INTERNAL MEDICINE | Facility: CLINIC | Age: 66
End: 2021-01-29

## 2021-01-29 DIAGNOSIS — Z79.4 TYPE 2 DIABETES MELLITUS WITH DIABETIC POLYNEUROPATHY, WITH LONG-TERM CURRENT USE OF INSULIN: Chronic | ICD-10-CM

## 2021-01-29 DIAGNOSIS — E11.42 TYPE 2 DIABETES MELLITUS WITH DIABETIC POLYNEUROPATHY, WITH LONG-TERM CURRENT USE OF INSULIN: Chronic | ICD-10-CM

## 2021-01-29 RX ORDER — INSULIN DEGLUDEC 100 U/ML
32 INJECTION, SOLUTION SUBCUTANEOUS DAILY
Qty: 4 SYRINGE | Refills: 1 | Status: SHIPPED | OUTPATIENT
Start: 2021-01-29

## 2021-02-02 ENCOUNTER — TELEPHONE (OUTPATIENT)
Dept: OPHTHALMOLOGY | Facility: CLINIC | Age: 66
End: 2021-02-02

## 2021-02-04 ENCOUNTER — PATIENT MESSAGE (OUTPATIENT)
Dept: RHEUMATOLOGY | Facility: CLINIC | Age: 66
End: 2021-02-04

## 2021-02-05 ENCOUNTER — PATIENT MESSAGE (OUTPATIENT)
Dept: RHEUMATOLOGY | Facility: CLINIC | Age: 66
End: 2021-02-05

## 2021-02-23 ENCOUNTER — PATIENT MESSAGE (OUTPATIENT)
Dept: RHEUMATOLOGY | Facility: CLINIC | Age: 66
End: 2021-02-23

## 2021-02-26 ENCOUNTER — PES CALL (OUTPATIENT)
Dept: ADMINISTRATIVE | Facility: CLINIC | Age: 66
End: 2021-02-26

## 2021-03-12 ENCOUNTER — PATIENT OUTREACH (OUTPATIENT)
Dept: ADMINISTRATIVE | Facility: HOSPITAL | Age: 66
End: 2021-03-12

## 2021-03-18 ENCOUNTER — PATIENT MESSAGE (OUTPATIENT)
Dept: PHARMACY | Facility: CLINIC | Age: 66
End: 2021-03-18

## 2021-07-08 NOTE — PROGRESS NOTES
HPI: Adriana Paula is a 65 y.o.  female c/I for visit to address Diabetes Type 2  This is the first time I am seeing her for care, follows with Dr. Márquez for primary care needs.     was diagnosed with T2DM during pregnancy at age 39 - seemed to be controlled with diet and exercise, then sugars got high again.   Started on metformin and continues it.   Started on insulin during pregnancy, off of it for about 5 years, then restarted again at age 44.   Has never been hospitalized r/t DM.  Denies missing doses of DM medication.     Taking metformin 1000 mg at lunch and 500 mg at dinner time.   On Tresiba 40 units every morning. Was on lower doses but has had to wean up since being on steroids.   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 So stopped taking.   Is on high dose steroids - 20 mg once per day, was on higher doses and has weaned down to once daily dose.   Has caused her sugars to go up and weight gain.     Checking sugars 4 times per day - states sugars fasting are 140's at lowest in morning and 200 - 250's in middle of day.   Not writing down.   C/o some swelling in feet and ankles today - states ate spaghetti last night. And bp slightly elevated on today's check.   Also has some sinus congestion and mucous starting over the past 2 days. Denies shortness of breath or chest pain.   No fever.     Past medical History:   Past Medical History:   Diagnosis Date    Allergy     Anemia     Anxiety     Arthritis     Breast cyst     Cataract     DR (diabetic retinopathy)     Dyslipidemia     Essential hypertension 8/1/2012    Gastroesophageal reflux disease without esophagitis 2/3/2017    GERD (gastroesophageal reflux disease)     Glaucoma     Hypertension     Obesity (BMI 30-39.9) 10/24/2013    PN (peripheral neuropathy)     Sleep apnea     Type 2 diabetes mellitus with both  Problem: Pain:  Goal: Pain level will decrease  Description: Pain level will decrease  Outcome: Ongoing     Problem: Neurological  Goal: Maximum potential motor/sensory/cognitive function  Outcome: Ongoing     Problem: Nutrition  Goal: Optimal nutrition therapy  Outcome: Ongoing     Problem: Skin Integrity:  Goal: Will show no infection signs and symptoms  Description: Will show no infection signs and symptoms  Outcome: Ongoing  Goal: Absence of new skin breakdown  Description: Absence of new skin breakdown  Outcome: Ongoing     Problem: Falls - Risk of:  Goal: Will remain free from falls  Description: Will remain free from falls  Outcome: Ongoing     Problem: Discharge Planning:  Goal: Discharged to appropriate level of care  Description: Discharged to appropriate level of care  Outcome: Ongoing     Problem: Metabolic:  Goal: Ability to maintain appropriate glucose levels will improve  Description: Ability to maintain appropriate glucose levels will improve  Outcome: Ongoing   Care plan reviewed with patient and verbalize understanding of the plan of care and contribute to goal setting. "eyes affected by mild nonproliferative retinopathy without macular edema, with long-term current use of insulin     Type 2 diabetes mellitus with diabetic polyneuropathy, with long-term current use of insulin     Type II or unspecified type diabetes mellitus with other specified manifestations, uncontrolled       Family hx:   Family History   Problem Relation Age of Onset    Arthritis Mother     Diabetes Mother     Heart disease Mother     Miscarriages / Stillbirths Mother     Vision loss Mother     Breast cancer Mother 75        70s    Cancer Mother 70        Breast    No Known Problems Father     Diabetes Unknown         "Half of my family"    Breast cancer Maternal Grandmother 50        50s    Breast cancer Sister 50        50s    Osteoarthritis Sister     Breast cancer Cousin 40        40s    Heart failure Brother     Pacemaker/defibrilator Brother     No Known Problems Maternal Aunt     No Known Problems Maternal Uncle     No Known Problems Paternal Aunt     No Known Problems Paternal Uncle     No Known Problems Maternal Grandfather     No Known Problems Paternal Grandmother     No Known Problems Paternal Grandfather     Breast cancer Cousin 40        40s    Breast cancer Sister 40    Osteoarthritis Sister     Breast cancer Sister 54    Heart disease Sister     Thyroid disease Neg Hx     Ovarian cancer Neg Hx     Amblyopia Neg Hx     Blindness Neg Hx     Cataracts Neg Hx     Glaucoma Neg Hx     Hypertension Neg Hx     Macular degeneration Neg Hx     Retinal detachment Neg Hx     Strabismus Neg Hx     Stroke Neg Hx     Lupus Neg Hx       Current meds:   Current Outpatient Medications:     ACCU-CHEK SOFTCLIX LANCETS Misc, , Disp: , Rfl:     amLODIPine (NORVASC) 10 MG tablet, TAKE 1 TABLET BY MOUTH EVERY DAY, Disp: 90 tablet, Rfl: 1    aspirin (ECOTRIN) 81 MG EC tablet, Take 81 mg by mouth once daily. Instructed to stop 1 week prior to surgery on 4/15/19 per  " "Erasto-wise, Disp: , Rfl:     atorvastatin (LIPITOR) 40 MG tablet, TAKE 1 TABLET(40 MG) BY MOUTH EVERY DAY, Disp: 90 tablet, Rfl: 1    BD ULTRA-FINE SHORT PEN NEEDLE 31 gauge x 5/16" Ndle, USE AS DIRECTED TWICE DAILY, Disp: 100 each, Rfl: 5    blood-glucose meter (ACCU-CHEK KENDRA PLUS METER) Misc, 1 each by Misc.(Non-Drug; Combo Route) route 2 (two) times daily. Patient test blood sugar 2 times daily, Disp: 1 each, Rfl: 0    chlorhexidine (PERIDEX) 0.12 % solution, SWISH FOR 30 SECONDS AND SPIT 15ML PO  BID . DO NOT SWALLOW, Disp: , Rfl:     cholecalciferol, vitamin D3, 125 mcg (5,000 unit) Tab, Take 5,000 Units by mouth once daily., Disp: , Rfl:     fluconazole (DIFLUCAN) 200 MG Tab, Take 1 tablet (200 mg total) by mouth once daily. for 14 days, Disp: 14 tablet, Rfl: 0    fluticasone propionate (FLONASE) 50 mcg/actuation nasal spray, 1 spray by Each Nostril route once daily., Disp: , Rfl:     folic acid (FOLVITE) 1 MG tablet, Take 1 tablet (1 mg total) by mouth once daily., Disp: 90 tablet, Rfl: 0    insulin degludec (TRESIBA FLEXTOUCH U-100) 100 unit/mL (3 mL) InPn, Inject 36 Units into the skin once daily., Disp: 4 Syringe, Rfl: 5    lancets 33 gauge Misc, 1 lancet by Misc.(Non-Drug; Combo Route) route 2 (two) times daily before meals., Disp: 200 each, Rfl: 11    lidocaine HCL 2% (XYLOCAINE) 2 % jelly, Apply topically as needed. Apply topically once nightly to affected part of foot/feet., Disp: 30 mL, Rfl: 2    losartan (COZAAR) 100 MG tablet, TAKE 1 TABLET BY MOUTH EVERY DAY, Disp: 90 tablet, Rfl: 1    MAGNESIUM ORAL, Take 500 mg by mouth., Disp: , Rfl:     metFORMIN (GLUCOPHAGE) 500 MG tablet, TAKE 2 TABLETS BY MOUTH AT LUNCH AND 1 TABLET AT DINNER, Disp: 270 tablet, Rfl: 1    methotrexate 2.5 MG Tab, Take 4 tablets (10 mg total) by mouth every 7 days., Disp: 48 tablet, Rfl: 0    nystatin (MYCOSTATIN) 100,000 unit/mL suspension, Take 4 mLs (400,000 Units total) by mouth 4 (four) times daily. for " 14 days, Disp: 224 mL, Rfl: 0    oxybutynin (DITROPAN) 5 MG Tab, Take 1 tablet (5 mg total) by mouth 3 (three) times daily., Disp: 270 tablet, Rfl: 0    predniSONE (DELTASONE) 10 MG tablet, Take 2 tablets (20 mg total) by mouth 2 (two) times a day., Disp: 60 tablet, Rfl: 3    pregabalin (LYRICA) 75 MG capsule, Take 1 capsule (75 mg total) by mouth 2 (two) times daily., Disp: 60 capsule, Rfl: 5    traMADoL (ULTRAM) 50 mg tablet, Take 1 tablet (50 mg total) by mouth every 12 (twelve) hours as needed. M54.40 Greater than 7 day supply is medically necessary, Disp: 60 tablet, Rfl: 1    hydroCHLOROthiazide (HYDRODIURIL) 12.5 MG Tab, Take 1 tablet (12.5 mg total) by mouth once daily., Disp: 30 tablet, Rfl: 6    insulin lispro (HUMALOG U-100 INSULIN) 100 unit/mL injection, Inject 70 Units into the skin continuous. Injects up to 70 units daily via VGO 30., Disp: 4 vial, Rfl: 3    sub-q insulin device, 30 unit (V-GO 30) Fariba, 1 Device by Misc.(Non-Drug; Combo Route) route once daily., Disp: 30 each, Rfl: 6     Current Diabetes medications:   Long acting insulin:  Tresiba 40 units every HS.    Metformin bid with food- 1000mg in morning and 500 at dinner time.     Medications Tried and Failed:   levemir - worked well, but was expensive.   Novolin N in morning   Glyburide  bydureon - injection was too large/ painful   victoza - nausea -     Review of Pertinent co-morbidities/risk factors:   CV: Denies history of MI nor stroke.   CAD: Denies.  Takes aspirin 81mg tablet daily  BP: has history of HTN  Statin: Taking  ACE/ARB: Taking    Social History     Tobacco Use   Smoking Status Former Smoker    Start date: 12/9/2002   Smokeless Tobacco Never Used      Social:   Lives at home with daughter who is 27 y.o.   Life changes/stressors currently: new diagnosis of scleritis.   Diet: not always following ADA diet   Meals: 3 per day and snacks.        Breakfast - Bran cereal with fruit cup       Lunch - soup and crackers or  "sandwich.        Dinner - smallest meal - glucerna.        Snacks - nuts/almonds and raisins. cutie oranges and apples.         Drinks - some fruit juices, herbal tea or coffee.   Exercise: walking in neighborhood.   Activities: retired 1 year ago - worked at [employer] as .     Glucose Monitoring:   Checking sugars 4x/day by fingerstick.   140 - 250's.     Standards of care:   Eyes: .: 10/23/2020  Foot exam: : 10/09/2020   Diabetes education: None.    Vital Signs  BP (!) 140/80 (BP Location: Left arm, Patient Position: Sitting, BP Method: Medium (Manual))   Pulse 68   Temp 97.7 °F (36.5 °C) (Temporal)   Resp 16   Ht 5' 5" (1.651 m)   Wt 99.5 kg (219 lb 5.7 oz)   SpO2 99%   BMI 36.50 kg/m²     Pertinent Labs:   Hgba1c   Lab Results   Component Value Date    HGBA1C 9.3 (H) 10/09/2020     Lipid panel   Lab Results   Component Value Date    CHOL 141 06/12/2020    CHOL 158 04/02/2019    CHOL 171 03/19/2018     Lab Results   Component Value Date    HDL 73 06/12/2020    HDL 84 (H) 04/02/2019    HDL 79 (H) 03/19/2018     Lab Results   Component Value Date    LDLCALC 60.0 (L) 06/12/2020    LDLCALC 67.2 04/02/2019    LDLCALC 82.8 03/19/2018     Lab Results   Component Value Date    TRIG 40 06/12/2020    TRIG 34 04/02/2019    TRIG 46 03/19/2018     Lab Results   Component Value Date    CHOLHDL 51.8 (H) 06/12/2020    CHOLHDL 53.2 (H) 04/02/2019    CHOLHDL 46.2 03/19/2018      CMP  Glucose   Date Value Ref Range Status   10/09/2020 206 (H) 70 - 110 mg/dL Final     BUN   Date Value Ref Range Status   10/09/2020 9 8 - 23 mg/dL Final     Creatinine   Date Value Ref Range Status   10/09/2020 0.8 0.5 - 1.4 mg/dL Final     eGFR if    Date Value Ref Range Status   10/09/2020 >60.0 >60 mL/min/1.73 m^2 Final     eGFR if non    Date Value Ref Range Status   10/09/2020 >60.0 >60 mL/min/1.73 m^2 Final     Comment:     Calculation used to obtain the estimated glomerular " filtration  rate (eGFR) is the CKD-EPI equation.         Microalbumin creatinine ratio:   Lab Results   Component Value Date    RITU 6.5 06/12/2020     Review Of Systems:   Gen: Appetite good, + weight gain, + fatigue. Denies polydipsia.  Skin: Skin is intact and heals well, denies any rashes or hair changes.   Eyes: Denies any acute visual disturbances, nor blurred vision. Has scleritis of eyes bilateral, much improved on steroids.   Resp: Denies SOB or Dyspnea on exertion, denies cough.   Cardiac: Denies chest pain, palpitations, or swelling.   GI: Denies abdominal pain, nausea or vomiting, diarrhea, or constipation.   /GYN: Denies nocturia, nor burning, frequency or pain on urination.  MS/Neuro: Denies numbness/ tingling in BLE; Gait steady, speech clear  Psych: Denies drug/ETOH abuse, no hx of depression.  Other systems: negative.    Physical Exam:   GENERAL: Well developed, well nourished in appearance.   PSYCH: AAOx3, appropriate mood and affect, pleasant expression, conversant, appears relaxed, well groomed.   EYES: PERRL, Conjunctiva and corneas clear  NECK: Soft and Supple, trachea midline, No thyroid enlargement noted  CHEST: Even, regular, and unlabored respirations  CARDIAC: RRR, S1, S2 heard  ABDOMEN: Soft, non-tender, non-distended   VASCULAR: pedal pulses palpable bilaterally, +1 dependent edema bilaterally.   NEURO:  cranial nerves II - XII intact   MUSCULOSKELETAL: Good ROM, equal strength against resistance to bilateral lower extremities, steady gait.   SKIN: Skin warm, dry, and intact     Assessment and Plan of Care:     Adriana was seen today for diabetes.    Diagnoses and all orders for this visit:    Type 2 diabetes mellitus with both eyes affected by mild nonproliferative retinopathy without macular edema, with long-term current use of insulin  -     Ambulatory referral/consult to Diabetes Education; Future  -     sub-q insulin device, 30 unit (V-GO 30) Fariba; 1 Device by  Misc.(Non-Drug; Combo Route) route once daily.  -     insulin lispro (HUMALOG U-100 INSULIN) 100 unit/mL injection; Inject 70 Units into the skin continuous. Injects up to 70 units daily via VGO 30.    Hypertension associated with diabetes    Severe obesity (BMI 35.0-39.9) with comorbidity    Hyperlipidemia associated with type 2 diabetes mellitus    Obstructive sleep apnea syndrome    Gastroesophageal reflux disease without esophagitis    Type 2 diabetes mellitus with diabetic polyneuropathy, with long-term current use of insulin    Other orders  -     hydroCHLOROthiazide (HYDRODIURIL) 12.5 MG Tab; Take 1 tablet (12.5 mg total) by mouth once daily.     1. T2DM with hyperglycemia- Hgba1c goal is 7.5% or less without hypoglycemia - @9.3% currently.   Long term steroid use now - needs meal time insulin.   Intolerant to GLP1's in past.   Will continue insulin - switch from Tresiba 40 every day to vgo 30 daily - 3 clicks with meals, 1 click with snack.   Continue metformin 1000 with lunch. 500 with dinner.   discussed DM, progression of disease, long term complications, CV risk factors and tx options.   Advise compliance with ADA diet and encourage exercise  Reviewed  hypoglycemia, s/s and appropriate tx. Have/get quick acting glucose tablets at hand.   Instructed to monitor Blood glucose 2 - 4x/day and bring meter/ log to every clinic visit.   Continue to check sugars 4 times per day.   Patient on insulin injections 4 times per day via VGO 30.   The VGO PATCH DELIVERS INSULIN CONTINUOUSLY VIA BASAL RATE,  THE PATIENT IS CLICKING 3 TIMES PER DAY/GIVING INSULIN WITH MEALS AND ALSO ADDITIONAL CLICK FOR A SNACK.   THIS IS 4 INJECTIONS PER DAY OF INSULIN -   WILL ARRANGE FOR CGM MEDICAL DEVICE WHICH IS MEDICALLY NECESSARY SINCE THE PATIENT IS ON CONTINUOUS INSULIN WITH INJECTIONS 4 TIMES PER DAY.   Putting her on sample dexcom G6 today for personal use, will have her return in 10 days for nurse visit.   Sending in orders  for dexcom sensors to continue.   A CGM is MEDICALLY NECESSARY as it will allow me to virtually and more closely monitor glucose readings  This enables me to make any necessary adjustments to the patients diabetes regimen while keeping the patient and staff safe during the COVID-19 PHE.  Eyes - following closely with opthometry Dr. Serrano. Last seen in 10/2020 -     2. HTN- controlled, continue meds as previously prescribed and monitor.   Losartan 100 and norvasc 10 - daily. Has some peripheral edema and bp up.   Advised low Na diet. Start ctz daily x 3 days, and can take every other day to help with swelling and bp control.     3. HLD - LDL goal < 100. At goal.   Currently on statin therapy- continue lipitor 40.     4. Weight - BMI Body mass index is 36.5 kg/m².   Encourage Ada diet and exercise.     5. MARINA. - stable at present.     6. GERD and has candidiasis and thrush. Currently on anti-fungals.   Stable reflux at present.     7. Arthritis  And sciatica - on lyrica 75 bid. Pain medication prn.     8. Allergies/sinutis - advised OTC cold medication - nyquil and dayquil prn.   Advised to follow up if symptoms continue over next 2 - 3 days and do not improve.   Message sent to dr. Márquez this afternoon and call back tomorrow to check on her.      Sent referral to CDE for new VGO training.   Follow up in 6 weeks with OV and bg logs.   Nurse visit in 10 days to read dexcom.   New dexcom sample applied today.

## 2022-05-10 NOTE — TELEPHONE ENCOUNTER
Lab appt cancelled as well as annual scheduled for May. Annual recall entered. 3 month follow up has been scheduled. Pt has been notified via portal and letter sent to residence.    Medical Record reviewed.  Stacy Rivera was discharged from [facility] on 5/6/22.  30 day end date: 6/5/22    Diagnosis/History:   Acute hypoxic respiratory failure and RETANA due to Drug Induced pneumonitis: known PE which was improving on last imaging in 2/2022  · NM scan: intermediate to high probability PE, correlate with CTA chest  · CTA- unable to complete due to national shortage of contrast media/material  · D-dimer 0.85 but improved from prior 24.71, make acute clot less likely   · Bilateral LE dopplers- negative for DVT  · ECHO Limited echocardiogram performed.   ~ 1.8 x 1 cm mass density attached to the anterior tricuspid leaflet, decreased in size compared to study dated 12/16/2021. Mass density smaller when measured on the images with Definity contrast. Prior study did not use Definity contrast. Mildly increased right ventricular chamber size. Moderately decreased right ventricular systolic function.   Severe pulmonary hypertension; RVSP 61 mmHg.  Flattening of the septum throughout the cardiac cycle consistent with RV pressure & volume overload.  Moderate tricuspid valve regurgitation.  Increased right atrial chamber size.  Normal left ventricular chamber size, wall thickness and systolic function, EF ~ 55%.  · Troponin negative x 2  · procalcitonin <0.05  · Oncology consulted  · CT chest: New bilateral groundglass opacities. Findings are nonspecific but may represent autoimmune reaction or atypical infection such as Covid 19 pneumonia.  · Pulmonary Consult: consistent with drug induced pneumonitis from her cancer treatment.  Recommended starting steroids with slow taper.   Follow up in clinic in 1-2 weeks  · Home with home oxygen- 1L at rest and 2L with activity.       H/o Cardiac thrombus, now possible mass vs remaining thombus  · ECHO: Mass improved/decreased in size from 12/2021  · Unable to complete Cardiac MRI as HR was elevated due to anxiety.  Oncology plans to follow up as outpatient with repeat  imaging today to determine if mass vs thrombus  · Continue Lovenox BID + warfarin 5mg daily until recheck 5/9/2022 with anticoagulation clinic     Severe Pulmonary Hypertension  · Pulmonary consulted     Anemia: near baseline.  No evidence of acute blood loss     adenocarcinoma stage III of the lung   · Follows Oncology- durvalumab     H/o DVT, bilateral PE   · Was on Eliquis (changed from lovenox in 2/2022)  · Lovenox + coumadin     Prior history tobacco abuse           Consults       IP Consult Orders (From admission, onward)                           Start     Ordered      05/04/22 1242   Inpatient consult to Pulmonology  ONE TIME        Provider:  Will Walters MD    05/04/22 1242      05/04/22 0955   Inpatient consult to Oncology  ONE TIME        Provider:  Chuy Madison MD    05/04/22 0954                    Patient is being enrolled in the High Risk Transition of Care program due to readmission risk score or diagnosis.  Episode of care created to establish routine post discharge patient outreach calls.    Future Appointments   Date Time Provider Department Center   [facility]   5/12/2022 10:15 AM Chuy Madiosn MD [facility]       Please contact me for any questions.    Thank you,  Pura Quan, RN  Transition of Care Nurse  [facility]  [phone]

## 2023-07-18 ENCOUNTER — TELEPHONE (OUTPATIENT)
Dept: ENDOSCOPY | Facility: HOSPITAL | Age: 68
End: 2023-07-18
Payer: MEDICARE

## 2023-07-18 NOTE — TELEPHONE ENCOUNTER
Ma checked referral   Pt needs to come and establish care for her Abdominal tenderness and pain around her naval   Ma called pt to verify and pt states she would like to see a GI provider pt states she had procedures done in 2020  Please call pt

## 2023-07-18 NOTE — TELEPHONE ENCOUNTER
----- Message from Adilia Garcia sent at 7/18/2023  1:06 PM CDT -----  Regarding: FW: Colonoscopy referral    ----- Message -----  From: Shara Segovia  Sent: 7/18/2023   9:18 AM CDT  To: [facility] Endo Schedulers  Subject: Colonoscopy referral                             Good morning,                           Please contact the pt for scheduling. The referral and records are scanned into .  Have a great day.     Dx-screening colonoscopy    Thank you,   Shara espinoza

## 2023-08-10 ENCOUNTER — TELEPHONE (OUTPATIENT)
Dept: GASTROENTEROLOGY | Facility: CLINIC | Age: 68
End: 2023-08-10
Payer: MEDICARE

## 2023-08-10 NOTE — TELEPHONE ENCOUNTER
----- Message from Alon Galdamez sent at 8/10/2023 12:21 PM CDT -----  Type:  Patient Returning Call    Who Called: pt  Who Left Message for Patient:  Does the patient know what this is regarding?:  Would the patient rather a call back or a response via [facility]? call  Best Call Back Number: [phone]  Additional Information:

## 2023-09-08 ENCOUNTER — OFFICE VISIT (OUTPATIENT)
Dept: GASTROENTEROLOGY | Facility: CLINIC | Age: 68
End: 2023-09-08
Payer: MEDICARE

## 2023-09-08 VITALS — HEIGHT: 65 IN | BODY MASS INDEX: 32.91 KG/M2 | WEIGHT: 197.56 LBS

## 2023-09-08 DIAGNOSIS — K21.9 GASTROESOPHAGEAL REFLUX DISEASE WITHOUT ESOPHAGITIS: ICD-10-CM

## 2023-09-08 DIAGNOSIS — D50.0 IRON DEFICIENCY ANEMIA DUE TO CHRONIC BLOOD LOSS: ICD-10-CM

## 2023-09-08 DIAGNOSIS — Z86.010 PERSONAL HISTORY OF COLONIC POLYPS: ICD-10-CM

## 2023-09-08 DIAGNOSIS — R10.33 PERIUMBILICAL ABDOMINAL PAIN: Primary | ICD-10-CM

## 2023-09-08 PROCEDURE — 99214 PR OFFICE/OUTPT VISIT, EST, LEVL IV, 30-39 MIN: ICD-10-PCS | Mod: S$GLB,,, | Performed by: INTERNAL MEDICINE

## 2023-09-08 PROCEDURE — 1125F AMNT PAIN NOTED PAIN PRSNT: CPT | Mod: CPTII,S$GLB,, | Performed by: INTERNAL MEDICINE

## 2023-09-08 PROCEDURE — 1101F PR PT FALLS ASSESS DOC 0-1 FALLS W/OUT INJ PAST YR: ICD-10-PCS | Mod: CPTII,S$GLB,, | Performed by: INTERNAL MEDICINE

## 2023-09-08 PROCEDURE — 1125F PR PAIN SEVERITY QUANTIFIED, PAIN PRESENT: ICD-10-PCS | Mod: CPTII,S$GLB,, | Performed by: INTERNAL MEDICINE

## 2023-09-08 PROCEDURE — 99214 OFFICE O/P EST MOD 30 MIN: CPT | Mod: S$GLB,,, | Performed by: INTERNAL MEDICINE

## 2023-09-08 PROCEDURE — 1157F ADVNC CARE PLAN IN RCRD: CPT | Mod: CPTII,S$GLB,, | Performed by: INTERNAL MEDICINE

## 2023-09-08 PROCEDURE — 1159F PR MEDICATION LIST DOCUMENTED IN MEDICAL RECORD: ICD-10-PCS | Mod: CPTII,S$GLB,, | Performed by: INTERNAL MEDICINE

## 2023-09-08 PROCEDURE — 3008F PR BODY MASS INDEX (BMI) DOCUMENTED: ICD-10-PCS | Mod: CPTII,S$GLB,, | Performed by: INTERNAL MEDICINE

## 2023-09-08 PROCEDURE — 1157F PR ADVANCE CARE PLAN OR EQUIV PRESENT IN MEDICAL RECORD: ICD-10-PCS | Mod: CPTII,S$GLB,, | Performed by: INTERNAL MEDICINE

## 2023-09-08 PROCEDURE — 1159F MED LIST DOCD IN RCRD: CPT | Mod: CPTII,S$GLB,, | Performed by: INTERNAL MEDICINE

## 2023-09-08 PROCEDURE — 3288F FALL RISK ASSESSMENT DOCD: CPT | Mod: CPTII,S$GLB,, | Performed by: INTERNAL MEDICINE

## 2023-09-08 PROCEDURE — 99999 PR PBB SHADOW E&M-EST. PATIENT-LVL IV: ICD-10-PCS | Mod: PBBFAC,,, | Performed by: INTERNAL MEDICINE

## 2023-09-08 PROCEDURE — 3008F BODY MASS INDEX DOCD: CPT | Mod: CPTII,S$GLB,, | Performed by: INTERNAL MEDICINE

## 2023-09-08 PROCEDURE — 99999 PR PBB SHADOW E&M-EST. PATIENT-LVL IV: CPT | Mod: PBBFAC,,, | Performed by: INTERNAL MEDICINE

## 2023-09-08 PROCEDURE — 3288F PR FALLS RISK ASSESSMENT DOCUMENTED: ICD-10-PCS | Mod: CPTII,S$GLB,, | Performed by: INTERNAL MEDICINE

## 2023-09-08 PROCEDURE — 1101F PT FALLS ASSESS-DOCD LE1/YR: CPT | Mod: CPTII,S$GLB,, | Performed by: INTERNAL MEDICINE

## 2023-09-08 RX ORDER — POLYETHYLENE GLYCOL 3350, SODIUM SULFATE ANHYDROUS, SODIUM BICARBONATE, SODIUM CHLORIDE, POTASSIUM CHLORIDE 236; 22.74; 6.74; 5.86; 2.97 G/4L; G/4L; G/4L; G/4L; G/4L
4 POWDER, FOR SOLUTION ORAL ONCE
Qty: 4000 ML | Refills: 0 | Status: SHIPPED | OUTPATIENT
Start: 2023-09-08 | End: 2023-09-08

## 2023-09-08 NOTE — PATIENT INSTRUCTIONS
GOLYTELY/ COLYTE/ NULYTELY Prep Instructions    [facility] [address]  Clinic Office [phone]  Endoscopy Lab [phone]    You are scheduled for a Colonoscopy with Dr. Mobley on 10/04/2023at [facility] in [place].    Check in at the Hospital -1st floor, Information desk.   Call [phone] to reschedule.    An adult friend/family member must come with you to drive you home.  You cannot drive, take a taxi, Uber/Lyft or bus to leave the Endoscopy Center alone.  If you do not have someone to drive you home, your test will be cancelled.     Please follow the directions of your doctor if you take any pills that thin your blood. If you take these meds: Aggrenox, Brilinta, Effient, Eliquis, Lovenox, Plavix, Pletal, Pradaxa, Ticilid, Xarelto or Coumadin, let the doctor's office know.    Please hold any GLP-1 medications prior to the procedure: Dulaglutide Trulicity(hold week prior), Exenatide Byetta (hold the morning of procedure), Semaglutide Ozempic (hold week prior), Liraglutide Victoza, Saxenda(hold week prior), Lixisenatide Adlyxin (hold the morning of procedure), Semaglutide Rybelsus (hold the morning of procedure), Tirzepatide Mounjaro (hold week prior)     DON'T: On the morning of the test do not take insulin or pills for diabetes.     DO: On the morning of the test, do take any pills for blood pressure, heart, anti-rejection and or seizures with a small sip of water. Bring any inhalers with you.    To have a good prep, you must follow these instructions - please do not use the directions from the pharmacy.    The doctor will send a prescription for the Golytely.      The Day Before the test:    You can only drink CLEAR LIQUIDS the whole day before your test.  You can't eat any food for the whole day.    You CAN have:  Water, Coffee or decaf coffee (no milk or cream)  Tea  Soft drinks - regular and sugar free  Jello (green or yellow)  Apple Juice, white grape  juice, white cranberry juice  Gatorade, Power Aid, Crystal Light, Gregg Aid  Lemonade and Limeade  Bouillon, clear soup  Snowball, popsicles  YOU CAN'T DRINK ANYTHING RED, PURPLE ORANGE OR BLUE   YOU CAN'T DRINK ALCOHOL  ONLY DRINK WHAT IS ON THE LIST      At 12 noon,   Add water up to the line on the jug of GOLYTELY (should be 1 gallon when done).  You can add a packet of yellow/green powder drink mix to the jug.   Put the bottle in the refrigerator if you prefer. It should taste better if it is cold. Do NOT put this solution over ice. It is ok to drink with a straw.    At 5 pm the NIGHT before your test:    Drink 1 glass (8 ounces) every 10 minutes until 1/2 of the jug is finished.  Put the jug back in the refrigerator after you finish the first half, and don't drink any more until 5 hours before you come to the hospital (see below for more specific details).    You can continue to drink clear liquids until you go to sleep.    The Day of the test - We will call you 2 days before your test to tell you what time to get there.    5 hours before you come to the hospital (this may be in the middle of the night)  Drink 1 glass (8 ounces) every 10 minutes until the jug is finished.     YOU CAN'T EAT OR DRINK ANYTHING ELSE ONCE YOU FINISH THE PREP    Leave all valuables and jewelry at home. You will be at the hospital for 2-4 hours.    Call the Endoscopy department at [phone] with any questions about your procedure.

## 2023-09-08 NOTE — PROGRESS NOTES
"     GASTROENTEROLOGY CLINIC NOTE    Reason for visit: The primary encounter diagnosis was Periumbilical abdominal pain. Diagnoses of Personal history of colonic polyps, Gastroesophageal reflux disease without esophagitis, and Iron deficiency anemia due to chronic blood loss were also pertinent to this visit.  Referring provider/PCP: No, Primary Doctor    HPI:  Adriana Paula is a 68 y.o. female here today for central abd pain , new patient.    Abd pain. Centered near umbilicus.  Ongoing, intermittent in nature, unclear any alleviating or inciting factors.  Associated sometimes around bowel movements.  She that perhaps her stool is a little darker recently.  She does not see ana lilia blood in the stool.  No vomiting.  No radiating symptoms.  Has an odd smell after showering, like she can smell her 'insides', like it is coming from her navel area. She does not describe this as stool smell, but perhaps her breath smell? Not assoc with belching or flatus.  Has constipation, every 3 days, sometimes loose, some constipation. No blood in stool , but perhaps darker?    She thinks some of these symptoms were similar to perhaps ongoing bleeding back in 2020 ? But she cannot be sure of the symtpoms back then.    Hx of myomectomy complicated by adhesions to intestines apparently.     Follows with [facility] PCP - states recent lab work normal.      Prior Endoscopy:  EGD: and colon  12/20 for iron def  Normal egd  Blood in colon, negative    VCE  She recalls swallowing the pill but doesn't recall results.  Per dishcarge summary   "She underwent pill endoscopy 12/23 without a source of bleed."      (Portions of this note were dictated using voice recognition software and may contain dictation related errors in spelling/grammar/syntax not found on text review)    Review of Systems   Constitutional:  Negative for fever and malaise/fatigue.   Respiratory:  Negative for cough and shortness of breath.    Cardiovascular:  Negative " for chest pain and palpitations.   Gastrointestinal:  Positive for abdominal pain and constipation. Negative for blood in stool, nausea and vomiting.   Neurological:  Negative for dizziness and headaches.       Past Medical History: has a past medical history of Allergy, Anemia, Anxiety, Arthritis, Breast cyst, Cataract, Colon polyp, DR (diabetic retinopathy), Dyslipidemia, Essential hypertension, Gastroesophageal reflux disease without esophagitis, GERD (gastroesophageal reflux disease), Glaucoma, Hypertension, Obesity (BMI 30-39.9), PN (peripheral neuropathy), Sleep apnea, Type 2 diabetes mellitus with both eyes affected by mild nonproliferative retinopathy without macular edema, with long-term current use of insulin, Type 2 diabetes mellitus with diabetic polyneuropathy, with long-term current use of insulin, and Type II or unspecified type diabetes mellitus with other specified manifestations, uncontrolled.    Past Surgical History: has a past surgical history that includes  section; Myomectomy; Carpal tunnel release (Right); Knee arthroscopy (13); Cataract extraction; Cataract extraction w/  intraocular lens implant (Right, 2017); Trigger finger release; Trigger finger release (Left, 4/15/2019); Epidural steroid injection (N/A, 2019); Epidural steroid injection (N/A, 2019); Transforaminal epidural injection of steroid (Right, 2019); Breast cyst excision (Right); Esophagogastroduodenoscopy (N/A, 2020); and Colonoscopy (N/A, 2020).    Family History:family history includes Arthritis in her mother; Breast cancer (age of onset: 40) in her cousin, cousin, and sister; Breast cancer (age of onset: 50) in her maternal grandmother and sister; Breast cancer (age of onset: 54) in her sister; Breast cancer (age of onset: 75) in her mother; Cancer (age of onset: 70) in her mother; Diabetes in her mother and unknown relative; Heart disease in her mother and sister; Heart failure  "in her brother; Miscarriages / Stillbirths in her mother; No Known Problems in her father, maternal aunt, maternal grandfather, maternal uncle, paternal aunt, paternal grandfather, paternal grandmother, and paternal uncle; Osteoarthritis in her sister and sister; Pacemaker/defibrilator in her brother; Vision loss in her mother.    Allergies:   Review of patient's allergies indicates:   Allergen Reactions    Keflex [cephalexin] Rash      blisters, fever    Penicillins Itching    Sulfamethoxazole-trimethoprim      Other reaction(s): blisters    Vicodin [hydrocodone-acetaminophen] Swelling    Sulfa (sulfonamide antibiotics) Rash      blisters,fever           Social History: reports that she has quit smoking. Her smoking use included cigarettes. She started smoking about 20 years ago. She has never used smokeless tobacco. She reports current alcohol use. She reports that she does not use drugs.    Home medications:   Current Outpatient Medications on File Prior to Visit   Medication Sig Dispense Refill    aspirin (ECOTRIN) 81 MG EC tablet Take 81 mg by mouth once daily. Instructed to stop 1 week prior to surgery on 4/15/19 per Dr. Conklin      atorvastatin (LIPITOR) 40 MG tablet TAKE 1 TABLET(40 MG) BY MOUTH EVERY DAY 90 tablet 1    BD ULTRA-FINE SHORT PEN NEEDLE 31 gauge x 5/16" Ndle USE AS DIRECTED TWICE DAILY 100 each 5    blood-glucose meter Misc use twice a day 1 each 12    cholecalciferol, vitamin D3, 125 mcg (5,000 unit) Tab Take 1,000 Units by mouth 2 (two) times a day.      fluticasone propionate (FLONASE) 50 mcg/actuation nasal spray 1 spray (50 mcg total) by Each Nostril route as needed for Rhinitis or Allergies. 16 g 3    lancets (ACCU-CHEK FASTCLIX LANCET DRUM) Misc Inject 1 each into the skin 2 (two) times daily before meals. 100 each 6    lancets 33 gauge Misc 1 lancet by Misc.(Non-Drug; Combo Route) route 2 (two) times daily before meals. 200 each 11    losartan (COZAAR) 100 MG tablet TAKE 1 TABLET BY " "MOUTH EVERY DAY 90 tablet 1    metFORMIN (GLUCOPHAGE) 500 MG tablet TAKE 2 TABLETS BY MOUTH AT LUNCH AND 1 TABLET AT DINNER 270 tablet 1    chlorhexidine (PERIDEX) 0.12 % solution SWISH FOR 30 SECONDS AND SPIT 15ML PO  BID . DO NOT SWALLOW      divalproex (DEPAKOTE) 250 MG EC tablet Take 1 tablet (250 mg total) by mouth every evening. 30 tablet 3    folic acid (FOLVITE) 1 MG tablet Take 1 tablet (1 mg total) by mouth once daily. 90 tablet 0    furosemide (LASIX) 20 MG tablet Take 1 tablet (20 mg total) by mouth daily as needed. 7 tablet 0    insulin degludec (TRESIBA FLEXTOUCH U-100) 100 unit/mL (3 mL) InPn Inject 32 Units into the skin once daily. (Patient not taking: Reported on 9/8/2023) 4 Syringe 1    lidocaine HCL 2% (XYLOCAINE) 2 % jelly Apply topically as needed. Apply topically once nightly to affected part of foot/feet. (Patient not taking: Reported on 9/8/2023) 30 mL 2    magnesium oxide (MAG-OX) 400 mg (241.3 mg magnesium) tablet Take 400 mg by mouth once daily.      ondansetron (ZOFRAN) 4 MG tablet Take 1 tablet (4 mg total) by mouth every 8 (eight) hours as needed for Nausea. (Patient not taking: Reported on 9/8/2023) 30 tablet 0    pantoprazole (PROTONIX) 40 MG tablet Take 1 tablet (40 mg total) by mouth once daily. 90 tablet 0    QUEtiapine (SEROQUEL) 25 MG Tab Take 1/2 a tablet (12.5mg) nighty as needed for agitation (Patient not taking: Reported on 9/8/2023) 30 tablet 0     No current facility-administered medications on file prior to visit.       Vital signs:  Ht 5' 5" (1.651 m)   Wt 89.6 kg (197 lb 8.5 oz)   BMI 32.87 kg/m²     Physical Exam  Vitals reviewed.   Constitutional:       General: She is not in acute distress.  HENT:      Head: Normocephalic and atraumatic.   Eyes:      General: No scleral icterus.     Conjunctiva/sclera: Conjunctivae normal.   Abdominal:      General: There is no distension.      Palpations: Abdomen is soft.      Tenderness: There is no abdominal tenderness.      " Comments: Perimbilical TTP without rigidity ; no focal spot.  No hernia appreciated.   Low transverse pelvic scar   Skin:     General: Skin is warm.      Coloration: Skin is not pale.      Findings: No rash.   Neurological:      Mental Status: She is oriented to person, place, and time.      Gait: Gait normal.   Psychiatric:         Mood and Affect: Mood normal.         Behavior: Behavior normal.         I have reviewed prior labs, imaging, notes from last month    Assessment:  1. Periumbilical abdominal pain    2. Personal history of colonic polyps    3. Gastroesophageal reflux disease without esophagitis    4. Iron deficiency anemia due to chronic blood loss        Unclear etiology of a central abdominal pain, however she did have a significant bleeding episode back at main [place] in 2020.  She states some of these symptoms perhaps feel similar to that, I am not sure if she is anemic anymore, she states her stool maybe slightly darker however this does not seem significantly changed.    Perhaps this is related to a small bowel bleed, we do not have the records of the video capsule endoscopy that was done but by her report it was negative back in 2020.    Plan:  Orders Placed This Encounter    CT Abdomen Pelvis With Contrast    CBC Auto Differential    Comprehensive Metabolic Panel    Ferritin    Iron and TIBC    polyethylene glycol (GOLYTELY) 236-22.74-6.74 -5.86 gram suspension    Case Request Endoscopy: COLONOSCOPY       CT scan abd pelv    Check iron and labs studies    Plan colonoscopy    May need to consider VCE in near future pending her cbc labs.    RTC based on above.    Uzair Mobley MD  [facility] [place]

## 2023-09-11 ENCOUNTER — HOSPITAL ENCOUNTER (OUTPATIENT)
Dept: RADIOLOGY | Facility: HOSPITAL | Age: 68
Discharge: HOME | End: 2023-09-11
Attending: INTERNAL MEDICINE
Payer: MEDICARE

## 2023-09-11 DIAGNOSIS — R10.33 PERIUMBILICAL ABDOMINAL PAIN: ICD-10-CM

## 2023-09-11 DIAGNOSIS — D50.0 IRON DEFICIENCY ANEMIA DUE TO CHRONIC BLOOD LOSS: ICD-10-CM

## 2023-09-11 PROCEDURE — 25500020 PHARM REV CODE 255: Performed by: INTERNAL MEDICINE

## 2023-09-11 PROCEDURE — 74177 CT ABD & PELVIS W/CONTRAST: CPT | Mod: 26,,, | Performed by: RADIOLOGY

## 2023-09-11 PROCEDURE — A9698 NON-RAD CONTRAST MATERIALNOC: HCPCS | Performed by: INTERNAL MEDICINE

## 2023-09-11 PROCEDURE — 74177 CT ABD & PELVIS W/CONTRAST: CPT | Mod: TC

## 2023-09-11 PROCEDURE — 74177 CT ABDOMEN PELVIS WITH CONTRAST: ICD-10-PCS | Mod: 26,,, | Performed by: RADIOLOGY

## 2023-09-11 RX ADMIN — IOHEXOL 1000 ML: 12 SOLUTION ORAL at 08:09

## 2023-09-11 RX ADMIN — IOHEXOL 100 ML: 350 INJECTION, SOLUTION INTRAVENOUS at 09:09

## 2023-09-29 ENCOUNTER — TELEPHONE (OUTPATIENT)
Dept: GASTROENTEROLOGY | Facility: CLINIC | Age: 68
End: 2023-09-29
Payer: MEDICARE

## 2023-09-29 DIAGNOSIS — K80.20 CALCULUS OF GALLBLADDER WITHOUT CHOLECYSTITIS WITHOUT OBSTRUCTION: Primary | ICD-10-CM

## 2023-09-29 NOTE — TELEPHONE ENCOUNTER
Spoke with patient and she reviewed her Ct results online. Patient stated that she does have a soreness on her right side when she lays down. Should she be referred to surgery for the soreness due to the gallstones?

## 2023-09-29 NOTE — TELEPHONE ENCOUNTER
----- Message from Namita Don sent at 9/29/2023 11:37 AM CDT -----  Type:  Needs Medical Advice    Who Called: pt  Would the patient rather a call back or a response via [facility]? call  Best Call Back Number: [phone]  Additional Information: What time is the procedure and what medications should she stop taking before the procedure.

## 2023-10-02 ENCOUNTER — TELEPHONE (OUTPATIENT)
Dept: ENDOSCOPY | Facility: HOSPITAL | Age: 68
End: 2023-10-02
Payer: MEDICARE

## 2023-10-02 NOTE — TELEPHONE ENCOUNTER
Spoke with patient about arrival time @ 1000.   Colon/Golytely    Prep instructions reviewed: the day before the procedure, follow a clear liquid diet all day, then start the first 1/2 of prep at 5pm and take 2nd 1/2 of prep @ 0500.  Pt must be completely NPO when prep completed @ 0700.              Medications: Do not take Insulin or oral diabetic medications the day of the procedure.  Take as prescribed: heart, seizure and blood pressure medication in the morning with a sip of water (less than an ounce).  Take any breathing medications and bring inhalers to hospital with you Leave all valuables and jewelry at home.     Wear comfortable clothes to procedure to change into hospital gown You cannot drive for 24 hours after your procedure because you will receive sedation for your procedure to make you comfortable.  A ride must be provided at discharge.

## 2023-10-02 NOTE — TELEPHONE ENCOUNTER
Her pain is not likely coming from the gallbladder     But we can have her visit a surgeon    Referral placed

## 2023-10-04 NOTE — TELEPHONE ENCOUNTER
Spoke with patient and rescheduled her procedure on 11/10. Patient states that she already started mixing the prep sent another prep to the pharmacy. Verbal understanding

## 2023-10-04 NOTE — TELEPHONE ENCOUNTER
----- Message from Ira Roger sent at 10/4/2023  9:16 AM CDT -----  Regarding: procedure  Contact: [phone]  Patient is requesting a call back regarding rescheduling her procedure.   Would the patient rather a call back or a response via [facility]?  Call   Best Call Back Number:  [phone]  Additional Information:  pt would like to reschedule the week of 11/06. Her daughter will be on vacation and can bring her in. Thanks
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
## 2023-11-08 ENCOUNTER — TELEPHONE (OUTPATIENT)
Dept: ENDOSCOPY | Facility: HOSPITAL | Age: 68
End: 2023-11-08
Payer: MEDICARE

## 2023-11-08 NOTE — TELEPHONE ENCOUNTER
Spoke with patient about arrival time @1115 friday.     Prep instructions reviewed: the day before the procedure, follow a clear liquid diet all day, then start the first 1/2 of prep at 5pm and take 2nd 1/2 of prep @0615 Friday..  Pt must be completely NPO when prep completed @ 0815 Friday.           Medications: Do not take Insulin or oral diabetic medications the day of the procedure.  Take as prescribed: heart, seizure and blood pressure medication in the morning with a sip of water (less than an ounce).  Take any breathing medications and bring inhalers to hospital with you Leave all valuables and jewelry at home.     Wear comfortable clothes to procedure to change into hospital gown You cannot drive for 24 hours after your procedure because you will receive sedation for your procedure to make you comfortable.  A ride must be provided at discharge.       Patient not on GLP-1 meds.

## 2023-11-10 ENCOUNTER — HOSPITAL ENCOUNTER (OUTPATIENT)
Facility: HOSPITAL | Age: 68
Discharge: HOME | End: 2023-11-10
Attending: INTERNAL MEDICINE | Admitting: INTERNAL MEDICINE
Payer: MEDICARE

## 2023-11-10 ENCOUNTER — ANESTHESIA (OUTPATIENT)
Dept: ENDOSCOPY | Facility: HOSPITAL | Age: 68
End: 2023-11-10
Payer: MEDICARE

## 2023-11-10 ENCOUNTER — ANESTHESIA EVENT (OUTPATIENT)
Dept: ENDOSCOPY | Facility: HOSPITAL | Age: 68
End: 2023-11-10
Payer: MEDICARE

## 2023-11-10 VITALS
SYSTOLIC BLOOD PRESSURE: 126 MMHG | HEIGHT: 65 IN | TEMPERATURE: 98 F | OXYGEN SATURATION: 100 % | BODY MASS INDEX: 32.99 KG/M2 | RESPIRATION RATE: 24 BRPM | WEIGHT: 198 LBS | DIASTOLIC BLOOD PRESSURE: 78 MMHG | HEART RATE: 62 BPM

## 2023-11-10 DIAGNOSIS — Z12.11 COLON CANCER SCREENING: ICD-10-CM

## 2023-11-10 LAB
GLUCOSE SERPL-MCNC: 125 MG/DL (ref 70–110)
POCT GLUCOSE: 125 MG/DL (ref 70–110)

## 2023-11-10 PROCEDURE — 25000003 PHARM REV CODE 250: Performed by: NURSE ANESTHETIST, CERTIFIED REGISTERED

## 2023-11-10 PROCEDURE — 82962 GLUCOSE BLOOD TEST: CPT | Performed by: INTERNAL MEDICINE

## 2023-11-10 PROCEDURE — G0105 COLORECTAL SCRN; HI RISK IND: HCPCS | Performed by: INTERNAL MEDICINE

## 2023-11-10 PROCEDURE — D9220A PRA ANESTHESIA: Mod: CRNA,,, | Performed by: NURSE ANESTHETIST, CERTIFIED REGISTERED

## 2023-11-10 PROCEDURE — D9220A PRA ANESTHESIA: ICD-10-PCS | Mod: ANES,,, | Performed by: UROLOGY

## 2023-11-10 PROCEDURE — D9220A PRA ANESTHESIA: Mod: ANES,,, | Performed by: UROLOGY

## 2023-11-10 PROCEDURE — 63600175 PHARM REV CODE 636 W HCPCS: Performed by: NURSE ANESTHETIST, CERTIFIED REGISTERED

## 2023-11-10 PROCEDURE — 37000009 HC ANESTHESIA EA ADD 15 MINS: Performed by: INTERNAL MEDICINE

## 2023-11-10 PROCEDURE — D9220A PRA ANESTHESIA: ICD-10-PCS | Mod: CRNA,,, | Performed by: NURSE ANESTHETIST, CERTIFIED REGISTERED

## 2023-11-10 PROCEDURE — 37000008 HC ANESTHESIA 1ST 15 MINUTES: Performed by: INTERNAL MEDICINE

## 2023-11-10 PROCEDURE — 25000003 PHARM REV CODE 250: Performed by: INTERNAL MEDICINE

## 2023-11-10 PROCEDURE — G0105 COLORECTAL SCRN; HI RISK IND: HCPCS | Mod: ,,, | Performed by: INTERNAL MEDICINE

## 2023-11-10 PROCEDURE — G0105 COLORECTAL SCRN; HI RISK IND: ICD-10-PCS | Mod: ,,, | Performed by: INTERNAL MEDICINE

## 2023-11-10 RX ORDER — SODIUM CHLORIDE 0.9 % (FLUSH) 0.9 %
10 SYRINGE (ML) INJECTION ONCE
Status: DISCONTINUED | OUTPATIENT
Start: 2023-11-10 | End: 2023-11-10 | Stop reason: HOSPADM

## 2023-11-10 RX ORDER — LIDOCAINE HYDROCHLORIDE 20 MG/ML
INJECTION INTRAVENOUS
Status: DISCONTINUED | OUTPATIENT
Start: 2023-11-10 | End: 2023-11-10

## 2023-11-10 RX ORDER — SODIUM CHLORIDE 9 MG/ML
INJECTION, SOLUTION INTRAVENOUS CONTINUOUS
Status: DISCONTINUED | OUTPATIENT
Start: 2023-11-10 | End: 2023-11-10 | Stop reason: HOSPADM

## 2023-11-10 RX ORDER — PREDNISOLONE ACETATE 10 MG/ML
1 SUSPENSION/ DROPS OPHTHALMIC 4 TIMES DAILY
COMMUNITY

## 2023-11-10 RX ORDER — METHOTREXATE 2.5 MG/1
2.5 TABLET ORAL
COMMUNITY

## 2023-11-10 RX ORDER — PROPOFOL 10 MG/ML
VIAL (ML) INTRAVENOUS
Status: DISCONTINUED | OUTPATIENT
Start: 2023-11-10 | End: 2023-11-10

## 2023-11-10 RX ORDER — PROPOFOL 10 MG/ML
VIAL (ML) INTRAVENOUS CONTINUOUS PRN
Status: DISCONTINUED | OUTPATIENT
Start: 2023-11-10 | End: 2023-11-10

## 2023-11-10 RX ADMIN — SODIUM CHLORIDE: 0.9 INJECTION, SOLUTION INTRAVENOUS at 02:11

## 2023-11-10 RX ADMIN — LIDOCAINE HYDROCHLORIDE 100 MG: 20 INJECTION, SOLUTION INTRAVENOUS at 02:11

## 2023-11-10 RX ADMIN — PROPOFOL 80 MG: 10 INJECTION, EMULSION INTRAVENOUS at 02:11

## 2023-11-10 RX ADMIN — PROPOFOL 150 MCG/KG/MIN: 10 INJECTION, EMULSION INTRAVENOUS at 02:11

## 2023-11-10 NOTE — ANESTHESIA POSTPROCEDURE EVALUATION
Anesthesia Post Evaluation    Patient: Adriana Paula    Procedure(s) Performed: Procedure(s) (LRB):  COLONOSCOPY (N/A)    Final Anesthesia Type: general      Patient location during evaluation: GI PACU  Patient participation: Yes- Able to Participate  Level of consciousness: awake and alert and oriented  Post-procedure vital signs: reviewed and stable  Pain management: adequate  Airway patency: patent    PONV status at discharge: No PONV  Anesthetic complications: no      Cardiovascular status: hemodynamically stable  Respiratory status: unassisted  Hydration status: euvolemic  Follow-up not needed.          Vitals Value Taken Time   /78 11/10/23 1533   Temp 98 11/10/23 1554   Pulse 62 11/10/23 1533   Resp 24 11/10/23 1533   SpO2 100 % 11/10/23 1533         Event Time   Out of Recovery 15:46:38         Pain/Eric Score: No data recorded

## 2023-11-10 NOTE — TRANSFER OF CARE
"Anesthesia Transfer of Care Note    Patient: Adriana Paula    Procedure(s) Performed: Procedure(s) (LRB):  COLONOSCOPY (N/A)    Patient location: GI    Anesthesia Type: general    Transport from OR: Transported from OR on room air with adequate spontaneous ventilation    Post pain: adequate analgesia    Post assessment: no apparent anesthetic complications    Post vital signs: stable    Level of consciousness: awake, alert and oriented    Nausea/Vomiting: no nausea/vomiting    Complications: none    Transfer of care protocol was followed      Last vitals: Visit Vitals  BP (!) 156/67   Pulse 68   Temp 36.9 °C (98.4 °F)   Resp 18   Ht 5' 5" (1.651 m)   Wt 89.8 kg (198 lb)   SpO2 100%   Breastfeeding No   BMI 32.95 kg/m²     "

## 2023-11-10 NOTE — [employer] PATIENT INSTRUCTIONS
Discharge Summary/Instructions after an Endoscopic Procedure  Patient Name: Adriana Paula  Patient MRN: [id number]  Patient YOB: 1955  Friday, November 10, 2023  Uzair Mobley MD  Dear patient,  As a result of recent federal legislation (The Federal Cures Act), you may   receive lab or pathology results from your procedure in your [facility]   account before your physician is able to contact you. Your physician or   their representative will relay the results to you with their   recommendations at their soonest availability.  Thank you,  Your health is very important to us during the Covid Crisis. Following your   procedure today, you will receive a daily text for 2 weeks asking about   signs or symptoms of Covid 19.  Please respond to this text when you   receive it so we can follow up and keep you as safe as possible.   RESTRICTIONS:  During your procedure today, you received medications for sedation.  These   medications may affect your judgment, balance and coordination.  Therefore,   for 24 hours, you have the following restrictions:   - DO NOT drive a car, operate machinery, make legal/financial decisions,   sign important papers or drink alcohol.    ACTIVITY:  Today: no heavy lifting, straining or running due to procedural   sedation/anesthesia.  The following day: return to full activity including work.  DIET:  Eat and drink normally unless instructed otherwise.     TREATMENT FOR COMMON SIDE EFFECTS:  - Mild abdominal pain, nausea, belching, bloating or excessive gas:  rest,   eat lightly and use a heating pad.  - Sore Throat: treat with throat lozenges and/or gargle with warm salt   water.  - Because air was used during the procedure, expelling large amounts of air   from your rectum or belching is normal.  - If a bowel prep was taken, you may not have a bowel movement for 1-3 days.    This is normal.  SYMPTOMS TO WATCH FOR AND REPORT TO YOUR PHYSICIAN:  1. Abdominal pain or bloating, other than  gas cramps.  2. Chest pain.  3. Back pain.  4. Signs of infection such as: chills or fever occurring within 24 hours   after the procedure.  5. Rectal bleeding, which would show as bright red, maroon, or black stools.   (A tablespoon of blood from the rectum is not serious, especially if   hemorrhoids are present.)  6. Vomiting.  7. Weakness or dizziness.  GO DIRECTLY TO THE NEAREST EMERGENCY ROOM IF YOU HAVE ANY OF THE FOLLOWING:      Difficulty breathing              Chills and/or fever over 101 F   Persistent vomiting and/or vomiting blood   Severe abdominal pain   Severe chest pain   Black, tarry stools   Bleeding- more than one tablespoon   Any other symptom or condition that you feel may need urgent attention  Your doctor recommends these additional instructions:  If any biopsies were taken, your doctors clinic will contact you in 1 to 2   weeks with any results.  - Discharge patient to home.   - Patient has a contact number available for emergencies.  The signs and   symptoms of potential delayed complications were discussed with the   patient.  Return to normal activities tomorrow.  Written discharge   instructions were provided to the patient.   - Resume previous diet.   - Continue present medications.   - Repeat colonoscopy in 10 years for screening purposes.   - I suspect abdominal pain is from adhesional / scar tissue disease / and   from fibroid etc  For questions, problems or results please call your physician - Uzair Mobley MD.  EMERGENCY PHONE NUMBER: [phone],  LAB RESULTS: [phone]  IF A COMPLICATION OR EMERGENCY SITUATION ARISES AND YOU ARE UNABLE TO REACH   YOUR PHYSICIAN - GO DIRECTLY TO THE EMERGENCY ROOM.  Uzair Mobley MD  11/10/2023 2:56:44 PM  This report has been verified and signed electronically.  Dear patient,  As a result of recent federal legislation (The Federal Cures Act), you may   receive lab or pathology results from your procedure in your [facility]   account  before your physician is able to contact you. Your physician or   their representative will relay the results to you with their   recommendations at their soonest availability.  Thank you,  [employer]ATION

## 2023-11-10 NOTE — H&P
Short Stay Endoscopy History and Physical    PCP - No, Primary Doctor    Procedure - Colonoscopy  ASA - per anesthesia  Mallampati - per anesthesia  History of Anesthesia problems - no  Family history Anesthesia problems - no   Plan of anesthesia - General    HPI:  This is a 68 y.o. female here for evaluation of : screening exam    Incidnetal hx of gi bleeding  Abd pain    ROS:  Constitutional: No fevers, chills, No weight loss  CV: No chest pain  Pulm: No cough, No shortness of breath  GI: see HPI  Derm: No rash    Medical History:  has a past medical history of Allergy, Anemia, Anxiety, Arthritis, Benign paroxysmal vertigo, bilateral, Breast cyst, Cataract, Colon polyp (2020), DR (diabetic retinopathy), Dyslipidemia, Essential hypertension (2012), Gastroesophageal reflux disease without esophagitis (2017), GERD (gastroesophageal reflux disease), Glaucoma, Hypertension, Obesity (BMI 30-39.9) (10/24/2013), PN (peripheral neuropathy), Sleep apnea, Type 2 diabetes mellitus with both eyes affected by mild nonproliferative retinopathy without macular edema, with long-term current use of insulin, Type 2 diabetes mellitus with diabetic polyneuropathy, with long-term current use of insulin, and Type II or unspecified type diabetes mellitus with other specified manifestations, uncontrolled.    Surgical History:  has a past surgical history that includes  section; Myomectomy; Carpal tunnel release (Right); Knee arthroscopy (14); Cataract extraction; Cataract extraction w/  intraocular lens implant (Right, 2017); Trigger finger release; Trigger finger release (Left, 4/15/2019); Epidural steroid injection (N/A, 2019); Epidural steroid injection (N/A, 2019); Transforaminal epidural injection of steroid (Right, 2019); Breast cyst excision (Right); Esophagogastroduodenoscopy (N/A, 2020); and Colonoscopy (N/A, 2020).    Family History: family history includes  Arthritis in her mother; Breast cancer (age of onset: 40) in her cousin, cousin, and sister; Breast cancer (age of onset: 50) in her maternal grandmother and sister; Breast cancer (age of onset: 54) in her sister; Breast cancer (age of onset: 75) in her mother; Cancer (age of onset: 70) in her mother; Diabetes in her mother and unknown relative; Heart disease in her mother and sister; Heart failure in her brother; Miscarriages / Stillbirths in her mother; No Known Problems in her father, maternal aunt, maternal grandfather, maternal uncle, paternal aunt, paternal grandfather, paternal grandmother, and paternal uncle; Osteoarthritis in her sister and sister; Pacemaker/defibrilator in her brother; Vision loss in her mother.. Otherwise no colon cancer, inflammatory bowel disease, or GI malignancies.    Social History:  reports that she has quit smoking. Her smoking use included cigarettes. She started smoking about 20 years ago. She has never used smokeless tobacco. She reports current alcohol use. She reports that she does not use drugs.    Review of patient's allergies indicates:   Allergen Reactions    Keflex [cephalexin] Rash      blisters, fever    Penicillins Itching    Sulfamethoxazole-trimethoprim      Other reaction(s): blisters    Vicodin [hydrocodone-acetaminophen] Swelling    Sulfa (sulfonamide antibiotics) Rash      blisters,fever           Medications:   Medications Prior to Admission   Medication Sig Dispense Refill Last Dose    aspirin (ECOTRIN) 81 MG EC tablet Take 81 mg by mouth once daily. Instructed to stop 1 week prior to surgery on 4/15/19 per Dr. Conklin   11/9/2023    atorvastatin (LIPITOR) 40 MG tablet TAKE 1 TABLET(40 MG) BY MOUTH EVERY DAY 90 tablet 1 Past Week    cholecalciferol, vitamin D3, 125 mcg (5,000 unit) Tab Take 1,000 Units by mouth 2 (two) times a day.   11/9/2023    fluticasone propionate (FLONASE) 50 mcg/actuation nasal spray 1 spray (50 mcg total) by Each Nostril route as  "needed for Rhinitis or Allergies. 16 g 3 Past Week    folic acid (FOLVITE) 1 MG tablet Take 1 tablet (1 mg total) by mouth once daily. 90 tablet 0 11/9/2023    insulin degludec (TRESIBA FLEXTOUCH U-100) 100 unit/mL (3 mL) InPn Inject 32 Units into the skin once daily. 4 Syringe 1 Past Week    losartan (COZAAR) 100 MG tablet TAKE 1 TABLET BY MOUTH EVERY DAY 90 tablet 1 11/10/2023    metFORMIN (GLUCOPHAGE) 500 MG tablet TAKE 2 TABLETS BY MOUTH AT LUNCH AND 1 TABLET AT DINNER 270 tablet 1 Past Week    pantoprazole (PROTONIX) 40 MG tablet Take 1 tablet (40 mg total) by mouth once daily. 90 tablet 0 Past Week    prednisoLONE acetate (PRED FORTE) 1 % DrpS 1 drop 4 (four) times daily.   11/9/2023    BD ULTRA-FINE SHORT PEN NEEDLE 31 gauge x 5/16" Ndle USE AS DIRECTED TWICE DAILY 100 each 5     blood-glucose meter Misc use twice a day 1 each 12     chlorhexidine (PERIDEX) 0.12 % solution SWISH FOR 30 SECONDS AND SPIT 15ML PO  BID . DO NOT SWALLOW   Unknown    divalproex (DEPAKOTE) 250 MG EC tablet Take 1 tablet (250 mg total) by mouth every evening. 30 tablet 3 Unknown    furosemide (LASIX) 20 MG tablet Take 1 tablet (20 mg total) by mouth daily as needed. 7 tablet 0 Unknown    lancets (ACCU-CHEK FASTCLIX LANCET DRUM) Misc Inject 1 each into the skin 2 (two) times daily before meals. 100 each 6     lancets 33 gauge Misc 1 lancet by Misc.(Non-Drug; Combo Route) route 2 (two) times daily before meals. 200 each 11     lidocaine HCL 2% (XYLOCAINE) 2 % jelly Apply topically as needed. Apply topically once nightly to affected part of foot/feet. (Patient not taking: Reported on 9/8/2023) 30 mL 2     magnesium oxide (MAG-OX) 400 mg (241.3 mg magnesium) tablet Take 400 mg by mouth once daily.   Unknown    methotrexate 2.5 MG Tab Take 2.5 mg by mouth every 7 days.   11/3/2023    ondansetron (ZOFRAN) 4 MG tablet Take 1 tablet (4 mg total) by mouth every 8 (eight) hours as needed for Nausea. (Patient not taking: Reported on 9/8/2023) " 30 tablet 0 Unknown    QUEtiapine (SEROQUEL) 25 MG Tab Take 1/2 a tablet (12.5mg) nighty as needed for agitation (Patient not taking: Reported on 9/8/2023) 30 tablet 0 Unknown         Physical Exam:    Vital Signs:   Vitals:    11/10/23 1219   BP: (!) 156/67   Pulse: 68   Resp: 18   Temp: 98.4 °F (36.9 °C)       General Appearance: Well appearing in no acute distress  Eyes:    No scleral icterus  ENT: Neck supple, Lips, mucosa, and tongue normal; teeth and gums normal  Abdomen: Soft, non tender, non distended with positive bowel sounds. No hepatosplenomegaly, ascites, or mass.  Extremities: 2+ pulses, no clubbing, cyanosis or edema  Skin: No rash      Labs:  Lab Results   Component Value Date    WBC 7.08 09/11/2023    HGB 11.9 (L) 09/11/2023    HCT 37.2 09/11/2023     09/11/2023    CHOL 141 06/12/2020    TRIG 40 06/12/2020    HDL 73 06/12/2020    ALT 26 09/11/2023    AST 27 09/11/2023     09/11/2023    K 3.8 09/11/2023     09/11/2023    CREATININE 0.8 09/11/2023    BUN 11 09/11/2023    CO2 24 09/11/2023    TSH 1.072 12/16/2020    INR 1.0 12/20/2020    HGBA1C 6.5 (H) 12/31/2020       I have explained the risks and benefits of endoscopy procedures to the patient including but not limited to bleeding, perforation, infection, and death.  The patient was asked if they understand and allowed to ask any further questions to their satisfaction.    Uzair Mobley MD

## 2023-11-10 NOTE — ANESTHESIA PREPROCEDURE EVALUATION
11/10/2023  Adriana Paula is a 68 y.o., female  for diagnsotic colonoscopy for eval of blood loss anemia    Hx of anesthetics in past without complications  NPO>8 hours  Multiple drug allergies  Amenable to proceed with scheduled procedure      Patient Active Problem List   Diagnosis    Hypertension associated with diabetes    ALLERGIC RHINITIS    Type 2 diabetes mellitus with diabetic polyneuropathy, with long-term current use of insulin    Chondral defect of femoral condyle    S/P arthroscopy of knee    Headache(784.0)    Obstructive sleep apnea syndrome    Gastroesophageal reflux disease without esophagitis    Urge incontinence    Primary osteoarthritis of first carpometacarpal joint of right hand    Decreased  strength of right hand    Trigger finger of left thumb    Trigger thumb of left hand    Arthritis of lumbar spine    DDD (degenerative disc disease), lumbar    Acute right-sided low back pain with sciatica    Chronic pain    Obesity (BMI 30-39.9)    At high risk for breast cancer    ANGELICA positive    Pulmonary nodule    Hyperlipidemia associated with type 2 diabetes mellitus    Abnormal AST and ALT    Scleritis, bilateral    Steroid-induced gabe    Psychosis    Anemia    Anemia due to acute blood loss    Acute hyperglycemia    Metabolic acidosis with normal anion gap and bicarbonate losses    Obesity, diabetes, and hypertension syndrome    Encephalopathy    Acute cystitis without hematuria    Hypervolemia    Hypokalemia    Bizarre behavior    Elevated brain natriuretic peptide (BNP) level       Past Medical History:   Diagnosis Date    Allergy     Anemia     Anxiety     Arthritis     Benign paroxysmal vertigo, bilateral     Breast cyst     Cataract     Colon polyp 12/2020    DR (diabetic retinopathy)     Dyslipidemia     Essential hypertension 08/01/2012    Gastroesophageal reflux disease  without esophagitis 02/03/2017    GERD (gastroesophageal reflux disease)     Glaucoma     Hypertension     Obesity (BMI 30-39.9) 10/24/2013    PN (peripheral neuropathy)     Sleep apnea     Type 2 diabetes mellitus with both eyes affected by mild nonproliferative retinopathy without macular edema, with long-term current use of insulin     Type 2 diabetes mellitus with diabetic polyneuropathy, with long-term current use of insulin     Type II or unspecified type diabetes mellitus with other specified manifestations, uncontrolled        ECHO: Results for orders placed during the hospital encounter of 12/20/20    Echo Color Flow Doppler? Yes; Bubble Contrast? No    Interpretation Summary  · The left ventricle is normal in size with normal systolic function. The estimated ejection fraction is 70%  · Normal left ventricular diastolic function.  · Dynamic LV outflow tract obstruction w a peak resting gradient of 60 mm Hg and no change w Valsalva.  · There is systolic anterior motion of the mitral valve, but normal septal wall thickness.  · Normal right ventricular size with normal right ventricular systolic function.  · The estimated PA systolic pressure is 31 mmHg.  · Normal central venous pressure (3 mmHg).      Body mass index is 32.95 kg/m².    Tobacco Use: Medium Risk (11/10/2023)    Patient History     Smoking Tobacco Use: Former     Smokeless Tobacco Use: Never     Passive Exposure: Not on file       Social History     Substance and Sexual Activity   Drug Use No        Alcohol Use: Unknown (6/29/2020)    AUDIT-C     Frequency of Alcohol Consumption: Monthly or less     Average Number of Drinks: Not on file     Frequency of Binge Drinking: Not on file       Review of patient's allergies indicates:   Allergen Reactions    Keflex [cephalexin] Rash      blisters, fever    Penicillins Itching    Sulfamethoxazole-trimethoprim      Other reaction(s): blisters    Vicodin [hydrocodone-acetaminophen] Swelling    Sulfa  (sulfonamide antibiotics) Rash      blisters,fever             Airway:  No value filed.         Pre-op Assessment    I have reviewed the Patient Summary Reports.     I have reviewed the Nursing Notes. I have reviewed the NPO Status.   I have reviewed the Medications.     Review of Systems  Anesthesia Hx:  No problems with previous Anesthesia   History of prior surgery of interest to airway management or planning:          Denies Family Hx of Anesthesia complications.    Denies Personal Hx of Anesthesia complications.                        Physical Exam  General: Well nourished and Cooperative    Airway:  Mallampati: II / II  Mouth Opening: Normal  TM Distance: Normal  Tongue: Normal  Neck ROM: Normal ROM    Dental:  Periodontal disease, Intact        Anesthesia Plan  Type of Anesthesia, risks & benefits discussed:    Anesthesia Type: Gen Natural Airway, MAC  Intra-op Monitoring Plan: Standard ASA Monitors  Post Op Pain Control Plan: multimodal analgesia and IV/PO Opioids PRN  Induction:  IV  Informed Consent: Informed consent signed with the Patient and all parties understand the risks and agree with anesthesia plan.  All questions answered. Patient consented to blood products? No  ASA Score: 3    Ready For Surgery From Anesthesia Perspective.     .

## 2023-12-27 ENCOUNTER — OFFICE VISIT (OUTPATIENT)
Dept: OBSTETRICS AND GYNECOLOGY | Facility: CLINIC | Age: 68
End: 2023-12-27
Attending: STUDENT IN AN ORGANIZED HEALTH CARE EDUCATION/TRAINING PROGRAM
Payer: MEDICARE

## 2023-12-27 VITALS
SYSTOLIC BLOOD PRESSURE: 154 MMHG | HEART RATE: 70 BPM | BODY MASS INDEX: 33.4 KG/M2 | WEIGHT: 200.75 LBS | DIASTOLIC BLOOD PRESSURE: 76 MMHG

## 2023-12-27 DIAGNOSIS — Z01.419 WELL WOMAN EXAM WITH ROUTINE GYNECOLOGICAL EXAM: Primary | ICD-10-CM

## 2023-12-27 PROCEDURE — 3077F PR MOST RECENT SYSTOLIC BLOOD PRESSURE >= 140 MM HG: ICD-10-PCS | Mod: CPTII,S$GLB,, | Performed by: STUDENT IN AN ORGANIZED HEALTH CARE EDUCATION/TRAINING PROGRAM

## 2023-12-27 PROCEDURE — 3078F DIAST BP <80 MM HG: CPT | Mod: CPTII,S$GLB,, | Performed by: STUDENT IN AN ORGANIZED HEALTH CARE EDUCATION/TRAINING PROGRAM

## 2023-12-27 PROCEDURE — 1159F PR MEDICATION LIST DOCUMENTED IN MEDICAL RECORD: ICD-10-PCS | Mod: CPTII,S$GLB,, | Performed by: STUDENT IN AN ORGANIZED HEALTH CARE EDUCATION/TRAINING PROGRAM

## 2023-12-27 PROCEDURE — 1101F PR PT FALLS ASSESS DOC 0-1 FALLS W/OUT INJ PAST YR: ICD-10-PCS | Mod: CPTII,S$GLB,, | Performed by: STUDENT IN AN ORGANIZED HEALTH CARE EDUCATION/TRAINING PROGRAM

## 2023-12-27 PROCEDURE — 3078F PR MOST RECENT DIASTOLIC BLOOD PRESSURE < 80 MM HG: ICD-10-PCS | Mod: CPTII,S$GLB,, | Performed by: STUDENT IN AN ORGANIZED HEALTH CARE EDUCATION/TRAINING PROGRAM

## 2023-12-27 PROCEDURE — 1159F MED LIST DOCD IN RCRD: CPT | Mod: CPTII,S$GLB,, | Performed by: STUDENT IN AN ORGANIZED HEALTH CARE EDUCATION/TRAINING PROGRAM

## 2023-12-27 PROCEDURE — 1125F AMNT PAIN NOTED PAIN PRSNT: CPT | Mod: CPTII,S$GLB,, | Performed by: STUDENT IN AN ORGANIZED HEALTH CARE EDUCATION/TRAINING PROGRAM

## 2023-12-27 PROCEDURE — 1125F PR PAIN SEVERITY QUANTIFIED, PAIN PRESENT: ICD-10-PCS | Mod: CPTII,S$GLB,, | Performed by: STUDENT IN AN ORGANIZED HEALTH CARE EDUCATION/TRAINING PROGRAM

## 2023-12-27 PROCEDURE — 1157F ADVNC CARE PLAN IN RCRD: CPT | Mod: CPTII,S$GLB,, | Performed by: STUDENT IN AN ORGANIZED HEALTH CARE EDUCATION/TRAINING PROGRAM

## 2023-12-27 PROCEDURE — 1157F PR ADVANCE CARE PLAN OR EQUIV PRESENT IN MEDICAL RECORD: ICD-10-PCS | Mod: CPTII,S$GLB,, | Performed by: STUDENT IN AN ORGANIZED HEALTH CARE EDUCATION/TRAINING PROGRAM

## 2023-12-27 PROCEDURE — G0101 PR CA SCREEN;PELVIC/BREAST EXAM: ICD-10-PCS | Mod: GZ,S$GLB,, | Performed by: STUDENT IN AN ORGANIZED HEALTH CARE EDUCATION/TRAINING PROGRAM

## 2023-12-27 PROCEDURE — 3288F PR FALLS RISK ASSESSMENT DOCUMENTED: ICD-10-PCS | Mod: CPTII,S$GLB,, | Performed by: STUDENT IN AN ORGANIZED HEALTH CARE EDUCATION/TRAINING PROGRAM

## 2023-12-27 PROCEDURE — 3077F SYST BP >= 140 MM HG: CPT | Mod: CPTII,S$GLB,, | Performed by: STUDENT IN AN ORGANIZED HEALTH CARE EDUCATION/TRAINING PROGRAM

## 2023-12-27 PROCEDURE — 3288F FALL RISK ASSESSMENT DOCD: CPT | Mod: CPTII,S$GLB,, | Performed by: STUDENT IN AN ORGANIZED HEALTH CARE EDUCATION/TRAINING PROGRAM

## 2023-12-27 PROCEDURE — 1101F PT FALLS ASSESS-DOCD LE1/YR: CPT | Mod: CPTII,S$GLB,, | Performed by: STUDENT IN AN ORGANIZED HEALTH CARE EDUCATION/TRAINING PROGRAM

## 2023-12-27 PROCEDURE — G0101 CA SCREEN;PELVIC/BREAST EXAM: HCPCS | Mod: GZ,S$GLB,, | Performed by: STUDENT IN AN ORGANIZED HEALTH CARE EDUCATION/TRAINING PROGRAM

## 2023-12-27 RX ORDER — METRONIDAZOLE 500 MG/1
500 TABLET ORAL 2 TIMES DAILY
Qty: 14 TABLET | Refills: 0 | Status: SHIPPED | OUTPATIENT
Start: 2023-12-27 | End: 2024-01-03

## 2023-12-27 NOTE — PROGRESS NOTES
History & Physical  Gynecology      SUBJECTIVE:     Chief Complaint: Well Woman       History of Present Illness:  Annual exam. Also had CT for GI purposes who noted fibroids uterus. Asymptomatic. No PMB  Menstrual History:  no PMB. No HRT. Never has been. Some vaginal dryness. Increased odor after eating  Sexually Active: no  Family history:below  Social: Wears seatbelts. Exercises some. Feels safe at home. No severe depressive episodes recently  Last pap: 2020 normal, HPV nerg.   Last mammo: this year at [facility]  Colonoscopy: 11/2023 normal for ten years  DEXA: 2022  normal at [facility]  Flu: yes  Covid vaccine yes  Vitamins: MTV, 50,000 a week of VitD3        Review of patient's allergies indicates:   Allergen Reactions    Keflex [cephalexin] Rash      blisters, fever    Penicillins Itching    Sulfamethoxazole-trimethoprim      Other reaction(s): blisters    Vicodin [hydrocodone-acetaminophen] Swelling    Sulfa (sulfonamide antibiotics) Rash      blisters,fever           Past Medical History:   Diagnosis Date    Allergy     Anemia     Anxiety     Arthritis     Benign paroxysmal vertigo, bilateral     Breast cyst     Cataract     Colon polyp 12/2020    DR (diabetic retinopathy)     Dyslipidemia     Essential hypertension 08/01/2012    Gastroesophageal reflux disease without esophagitis 02/03/2017    GERD (gastroesophageal reflux disease)     Glaucoma     Hypertension     Obesity (BMI 30-39.9) 10/24/2013    PN (peripheral neuropathy)     Sleep apnea     Type 2 diabetes mellitus with both eyes affected by mild nonproliferative retinopathy without macular edema, with long-term current use of insulin     Type 2 diabetes mellitus with diabetic polyneuropathy, with long-term current use of insulin     Type II or unspecified type diabetes mellitus with other specified manifestations, uncontrolled      Past Surgical History:   Procedure Laterality Date    BREAST CYST EXCISION Right     1980s    CARPAL TUNNEL RELEASE Right      CATARACT EXTRACTION      CATARACT EXTRACTION W/  INTRAOCULAR LENS IMPLANT Right 2017    Dr Brewster     SECTION      COLONOSCOPY N/A 2020    Procedure: COLONOSCOPY;  Surgeon: Mila Merchant MD;  Location: [facility] ENDO (2ND FLR);  Service: Endoscopy;  Laterality: N/A;    COLONOSCOPY N/A 11/10/2023    Procedure: COLONOSCOPY;  Surgeon: Uzair Mobley MD;  Location: [facility] ENDO;  Service: Endoscopy;  Laterality: N/A;    EPIDURAL STEROID INJECTION N/A 2019    Procedure: Injection, Steroid, Epidural LUMBAR/CAUDAL L5-S1 IL KARLA;  Surgeon: Jef García MD;  Location: [facility] PAIN MGT;  Service: Pain Management;  Laterality: N/A;  NEEDS CONSENT    EPIDURAL STEROID INJECTION N/A 2019    Procedure: Injection, Steroid, Epidural LUMBAR/CAUDAL L5-S1 IL KARLA;  Surgeon: Jef García MD;  Location: [facility] PAIN MGT;  Service: Pain Management;  Laterality: N/A;  NEEDS CONSENT    ESOPHAGOGASTRODUODENOSCOPY N/A 2020    Procedure: EGD (ESOPHAGOGASTRODUODENOSCOPY);  Surgeon: Mila Merchant MD;  Location: [facility] (2ND [facility]);  Service: Endoscopy;  Laterality: N/A;    KNEE ARTHROSCOPY  14    right    MYOMECTOMY      TRANSFORAMINAL EPIDURAL INJECTION OF STEROID Right 2019    Procedure: LUMBAR TRANSFORAMINAL RIGHT L5-S1  TF KARLA;  Surgeon: Jef García MD;  Location: [facility] PAIN MGT;  Service: Pain Management;  Laterality: Right;  NEEDS CONSENT    TRIGGER FINGER RELEASE      TRIGGER FINGER RELEASE Left 4/15/2019    Procedure: RELEASE, TRIGGER FINGER left thumb;  Surgeon: Yuridia Gonzales MD;  Location: [facility] OR;  Service: Orthopedics;  Laterality: Left;  stretcher, supine, hand pan 1 and pan 2     OB History          3    Para   1    Term   1            AB   1    Living             SAB   1    IAB        Ectopic        Multiple        Live Births                   Family History   Problem Relation Age of Onset    Arthritis Mother     Diabetes Mother     Heart disease  "Mother     Miscarriages / Stillbirths Mother     Vision loss Mother     Breast cancer Mother 75        70s    Cancer Mother 70        Breast    No Known Problems Father     Diabetes Unknown         "Half of my family"    Breast cancer Maternal Grandmother 50        50s    Breast cancer Sister 50        50s    Osteoarthritis Sister     Breast cancer Cousin 40        40s    Heart failure Brother     Pacemaker/defibrilator Brother     No Known Problems Maternal Aunt     No Known Problems Maternal Uncle     No Known Problems Paternal Aunt     No Known Problems Paternal Uncle     No Known Problems Maternal Grandfather     No Known Problems Paternal Grandmother     No Known Problems Paternal Grandfather     Breast cancer Cousin 40        40s    Breast cancer Sister 40    Osteoarthritis Sister     Breast cancer Sister 54    Heart disease Sister     Thyroid disease Neg Hx     Ovarian cancer Neg Hx     Amblyopia Neg Hx     Blindness Neg Hx     Cataracts Neg Hx     Glaucoma Neg Hx     Hypertension Neg Hx     Macular degeneration Neg Hx     Retinal detachment Neg Hx     Strabismus Neg Hx     Stroke Neg Hx     Lupus Neg Hx      Social History     Tobacco Use    Smoking status: Former     Types: Cigarettes     Start date: 12/9/2002    Smokeless tobacco: Never   Substance Use Topics    Alcohol use: Yes     Comment: socially; couple glasses    Drug use: No       Current Outpatient Medications   Medication Sig    aspirin (ECOTRIN) 81 MG EC tablet Take 81 mg by mouth once daily. Instructed to stop 1 week prior to surgery on 4/15/19 per Dr. Conklin    atorvastatin (LIPITOR) 40 MG tablet TAKE 1 TABLET(40 MG) BY MOUTH EVERY DAY    BD ULTRA-FINE SHORT PEN NEEDLE 31 gauge x 5/16" Ndle USE AS DIRECTED TWICE DAILY    blood-glucose meter Misc use twice a day    chlorhexidine (PERIDEX) 0.12 % solution SWISH FOR 30 SECONDS AND SPIT 15ML PO  BID . DO NOT SWALLOW    cholecalciferol, vitamin D3, 125 mcg (5,000 unit) Tab Take 1,000 Units by " mouth 2 (two) times a day.    fluticasone propionate (FLONASE) 50 mcg/actuation nasal spray 1 spray (50 mcg total) by Each Nostril route as needed for Rhinitis or Allergies.    insulin degludec (TRESIBA FLEXTOUCH U-100) 100 unit/mL (3 mL) InPn Inject 32 Units into the skin once daily.    lancets (ACCU-CHEK FASTCLIX LANCET DRUM) Misc Inject 1 each into the skin 2 (two) times daily before meals.    lancets 33 gauge Misc 1 lancet by Misc.(Non-Drug; Combo Route) route 2 (two) times daily before meals.    losartan (COZAAR) 100 MG tablet TAKE 1 TABLET BY MOUTH EVERY DAY    magnesium oxide (MAG-OX) 400 mg (241.3 mg magnesium) tablet Take 400 mg by mouth once daily.    metFORMIN (GLUCOPHAGE) 500 MG tablet TAKE 2 TABLETS BY MOUTH AT LUNCH AND 1 TABLET AT DINNER    methotrexate 2.5 MG Tab Take 2.5 mg by mouth every 7 days.    prednisoLONE acetate (PRED FORTE) 1 % DrpS 1 drop 4 (four) times daily.    divalproex (DEPAKOTE) 250 MG EC tablet Take 1 tablet (250 mg total) by mouth every evening.    folic acid (FOLVITE) 1 MG tablet Take 1 tablet (1 mg total) by mouth once daily.    furosemide (LASIX) 20 MG tablet Take 1 tablet (20 mg total) by mouth daily as needed.    lidocaine HCL 2% (XYLOCAINE) 2 % jelly Apply topically as needed. Apply topically once nightly to affected part of foot/feet. (Patient not taking: Reported on 9/8/2023)    ondansetron (ZOFRAN) 4 MG tablet Take 1 tablet (4 mg total) by mouth every 8 (eight) hours as needed for Nausea. (Patient not taking: Reported on 9/8/2023)    pantoprazole (PROTONIX) 40 MG tablet Take 1 tablet (40 mg total) by mouth once daily.    QUEtiapine (SEROQUEL) 25 MG Tab Take 1/2 a tablet (12.5mg) nighty as needed for agitation (Patient not taking: Reported on 9/8/2023)     No current facility-administered medications for this visit.         Review of Systems:  Review of Systems   Constitutional:  Negative for activity change, appetite change, fever and unexpected weight change.    Respiratory:  Negative for shortness of breath.    Cardiovascular:  Negative for chest pain.   Gastrointestinal:  Positive for constipation. Negative for abdominal pain, blood in stool, diarrhea, nausea and vomiting.   Endocrine: Negative for hot flashes.   Genitourinary:  Positive for vaginal dryness and vaginal odor. Negative for dysuria, frequency, hematuria, hot flashes, pelvic pain, urgency, vaginal bleeding, vaginal discharge, vaginal pain, urinary incontinence and postmenopausal bleeding.   Integumentary:  Negative for breast mass.   Psychiatric/Behavioral:  Negative for sleep disturbance.    Breast: Negative for lump and mass     OBJECTIVE:     Physical Exam:  Physical Exam  Vitals reviewed.   Constitutional:       General: She is not in acute distress.     Appearance: She is well-developed. She is not diaphoretic.   HENT:      Head: Normocephalic and atraumatic.   Eyes:      General: No scleral icterus.        Right eye: No discharge.         Left eye: No discharge.      Conjunctiva/sclera: Conjunctivae normal.   Neck:      Thyroid: No thyromegaly.   Cardiovascular:      Rate and Rhythm: Normal rate.   Pulmonary:      Effort: Pulmonary effort is normal.   Chest:   Breasts:     Breasts are symmetrical.      Right: No inverted nipple, mass, nipple discharge, skin change or tenderness.      Left: No inverted nipple, mass, nipple discharge, skin change or tenderness.   Abdominal:      General: There is no distension.      Palpations: Abdomen is soft.      Tenderness: There is no abdominal tenderness.   Genitourinary:     Labia:         Right: No rash, tenderness, lesion or injury.         Left: No rash, tenderness, lesion or injury.       Vagina: Normal. No signs of injury and foreign body. No vaginal discharge, erythema, tenderness or bleeding.      Cervix: No cervical motion tenderness, discharge or friability.      Uterus: Not deviated, not enlarged, not fixed and not tender.       Adnexa:         Right:  No mass, tenderness or fullness.          Left: No mass, tenderness or fullness.     Musculoskeletal:         General: Normal range of motion.      Cervical back: Normal range of motion and neck supple.   Lymphadenopathy:      Cervical: No cervical adenopathy.   Skin:     General: Skin is warm and dry.      Findings: No erythema or rash.   Neurological:      Mental Status: She is alert and oriented to person, place, and time.       ASSESSMENT:     No diagnosis found.       Plan:      WWE  - Postmenopausal. No issues. Reviewed imaging and fibroids are small and calcified. Leave alone unless symptomatic   - Vaccines utd  - Labs utd  - DEXA, Mammogram, Colonoscopy utd  - Adequate screening with pap smear has been normal. Discussed with patient that she no longer requires screening based on ASCCP guidelines.   - Vitamin D and Calcium recs given  - CBE normal. Physical exam normal. VSS     Counseling time: 30 minutes    Katiuska Worthy

## 2025-03-07 NOTE — TELEPHONE ENCOUNTER
Can she come in for visit because of the chest discomfort, aches or headaches? She should go to the ER if the chest discomfort returns or if additional symptoms develop.  
Pt notified via portal   
decreased man/decreased step length/decreased stride length

## 2025-05-13 NOTE — PATIENT INSTRUCTIONS
Increase your Metformin to either 1000 mg twice daily EVERY DAY or 2000 mg daily in the morning with food    If no nausea, increase your Victoza to 1.8 mg daily    Increase your fluid intake to 6 bottles daily and email me if you can consistently do this and I will start Invokana     For now, continue your Levemir 40 units daily    Per ortho/weight-bearing as tolerated

## (undated) DEVICE — PAD UNDERPAD 30X30

## (undated) DEVICE — SCRUB 10% POVIDONE IODINE 4OZ

## (undated) DEVICE — UNDERGLOVES BIOGEL PI SZ 7 LF

## (undated) DEVICE — SEE MEDLINE ITEM 146268

## (undated) DEVICE — NDL SAFETY 22G X 1.5 ECLIPSE

## (undated) DEVICE — BANDAGE CONFORM 3IN STRL

## (undated) DEVICE — FORCEP BIPOLAR ADSON 1MM TIP

## (undated) DEVICE — GLOVE BIOGEL ECLIPSE SZ 6.5

## (undated) DEVICE — SEE MEDLINE ITEM 146372

## (undated) DEVICE — CASSETTE INFINITI

## (undated) DEVICE — DRAPE SURG W/TWL 17 5/8X23

## (undated) DEVICE — PACK UPPER EXTREMITY BAPTIST

## (undated) DEVICE — FORCEP STRAIGHT DISP

## (undated) DEVICE — BANDAGE ELASTIC 2X5 VELCRO ST

## (undated) DEVICE — BANDAGE SOFFORM STER 2IN

## (undated) DEVICE — SUT 4/0 18IN ETHILON BL P3

## (undated) DEVICE — SYR B-D DISP CONTROL 10CC100/C

## (undated) DEVICE — DRESSING LEUKOPLAST FLEX 1X3IN

## (undated) DEVICE — APPLICATOR CHLORAPREP ORN 26ML

## (undated) DEVICE — Device

## (undated) DEVICE — DRESSING N ADH OIL EMUL 3X3

## (undated) DEVICE — GAUZE SPONGE 4X4 12PLY

## (undated) DEVICE — GAUZE SPONGE 4'X4 12 PLY

## (undated) DEVICE — SYR SLIP TIP 1CC

## (undated) DEVICE — BANDAGE ADHESIVE

## (undated) DEVICE — SOL 9P NACL IRR PIC IL

## (undated) DEVICE — SOL BETADINE 5%

## (undated) DEVICE — GLOVE BIOGEL SKINSENSE PI 7.0

## (undated) DEVICE — CORD BIPOLAR 12 FOOT

## (undated) DEVICE — TOURNIQUET SB QC DP 18X4IN